# Patient Record
Sex: FEMALE | Race: BLACK OR AFRICAN AMERICAN | NOT HISPANIC OR LATINO | Employment: FULL TIME | URBAN - METROPOLITAN AREA
[De-identification: names, ages, dates, MRNs, and addresses within clinical notes are randomized per-mention and may not be internally consistent; named-entity substitution may affect disease eponyms.]

---

## 2017-01-02 ENCOUNTER — TELEPHONE (OUTPATIENT)
Dept: FAMILY MEDICINE | Facility: CLINIC | Age: 47
End: 2017-01-02

## 2017-01-02 DIAGNOSIS — E11.65 TYPE 2 DIABETES MELLITUS WITH HYPERGLYCEMIA, WITHOUT LONG-TERM CURRENT USE OF INSULIN (H): Primary | ICD-10-CM

## 2017-01-02 RX ORDER — INSULIN GLARGINE 100 [IU]/ML
55 INJECTION, SOLUTION SUBCUTANEOUS AT BEDTIME
Qty: 6 PEN | Refills: 3 | Status: SHIPPED | OUTPATIENT
Start: 2017-01-02 | End: 2017-09-13

## 2017-01-02 NOTE — TELEPHONE ENCOUNTER
Pharmacist notified.  Routing to provider please sign order for pen needles.    Catherine Nguyen RN

## 2017-01-02 NOTE — TELEPHONE ENCOUNTER
Pharmacist( Carlene) calling due to New Year Insurance change the RX for Lantus will not longer be covered , now they will cover Basaglar.  Please advise to change RX.      [facility]= [phone].    DELANEY Avila MA

## 2017-01-09 ENCOUNTER — OFFICE VISIT (OUTPATIENT)
Dept: FAMILY MEDICINE | Facility: CLINIC | Age: 47
End: 2017-01-09
Payer: COMMERCIAL

## 2017-01-09 VITALS
HEIGHT: 67 IN | TEMPERATURE: 97.1 F | HEART RATE: 90 BPM | DIASTOLIC BLOOD PRESSURE: 81 MMHG | WEIGHT: 293 LBS | SYSTOLIC BLOOD PRESSURE: 131 MMHG | BODY MASS INDEX: 45.99 KG/M2 | OXYGEN SATURATION: 100 %

## 2017-01-09 DIAGNOSIS — I10 ESSENTIAL HYPERTENSION: ICD-10-CM

## 2017-01-09 DIAGNOSIS — R82.90 UNSPECIFIED ABNORMAL FINDINGS IN URINE: ICD-10-CM

## 2017-01-09 DIAGNOSIS — E66.01 MORBID OBESITY DUE TO EXCESS CALORIES (H): ICD-10-CM

## 2017-01-09 DIAGNOSIS — R42 DIZZINESS: Primary | ICD-10-CM

## 2017-01-09 DIAGNOSIS — Z79.4 TYPE 2 DIABETES MELLITUS WITHOUT COMPLICATION, WITH LONG-TERM CURRENT USE OF INSULIN (H): ICD-10-CM

## 2017-01-09 DIAGNOSIS — E11.65 TYPE 2 DIABETES MELLITUS WITH HYPERGLYCEMIA, WITH LONG-TERM CURRENT USE OF INSULIN (H): ICD-10-CM

## 2017-01-09 DIAGNOSIS — J45.20 INTERMITTENT ASTHMA, UNCOMPLICATED: ICD-10-CM

## 2017-01-09 DIAGNOSIS — E11.9 TYPE 2 DIABETES MELLITUS WITHOUT COMPLICATION, WITH LONG-TERM CURRENT USE OF INSULIN (H): ICD-10-CM

## 2017-01-09 DIAGNOSIS — Z79.4 TYPE 2 DIABETES MELLITUS WITH HYPERGLYCEMIA, WITH LONG-TERM CURRENT USE OF INSULIN (H): ICD-10-CM

## 2017-01-09 DIAGNOSIS — I50.22 CHRONIC SYSTOLIC CONGESTIVE HEART FAILURE (H): ICD-10-CM

## 2017-01-09 LAB
ALBUMIN SERPL-MCNC: 3.6 G/DL (ref 3.4–5)
ALBUMIN UR-MCNC: NEGATIVE MG/DL
ALP SERPL-CCNC: 118 U/L (ref 40–150)
ALT SERPL W P-5'-P-CCNC: 30 U/L (ref 0–50)
ANION GAP SERPL CALCULATED.3IONS-SCNC: 8 MMOL/L (ref 3–14)
APPEARANCE UR: CLEAR
AST SERPL W P-5'-P-CCNC: 19 U/L (ref 0–45)
BACTERIA #/AREA URNS HPF: ABNORMAL /HPF
BASOPHILS # BLD AUTO: 0 10E9/L (ref 0–0.2)
BASOPHILS NFR BLD AUTO: 0.2 %
BILIRUB SERPL-MCNC: 0.4 MG/DL (ref 0.2–1.3)
BILIRUB UR QL STRIP: NEGATIVE
BUN SERPL-MCNC: 7 MG/DL (ref 7–30)
CALCIUM SERPL-MCNC: 8.8 MG/DL (ref 8.5–10.1)
CHLORIDE SERPL-SCNC: 100 MMOL/L (ref 94–109)
CO2 SERPL-SCNC: 27 MMOL/L (ref 20–32)
COLOR UR AUTO: YELLOW
CREAT SERPL-MCNC: 0.73 MG/DL (ref 0.52–1.04)
DIFFERENTIAL METHOD BLD: NORMAL
EOSINOPHIL # BLD AUTO: 0.1 10E9/L (ref 0–0.7)
EOSINOPHIL NFR BLD AUTO: 0.5 %
ERYTHROCYTE [DISTWIDTH] IN BLOOD BY AUTOMATED COUNT: 14.2 % (ref 10–15)
GFR SERPL CREATININE-BSD FRML MDRD: 85 ML/MIN/1.7M2
GLUCOSE SERPL-MCNC: 156 MG/DL (ref 70–99)
GLUCOSE UR STRIP-MCNC: NEGATIVE MG/DL
HBA1C MFR BLD: 9.8 % (ref 4.3–6)
HCT VFR BLD AUTO: 37.4 % (ref 35–47)
HGB BLD-MCNC: 11.9 G/DL (ref 11.7–15.7)
HGB UR QL STRIP: ABNORMAL
KETONES UR STRIP-MCNC: NEGATIVE MG/DL
LEUKOCYTE ESTERASE UR QL STRIP: ABNORMAL
LYMPHOCYTES # BLD AUTO: 2.9 10E9/L (ref 0.8–5.3)
LYMPHOCYTES NFR BLD AUTO: 30 %
MCH RBC QN AUTO: 26.7 PG (ref 26.5–33)
MCHC RBC AUTO-ENTMCNC: 31.8 G/DL (ref 31.5–36.5)
MCV RBC AUTO: 84 FL (ref 78–100)
MONOCYTES # BLD AUTO: 1 10E9/L (ref 0–1.3)
MONOCYTES NFR BLD AUTO: 10.4 %
NEUTROPHILS # BLD AUTO: 5.6 10E9/L (ref 1.6–8.3)
NEUTROPHILS NFR BLD AUTO: 58.9 %
NITRATE UR QL: NEGATIVE
NON-SQ EPI CELLS #/AREA URNS LPF: ABNORMAL /LPF
PH UR STRIP: 5.5 PH (ref 5–7)
PLATELET # BLD AUTO: 428 10E9/L (ref 150–450)
POTASSIUM SERPL-SCNC: 3.9 MMOL/L (ref 3.4–5.3)
PROT SERPL-MCNC: 8 G/DL (ref 6.8–8.8)
RBC # BLD AUTO: 4.45 10E12/L (ref 3.8–5.2)
RBC #/AREA URNS AUTO: ABNORMAL /HPF (ref 0–2)
SODIUM SERPL-SCNC: 135 MMOL/L (ref 133–144)
SP GR UR STRIP: 1.02 (ref 1–1.03)
URN SPEC COLLECT METH UR: ABNORMAL
UROBILINOGEN UR STRIP-ACNC: 0.2 EU/DL (ref 0.2–1)
WBC # BLD AUTO: 9.5 10E9/L (ref 4–11)
WBC #/AREA URNS AUTO: ABNORMAL /HPF (ref 0–2)

## 2017-01-09 PROCEDURE — 93000 ELECTROCARDIOGRAM COMPLETE: CPT | Performed by: FAMILY MEDICINE

## 2017-01-09 PROCEDURE — 81001 URINALYSIS AUTO W/SCOPE: CPT | Performed by: FAMILY MEDICINE

## 2017-01-09 PROCEDURE — 85025 COMPLETE CBC W/AUTO DIFF WBC: CPT | Performed by: FAMILY MEDICINE

## 2017-01-09 PROCEDURE — 80053 COMPREHEN METABOLIC PANEL: CPT | Performed by: FAMILY MEDICINE

## 2017-01-09 PROCEDURE — 83036 HEMOGLOBIN GLYCOSYLATED A1C: CPT | Performed by: FAMILY MEDICINE

## 2017-01-09 PROCEDURE — 87086 URINE CULTURE/COLONY COUNT: CPT | Performed by: FAMILY MEDICINE

## 2017-01-09 PROCEDURE — 99214 OFFICE O/P EST MOD 30 MIN: CPT | Performed by: FAMILY MEDICINE

## 2017-01-09 PROCEDURE — 36415 COLL VENOUS BLD VENIPUNCTURE: CPT | Performed by: FAMILY MEDICINE

## 2017-01-09 RX ORDER — MECLIZINE HYDROCHLORIDE 25 MG/1
25 TABLET ORAL EVERY 6 HOURS PRN
Qty: 30 TABLET | Refills: 1 | Status: SHIPPED | OUTPATIENT
Start: 2017-01-09 | End: 2017-09-13

## 2017-01-09 NOTE — MR AVS SNAPSHOT
After Visit Summary   1/9/2017    Bettina Montes    MRN: [id number]           Patient Information     Date Of Birth          1970        Visit Information        Provider Department      1/9/2017 2:00 PM Ambreen Knight MD [facility]        Today's Diagnoses     Dizziness    -  1     Chronic systolic congestive heart failure (H)         Type 2 diabetes mellitus without complication, with long-term current use of insulin (H)         Morbid obesity due to excess calories (H)         Essential hypertension         Mild persistent asthma without complication           Care Instructions    Call the imaging center at [phone] or  [phone] to schedule your echocardiogram prior to seeing the cardiologist.    [facility]    If you have any questions regarding to your visit please contact your care team:       Team Red:   Clinic Hours Telephone Number   Dr. Ambreen Pringle, NP   7am-7pm  Monday - Thursday   7am-5pm  Fridays  [phone]  (Appointment scheduling available 24/7)    Questions about your visit?   Team Line  [phone]   Urgent Care - [place] [facility] - 11am-9pm Monday-Friday Saturday-Sunday- 9am-5pm   [place] - 5pm-9pm Monday-Friday Saturday-Sunday- 9am-5pm  [phone] - [place]   [phone] - [place]       What options do I have for visits at the clinic other than the traditional office visit?  To expand how we care for you, many of our providers are utilizing electronic visits (e-visits) and telephone visits, when medically appropriate, for interactions with their patients rather than a visit in the clinic.   We also offer nurse visits for many medical concerns. Just like any other service, we will bill your insurance company for this type of visit based on time spent on the phone with your provider. Not all insurance companies cover these visits. Please check with your  medical insurance if this type of visit is covered. You will be responsible for any charges that are not paid by your insurance.      E-visits via Bauble[employer]hart:  generally incur a $35.00 fee.  Telephone visits:  Time spent on the phone: *charged based on time that is spent on the phone in increments of 10 minutes. Estimated cost:   5-10 mins $30.00   11-20 mins. $59.00   21-30 mins. $85.00     Use PacketFront (secure email communication and access to your chart) to send your primary care provider a message or make an appointment. Ask someone on your Team how to sign up for PacketFront.  For a Price Quote for your services, please call our [employer] Line at [phone].      As always, Thank you for trusting us with your health care needs!  Osmin Tylertarhoda          Follow-ups after your visit        Additional Services     CARDIOLOGY EVAL ADULT REFERRAL       Your provider [place] you to:  [facility] [place] [facility] [place] [phone]   [email or web address]    Please be aware that coverage of these services is subject to the terms and limitations of your health insurance plan.  Call member services at your health plan with any benefit or coverage questions.      Type of Referral:  New Cardiology Consult    Timeframe requested:  Within 1 month    Please bring the following to your appointment:  >>   Any x-rays, CTs or MRIs which have been performed.  Contact the facility where they were done to arrange for  prior to your scheduled appointment.    >>   List of current medications  >>   This referral request   >>   Any documents/labs given to you for this referral                  Follow-up notes from your care team     Return in about 3 months (around 4/9/2017), or if symptoms worsen or fail to improve, for diabetes (fasting labs up to one week prior).      Future tests that were ordered for you today     Open Future Orders        Priority Expected Expires  "Ordered    Echocardiogram Complete Routine  1/9/2018 1/9/2017            Who to contact     If you have questions or need follow up information about today's clinic visit or your schedule please contact [facility] directly at [phone].  Normal or non-critical lab and imaging results will be communicated to you by MyChart, letter or phone within 4 business days after the clinic has received the results. If you do not hear from us within 7 days, please contact the clinic through MyChart or phone. If you have a critical or abnormal lab result, we will notify you by phone as soon as possible.  Submit refill requests through food.[email or web address] or call your pharmacy and they will forward the refill request to us. Please allow 3 business days for your refill to be completed.          Additional Information About Your Visit        Care EveryWhere ID     This is your Care EveryWhere ID. This could be used by other organizations to access your [facility] medical records  [id number]        Your Vitals Were     Pulse Temperature Height    90 97.1  F (36.2  C) (Oral) 5' 6.81\" (1.697 m)    BMI (Body Mass Index) Pulse Oximetry Last Period    46.15 kg/m2 100% 01/09/2017 (Exact Date)    Breastfeeding?          No         Blood Pressure from Last 3 Encounters:   01/09/17 131/81   10/10/16 131/81   06/13/16 118/74    Weight from Last 3 Encounters:   01/09/17 293 lb (132.904 kg)   10/10/16 291 lb 3.2 oz (132.087 kg)   06/13/16 287 lb 6.4 oz (130.364 kg)              We Performed the Following     CARDIOLOGY EVAL ADULT REFERRAL     CBC with platelets differential     Comprehensive metabolic panel     EKG 12-lead complete w/read - Clinics     UA reflex to Microscopic and Culture          Today's Medication Changes          These changes are accurate as of: 1/9/17  2:48 PM.  If you have any questions, ask your nurse or doctor.               Start taking these medicines.        Dose/Directions    meclizine 25 MG tablet   Commonly " known as:  ANTIVERT   Used for:  Dizziness   Started by:  Ambreen Knight MD        Dose:  25 mg   Take 1 tablet (25 mg) by mouth every 6 hours as needed for dizziness or nausea   Quantity:  30 tablet   Refills:  1            Where to get your medicines      These medications were sent to [facility] [id number] [facility] [place] - [address]  [address] [place] [address]     Phone:  [phone]    - meclizine 25 MG tablet             Primary Care Provider Office Phone # Fax #    Anuja Carrollbrina Tenorio [phone]       [facility] [address]  [facility] [address]        Thank you!     Thank you for choosing [facility]  for your care. Our goal is always to provide you with excellent care. Hearing back from our patients is one way we can continue to improve our services. Please take a few minutes to complete the written survey that you may receive in the mail after your visit with us. Thank you!             Your Updated Medication List - Protect others around you: Learn how to safely use, store and throw away your medicines at www.disposemymeds.org.          This list is accurate as of: 1/9/17  2:48 PM.  Always use your most recent med list.                   Brand Name Dispense Instructions for use    furosemide 40 MG tablet    LASIX    60 tablet    TAKE 1.5 TABLETS (60 MG) BY MOUTH EVERY OTHER DAY AND 1 TABLET (40MG) BY MOUTH EVERY OTHER DAY (ALTE       hydrALAZINE 25 MG tablet    APRESOLINE    150 tablet    Take 3 tablets (75 mg) by mouth 2 times daily       * insulin glargine 100 UNIT/ML injection    LANTUS    3 Month    Take 5 units SC in the morning and 50 units SC at bedtime.       * insulin glargine 100 UNIT/ML injection     6 pen    Inject 55 Units Subcutaneous At Bedtime       insulin pen needle 31G X 5 MM    B-D U/F    100 each    Use 1 daily or as directed.       levalbuterol 1.25 MG/3ML neb solution    XOPENEX     Take 1 ampule by  nebulization every 4 hours as needed for shortness of breath / dyspnea or wheezing       losartan 50 MG tablet    COZAAR    180 tablet    Take 1 tablet (50 mg) by mouth 2 times daily       lovastatin 20 MG tablet    MEVACOR    90 tablet    Take 1 tablet (20 mg) by mouth At Bedtime       meclizine 25 MG tablet    ANTIVERT    30 tablet    Take 1 tablet (25 mg) by mouth every 6 hours as needed for dizziness or nausea       metFORMIN 500 MG tablet    GLUCOPHAGE    360 tablet    Take 2 tablets (1,000 mg) by mouth 2 times daily (with meals)       metoprolol 100 MG tablet    LOPRESSOR    180 tablet    Take 1 tablet (100 mg) by mouth 2 times daily       montelukast 10 MG tablet    SINGULAIR    90 tablet    Take 1 tablet (10 mg) by mouth At Bedtime       potassium chloride SA 20 MEQ CR tablet    potassium chloride    180 tablet    Take 1 tablet (20 mEq) by mouth 2 times daily       spironolactone 25 MG tablet    ALDACTONE    90 tablet    Take 1 tablet (25 mg) by mouth daily       TYLENOL 500 MG tablet   Generic drug:  acetaminophen      1 TABLET EVERY 4 HOURS AS NEEDED       VITAMIN D3 PO      Take 2,000 Units by mouth daily       * Notice:  This list has 2 medication(s) that are the same as other medications prescribed for you. Read the directions carefully, and ask your doctor or other care provider to review them with you.

## 2017-01-09 NOTE — Clinical Note
[facility]  [address] [place] [address]    January 11, 2017    Bettina Montes  [address] [place] [address]          Dear Bettina,  Your results are normal.  Enclosed is a copy of your results.     Results for orders placed or performed in visit on 01/09/17   CBC with platelets differential   Result Value Ref Range    WBC 9.5 4.0 - 11.0 10e9/L    RBC Count 4.45 3.8 - 5.2 10e12/L    Hemoglobin 11.9 11.7 - 15.7 g/dL    Hematocrit 37.4 35.0 - 47.0 %    MCV 84 78 - 100 fl    MCH 26.7 26.5 - 33.0 pg    MCHC 31.8 31.5 - 36.5 g/dL    RDW 14.2 10.0 - 15.0 %    Platelet Count 428 150 - 450 10e9/L    Diff Method Automated Method     % Neutrophils 58.9 %    % Lymphocytes 30.0 %    % Monocytes 10.4 %    % Eosinophils 0.5 %    % Basophils 0.2 %    Absolute Neutrophil 5.6 1.6 - 8.3 10e9/L    Absolute Lymphocytes 2.9 0.8 - 5.3 10e9/L    Absolute Monocytes 1.0 0.0 - 1.3 10e9/L    Absolute Eosinophils 0.1 0.0 - 0.7 10e9/L    Absolute Basophils 0.0 0.0 - 0.2 10e9/L   Comprehensive metabolic panel   Result Value Ref Range    Sodium 135 133 - 144 mmol/L    Potassium 3.9 3.4 - 5.3 mmol/L    Chloride 100 94 - 109 mmol/L    Carbon Dioxide 27 20 - 32 mmol/L    Anion Gap 8 3 - 14 mmol/L    Glucose 156 (H) 70 - 99 mg/dL    Urea Nitrogen 7 7 - 30 mg/dL    Creatinine 0.73 0.52 - 1.04 mg/dL    GFR Estimate 85 >60 mL/min/1.7m2    GFR Estimate If Black >90   GFR Calc   >60 mL/min/1.7m2    Calcium 8.8 8.5 - 10.1 mg/dL    Bilirubin Total 0.4 0.2 - 1.3 mg/dL    Albumin 3.6 3.4 - 5.0 g/dL    Protein Total 8.0 6.8 - 8.8 g/dL    Alkaline Phosphatase 118 40 - 150 U/L    ALT 30 0 - 50 U/L    AST 19 0 - 45 U/L   UA reflex to Microscopic and Culture   Result Value Ref Range    Color Urine Yellow     Appearance Urine Clear     Glucose Urine Negative NEG mg/dL    Bilirubin Urine Negative NEG    Ketones Urine Negative NEG mg/dL    Specific Gravity  Urine 1.025 1.003 - 1.035    Blood Urine Large (A) NEG    pH Urine 5.5 5.0 - 7.0 pH    Protein Albumin Urine Negative NEG mg/dL    Urobilinogen Urine 0.2 0.2 - 1.0 EU/dL    Nitrite Urine Negative NEG    Leukocyte Esterase Urine Small (A) NEG    Source Midstream Urine    Urine Microscopic   Result Value Ref Range    WBC Urine 2-5 (A) 0 - 2 /HPF    RBC Urine 10-25 (A) 0 - 2 /HPF    Squamous Epithelial /LPF Urine Few FEW /LPF    Bacteria Urine Few (A) NEG /HPF   Urine Culture Aerobic Bacterial   Result Value Ref Range    Specimen Description Midstream Urine     Culture Micro No growth     Micro Report Status FINAL 01/10/2017        If you have any questions or concerns, please call myself or my nurse at [phone].      Sincerely,        Ambreen Knight MD/pb

## 2017-01-09 NOTE — PATIENT INSTRUCTIONS
Call the imaging center at [phone] or  [phone] to schedule your echocardiogram prior to seeing the cardiologist.    [facility]    If you have any questions regarding to your visit please contact your care team:       Team Red:   Clinic Hours Telephone Number   Dr. Ambreen Pringle, NP   7am-7pm  Monday - Thursday   7am-5pm  Fridays  [phone]  (Appointment scheduling available 24/7)    Questions about your visit?   Team Line  [phone]   [facility] [place] [facility] 11am-9pm Monday-Friday Saturday-Sunday- 9am-5pm   [place] - 5pm-9pm Monday-Friday Saturday-Sunday- 9am-5pm  [phone] - [place]   [phone] - [place]       What options do I have for visits at the clinic other than the traditional office visit?  To expand how we care for you, many of our providers are utilizing electronic visits (e-visits) and telephone visits, when medically appropriate, for interactions with their patients rather than a visit in the clinic.   We also offer nurse visits for many medical concerns. Just like any other service, we will bill your insurance company for this type of visit based on time spent on the phone with your provider. Not all insurance companies cover these visits. Please check with your medical insurance if this type of visit is covered. You will be responsible for any charges that are not paid by your insurance.      E-visits via [employer]:  generally incur a $35.00 fee.  Telephone visits:  Time spent on the phone: *charged based on time that is spent on the phone in increments of 10 minutes. Estimated cost:   5-10 mins $30.00   11-20 mins. $59.00   21-30 mins. $85.00     Use [employer]ost (secure email communication and access to your chart) to send your primary care provider a message or make an appointment. Ask someone on your Team how to sign up for [employer] Inter[employer].  For a Price Quote for your services, please call  our Consumer Price Line at [phone].      As always, Thank you for trusting us with your health care needs!  Osmin Lyn

## 2017-01-09 NOTE — PROGRESS NOTES
SUBJECTIVE:                                                    Bettina Montes is a 46 year old morbidly obese female who presents to clinic today for the following health issues:    Dizziness      Duration: x 3 days     Description   Feeling faint:  YES  Feeling like the surroundings are moving: YES  Loss of consciousness or falls: no     Intensity:  moderate, severe    Accompanying signs and symptoms:   Nausea/vomitting: YES  Palpitations: no   Weakness in arms or legs: YES - numbness   Vision or speech changes: YES - blurred  Ringing in ears (Tinnitus): no   Hearing loss related to dizziness: no   Other (fevers/chills/sweating/dyspnea): YES - pain in the neck that goes all the way around, fatigue      History (similar episodes/head trauma/previous evaluation/recent bleeding): None    Precipitating or alleviating factors (new meds/chemicals): tylenol  Worse with activity/head movement: YES    Therapies tried and outcome: None     She denies chest pain, vomiting, shortness of breath, wheezing, recent viral upper respiratory illness, fever, cough, localized numbness, tingling, weakness, or change in bowel movements. She has some dysuria. Patient's last menstrual period was 01/09/2017 (exact date).     She is lost to follow-up with cardiology for CHF. She sees another [facility] provider for diabetes, hypertension, and hypercholesterolemia. Patient also has asthma, mild intermittent. Patient has had asthma for years. Occasional wheezing and shortness of breath are triggered by colds and relieved by albuterol.     I have reviewed the patient's medical history in detail and updated the computerized patient record.     ROS:  C: NEGATIVE for fever, chills, change in weight  I: NEGATIVE for worrisome rashes, moles or lesions  E: NEGATIVE for vision changes or irritation  ENT/MOUTH: chronic mild rhinitis   R: NEGATIVE for significant cough or SOB  CV: NEGATIVE for chest pain, palpitations or peripheral edema  GI: see HPI  "   female: menses: irregular   M: NEGATIVE for significant arthralgias or myalgia  NEURO: see HPI   ENDOCRINE: diabetes   H: NEGATIVE for bleeding problems  P: NEGATIVE for changes in mood or affect    OBJECTIVE:                                                    /81 mmHg  Pulse 90  Temp(Src) 97.1  F (36.2  C) (Oral)  Ht 5' 6.81\" (1.697 m)  Wt 293 lb (132.904 kg)  BMI 46.15 kg/m2  SpO2 100%  LMP 01/09/2017 (Exact Date)  Breastfeeding? No  Body mass index is 46.15 kg/(m^2).  GENERAL: alert, no distress and obese  EYES: Eyes grossly normal to inspection, PERRL and conjunctivae and sclerae normal  HENT: ear canals and TM's normal, nose and mouth without ulcers or lesions  NECK: no adenopathy, no asymmetry, masses, or scars and thyroid normal to palpation  RESP: lungs clear to auscultation - no rales, rhonchi or wheezes  CV: regular rate and rhythm, normal S1 S2, no S3 or S4, no murmur, click or rub, no peripheral edema and peripheral pulses strong  ABDOMEN: soft, nontender, no hepatosplenomegaly, no masses and bowel sounds normal  MS: no gross musculoskeletal defects noted, no edema  NEURO: Normal strength and tone, mentation intact and speech normal  PSYCH: mentation appears normal, affect normal/bright    Diagnostic Test Results:  Results for orders placed or performed in visit on 10/10/16   Hemoglobin A1c   Result Value Ref Range    Hemoglobin A1C 7.8 (H) 4.3 - 6.0 %   Basic metabolic panel   Result Value Ref Range    Sodium 136 133 - 144 mmol/L    Potassium 4.0 3.4 - 5.3 mmol/L    Chloride 102 94 - 109 mmol/L    Carbon Dioxide 27 20 - 32 mmol/L    Anion Gap 7 3 - 14 mmol/L    Glucose 119 (H) 70 - 99 mg/dL    Urea Nitrogen 7 7 - 30 mg/dL    Creatinine 0.58 0.52 - 1.04 mg/dL    GFR Estimate >90  Non  GFR Calc   >60 mL/min/1.7m2    GFR Estimate If Black >90   GFR Calc   >60 mL/min/1.7m2    Calcium 9.2 8.5 - 10.1 mg/dL   LDL cholesterol direct   Result Value Ref Range    " LDL Cholesterol Direct 70 <100 mg/dL        ASSESSMENT/PLAN:                                                    (R42) Dizziness  (primary encounter diagnosis)  Comment: Differential diagnoses would include: benign positional vertigo   Plan: EKG 12-lead complete w/read - Clinics,         Echocardiogram Complete, CARDIOLOGY EVAL ADULT         REFERRAL, CBC with platelets differential,         Comprehensive metabolic panel, UA reflex to         Microscopic and Culture, meclizine (ANTIVERT)         25 MG tablet        Consider physical therapy if symptoms persist; avoid aggravating activities and follow-up for worsening or new symptoms     (I50.22) Chronic systolic congestive heart failure (H)  Comment: unknown EF; euvolemic, on beta blocker and ACE/ARB   Plan: EKG 12-lead complete w/read - Clinics,         Echocardiogram Complete, CARDIOLOGY EVAL ADULT         REFERRAL, Comprehensive metabolic panel           (E11.9,  Z79.4) Type 2 diabetes mellitus without complication, with long-term current use of insulin (H)  (E66.01) Morbid obesity due to excess calories (H)  Comment: A1C      7.8   10/10/2016   Plan: Comprehensive metabolic panel        Continue insulins and metformin and follow-up with PCP as planned     (I10) Essential hypertension  Comment: Well controlled with medications without side effects.   Plan: EKG 12-lead complete w/read - Clinics,         Comprehensive metabolic panel, UA reflex to         Microscopic and Culture          (J45.2) Intermittent asthma without complication  Comment: Well controlled with medications without side effects.   Plan: ACT q 6 months      See Patient Instructions    Ambreen Knight MD  [facility]

## 2017-01-09 NOTE — NURSING NOTE
"Chief Complaint   Patient presents with     Dizziness       Initial /81 mmHg  Pulse 90  Temp(Src) 97.1  F (36.2  C) (Oral)  Ht 5' 6.81\" (1.697 m)  Wt 293 lb (132.904 kg)  BMI 46.15 kg/m2  SpO2 100%  LMP 01/09/2017 (Exact Date)  Breastfeeding? No Estimated body mass index is 46.15 kg/(m^2) as calculated from the following:    Height as of this encounter: 5' 6.81\" (1.697 m).    Weight as of this encounter: 293 lb (132.904 kg).  BP completed using cuff size: haven Lyn      "

## 2017-01-10 LAB
BACTERIA SPEC CULT: NO GROWTH
MICRO REPORT STATUS: NORMAL
SPECIMEN SOURCE: NORMAL

## 2017-01-10 ASSESSMENT — ASTHMA QUESTIONNAIRES: ACT_TOTALSCORE: 20

## 2017-01-13 NOTE — PROGRESS NOTES
Quick Note:    Ms. Montes,    Your diabetes has worsened significantly. This may account for some of your dizziness. Please take your medications as directed and cut back on sweets, regular pop, bread and pasta. Follow up in 2 months for a recheck.    Please contact the clinic if you have additional questions. Thank you.    Sincerely,    Anuja Tenorio  ______

## 2017-01-16 ENCOUNTER — RADIANT APPOINTMENT (OUTPATIENT)
Dept: CARDIOLOGY | Facility: CLINIC | Age: 47
End: 2017-01-16
Attending: FAMILY MEDICINE
Payer: COMMERCIAL

## 2017-01-16 DIAGNOSIS — R42 DIZZINESS: ICD-10-CM

## 2017-01-16 DIAGNOSIS — I50.22 CHRONIC SYSTOLIC CONGESTIVE HEART FAILURE (H): ICD-10-CM

## 2017-01-16 PROCEDURE — 93306 TTE W/DOPPLER COMPLETE: CPT | Performed by: INTERNAL MEDICINE

## 2017-01-16 RX ADMIN — Medication 3 ML: at 16:45

## 2017-01-16 NOTE — NURSING NOTE
Patient presents today for resting echo ordered by MD.     Echo Tech provided patient education about resting echo. IV started in LAC.   IV start documented in  [employer].  Echo technician completed resting echo. Patient monitored according to Optison protocol. Data transferred to [facility] for final interpretation through [employer].     Optison medication:  Amount used 3ml Optison mixed with 6ml Saline - [id number]   After completion of resting echo, IV removed.    Patient education provided about cardiology interpretation and primary provider will be notified of results.    Liberty Kang RDCS

## 2017-01-16 NOTE — Clinical Note
[facility]  [address] [facility] [facility] [place] [address]    2017    Fernando Montes  [address] [place] [address]          Dear Fernando,  Your echocardiogram is stable. Follow-up with the cardiologist as we discussed.   Enclosed is a copy of your results.     Results for orders placed or performed in visit on 17   ECHO COMPLETE WITH OPTISON    Narrative    Interpretation Summary                    [facility]  [address] [place] [address], [place]  [place] [address]        Name: FERNANDO MONTES  MRN: [id number]  : 1970  Study Date: 2017 03:52 PM  Age: 46 yrs  Gender: Female  Patient Location: [facility]  Reason For Study:     Ordering Physician: JODI MONTOYA  Referring Physician: JODI MONTOYA  Performed By: Liberty Kang RDCS     BSA: 2.4 m2  Height: 67 in  Weight: 293 lb  BP: 131/81 mmHg  ______________________________________________________________________________        Procedure  Echocardiogram with two-dimensional, color and spectral Doppler performed.  Contrast Optison. Optison (NDC #9173-6145-72) given intravenously. Patient  was given 3.0 ml mixture of 3 ml Optison and 6 ml saline. 6.0 ml wasted. IV  start location LAC .  ______________________________________________________________________________     Interpretation Summary     Right ventricular function, chamber size, wall motion, and thickness are  normal.  Mild left ventricular dilation is present. Mildly (EF 45-50%) reduced left  ventricular function is present. Traced at 45%.  ______________________________________________________________________________           Left Ventricle  Left ventricular wall thickness is normal. Mild left ventricular dilation is  present. Mildly (EF 45-50%) reduced left ventricular function is present.  Left ventricular diastolic function is indeterminate. Mild diffuse  hypokinesis  is present. No regional wall motion abnormalities are seen.     Right Ventricle  Right ventricular function, chamber size, wall motion, and thickness are  normal.  Atria  Both atria appear normal.     Mitral Valve  Trace to mild mitral insufficiency is present.     Aortic Valve  Aortic valve is normal in structure and function.     Tricuspid Valve  The tricuspid valve is normal. The peak velocity of the tricuspid regurgitant  jet is not obtainable.     Pulmonic Valve  The pulmonic valve is normal.     Vessels  The thoracic aorta is normal. The inferior vena cava cannot be assessed.  Pericardium  No pericardial effusion is present.     Compared to Previous Study  Previous study not available for comparison.     ______________________________________________________________________________  MMode/2D Measurements & Calculations  IVSd: 0.93 cm  LVIDd: 6.0 cm  LVIDs: 4.2 cm  LVPWd: 0.81 cm  FS: 29.8 %  EDV(Teich): 177.9 ml  ESV(Teich): 78.1 ml  LV mass(C)d: 205.8 grams  Ao root diam: 3.1 cm  LA dimension: 3.4 cm  asc Aorta Diam: 3.3 cm  LA/Ao: 1.1  LVOT diam: 2.2 cm  LVOT area: 3.8 cm2  LA Volume (BP): 76.0 ml     LA Volume Index (BP): 31.9 ml/m2        Doppler Measurements & Calculations  MV E max ange lluis: 47.2 cm/sec  MV A max angel luis: 60.6 cm/sec  MV E/A: 0.78  MV dec time: 0.15 sec  Lateral E/e': 6.1  Medial E/e': 7.4              ______________________________________________________________________________        Report approved by: El Barry 01/16/2017 04:57 PM          If you have any questions or concerns, please call myself or my nurse at [phone].      Sincerely,      Ambreen Knight MD /pb

## 2017-01-16 NOTE — PROGRESS NOTES
Quick Note:    Mail letter:  Your echocardiogram is stable. Follow-up with the cardiologist as we discussed.   Ambreen Knight MD    ______

## 2017-01-17 ENCOUNTER — OFFICE VISIT (OUTPATIENT)
Dept: FAMILY MEDICINE | Facility: CLINIC | Age: 47
End: 2017-01-17
Payer: COMMERCIAL

## 2017-01-17 VITALS
DIASTOLIC BLOOD PRESSURE: 78 MMHG | TEMPERATURE: 97.8 F | OXYGEN SATURATION: 99 % | HEIGHT: 67 IN | HEART RATE: 92 BPM | SYSTOLIC BLOOD PRESSURE: 124 MMHG | BODY MASS INDEX: 45.36 KG/M2 | WEIGHT: 289 LBS

## 2017-01-17 DIAGNOSIS — A59.9 TRICHOMONAS VAGINALIS INFECTION: Primary | ICD-10-CM

## 2017-01-17 DIAGNOSIS — R82.79 ABNORMAL FINDINGS ON MICROBIOLOGICAL EXAMINATION OF URINE: ICD-10-CM

## 2017-01-17 DIAGNOSIS — E11.9 TYPE 2 DIABETES MELLITUS WITHOUT COMPLICATION, WITH LONG-TERM CURRENT USE OF INSULIN (H): ICD-10-CM

## 2017-01-17 DIAGNOSIS — Z79.4 TYPE 2 DIABETES MELLITUS WITHOUT COMPLICATION, WITH LONG-TERM CURRENT USE OF INSULIN (H): ICD-10-CM

## 2017-01-17 DIAGNOSIS — N89.8 VAGINAL DISCHARGE: ICD-10-CM

## 2017-01-17 DIAGNOSIS — E66.01 MORBID OBESITY DUE TO EXCESS CALORIES (H): ICD-10-CM

## 2017-01-17 DIAGNOSIS — R31.29 MICROSCOPIC HEMATURIA: ICD-10-CM

## 2017-01-17 DIAGNOSIS — Z23 NEED FOR VACCINATION: ICD-10-CM

## 2017-01-17 DIAGNOSIS — I50.22 CHRONIC SYSTOLIC CONGESTIVE HEART FAILURE (H): ICD-10-CM

## 2017-01-17 DIAGNOSIS — Z71.89 ADVANCED DIRECTIVES, COUNSELING/DISCUSSION: ICD-10-CM

## 2017-01-17 DIAGNOSIS — Z11.3 SCREEN FOR STD (SEXUALLY TRANSMITTED DISEASE): ICD-10-CM

## 2017-01-17 PROBLEM — Z13.6 CARDIOVASCULAR SCREENING; LDL GOAL LESS THAN 100: Status: RESOLVED | Noted: 2017-01-17 | Resolved: 2017-01-17

## 2017-01-17 PROBLEM — Z13.6 CARDIOVASCULAR SCREENING; LDL GOAL LESS THAN 100: Status: ACTIVE | Noted: 2017-01-17

## 2017-01-17 LAB
ALBUMIN UR-MCNC: NEGATIVE MG/DL
APPEARANCE UR: ABNORMAL
BILIRUB UR QL STRIP: NEGATIVE
COLOR UR AUTO: YELLOW
GLUCOSE UR STRIP-MCNC: NEGATIVE MG/DL
HGB UR QL STRIP: NEGATIVE
KETONES UR STRIP-MCNC: NEGATIVE MG/DL
LEUKOCYTE ESTERASE UR QL STRIP: ABNORMAL
MICRO REPORT STATUS: ABNORMAL
NITRATE UR QL: NEGATIVE
NON-SQ EPI CELLS #/AREA URNS LPF: ABNORMAL /LPF
PH UR STRIP: 5.5 PH (ref 5–7)
RBC #/AREA URNS AUTO: ABNORMAL /HPF (ref 0–2)
SP GR UR STRIP: 1.02 (ref 1–1.03)
SPECIMEN SOURCE: ABNORMAL
URN SPEC COLLECT METH UR: ABNORMAL
UROBILINOGEN UR STRIP-ACNC: 0.2 EU/DL (ref 0.2–1)
WBC #/AREA URNS AUTO: ABNORMAL /HPF (ref 0–2)
WET PREP SPEC: ABNORMAL

## 2017-01-17 PROCEDURE — 87591 N.GONORRHOEAE DNA AMP PROB: CPT | Performed by: FAMILY MEDICINE

## 2017-01-17 PROCEDURE — 87086 URINE CULTURE/COLONY COUNT: CPT | Performed by: FAMILY MEDICINE

## 2017-01-17 PROCEDURE — 87210 SMEAR WET MOUNT SALINE/INK: CPT | Performed by: FAMILY MEDICINE

## 2017-01-17 PROCEDURE — 81001 URINALYSIS AUTO W/SCOPE: CPT | Performed by: FAMILY MEDICINE

## 2017-01-17 PROCEDURE — 87491 CHLMYD TRACH DNA AMP PROBE: CPT | Performed by: FAMILY MEDICINE

## 2017-01-17 PROCEDURE — 99214 OFFICE O/P EST MOD 30 MIN: CPT | Performed by: FAMILY MEDICINE

## 2017-01-17 RX ORDER — METRONIDAZOLE 500 MG/1
2000 TABLET ORAL ONCE
Qty: 4 TABLET | Refills: 0 | Status: SHIPPED | OUTPATIENT
Start: 2017-01-17 | End: 2017-01-17

## 2017-01-17 NOTE — MR AVS SNAPSHOT
After Visit Summary   1/17/2017    Bettina Montes    MRN: [id number]           Patient Information     Date Of Birth          1970        Visit Information        Provider Department      1/17/2017 1:20 PM Ambreen Knight MD [facility]        Today's Diagnoses     Trichomonas vaginalis infection    -  1     Vaginal discharge         Screen for STD (sexually transmitted disease)         Chronic systolic congestive heart failure (H)         Type 2 diabetes mellitus without complication, with long-term current use of insulin (H)         Morbid obesity due to excess calories (H)         Advanced directives, counseling/discussion         Need for vaccination         Abnormal findings on microbiological examination of urine           Care Instructions    [facility]    If you have any questions regarding to your visit please contact your care team:       Team Red:   Clinic Hours Telephone Number   Dr. Ambreen Pringle, NP   7am-7pm  Monday - Thursday   7am-5pm  Fridays  [phone]  (Appointment scheduling available 24/7)    Questions about your visit?   Team Line  [phone]   Urgent Care - [place] - 11am-9pm Monday-Friday Saturday-Sunday- 9am-5pm   [place] - 5pm-9pm Monday-Friday Saturday-Sunday- 9am-5pm  [phone] - [place]   [phone] - [place]       What options do I have for visits at the clinic other than the traditional office visit?  To expand how we care for you, many of our providers are utilizing electronic visits (e-visits) and telephone visits, when medically appropriate, for interactions with their patients rather than a visit in the clinic.   We also offer nurse visits for many medical concerns. Just like any other service, we will bill your insurance company for this type of visit based on time spent on the phone with your provider. Not all insurance companies  cover these visits. Please check with your medical insurance if this type of visit is covered. You will be responsible for any charges that are not paid by your insurance.      E-visits via [employer]:  generally incur a $35.00 fee.  Telephone visits:  Time spent on the phone: *charged based on time that is spent on the phone in increments of 10 minutes. Estimated cost:   5-10 mins $30.00   11-20 mins. $59.00   21-30 mins. $85.00     Use [facility] (secure email communication and access to your chart) to send your primary care provider a message or make an appointment. Ask someone on your Team how to sign up for [facility].  For a Price Quote for your services, please call our [facility] Line at [phone].      As always, Thank you for trusting us with your health care needs!          Follow-ups after your visit        Follow-up notes from your care team     Return in about 3 months (around 4/17/2017), or if symptoms worsen or fail to improve, for diabetes (fasting labs up to one week prior).      Future tests that were ordered for you today     Open Future Orders        Priority Expected Expires Ordered    HIV Antigen Antibody Combo Routine 1/18/2017 1/17/2018 1/17/2017    Hepatitis B Surface Antibody Routine 1/18/2017 1/17/2018 1/17/2017    Hepatitis B surface antigen Routine 1/18/2017 1/17/2018 1/17/2017            Who to contact     If you have questions or need follow up information about today's clinic visit or your schedule please contact [facility] directly at [phone].  Normal or non-critical lab and imaging results will be communicated to you by MyChart, letter or phone within 4 business days after the clinic has received the results. If you do not hear from us within 7 days, please contact the clinic through MyChart or phone. If you have a critical or abnormal lab result, we will notify you by phone as soon as possible.  Submit refill requests through [facility] or call your pharmacy and they  "will forward the refill request to us. Please allow 3 business days for your refill to be completed.          Additional Information About Your Visit        Care EveryWhere ID     This is your Care EveryWhere ID. This could be used by other organizations to access your [place] medical records  [id number]        Your Vitals Were     Pulse Temperature Height    92 97.8  F (36.6  C) (Oral) 5' 6.81\" (1.697 m)    BMI (Body Mass Index) Pulse Oximetry Last Period    45.52 kg/m2 99% 01/09/2017 (Exact Date)    Breastfeeding?          No         Blood Pressure from Last 3 Encounters:   01/17/17 124/78   01/09/17 131/81   10/10/16 131/81    Weight from Last 3 Encounters:   01/17/17 289 lb (131.09 kg)   01/09/17 293 lb (132.904 kg)   10/10/16 291 lb 3.2 oz (132.087 kg)              We Performed the Following     Chlamydia trachomatis PCR     Neisseria gonorrhoeae PCR     UA reflex to Microscopic and Culture     Urine Culture Aerobic Bacterial     Urine Microscopic     Wet prep          Today's Medication Changes          These changes are accurate as of: 1/17/17  2:25 PM.  If you have any questions, ask your nurse or doctor.               Start taking these medicines.        Dose/Directions    metroNIDAZOLE 500 MG tablet   Commonly known as:  FLAGYL   Used for:  Trichomonas vaginalis infection, Vaginal discharge   Started by:  Ambreen Knight MD        Dose:  2000 mg   Take 4 tablets (2,000 mg) by mouth once for 1 dose   Quantity:  4 tablet   Refills:  0         These medicines have changed or have updated prescriptions.        Dose/Directions    insulin glargine 100 UNIT/ML injection   This may have changed:  Another medication with the same name was removed. Continue taking this medication, and follow the directions you see here.   Used for:  Type 2 diabetes mellitus with hyperglycemia, without long-term current use of insulin (H)   Changed by:  Anuja Bruce MD        Dose:  55 Units   Inject 55 Units " Subcutaneous At Bedtime   Quantity:  6 pen   Refills:  3            Where to get your medicines      These medications were sent to [facility] [address] [facility] - [place] - [address]  [address] [address]     Phone:  [phone]    - metroNIDAZOLE 500 MG tablet             Primary Care Provider Office Phone # Fax #    Anuja Janay Tenorio [phone]       [facility] [address]  [facility] [address]        Thank you!     Thank you for choosing [facility]  for your care. Our goal is always to provide you with excellent care. Hearing back from our patients is one way we can continue to improve our services. Please take a few minutes to complete the written survey that you may receive in the mail after your visit with us. Thank you!             Your Updated Medication List - Protect others around you: Learn how to safely use, store and throw away your medicines at www.disposemymeds.org.          This list is accurate as of: 1/17/17  2:25 PM.  Always use your most recent med list.                   Brand Name Dispense Instructions for use    furosemide 40 MG tablet    LASIX    60 tablet    TAKE 1.5 TABLETS (60 MG) BY MOUTH EVERY OTHER DAY AND 1 TABLET (40MG) BY MOUTH EVERY OTHER DAY (ALTE       hydrALAZINE 25 MG tablet    APRESOLINE    150 tablet    Take 3 tablets (75 mg) by mouth 2 times daily       insulin glargine 100 UNIT/ML injection     6 pen    Inject 55 Units Subcutaneous At Bedtime       insulin pen needle 31G X 5 MM    B-D U/F    100 each    Use 1 daily or as directed.       levalbuterol 1.25 MG/3ML neb solution    XOPENEX     Take 1 ampule by nebulization every 4 hours as needed for shortness of breath / dyspnea or wheezing       losartan 50 MG tablet    COZAAR    180 tablet    Take 1 tablet (50 mg) by mouth 2 times daily       lovastatin 20 MG tablet    MEVACOR    90 tablet    Take 1 tablet (20 mg) by mouth At Bedtime        meclizine 25 MG tablet    ANTIVERT    30 tablet    Take 1 tablet (25 mg) by mouth every 6 hours as needed for dizziness or nausea       metFORMIN 500 MG tablet    GLUCOPHAGE    360 tablet    Take 2 tablets (1,000 mg) by mouth 2 times daily (with meals)       metoprolol 100 MG tablet    LOPRESSOR    180 tablet    Take 1 tablet (100 mg) by mouth 2 times daily       metroNIDAZOLE 500 MG tablet    FLAGYL    4 tablet    Take 4 tablets (2,000 mg) by mouth once for 1 dose       montelukast 10 MG tablet    SINGULAIR    90 tablet    Take 1 tablet (10 mg) by mouth At Bedtime       potassium chloride SA 20 MEQ CR tablet    potassium chloride    180 tablet    Take 1 tablet (20 mEq) by mouth 2 times daily       spironolactone 25 MG tablet    ALDACTONE    90 tablet    Take 1 tablet (25 mg) by mouth daily       TYLENOL 500 MG tablet   Generic drug:  acetaminophen      1 TABLET EVERY 4 HOURS AS NEEDED       VITAMIN D3 PO      Take 2,000 Units by mouth daily

## 2017-01-17 NOTE — NURSING NOTE
"Chief Complaint   Patient presents with     Vaginal Problem       Initial /78 mmHg  Pulse 92  Temp(Src) 97.8  F (36.6  C) (Oral)  Ht 5' 6.81\" (1.697 m)  Wt 289 lb (131.09 kg)  BMI 45.52 kg/m2  SpO2 99%  LMP 01/09/2017 (Exact Date) Estimated body mass index is 45.52 kg/(m^2) as calculated from the following:    Height as of this encounter: 5' 6.81\" (1.697 m).    Weight as of this encounter: 289 lb (131.09 kg).  BP completed using cuff size: large right arm    Rosario Lopez MA      "

## 2017-01-17 NOTE — PROGRESS NOTES
"  SUBJECTIVE:                                                    Bettina Montes is a 46 year old morbidly obese female who presents to clinic today for the following health issues:    Vaginal Symptoms      Duration: 3 weeks +    Description  vaginal discharge, itching, burning and odor    Intensity:  moderate    Accompanying signs and symptoms (fever/dysuria/abdominal or back pain): lower abdomen and low back pain    History  Sexually active: yes, single partner, contraception - none  Possibility of pregnancy: No  Recent antibiotic use: no     Precipitating or alleviating factors: None    Therapies tried and outcome: Monistat   Outcome: didn't help     She has history of bacterial vaginosis and yeast vaginitis. These symptoms have been recurring off and on for months. She has uncontrolled diabetes, with plan to change Lantus to Basaglar when her supply runs out. Her recent dizziness has improved. She plans to see cardiology about mild systolic CHF, likely hypertensive in etiology.     I have reviewed the patient's medical history in detail and updated the computerized patient record.     ROS:  C: NEGATIVE for fever, chills, change in weight  E/M: NEGATIVE for ear, mouth and throat problems  GI: see above    female: as above   NEURO: as above   ENDOCRINE: as above     OBJECTIVE:                                                    /78 mmHg  Pulse 92  Temp(Src) 97.8  F (36.6  C) (Oral)  Ht 5' 6.81\" (1.697 m)  Wt 289 lb (131.09 kg)  BMI 45.52 kg/m2  SpO2 99%  LMP 01/09/2017 (Exact Date)  Breastfeeding? No  Body mass index is 45.52 kg/(m^2).  GENERAL: alert, no distress and obese  MS: extremities normal- no gross deformities noted  PSYCH: mentation appears normal, affect normal/bright    Diagnostic Test Results:  Results for orders placed or performed in visit on 01/17/17   UA reflex to Microscopic and Culture   Result Value Ref Range    Color Urine Yellow     Appearance Urine Slightly Cloudy     Glucose " Urine Negative NEG mg/dL    Bilirubin Urine Negative NEG    Ketones Urine Negative NEG mg/dL    Specific Gravity Urine 1.020 1.003 - 1.035    Blood Urine Negative NEG    pH Urine 5.5 5.0 - 7.0 pH    Protein Albumin Urine Negative NEG mg/dL    Urobilinogen Urine 0.2 0.2 - 1.0 EU/dL    Nitrite Urine Negative NEG    Leukocyte Esterase Urine Small (A) NEG    Source Midstream Urine    Urine Microscopic   Result Value Ref Range    WBC Urine 2-5 (A) 0 - 2 /HPF    RBC Urine 2-5 (A) 0 - 2 /HPF    Squamous Epithelial /LPF Urine Few FEW /LPF   Wet prep   Result Value Ref Range    Specimen Description Midstream Urine     Wet Prep (A)      Trichomonas seen  No clue cells seen  No yeast seen  (Note)  SELF COLLECT.      Micro Report Status FINAL 01/17/2017         ASSESSMENT/PLAN:                                                    (A59.9) Trichomonas vaginalis infection  (primary encounter diagnosis)  Plan: UA reflex to Microscopic and Culture,         metroNIDAZOLE (FLAGYL) 500 MG tablet        Discussed risks and benefits of this medication. Prescription also called into her pharmacy for her partner. Call or return to clinic as needed if these symptoms worsen or fail to improve as anticipated.     (N89.8) Vaginal discharge  Plan: Wet prep, UA reflex to Microscopic and Culture,        Urine Microscopic, metroNIDAZOLE (FLAGYL) 500         MG tablet, Neisseria gonorrhoeae PCR, Chlamydia        trachomatis PCR          (Z11.3) Screen for STD (sexually transmitted disease)  Plan: UA reflex to Microscopic and Culture, Neisseria        gonorrhoeae PCR, Chlamydia trachomatis PCR, HIV        Antigen Antibody Combo, Hepatitis B Surface         Antibody, Hepatitis B surface antigen          (I50.22) Chronic systolic congestive heart failure (H)  Plan: UA reflex to Microscopic and Culture        See cardiology as planned     (E11.9,  Z79.4) Type 2 diabetes mellitus without complication, with long-term current use of insulin (H)  (E66.01)  Morbid obesity due to excess calories (H)  Comment: A1C      9.8   1/9/2017   Plan: UA reflex to Microscopic and Culture        Change Lantus to Basaglar as planned. Goal blood glucose <130 in AM and <180 in PM. May need to add glipizide or prandial insulin. Recommended diabetes education and follow-up labs in 3 months.      (Z71.89) Advanced directives, counseling/discussion  Plan: referral for planning       See Patient Instructions    Ambreen Knight MD  [facility]

## 2017-01-18 PROBLEM — R31.29 MICROSCOPIC HEMATURIA: Status: ACTIVE | Noted: 2017-01-18

## 2017-01-18 LAB
BACTERIA SPEC CULT: NORMAL
C TRACH DNA SPEC QL NAA+PROBE: NORMAL
MICRO REPORT STATUS: NORMAL
N GONORRHOEA DNA SPEC QL NAA+PROBE: NORMAL
SPECIMEN SOURCE: NORMAL

## 2017-01-18 NOTE — PROGRESS NOTES
Quick Note:    Please call patient:  No other cause of your symptoms was found. You have a small amount of blood in your urine. Return for lab-only recheck of your test at your convenience.     I think it would be best to further evaluate this with a CT scan and visit with our urologist. Call the imaging center at [phone] to schedule your CT. Call our appointment line at [phone] or go to [email or web address] to discuss the results with our urologist.     Ambreen Knight MD    ______

## 2017-01-23 ENCOUNTER — RADIANT APPOINTMENT (OUTPATIENT)
Dept: CT IMAGING | Facility: CLINIC | Age: 47
End: 2017-01-23
Attending: FAMILY MEDICINE
Payer: COMMERCIAL

## 2017-01-23 DIAGNOSIS — R31.29 MICROSCOPIC HEMATURIA: ICD-10-CM

## 2017-01-23 DIAGNOSIS — Q89.1 ADRENAL GLAND ANOMALY: Primary | ICD-10-CM

## 2017-01-23 LAB — RADIOLOGIST FLAGS: NORMAL

## 2017-01-23 PROCEDURE — 74178 CT ABD&PLV WO CNTR FLWD CNTR: CPT | Performed by: STUDENT IN AN ORGANIZED HEALTH CARE EDUCATION/TRAINING PROGRAM

## 2017-01-23 RX ORDER — IOPAMIDOL 755 MG/ML
134 INJECTION, SOLUTION INTRAVASCULAR ONCE
Status: COMPLETED | OUTPATIENT
Start: 2017-01-23 | End: 2017-01-23

## 2017-01-23 RX ADMIN — IOPAMIDOL 134 ML: 755 INJECTION, SOLUTION INTRAVASCULAR at 08:41

## 2017-01-24 NOTE — PROGRESS NOTES
Quick Note:    Please call patient:  No cause for blood in your urine was found. The left adrenal gland was a bit thick, and I'd like you to see the urologist to discuss these results and possible further testing of your bladder. We can safely follow-up this finding with a CT scan in 3 months. Call the imaging center at [phone] or [phone] to schedule your CT.    Ambreen Knight MD    ______

## 2017-03-01 DIAGNOSIS — I10 ESSENTIAL HYPERTENSION: ICD-10-CM

## 2017-03-01 DIAGNOSIS — I50.22 CHRONIC SYSTOLIC CONGESTIVE HEART FAILURE (H): ICD-10-CM

## 2017-03-01 NOTE — TELEPHONE ENCOUNTER
hydrALAZINE (APRESOLINE) 25 MG tablet      Last Written Prescription Date: 2/17/16  Last Fill Quantity: 150, # refills: 12    Last Office Visit with [facility], [facility] or [facility] prescribing provider:  1/17/17   Future Office Visit:        BP Readings from Last 3 Encounters:   01/17/17 124/78   01/09/17 131/81   10/10/16 131/81           Dominic Faarax  [facility]

## 2017-03-03 RX ORDER — HYDRALAZINE HYDROCHLORIDE 25 MG/1
TABLET, FILM COATED ORAL
Qty: 150 TABLET | Refills: 5 | Status: SHIPPED | OUTPATIENT
Start: 2017-03-03 | End: 2017-09-13

## 2017-03-03 NOTE — TELEPHONE ENCOUNTER
Prescription approved per [facility] Refill Protocol.  Dominique Clayton RN

## 2017-03-20 DIAGNOSIS — I10 ESSENTIAL HYPERTENSION: ICD-10-CM

## 2017-03-20 DIAGNOSIS — I50.22 CHRONIC SYSTOLIC HEART FAILURE (H): ICD-10-CM

## 2017-03-20 NOTE — TELEPHONE ENCOUNTER
furosemide (LASIX) 40 MG tablet      Last Written Prescription Date: 06/28/16  Last Fill Quantity: 60, # refills: 5  Last Office Visit with G, P or [facility] prescribing provider: 01/17/17       Potassium   Date Value Ref Range Status   01/09/2017 3.9 3.4 - 5.3 mmol/L Final     Creatinine   Date Value Ref Range Status   01/09/2017 0.73 0.52 - 1.04 mg/dL Final     BP Readings from Last 3 Encounters:   01/17/17 124/78   01/09/17 131/81   10/10/16 131/81         Renetta Edmond  [facility]

## 2017-03-22 RX ORDER — FUROSEMIDE 40 MG
TABLET ORAL
Qty: 38 TABLET | Refills: 5 | Status: SHIPPED | OUTPATIENT
Start: 2017-03-22 | End: 2017-09-13

## 2017-03-22 NOTE — TELEPHONE ENCOUNTER
Prescription approved per [facility] Refill Protocol.  Dominique Clayton RN

## 2017-04-09 DIAGNOSIS — E87.6 HYPOKALEMIA: ICD-10-CM

## 2017-04-09 NOTE — TELEPHONE ENCOUNTER
potassium chloride SA (POTASSIUM CHLORIDE) 20 MEQ tablet      Last Written Prescription Date: 10/10/16  Last Fill Quantity: 180, # refills: 1  Last Office Visit with G, P or [facility] prescribing provider: 1/17/17       Potassium   Date Value Ref Range Status   01/09/2017 3.9 3.4 - 5.3 mmol/L Final     Creatinine   Date Value Ref Range Status   01/09/2017 0.73 0.52 - 1.04 mg/dL Final     BP Readings from Last 3 Encounters:   01/17/17 124/78   01/09/17 131/81   10/10/16 131/81

## 2017-04-12 RX ORDER — POTASSIUM CHLORIDE 1500 MG/1
TABLET, EXTENDED RELEASE ORAL
Qty: 180 TABLET | Refills: 0 | Status: SHIPPED | OUTPATIENT
Start: 2017-04-12 | End: 2017-09-13

## 2017-05-09 ENCOUNTER — TELEPHONE (OUTPATIENT)
Dept: FAMILY MEDICINE | Facility: CLINIC | Age: 47
End: 2017-05-09

## 2017-05-09 NOTE — TELEPHONE ENCOUNTER
Patient states the medication she was given in January is not working. She is wanting a different medication or option for treatment. Patient declines Urgent Care. She is scheduled in the am with Dr Daniel. Informed severe or worsening symptoms to be seen tonight.    Aleena Perry RN, [facility] Triage

## 2017-05-09 NOTE — TELEPHONE ENCOUNTER
This writer attempted to contact Bettina on 05/09/17    Reason for call triage and left message to return call.    When patient calls back, please contact clinic RN team. If no one available, document that pt called and route to care team. routine priority.        Aleena Perry, RN

## 2017-05-09 NOTE — TELEPHONE ENCOUNTER
Reason for call:  Patient reporting a symptom    Symptom or request: Dizzyness, nausea, light headedness     Duration (how long have symptoms been present): 2 days    Have you been treated for this before? Yes    Additional comments: Patient states she was on pills for this before and symptoms went away and now it's back and the pill aren't helping. Please call to advise. Thanks.    Phone Number patient can be reached at:  Home number on file [phone] (home)    Best Time:  Anytime     Can we leave a detailed message on this number:  YES    Call taken on 5/9/2017 at 10:21 AM by Kamari Orourke

## 2017-05-10 ENCOUNTER — OFFICE VISIT (OUTPATIENT)
Dept: FAMILY MEDICINE | Facility: CLINIC | Age: 47
End: 2017-05-10
Payer: COMMERCIAL

## 2017-05-10 VITALS
OXYGEN SATURATION: 99 % | TEMPERATURE: 97.2 F | SYSTOLIC BLOOD PRESSURE: 134 MMHG | BODY MASS INDEX: 45.52 KG/M2 | WEIGHT: 289 LBS | HEART RATE: 87 BPM | DIASTOLIC BLOOD PRESSURE: 88 MMHG

## 2017-05-10 DIAGNOSIS — Z79.4 TYPE 2 DIABETES MELLITUS WITH HYPERGLYCEMIA, WITH LONG-TERM CURRENT USE OF INSULIN (H): ICD-10-CM

## 2017-05-10 DIAGNOSIS — I10 ESSENTIAL HYPERTENSION: ICD-10-CM

## 2017-05-10 DIAGNOSIS — R42 VERTIGO: Primary | ICD-10-CM

## 2017-05-10 DIAGNOSIS — E11.65 TYPE 2 DIABETES MELLITUS WITH HYPERGLYCEMIA, WITH LONG-TERM CURRENT USE OF INSULIN (H): ICD-10-CM

## 2017-05-10 LAB
ANION GAP SERPL CALCULATED.3IONS-SCNC: 13 MMOL/L (ref 3–14)
BUN SERPL-MCNC: 6 MG/DL (ref 7–30)
CALCIUM SERPL-MCNC: 9.2 MG/DL (ref 8.5–10.1)
CHLORIDE SERPL-SCNC: 103 MMOL/L (ref 94–109)
CO2 SERPL-SCNC: 24 MMOL/L (ref 20–32)
CREAT SERPL-MCNC: 0.7 MG/DL (ref 0.52–1.04)
ERYTHROCYTE [DISTWIDTH] IN BLOOD BY AUTOMATED COUNT: 14.4 % (ref 10–15)
GFR SERPL CREATININE-BSD FRML MDRD: 89 ML/MIN/1.7M2
GLUCOSE SERPL-MCNC: 244 MG/DL (ref 70–99)
HBA1C MFR BLD: 10.1 % (ref 4.3–6)
HCT VFR BLD AUTO: 37.1 % (ref 35–47)
HGB BLD-MCNC: 12.1 G/DL (ref 11.7–15.7)
MCH RBC QN AUTO: 27.5 PG (ref 26.5–33)
MCHC RBC AUTO-ENTMCNC: 32.6 G/DL (ref 31.5–36.5)
MCV RBC AUTO: 84 FL (ref 78–100)
PLATELET # BLD AUTO: 392 10E9/L (ref 150–450)
POTASSIUM SERPL-SCNC: 4.2 MMOL/L (ref 3.4–5.3)
RBC # BLD AUTO: 4.4 10E12/L (ref 3.8–5.2)
SODIUM SERPL-SCNC: 140 MMOL/L (ref 133–144)
TSH SERPL DL<=0.005 MIU/L-ACNC: 0.84 MU/L (ref 0.4–4)
WBC # BLD AUTO: 8.5 10E9/L (ref 4–11)

## 2017-05-10 PROCEDURE — 36415 COLL VENOUS BLD VENIPUNCTURE: CPT | Performed by: FAMILY MEDICINE

## 2017-05-10 PROCEDURE — 85027 COMPLETE CBC AUTOMATED: CPT | Performed by: FAMILY MEDICINE

## 2017-05-10 PROCEDURE — 80048 BASIC METABOLIC PNL TOTAL CA: CPT | Performed by: FAMILY MEDICINE

## 2017-05-10 PROCEDURE — 99214 OFFICE O/P EST MOD 30 MIN: CPT | Performed by: FAMILY MEDICINE

## 2017-05-10 PROCEDURE — 83036 HEMOGLOBIN GLYCOSYLATED A1C: CPT | Performed by: FAMILY MEDICINE

## 2017-05-10 PROCEDURE — 84443 ASSAY THYROID STIM HORMONE: CPT | Performed by: FAMILY MEDICINE

## 2017-05-10 RX ORDER — LEVALBUTEROL TARTRATE 45 UG/1
2 AEROSOL, METERED ORAL
COMMUNITY
Start: 2014-07-03 | End: 2018-05-02

## 2017-05-10 NOTE — PROGRESS NOTES
SUBJECTIVE:                                                    Bettina Montes is a 47 year old female who presents to clinic today for the following health issues:      Dizziness      Duration: about 3 days    Description   Feeling faint:  no   Feeling like the surroundings are moving: YES  Loss of consciousness or falls: no     Intensity:  moderate    Accompanying signs and symptoms:   Nausea/vomitting: YES  Palpitations: YES- twice--is normal  Weakness in arms or legs: no   Vision or speech changes: YES- vision  Ringing in ears (Tinnitus): no   Hearing loss related to dizziness: no   Other (fevers/chills/sweating/dyspnea): no     History (similar episodes/head trauma/previous evaluation/recent bleeding): Had this in January but it went away    Precipitating or alleviating factors (new meds/chemicals): None  Worse with activity/head movement: YES    Therapies tried and outcome: meclizine         Diabetes Follow-up      Patient is checking blood sugars: not at all    Diabetic concerns: None     Symptoms of hypoglycemia (low blood sugar): none     Paresthesias (numbness or burning in feet) or sores: No     Date of last diabetic eye exam: November of 2016     Hyperlipidemia Follow-Up      Rate your low fat/cholesterol diet?: not monitoring fat    Taking statin?  Yes, no muscle aches from statin    Other lipid medications/supplements?:  none     Hypertension Follow-up      Outpatient blood pressures are not being checked.    Low Salt Diet: not monitoring salt         Problem list and histories reviewed & adjusted, as indicated.  Additional history: as documented    Patient Active Problem List   Diagnosis     Hypertension     Type 2 diabetes mellitus (H)     CHF (congestive heart failure) (H)     Hyperlipidemia     Morbid obesity due to excess calories (H)     Intermittent asthma, uncomplicated     Microscopic hematuria     Adrenal gland anomaly     Past Surgical History:   Procedure Laterality Date     TUBAL  LIGATION         Social History   Substance Use Topics     Smoking status: Former Smoker     Packs/day: 1.00     Years: 30.00     Types: Cigarettes     Quit date: 1/9/2013     Smokeless tobacco: Not on file      Comment:       Alcohol use No     Family History   Problem Relation Age of Onset     DIABETES Mother      Hypertension Mother      Hyperlipidemia Mother      Heart Failure Mother      DIABETES Father      CANCER Paternal Grandmother      ?           Reviewed and updated as needed this visit by clinical staff  Tobacco  Allergies  Meds  Med Hx  Surg Hx  Fam Hx  Soc Hx      Reviewed and updated as needed this visit by Provider         ROS:  Constitutional, HEENT, cardiovascular, pulmonary, GI, , musculoskeletal, neuro, skin, endocrine and psych systems are negative, except as otherwise noted.    OBJECTIVE:                                                    /88  Pulse 87  Temp 97.2  F (36.2  C) (Oral)  Wt 289 lb (131.1 kg)  SpO2 99%  Breastfeeding? No  BMI 45.52 kg/m2  Body mass index is 45.52 kg/(m^2).  GENERAL: healthy, alert, no distress and obese  EYES: Eyes grossly normal to inspection and nystagmus with lateral gaze  NECK: no adenopathy, no asymmetry, masses, or scars and thyroid normal to palpation  RESP: lungs clear to auscultation - no rales, rhonchi or wheezes  CV: regular rate and rhythm, normal S1 S2, no S3 or S4, no murmur, click or rub, no peripheral edema and peripheral pulses strong  ABDOMEN: soft, nontender, no hepatosplenomegaly, no masses and bowel sounds normal  MS: no gross musculoskeletal defects noted, no edema  NEURO: Normal strength and tone, sensory exam grossly normal and mentation intact  PSYCH: mentation appears normal, affect normal/bright    Diagnostic Test Results:  none      ASSESSMENT/PLAN:                                                    1. Vertigo  Suspect BPV - check labs, continue meclizine and try exercises to move cillia.  - Hemoglobin A1c  - Basic  metabolic panel  - CBC with platelets  - TSH with free T4 reflex    2. Type 2 diabetes mellitus with hyperglycemia, with long-term current use of insulin (H)  Not previously controlled - check lab  - Hemoglobin A1c    3. Essential hypertension  Stable - check lab  - Basic metabolic panel    FUTURE APPOINTMENTS:       - Follow-up visit in 1 week if not improved or as determined by labs.    Anuja Tenorio MD  [facility]

## 2017-05-10 NOTE — NURSING NOTE
"Chief Complaint   Patient presents with     Dizziness       Initial BP (!) 156/93 (Cuff Size: Adult Large)  Pulse 87  Temp 97.2  F (36.2  C) (Oral)  Wt 289 lb (131.1 kg)  SpO2 99%  Breastfeeding? No  BMI 45.52 kg/m2 Estimated body mass index is 45.52 kg/(m^2) as calculated from the following:    Height as of 1/17/17: 5' 6.81\" (1.697 m).    Weight as of this encounter: 289 lb (131.1 kg).  Medication Reconciliation:   Liberty Max CMA  May 10, 2017 9:20 AM        "

## 2017-05-10 NOTE — PATIENT INSTRUCTIONS
Vertigo (Unknown Cause)    In addition to helping with hearing, the inner ear is part of the balance center of your body. Problems with the inner ear can a false feeling of motion. This is called vertigo. Often, it feels as if you or the room is spinning. A vertigo attack may cause sudden nausea, vomiting and heavy sweating. Severe vertigo causes a loss of balance and can cause you to fall. During vertigo, small head movements and changes in body position will often make the symptoms worse. You may also have ringing in the ears called tinnitus.  An episode of vertigo may last seconds, minutes or hours. Once you are over the first episode, it may never come back. However, symptoms may return off and on.  The cause of your vertigo is not yet known. Possible causes of vertigo include:    Inflammation of the inner ear    Disease of the nerves to the inner ear    Movement of calcium particles in the inner ear    Poor blood flow to the balance centers of the brain    Migraine headaches  Home care    If symptoms are severe, rest quietly in bed. Change positions very slowly. There is usually one position that will feel best, such as lying on one side or lying on your back with your head slightly raised on pillows.    Do not drive a car or work with dangerous machinery until symptoms have been gone for at least one week.    Take medicine as prescribed to relieve your symptoms. Unless another medicine was prescribed for symptoms of nausea, vomiting, and dizziness, you may use over-the-counter motion sickness pills. Ask your pharmacist for suggestions.  Follow-up care  Follow up with your healthcare provider or as directed. If you are referred to a specialist or for testing, make the appointment promptly.  When to seek medical advice  Call your healthcare provider if any of the following occur:    Fever of 100.4 F (38 C) or higher, or as directed by your healthcare provider    Vertigo worsens or is not controlled  by prescribed medicine     Repeated vomiting not relieved by prescribed medicine     Severe headache    Confusion    Weakness of an arm or leg or one side of the face    Difficulty with speech or vision    Loss of consciousness     Seizure    7083-6010 [employer]. [address] [place] [address], [place] [address]. All rights reserved. This information is not intended as a substitute for professional medical care. Always follow your healthcare professional's instructions.

## 2017-05-10 NOTE — MR AVS SNAPSHOT
After Visit Summary   5/10/2017    Bettina Montes    MRN: [id number]           Patient Information     Date Of Birth          1970        Visit Information        Provider Department      5/10/2017 9:00 AM Anuja Bruce MD [facility]        Today's Diagnoses     Vertigo    -  1    Type 2 diabetes mellitus with hyperglycemia, with long-term current use of insulin (H)        Essential hypertension          Care Instructions      Vertigo (Unknown Cause)    In addition to helping with hearing, the inner ear is part of the balance center of your body. Problems with the inner ear can a false feeling of motion. This is called vertigo. Often, it feels as if you or the room is spinning. A vertigo attack may cause sudden nausea, vomiting and heavy sweating. Severe vertigo causes a loss of balance and can cause you to fall. During vertigo, small head movements and changes in body position will often make the symptoms worse. You may also have ringing in the ears called tinnitus.  An episode of vertigo may last seconds, minutes or hours. Once you are over the first episode, it may never come back. However, symptoms may return off and on.  The cause of your vertigo is not yet known. Possible causes of vertigo include:    Inflammation of the inner ear    Disease of the nerves to the inner ear    Movement of calcium particles in the inner ear    Poor blood flow to the balance centers of the brain    Migraine headaches  Home care    If symptoms are severe, rest quietly in bed. Change positions very slowly. There is usually one position that will feel best, such as lying on one side or lying on your back with your head slightly raised on pillows.    Do not drive a car or work with dangerous machinery until symptoms have been gone for at least one week.    Take medicine as prescribed to relieve your symptoms. Unless another medicine was prescribed for symptoms of nausea,  vomiting, and dizziness, you may use over-the-counter motion sickness pills. Ask your pharmacist for suggestions.  Follow-up care  Follow up with your healthcare provider or as directed. If you are referred to a specialist or for testing, make the appointment promptly.  When to seek medical advice  Call your healthcare provider if any of the following occur:    Fever of 100.4 F (38 C) or higher, or as directed by your healthcare provider    Vertigo worsens or is not controlled by prescribed medicine     Repeated vomiting not relieved by prescribed medicine     Severe headache    Confusion    Weakness of an arm or leg or one side of the face    Difficulty with speech or vision    Loss of consciousness     Seizure    8672-3415 [employer]. [address] [place] [address]. All rights reserved. This information is not intended as a substitute for professional medical care. Always follow your healthcare professional's instructions.              Follow-ups after your visit        Who to contact     If you have questions or need follow up information about today's clinic visit or your schedule please contact [facility] directly at [phone].  Normal or non-critical lab and imaging results will be communicated to you by MyChart, letter or phone within 4 business days after the clinic has received the results. If you do not hear from us within 7 days, please contact the clinic through [employer] or phone. If you have a critical or abnormal lab result, we will notify you by phone as soon as possible.  Submit refill requests through [employer] or call your pharmacy and they will forward the refill request to us. Please allow 3 business days for your refill to be completed.          Additional Information About Your Visit        [employer] Information     [employer] lets you send messages to your doctor, view your test results, renew your prescriptions, schedule appointments and more. To sign  "up, go to [email or web address] . Click on \"Log in\" on the left side of the screen, which will take you to the Welcome page. Then click on \"Sign up Now\" on the right side of the page.     You will be asked to enter the access code listed below, as well as some personal information. Please follow the directions to create your username and password.     Your access code is: [email or web address]  Expires: 2017  9:40 AM     Your access code will  in 90 days. If you need help or a new code, please call your [place] clinic or [phone].        Care EveryWhere ID     This is your Care EveryWhere ID. This could be used by other organizations to access your [place] medical records  [id number]        Your Vitals Were     Pulse Temperature Pulse Oximetry Breastfeeding? BMI (Body Mass Index)       87 97.2  F (36.2  C) (Oral) 99% No 45.52 kg/m2        Blood Pressure from Last 3 Encounters:   05/10/17 134/88   17 124/78   17 131/81    Weight from Last 3 Encounters:   05/10/17 289 lb (131.1 kg)   17 289 lb (131.1 kg)   17 293 lb (132.9 kg)              We Performed the Following     Basic metabolic panel     CBC with platelets     Hemoglobin A1c     TSH with free T4 reflex        Primary Care Provider Office Phone # Fax #    Anuja Janay Tenorio [phone]       [facility] [address] [place]  [facility] [address]        Thank you!     Thank you for choosing [facility]  for your care. Our goal is always to provide you with excellent care. Hearing back from our patients is one way we can continue to improve our services. Please take a few minutes to complete the written survey that you may receive in the mail after your visit with us. Thank you!             Your Updated Medication List - Protect others around you: Learn how to safely use, store and throw away your medicines at www.disposemymeds.org.          This list is accurate as " of: 5/10/17  9:40 AM.  Always use your most recent med list.                   Brand Name Dispense Instructions for use    furosemide 40 MG tablet    LASIX    38 tablet    ALTERNATE TAKING 1.5 TABS BY MOUTH EVERY OTHER DAY AND 1 TAB EVERY OTHER DAY.       hydrALAZINE 25 MG tablet    APRESOLINE    150 tablet    TAKE 3 TABLETS BY MOUTH TWO TIMES DAILY       insulin glargine 100 UNIT/ML injection     6 pen    Inject 55 Units Subcutaneous At Bedtime       insulin pen needle 31G X 5 MM    B-D U/F    100 each    Use 1 daily or as directed.       levalbuterol 1.25 MG/3ML neb solution    XOPENEX     Take 1 ampule by nebulization every 4 hours as needed for shortness of breath / dyspnea or wheezing Reported on 5/10/2017       losartan 50 MG tablet    COZAAR    180 tablet    Take 1 tablet (50 mg) by mouth 2 times daily       lovastatin 20 MG tablet    MEVACOR    90 tablet    Take 1 tablet (20 mg) by mouth At Bedtime       meclizine 25 MG tablet    ANTIVERT    30 tablet    Take 1 tablet (25 mg) by mouth every 6 hours as needed for dizziness or nausea       metFORMIN 500 MG tablet    GLUCOPHAGE    360 tablet    Take 2 tablets (1,000 mg) by mouth 2 times daily (with meals)       metoprolol 100 MG tablet    LOPRESSOR    180 tablet    Take 1 tablet (100 mg) by mouth 2 times daily       mometasone-formoterol 200-5 MCG/ACT oral inhaler    DULERA     Inhale 2 puffs into the lungs       montelukast 10 MG tablet    SINGULAIR    90 tablet    Take 1 tablet (10 mg) by mouth At Bedtime       potassium chloride SA 20 MEQ CR tablet    K-DUR/KLOR-CON M    180 tablet    TAKE 1 TABLET BY MOUTH 2 TIMES DAILY       spironolactone 25 MG tablet    ALDACTONE    90 tablet    Take 1 tablet (25 mg) by mouth daily       TYLENOL 500 MG tablet   Generic drug:  acetaminophen      Reported on 5/10/2017       VITAMIN D3 PO      Take 2,000 Units by mouth daily       XOPENEX HFA 45 MCG/ACT Inhaler   Generic drug:  levalbuterol      Inhale 2 puffs into the  lungs

## 2017-05-10 NOTE — LETTER
[facility]   [address] [place] [address]      May 15, 2017      Bettnia Montes  [address] [place] [address]            Ms. Montes,    Your diabetes and blood sugars have worsened.  This may be the cause of your dizziness.  Make the diet changes we discussed and follow up in 2 months for a recheck.    Please contact the clinic if you have additional questions.  Thank you.    Sincerely,    Anuja Tenorio/ak        Results for orders placed or performed in visit on 05/10/17   Hemoglobin A1c   Result Value Ref Range    Hemoglobin A1C 10.1 (H) 4.3 - 6.0 %   Basic metabolic panel   Result Value Ref Range    Sodium 140 133 - 144 mmol/L    Potassium 4.2 3.4 - 5.3 mmol/L    Chloride 103 94 - 109 mmol/L    Carbon Dioxide 24 20 - 32 mmol/L    Anion Gap 13 3 - 14 mmol/L    Glucose 244 (H) 70 - 99 mg/dL    Urea Nitrogen 6 (L) 7 - 30 mg/dL    Creatinine 0.70 0.52 - 1.04 mg/dL    GFR Estimate 89 >60 mL/min/1.7m2    GFR Estimate If Black >90   GFR Calc   >60 mL/min/1.7m2    Calcium 9.2 8.5 - 10.1 mg/dL   CBC with platelets   Result Value Ref Range    WBC 8.5 4.0 - 11.0 10e9/L    RBC Count 4.40 3.8 - 5.2 10e12/L    Hemoglobin 12.1 11.7 - 15.7 g/dL    Hematocrit 37.1 35.0 - 47.0 %    MCV 84 78 - 100 fl    MCH 27.5 26.5 - 33.0 pg    MCHC 32.6 31.5 - 36.5 g/dL    RDW 14.4 10.0 - 15.0 %    Platelet Count 392 150 - 450 10e9/L   TSH with free T4 reflex   Result Value Ref Range    TSH 0.84 0.40 - 4.00 mU/L

## 2017-05-15 NOTE — PROGRESS NOTES
Ms. Montes,    Your diabetes and blood sugars have worsened.  This may be the cause of your dizziness.  Make the diet changes we discussed and follow up in 2 months for a recheck.    Please contact the clinic if you have additional questions.  Thank you.    Sincerely,    Anuja Tenorio

## 2017-06-05 ENCOUNTER — TELEPHONE (OUTPATIENT)
Dept: FAMILY MEDICINE | Facility: CLINIC | Age: 47
End: 2017-06-05

## 2017-06-05 NOTE — TELEPHONE ENCOUNTER
Called patient, unable to leave a message, will re-attempt at a later time. Poncho, Clinical RN Honey Cunningham.

## 2017-06-05 NOTE — TELEPHONE ENCOUNTER
Reason for call:  Patient reporting a symptom    Symptom or request: DIZZINESS     Duration (how long have symptoms been present):     Have you been treated for this before? Yes    Additional comments: pt seen on 5/10/2017 and still having symptoms and wants to discuss    Phone Number patient can be reached at:  Home number on file [phone] (home)    Best Time:  any    Can we leave a detailed message on this number:  YES    Call taken on 6/5/2017 at 9:16 AM by Jolanta Gonzalez

## 2017-06-06 NOTE — TELEPHONE ENCOUNTER
Patient called back. She states that the dizziness is now all day. It has lessened but is still there. States that she stumbles a lot. Review of chart noted to be seen again if not resolved. Tried to offer appointment to the patient. Patient declines, stating that she wants to know what the plan would be for an office visit. Is there something that can be done at that appointment.    Aleena Perry RN, [facility] Triage

## 2017-06-07 NOTE — TELEPHONE ENCOUNTER
Returned call to pt.  Relayed message below.  Pt will call back for appt.    Majo Pollard MA  9:59 AM 6/7/2017

## 2017-06-07 NOTE — TELEPHONE ENCOUNTER
Reason for Call:  Other     Detailed comments: plz try back sorry to miss you    Phone Number Patient can be reached at: Home number on file [phone] (home)    Best Time: any     Can we leave a detailed message on this number? YES    Call taken on 6/7/2017 at 9:56 AM by Demetra Romo

## 2017-06-07 NOTE — TELEPHONE ENCOUNTER
This writer attempted to contact Bettina on 06/07/17      Reason for call sx's and left message to return call.      When patient calls back, please contact 2nd floor [facility] Care Team (MA/TC). If no one available, document that pt called and route to care team. routine priority .          Marley Higgins MA

## 2017-07-20 DIAGNOSIS — E11.65 TYPE 2 DIABETES MELLITUS WITH HYPERGLYCEMIA, WITH LONG-TERM CURRENT USE OF INSULIN (H): ICD-10-CM

## 2017-07-20 DIAGNOSIS — I10 ESSENTIAL HYPERTENSION WITH GOAL BLOOD PRESSURE LESS THAN 140/90: ICD-10-CM

## 2017-07-20 DIAGNOSIS — E78.2 MIXED HYPERLIPIDEMIA: ICD-10-CM

## 2017-07-20 DIAGNOSIS — I50.22 CHRONIC SYSTOLIC CONGESTIVE HEART FAILURE (H): ICD-10-CM

## 2017-07-20 DIAGNOSIS — Z79.4 TYPE 2 DIABETES MELLITUS WITH HYPERGLYCEMIA, WITH LONG-TERM CURRENT USE OF INSULIN (H): ICD-10-CM

## 2017-07-21 NOTE — TELEPHONE ENCOUNTER
lovastatin (MEVACOR) 20 MG tablet     Last Written Prescription Date: 10/10/16  Last Fill Quantity: 90, # refills: 1  Last Office Visit with [facility], [facility] or [facility] prescribing provider: 05/10/17       No results found for: CHOL  No results found for: HDL  Lab Results   Component Value Date    LDL 70 10/10/2016     No results found for: TRIG  No results found for: CHOLHDLRATIO      losartan (COZAAR) 50 MG tablet      Last Written Prescription Date: 10/10/16  Last Fill Quantity: 18, # refills: 01  Last Office Visit with [facility], [facility] or [facility] prescribing provider: 05/10/17       Potassium   Date Value Ref Range Status   05/10/2017 4.2 3.4 - 5.3 mmol/L Final     Creatinine   Date Value Ref Range Status   05/10/2017 0.70 0.52 - 1.04 mg/dL Final     BP Readings from Last 3 Encounters:   05/10/17 134/88   01/17/17 124/78   01/09/17 131/81       spironolactone (ALDACTONE) 25 MG tablet      Last Written Prescription Date: 10/10/16  Last Fill Quantity: 90, # refills: 1  Last Office Visit with [facility], [facility] or [facility] prescribing provider: 05/10/17       Potassium   Date Value Ref Range Status   05/10/2017 4.2 3.4 - 5.3 mmol/L Final     Creatinine   Date Value Ref Range Status   05/10/2017 0.70 0.52 - 1.04 mg/dL Final     BP Readings from Last 3 Encounters:   05/10/17 134/88   01/17/17 124/78   01/09/17 131/81           Renetta Edmond  [facility]

## 2017-07-24 RX ORDER — SPIRONOLACTONE 25 MG/1
25 TABLET ORAL DAILY
Qty: 90 TABLET | Refills: 0 | Status: SHIPPED | OUTPATIENT
Start: 2017-07-24 | End: 2017-09-13

## 2017-07-24 RX ORDER — LOSARTAN POTASSIUM 50 MG/1
TABLET ORAL
Qty: 180 TABLET | Refills: 0 | Status: SHIPPED | OUTPATIENT
Start: 2017-07-24 | End: 2017-09-13

## 2017-07-24 RX ORDER — LOVASTATIN 20 MG
TABLET ORAL
Qty: 90 TABLET | Refills: 0 | Status: SHIPPED | OUTPATIENT
Start: 2017-07-24 | End: 2017-09-13

## 2017-07-24 NOTE — TELEPHONE ENCOUNTER
Routing refill request to provider for review/approval because:  Drug interaction warning  Labs not current:  No FLP  Catherine Albrecht RN.

## 2017-07-25 DIAGNOSIS — E11.65 TYPE 2 DIABETES MELLITUS WITH HYPERGLYCEMIA, WITH LONG-TERM CURRENT USE OF INSULIN (H): ICD-10-CM

## 2017-07-25 DIAGNOSIS — Z79.4 TYPE 2 DIABETES MELLITUS WITH HYPERGLYCEMIA, WITH LONG-TERM CURRENT USE OF INSULIN (H): ICD-10-CM

## 2017-07-26 NOTE — TELEPHONE ENCOUNTER
metFORMIN (GLUCOPHAGE) 500 MG tablet         Last Written Prescription Date: 10/10/16  Last Fill Quantity: 360, # refills: 1  Last Office Visit with [facility], [facility] or [facility] prescribing provider:  05/10/17        BP Readings from Last 3 Encounters:   05/10/17 134/88   01/17/17 124/78   01/09/17 131/81     Lab Results   Component Value Date    MICROL 21 02/17/2016     Lab Results   Component Value Date    UMALCR 12.94 02/17/2016     Creatinine   Date Value Ref Range Status   05/10/2017 0.70 0.52 - 1.04 mg/dL Final   ]  GFR Estimate   Date Value Ref Range Status   05/10/2017 89 >60 mL/min/1.7m2 Final     Comment:     Non  GFR Calc   01/09/2017 85 >60 mL/min/1.7m2 Final     Comment:     Non  GFR Calc   10/10/2016 >90  Non  GFR Calc   >60 mL/min/1.7m2 Final     GFR Estimate If Black   Date Value Ref Range Status   05/10/2017 >90   GFR Calc   >60 mL/min/1.7m2 Final   01/09/2017 >90   GFR Calc   >60 mL/min/1.7m2 Final   10/10/2016 >90   GFR Calc   >60 mL/min/1.7m2 Final     No results found for: CHOL  No results found for: HDL  Lab Results   Component Value Date    LDL 70 10/10/2016     No results found for: TRIG  No results found for: CHOLHDLRATIO  Lab Results   Component Value Date    AST 19 01/09/2017     Lab Results   Component Value Date    ALT 30 01/09/2017     Lab Results   Component Value Date    A1C 10.1 05/10/2017    A1C 9.8 01/09/2017    A1C 7.8 10/10/2016    A1C 10.7 02/17/2016     Potassium   Date Value Ref Range Status   05/10/2017 4.2 3.4 - 5.3 mmol/L Final           Renetta [facility]

## 2017-07-31 NOTE — TELEPHONE ENCOUNTER
Routing refill request to provider for review/approval because:  Labs out of range:  A1C above goal  Maria E Andrea RN

## 2017-09-05 DIAGNOSIS — Z79.4 TYPE 2 DIABETES MELLITUS WITH HYPERGLYCEMIA, WITH LONG-TERM CURRENT USE OF INSULIN (H): ICD-10-CM

## 2017-09-05 DIAGNOSIS — E11.65 TYPE 2 DIABETES MELLITUS WITH HYPERGLYCEMIA, WITH LONG-TERM CURRENT USE OF INSULIN (H): ICD-10-CM

## 2017-09-05 NOTE — TELEPHONE ENCOUNTER
Reason for Call:  Medication or medication refill:    Do you use a [facility]?  Name of the pharmacy and phone number for the current request:  [id number] [place] [facility], [place] - [address]    Name of the medication requested:  metFORMIN (GLUCOPHAGE) 500 MG tablet    Other request: Has appointment 09/13 but will run out before then. Please refill enough to tide her over until the appointment.    Can we leave a detailed message on this number? YES    Phone number patient can be reached at: Home number on file [phone] (home)    Best Time: Any    Call taken on 9/5/2017 at 5:23 PM by Willy Lezama

## 2017-09-06 NOTE — TELEPHONE ENCOUNTER
metFORMIN (GLUCOPHAGE) 500 MG tablet         Last Written Prescription Date: 07/31/17  Last Fill Quantity: 120, # refills: 0  Last Office Visit with G, P or  Health prescribing provider:  05/10/17   Next 5 appointments (look out 90 days)     Sep 13, 2017  2:20 PM CDT   PHYSICAL with Anuja Tenorio MD   [facility] ([facility]    [address] [place] [id number] [phone]                   BP Readings from Last 3 Encounters:   05/10/17 134/88   01/17/17 124/78   01/09/17 131/81     Lab Results   Component Value Date    MICROL 21 02/17/2016     Lab Results   Component Value Date    UMALCR 12.94 02/17/2016     Creatinine   Date Value Ref Range Status   05/10/2017 0.70 0.52 - 1.04 mg/dL Final   ]  GFR Estimate   Date Value Ref Range Status   05/10/2017 89 >60 mL/min/1.7m2 Final     Comment:     Non  GFR Calc   01/09/2017 85 >60 mL/min/1.7m2 Final     Comment:     Non  GFR Calc   10/10/2016 >90  Non  GFR Calc   >60 mL/min/1.7m2 Final     GFR Estimate If Black   Date Value Ref Range Status   05/10/2017 >90   GFR Calc   >60 mL/min/1.7m2 Final   01/09/2017 >90   GFR Calc   >60 mL/min/1.7m2 Final   10/10/2016 >90   GFR Calc   >60 mL/min/1.7m2 Final     No results found for: CHOL  No results found for: HDL  Lab Results   Component Value Date    LDL 70 10/10/2016     No results found for: TRIG  No results found for: CHOLHDLRATIO  Lab Results   Component Value Date    AST 19 01/09/2017     Lab Results   Component Value Date    ALT 30 01/09/2017     Lab Results   Component Value Date    A1C 10.1 05/10/2017    A1C 9.8 01/09/2017    A1C 7.8 10/10/2016    A1C 10.7 02/17/2016     Potassium   Date Value Ref Range Status   05/10/2017 4.2 3.4 - 5.3 mmol/L Final

## 2017-09-13 ENCOUNTER — OFFICE VISIT (OUTPATIENT)
Dept: FAMILY MEDICINE | Facility: CLINIC | Age: 47
End: 2017-09-13
Payer: COMMERCIAL

## 2017-09-13 VITALS
HEART RATE: 103 BPM | TEMPERATURE: 97.9 F | BODY MASS INDEX: 44.73 KG/M2 | DIASTOLIC BLOOD PRESSURE: 78 MMHG | WEIGHT: 284 LBS | OXYGEN SATURATION: 98 % | SYSTOLIC BLOOD PRESSURE: 136 MMHG

## 2017-09-13 DIAGNOSIS — I50.22 CHRONIC SYSTOLIC HEART FAILURE (H): ICD-10-CM

## 2017-09-13 DIAGNOSIS — I50.22 CHRONIC SYSTOLIC CONGESTIVE HEART FAILURE (H): ICD-10-CM

## 2017-09-13 DIAGNOSIS — I10 ESSENTIAL HYPERTENSION WITH GOAL BLOOD PRESSURE LESS THAN 140/90: ICD-10-CM

## 2017-09-13 DIAGNOSIS — E87.6 HYPOKALEMIA: ICD-10-CM

## 2017-09-13 DIAGNOSIS — Z12.39 SCREENING FOR BREAST CANCER: ICD-10-CM

## 2017-09-13 DIAGNOSIS — E78.2 MIXED HYPERLIPIDEMIA: ICD-10-CM

## 2017-09-13 DIAGNOSIS — Z00.01 ENCOUNTER FOR ROUTINE ADULT HEALTH EXAMINATION WITH ABNORMAL FINDINGS: Primary | ICD-10-CM

## 2017-09-13 DIAGNOSIS — Z23 NEED FOR PROPHYLACTIC VACCINATION AND INOCULATION AGAINST INFLUENZA: ICD-10-CM

## 2017-09-13 DIAGNOSIS — L84 CALLUS OF FOOT: ICD-10-CM

## 2017-09-13 DIAGNOSIS — E11.65 TYPE 2 DIABETES MELLITUS WITH HYPERGLYCEMIA, WITH LONG-TERM CURRENT USE OF INSULIN (H): ICD-10-CM

## 2017-09-13 DIAGNOSIS — Z79.4 TYPE 2 DIABETES MELLITUS WITH HYPERGLYCEMIA, WITH LONG-TERM CURRENT USE OF INSULIN (H): ICD-10-CM

## 2017-09-13 DIAGNOSIS — J45.30 MILD PERSISTENT ASTHMA WITHOUT COMPLICATION: ICD-10-CM

## 2017-09-13 LAB
ANION GAP SERPL CALCULATED.3IONS-SCNC: 8 MMOL/L (ref 3–14)
BUN SERPL-MCNC: 12 MG/DL (ref 7–30)
CALCIUM SERPL-MCNC: 9.5 MG/DL (ref 8.5–10.1)
CHLORIDE SERPL-SCNC: 101 MMOL/L (ref 94–109)
CHOLEST SERPL-MCNC: 146 MG/DL
CO2 SERPL-SCNC: 27 MMOL/L (ref 20–32)
CREAT SERPL-MCNC: 0.83 MG/DL (ref 0.52–1.04)
CREAT UR-MCNC: 75 MG/DL
GFR SERPL CREATININE-BSD FRML MDRD: 73 ML/MIN/1.7M2
GLUCOSE SERPL-MCNC: 158 MG/DL (ref 70–99)
HBA1C MFR BLD: 8.3 % (ref 4.3–6)
HDLC SERPL-MCNC: 54 MG/DL
LDLC SERPL CALC-MCNC: 56 MG/DL
MICROALBUMIN UR-MCNC: <5 MG/L
MICROALBUMIN/CREAT UR: NORMAL MG/G CR (ref 0–25)
NONHDLC SERPL-MCNC: 92 MG/DL
POTASSIUM SERPL-SCNC: 3.7 MMOL/L (ref 3.4–5.3)
SODIUM SERPL-SCNC: 136 MMOL/L (ref 133–144)
TRIGL SERPL-MCNC: 178 MG/DL

## 2017-09-13 PROCEDURE — 90471 IMMUNIZATION ADMIN: CPT | Performed by: FAMILY MEDICINE

## 2017-09-13 PROCEDURE — 99213 OFFICE O/P EST LOW 20 MIN: CPT | Mod: 25 | Performed by: FAMILY MEDICINE

## 2017-09-13 PROCEDURE — 11056 PARNG/CUTG B9 HYPRKR LES 2-4: CPT | Performed by: FAMILY MEDICINE

## 2017-09-13 PROCEDURE — 82043 UR ALBUMIN QUANTITATIVE: CPT | Performed by: FAMILY MEDICINE

## 2017-09-13 PROCEDURE — 83036 HEMOGLOBIN GLYCOSYLATED A1C: CPT | Performed by: FAMILY MEDICINE

## 2017-09-13 PROCEDURE — 80061 LIPID PANEL: CPT | Performed by: FAMILY MEDICINE

## 2017-09-13 PROCEDURE — 80048 BASIC METABOLIC PNL TOTAL CA: CPT | Performed by: FAMILY MEDICINE

## 2017-09-13 PROCEDURE — 99396 PREV VISIT EST AGE 40-64: CPT | Mod: 25 | Performed by: FAMILY MEDICINE

## 2017-09-13 PROCEDURE — 36415 COLL VENOUS BLD VENIPUNCTURE: CPT | Performed by: FAMILY MEDICINE

## 2017-09-13 PROCEDURE — 90686 IIV4 VACC NO PRSV 0.5 ML IM: CPT | Performed by: FAMILY MEDICINE

## 2017-09-13 RX ORDER — POTASSIUM CHLORIDE 1500 MG/1
20 TABLET, EXTENDED RELEASE ORAL 2 TIMES DAILY
Qty: 180 TABLET | Refills: 1 | Status: SHIPPED | OUTPATIENT
Start: 2017-09-13 | End: 2018-05-02

## 2017-09-13 RX ORDER — SPIRONOLACTONE 25 MG/1
25 TABLET ORAL DAILY
Qty: 90 TABLET | Refills: 1 | Status: SHIPPED | OUTPATIENT
Start: 2017-09-13 | End: 2018-05-02

## 2017-09-13 RX ORDER — LOSARTAN POTASSIUM 50 MG/1
50 TABLET ORAL DAILY
Qty: 180 TABLET | Refills: 1 | Status: SHIPPED | OUTPATIENT
Start: 2017-09-13 | End: 2018-05-02

## 2017-09-13 RX ORDER — LOVASTATIN 20 MG
20 TABLET ORAL AT BEDTIME
Qty: 90 TABLET | Refills: 3 | Status: SHIPPED | OUTPATIENT
Start: 2017-09-13 | End: 2018-05-02

## 2017-09-13 RX ORDER — INSULIN GLARGINE 100 [IU]/ML
55 INJECTION, SOLUTION SUBCUTANEOUS AT BEDTIME
Qty: 48 ML | Refills: 1 | Status: SHIPPED | OUTPATIENT
Start: 2017-09-13 | End: 2017-09-13

## 2017-09-13 RX ORDER — FUROSEMIDE 40 MG
TABLET ORAL
Qty: 120 TABLET | Refills: 1 | Status: SHIPPED | OUTPATIENT
Start: 2017-09-13 | End: 2018-02-22

## 2017-09-13 RX ORDER — MONTELUKAST SODIUM 10 MG/1
10 TABLET ORAL AT BEDTIME
Qty: 90 TABLET | Refills: 3 | Status: SHIPPED | OUTPATIENT
Start: 2017-09-13 | End: 2018-05-02

## 2017-09-13 RX ORDER — HYDRALAZINE HYDROCHLORIDE 25 MG/1
75 TABLET, FILM COATED ORAL 2 TIMES DAILY
Qty: 150 TABLET | Refills: 5 | Status: SHIPPED | OUTPATIENT
Start: 2017-09-13 | End: 2018-05-02

## 2017-09-13 RX ORDER — INSULIN GLARGINE 100 [IU]/ML
65 INJECTION, SOLUTION SUBCUTANEOUS AT BEDTIME
Qty: 48 ML | Refills: 1 | Status: SHIPPED | OUTPATIENT
Start: 2017-09-13 | End: 2018-05-02

## 2017-09-13 NOTE — NURSING NOTE
"Chief Complaint   Patient presents with     Diabetes     Follow up.     Hypertension     Follow up.     Hyperlipidemia     Follow up.       Initial /78 (BP Location: Right arm, Patient Position: Chair, Cuff Size: Adult Large)  Pulse 103  Temp 97.9  F (36.6  C) (Oral)  Wt 284 lb (128.8 kg)  SpO2 98%  Breastfeeding? No  BMI 44.73 kg/m2 Estimated body mass index is 44.73 kg/(m^2) as calculated from the following:    Height as of 1/17/17: 5' 6.81\" (1.697 m).    Weight as of this encounter: 284 lb (128.8 kg).  Medication Reconciliation: complete   An,CMA (AMAA)      "

## 2017-09-13 NOTE — PATIENT INSTRUCTIONS
Based on your medical history and these are the current health maintenance or preventive care services that you are due for (some may have been done at this visit)  Health Maintenance Due   Topic Date Due     FOOT EXAM Q1 YEAR  02/17/2017     ASTHMA ACTION PLAN Q1 YR 02/17/2017     MICROALBUMIN Q1 YEAR 02/17/2017     WELLNESS VISIT Q1 YR 02/17/2017     ASTHMA CONTROL TEST Q6 MOS  07/09/2017     A1C Q3 MO  08/10/2017     INFLUENZA VACCINE (SYSTEM ASSIGNED)  09/01/2017     LIPID MONITORING Q1 YEAR  10/10/2017         At [facility], we strive to deliver an exceptional experience to you, every time we see you.    If you receive a survey in the mail, please send us back your thoughts. We really do value your feedback.    Your care team's suggested websites for health information:  Www.[employer] : Up to date and easily searchable information on multiple topics.  Www.medlineplus.gov : medication info, interactive tutorials, watch real surgeries online  Www.familydoctor.org : good info from the Academy of Family Physicians  Www.cdc.gov : public health info, travel advisories, epidemics (H1N1)  Www.aap.org : children's health info, normal development, vaccinations  Www.health.[facility].[place].us : [place] dept of health, public health issues in [place], N1N1    How to contact your care team:   Team Zulma/Farrukh [phone]         Pharmacy [phone]    Dr. Daniel, Shazia Fuller PA-C, Dr. Sim, Darby GU CNP, Mandi Almanzar PA-C, Dr. Best, and RICCARDO Jenkins CNP    Team RNs: Aleena Muniz      Clinic hours  M-Th 7 am-7 pm   Fri 7 am-5 pm.   Urgent care M-F 11 am-9 pm,   Sat/Sun 9 am-5 pm.  Pharmacy M-Th 8 am-8 pm Fri 8 am-6 pm  Sat/Sun 9 am-5 pm.     All password changes, disabled accounts, or ID changes in [employer]/MyHealth will be done by our Access Services Department.    If you need help with your account or password, call: [phone]. Clinic staff no longer has the ability  to change passwords.     Preventive Health Recommendations  Female Ages 40 to 49    Yearly exam:     See your health care provider every year in order to  1. Review health changes.   2. Discuss preventive care.    3. Review your medicines if your doctor prescribed any.      Get a Pap test every three years (unless you have an abnormal result and your provider advises testing more often).      If you get Pap tests with HPV test, you only need to test every 5 years, unless you have an abnormal result. You do not need a Pap test if your uterus was removed (hysterectomy) and you have not had cancer.      You should be tested each year for STDs (sexually transmitted diseases), if you're at risk.       Ask your doctor if you should have a mammogram.      Have a colonoscopy (test for colon cancer) if someone in your family has had colon cancer or polyps before age 50.       Have a cholesterol test every 5 years.       Have a diabetes test (fasting glucose) after age 45. If you are at risk for diabetes, you should have this test every 3 years.    Shots: Get a flu shot each year. Get a tetanus shot every 10 years.     Nutrition:     Eat at least 5 servings of fruits and vegetables each day.    Eat whole-grain bread, whole-wheat pasta and brown rice instead of white grains and rice.    Talk to your provider about Calcium and Vitamin D.     Lifestyle    Exercise at least 150 minutes a week (an average of 30 minutes a day, 5 days a week). This will help you control your weight and prevent disease.    Limit alcohol to one drink per day.    No smoking.     Wear sunscreen to prevent skin cancer.    See your dentist every six months for an exam and cleaning.

## 2017-09-13 NOTE — PROGRESS NOTES
Injectable Influenza Immunization Documentation    1.  Are you sick today? (Fever of 100.5 or higher on the day of the clinic)   No    2.  Have you ever had Guillain-Delta Syndrome within 6 weeks of an influenza vaccionation?  No    3. Do you have a life-threatening allergy to eggs?  No    4. Do you have a life-threatening allergy to a component of the vaccine? May include antibiotics, gelatin or latex.  No     5. Have you ever had a reaction to a dose of flu vaccine that needed immediate medical attention?  No     Form completed by An,PEREZ (AMAA)       SUBJECTIVE:   CC: Bettina Montes is an 47 year old woman who presents for preventive health visit.     Healthy Habits:    Do you get at least three servings of calcium containing foods daily (dairy, green leafy vegetables, etc.)? yes    Amount of exercise or daily activities, outside of work: 0 day(s) per week    Problems taking medications regularly No    Medication side effects: No    Have you had an eye exam in the past two years? yes    Do you see a dentist twice per year? yes    Do you have sleep apnea, excessive snoring or daytime drowsiness?no      Diabetes Follow-up    Patient is checking blood sugars: once daily.  Results are as follows:       am - 98 to 135        Diabetic concerns: other - feet     Symptoms of hypoglycemia (low blood sugar): none     Paresthesias (numbness or burning in feet) or sores: Yes      Date of last diabetic eye exam: duo    Hyperlipidemia Follow-Up      Rate your low fat/cholesterol diet?: good    Taking statin?  Yes, no muscle aches from statin    Other lipid medications/supplements?:  none    Hypertension Follow-up      Outpatient blood pressures are being checked at home.  Results are pt doesn't remember the number.    Low Salt Diet: low salt        Amount of exercise or physical activity: None    Problems taking medications regularly: No    Medication side effects: none  Diet: regular (no restrictions)    Heart failure - a  little more swelling recently but doing well with additional lasix. Stopped metoprolol because she chose to.        Today's PHQ-2 Score: PHQ-2 ( 1999 Pfizer) 5/10/2017 1/17/2017   Q1: Little interest or pleasure in doing things 0 0   Q2: Feeling down, depressed or hopeless 0 0   PHQ-2 Score 0 0         Abuse: Current or Past(Physical, Sexual or Emotional)- No  Do you feel safe in your environment - Yes  Social History   Substance Use Topics     Smoking status: Former Smoker     Packs/day: 1.00     Years: 30.00     Types: Cigarettes     Quit date: 1/9/2013     Smokeless tobacco: Former User      Comment:       Alcohol use No     The patient does not drink >3 drinks per day nor >7 drinks per week.    Reviewed orders with patient.  Reviewed health maintenance and updated orders accordingly - Yes  Patient Active Problem List   Diagnosis     Hypertension     Type 2 diabetes mellitus (H)     CHF (congestive heart failure) (H)     Hyperlipidemia     Morbid obesity due to excess calories (H)     Intermittent asthma, uncomplicated     Microscopic hematuria     Adrenal gland anomaly     Past Surgical History:   Procedure Laterality Date     TUBAL LIGATION         Social History   Substance Use Topics     Smoking status: Former Smoker     Packs/day: 1.00     Years: 30.00     Types: Cigarettes     Quit date: 1/9/2013     Smokeless tobacco: Former User      Comment:       Alcohol use No     Family History   Problem Relation Age of Onset     DIABETES Mother      Hypertension Mother      Hyperlipidemia Mother      Heart Failure Mother      DIABETES Father      CANCER Paternal Grandmother      ?               Pertinent mammograms are reviewed under the imaging tab.  History of abnormal Pap smear: NO - age 30-65 PAP every 5 years with negative HPV co-testing recommended    Reviewed and updated as needed this visit by clinical staffTobacco  Allergies  Meds  Med Hx  Surg Hx  Fam Hx  Soc Hx        Reviewed and updated as needed  this visit by Provider              ROS:  C: NEGATIVE for fever, chills, change in weight  I: NEGATIVE for worrisome rashes, moles or lesions  E: NEGATIVE for vision changes or irritation  ENT: NEGATIVE for ear, mouth and throat problems  R: NEGATIVE for significant cough or SOB  B: NEGATIVE for masses, tenderness or discharge  CV: NEGATIVE for chest pain, palpitations or peripheral edema  GI: NEGATIVE for nausea, abdominal pain, heartburn, or change in bowel habits  : NEGATIVE for unusual urinary or vaginal symptoms. Periods are regular.  M: NEGATIVE for significant arthralgias or myalgia  N: NEGATIVE for weakness, dizziness or paresthesias  P: NEGATIVE for changes in mood or affect    OBJECTIVE:   /78 (BP Location: Right arm, Patient Position: Chair, Cuff Size: Adult Large)  Pulse 103  Temp 97.9  F (36.6  C) (Oral)  Wt 284 lb (128.8 kg)  SpO2 98%  Breastfeeding? No  BMI 44.73 kg/m2  EXAM:  GENERAL: healthy, alert, no distress and obese  EYES: Eyes grossly normal to inspection, PERRL and conjunctivae and sclerae normal  HENT: ear canals and TM's normal, nose and mouth without ulcers or lesions  NECK: no adenopathy, no asymmetry, masses, or scars and thyroid normal to palpation  RESP: lungs clear to auscultation - no rales, rhonchi or wheezes  BREAST: normal without masses, tenderness or nipple discharge and no palpable axillary masses or adenopathy  CV: regular rate and rhythm, normal S1 S2, no S3 or S4, no murmur, click or rub, no peripheral edema and peripheral pulses strong  ABDOMEN: soft, nontender, no hepatosplenomegaly, no masses and bowel sounds normal  MS: no gross musculoskeletal defects noted, no edema  SKIN: large callous on left plantar foot and smaller callous on right plantar foot and plantar great toe  NEURO: Normal strength and tone, mentation intact and speech normal  PSYCH: mentation appears normal, affect normal/bright    ASSESSMENT/PLAN:   1. Encounter for routine adult health  examination with abnormal findings  Routine preventive    2. Type 2 diabetes mellitus with hyperglycemia, with long-term current use of insulin (H)  Uncontrolled - check labs and increase insulin to 65 units  She will titrate to 60 for 2 weeks then increase to 65. Discussed low carb eating  - Hemoglobin A1c  - Basic metabolic panel  - Lipid panel reflex to direct LDL  - Albumin Random Urine Quantitative with Creat Ratio  - metFORMIN (GLUCOPHAGE) 500 MG tablet; Take 2 tablets (1,000 mg) by mouth 2 times daily (with meals)  Dispense: 360 tablet; Refill: 1  - losartan (COZAAR) 50 MG tablet; Take 1 tablet (50 mg) by mouth daily  Dispense: 180 tablet; Refill: 1  - insulin pen needle (ULTICARE MINI) 31G X 6 MM; Use 1 daily or as directed.  Dispense: 100 each; Refill: 3  - insulin glargine (BASAGLAR KWIKPEN) 100 UNIT/ML injection; Inject 65 Units Subcutaneous At Bedtime  Dispense: 48 mL; Refill: 1    3. Mixed hyperlipidemia  Refilled  - lovastatin (MEVACOR) 20 MG tablet; Take 1 tablet (20 mg) by mouth At Bedtime  Dispense: 90 tablet; Refill: 3    4. Essential hypertension with goal blood pressure less than 140/90  Refilled  - losartan (COZAAR) 50 MG tablet; Take 1 tablet (50 mg) by mouth daily  Dispense: 180 tablet; Refill: 1  - spironolactone (ALDACTONE) 25 MG tablet; Take 1 tablet (25 mg) by mouth daily  Dispense: 90 tablet; Refill: 1    5. Chronic systolic congestive heart failure (H)  Refilled and discussed importance of beta blocker but patient refused for now.  - losartan (COZAAR) 50 MG tablet; Take 1 tablet (50 mg) by mouth daily  Dispense: 180 tablet; Refill: 1  - spironolactone (ALDACTONE) 25 MG tablet; Take 1 tablet (25 mg) by mouth daily  Dispense: 90 tablet; Refill: 1  - hydrALAZINE (APRESOLINE) 25 MG tablet; Take 3 tablets (75 mg) by mouth 2 times daily  Dispense: 150 tablet; Refill: 5    6. Hypokalemia  Refilled  - potassium chloride SA (K-DUR/KLOR-CON M) 20 MEQ CR tablet; Take 1 tablet (20 mEq) by mouth 2  "times daily  Dispense: 180 tablet; Refill: 1    7. Chronic systolic heart failure (H)  Refilled  - furosemide (LASIX) 40 MG tablet; Take one every other day and 2 the remaining days.  Dispense: 120 tablet; Refill: 1    8. Mild persistent asthma without complication  Refilled  - montelukast (SINGULAIR) 10 MG tablet; Take 1 tablet (10 mg) by mouth At Bedtime  Dispense: 90 tablet; Refill: 3    9. Callus of foot  Verbal consent obtained.  Dermablade used to removed callous from bilateral feet and right great toe.  Patient tolerated procedure well.  - TRIM HYPERKERATOTIC SKIN LESION, ONE  - TRIM HYPERKERATOTIC SKIN LESION,2-4    10. Need for prophylactic vaccination and inoculation against influenza    - FLU VAC, SPLIT VIRUS IM > 3 YO (QUADRIVALENT) [45527]  - Vaccine Administration, Initial [01279]    11. Screening for breast cancer    - MA Screening Digital Bilateral; Future    The uses and side effects, including black box warnings as appropriate, were discussed in detail.  All patient questions were answered.  The patient was instructed to call immediately if any side effects developed.     Follow up in 2 - 3 months for recheck.    COUNSELING:   Reviewed preventive health counseling, as reflected in patient instructions       reports that she quit smoking about 4 years ago. Her smoking use included Cigarettes. She has a 30.00 pack-year smoking history. She has quit using smokeless tobacco.    Estimated body mass index is 44.73 kg/(m^2) as calculated from the following:    Height as of 1/17/17: 5' 6.81\" (1.697 m).    Weight as of this encounter: 284 lb (128.8 kg).   Weight management plan: Discussed healthy diet and exercise guidelines and patient will follow up in 3 months in clinic to re-evaluate.    Counseling Resources:  ATP IV Guidelines  Pooled Cohorts Equation Calculator  Breast Cancer Risk Calculator  FRAX Risk Assessment  ICSI Preventive Guidelines  Dietary Guidelines for Americans, 2010  USDA's MyPlate  ASA " Prophylaxis  Lung CA Screening    Anuja Tenorio MD  [facility]

## 2017-09-13 NOTE — MR AVS SNAPSHOT
After Visit Summary   9/13/2017    Bettina Montes    MRN: [id number]           Patient Information     Date Of Birth          1970        Visit Information        Provider Department      9/13/2017 2:20 PM Anuja Bruce MD [facility]        Today's Diagnoses     Encounter for routine adult health examination with abnormal findings    -  1    Type 2 diabetes mellitus with hyperglycemia, with long-term current use of insulin (H)        Mixed hyperlipidemia        Essential hypertension with goal blood pressure less than 140/90        Chronic systolic congestive heart failure (H)        Hypokalemia        Chronic systolic heart failure (H)        Mild persistent asthma without complication        Callus of foot        Need for prophylactic vaccination and inoculation against influenza        Screening for breast cancer          Care Instructions    Based on your medical history and these are the current health maintenance or preventive care services that you are due for (some may have been done at this visit)  Health Maintenance Due   Topic Date Due     FOOT EXAM Q1 YEAR  02/17/2017     ASTHMA ACTION PLAN Q1 YR  02/17/2017     MICROALBUMIN Q1 YEAR  02/17/2017     WELLNESS VISIT Q1 YR  02/17/2017     ASTHMA CONTROL TEST Q6 MOS  07/09/2017     A1C Q3 MO  08/10/2017     INFLUENZA VACCINE (SYSTEM ASSIGNED)  09/01/2017     LIPID MONITORING Q1 YEAR  10/10/2017         At [facility], we strive to deliver an exceptional experience to you, every time we see you.    If you receive a survey in the mail, please send us back your thoughts. We really do value your feedback.    Your care team's suggested websites for health information:  Www.[employer].org : Up to date and easily searchable information on multiple topics.  Www.medlineplus.gov : medication info, interactive tutorials, watch real surgeries online  Www.familydoctor.org : good info from the  Academy of Family Physicians  Www.cdc.gov : public health info, travel advisories, epidemics (H1N1)  Www.aap.org : children's health info, normal development, vaccinations  Www.health.[facility].[place].us : [place] dept of health, public health issues in [place], N1N1    How to contact your care team:   Team Zulma/Farrukh [phone]         Pharmacy [phone]    Dr. Daniel, Shazia Fuller PA-C, Dr. Sim, Darby GU CNP, Mandi Almanzar PA-C, Dr. Best, and RICCARDO Jenkins CNP    Team RNs: Aleena & Cristy      Clinic hours  M-Th 7 am-7 pm   Fri 7 am-5 pm.   Urgent care M-F 11 am-9 pm,   Sat/Sun 9 am-5 pm.  Pharmacy M-Th 8 am-8 pm Fri 8 am-6 pm  Sat/Sun 9 am-5 pm.     All password changes, disabled accounts, or ID changes in [employer]/MyHealth will be done by our Access Services Department.    If you need help with your account or password, call: [phone]. Clinic staff no longer has the ability to change passwords.             Follow-ups after your visit        Your next 10 appointments already scheduled     Sep 15, 2017  4:00 PM CDT   MA SCREENING DIGITAL BILATERAL with [id number]   [facility] ([facility]    [address] [place] [id number] [phone]           Do not use any powder, lotion or deodorant under your arms or on your breast. If you do, we will ask you to remove it before your exam.  Wear comfortable, two-piece clothing.  If you have any allergies, tell your care team.  Bring any previous mammograms from other facilities or have them mailed to the breast center.              Future tests that were ordered for you today     Open Future Orders        Priority Expected Expires Ordered    MA Screening Digital Bilateral Routine  9/13/2018 9/13/2017            Who to contact     If you have questions or need follow up information about today's clinic visit or your schedule please [facility] at  "[phone].  Normal or non-critical lab and imaging results will be communicated to you by MyChart, letter or phone within 4 business days after the clinic has received the results. If you do not hear from us within 7 days, please contact the clinic through Vgifthart or phone. If you have a critical or abnormal lab result, we will notify you by phone as soon as possible.  Submit refill requests through GetTaxi or call your pharmacy and they will forward the refill request to us. Please allow 3 business days for your refill to be completed.          Additional Information About Your Visit        VgiftharMedAware Information     GetTaxi lets you send messages to your doctor, view your test results, renew your prescriptions, schedule appointments and more. To sign up, go to [email or web address] . Click on \"Log in\" on the left side of the screen, which will take you to the Welcome page. Then click on \"Sign up Now\" on the right side of the page.     You will be asked to enter the access code listed below, as well as some personal information. Please follow the directions to create your username and password.     Your access code is: [email or web address]  Expires: 2017  3:32 PM     Your access code will  in 90 days. If you need help or a new code, please call your [place] clinic or [phone].        Care EveryWhere ID     This is your Care EveryWhere ID. This could be used by other organizations to access your [place] medical records  [id number]        Your Vitals Were     Pulse Temperature Pulse Oximetry Breastfeeding? BMI (Body Mass Index)       103 97.9  F (36.6  C) (Oral) 98% No 44.73 kg/m2        Blood Pressure from Last 3 Encounters:   17 136/78   05/10/17 134/88   17 124/78    Weight from Last 3 Encounters:   17 284 lb (128.8 kg)   05/10/17 289 lb (131.1 kg)   17 289 lb (131.1 kg)              We Performed the Following     Albumin Random Urine Quantitative with Creat Ratio     Basic " metabolic panel     FLU VAC, SPLIT VIRUS IM > 3 YO (QUADRIVALENT) [24244]     Hemoglobin A1c     Lipid panel reflex to direct LDL     TRIM HYPERKERATOTIC SKIN LESION, ONE     TRIM HYPERKERATOTIC SKIN LESION,2-4     Vaccine Administration, Initial [93355]          Today's Medication Changes          These changes are accurate as of: 9/13/17  3:32 PM.  If you have any questions, ask your nurse or doctor.               Start taking these medicines.        Dose/Directions    insulin glargine 100 UNIT/ML injection   Used for:  Type 2 diabetes mellitus with hyperglycemia, with long-term current use of insulin (H)   Started by:  Anuja Bruce MD        Dose:  65 Units   Inject 65 Units Subcutaneous At Bedtime   Quantity:  48 mL   Refills:  1       insulin pen needle 31G X 6 MM   Commonly known as:  ULTICARE MINI   Used for:  Type 2 diabetes mellitus with hyperglycemia, with long-term current use of insulin (H)   Started by:  Anuja Bruce MD        Use 1 daily or as directed.   Quantity:  100 each   Refills:  3         These medicines have changed or have updated prescriptions.        Dose/Directions    furosemide 40 MG tablet   Commonly known as:  LASIX   This may have changed:  See the new instructions.   Used for:  Chronic systolic heart failure (H)   Changed by:  Anuja Bruce MD        Take one every other day and 2 the remaining days.   Quantity:  120 tablet   Refills:  1       hydrALAZINE 25 MG tablet   Commonly known as:  APRESOLINE   This may have changed:  See the new instructions.   Used for:  Chronic systolic congestive heart failure (H)   Changed by:  Anuja Bruce MD        Dose:  75 mg   Take 3 tablets (75 mg) by mouth 2 times daily   Quantity:  150 tablet   Refills:  5       losartan 50 MG tablet   Commonly known as:  COZAAR   This may have changed:  See the new instructions.   Used for:  Essential hypertension with goal blood pressure  less than 140/90, Type 2 diabetes mellitus with hyperglycemia, with long-term current use of insulin (H), Chronic systolic congestive heart failure (H)   Changed by:  Anuja Bruce MD        Dose:  50 mg   Take 1 tablet (50 mg) by mouth daily   Quantity:  180 tablet   Refills:  1       lovastatin 20 MG tablet   Commonly known as:  MEVACOR   This may have changed:  See the new instructions.   Used for:  Mixed hyperlipidemia   Changed by:  Anuja Bruce MD        Dose:  20 mg   Take 1 tablet (20 mg) by mouth At Bedtime   Quantity:  90 tablet   Refills:  3       metFORMIN 500 MG tablet   Commonly known as:  GLUCOPHAGE   This may have changed:  additional instructions   Used for:  Type 2 diabetes mellitus with hyperglycemia, with long-term current use of insulin (H)   Changed by:  Anuja Bruce MD        Dose:  1000 mg   Take 2 tablets (1,000 mg) by mouth 2 times daily (with meals)   Quantity:  360 tablet   Refills:  1       potassium chloride SA 20 MEQ CR tablet   Commonly known as:  K-DUR/KLOR-CON M   This may have changed:  See the new instructions.   Used for:  Hypokalemia   Changed by:  Anuja Bruce MD        Dose:  20 mEq   Take 1 tablet (20 mEq) by mouth 2 times daily   Quantity:  180 tablet   Refills:  1       spironolactone 25 MG tablet   Commonly known as:  ALDACTONE   This may have changed:  additional instructions   Used for:  Essential hypertension with goal blood pressure less than 140/90, Chronic systolic congestive heart failure (H)   Changed by:  Anuja Bruce MD        Dose:  25 mg   Take 1 tablet (25 mg) by mouth daily   Quantity:  90 tablet   Refills:  1            Where to get your medicines      These medications were sent to [facility] [id number] [facility]  [address] [address] [address] [address]     Phone:  [phone]     furosemide 40 MG tablet    hydrALAZINE 25 MG tablet    insulin  glargine 100 UNIT/ML injection    insulin pen needle 31G X 6 MM    losartan 50 MG tablet    lovastatin 20 MG tablet    metFORMIN 500 MG tablet    montelukast 10 MG tablet    potassium chloride SA 20 MEQ CR tablet    spironolactone 25 MG tablet                Primary Care Provider Office Phone # Fax #    Anuja Gustafson Noah Tenorio [phone]       [address]  [facility] [address]        Equal Access to Services     [facility] : Hadii aad ku hadasho Soomaali, waaxda luqadaha, qaybta kaalmada adeegyada, waxay idiin hayaan adeeg kharash la'aan . So [facility] [phone].    ATENCIÓN: Si habla español, tiene a soliz disposición servicios gratuitos de asistencia lingüística. [facility] [phone].    We comply with applicable federal civil rights laws and [place] laws. We do not discriminate on the basis of race, color, national origin, age, disability sex, sexual orientation or gender identity.            Thank you!     Thank you for choosing [facility]  for your care. Our goal is always to provide you with excellent care. Hearing back from our patients is one way we can continue to improve our services. Please take a few minutes to complete the written survey that you may receive in the mail after your visit with us. Thank you!             Your Updated Medication List - Protect others around you: Learn how to safely use, store and throw away your medicines at www.disposemymeds.org.          This list is accurate as of: 9/13/17  3:32 PM.  Always use your most recent med list.                   Brand Name Dispense Instructions for use Diagnosis    furosemide 40 MG tablet    LASIX    120 tablet    Take one every other day and 2 the remaining days.    Chronic systolic heart failure (H)       hydrALAZINE 25 MG tablet    APRESOLINE    150 tablet    Take 3 tablets (75 mg) by mouth 2 times daily    Chronic systolic congestive heart failure (H)       insulin glargine 100  UNIT/ML injection     48 mL    Inject 65 Units Subcutaneous At Bedtime    Type 2 diabetes mellitus with hyperglycemia, with long-term current use of insulin (H)       insulin pen needle 31G X 6 MM    ULTICARE MINI    100 each    Use 1 daily or as directed.    Type 2 diabetes mellitus with hyperglycemia, with long-term current use of insulin (H)       levalbuterol 1.25 MG/3ML neb solution    XOPENEX     Take 1 ampule by nebulization every 4 hours as needed for shortness of breath / dyspnea or wheezing Reported on 5/10/2017        losartan 50 MG tablet    COZAAR    180 tablet    Take 1 tablet (50 mg) by mouth daily    Essential hypertension with goal blood pressure less than 140/90, Type 2 diabetes mellitus with hyperglycemia, with long-term current use of insulin (H), Chronic systolic congestive heart failure (H)       lovastatin 20 MG tablet    MEVACOR    90 tablet    Take 1 tablet (20 mg) by mouth At Bedtime    Mixed hyperlipidemia       metFORMIN 500 MG tablet    GLUCOPHAGE    360 tablet    Take 2 tablets (1,000 mg) by mouth 2 times daily (with meals)    Type 2 diabetes mellitus with hyperglycemia, with long-term current use of insulin (H)       mometasone-formoterol 200-5 MCG/ACT oral inhaler    DULERA     Inhale 2 puffs into the lungs        montelukast 10 MG tablet    SINGULAIR    90 tablet    Take 1 tablet (10 mg) by mouth At Bedtime    Mild persistent asthma without complication       potassium chloride SA 20 MEQ CR tablet    K-DUR/KLOR-CON M    180 tablet    Take 1 tablet (20 mEq) by mouth 2 times daily    Hypokalemia       spironolactone 25 MG tablet    ALDACTONE    90 tablet    Take 1 tablet (25 mg) by mouth daily    Essential hypertension with goal blood pressure less than 140/90, Chronic systolic congestive heart failure (H)       TYLENOL 500 MG tablet   Generic drug:  acetaminophen      Reported on 5/10/2017        VITAMIN D3 PO      Take 2,000 Units by mouth daily        XOPENEX HFA 45 MCG/ACT Inhaler    Generic drug:  levalbuterol      Inhale 2 puffs into the lungs

## 2017-09-14 ASSESSMENT — ASTHMA QUESTIONNAIRES: ACT_TOTALSCORE: 24

## 2017-09-15 ENCOUNTER — RADIANT APPOINTMENT (OUTPATIENT)
Dept: MAMMOGRAPHY | Facility: CLINIC | Age: 47
End: 2017-09-15
Payer: COMMERCIAL

## 2017-09-15 DIAGNOSIS — Z12.31 VISIT FOR SCREENING MAMMOGRAM: ICD-10-CM

## 2017-09-15 PROCEDURE — G0202 SCR MAMMO BI INCL CAD: HCPCS | Mod: TC

## 2017-09-15 NOTE — PROGRESS NOTES
Ms. Montes,    Your kidney function, cholesterol and urine tests are good for you.    Please contact the clinic if you have additional questions.  Thank you.    Sincerely,    Anuja Tenorio

## 2017-10-10 ENCOUNTER — TELEPHONE (OUTPATIENT)
Dept: FAMILY MEDICINE | Facility: CLINIC | Age: 47
End: 2017-10-10

## 2017-10-10 NOTE — LETTER
October 10, 2017      Bettina Montes  [address] [place] [address]        Dear Bettina Montes,      At [facility] we care about your health and are committed to providing quality patient care. It has come to our attention that you are due for an Asthma Control Test.     This screening tool helps us to assess how well your asthma is controlled. Good asthma control leads to less asthma symptoms and greater health. If your asthma is not in good control (score less than 20) it is recommended you be seen by your provider for medication and lifestyle adjustments    We have enclosed an  Asthma Control Test. Please complete the enclosed asthma control test even if you are feeling well. You can mail it back to our clinic or drop if off at our .     Thank you for your time and we hope this letter finds you well!      Sincerely,    Your Team at [facility]

## 2017-10-23 DIAGNOSIS — I50.22 CHRONIC SYSTOLIC CONGESTIVE HEART FAILURE (H): ICD-10-CM

## 2017-10-23 DIAGNOSIS — I10 ESSENTIAL HYPERTENSION WITH GOAL BLOOD PRESSURE LESS THAN 140/90: ICD-10-CM

## 2017-10-23 DIAGNOSIS — E11.65 TYPE 2 DIABETES MELLITUS WITH HYPERGLYCEMIA, WITH LONG-TERM CURRENT USE OF INSULIN (H): ICD-10-CM

## 2017-10-23 DIAGNOSIS — Z79.4 TYPE 2 DIABETES MELLITUS WITH HYPERGLYCEMIA, WITH LONG-TERM CURRENT USE OF INSULIN (H): ICD-10-CM

## 2017-10-26 RX ORDER — LOSARTAN POTASSIUM 50 MG/1
TABLET ORAL
Qty: 180 TABLET | Refills: 0 | OUTPATIENT
Start: 2017-10-26

## 2017-12-06 ENCOUNTER — TELEPHONE (OUTPATIENT)
Dept: FAMILY MEDICINE | Facility: CLINIC | Age: 47
End: 2017-12-06

## 2017-12-06 NOTE — TELEPHONE ENCOUNTER
Panel Management Review      Lab Results   Component Value Date    A1C 8.3 09/13/2017    A1C 10.1 05/10/2017     Last Office Visit with this department: 10/10/2017    Fail List measure: DM      Patient is due/failing the following:   A1C    Action needed:   Patient needs office visit for Diabetes check.    Type of outreach:    Phone, spoke to patient.       Questions for provider review:    None                                                                                          Shannan Franks CMA

## 2017-12-06 NOTE — LETTER
December 13, 2017          Bettina Montes  [address] [place] [address]            Dear Bettina Montes,      At [facility] we care about your health and are committed to providing quality patient care. Regular appointments are a vital part of the care and management of your health and can help prevent many of the complications that can occur.      It has come to our attention that you are due for Diabetes check.  Please call [facility] at [phone] soon to schedule your follow up appointment.    If you have transferred care to another clinic please call to inform us so that we do not continue to send you reminder letters.      Sincerely,      [facility] Care Team/tristan

## 2018-02-22 DIAGNOSIS — I50.22 CHRONIC SYSTOLIC HEART FAILURE (H): ICD-10-CM

## 2018-02-23 NOTE — TELEPHONE ENCOUNTER
"Requested Prescriptions   Pending Prescriptions Disp Refills     furosemide (LASIX) 40 MG tablet [Pharmacy Med Name: FUROSEMIDE 40 MG TABLET]  Last Written Prescription Date:  09/13/17  Last Fill Quantity: 120,  # refills: 1   Last Office Visit with [facility], P or [facility] prescribing provider:  09/13/17   Future Office Visit:    104 tablet 0     Sig: TAKE 1 TABLET BY MOUTH EVERY OTHER DAY ALTERNATING WITH 2 TABLETS    Diuretics (Including Combos) Protocol Passed    2/22/2018 10:27 PM       Passed - Blood pressure under 140/90 in past 12 months    BP Readings from Last 3 Encounters:   09/13/17 136/78   05/10/17 134/88   01/17/17 124/78                Passed - Recent or future visit with authorizing provider's specialty    Patient had office visit in the last year or has a visit in the next 30 days with authorizing provider.  See \"Patient Info\" tab in inbasket, or \"Choose Columns\" in Meds & Orders section of the refill encounter.            Passed - Patient is age 18 or older       Passed - No active pregancy on record       Passed - Normal serum creatinine on file in past 12 months    Recent Labs   Lab Test  09/13/17   1411   CR  0.83             Passed - Normal serum potassium on file in past 12 months    Recent Labs   Lab Test  09/13/17   1411   POTASSIUM  3.7                   Passed - Normal serum sodium on file in past 12 months    Recent Labs   Lab Test  09/13/17   1411   NA  136             Passed - No positive pregnancy test in past 12 months          "

## 2018-02-26 RX ORDER — FUROSEMIDE 40 MG
TABLET ORAL
Qty: 35 TABLET | Refills: 0 | Status: SHIPPED | OUTPATIENT
Start: 2018-02-26 | End: 2018-03-21

## 2018-02-26 NOTE — TELEPHONE ENCOUNTER
Routing refill request to provider for review/approval because:  RN can only give 30 day per protocol, which states by provider plan. Patient is due in March for follow up appointment. Requested amount is different that original Rx.    Aleena Perry RN, [facility]

## 2018-03-21 DIAGNOSIS — I50.22 CHRONIC SYSTOLIC HEART FAILURE (H): ICD-10-CM

## 2018-03-21 NOTE — TELEPHONE ENCOUNTER
Routing refill request to provider for review/approval because:  Zulma given x1 and patient did not follow up. No future appt scheduled at this time.  Request fails [facility] refill protocol, RN unable to fill.    Laura Medel RN  [facility]

## 2018-03-21 NOTE — TELEPHONE ENCOUNTER
"Requested Prescriptions   Pending Prescriptions Disp Refills     furosemide (LASIX) 40 MG tablet [Pharmacy Med Name: FUROSEMIDE 40 MG TABLET]    Last Written Prescription Date:  2/26/18  Last Fill Quantity: 35,  # refills: 0   Last Office Visit with G, P or [facility] prescribing provider:  9/13/17   Future Office Visit:      35 tablet 0     Sig: TAKE 1 TABLET BY MOUTH EVERY OTHER DAY. TAKE 2 TABS ON OTHER DAYS. NEEDS TO BE SEEN FOR MORE.    Diuretics (Including Combos) Protocol Passed    3/21/2018 11:40 AM       Passed - Blood pressure under 140/90 in past 12 months    BP Readings from Last 3 Encounters:   09/13/17 136/78   05/10/17 134/88   01/17/17 124/78                Passed - Recent (12 mo) or future (30 days) visit within the authorizing provider's specialty    Patient had office visit in the last 12 months or has a visit in the next 30 days with authorizing provider or within the authorizing provider's specialty.  See \"Patient Info\" tab in inbasket, or \"Choose Columns\" in Meds & Orders section of the refill encounter.           Passed - Patient is age 18 or older       Passed - No active pregancy on record       Passed - Normal serum creatinine on file in past 12 months    Recent Labs   Lab Test  09/13/17   1411   CR  0.83             Passed - Normal serum potassium on file in past 12 months    Recent Labs   Lab Test  09/13/17   1411   POTASSIUM  3.7                   Passed - Normal serum sodium on file in past 12 months    Recent Labs   Lab Test  09/13/17   1411   NA  136             Passed - No positive pregnancy test in past 12 months              Dominic Faarax  [facility]  "

## 2018-03-22 RX ORDER — FUROSEMIDE 40 MG
TABLET ORAL
Qty: 20 TABLET | Refills: 0 | Status: SHIPPED | OUTPATIENT
Start: 2018-03-22 | End: 2018-04-23

## 2018-04-12 ENCOUNTER — TELEPHONE (OUTPATIENT)
Dept: FAMILY MEDICINE | Facility: CLINIC | Age: 48
End: 2018-04-12

## 2018-04-12 NOTE — LETTER
[address] [address]  Phone: [phone]    April 12, 2018    Bettina Montes  [address] [place] [address]    Dear Bettina,    I care about your health and have reviewed your health plan. I have reviewed your medical conditions, medication list, and lab results and am making recommendations based on this review, to better manage your health.    You are in particular need of attention regarding:  -Diabetes  -High Blood Pressure    I am recommending that you:-schedule a FOLLOWUP OFFICE APPOINTMENT with me.         Here is a list of Health Maintenance topics that are due now or due soon:  Health Maintenance Due   Topic Date Due     Diabetic Foot Exam - yearly  02/17/2017     Asthma Action Plan - yearly  02/17/2017     Eye Exam - yearly  12/01/2017     A1C (Diabetes) Lab - every 3 months  12/13/2017     Liver Monitoring Lab - yearly  01/09/2018     Basic Metabolic Lab - every 6 months  03/13/2018     Asthma Control Test - every 6 months  03/13/2018     Complete Blood Count Every Year  05/10/2018       Please call us at [phone] (or use [employer]) to address the above recommendations.     Thank you for trusting [facility] and we appreciate the opportunity to serve you.  We look forward to supporting your healthcare needs in the future.    Healthy Regards,    [facility]/ Dr. Noah Tenorio.

## 2018-04-12 NOTE — TELEPHONE ENCOUNTER
Panel Management Review      Patient has the following on her problem list:   Diabetes/IVD    ASA: Failed    Last A1C  Lab Results   Component Value Date    A1C 8.3 09/13/2017    A1C 10.1 05/10/2017    A1C 9.8 01/09/2017    A1C 7.8 10/10/2016    A1C 10.7 02/17/2016     A1C tested: FAILED    Last LDL:    Lab Results   Component Value Date    CHOL 146 09/13/2017     Lab Results   Component Value Date    HDL 54 09/13/2017     Lab Results   Component Value Date    LDL 56 09/13/2017     Lab Results   Component Value Date    TRIG 178 09/13/2017     No results found for: CHOLHDLRATIO  Lab Results   Component Value Date    NHDL 92 09/13/2017       Is the patient on a Statin? YES             Is the patient on Aspirin? YES    Medications     HMG CoA Reductase Inhibitors    lovastatin (MEVACOR) 20 MG tablet        Last three blood pressure readings:  BP Readings from Last 3 Encounters:   09/13/17 136/78   05/10/17 134/88   01/17/17 124/78     Date of last diabetes office visit: 09/13/2017     Tobacco History:     History   Smoking Status     Former Smoker     Packs/day: 1.00     Years: 30.00     Types: Cigarettes     Quit date: 1/9/2013   Smokeless Tobacco     Former User     Comment:            Patient is due/failing the following:   A1C, BP CHECK and FOLLOW UP    Action needed:   Patient needs office visit for diabetes follow up.    Type of outreach:    Phone, spoke to patient.  Patient will call back to schedule     Questions for provider review:    None                                                                                                                                    Yulia Umaña, [facility]      Chart routed to Care Team.

## 2018-04-23 DIAGNOSIS — I50.22 CHRONIC SYSTOLIC HEART FAILURE (H): ICD-10-CM

## 2018-04-23 NOTE — TELEPHONE ENCOUNTER
"Requested Prescriptions   Pending Prescriptions Disp Refills     furosemide (LASIX) 40 MG tablet [Pharmacy Med Name: FUROSEMIDE 40 MG TABLET] 20 tablet 0     Sig: TAKE 1 TABLET BY MOUTH EVERY OTHER DAY. TAKE 2 TABS ON OTHER DAYS. NEEDS TO BE SEEN FOR MORE.    Diuretics (Including Combos) Protocol Passed    4/23/2018 10:33 AM       Passed - Blood pressure under 140/90 in past 12 months    BP Readings from Last 3 Encounters:   09/13/17 136/78   05/10/17 134/88   01/17/17 124/78                Passed - Recent (12 mo) or future (30 days) visit within the authorizing provider's specialty    Patient had office visit in the last 12 months or has a visit in the next 30 days with authorizing provider or within the authorizing provider's specialty.  See \"Patient Info\" tab in inbasket, or \"Choose Columns\" in Meds & Orders section of the refill encounter.           Passed - Patient is age 18 or older       Passed - No active pregancy on record       Passed - Normal serum creatinine on file in past 12 months    Recent Labs   Lab Test  09/13/17   1411   CR  0.83             Passed - Normal serum potassium on file in past 12 months    Recent Labs   Lab Test  09/13/17   1411   POTASSIUM  3.7                   Passed - Normal serum sodium on file in past 12 months    Recent Labs   Lab Test  09/13/17   1411   NA  136             Passed - No positive pregnancy test in past 12 months        Last Written Prescription Date:  4/23/18  Last Fill Quantity: 20,  # refills: 0   Last office visit: 9/13/2017 with prescribing provider:  Noah Tenorio   Future Office Visit:   Next 5 appointments (look out 90 days)     May 02, 2018  3:00 PM CDT   Office Visit with Anuja Tenorio MD   [facility] ([facility])    [address] [place] [id number] [phone]                   "

## 2018-04-25 RX ORDER — FUROSEMIDE 40 MG
TABLET ORAL
Qty: 20 TABLET | Refills: 0 | Status: SHIPPED | OUTPATIENT
Start: 2018-04-25 | End: 2018-05-02

## 2018-04-25 NOTE — TELEPHONE ENCOUNTER
Reason for Call:  Other prescription    Detailed comments: patient would like her script today. She states she's been without for 5 days    Phone Number Patient can be reached at: Home number on file [phone] (home)    Best Time: anytime     Can we leave a detailed message on this number? YES    Call taken on 4/25/2018 at 3:45 PM by Kamari Orourke

## 2018-04-26 NOTE — TELEPHONE ENCOUNTER
This writer attempted to contact patient on 04/26/18      Reason for call medication approval and office visit scheduling and left detailed message.      If patient calls back:   Schedule Office Visit appointment within 1 week with PCP, document that pt called and close encounter         Darlin Mcnulty MA

## 2018-04-30 DIAGNOSIS — I50.22 CHRONIC SYSTOLIC CONGESTIVE HEART FAILURE (H): ICD-10-CM

## 2018-04-30 NOTE — TELEPHONE ENCOUNTER
Requested Prescriptions   Pending Prescriptions Disp Refills     hydrALAZINE (APRESOLINE) 25 MG tablet [Pharmacy Med Name: HYDRALAZINE 25 MG TABLET]  Last Written Prescription Date:  09/13/17  Last Fill Quantity: 150,  # refills: 5   Last Office Visit with [facility], [facility] or [facility] prescribing provider:  09/13/17   Future Office Visit:    Next 5 appointments (look out 90 days)     May 02, 2018  3:00 PM CDT   Office Visit with Anuja Tenorio MD   [facility] ([facility])    [address] [place] [id number]   [phone]                150 tablet 5     Sig: TAKE 3 TABLETS (75 MG) BY MOUTH 2 TIMES DAILY    There is no refill protocol information for this order

## 2018-05-02 ENCOUNTER — OFFICE VISIT (OUTPATIENT)
Dept: FAMILY MEDICINE | Facility: CLINIC | Age: 48
End: 2018-05-02
Payer: COMMERCIAL

## 2018-05-02 VITALS
HEIGHT: 66 IN | SYSTOLIC BLOOD PRESSURE: 134 MMHG | RESPIRATION RATE: 16 BRPM | HEART RATE: 97 BPM | BODY MASS INDEX: 47.09 KG/M2 | TEMPERATURE: 98.1 F | DIASTOLIC BLOOD PRESSURE: 86 MMHG | WEIGHT: 293 LBS | OXYGEN SATURATION: 99 %

## 2018-05-02 DIAGNOSIS — Z79.4 TYPE 2 DIABETES MELLITUS WITH HYPERGLYCEMIA, WITH LONG-TERM CURRENT USE OF INSULIN (H): Primary | ICD-10-CM

## 2018-05-02 DIAGNOSIS — I10 ESSENTIAL HYPERTENSION WITH GOAL BLOOD PRESSURE LESS THAN 140/90: ICD-10-CM

## 2018-05-02 DIAGNOSIS — E11.65 TYPE 2 DIABETES MELLITUS WITH HYPERGLYCEMIA, WITH LONG-TERM CURRENT USE OF INSULIN (H): Primary | ICD-10-CM

## 2018-05-02 DIAGNOSIS — J45.30 MILD PERSISTENT ASTHMA WITHOUT COMPLICATION: ICD-10-CM

## 2018-05-02 DIAGNOSIS — E87.6 HYPOKALEMIA: ICD-10-CM

## 2018-05-02 DIAGNOSIS — E78.2 MIXED HYPERLIPIDEMIA: ICD-10-CM

## 2018-05-02 DIAGNOSIS — I50.22 CHRONIC SYSTOLIC CONGESTIVE HEART FAILURE (H): ICD-10-CM

## 2018-05-02 DIAGNOSIS — L84 CALLUS OF FOOT: ICD-10-CM

## 2018-05-02 LAB
ALBUMIN SERPL-MCNC: 4 G/DL (ref 3.4–5)
ALP SERPL-CCNC: 113 U/L (ref 40–150)
ALT SERPL W P-5'-P-CCNC: 34 U/L (ref 0–50)
ANION GAP SERPL CALCULATED.3IONS-SCNC: 9 MMOL/L (ref 3–14)
AST SERPL W P-5'-P-CCNC: 33 U/L (ref 0–45)
BILIRUB SERPL-MCNC: 0.6 MG/DL (ref 0.2–1.3)
BUN SERPL-MCNC: 12 MG/DL (ref 7–30)
CALCIUM SERPL-MCNC: 9.7 MG/DL (ref 8.5–10.1)
CHLORIDE SERPL-SCNC: 103 MMOL/L (ref 94–109)
CHOLEST SERPL-MCNC: 153 MG/DL
CO2 SERPL-SCNC: 28 MMOL/L (ref 20–32)
CREAT SERPL-MCNC: 0.82 MG/DL (ref 0.52–1.04)
ERYTHROCYTE [DISTWIDTH] IN BLOOD BY AUTOMATED COUNT: 14.8 % (ref 10–15)
GFR SERPL CREATININE-BSD FRML MDRD: 75 ML/MIN/1.7M2
GLUCOSE SERPL-MCNC: 73 MG/DL (ref 70–99)
HBA1C MFR BLD: 8.3 % (ref 0–5.6)
HCT VFR BLD AUTO: 36.2 % (ref 35–47)
HDLC SERPL-MCNC: 71 MG/DL
HGB BLD-MCNC: 11.8 G/DL (ref 11.7–15.7)
LDLC SERPL CALC-MCNC: 57 MG/DL
MCH RBC QN AUTO: 27.3 PG (ref 26.5–33)
MCHC RBC AUTO-ENTMCNC: 32.6 G/DL (ref 31.5–36.5)
MCV RBC AUTO: 84 FL (ref 78–100)
NONHDLC SERPL-MCNC: 82 MG/DL
PLATELET # BLD AUTO: 429 10E9/L (ref 150–450)
POTASSIUM SERPL-SCNC: 3.7 MMOL/L (ref 3.4–5.3)
PROT SERPL-MCNC: 8.2 G/DL (ref 6.8–8.8)
RBC # BLD AUTO: 4.32 10E12/L (ref 3.8–5.2)
SODIUM SERPL-SCNC: 140 MMOL/L (ref 133–144)
TRIGL SERPL-MCNC: 123 MG/DL
WBC # BLD AUTO: 11.7 10E9/L (ref 4–11)

## 2018-05-02 PROCEDURE — 80061 LIPID PANEL: CPT | Performed by: FAMILY MEDICINE

## 2018-05-02 PROCEDURE — 80053 COMPREHEN METABOLIC PANEL: CPT | Performed by: FAMILY MEDICINE

## 2018-05-02 PROCEDURE — 99214 OFFICE O/P EST MOD 30 MIN: CPT | Mod: 25 | Performed by: FAMILY MEDICINE

## 2018-05-02 PROCEDURE — 83036 HEMOGLOBIN GLYCOSYLATED A1C: CPT | Performed by: FAMILY MEDICINE

## 2018-05-02 PROCEDURE — 85027 COMPLETE CBC AUTOMATED: CPT | Performed by: FAMILY MEDICINE

## 2018-05-02 PROCEDURE — 36415 COLL VENOUS BLD VENIPUNCTURE: CPT | Performed by: FAMILY MEDICINE

## 2018-05-02 PROCEDURE — 11056 PARNG/CUTG B9 HYPRKR LES 2-4: CPT | Performed by: FAMILY MEDICINE

## 2018-05-02 RX ORDER — POTASSIUM CHLORIDE 1500 MG/1
20 TABLET, EXTENDED RELEASE ORAL 2 TIMES DAILY
Qty: 180 TABLET | Refills: 1 | Status: SHIPPED | OUTPATIENT
Start: 2018-05-02 | End: 2018-10-10

## 2018-05-02 RX ORDER — LOVASTATIN 20 MG
20 TABLET ORAL AT BEDTIME
Qty: 90 TABLET | Refills: 3 | Status: SHIPPED | OUTPATIENT
Start: 2018-05-02 | End: 2019-06-16

## 2018-05-02 RX ORDER — LEVALBUTEROL TARTRATE 45 UG/1
2 AEROSOL, METERED ORAL EVERY 4 HOURS PRN
Qty: 15 G | Refills: 3 | Status: SHIPPED | OUTPATIENT
Start: 2018-05-02

## 2018-05-02 RX ORDER — MONTELUKAST SODIUM 10 MG/1
10 TABLET ORAL AT BEDTIME
Qty: 90 TABLET | Refills: 3 | Status: SHIPPED | OUTPATIENT
Start: 2018-05-02 | End: 2019-05-16

## 2018-05-02 RX ORDER — LOSARTAN POTASSIUM 50 MG/1
50 TABLET ORAL DAILY
Qty: 90 TABLET | Refills: 1 | Status: SHIPPED | OUTPATIENT
Start: 2018-05-02 | End: 2018-10-10

## 2018-05-02 RX ORDER — HYDRALAZINE HYDROCHLORIDE 25 MG/1
75 TABLET, FILM COATED ORAL 2 TIMES DAILY
Qty: 180 TABLET | Refills: 5 | Status: SHIPPED | OUTPATIENT
Start: 2018-05-02 | End: 2018-10-10

## 2018-05-02 RX ORDER — INSULIN GLARGINE 100 [IU]/ML
65 INJECTION, SOLUTION SUBCUTANEOUS AT BEDTIME
Qty: 48 ML | Refills: 1 | Status: SHIPPED | OUTPATIENT
Start: 2018-05-02 | End: 2019-02-19

## 2018-05-02 RX ORDER — FUROSEMIDE 40 MG
TABLET ORAL
Qty: 120 TABLET | Refills: 3 | Status: SHIPPED | OUTPATIENT
Start: 2018-05-02 | End: 2018-10-10

## 2018-05-02 RX ORDER — SPIRONOLACTONE 25 MG/1
25 TABLET ORAL DAILY
Qty: 90 TABLET | Refills: 1 | Status: SHIPPED | OUTPATIENT
Start: 2018-05-02 | End: 2018-10-10

## 2018-05-02 ASSESSMENT — PAIN SCALES - GENERAL: PAINLEVEL: NO PAIN (0)

## 2018-05-02 NOTE — LETTER
My Asthma Action Plan  Name: Bettina Montes   YOB: 1970  Date: 5/2/2018   My doctor: Anuja Tenorio MD   My clinic: [facility]        My Control Medicine: Mometasone + formoterol (Dulera) -  200/5 mcg 2 puffs twice daily  My Rescue Medicine: Levalbuterol (Xopenex) inhaler 2 puffs every 4 hours as needed for cough or shortness of breath   My Asthma Severity: moderate persistent  Avoid your asthma triggers: smoke, upper respiratory infections, dust mites, pollens, mold, humidity, strong odors and fumes, exercise or sports and emotions               GREEN ZONE   Good Control    I feel good    No cough or wheeze    Can work, sleep and play without asthma symptoms       Take your asthma control medicine every day.     1. If exercise triggers your asthma, take your rescue medication    15 minutes before exercise or sports, and    During exercise if you have asthma symptoms  2. Spacer to use with inhaler: If you have a spacer, make sure to use it with your inhaler             YELLOW ZONE Getting Worse  I have ANY of these:    I do not feel good    Cough or wheeze    Chest feels tight    Wake up at night   1. Keep taking your Green Zone medications  2. Start taking your rescue medicine:    every 20 minutes for up to 1 hour. Then every 4 hours for 24-48 hours.  3. If you stay in the Yellow Zone for more than 12-24 hours, contact your doctor.  4. If you do not return to the Green Zone in 12-24 hours or you get worse, start taking your oral steroid medicine if prescribed by your provider.           RED ZONE Medical Alert - Get Help  I have ANY of these:    I feel awful    Medicine is not helping    Breathing getting harder    Trouble walking or talking    Nose opens wide to breathe       1. Take your rescue medicine NOW  2. If your provider has prescribed an oral steroid medicine, start taking it NOW  3. Call your doctor NOW  4. If you are still in the Red Zone after 20 minutes  and you have not reached your doctor:    Take your rescue medicine again and    Call 911 or go to the emergency room right away    See your regular doctor within 2 weeks of an Emergency Room or Urgent Care visit for follow-up treatment.          Annual Reminders:  Meet with Asthma Educator,  Flu Shot in the Fall, consider Pneumonia Vaccination for patients with asthma (aged 19 and older).    Pharmacy:    [id number] [place] [facility] [place], [place] - [address] [facility] [address] [place] [facility] [place], [place] [facility] [address] [facility]                      Asthma Triggers  How To Control Things That Make Your Asthma Worse    Triggers are things that make your asthma worse.  Look at the list below to help you find your triggers and what you can do about them.  You can help prevent asthma flare-ups by staying away from your triggers.      Trigger                                                          What you can do   Cigarette Smoke  Tobacco smoke can make asthma worse. Do not allow smoking in your home, car or around you.  Be sure no one smokes at a child s day care or school.  If you smoke, ask your health care provider for ways to help you quit.  Ask family members to quit too.  Ask your health care provider for a referral to Quit Plan to help you quit smoking, or call 3-751-879-PLAN.     Colds, Flu, Bronchitis  These are common triggers of asthma. Wash your hands often.  Don t touch your eyes, nose or mouth.  Get a flu shot every year.     Dust Mites  These are tiny bugs that live in cloth or carpet. They are too small to see. Wash sheets and blankets in hot water every week.   Encase pillows and mattress in dust mite proof covers.  Avoid having carpet if you can. If you have carpet, vacuum weekly.   Use a dust mask and HEPA vacuum.   Pollen and Outdoor Mold  Some people are allergic to trees, grass, or weed pollen, or molds. Try to keep your windows closed.  Limit time out doors when pollen count is high.   Ask you health care  provider about taking medicine during allergy season.     Animal Dander  Some people are allergic to skin flakes, urine or saliva from pets with fur or feathers. Keep pets with fur or feathers out of your home.    If you can t keep the pet outdoors, then keep the pet out of your bedroom.  Keep the bedroom door closed.  Keep pets off cloth furniture and away from stuffed toys.     Mice, Rats, and Cockroaches  Some people are allergic to the waste from these pests.   Cover food and garbage.  Clean up spills and food crumbs.  Store grease in the refrigerator.   Keep food out of the bedroom.   Indoor Mold  This can be a trigger if your home has high moisture. Fix leaking faucets, pipes, or other sources of water.   Clean moldy surfaces.  Dehumidify basement if it is damp and smelly.   Smoke, Strong Odors, and Sprays  These can reduce air quality. Stay away from strong odors and sprays, such as perfume, powder, hair spray, paints, smoke incense, paint, cleaning products, candles and new carpet.   Exercise or Sports  Some people with asthma have this trigger. Be active!  Ask your doctor about taking medicine before sports or exercise to prevent symptoms.    Warm up for 5-10 minutes before and after sports or exercise.     Other Triggers of Asthma  Cold air:  Cover your nose and mouth with a scarf.  Sometimes laughing or crying can be a trigger.  Some medicines and food can trigger asthma.

## 2018-05-02 NOTE — MR AVS SNAPSHOT
After Visit Summary   5/2/2018    Bettina Montes    MRN: [id number]           Patient Information     Date Of Birth          1970        Visit Information        Provider Department      5/2/2018 3:00 PM Anuja Bruce MD [facility]        Today's Diagnoses     Type 2 diabetes mellitus with hyperglycemia, with long-term current use of insulin (H)    -  1    Chronic systolic congestive heart failure (H)        Essential hypertension with goal blood pressure less than 140/90        Mixed hyperlipidemia        Mild persistent asthma without complication        Hypokalemia          Care Instructions    At [facility], we strive to deliver an exceptional experience to you, every time we see you.  If you receive a survey in the mail, please send us back your thoughts. We really do value your feedback.    Based on your medical history, these are the current health maintenance/preventive care services that you are due for (some may have been done at this visit.)  Health Maintenance Due   Topic Date Due     HIV SCREEN (SYSTEM ASSIGNED)  01/25/1987     FOOT EXAM Q1 YEAR  02/17/2017     ASTHMA ACTION PLAN Q1 YR  02/17/2017     EYE EXAM Q1 YEAR  12/01/2017     A1C Q3 MO  12/13/2017     ALT Q1 YR  01/09/2018     BMP Q6 MOS  03/13/2018     ASTHMA CONTROL TEST Q6 MOS  03/13/2018     CBC Q1 YR  05/10/2018       Suggested websites for health information:  Www.[employer].org : Up to date and easily searchable information on multiple topics.  Www.medlineplus.gov : medication info, interactive tutorials, watch real surgeries online  Www.familydoctor.org : good info from the Academy of Family Physicians  Www.cdc.gov : public health info, travel advisories, epidemics (H1N1)  Www.aap.org : children's health info, normal development, vaccinations  Www.health.state[place] : [place] dept of health, public health issues in [place], N1N1    Your care team:                          "   Family Medicine Internal Medicine   MD Fran Gabriel MD Shantel Branch-Fleming, MD Katya Georgiev PA-C Megan Hill, APRN CNP    Prabhu Robbins MD Pediatrics   ETIENNE Dewitt, MD Darby Ortiz APRN CNP   MD Latrice Piña MD Deborah Mielke, MD Kim Thein, APRGrand [facility]asc[facility]      Clinic hours: Monday - Thursday 7 am-7 pm; Fridays 7 am-5 pm.   Urgent care: Monday - Friday 11 am-9 pm; Saturday and Sunday 9 am-5 pm.  Pharmacy : Monday -Thursday 8 am-8 pm; Friday 8 am-6 pm; Saturday and Sunday 9 am-5 pm.     Clinic: [phone]   Pharmacy: [phone]             Follow-ups after your visit        Follow-up notes from your care team     Return in about 3 months (around 8/2/2018) for diabetes.      Who to contact     If you have questions or need follow up information about today's clinic visit or your schedule please contact [facility] directly at [phone].  Normal or non-critical lab and imaging results will be communicated to you by Hithruhart, letter or phone within 4 business days after the clinic has received the results. If you do not hear from us within 7 days, please contact the clinic through Hithruhart or phone. If you have a critical or abnormal lab result, we will notify you by phone as soon as possible.  Submit refill requests through Socialbomb or call your pharmacy and they will forward the refill request to us. Please allow 3 business days for your refill to be completed.          Additional Information About Your Visit        [facility] Information     Socialbomb lets you send messages to your doctor, view your test results, renew your prescriptions, schedule appointments and more. To sign up, go to [email or web address] . Click on \"Log in\" on the left side of the screen, which will take you to the Welcome page. Then click on \"Sign up Now\" on the right side of the page.     You will be asked to enter the access code " "listed below, as well as some personal information. Please follow the directions to create your username and password.     Your access code is: [email or web address]  Expires: 2018  5:05 PM     Your access code will  in 90 days. If you need help or a new code, please call your [facility] or [phone].        Care EveryWhere ID     This is your Care EveryWhere ID. This could be used by other organizations to access your [facility] medical records  [id number]        Your Vitals Were     Pulse Temperature Respirations Height Pulse Oximetry BMI (Body Mass Index)    97 98.1  F (36.7  C) (Oral) 16 5' 5.87\" (1.673 m) 99% 48 kg/m2       Blood Pressure from Last 3 Encounters:   18 159/90   17 136/78   05/10/17 134/88    Weight from Last 3 Encounters:   18 296 lb 3.2 oz (134.4 kg)   17 284 lb (128.8 kg)   05/10/17 289 lb (131.1 kg)              We Performed the Following     Asthma Action Plan (AAP)     CBC with platelets     Comprehensive metabolic panel     Hemoglobin A1c     Lipid panel reflex to direct LDL Non-fasting          Today's Medication Changes          These changes are accurate as of 18  5:05 PM.  If you have any questions, ask your nurse or doctor.               Start taking these medicines.        Dose/Directions    BETA BLOCKER NOT PRESCRIBED (INTENTIONAL)   Used for:  Mild persistent asthma without complication   Started by:  Anuja Bruce MD        Beta Blocker not prescribed intentionally due to Severe Asthma   Refills:  0         These medicines have changed or have updated prescriptions.        Dose/Directions    furosemide 40 MG tablet   Commonly known as:  LASIX   This may have changed:  See the new instructions.   Used for:  Chronic systolic congestive heart failure (H)   Changed by:  Anuja Bruce MD        TAKE 1 TABLET BY MOUTH EVERY OTHER DAY. TAKE 2 TABS ON OTHER DAYS.   Quantity:  120 tablet   Refills:  3       " levalbuterol 45 MCG/ACT Inhaler   Commonly known as:  XOPENEX HFA   This may have changed:    - when to take this  - reasons to take this   Used for:  Mild persistent asthma without complication   Changed by:  Anuja Bruce MD        Dose:  2 puff   Inhale 2 puffs into the lungs every 4 hours as needed for shortness of breath / dyspnea or wheezing   Quantity:  15 g   Refills:  3       mometasone-formoterol 200-5 MCG/ACT oral inhaler   Commonly known as:  DULERA   This may have changed:  when to take this   Used for:  Mild persistent asthma without complication   Changed by:  Anuja Bruce MD        Dose:  2 puff   Inhale 2 puffs into the lungs 2 times daily   Quantity:  39 g   Refills:  1            Where to get your medicines      These medications were sent to [facility] [address] [place] [facility] [address] [address]     Phone:  [phone]     BASAGLAR 100 UNIT/ML injection    furosemide 40 MG tablet    hydrALAZINE 25 MG tablet    levalbuterol 45 MCG/ACT Inhaler    losartan 50 MG tablet    lovastatin 20 MG tablet    metFORMIN 500 MG tablet    mometasone-formoterol 200-5 MCG/ACT oral inhaler    montelukast 10 MG tablet    potassium chloride SA 20 MEQ CR tablet    spironolactone 25 MG tablet         Some of these will need a paper prescription and others can be bought over the counter.  Ask your nurse if you have questions.     You don't need a prescription for these medications     BETA BLOCKER NOT PRESCRIBED (INTENTIONAL)                Primary Care Provider Office Phone # Fax #    Anuja Tenorio [phone]       [address]  [place] [facility] [address]        Equal Access to Services     [facility] : Hadii jeancarlos Meade, waaxda luqadaha, qaybta kaalmada toan, miriam luke. So [facility] [phone].    ATENCIÓN: Si habla español, tiene a soliz disposición servicios  jh de asistencia lingüística. Lizandro davis [phone].    We comply with applicable federal civil rights laws and [place] laws. We do not discriminate on the basis of race, color, national origin, age, disability, sex, sexual orientation, or gender identity.            Thank you!     Thank you for choosing [facility]  for your care. Our goal is always to provide you with excellent care. Hearing back from our patients is one way we can continue to improve our services. Please take a few minutes to complete the written survey that you may receive in the mail after your visit with us. Thank you!             Your Updated Medication List - Protect others around you: Learn how to safely use, store and throw away your medicines at www.disposemymeds.org.          This list is accurate as of 5/2/18  5:05 PM.  Always use your most recent med list.                   Brand Name Dispense Instructions for use Diagnosis    BASAGLAR 100 UNIT/ML injection     48 mL    Inject 65 Units Subcutaneous At Bedtime    Type 2 diabetes mellitus with hyperglycemia, with long-term current use of insulin (H)       BETA BLOCKER NOT PRESCRIBED (INTENTIONAL)      Beta Blocker not prescribed intentionally due to Severe Asthma    Mild persistent asthma without complication       furosemide 40 MG tablet    LASIX    120 tablet    TAKE 1 TABLET BY MOUTH EVERY OTHER DAY. TAKE 2 TABS ON OTHER DAYS.    Chronic systolic congestive heart failure (H)       hydrALAZINE 25 MG tablet    APRESOLINE    180 tablet    Take 3 tablets (75 mg) by mouth 2 times daily    Chronic systolic congestive heart failure (H)       insulin pen needle 31G X 6 MM    ULTICARE MINI    100 each    Use 1 daily or as directed.    Type 2 diabetes mellitus with hyperglycemia, with long-term current use of insulin (H)       levalbuterol 1.25 MG/3ML neb solution    XOPENEX     Take 1 ampule by nebulization every 4 hours as needed for shortness of breath / dyspnea  or wheezing Reported on 5/10/2017        levalbuterol 45 MCG/ACT Inhaler    XOPENEX HFA    15 g    Inhale 2 puffs into the lungs every 4 hours as needed for shortness of breath / dyspnea or wheezing    Mild persistent asthma without complication       losartan 50 MG tablet    COZAAR    90 tablet    Take 1 tablet (50 mg) by mouth daily    Essential hypertension with goal blood pressure less than 140/90, Type 2 diabetes mellitus with hyperglycemia, with long-term current use of insulin (H), Chronic systolic congestive heart failure (H)       lovastatin 20 MG tablet    MEVACOR    90 tablet    Take 1 tablet (20 mg) by mouth At Bedtime    Mixed hyperlipidemia       metFORMIN 500 MG tablet    GLUCOPHAGE    360 tablet    Take 2 tablets (1,000 mg) by mouth 2 times daily (with meals)    Type 2 diabetes mellitus with hyperglycemia, with long-term current use of insulin (H)       mometasone-formoterol 200-5 MCG/ACT oral inhaler    DULERA    39 g    Inhale 2 puffs into the lungs 2 times daily    Mild persistent asthma without complication       montelukast 10 MG tablet    SINGULAIR    90 tablet    Take 1 tablet (10 mg) by mouth At Bedtime    Mild persistent asthma without complication       potassium chloride SA 20 MEQ CR tablet    K-DUR/KLOR-CON M    180 tablet    Take 1 tablet (20 mEq) by mouth 2 times daily    Hypokalemia       spironolactone 25 MG tablet    ALDACTONE    90 tablet    Take 1 tablet (25 mg) by mouth daily    Essential hypertension with goal blood pressure less than 140/90, Chronic systolic congestive heart failure (H)       TYLENOL 500 MG tablet   Generic drug:  acetaminophen      Reported on 5/10/2017        VITAMIN D3 PO      Take 2,000 Units by mouth daily

## 2018-05-02 NOTE — PATIENT INSTRUCTIONS
At [facility], we strive to deliver an exceptional experience to you, every time we see you.  If you receive a survey in the mail, please send us back your thoughts. We really do value your feedback.    Based on your medical history, these are the current health maintenance/preventive care services that you are due for (some may have been done at this visit.)  Health Maintenance Due   Topic Date Due     HIV SCREEN (SYSTEM ASSIGNED)  01/25/1987     FOOT EXAM Q1 YEAR  02/17/2017     ASTHMA ACTION PLAN Q1 YR  02/17/2017     EYE EXAM Q1 YEAR  12/01/2017     A1C Q3 MO  12/13/2017     ALT Q1 YR  01/09/2018     BMP Q6 MOS  03/13/2018     ASTHMA CONTROL TEST Q6 MOS  03/13/2018     CBC Q1 YR  05/10/2018       Suggested websites for health information:  Www.[employer] : Up to date and easily searchable information on multiple topics.  Www.[employer].gov : medication info, interactive tutorials, watch real surgeries online  Www.familydoctor.org : good info from the Academy of Family Physicians  Www.cdc.gov : public health info, travel advisories, epidemics (H1N1)  Www.aap.org : children's health info, normal development, vaccinations  Www.health.[facility].[place].us : [place] dept of health, public health issues in [place], N1N1    Your care team:                            Family Medicine Internal Medicine   MD Fran Gabriel MD Shantel Branch-Fleming, MD Katya Georgiev PA-C Megan Hill, RICCARDO Robbins MD Pediatrics   ETIENNE Dewitt, MD Darby Ortiz APRN CNP   MD Latrice Piña MD Deborah Mielke, MD Kim Thein, APRN CNP      Clinic hours: Monday - Thursday 7 am-7 pm; Fridays 7 am-5 pm.   Urgent care: Monday - Friday 11 am-9 pm; Saturday and Sunday 9 am-5 pm.  Pharmacy : Monday -Thursday 8 am-8 pm; Friday 8 am-6 pm; Saturday and Sunday 9 am-5 pm.     Clinic: [phone]   Pharmacy: [phone]

## 2018-05-02 NOTE — PROGRESS NOTES
"  SUBJECTIVE:   Bettina Montes is a 48 year old female who presents to clinic today for the following health issues:    Diabetes Follow-up      Patient is checking blood sugars: rarely.  \"Only check when she feel funny\"    Diabetic concerns: None     Symptoms of hypoglycemia (low blood sugar): none     Paresthesias (numbness or burning in feet) or sores: No     Date of last diabetic eye exam: UTD    Eating mainly carbs and fast foods.    BP Readings from Last 2 Encounters:   05/02/18 159/90   09/13/17 136/78     Hemoglobin A1C (%)   Date Value   05/02/2018 8.3 (H)   09/13/2017 8.3 (H)     LDL Cholesterol Calculated (mg/dL)   Date Value   09/13/2017 56     LDL Cholesterol Direct (mg/dL)   Date Value   10/10/2016 70   02/17/2016 111 (H)     Hypertension Follow-up      Outpatient blood pressures are not being checked.    Low Salt Diet: not monitoring salt    Asthma Follow-Up    Was ACT completed today?    Yes    ACT Total Scores 5/2/2018   ACT TOTAL SCORE (Goal Greater than or Equal to 20) 19   In the past 12 months, how many times did you visit the emergency room for your asthma without being admitted to the hospital? 0   In the past 12 months, how many times were you hospitalized overnight because of your asthma? 0       Recent asthma triggers that patient is dealing with: pollens        Amount of exercise or physical activity: None    Problems taking medications regularly: No    Medication side effects: none    Diet: regular (no restrictions)    Heart Failure Follow-up    Symptoms:    Shortness of breath: none    Lower extremity edema: none    Chest pain: No    Using more pillows than normal: No    Cough at night: No    Weight:    Checking weight daily: No    Weight change: none    Cardiology visits, ER/UC, or hospital admissions since last visit: None    Medication side effects: none      Problem list and histories reviewed & adjusted, as indicated.  Additional history: as documented    Patient Active Problem List " "  Diagnosis     Hypertension     Type 2 diabetes mellitus (H)     CHF (congestive heart failure) (H)     Hyperlipidemia     Morbid obesity due to excess calories (H)     Intermittent asthma, uncomplicated     Microscopic hematuria     Adrenal gland anomaly     Past Surgical History:   Procedure Laterality Date     TUBAL LIGATION         Social History   Substance Use Topics     Smoking status: Former Smoker     Packs/day: 1.00     Years: 30.00     Types: Cigarettes     Quit date: 1/9/2013     Smokeless tobacco: Former User      Comment:       Alcohol use No     Family History   Problem Relation Age of Onset     DIABETES Mother      Hypertension Mother      Hyperlipidemia Mother      Heart Failure Mother      DIABETES Father      CANCER Paternal Grandmother      ?           Reviewed and updated as needed this visit by clinical staff  Tobacco  Allergies  Meds  Problems       Reviewed and updated as needed this visit by Provider  Allergies  Meds  Problems         ROS:  Constitutional, HEENT, cardiovascular, pulmonary, GI, , musculoskeletal, neuro, skin, endocrine and psych systems are negative, except as otherwise noted.    OBJECTIVE:     /86  Pulse 97  Temp 98.1  F (36.7  C) (Oral)  Resp 16  Ht 5' 5.87\" (1.673 m)  Wt 296 lb 3.2 oz (134.4 kg)  SpO2 99%  BMI 48 kg/m2  Body mass index is 48 kg/(m^2).  GENERAL: healthy, alert, no distress and obese  NECK: no adenopathy, no asymmetry, masses, or scars and thyroid normal to palpation  RESP: lungs clear to auscultation - no rales, rhonchi or wheezes  CV: regular rate and rhythm, normal S1 S2, no S3 or S4, no murmur, click or rub, no peripheral edema and peripheral pulses strong  ABDOMEN: soft, nontender, no hepatosplenomegaly, no masses and bowel sounds normal  MS: no gross musculoskeletal defects noted, no edema  SKIN: 4 callus on bilateral feet - two large on plantar aspect and smaller on bilateral great toes  PSYCH: mentation appears normal, affect " normal/bright  Diabetic foot exam: normal DP and PT pulses, no trophic changes or ulcerative lesions, normal sensory exam and normal monofilament exam    Diagnostic Test Results:  Results for orders placed or performed in visit on 05/02/18 (from the past 24 hour(s))   CBC with platelets   Result Value Ref Range    WBC 11.7 (H) 4.0 - 11.0 10e9/L    RBC Count 4.32 3.8 - 5.2 10e12/L    Hemoglobin 11.8 11.7 - 15.7 g/dL    Hematocrit 36.2 35.0 - 47.0 %    MCV 84 78 - 100 fl    MCH 27.3 26.5 - 33.0 pg    MCHC 32.6 31.5 - 36.5 g/dL    RDW 14.8 10.0 - 15.0 %    Platelet Count 429 150 - 450 10e9/L   Hemoglobin A1c   Result Value Ref Range    Hemoglobin A1C 8.3 (H) 0 - 5.6 %       ASSESSMENT/PLAN:     1. Type 2 diabetes mellitus with hyperglycemia, with long-term current use of insulin (H)  Not improved with increase lantus - discussed low carb eating.  Patient committed to 2 days per week and will try to increase to 4.  Continue current medciations.  - Comprehensive metabolic panel; Future  - CBC with platelets; Future  - Hemoglobin A1c; Future  - Lipid panel reflex to direct LDL Fasting; Future  - Comprehensive metabolic panel  - CBC with platelets  - Hemoglobin A1c  - Lipid panel reflex to direct LDL Non-fasting  - BASAGLAR 100 UNIT/ML injection; Inject 65 Units Subcutaneous At Bedtime  Dispense: 48 mL; Refill: 1  - losartan (COZAAR) 50 MG tablet; Take 1 tablet (50 mg) by mouth daily  Dispense: 90 tablet; Refill: 1  - metFORMIN (GLUCOPHAGE) 500 MG tablet; Take 2 tablets (1,000 mg) by mouth 2 times daily (with meals)  Dispense: 360 tablet; Refill: 1    2. Chronic systolic congestive heart failure (H)  Stable - continue current medications.  - furosemide (LASIX) 40 MG tablet; TAKE 1 TABLET BY MOUTH EVERY OTHER DAY. TAKE 2 TABS ON OTHER DAYS.  Dispense: 120 tablet; Refill: 3  - hydrALAZINE (APRESOLINE) 25 MG tablet; Take 3 tablets (75 mg) by mouth 2 times daily  Dispense: 180 tablet; Refill: 5  - losartan (COZAAR) 50 MG  tablet; Take 1 tablet (50 mg) by mouth daily  Dispense: 90 tablet; Refill: 1  - spironolactone (ALDACTONE) 25 MG tablet; Take 1 tablet (25 mg) by mouth daily  Dispense: 90 tablet; Refill: 1    3. Essential hypertension with goal blood pressure less than 140/90  Stable - continue current medications  - losartan (COZAAR) 50 MG tablet; Take 1 tablet (50 mg) by mouth daily  Dispense: 90 tablet; Refill: 1  - spironolactone (ALDACTONE) 25 MG tablet; Take 1 tablet (25 mg) by mouth daily  Dispense: 90 tablet; Refill: 1    4. Mixed hyperlipidemia  Refilled  - lovastatin (MEVACOR) 20 MG tablet; Take 1 tablet (20 mg) by mouth At Bedtime  Dispense: 90 tablet; Refill: 3    5. Mild persistent asthma without complication  Restart singulair and continue other medications  - BETA BLOCKER NOT PRESCRIBED, INTENTIONAL,; Beta Blocker not prescribed intentionally due to Severe Asthma; Refill: 0  - mometasone-formoterol (DULERA) 200-5 MCG/ACT oral inhaler; Inhale 2 puffs into the lungs 2 times daily  Dispense: 39 g; Refill: 1  - montelukast (SINGULAIR) 10 MG tablet; Take 1 tablet (10 mg) by mouth At Bedtime  Dispense: 90 tablet; Refill: 3  - levalbuterol (XOPENEX HFA) 45 MCG/ACT Inhaler; Inhale 2 puffs into the lungs every 4 hours as needed for shortness of breath / dyspnea or wheezing  Dispense: 15 g; Refill: 3  - Asthma Action Plan (AAP)    6. Hypokalemia  Refill  - potassium chloride SA (K-DUR/KLOR-CON M) 20 MEQ CR tablet; Take 1 tablet (20 mEq) by mouth 2 times daily  Dispense: 180 tablet; Refill: 1    7. Callus of bilateral foot  Callus on bilateral feet trimmed with dermablade.  No bleeding and patient tolerated procedure well.  - TRIM HYPERKERATOTIC SKIN LESION, ONE  - TRIM HYPERKERATOTIC SKIN LESION,2-4    The uses and side effects, including black box warnings as appropriate, were discussed in detail.  All patient questions were answered.  The patient was instructed to call immediately if any side effects developed.     FUTURE  APPOINTMENTS:       - Follow-up visit in 3 months.    Anuja Tenorio MD  [facility]

## 2018-05-03 RX ORDER — HYDRALAZINE HYDROCHLORIDE 25 MG/1
TABLET, FILM COATED ORAL
Qty: 150 TABLET | Refills: 5
Start: 2018-05-03

## 2018-05-03 ASSESSMENT — ASTHMA QUESTIONNAIRES: ACT_TOTALSCORE: 19

## 2018-05-03 NOTE — TELEPHONE ENCOUNTER
Requested Prescriptions   Pending Prescriptions Disp Refills     hydrALAZINE (APRESOLINE) 25 MG tablet [Pharmacy Med Name: HYDRALAZINE 25 MG TABLET] 150 tablet 5     Sig: TAKE 3 TABLETS (75 MG) BY MOUTH 2 TIMES DAILY    There is no refill protocol information for this order        Filled at office visit on 5/2/18.    Chelle Emanuel RN, BSN

## 2018-05-03 NOTE — PROGRESS NOTES
Ms. Montes,    Your cholesterol, liver and kidney function look great.  Your diabetes has not improved as we discussed at your visit. Try eating lower carbohydrate 2 days per week and increase to 4 as we discussed.  Follow up with me in August.  Your white blood cell count is a little high.  This may account for your achyness.  Call if you feel more ill.    Please contact the clinic if you have additional questions.  Thank you.    Sincerely,    Anuja Tenorio

## 2018-05-07 ENCOUNTER — TELEPHONE (OUTPATIENT)
Dept: FAMILY MEDICINE | Facility: CLINIC | Age: 48
End: 2018-05-07

## 2018-05-07 DIAGNOSIS — J45.40 MODERATE PERSISTENT ASTHMA WITHOUT COMPLICATION: Primary | ICD-10-CM

## 2018-05-07 NOTE — TELEPHONE ENCOUNTER
Would you like to initiate a Prior Authorization request for the following medication or change?    Medications: mometasone-formoterol (DULERA) 200-5 MCG/ACT oral inhaler  Host:leigh [phone]  Key: [id number]  From: [facility] [address] [facility] [employer] - [place], [place] - [address]  Ph.[phone]  Fx.[phone]  Rx order #: [id number]  Rx ref #: [id number]  Yulia Uamña

## 2018-05-21 DIAGNOSIS — Z79.4 TYPE 2 DIABETES MELLITUS WITH HYPERGLYCEMIA, WITH LONG-TERM CURRENT USE OF INSULIN (H): ICD-10-CM

## 2018-05-21 DIAGNOSIS — E11.65 TYPE 2 DIABETES MELLITUS WITH HYPERGLYCEMIA, WITH LONG-TERM CURRENT USE OF INSULIN (H): ICD-10-CM

## 2018-06-22 ENCOUNTER — TELEPHONE (OUTPATIENT)
Dept: FAMILY MEDICINE | Facility: CLINIC | Age: 48
End: 2018-06-22

## 2018-06-22 NOTE — LETTER
June 22, 2018    Bettina Montes  [address], [place] [place] [facility] [place] [address]    Dear Bettina Montes,      At [facility] we care about your health and are committed to providing quality patient care. It has come to our attention that you are due for an Asthma Control Test.     This screening tool helps us to assess how well your asthma is controlled. Good asthma control leads to less asthma symptoms and greater health. If your asthma is not in good control (score less than 20) it is recommended you be seen by your provider for medication and lifestyle adjustments    We have enclosed an  Asthma Control Test. Please complete the enclosed asthma control test even if you are feeling well. You can mail it back to our clinic or drop if off at our .     Thank you for your time and we hope this letter finds you well!      Sincerely,    Your Team at [facility]

## 2018-06-27 NOTE — TELEPHONE ENCOUNTER
This writer attempted to contact patient on 06/27/18    Reason for call update ACT and left message.    If patient calls back:   Patient contacted by 2nd floor [facility] Team (MA/TC). Inform patient that someone from the team will contact them, document that pt called and route to care team.       Lisbet Palmer MA

## 2018-07-10 NOTE — TELEPHONE ENCOUNTER
I called pt and she stated she doesn't have time right now to complete ACT. I asked that she call back to complete it. PEREZ Russell

## 2018-09-06 ASSESSMENT — ASTHMA QUESTIONNAIRES: ACT_TOTALSCORE: 24

## 2018-10-03 ENCOUNTER — DOCUMENTATION ONLY (OUTPATIENT)
Dept: FAMILY MEDICINE | Facility: CLINIC | Age: 48
End: 2018-10-03

## 2018-10-03 DIAGNOSIS — Z79.4 TYPE 2 DIABETES MELLITUS WITH HYPERGLYCEMIA, WITH LONG-TERM CURRENT USE OF INSULIN (H): Primary | ICD-10-CM

## 2018-10-03 DIAGNOSIS — E11.65 TYPE 2 DIABETES MELLITUS WITH HYPERGLYCEMIA, WITH LONG-TERM CURRENT USE OF INSULIN (H): Primary | ICD-10-CM

## 2018-10-10 ENCOUNTER — OFFICE VISIT (OUTPATIENT)
Dept: FAMILY MEDICINE | Facility: CLINIC | Age: 48
End: 2018-10-10
Payer: COMMERCIAL

## 2018-10-10 VITALS
TEMPERATURE: 98.2 F | SYSTOLIC BLOOD PRESSURE: 118 MMHG | BODY MASS INDEX: 45.61 KG/M2 | WEIGHT: 283.8 LBS | DIASTOLIC BLOOD PRESSURE: 82 MMHG | OXYGEN SATURATION: 97 % | HEART RATE: 102 BPM | HEIGHT: 66 IN

## 2018-10-10 DIAGNOSIS — I10 ESSENTIAL HYPERTENSION WITH GOAL BLOOD PRESSURE LESS THAN 140/90: ICD-10-CM

## 2018-10-10 DIAGNOSIS — J45.40 MODERATE PERSISTENT ASTHMA WITHOUT COMPLICATION: ICD-10-CM

## 2018-10-10 DIAGNOSIS — E11.65 TYPE 2 DIABETES MELLITUS WITH HYPERGLYCEMIA, WITH LONG-TERM CURRENT USE OF INSULIN (H): Primary | ICD-10-CM

## 2018-10-10 DIAGNOSIS — I50.22 CHRONIC SYSTOLIC CONGESTIVE HEART FAILURE (H): ICD-10-CM

## 2018-10-10 DIAGNOSIS — E87.6 HYPOKALEMIA: ICD-10-CM

## 2018-10-10 DIAGNOSIS — Z79.4 TYPE 2 DIABETES MELLITUS WITH HYPERGLYCEMIA, WITH LONG-TERM CURRENT USE OF INSULIN (H): Primary | ICD-10-CM

## 2018-10-10 DIAGNOSIS — L84 CALLUS OF FOOT: ICD-10-CM

## 2018-10-10 DIAGNOSIS — M62.838 MUSCLE SPASM: ICD-10-CM

## 2018-10-10 DIAGNOSIS — Z23 NEED FOR PROPHYLACTIC VACCINATION AND INOCULATION AGAINST INFLUENZA: ICD-10-CM

## 2018-10-10 LAB
ANION GAP SERPL CALCULATED.3IONS-SCNC: 9 MMOL/L (ref 3–14)
BUN SERPL-MCNC: 6 MG/DL (ref 7–30)
CALCIUM SERPL-MCNC: 9.2 MG/DL (ref 8.5–10.1)
CHLORIDE SERPL-SCNC: 104 MMOL/L (ref 94–109)
CO2 SERPL-SCNC: 28 MMOL/L (ref 20–32)
CREAT SERPL-MCNC: 0.79 MG/DL (ref 0.52–1.04)
CREAT UR-MCNC: 235 MG/DL
GFR SERPL CREATININE-BSD FRML MDRD: 78 ML/MIN/1.7M2
GLUCOSE SERPL-MCNC: 102 MG/DL (ref 70–99)
HBA1C MFR BLD: 8.2 % (ref 0–5.6)
MICROALBUMIN UR-MCNC: 13 MG/L
MICROALBUMIN/CREAT UR: 5.36 MG/G CR (ref 0–25)
POTASSIUM SERPL-SCNC: 3.9 MMOL/L (ref 3.4–5.3)
SODIUM SERPL-SCNC: 141 MMOL/L (ref 133–144)

## 2018-10-10 PROCEDURE — 11056 PARNG/CUTG B9 HYPRKR LES 2-4: CPT | Performed by: FAMILY MEDICINE

## 2018-10-10 PROCEDURE — 80048 BASIC METABOLIC PNL TOTAL CA: CPT | Performed by: FAMILY MEDICINE

## 2018-10-10 PROCEDURE — 82043 UR ALBUMIN QUANTITATIVE: CPT | Performed by: FAMILY MEDICINE

## 2018-10-10 PROCEDURE — 83036 HEMOGLOBIN GLYCOSYLATED A1C: CPT | Performed by: FAMILY MEDICINE

## 2018-10-10 PROCEDURE — 90471 IMMUNIZATION ADMIN: CPT | Performed by: FAMILY MEDICINE

## 2018-10-10 PROCEDURE — 99214 OFFICE O/P EST MOD 30 MIN: CPT | Mod: 25 | Performed by: FAMILY MEDICINE

## 2018-10-10 PROCEDURE — 36415 COLL VENOUS BLD VENIPUNCTURE: CPT | Performed by: FAMILY MEDICINE

## 2018-10-10 PROCEDURE — 90686 IIV4 VACC NO PRSV 0.5 ML IM: CPT | Performed by: FAMILY MEDICINE

## 2018-10-10 RX ORDER — HYDRALAZINE HYDROCHLORIDE 25 MG/1
75 TABLET, FILM COATED ORAL 2 TIMES DAILY
Qty: 180 TABLET | Refills: 5 | Status: SHIPPED | OUTPATIENT
Start: 2018-10-10 | End: 2019-02-27

## 2018-10-10 RX ORDER — SPIRONOLACTONE 25 MG/1
25 TABLET ORAL DAILY
Qty: 90 TABLET | Refills: 1 | Status: SHIPPED | OUTPATIENT
Start: 2018-10-10 | End: 2019-02-27

## 2018-10-10 RX ORDER — LOSARTAN POTASSIUM 50 MG/1
50 TABLET ORAL DAILY
Qty: 90 TABLET | Refills: 1 | Status: SHIPPED | OUTPATIENT
Start: 2018-10-10 | End: 2019-02-27

## 2018-10-10 RX ORDER — FUROSEMIDE 40 MG
TABLET ORAL
Qty: 120 TABLET | Refills: 3 | Status: SHIPPED | OUTPATIENT
Start: 2018-10-10 | End: 2019-02-27

## 2018-10-10 RX ORDER — POTASSIUM CHLORIDE 1500 MG/1
20 TABLET, EXTENDED RELEASE ORAL 2 TIMES DAILY
Qty: 180 TABLET | Refills: 1 | Status: SHIPPED | OUTPATIENT
Start: 2018-10-10 | End: 2019-02-27

## 2018-10-10 NOTE — PATIENT INSTRUCTIONS
At [facility], we strive to deliver an exceptional experience to you, every time we see you.  If you receive a survey in the mail, please send us back your thoughts. We really do value your feedback.    Your care team:                            Family Medicine Internal Medicine   MD Fran Gabriel MD Shantel Branch-Fleming, MD Katya Georgiev PA-C Megan Hill, APRN SHAZIA Robbins MD Pediatrics   Ricardo Arellano, ETIENNE Ramirez, MD Darby Ortiz APRN CNP   MD Latrice Piña MD Deborah Mielke, MD Lona Chase, APRN [facility]      Clinic hours: Monday - Thursday 7 am-7 pm; Fridays 7 am-5 pm.   Urgent care: Monday - Friday 11 am-9 pm; Saturday and Sunday 9 am-5 pm.  Pharmacy : Monday -Thursday 8 am-8 pm; Friday 8 am-6 pm; Saturday and Sunday 9 am-5 pm.     Clinic: [phone]   Pharmacy: [phone]

## 2018-10-10 NOTE — PROGRESS NOTES
SUBJECTIVE:   Bettina Montes is a 48 year old female who presents to clinic today for the following health issues:    Diabetes Follow-up      Patient is checking blood sugars: not at all    Diabetic concerns: None     Symptoms of hypoglycemia (low blood sugar): none     Paresthesias (numbness or burning in feet) or sores: No     Date of last diabetic eye exam: 2017    Stopped meds and injections for a while a few weeks ago but has restated them for now.    Diabetes Management Resources    Hyperlipidemia Follow-Up      Rate your low fat/cholesterol diet?: not monitoring fat    Taking statin?  Yes, muscle aches after starting statin    Other lipid medications/supplements?:  none    Hypertension Follow-up      Outpatient blood pressures are being checked at home.  Results are normal.    Low Salt Diet: not monitoring salt    BP Readings from Last 2 Encounters:   10/10/18 118/82   05/02/18 134/86     Hemoglobin A1C (%)   Date Value   10/10/2018 8.2 (H)   05/02/2018 8.3 (H)     LDL Cholesterol Calculated (mg/dL)   Date Value   05/02/2018 57   09/13/2017 56       Amount of exercise or physical activity: None    Problems taking medications regularly: No    Medication side effects: muscle aches    Diet: regular (no restrictions)      Muscle spasms for a couple months, pain is in thighs, feet, hands and arms.  There is also a knot on the top of feet.    Thick callus bilateral plantar feet and bilateral great toes.      Problem list and histories reviewed & adjusted, as indicated.  Additional history: as documented    Patient Active Problem List   Diagnosis     Hypertension     Type 2 diabetes mellitus (H)     CHF (congestive heart failure) (H)     Hyperlipidemia     Morbid obesity due to excess calories (H)     Intermittent asthma, uncomplicated     Microscopic hematuria     Adrenal gland anomaly     Moderate persistent asthma without complication     Callus of foot     Past Surgical History:   Procedure Laterality Date  "    TUBAL LIGATION         Social History   Substance Use Topics     Smoking status: Former Smoker     Packs/day: 1.00     Years: 30.00     Types: Cigarettes     Quit date: 1/9/2013     Smokeless tobacco: Former User      Comment:       Alcohol use No     Family History   Problem Relation Age of Onset     Diabetes Mother      Hypertension Mother      Hyperlipidemia Mother      Heart Failure Mother      Diabetes Father      Cancer Paternal Grandmother      ?           Reviewed and updated as needed this visit by clinical staff  Tobacco  Allergies  Meds  Problems  Med Hx  Surg Hx  Fam Hx  Soc Hx        Reviewed and updated as needed this visit by Provider  Allergies  Meds  Problems         ROS:  Constitutional, HEENT, cardiovascular, pulmonary, gi and gu systems are negative, except as otherwise noted.    OBJECTIVE:     /82 (BP Location: Left arm, Patient Position: Chair, Cuff Size: Adult Large)  Pulse 102  Temp 98.2  F (36.8  C) (Oral)  Ht 5' 5.87\" (1.673 m)  Wt 283 lb 12.8 oz (128.7 kg)  LMP  (LMP Unknown)  SpO2 97%  BMI 45.99 kg/m2  Body mass index is 45.99 kg/(m^2).  GENERAL: healthy, alert, no distress and obese  NECK: no adenopathy, no asymmetry, masses, or scars and thyroid normal to palpation  RESP: lungs clear to auscultation - no rales, rhonchi or wheezes  CV: regular rate and rhythm, normal S1 S2, no S3 or S4, no murmur, click or rub, no peripheral edema and peripheral pulses strong  ABDOMEN: soft, nontender, no hepatosplenomegaly, no masses and bowel sounds normal  MS: raised nodule similar to ganglion cyst on right dorsal foot  SKIN: thickened callous bilateral plantar feet and great toes.  Hyperpigmentation bilateral dorsal feet.  PSYCH: mentation appears normal, affect normal/bright    Diagnostic Test Results:  none     ASSESSMENT/PLAN:     1. Type 2 diabetes mellitus with hyperglycemia, with long-term current use of insulin (H)  Stable - continue current medication and low carb " eating.  - **A1C FUTURE anytime  - **Basic metabolic panel FUTURE anytime  - Albumin Random Urine Quantitative with Creat Ratio  - losartan (COZAAR) 50 MG tablet; Take 1 tablet (50 mg) by mouth daily  Dispense: 90 tablet; Refill: 1  - insulin pen needle (ULTICARE MINI) 31G X 6 MM; Use 1 daily or as directed.  Dispense: 100 each; Refill: 3  - metFORMIN (GLUCOPHAGE) 500 MG tablet; Take 2 tablets (1,000 mg) by mouth 2 times daily (with meals)  Dispense: 360 tablet; Refill: 1    2. Muscle spasm  Trial of magnesium and wait labs  - magnesium oxide (MAG-OX) 400 (241.3 Mg) MG tablet; Take 1 tablet (400 mg) by mouth daily  Dispense: 100 tablet; Refill: 3    3. Moderate persistent asthma without complication  Refilled  - fluticasone-salmeterol (ADVAIR) 250-50 MCG/DOSE diskus inhaler; Inhale 1 puff into the lungs 2 times daily  Dispense: 3 Inhaler; Refill: 1    4. Chronic systolic congestive heart failure (H)  Refilled  - furosemide (LASIX) 40 MG tablet; TAKE 1 TABLET BY MOUTH EVERY OTHER DAY. TAKE 2 TABS ON OTHER DAYS.  Dispense: 120 tablet; Refill: 3  - hydrALAZINE (APRESOLINE) 25 MG tablet; Take 3 tablets (75 mg) by mouth 2 times daily  Dispense: 180 tablet; Refill: 5  - losartan (COZAAR) 50 MG tablet; Take 1 tablet (50 mg) by mouth daily  Dispense: 90 tablet; Refill: 1  - spironolactone (ALDACTONE) 25 MG tablet; Take 1 tablet (25 mg) by mouth daily  Dispense: 90 tablet; Refill: 1    5. Essential hypertension with goal blood pressure less than 140/90  Controlled - refilled  - losartan (COZAAR) 50 MG tablet; Take 1 tablet (50 mg) by mouth daily  Dispense: 90 tablet; Refill: 1  - spironolactone (ALDACTONE) 25 MG tablet; Take 1 tablet (25 mg) by mouth daily  Dispense: 90 tablet; Refill: 1    6. Hypokalemia  Refilled  - potassium chloride SA (K-DUR/KLOR-CON M) 20 MEQ CR tablet; Take 1 tablet (20 mEq) by mouth 2 times daily  Dispense: 180 tablet; Refill: 1    7. Callus of foot  After verbal consent, dermablade used to debride  callous x 4.  Patient tolerated procedure well and no blood loss.  - TRIM HYPERKERATOTIC SKIN LESION, ONE  - TRIM HYPERKERATOTIC SKIN LESION,2-4    8. Need for prophylactic vaccination and inoculation against influenza    - HC FLU VAC PRESRV FREE QUAD SPLIT VIR 3+YRS IM  [43468]  -      ADMIN VACCINE, FIRST [84956]    The uses and side effects, including black box warnings as appropriate, were discussed in detail.  All patient questions were answered.  The patient was instructed to call immediately if any side effects developed.     FUTURE APPOINTMENTS:       - Follow-up visit in 3 months.    Anuja Tenorio MD  [facility]    Injectable Influenza Immunization Documentation    1.  Is the person to be vaccinated sick today?   No    2. Does the person to be vaccinated have an allergy to a component   of the vaccine?   No  Egg Allergy Algorithm Link    3. Has the person to be vaccinated ever had a serious reaction   to influenza vaccine in the past?   No    4. Has the person to be vaccinated ever had Guillain-Barré syndrome?   No    Form completed by Darlin Mcnulty MA

## 2018-10-10 NOTE — LETTER
October 12, 2018      Bettina Montes  [address] [place] [address]        Ms. Montes,     Your urine and blood kidney function is stable.   Your diabetes has improved slightly as we discussed at your visit.     Follow up with me in 3 months for a recheck.     Please contact the clinic if you have additional questions.  Thank you.         Resulted Orders   **A1C FUTURE anytime   Result Value Ref Range    Hemoglobin A1C 8.2 (H) 0 - 5.6 %      Comment:      Normal <5.7% Prediabetes 5.7-6.4%  Diabetes 6.5% or higher - adopted from ADA   consensus guidelines.     **Basic metabolic panel FUTURE anytime   Result Value Ref Range    Sodium 141 133 - 144 mmol/L    Potassium 3.9 3.4 - 5.3 mmol/L    Chloride 104 94 - 109 mmol/L    Carbon Dioxide 28 20 - 32 mmol/L    Anion Gap 9 3 - 14 mmol/L    Glucose 102 (H) 70 - 99 mg/dL      Comment:      Fasting specimen    Urea Nitrogen 6 (L) 7 - 30 mg/dL    Creatinine 0.79 0.52 - 1.04 mg/dL    GFR Estimate 78 >60 mL/min/1.7m2      Comment:      Non  GFR Calc    GFR Estimate If Black >90 >60 mL/min/1.7m2      Comment:       GFR Calc    Calcium 9.2 8.5 - 10.1 mg/dL   Albumin Random Urine Quantitative with Creat Ratio   Result Value Ref Range    Creatinine Urine 235 mg/dL    Albumin Urine mg/L 13 mg/L    Albumin Urine mg/g Cr 5.36 0 - 25 mg/g Cr       If you have any questions or concerns, please call the clinic at the number listed above.       Sincerely,        Anuja Tenorio MD/[facility]UTH[facility]

## 2018-10-10 NOTE — MR AVS SNAPSHOT
After Visit Summary   10/10/2018    Bettina Montes    MRN: [id number]           Patient Information     Date Of Birth          1970        Visit Information        Provider Department      10/10/2018 11:40 AM Anuja Bruce MD [facility]        Today's Diagnoses     Type 2 diabetes mellitus with hyperglycemia, with long-term current use of insulin (H)    -  1    Muscle spasm        Moderate persistent asthma without complication        Chronic systolic congestive heart failure (H)        Essential hypertension with goal blood pressure less than 140/90        Hypokalemia        Callus of foot        Need for prophylactic vaccination and inoculation against influenza          Care Instructions    At [facility], we strive to deliver an exceptional experience to you, every time we see you.  If you receive a survey in the mail, please send us back your thoughts. We really do value your feedback.    Your care team:                            Family Medicine Internal Medicine   MD Fran Gabriel MD Shantel Branch-Fleming, MD Katya Georgiev PA-C Megan Hill, APRN SHAZIA Robbins MD Pediatrics   Ricardo Arellano PAHUMBERTO Ramirez, MD Darby Ortiz APRN CNP   MD Latrice Piña MD Deborah Mielke, MD Kim Thein, APRN [facility]      Clinic hours: Monday - Thursday 7 am-7 pm; Fridays 7 am-5 pm.   Urgent care: Monday - Friday 11 am-9 pm; Saturday and Sunday 9 am-5 pm.  Pharmacy : Monday -Thursday 8 am-8 pm; Friday 8 am-6 pm; Saturday and Sunday 9 am-5 pm.     Clinic: [phone]   Pharmacy: [phone]                Follow-ups after your visit        Follow-up notes from your care team     Return in about 3 months (around 1/10/2019) for diabetes.      Who to contact     If you have questions or need follow up information about today's clinic visit or your schedule please contact [facility] directly at [phone].  Normal or non-critical lab and imaging results will be communicated to you by MyChart, letter or phone within 4 business days after the clinic has received the results. If you do not hear from us within 7 days, please contact the clinic through [employer] or phone. If you have a critical or abnormal lab result, we will notify you by phone as soon as possible.  Submit refill requests through [employer] or call your pharmacy and they will forward the refill request to us. Please allow 3 business days for your refill to be completed.          Additional Information About Your Visit        [employer] Information     [employer] lets you send messages to your doctor, view your test results, renew your prescriptions, schedule appointments and more. To sign up, go to [email or web address] . Click on \"Log in\" on the left side of the screen, which will take you to the Welcome page. Then click on \"Sign up Now\" on the right side of the page.     You will be asked to enter the access code listed below, as well as some personal information. Please follow the directions to create your username and password.     Your access code is: [email or web address]  Expires: 2019 12:19 PM     Your access code will  in 90 days. If you need help or a new code, please call your [facility] clinic or [phone].        Care EveryWhere ID     This is your Care EveryWhere ID. This could be used by other organizations to access your [facility] medical records  [id number]        Your Vitals Were     Pulse Temperature Height Last Period Pulse Oximetry BMI (Body Mass Index)    102 98.2  F (36.8  C) (Oral) 5' 5.87\" (1.673 m) (LMP Unknown) 97% 45.99 kg/m2       Blood Pressure from Last 3 Encounters:   10/10/18 118/82   18 134/86   17 136/78    Weight from Last 3 Encounters:   10/10/18 283 lb 12.8 oz (128.7 kg)   18 296 lb 3.2 oz (134.4 kg)   17 284 lb (128.8 kg)              We Performed the " Following          ADMIN VACCINE, FIRST [26987]     **A1C FUTURE anytime     **Basic metabolic panel FUTURE anytime     Albumin Random Urine Quantitative with Creat Ratio     HC FLU VAC PRESRV FREE QUAD SPLIT VIR 3+YRS IM  [39982]     TRIM HYPERKERATOTIC SKIN LESION, ONE     TRIM HYPERKERATOTIC SKIN LESION,2-4          Today's Medication Changes          These changes are accurate as of 10/10/18 12:19 PM.  If you have any questions, ask your nurse or doctor.               Start taking these medicines.        Dose/Directions    magnesium oxide 400 (241.3 Mg) MG tablet   Commonly known as:  MAG-OX   Used for:  Muscle spasm   Started by:  Anuja Bruce MD        Dose:  400 mg   Take 1 tablet (400 mg) by mouth daily   Quantity:  100 tablet   Refills:  3            Where to get your medicines      These medications were sent to [facility] [address] [place] [facility] [address]  [address] [address]     Phone:  [phone]     fluticasone-salmeterol 250-50 MCG/DOSE diskus inhaler    furosemide 40 MG tablet    hydrALAZINE 25 MG tablet    insulin pen needle 31G X 6 MM    losartan 50 MG tablet    magnesium oxide 400 (241.3 Mg) MG tablet    metFORMIN 500 MG tablet    potassium chloride SA 20 MEQ CR tablet    spironolactone 25 MG tablet                Primary Care Provider Office Phone # Fax #    Anuja Tenorio [phone]       [address]  [facility] [address]        Equal Access to Services     KEITH KAYE AH: Hadii aad ku hadasho Soomaali, waaxda luqadaha, qaybta kaalmada adeegyada, waxay danica haystacey burnett . So [facility] [phone].    ATENCIÓN: Si habla ashish, tiene a soliz disposición servicios gratuitos de asistencia lingüística. Lizandro al [phone].    We comply with applicable federal civil rights laws and [place] laws. We do not discriminate on the basis of race, color, national origin, age, disability, sex, sexual  orientation, or gender identity.            Thank you!     Thank you for choosing [facility]  for your care. Our goal is always to provide you with excellent care. Hearing back from our patients is one way we can continue to improve our services. Please take a few minutes to complete the written survey that you may receive in the mail after your visit with us. Thank you!             Your Updated Medication List - Protect others around you: Learn how to safely use, store and throw away your medicines at www.disposemymeds.org.          This list is accurate as of 10/10/18 12:19 PM.  Always use your most recent med list.                   Brand Name Dispense Instructions for use Diagnosis    BASAGLAR 100 UNIT/ML injection     48 mL    Inject 65 Units Subcutaneous At Bedtime    Type 2 diabetes mellitus with hyperglycemia, with long-term current use of insulin (H)       BETA BLOCKER NOT PRESCRIBED (INTENTIONAL)      Beta Blocker not prescribed intentionally due to Severe Asthma    Mild persistent asthma without complication       fluticasone-salmeterol 250-50 MCG/DOSE diskus inhaler    ADVAIR    3 Inhaler    Inhale 1 puff into the lungs 2 times daily    Moderate persistent asthma without complication       furosemide 40 MG tablet    LASIX    120 tablet    TAKE 1 TABLET BY MOUTH EVERY OTHER DAY. TAKE 2 TABS ON OTHER DAYS.    Chronic systolic congestive heart failure (H)       hydrALAZINE 25 MG tablet    APRESOLINE    180 tablet    Take 3 tablets (75 mg) by mouth 2 times daily    Chronic systolic congestive heart failure (H)       insulin pen needle 31G X 6 MM    ULTICARE MINI    100 each    Use 1 daily or as directed.    Type 2 diabetes mellitus with hyperglycemia, with long-term current use of insulin (H)       levalbuterol 1.25 MG/3ML neb solution    XOPENEX     Take 1 ampule by nebulization every 4 hours as needed for shortness of breath / dyspnea or wheezing Reported on 5/10/2017         levalbuterol 45 MCG/ACT Inhaler    XOPENEX HFA    15 g    Inhale 2 puffs into the lungs every 4 hours as needed for shortness of breath / dyspnea or wheezing    Mild persistent asthma without complication       losartan 50 MG tablet    COZAAR    90 tablet    Take 1 tablet (50 mg) by mouth daily    Essential hypertension with goal blood pressure less than 140/90, Type 2 diabetes mellitus with hyperglycemia, with long-term current use of insulin (H), Chronic systolic congestive heart failure (H)       lovastatin 20 MG tablet    MEVACOR    90 tablet    Take 1 tablet (20 mg) by mouth At Bedtime    Mixed hyperlipidemia       magnesium oxide 400 (241.3 Mg) MG tablet    MAG-OX    100 tablet    Take 1 tablet (400 mg) by mouth daily    Muscle spasm       metFORMIN 500 MG tablet    GLUCOPHAGE    360 tablet    Take 2 tablets (1,000 mg) by mouth 2 times daily (with meals)    Type 2 diabetes mellitus with hyperglycemia, with long-term current use of insulin (H)       montelukast 10 MG tablet    SINGULAIR    90 tablet    Take 1 tablet (10 mg) by mouth At Bedtime    Mild persistent asthma without complication       potassium chloride SA 20 MEQ CR tablet    K-DUR/KLOR-CON M    180 tablet    Take 1 tablet (20 mEq) by mouth 2 times daily    Hypokalemia       spironolactone 25 MG tablet    ALDACTONE    90 tablet    Take 1 tablet (25 mg) by mouth daily    Essential hypertension with goal blood pressure less than 140/90, Chronic systolic congestive heart failure (H)       TYLENOL 500 MG tablet   Generic drug:  acetaminophen      Reported on 5/10/2017        VITAMIN D3 PO      Take 2,000 Units by mouth daily

## 2018-10-12 NOTE — PROGRESS NOTES
MsMauro Montes,    Your urine and blood kidney function is stable.  Your diabetes has improved slightly as we discussed at your visit.    Follow up with me in 3 months for a recheck.    Please contact the clinic if you have additional questions.  Thank you.    Sincerely,    Anuja Tenorio

## 2018-11-20 ENCOUNTER — TRANSFERRED RECORDS (OUTPATIENT)
Dept: HEALTH INFORMATION MANAGEMENT | Facility: CLINIC | Age: 48
End: 2018-11-20

## 2018-12-03 DIAGNOSIS — E78.2 MIXED HYPERLIPIDEMIA: ICD-10-CM

## 2018-12-04 NOTE — TELEPHONE ENCOUNTER
"Requested Prescriptions   Pending Prescriptions Disp Refills     lovastatin (MEVACOR) 20 MG tablet [Pharmacy Med Name: LOVASTATIN 20 MG TABLET] 90 tablet 2    Last Written Prescription Date:  5/2/18  Last Fill Quantity: 90,  # refills: 3   Last Office Visit with [facility], [facility] or [facility] prescribing provider:  10/10/2018     Future Office Visit:      Sig: TAKE 1 TABLET BY MOUTH AT BEDTIME    Statins Protocol Passed    12/3/2018  8:32 PM       Passed - LDL on file in past 12 months    Recent Labs   Lab Test  05/02/18   1517   LDL  57            Passed - No abnormal creatine kinase in past 12 months    No lab results found.            Passed - Recent (12 mo) or future (30 days) visit within the authorizing provider's specialty    Patient had office visit in the last 12 months or has a visit in the next 30 days with authorizing provider or within the authorizing provider's specialty.  See \"Patient Info\" tab in inbasket, or \"Choose Columns\" in Meds & Orders section of the refill encounter.             Passed - Patient is age 18 or older       Passed - No active pregnancy on record       Passed - No positive pregnancy test in past 12 months          "

## 2018-12-06 RX ORDER — LOVASTATIN 20 MG
TABLET ORAL
Qty: 90 TABLET | Refills: 2
Start: 2018-12-06

## 2018-12-06 NOTE — TELEPHONE ENCOUNTER
90 day supply with 3 refills sent on 5/2/2018. Should have refills on file at pharmacy. Too soon to refill.          Chelle Emanuel RN, BSN    negative...

## 2019-01-22 ENCOUNTER — TELEPHONE (OUTPATIENT)
Dept: FAMILY MEDICINE | Facility: CLINIC | Age: 49
End: 2019-01-22

## 2019-01-22 NOTE — LETTER
[address] [address]  Dept: [phone]      January 22, 2019    Bettina Montes  [address] [facility] [place] [address]    Dear Bettina,    I care about your health and have reviewed your health plan. I have reviewed your medical conditions, medication list, and lab results and am making recommendations based on this review, to better manage your health.    You are in particular need of attention regarding:  -Diabetes    I am recommending that you:  -schedule a FOLLOWUP OFFICE APPOINTMENT with me.     Here is a list of Health Maintenance topics that are due now or due soon:  Health Maintenance Due   Topic Date Due     HIV SCREEN (SYSTEM ASSIGNED)  01/25/1987     Eye Exam - yearly  12/01/2017     Wellness Visit with your Primary Provider - yearly  09/13/2018     Asthma Control Test - every 6 months  12/22/2018     A1C (Diabetes) Lab - every 3 months  01/10/2019     Zoster (Chicken Pox) Vaccine (1 of 2) 01/25/2019     Heart Failure Action Plan Reviewed Every 3 Years  02/17/2019     Pap Smear - every 3 years  02/17/2019     Please call us at [phone] (or use [employer]) to address the above recommendations.     Thank you for trusting [facility] and we appreciate the opportunity to serve you.  We look forward to supporting your healthcare needs in the future.    Healthy Regards,    [facility]/Dr. Daniel

## 2019-01-22 NOTE — TELEPHONE ENCOUNTER
Panel Management Review      Patient has the following on her problem list:   Diabetes    ASA: Failed    Last A1C  Lab Results   Component Value Date    A1C 8.2 10/10/2018    A1C 8.3 05/02/2018    A1C 8.3 09/13/2017    A1C 10.1 05/10/2017    A1C 9.8 01/09/2017     A1C tested: FAILED    Last LDL:    Lab Results   Component Value Date    CHOL 153 05/02/2018     Lab Results   Component Value Date    HDL 71 05/02/2018     Lab Results   Component Value Date    LDL 57 05/02/2018     Lab Results   Component Value Date    TRIG 123 05/02/2018     No results found for: CHOLHDLRATIO  Lab Results   Component Value Date    NHDL 82 05/02/2018     Is the patient on a Statin? YES             Is the patient on Aspirin? NO    Medications     HMG CoA Reductase Inhibitors    lovastatin (MEVACOR) 20 MG tablet          Last three blood pressure readings:  BP Readings from Last 3 Encounters:   10/10/18 118/82   05/02/18 134/86   09/13/17 136/78     Date of last diabetes office visit: 10/10/2018     Tobacco History:     History   Smoking Status     Former Smoker     Packs/day: 1.00     Years: 30.00     Types: Cigarettes     Quit date: 1/9/2013   Smokeless Tobacco     Former User     Comment:          Patient is due/failing the following:   A1C, BP CHECK and FOLLOW UP    Action needed:   Patient needs office visit for diabetes follow up.    Type of outreach:    Phone, left message for patient to call back.  and Sent letter.    Questions for provider review:    None                                                                                                                                    Yulia Umaña, CMA

## 2019-02-15 ENCOUNTER — TELEPHONE (OUTPATIENT)
Dept: FAMILY MEDICINE | Facility: CLINIC | Age: 49
End: 2019-02-15

## 2019-02-15 ENCOUNTER — OFFICE VISIT (OUTPATIENT)
Dept: FAMILY MEDICINE | Facility: CLINIC | Age: 49
End: 2019-02-15
Payer: COMMERCIAL

## 2019-02-15 VITALS
TEMPERATURE: 98.5 F | WEIGHT: 271 LBS | HEART RATE: 97 BPM | OXYGEN SATURATION: 98 % | BODY MASS INDEX: 43.55 KG/M2 | HEIGHT: 66 IN | SYSTOLIC BLOOD PRESSURE: 144 MMHG | RESPIRATION RATE: 20 BRPM | DIASTOLIC BLOOD PRESSURE: 90 MMHG

## 2019-02-15 DIAGNOSIS — E11.65 TYPE 2 DIABETES MELLITUS WITH HYPERGLYCEMIA, WITH LONG-TERM CURRENT USE OF INSULIN (H): ICD-10-CM

## 2019-02-15 DIAGNOSIS — Z79.4 TYPE 2 DIABETES MELLITUS WITH HYPERGLYCEMIA, WITH LONG-TERM CURRENT USE OF INSULIN (H): ICD-10-CM

## 2019-02-15 DIAGNOSIS — M54.12 CERVICAL RADICULOPATHY: Primary | ICD-10-CM

## 2019-02-15 DIAGNOSIS — I10 ESSENTIAL HYPERTENSION: ICD-10-CM

## 2019-02-15 PROCEDURE — 99214 OFFICE O/P EST MOD 30 MIN: CPT | Performed by: NURSE PRACTITIONER

## 2019-02-15 RX ORDER — CYCLOBENZAPRINE HCL 5 MG
5-10 TABLET ORAL 3 TIMES DAILY PRN
Qty: 30 TABLET | Refills: 1 | Status: SHIPPED | OUTPATIENT
Start: 2019-02-15 | End: 2019-02-25

## 2019-02-15 ASSESSMENT — MIFFLIN-ST. JEOR: SCORE: 1863.94

## 2019-02-15 ASSESSMENT — PAIN SCALES - GENERAL: PAINLEVEL: EXTREME PAIN (9)

## 2019-02-15 NOTE — PROGRESS NOTES
SUBJECTIVE:   Bettina Montes is a 49 year old female who presents to clinic today for the following health issues:      Joint Pain    Onset: 4 days ago    Description:   Location: left shoulder  Character: throbbing    Intensity: severe    Progression of Symptoms: worse    Accompanying Signs & Symptoms:  Other symptoms: tingling in left fingers and swelling in left shoulder    History:   Previous similar pain: YES      Precipitating factors:   Trauma or overuse: no     Alleviating factors:  Improved by: nothing    Therapies Tried and outcome: Tylenol: no relieve      Neck pain to LUE. Feels like muscle pull. Tingling in fingers.   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  No known injury however. No chest pain.   BG running 250-280 for couple weeks after going to [employer] [place] for her birthday.  Ibuprofen 600 mg TID and tylenol 1000 mg q6h.  No weakness. No rash.    Problem list and histories reviewed & adjusted, as indicated.  Additional history: as documented    Patient Active Problem List   Diagnosis     Hypertension     Type 2 diabetes mellitus (H)     CHF (congestive heart failure) (H)     Hyperlipidemia     Morbid obesity due to excess calories (H)     Intermittent asthma, uncomplicated     Microscopic hematuria     Adrenal gland anomaly     Moderate persistent asthma without complication     Callus of foot     Past Surgical History:   Procedure Laterality Date     TUBAL LIGATION         Social History     Tobacco Use     Smoking status: Former Smoker     Packs/day: 1.00     Years: 30.00     Pack years: 30.00     Types: Cigarettes     Last attempt to quit: 2013     Years since quittin.1     Smokeless tobacco: Former User     Tobacco comment:     Substance Use Topics     Alcohol use: No     Alcohol/week: 0.0 oz     Family History   Problem Relation Age of Onset     Diabetes Mother      Hypertension Mother      Hyperlipidemia Mother      Heart Failure Mother      Diabetes  Father      Cancer Paternal Grandmother         ?         Current Outpatient Medications   Medication Sig Dispense Refill     BASAGLAR 100 UNIT/ML injection Inject 65 Units Subcutaneous At Bedtime 48 mL 1     BETA BLOCKER NOT PRESCRIBED, INTENTIONAL, Beta Blocker not prescribed intentionally due to Severe Asthma  0     Cholecalciferol (VITAMIN D3 PO) Take 2,000 Units by mouth daily       cyclobenzaprine (FLEXERIL) 5 MG tablet Take 1-2 tablets (5-10 mg) by mouth 3 times daily as needed for muscle spasms 30 tablet 1     fluticasone-salmeterol (ADVAIR) 250-50 MCG/DOSE diskus inhaler Inhale 1 puff into the lungs 2 times daily 3 Inhaler 1     furosemide (LASIX) 40 MG tablet TAKE 1 TABLET BY MOUTH EVERY OTHER DAY. TAKE 2 TABS ON OTHER DAYS. 120 tablet 3     hydrALAZINE (APRESOLINE) 25 MG tablet Take 3 tablets (75 mg) by mouth 2 times daily 180 tablet 5     insulin pen needle (ULTICARE MINI) 31G X 6 MM Use 1 daily or as directed. 100 each 3     levalbuterol (XOPENEX HFA) 45 MCG/ACT Inhaler Inhale 2 puffs into the lungs every 4 hours as needed for shortness of breath / dyspnea or wheezing 15 g 3     levalbuterol (XOPENEX) 1.25 MG/3ML nebulizer solution Take 1 ampule by nebulization every 4 hours as needed for shortness of breath / dyspnea or wheezing Reported on 5/10/2017       losartan (COZAAR) 50 MG tablet Take 1 tablet (50 mg) by mouth daily 90 tablet 1     lovastatin (MEVACOR) 20 MG tablet Take 1 tablet (20 mg) by mouth At Bedtime 90 tablet 3     magnesium oxide (MAG-OX) 400 (241.3 Mg) MG tablet Take 1 tablet (400 mg) by mouth daily 100 tablet 3     metFORMIN (GLUCOPHAGE) 500 MG tablet Take 2 tablets (1,000 mg) by mouth 2 times daily (with meals) 360 tablet 1     montelukast (SINGULAIR) 10 MG tablet Take 1 tablet (10 mg) by mouth At Bedtime 90 tablet 3     potassium chloride SA (K-DUR/KLOR-CON M) 20 MEQ CR tablet Take 1 tablet (20 mEq) by mouth 2 times daily 180 tablet 1     spironolactone (ALDACTONE) 25 MG tablet Take  "1 tablet (25 mg) by mouth daily 90 tablet 1     TYLENOL EXTRA STRENGTH 500 MG PO TABS Reported on 5/10/2017       Allergies   Allergen Reactions     Amlodipine Swelling     Edema on 5mg     Lisinopril      Swelling in throat, face     Naprosyn [Naproxen]      Swelling, diff breathing       Reviewed and updated as needed this visit by clinical staff  Tobacco  Allergies  Meds  Problems  Med Hx  Surg Hx  Fam Hx  Soc Hx        Reviewed and updated as needed this visit by Provider  Tobacco  Allergies  Meds  Problems  Med Hx  Surg Hx  Fam Hx         ROS:  Constitutional, HEENT, cardiovascular, pulmonary, GI, , musculoskeletal, neuro, skin, endocrine and psych systems are negative, except as otherwise noted.    OBJECTIVE:     /90   Pulse 97   Temp 98.5  F (36.9  C) (Oral)   Resp 20   Ht 1.673 m (5' 5.87\")   Wt 122.9 kg (271 lb)   LMP  (LMP Unknown)   SpO2 98%   Breastfeeding? No   BMI 43.91 kg/m    Body mass index is 43.91 kg/m .  GENERAL: healthy, alert and no distress  NECK: no adenopathy, no asymmetry, masses, or scars and thyroid normal to palpation  RESP: lungs clear to auscultation - no rales, rhonchi or wheezes  CV: regular rate and rhythm, normal S1 S2, no S3 or S4, no murmur, click or rub, no peripheral edema and peripheral pulses strong  MS: no gross musculoskeletal defects noted, no edema. Tenderness to left upper back. No tenderness over cervical spine  SKIN: no suspicious lesions or rashes  NEURO: Normal strength and tone, mentation intact and speech normal  PSYCH: mentation appears normal, affect normal/bright    Diagnostic Test Results:  none     ASSESSMENT/PLAN:         ICD-10-CM    1. Cervical radiculopathy M54.12 cyclobenzaprine (FLEXERIL) 5 MG tablet   2. Essential hypertension I10    3. Type 2 diabetes mellitus with hyperglycemia, with long-term current use of insulin (H) E11.65     Z79.4      Won't use steroids given type 2 DM and recent spikes in BG. Will use muscle " relaxer, NSAIDs/APAP and supportive cares.     See Patient Instructions    Ice or heat 15 minutes 4 times daily  You can take the cyclobenzaprine to help loosen up the muscles-no driving or operating machinery while taking. I also recommend ibuprofen 600 mg every 6 hours with food.   May alternate with tylenol 1000 mg every 6 hours.  If worsening or not improving in 3 days, follow up with primary care provider, sooner if needed.    Example: if you take ibuprofen at 12pm, take tylenol at 3pm, ibuprofen at 6pm, tylenol at 9pm.    The benefits, risks and potential side effects were discussed in detail. Black box warnings discussed as relevant. All patient questions were answered. The patient was instructed to follow up immediately if any adverse reactions develop.    Return precautions discussed, including when to seek urgent/emergent care.    Patient verbalizes understanding and agrees with plan of care. Patient stable for discharge.      RICCARDO Rodriguez [facility]

## 2019-02-15 NOTE — TELEPHONE ENCOUNTER
Reason for call:  Other   Patient called regarding (reason for call): call back  Additional comments: patient has questions about OTC meds and mixing them    Phone number to reach patient:  Home number on file [phone] (home)    Best Time:  any    Can we leave a detailed message on this number?  YES

## 2019-02-15 NOTE — PATIENT INSTRUCTIONS
Ice or heat 15 minutes 4 times daily  You can take the cyclobenzaprine to help loosen up the muscles-no driving or operating machinery while taking. I also recommend ibuprofen 600 mg every 6 hours with food.   May alternate with tylenol 1000 mg every 6 hours.  If worsening or not improving in 3 days, follow up with primary care provider, sooner if needed.    Example: if you take ibuprofen at 12pm, take tylenol at 3pm, ibuprofen at 6pm, tylenol at 9pm.      At [facility], we strive to deliver an exceptional experience to you, every time we see you.  If you receive a survey in the mail, please send us back your thoughts. We really do value your feedback.    Based on your medical history, these are the current health maintenance/preventive care services that you are due for (some may have been done at this visit.)  Health Maintenance Due   Topic Date Due     HIV SCREEN (SYSTEM ASSIGNED)  01/25/1987     EYE EXAM Q1 YEAR  12/01/2017     WELLNESS VISIT Q1 YR  09/13/2018     ASTHMA CONTROL TEST Q6 MOS  12/22/2018     A1C Q3 MO  01/10/2019     ZOSTER IMMUNIZATION (1 of 2) 01/25/2019     COLON CANCER SCREEN (SYSTEM ASSIGNED)  01/25/2019     HF ACTION PLAN Q3 YR  02/17/2019     PAP SCREENING Q3 YR (SYSTEM ASSIGNED)  02/17/2019         Suggested websites for health information:  Www.[employer].org : Up to date and easily searchable information on multiple topics.  Www.medlineplus.gov : medication info, interactive tutorials, watch real surgeries online  Www.familydoctor.org : good info from the Academy of Family Physicians  Www.cdc.gov : public health info, travel advisories, epidemics (H1N1)  Www.aap.org : children's health info, normal development, vaccinations  Www.health.state.[place].us : [place] dept of health, public health issues in [place], N1N1    Your care team:                            Family Medicine Internal Medicine   MD Fran Gabriel MD Shantel Branch-Fleming, MD Katya Georgiev PA-C     Prabhu Robbins MD Pediatrics   ETIENNE Dewitt, MD Latrice Siegel CNP, MD Deborah Mielke, MD Kim Thein, APRN CNP      Clinic hours: Monday - Thursday 7 am-7 pm; Fridays 7 am-5 pm.   Urgent care: Monday - Friday 11 am-9 pm; Saturday and Sunday 9 am-5 pm.  Pharmacy : Monday -Thursday 8 am-8 pm; Friday 8 am-6 pm; Saturday and Sunday 9 am-5 pm.     Clinic: [phone]   Pharmacy: [phone]    Patient Education     Pinched Nerve in the Neck  A pinched nerve in the neck (cervical radiculopathy) is caused when the nerve that goes from the spinal cord to the neck or arm is irritated or has pressure on it. This may be caused by a bulging spinal disk. A spinal disk is the cushion between each spinal bone. Or it may be caused by a narrowing of the spinal joint because of osteoarthritis and wear and tear from repeated injuries.  A pinched nerve can cause numbness, tingling, deep aching, or electrical shooting pain from the side of the neck all the way down to the fingers on one side.  A pinched nerve may start after a sudden turning or bending force (such as in a car accident) or after a simple awkward movement. In either case, muscle spasm is commonly present and adds to the pain.  Home care  Follow these guidelines when caring for yourself at home:    Rest and relax the muscles. Use a comfortable pillow that supports your head and keeps your spine in a natural (neutral) position. Your head shouldn t be tilted forward or backward. A rolled-up towel may help for a custom fit. When standing or sitting, keep your neck in line with your body. Keep your head up and shoulders down. Stay away from activities that require you to move your neck a lot.    You can use heat and massage to help ease the pain. Take a hot shower or bath, or use a heating pad. You can also use a cold pack for relief. You can make a cold pack by wrapping a plastic bag of crushed or cubed  ice in a thin towel. Try both heat and cold, and use the method that feels best. Do this for 20 minutes several times a day.    You may use acetaminophen or ibuprofen to control pain, unless another pain medicine was prescribed. If you have chronic liver or kidney disease, talk with your healthcare provider before using these medicines. Also talk with your provider if you ve had a stomach ulcer or gastrointestinal bleeding.    Reduce stress. Stress can make it longer for your pain to go away.    Do any exercises or stretches that were given to you as part of your discharge plan.    Wear a soft collar, if prescribed.    Physical therapy and massages are known to help.    You may need surgery for a more serious injury.  Follow-up care  Follow up with your healthcare provider, or as advised, if you don t start to get better after 1 week. You may need more tests. Tell your provider about any fever, chills, or weight loss.  If X-rays were taken, a radiologist may look at them. You will be told of any new findings that may affect your care.  When to seek medical advice  Call your healthcare provider right away if any of these occur:    Pain becomes worse even after taking prescribed pain medicine    Weakness in the arm or legs    Numbness in the arm gets worse    Trouble breathing or swallowing  Date Last Reviewed: 5/1/2017 2000-2018 [employer]. [address] [place] [address], [place] [address]. All rights reserved. This information is not intended as a substitute for professional medical care. Always follow your healthcare professional's instructions.

## 2019-02-15 NOTE — TELEPHONE ENCOUNTER
"  90 day supply request    Requested Prescriptions   Pending Prescriptions Disp Refills     insulin glargine (BASAGLAR KWIKPEN) 100 UNIT/ML pen  Last Written Prescription Date:  05/02/18  Last Fill Quantity: 48ml,  # refills: 1   Last Office Visit with [facility], [facility] or [facility] prescribing provider:  10/10/18-Noah Tenorio   Future Office Visit:    Next 5 appointments (look out 90 days)    Feb 27, 2019  9:40 AM CST  Office Visit with An[facility]ja [facility] MD  [facility] [address] [place] [phone]  [phone]        48 mL 1     Sig: Inject 65 Units Subcutaneous At Bedtime    Long Acting Insulin Protocol Failed - 2/15/2019 11:42 AM       Failed - HgbA1C in past 3 or 6 months    If HgbA1C is 8 or greater, it needs to be on file within the past 3 months.  If less than 8, must be on file within the past 6 months.     Recent Labs   Lab Test 10/10/18  1110   A1C 8.2*            Passed - Blood pressure less than 140/90 in past 6 months    BP Readings from Last 3 Encounters:   10/10/18 118/82   05/02/18 134/86   09/13/17 136/78                Passed - LDL on file in past 12 months    Recent Labs   Lab Test 05/02/18  1517   LDL 57            Passed - Microalbumin on file in past 12 months    Recent Labs   Lab Test 10/10/18  1115   MICROL 13   UMALCR 5.36            Passed - Serum creatinine on file in past 12 months    Recent Labs   Lab Test 10/10/18  1110   CR 0.79            Passed - Medication is active on med list       Passed - Patient is age 18 or older       Passed - Recent (6 mo) or future (30 days) visit within the authorizing provider's specialty    Patient had office visit in the last 6 months or has a visit in the next 30 days with authorizing provider or within the authorizing provider's specialty.  See \"Patient Info\" tab in inbasket, or \"Choose Columns\" in Meds & Orders section of the refill encounter.              "

## 2019-02-15 NOTE — TELEPHONE ENCOUNTER
Called patient back. She states that she pulled a muscle or pinched a nerve in her neck yesterday and she has a history of this happening in the past. Today the nerve pain is felt in her arm as well. Patient states her pain has increased since yesterday. Patient was calling to ask if it is okay to alternate taking Tylenol and Ibuprofen for symptoms.     Writer advised due to nature of symptoms, before any advisement it would be beneficial for patient to be evaluated by a provider in clinic for symptoms.     Patient denies chest pain, SOB, difficulty breathing, confusion, dizziness, lightheadedness, feeling faint, changes to vision, heart palpitations or any other emergent symptoms.     Patient advised to go to Urgent Care now for evaluation. Patient states understanding. Writer advised patient if she experiences any of the following symptoms bolded above that she should call 911 to be evaluated in the ED. Patient states understanding.     Maddie Sanchez RN

## 2019-02-19 RX ORDER — INSULIN GLARGINE 100 [IU]/ML
65 INJECTION, SOLUTION SUBCUTANEOUS AT BEDTIME
Qty: 48 ML | Refills: 0 | Status: SHIPPED | OUTPATIENT
Start: 2019-02-19 | End: 2019-02-27

## 2019-02-19 NOTE — TELEPHONE ENCOUNTER
Routing refill request to provider for review/approval because:  Labs not current:  A1C      Chelle Emanuel RN, BSN

## 2019-02-25 DIAGNOSIS — M54.12 CERVICAL RADICULOPATHY: ICD-10-CM

## 2019-02-25 RX ORDER — CYCLOBENZAPRINE HCL 5 MG
5-10 TABLET ORAL 3 TIMES DAILY PRN
Qty: 90 TABLET | Refills: 0 | Status: SHIPPED | OUTPATIENT
Start: 2019-02-25 | End: 2019-02-27 | Stop reason: SINTOL

## 2019-02-25 NOTE — TELEPHONE ENCOUNTER
Called and spoke to the patient and explained that this has been refilled and sent to her pharmacy, patient understands.  Kristyn Sheridan MA/  For Teams Mook

## 2019-02-25 NOTE — TELEPHONE ENCOUNTER
Reason for Call:   medication refill:    Do you use a [facility]?no    Name of the pharmacy and phone number for the current request:   [facility] [place]    Name of the medication requested: Flexeril - muscle relaxer    Other request: *pt was seen in uc, uc prescribed this* pt was wondering if Dr.Branch Tenorio can refill it for her even though it wasn't her who ordered it, since it states no refills on bottle.     Can we leave a detailed message on this number? YES    Phone number patient can be reached at: Cell number on file:    Telephone Information:   Mobile [phone]       Best Time: anytime    Call taken on 2/25/2019 at 9:39 AM by Lia Garcia I

## 2019-02-27 ENCOUNTER — OFFICE VISIT (OUTPATIENT)
Dept: FAMILY MEDICINE | Facility: CLINIC | Age: 49
End: 2019-02-27
Payer: COMMERCIAL

## 2019-02-27 VITALS
OXYGEN SATURATION: 100 % | HEART RATE: 114 BPM | BODY MASS INDEX: 45.32 KG/M2 | TEMPERATURE: 98.4 F | WEIGHT: 282 LBS | SYSTOLIC BLOOD PRESSURE: 137 MMHG | HEIGHT: 66 IN | DIASTOLIC BLOOD PRESSURE: 83 MMHG

## 2019-02-27 DIAGNOSIS — Z79.4 TYPE 2 DIABETES MELLITUS WITH HYPERGLYCEMIA, WITH LONG-TERM CURRENT USE OF INSULIN (H): ICD-10-CM

## 2019-02-27 DIAGNOSIS — J45.40 MODERATE PERSISTENT ASTHMA WITHOUT COMPLICATION: ICD-10-CM

## 2019-02-27 DIAGNOSIS — I50.22 CHRONIC SYSTOLIC CONGESTIVE HEART FAILURE (H): ICD-10-CM

## 2019-02-27 DIAGNOSIS — E11.65 TYPE 2 DIABETES MELLITUS WITH HYPERGLYCEMIA, WITH LONG-TERM CURRENT USE OF INSULIN (H): ICD-10-CM

## 2019-02-27 DIAGNOSIS — E87.6 HYPOKALEMIA: ICD-10-CM

## 2019-02-27 DIAGNOSIS — L84 CALLUS OF FOOT: ICD-10-CM

## 2019-02-27 DIAGNOSIS — Z23 NEED FOR SHINGLES VACCINE: ICD-10-CM

## 2019-02-27 DIAGNOSIS — Z11.4 SCREENING FOR HIV (HUMAN IMMUNODEFICIENCY VIRUS): ICD-10-CM

## 2019-02-27 DIAGNOSIS — S46.812A TRAPEZIUS STRAIN, LEFT, INITIAL ENCOUNTER: Primary | ICD-10-CM

## 2019-02-27 DIAGNOSIS — I10 ESSENTIAL HYPERTENSION WITH GOAL BLOOD PRESSURE LESS THAN 140/90: ICD-10-CM

## 2019-02-27 DIAGNOSIS — Z12.11 SCREEN FOR COLON CANCER: ICD-10-CM

## 2019-02-27 LAB — HBA1C MFR BLD: 11.7 % (ref 0–5.6)

## 2019-02-27 PROCEDURE — 99214 OFFICE O/P EST MOD 30 MIN: CPT | Mod: 25 | Performed by: FAMILY MEDICINE

## 2019-02-27 PROCEDURE — 90471 IMMUNIZATION ADMIN: CPT | Performed by: FAMILY MEDICINE

## 2019-02-27 PROCEDURE — 36415 COLL VENOUS BLD VENIPUNCTURE: CPT | Performed by: FAMILY MEDICINE

## 2019-02-27 PROCEDURE — 11056 PARNG/CUTG B9 HYPRKR LES 2-4: CPT | Performed by: FAMILY MEDICINE

## 2019-02-27 PROCEDURE — 83036 HEMOGLOBIN GLYCOSYLATED A1C: CPT | Performed by: FAMILY MEDICINE

## 2019-02-27 PROCEDURE — 90750 HZV VACC RECOMBINANT IM: CPT | Performed by: FAMILY MEDICINE

## 2019-02-27 RX ORDER — POTASSIUM CHLORIDE 1500 MG/1
20 TABLET, EXTENDED RELEASE ORAL 2 TIMES DAILY
Qty: 180 TABLET | Refills: 1 | Status: SHIPPED | OUTPATIENT
Start: 2019-02-27 | End: 2019-03-22

## 2019-02-27 RX ORDER — INSULIN GLARGINE 100 [IU]/ML
65 INJECTION, SOLUTION SUBCUTANEOUS AT BEDTIME
Qty: 60 ML | Refills: 1 | Status: SHIPPED | OUTPATIENT
Start: 2019-02-27 | End: 2019-08-12

## 2019-02-27 RX ORDER — METHOCARBAMOL 750 MG/1
750 TABLET, FILM COATED ORAL 4 TIMES DAILY PRN
Qty: 180 TABLET | Refills: 1 | Status: SHIPPED | OUTPATIENT
Start: 2019-02-27 | End: 2019-03-20

## 2019-02-27 RX ORDER — HYDRALAZINE HYDROCHLORIDE 25 MG/1
75 TABLET, FILM COATED ORAL 2 TIMES DAILY
Qty: 180 TABLET | Refills: 5 | Status: SHIPPED | OUTPATIENT
Start: 2019-02-27 | End: 2019-05-20

## 2019-02-27 RX ORDER — LOSARTAN POTASSIUM 50 MG/1
50 TABLET ORAL DAILY
Qty: 90 TABLET | Refills: 1 | Status: SHIPPED | OUTPATIENT
Start: 2019-02-27 | End: 2019-05-20

## 2019-02-27 RX ORDER — FUROSEMIDE 40 MG
TABLET ORAL
Qty: 120 TABLET | Refills: 3 | Status: SHIPPED | OUTPATIENT
Start: 2019-02-27 | End: 2019-03-20

## 2019-02-27 RX ORDER — SPIRONOLACTONE 25 MG/1
25 TABLET ORAL DAILY
Qty: 90 TABLET | Refills: 1 | Status: SHIPPED | OUTPATIENT
Start: 2019-02-27 | End: 2019-03-22

## 2019-02-27 ASSESSMENT — PAIN SCALES - GENERAL: PAINLEVEL: SEVERE PAIN (6)

## 2019-02-27 ASSESSMENT — MIFFLIN-ST. JEOR: SCORE: 1913.83

## 2019-02-27 NOTE — NURSING NOTE
Screening Questionnaire for Adult Immunization    Are you sick today?   No   Do you have allergies to medications, food, a vaccine component or latex?   No   Have you ever had a serious reaction after receiving a vaccination?   No   Do you have a long-term health problem with heart disease, lung disease, asthma, kidney disease, metabolic disease (e.g. diabetes), anemia, or other blood disorder?   No   Do you have cancer, leukemia, HIV/AIDS, or any other immune system problem?   No   In the past 3 months, have you taken medications that affect  your immune system, such as prednisone, other steroids, or anticancer drugs; drugs for the treatment of rheumatoid arthritis, Crohn s disease, or psoriasis; or have you had radiation treatments?   No   Have you had a seizure, or a brain or other nervous system problem?   No   During the past year, have you received a transfusion of blood or blood     products, or been given immune (gamma) globulin or antiviral drug?   No   For women: Are you pregnant or is there a chance you could become        pregnant during the next month?   No   Have you received any vaccinations in the past 4 weeks?   No     Immunization questionnaire answers were all negative.        Patient instructed to remain in clinic for 15 minutes afterwards, and to report any adverse reaction to me immediately.       Screening performed by Deep Palmer on 2/27/2019 at 10:48 AM.

## 2019-02-27 NOTE — PATIENT INSTRUCTIONS
At [facility], we strive to deliver an exceptional experience to you, every time we see you.  If you receive a survey in the mail, please send us back your thoughts. We really do value your feedback.    Your care team:                            Family Medicine Internal Medicine   MD Fran Gabriel MD Shantel Branch-Fleming, MD Katya Georgiev PA-C Megan Hill, APRN SHAZIA Robbins MD Pediatrics   Ricardo Arellano, ETIENNE Ramirez, MD Darby Ortiz APRN CNP   MD Latrice Piña MD Deborah Mielke, MD Lona Chase, APRN [facility]      Clinic hours: Monday - Thursday 7 am-7 pm; Fridays 7 am-5 pm.   Urgent care: Monday - Friday 11 am-9 pm; Saturday and Sunday 9 am-5 pm.  Pharmacy : Monday -Thursday 8 am-8 pm; Friday 8 am-6 pm; Saturday and Sunday 9 am-5 pm.     Clinic: [phone]   Pharmacy: [phone]

## 2019-02-27 NOTE — PROGRESS NOTES
SUBJECTIVE:   Bettina Montes is a 49 year old female who presents to clinic today for the following health issues:      Diabetes Follow-up    Patient is checking blood sugars: once daily.  Results are as follows:          am -     Diabetic concerns: blood sugar frequently over 200     Symptoms of hypoglycemia (low blood sugar): none     Paresthesias (numbness or burning in feet) or sores: Tips of fingers only     Date of last diabetic eye exam: 11/20/2018    BP Readings from Last 2 Encounters:   02/27/19 137/83   02/15/19 144/90     Hemoglobin A1C (%)   Date Value   02/27/2019 11.7 (H)   10/10/2018 8.2 (H)     LDL Cholesterol Calculated (mg/dL)   Date Value   05/02/2018 57   09/13/2017 56       Diabetes Management Resources  Hypertension/Hypokalemia Follow-up      Outpatient blood pressures are not being checked.    Low Salt Diet: not monitoring salt      Amount of exercise or physical activity: None    Problems taking medications regularly: No    Medication side effects: none    Diet: regular (no restrictions)      - Patient brought in forms for completion. Forms from the [place] Dept. Of Public Safety    CHF - stable with current medications.  No increased dyspnea noted.    Asthma - stable with current treatment.  No increase wheezing or rescue inhaler usage.    Bilateral feet callous x 4 - has reformed again.    Problem list and histories reviewed & adjusted, as indicated.  Additional history: as documented    Patient Active Problem List   Diagnosis     Hypertension     Type 2 diabetes mellitus (H)     CHF (congestive heart failure) (H)     Hyperlipidemia     Morbid obesity due to excess calories (H)     Intermittent asthma, uncomplicated     Microscopic hematuria     Adrenal gland anomaly     Moderate persistent asthma without complication     Callus of foot     Past Surgical History:   Procedure Laterality Date     TUBAL LIGATION         Social History     Tobacco Use     Smoking status: Former Smoker  "    Packs/day: 1.00     Years: 30.00     Pack years: 30.00     Types: Cigarettes     Last attempt to quit: 2013     Years since quittin.1     Smokeless tobacco: Former User     Tobacco comment:     Substance Use Topics     Alcohol use: No     Alcohol/week: 0.0 oz     Family History   Problem Relation Age of Onset     Diabetes Mother      Hypertension Mother      Hyperlipidemia Mother      Heart Failure Mother      Diabetes Father      Cancer Paternal Grandmother         ?           Reviewed and updated as needed this visit by clinical staff  Tobacco  Allergies  Meds  Problems  Med Hx  Surg Hx  Fam Hx  Soc Hx        Reviewed and updated as needed this visit by Provider  Tobacco  Allergies  Meds  Problems  Med Hx  Surg Hx  Fam Hx         ROS:  Constitutional, HEENT, cardiovascular, pulmonary, gi and gu systems are negative, except as otherwise noted.    OBJECTIVE:     /83 (BP Location: Left arm, Patient Position: Sitting, Cuff Size: Adult Large)   Pulse 114   Temp 98.4  F (36.9  C) (Tympanic)   Ht 1.673 m (5' 5.87\")   Wt 127.9 kg (282 lb)   LMP  (LMP Unknown)   SpO2 100%   Breastfeeding? No   BMI 45.70 kg/m    Body mass index is 45.7 kg/m .  GENERAL: healthy, alert, no distress and obese  NECK: no adenopathy, no asymmetry, masses, or scars and thyroid normal to palpation  RESP: lungs clear to auscultation - no rales, rhonchi or wheezes  CV: regular rate and rhythm, normal S1 S2, no S3 or S4, no murmur, click or rub, no peripheral edema and peripheral pulses strong  ABDOMEN: soft, nontender, no hepatosplenomegaly, no masses and bowel sounds normal  MS: tightness of neck component of trapezius bilaterally, and left entire trapezius with some rotator cuff tightness as well on the left  SKIN: callous on great toe and plantar foot bilaterally  PSYCH: mentation appears normal, affect normal/bright    Diagnostic Test Results:  none     ASSESSMENT/PLAN:     1. Trapezius strain, left, " initial encounter  Change flexeril to robaxin and gentle stretching recommended.  - methocarbamol (ROBAXIN) 750 MG tablet; Take 1 tablet (750 mg) by mouth 4 times daily as needed for muscle spasms  Dispense: 180 tablet; Refill: 1    2. Type 2 diabetes mellitus with hyperglycemia, with long-term current use of insulin (H)  Uncertain control - check labs, refill medication with adjustments as indicated.  - HEMOGLOBIN A1C  - insulin glargine (BASAGLAR KWIKPEN) 100 UNIT/ML pen; Inject 65 Units Subcutaneous At Bedtime  Dispense: 60 mL; Refill: 1  - losartan (COZAAR) 50 MG tablet; Take 1 tablet (50 mg) by mouth daily  Dispense: 90 tablet; Refill: 1  - metFORMIN (GLUCOPHAGE) 500 MG tablet; Take 2 tablets (1,000 mg) by mouth 2 times daily (with meals)  Dispense: 360 tablet; Refill: 1    3. Chronic systolic congestive heart failure (H)  Stable - continue current treatment  - hydrALAZINE (APRESOLINE) 25 MG tablet; Take 3 tablets (75 mg) by mouth 2 times daily  Dispense: 180 tablet; Refill: 5  - furosemide (LASIX) 40 MG tablet; TAKE 1 TABLET BY MOUTH EVERY OTHER DAY. TAKE 2 TABS ON OTHER DAYS.  Dispense: 120 tablet; Refill: 3  - losartan (COZAAR) 50 MG tablet; Take 1 tablet (50 mg) by mouth daily  Dispense: 90 tablet; Refill: 1  - spironolactone (ALDACTONE) 25 MG tablet; Take 1 tablet (25 mg) by mouth daily  Dispense: 90 tablet; Refill: 1    4. Moderate persistent asthma without complication  Stable - refilled  - fluticasone-salmeterol (ADVAIR) 250-50 MCG/DOSE inhaler; Inhale 1 puff into the lungs 2 times daily  Dispense: 3 Inhaler; Refill: 1    5. Essential hypertension with goal blood pressure less than 140/90  Controlled - refilled  - losartan (COZAAR) 50 MG tablet; Take 1 tablet (50 mg) by mouth daily  Dispense: 90 tablet; Refill: 1  - spironolactone (ALDACTONE) 25 MG tablet; Take 1 tablet (25 mg) by mouth daily  Dispense: 90 tablet; Refill: 1    6. Hypokalemia  Refilled  - potassium chloride ER (K-DUR/KLOR-CON M) 20 MEQ  CR tablet; Take 1 tablet (20 mEq) by mouth 2 times daily  Dispense: 180 tablet; Refill: 1    7. Callus of foot  Verbal informed consent.  Dermablade used to remove callous. Trace bleeding right great toe and hemostasis achieved.  - TRIM HYPERKERATOTIC SKIN LESION, ONE  - TRIM HYPERKERATOTIC SKIN LESION,2-4    8. Screening for HIV (human immunodeficiency virus)  Refused    9. Screen for colon cancer    - Fecal colorectal cancer screen FIT - Future (S+30); Future    10. Need for shingles vaccine    - ZOSTER VACCINE RECOMBINANT ADJUVANTED IM NJX  - ADMIN 1st VACCINE    The uses and side effects, including black box warnings as appropriate, were discussed in detail.  All patient questions were answered.  The patient was instructed to call immediately if any side effects developed.     FUTURE APPOINTMENTS:       - Follow-up visit in 2 - 6 months as based on labs.    Anuja Tenorio MD  [facility]

## 2019-02-27 NOTE — RESULT ENCOUNTER NOTE
Please call patient.    Her diabetes test has worsening significantly.  She need to get back to a lower carbohydrate/sugar diet and take her medications as directed.  If she feels she has been consistent in her diet, we should increase her insulin dose as her body may have become used to her current dose.    Follow up in 2 months for a recheck.    Anuja Daniel M.D.

## 2019-02-28 ASSESSMENT — ASTHMA QUESTIONNAIRES: ACT_TOTALSCORE: 20

## 2019-03-08 ENCOUNTER — TELEPHONE (OUTPATIENT)
Dept: FAMILY MEDICINE | Facility: CLINIC | Age: 49
End: 2019-03-08

## 2019-03-08 ENCOUNTER — OFFICE VISIT (OUTPATIENT)
Dept: FAMILY MEDICINE | Facility: CLINIC | Age: 49
End: 2019-03-08
Payer: COMMERCIAL

## 2019-03-08 VITALS
WEIGHT: 282 LBS | SYSTOLIC BLOOD PRESSURE: 142 MMHG | DIASTOLIC BLOOD PRESSURE: 88 MMHG | OXYGEN SATURATION: 99 % | BODY MASS INDEX: 45.7 KG/M2 | HEART RATE: 102 BPM | TEMPERATURE: 97.6 F

## 2019-03-08 DIAGNOSIS — Z79.4 TYPE 2 DIABETES MELLITUS WITH HYPERGLYCEMIA, WITH LONG-TERM CURRENT USE OF INSULIN (H): ICD-10-CM

## 2019-03-08 DIAGNOSIS — E66.01 MORBID OBESITY DUE TO EXCESS CALORIES (H): ICD-10-CM

## 2019-03-08 DIAGNOSIS — M75.02 ADHESIVE CAPSULITIS OF LEFT SHOULDER: ICD-10-CM

## 2019-03-08 DIAGNOSIS — H11.32 SUBCONJUNCTIVAL HEMORRHAGE, LEFT: Primary | ICD-10-CM

## 2019-03-08 DIAGNOSIS — E11.65 TYPE 2 DIABETES MELLITUS WITH HYPERGLYCEMIA, WITH LONG-TERM CURRENT USE OF INSULIN (H): ICD-10-CM

## 2019-03-08 DIAGNOSIS — I50.22 CHRONIC SYSTOLIC HEART FAILURE (H): ICD-10-CM

## 2019-03-08 DIAGNOSIS — S46.812D STRAIN OF LEFT TRAPEZIUS MUSCLE, SUBSEQUENT ENCOUNTER: ICD-10-CM

## 2019-03-08 PROCEDURE — 99214 OFFICE O/P EST MOD 30 MIN: CPT | Performed by: PREVENTIVE MEDICINE

## 2019-03-08 ASSESSMENT — PAIN SCALES - GENERAL: PAINLEVEL: NO PAIN (0)

## 2019-03-08 NOTE — TELEPHONE ENCOUNTER
Took patient off park.    Patient reports that she had a blood vessel that burst in her left eye. She woke up today and noticed her left eye was red.     She denies the following in left eye: pus/drainage from eye, yellow crusty stuff, loss of vision, injury to eye, unable to open eye, pain in eye.     She denies following symptoms: facial drooping, difficulty swallowing, headaches, dizziness, problems bearing weight, problems ambulating, difficulty swallowing, difficulty with moving limbs, problems breathing. She has asthma so breathing issues with this but no new breathing problems.     She was seen in clinic on 2/27/19 for Trapezius strain, left, initial encounter.    She reports that she was seen in December 2018 for an eye exam at [facility] and had a good report on her eyes.    She reports that she has been taking her blood pressure medications and does not feel like she is having symptoms related to uncontrolled blood pressures. She does not have a blood pressure cuff at home and does not check blood pressures.     She reports that she randomly checks her blood sugars and does not necessarily check blood sugars daily.This am her blood sugar was 156 and her blood sugar yesterday am was 96.      RN assisted her in scheduling an eye appointment with provider in clinic  today. Advised her that she should be seen by provider or eye doctor in next 24 hours.     Advised her to seek medical care if she has any of the above bolded symptoms in the meantime.    Routing to provider for review of plan.    Marie Mccormack RN

## 2019-03-08 NOTE — TELEPHONE ENCOUNTER
Called and left patient detailed message regarding notation below per Dr. Noah Tenorio. Appears pt has apt in clinic today with Dr. Sim at 3:20 pm.    Laura Medel RN

## 2019-03-08 NOTE — TELEPHONE ENCOUNTER
Reason for call:  Patient reporting a symptom    Symptom or request: burst left eye vessel    Duration (how long have symptoms been present): yesterday    Have you been treated for this before? No    Additional comments:     Phone Number patient can be reached at:  Home number on file [phone] (home)    Best Time:  any    Can we leave a detailed message on this number:  YES    Call taken on 3/8/2019 at 9:31 AM by Javed Woodard

## 2019-03-08 NOTE — PATIENT INSTRUCTIONS
At [facility], we strive to deliver an exceptional experience to you, every time we see you.  If you receive a survey in the mail, please send us back your thoughts. We really do value your feedback.    Your care team:                            Family Medicine Internal Medicine   MD Fran Gabriel MD Shantel Branch-Fleming, MD Katya Georgiev PA-C Megan Hill, APRN SHAZIA Robbins MD Pediatrics   Ricardo Arellano, ETIENNE Ramirez, MD Darby Ortiz APRN CNP   MD Latrice Piña MD Deborah Mielke, MD Lona Chase, APRN [facility]      Clinic hours: Monday - Thursday 7 am-7 pm; Fridays 7 am-5 pm.   Urgent care: Monday - Friday 11 am-9 pm; Saturday and Sunday 9 am-5 pm.  Pharmacy : Monday -Thursday 8 am-8 pm; Friday 8 am-6 pm; Saturday and Sunday 9 am-5 pm.     Clinic: [phone]   Pharmacy: [phone]

## 2019-03-08 NOTE — PROGRESS NOTES
SUBJECTIVE:   Bettina Montes is a 49 year old female who presents to clinic today for the following health issues:      Eye(s) Problem      Duration: x today    Description:  Location: left  Pain: no   Redness: YES  Discharge: no     Accompanying signs and symptoms: dryness    History (Trauma, foreign body exposure,): None    Precipitating or alleviating factors (contact use): None    Therapies tried and outcome: None    Patient reports that she had a blood vessel that burst in her left eye. She woke up and noticed her left eye was red.      Does not have pus/drainage from eye, yellow crusting, loss of vision, injury to eye, unable to open eye, pain in eye.      She denies following symptoms: facial drooping, difficulty swallowing, headaches, dizziness, problems bearing weight, problems ambulating, difficulty swallowing, difficulty with moving limbs, problems breathing. She has asthma so breathing issues with this but no new breathing problems.     Airplane in January, nothing recent   No hematuria  No melena or rectal bleeding   No past history   No photophobia and no foreign body sensation     Blood pressure is not checked at home  Lately, has been in a lot of pain in her left shoulder, throbbing+, pain ongoing for 1 month, has been on muscle relaxer medication   Back of the neck also hurts   About a week ago was not able to even turn her head     Diabetes Follow-up    Patient is checking blood sugars: once daily.  Results are as follows:         am - 156 mg/dl    Diabetic concerns: None     Symptoms of hypoglycemia (low blood sugar): none     Paresthesias (numbness or burning in feet) or sores: No     Date of last diabetic eye exam: 10/2018     BP Readings from Last 2 Encounters:   03/08/19 142/88   02/27/19 137/83     Hemoglobin A1C (%)   Date Value   02/27/2019 11.7 (H)   10/10/2018 8.2 (H)     LDL Cholesterol Calculated (mg/dL)   Date Value   05/02/2018 57   09/13/2017 56       Diabetes Management  Resources      Hypertension Follow-up      Outpatient blood pressures are not being checked.    Low Salt Diet: low salt      Problem list and histories reviewed & adjusted, as indicated.  Additional history: as documented    Patient Active Problem List   Diagnosis     Hypertension     Type 2 diabetes mellitus (H)     CHF (congestive heart failure) (H)     Hyperlipidemia     Morbid obesity due to excess calories (H)     Intermittent asthma, uncomplicated     Microscopic hematuria     Adrenal gland anomaly     Moderate persistent asthma without complication     Callus of foot     Past Surgical History:   Procedure Laterality Date     TUBAL LIGATION         Social History     Tobacco Use     Smoking status: Former Smoker     Packs/day: 1.00     Years: 30.00     Pack years: 30.00     Types: Cigarettes     Last attempt to quit: 2013     Years since quittin.1     Smokeless tobacco: Former User     Tobacco comment:     Substance Use Topics     Alcohol use: No     Alcohol/week: 0.0 oz     Family History   Problem Relation Age of Onset     Diabetes Mother      Hypertension Mother      Hyperlipidemia Mother      Heart Failure Mother      Diabetes Father      Cancer Paternal Grandmother         ?         Current Outpatient Medications   Medication Sig Dispense Refill     BETA BLOCKER NOT PRESCRIBED, INTENTIONAL, Beta Blocker not prescribed intentionally due to Severe Asthma  0     Cholecalciferol (VITAMIN D3 PO) Take 2,000 Units by mouth daily       fluticasone-salmeterol (ADVAIR) 250-50 MCG/DOSE inhaler Inhale 1 puff into the lungs 2 times daily 3 Inhaler 1     furosemide (LASIX) 40 MG tablet TAKE 1 TABLET BY MOUTH EVERY OTHER DAY. TAKE 2 TABS ON OTHER DAYS. 120 tablet 3     hydrALAZINE (APRESOLINE) 25 MG tablet Take 3 tablets (75 mg) by mouth 2 times daily 180 tablet 5     insulin glargine (BASAGLAR KWIKPEN) 100 UNIT/ML pen Inject 65 Units Subcutaneous At Bedtime 60 mL 1     insulin pen needle (ULTICARE MINI) 31G X 6  MM Use 1 daily or as directed. 100 each 3     levalbuterol (XOPENEX HFA) 45 MCG/ACT Inhaler Inhale 2 puffs into the lungs every 4 hours as needed for shortness of breath / dyspnea or wheezing 15 g 3     losartan (COZAAR) 50 MG tablet Take 1 tablet (50 mg) by mouth daily 90 tablet 1     lovastatin (MEVACOR) 20 MG tablet Take 1 tablet (20 mg) by mouth At Bedtime 90 tablet 3     magnesium oxide (MAG-OX) 400 (241.3 Mg) MG tablet Take 1 tablet (400 mg) by mouth daily 100 tablet 3     metFORMIN (GLUCOPHAGE) 500 MG tablet Take 2 tablets (1,000 mg) by mouth 2 times daily (with meals) 360 tablet 1     methocarbamol (ROBAXIN) 750 MG tablet Take 1 tablet (750 mg) by mouth 4 times daily as needed for muscle spasms 180 tablet 1     montelukast (SINGULAIR) 10 MG tablet Take 1 tablet (10 mg) by mouth At Bedtime 90 tablet 3     potassium chloride ER (K-DUR/KLOR-CON M) 20 MEQ CR tablet Take 1 tablet (20 mEq) by mouth 2 times daily 180 tablet 1     spironolactone (ALDACTONE) 25 MG tablet Take 1 tablet (25 mg) by mouth daily 90 tablet 1     TYLENOL EXTRA STRENGTH 500 MG PO TABS Reported on 5/10/2017       Allergies   Allergen Reactions     Amlodipine Swelling     Edema on 5mg     Lisinopril      Swelling in throat, face     Naprosyn [Naproxen]      Swelling, diff breathing     Recent Labs   Lab Test 02/27/19  1051 10/10/18  1110 05/02/18  1517 09/13/17  1411 05/10/17  0943 01/09/17  1507 10/10/16  1354 02/17/16  1435   A1C 11.7* 8.2* 8.3* 8.3* 10.1* 9.8* 7.8* 10.7*   LDL  --   --  57 56  --   --  70 111*   HDL  --   --  71 54  --   --   --   --    TRIG  --   --  123 178*  --   --   --   --    ALT  --   --  34  --   --  30  --  37   CR  --  0.79 0.82 0.83 0.70 0.73 0.58 0.71   GFRESTIMATED  --  78 75 73 89 85 >90  Non  GFR Calc   88   GFRESTBLACK  --  >90 >90 88 >90   GFR Calc   >90   GFR Calc   >90   GFR Calc   >90   GFR Calc     POTASSIUM  --  3.9 3.7 3.7  4.2 3.9 4.0 3.8   TSH  --   --   --   --  0.84  --   --  1.04      BP Readings from Last 3 Encounters:   03/08/19 142/88   02/27/19 137/83   02/15/19 144/90    Wt Readings from Last 3 Encounters:   03/08/19 127.9 kg (282 lb)   02/27/19 127.9 kg (282 lb)   02/15/19 122.9 kg (271 lb)           Labs reviewed in EPIC    Reviewed and updated as needed this visit by clinical staff  Tobacco  Allergies  Meds  Problems  Med Hx  Surg Hx  Fam Hx       Reviewed and updated as needed this visit by Provider  Tobacco  Allergies  Meds  Problems  Med Hx  Surg Hx  Fam Hx         ROS:  Constitutional, neuro, ENT, endocrine, pulmonary, cardiac, gastrointestinal, genitourinary, musculoskeletal, integument and psychiatric systems are negative, except as otherwise noted.    OBJECTIVE:                                                    /88 (BP Location: Left arm, Patient Position: Chair, Cuff Size: Adult Large)   Pulse 102   Temp 97.6  F (36.4  C) (Oral)   Wt 127.9 kg (282 lb)   LMP  (LMP Unknown)   SpO2 99%   Breastfeeding? No   BMI 45.70 kg/m    Body mass index is 45.7 kg/m .  GENERAL APPEARANCE: healthy, alert and no distress  EYES: PERRL, lids and lashes normal and small area of subconjunctival hemorrhage noted on the left side, lateral   NECK: trachea midline and normal to palpation  RESP: lungs clear to auscultation - no rales, rhonchi or wheezes  CV: regular rates and rhythm, normal S1 S2, no S3 or S4 and no murmur, click or rub  ABDOMEN: soft, non-tender  MS: extremities normal- no gross deformities noted  SKIN: no suspicious lesions or rashes  NEURO: Normal strength and tone, mentation intact and speech normal  PSYCH: mentation appears normal and affect normal/bright  Left shoulder: Marked tenderness of the left trapezius, significant reduction in range of motion of the shoulder in all fields.       VISION   Wears glasses: worn for testing  Tool used: Polanco   Right eye:        10/12.5 (20/25)  Left eye:           10/12.5 (20/25)  Visual Acuity: Pass    Diagnostic test results:  Diagnostic Test Results:  No results found for this or any previous visit (from the past 24 hour(s)).     ASSESSMENT/PLAN:                                                    1. Subconjunctival hemorrhage, left  -Reassurance  -should improve in 1-2 weeks  -Visual acuity is normal     2. Morbid obesity due to excess calories (H)  -weight stable compared to last check    3. Type 2 diabetes mellitus with hyperglycemia, with long-term current use of insulin (H)  -Uncontrolled  -last HbA1C was 11.7  -per PCP     4. Chronic systolic heart failure (H)  -Stable at this time  -no signs of fluid overload     5. Strain of left trapezius muscle, subsequent encounter  -Already on muscle relaxers   - [facility]; Future  - ORTHO  REFERRAL    6. Adhesive capsulitis of left shoulder  - [facility]FERR[facility]; Future  - ORTHO  REFERRAL      Follow up with Provider - 2 weeks with PCP  Health maintenance per PCP      Keli Sim MD MPH    [facility]

## 2019-03-09 ENCOUNTER — TELEPHONE (OUTPATIENT)
Dept: NURSING | Facility: CLINIC | Age: 49
End: 2019-03-09

## 2019-03-09 NOTE — TELEPHONE ENCOUNTER
[facility] pharmacy calling regarding RX   insulin glargine (BASAGLAR KWIKPEN) 100 UNIT/ML pen 60 mL 1 2/27/2019  No   Sig - Route: Inject 65 Units Subcutaneous At Bedtime - Subcutaneous     Verified dosing per epic.   Rebecca Wen RN [facility]

## 2019-03-12 ENCOUNTER — OFFICE VISIT (OUTPATIENT)
Dept: ORTHOPEDICS | Facility: CLINIC | Age: 49
End: 2019-03-12
Payer: COMMERCIAL

## 2019-03-12 ENCOUNTER — ANCILLARY PROCEDURE (OUTPATIENT)
Dept: GENERAL RADIOLOGY | Facility: CLINIC | Age: 49
End: 2019-03-12
Attending: FAMILY MEDICINE
Payer: OTHER MISCELLANEOUS

## 2019-03-12 ENCOUNTER — ANCILLARY PROCEDURE (OUTPATIENT)
Dept: MRI IMAGING | Facility: CLINIC | Age: 49
End: 2019-03-12
Attending: FAMILY MEDICINE
Payer: OTHER MISCELLANEOUS

## 2019-03-12 VITALS — BODY MASS INDEX: 45.7 KG/M2 | HEIGHT: 66 IN | DIASTOLIC BLOOD PRESSURE: 86 MMHG | SYSTOLIC BLOOD PRESSURE: 122 MMHG

## 2019-03-12 DIAGNOSIS — M54.12 CERVICAL RADICULOPATHY: ICD-10-CM

## 2019-03-12 DIAGNOSIS — R29.898 LEFT HAND WEAKNESS: ICD-10-CM

## 2019-03-12 DIAGNOSIS — M54.12 CERVICAL RADICULOPATHY: Primary | ICD-10-CM

## 2019-03-12 PROCEDURE — 99244 OFF/OP CNSLTJ NEW/EST MOD 40: CPT | Performed by: FAMILY MEDICINE

## 2019-03-12 PROCEDURE — 72040 X-RAY EXAM NECK SPINE 2-3 VW: CPT

## 2019-03-12 PROCEDURE — 72141 MRI NECK SPINE W/O DYE: CPT | Mod: TC

## 2019-03-12 RX ORDER — TRAMADOL HYDROCHLORIDE 50 MG/1
50 TABLET ORAL EVERY 6 HOURS PRN
Qty: 12 TABLET | Refills: 0 | Status: ON HOLD | OUTPATIENT
Start: 2019-03-12 | End: 2019-04-06

## 2019-03-12 NOTE — LETTER
[facility]  [address]  [place]  [address]  [phone]        Bettina Montes, female, 1970  [address] [facility] [address]    Employer: [employer]    Date of Treatment: 3/12/2019    Date of Accident: Approximately one month ago    History: Pt had left sided neck pain following lifting a heavy patient                        Diagnosis:   1. Cervical radiculopathy    2. Left hand weakness      Work Related:  Yes     The employee is ABLE to return to work today.    When the patient returns to work, the following restrictions apply until  :  A) Bend: Occasionally (1-3 hours)  B) Squat: Occasionally (1-3 hours)  C) Walk/Stand: Frequently (4-8 hours)  D) Reach Above Shoulders: Not at all (0 hours)  E) Lift, carry, push, and pull no more than:  0-10 lbs.    Other restrictions: None    FOLLOW-UP  Follow up with Dr. Carroll 1 week .

## 2019-03-12 NOTE — LETTER
3/12/2019         RE: Bettina Montes  [address] [place] [address]        Dear Colleague,    Thank you for referring your patient, Bettina Montes, to the [facility]. Please see a copy of my visit note below.    ASSESSMENT & PLAN  Bettina was seen today for pain.    Diagnoses and all orders for this visit:    Cervical radiculopathy  -     XR Cervical Spine 2/3 vws; Future  -     MRI Cervical spine w/o contrast; Future  -     traMADol (ULTRAM) 50 MG tablet; Take 1 tablet (50 mg) by mouth every 6 hours as needed for severe pain  -     NEUROSURGERY REFERRAL    Left hand weakness  -     XR Cervical Spine 2/3 vws; Future  -     MRI Cervical spine w/o contrast; Future  -     traMADol (ULTRAM) 50 MG tablet; Take 1 tablet (50 mg) by mouth every 6 hours as needed for severe pain  -     NEUROSURGERY REFERRAL      Pt is a 48 yo f presenting with a one mon HO progressive worsening left sided neck and shoulder pain with radicular sx to the hand and now left hand weakness.  Pt has pos Spurlings on Left, biceps weakness and decreased  strength and mild weakness in hand in m/r/u distributions.  She has fatigue with rapid finger tapping on left compared to right, 1+ reflexes bilaterally in biceps, triceps and BR and neg Rojas's bilaterally.  Concern at this point of significant disc herniation causing radicular sx and weakness.  Cervical XR neg, given this will get MRI tonight and f/u with pt with the results.  Given significant amount of pain, a one time Rx of Tramadol was written.  Due to poorly controlled diabetes, PO steroids were held for now.  Pt understands and agrees with plan.   -----    SUBJECTIVE  Bettina Montes is a/an 49 year old Right handed female who is seen in consultation at the request of  Keli Sim M.D. for evaluation of left shoulder pain. The patient is seen by themselves.    Onset: 1 month(s) ago. Reports insidious onset without acute precipitating  event. 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  Pt tried Tylenol no help, saw several providers  Location of Pain: left shoulder, radiating from neck   Rating of Pain at worst: 8/10  Rating of Pain Currently: 6/10  Worsened by: overhead motions, lifting   Better with: biofreeze   Treatments tried: Tylenol and Robaxin   Associated symptoms: numbness and tingling from elbow to fingers   Orthopedic history: YES -work injury lifting patient   Relevant surgical history: NO  Past Medical History:   Diagnosis Date     Adrenal gland anomaly      CHF (congestive heart failure) (H)      Fatty liver      Hyperlipidemia      Hypertension      Mild persistent asthma      Type 2 diabetes mellitus (H)      Social History     Socioeconomic History     Marital status: Single     Spouse name: Not on file     Number of children: 1     Years of education: Not on file     Highest education level: Not on file   Occupational History     Occupation: CNA     Employer: OTHER   Social Needs     Financial resource strain: Not on file     Food insecurity:     Worry: Not on file     Inability: Not on file     Transportation needs:     Medical: Not on file     Non-medical: Not on file   Tobacco Use     Smoking status: Former Smoker     Packs/day: 1.00     Years: 30.00     Pack years: 30.00     Types: Cigarettes     Last attempt to quit: 2013     Years since quittin.1     Smokeless tobacco: Former User     Tobacco comment:     Substance and Sexual Activity     Alcohol use: No     Alcohol/week: 0.0 oz     Drug use: No     Sexual activity: Yes     Partners: Male     Birth control/protection: Surgical   Lifestyle     Physical activity:     Days per week: Not on file     Minutes per session: Not on file     Stress: Not on file   Relationships     Social connections:     Talks on phone: Not on file     Gets together: Not on file     Attends Orthodoxy service: Not on file      "Active member of club or organization: Not on file     Attends meetings of clubs or organizations: Not on file     Relationship status: Not on file     Intimate partner violence:     Fear of current or ex partner: Not on file     Emotionally abused: Not on file     Physically abused: Not on file     Forced sexual activity: Not on file   Other Topics Concern     Parent/sibling w/ CABG, MI or angioplasty before 65F 55M? Not Asked   Social History Narrative     Not on file         Patient's past medical, surgical, social, and family histories were reviewed today and no changes are noted.    REVIEW OF SYSTEMS:  10 point ROS is negative other than symptoms noted above in HPI, Past Medical History or as stated below  Constitutional: NEGATIVE for fever, chills, change in weight  Skin: NEGATIVE for worrisome rashes, moles or lesions  GI/: NEGATIVE for bowel or bladder changes  Neuro: NEGATIVE for weakness, dizziness or paresthesias    OBJECTIVE:  /86   Ht 1.673 m (5' 5.87\")   LMP  (LMP Unknown)   BMI 45.70 kg/m      General: healthy, alert and in no distress  HEENT: no scleral icterus or conjunctival erythema  Skin: no suspicious lesions or rash. No jaundice.  CV: regular rhythm by palpation  Resp: normal respiratory effort without conversational dyspnea   Psych: normal mood and affect  Gait: normal steady gait with appropriate coordination and balance  Neuro: Decreased sensation diffusely in m/r/u distributions on left compared to right, mild dec in strength 5-/5 in median and radial distributions, 5/5 in ulnar bilaterally  BR, biceps and triceps reflexes 1+ bilaterally  Fatigue noted with rapid thumb/index finger tapping on left none on right      MSK:  LEFT SHOULDER  Inspection:    Palpation:    Tender about the . Remainder of bony and tendinous landmarks are nontender.  Active Range of Motion: Limited abduction and forward flexion 2/2 pain    Abduction 1350, FF 1350, ER significantly limited 2/2 pain0, IR L4. "        Strength:    Scapular plane abduction 4+/5, painful,  ER 5-/5, painful, IR 5-/5, painful, biceps 4+/5, weakness, triceps 5/5  Special Tests:    Positive: Gunderson and empty can testing with pain radiating to neck    Negative: Phalen/Cici'ls (cubital tunnel and wrist bilaterally)    CERVICAL SPINE  Inspection:    normal cervical lordosis present, rounded shoulders, forward head posture  Palpation:    Tender about the paracervical musculature (left). Otherwise remainder of the landmarks and nontender.  Range of Motion:     Flexion limited slightly by pain    Extension limited substantially by pain    Right side bend limited slightly by pain    Left side bend limited substantially by pain    Right rotation limited slightly by pain    Left rotation limited substantially by pain  Strength:    Full strength throughout all neck muscles  Special Tests:    Positive: Spurling's (bilateral)    Negative: Rojas's (bilateral)    Independent visualization of the below image:  Recent Results (from the past 24 hour(s))   XR Cervical Spine 2/3 vws    Narrative    CERVICAL SPINE TWO - THREE VIEWS 3/12/2019 4:03 PM     HISTORY: Left-sided radiculopathy. Cervical radiculopathy.      Impression    IMPRESSION: Below C5, the cervical spine is not well-seen on the  lateral view. Anterior alignment is grossly normal. Above C5,  vertebral body heights appear within normal limits. There may be mild  degenerative loss of disc height at C4-5. No apparent prevertebral  soft tissue swelling.    JOCELYNE TONEY MD   MRI Cervical spine w/o contrast    Narrative    MR CERVICAL SPINE WITHOUT CONTRAST March 12, 2019 8:37 PM     HISTORY: Radiculopathy, left-sided radiculopathy and weakness.  Cervical radiculopathy. Left hand weakness.    TECHNIQUE: Multiplanar multisequence images were obtained through the  cervical spine without contrast.    FINDINGS: Sagittal images demonstrate mild cervical kyphosis centered  on the C4-C5 level, otherwise  normal posterior alignment. There is no  evidence for craniovertebral or cervical medullary junction  abnormality. The cervical cord is indented anteriorly at C4-C5 and  C5-C6, otherwise is normal in morphology and signal characteristics.  Disc space narrowing is present C4-C5 and C5-C6. Bone marrow signal  intensity is normal.    C1-C2: Normal.    C2-C3: Normal.    C3-C4: Minimal disc bulging. No stenosis.    C4-C5: There is a moderate size central right paramedian disc  protrusion indenting and deforming the cervical cord causing moderate  central canal stenosis, moderate right-sided foraminal stenosis and  mild left-sided foraminal stenosis along with moderate cord deformity.    C5-C6: There is a moderate size left paramedian disc protrusion  indenting and deforming the cervical cord causing moderate central  canal stenosis, moderate left-sided foraminal stenosis and  mild-to-moderate right-sided foraminal stenosis. There is also  moderate cord deformity.    C6-C7: Normal.    C7-T1: Normal.    Paraspinal soft tissues: Unremarkable as visualized.      Impression    IMPRESSION:  1. Moderate size central to right paramedian C4-C5 disc protrusion  with secondary moderate central canal stenosis, moderate right-sided  foraminal stenosis, mild left-sided foraminal stenosis, and moderate  cord deformity.  2. Moderate-sized left paramedian C5-C6 disc protrusion with secondary  moderate central canal stenosis, moderate left-sided foraminal  stenosis, mild to moderate right-sided foraminal stenosis and moderate  cord deformity.    SOURAV RIOS MD         Patient's conditions were thoroughly discussed during today's visit with greater than 50% of the visit spent counseling the patient with total time spent face-to-face with the patient being 30 minutes.    Troy Carroll MD [facility]      Again, thank you for allowing me to participate in the care of your patient.         Sincerely,        Troy Carroll MD

## 2019-03-12 NOTE — PATIENT INSTRUCTIONS
Patient Education     Pinched Nerve in the Neck  A pinched nerve in the neck (cervical radiculopathy) is caused when the nerve that goes from the spinal cord to the neck or arm is irritated or has pressure on it. This may be caused by a bulging spinal disk. A spinal disk is the cushion between each spinal bone. Or it may be caused by a narrowing of the spinal joint because of osteoarthritis and wear and tear from repeated injuries.  A pinched nerve can cause numbness, tingling, deep aching, or electrical shooting pain from the side of the neck all the way down to the fingers on one side.  A pinched nerve may start after a sudden turning or bending force (such as in a car accident) or after a simple awkward movement. In either case, muscle spasm is commonly present and adds to the pain.  Home care  Follow these guidelines when caring for yourself at home:    Rest and relax the muscles. Use a comfortable pillow that supports your head and keeps your spine in a natural (neutral) position. Your head shouldn t be tilted forward or backward. A rolled-up towel may help for a custom fit. When standing or sitting, keep your neck in line with your body. Keep your head up and shoulders down. Stay away from activities that require you to move your neck a lot.    You can use heat and massage to help ease the pain. Take a hot shower or bath, or use a heating pad. You can also use a cold pack for relief. You can make a cold pack by wrapping a plastic bag of crushed or cubed ice in a thin towel. Try both heat and cold, and use the method that feels best. Do this for 20 minutes several times a day.    You may use acetaminophen or ibuprofen to control pain, unless another pain medicine was prescribed. If you have chronic liver or kidney disease, talk with your healthcare provider before using these medicines. Also talk with your provider if you ve had a stomach ulcer or gastrointestinal bleeding.    Reduce stress. Stress can make it  longer for your pain to go away.    Do any exercises or stretches that were given to you as part of your discharge plan.    Wear a soft collar, if prescribed.    Physical therapy and massages are known to help.    You may need surgery for a more serious injury.  Follow-up care  Follow up with your healthcare provider, or as advised, if you don t start to get better after 1 week. You may need more tests. Tell your provider about any fever, chills, or weight loss.  If X-rays were taken, a radiologist may look at them. You will be told of any new findings that may affect your care.  When to seek medical advice  Call your healthcare provider right away if any of these occur:    Pain becomes worse even after taking prescribed pain medicine    Weakness in the arm or legs    Numbness in the arm gets worse    Trouble breathing or swallowing  Date Last Reviewed: 5/1/2017 2000-2018 [employer]. [address] [place] [address], [place] [address]. All rights reserved. This information is not intended as a substitute for professional medical care. Always follow your healthcare professional's instructions.

## 2019-03-12 NOTE — PROGRESS NOTES
ASSESSMENT & PLAN  Bettina was seen today for pain.    Diagnoses and all orders for this visit:    Cervical radiculopathy  -     XR Cervical Spine 2/3 vws; Future      ***  -----    SUBJECTIVE  Bettina Montes is a/an 49 year old Right handed female who is seen in consultation at the request of  Keli Sim M.D. for evaluation of left shoulder pain. The patient is seen by themselves.    Onset: 1 month(s) ago. Reports insidious onset without acute precipitating event. 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  Pt tried Tylenol no help, saw several providers  Location of Pain: left shoulder, radiating from neck   Rating of Pain at worst: 8/10  Rating of Pain Currently: 6/10  Worsened by: overhead motions, lifting   Better with: biofreeze   Treatments tried: Tylenol and Robaxin   Associated symptoms: numbness and tingling from elbow to fingers   Orthopedic history: YES -work injury lifting patient   Relevant surgical history: NO  Past Medical History:   Diagnosis Date     Adrenal gland anomaly      CHF (congestive heart failure) (H)      Fatty liver      Hyperlipidemia      Hypertension      Mild persistent asthma      Type 2 diabetes mellitus (H)      Social History     Socioeconomic History     Marital status: Single     Spouse name: Not on file     Number of children: 1     Years of education: Not on file     Highest education level: Not on file   Occupational History     Occupation: CNA     Employer: OTHER   Social Needs     Financial resource strain: Not on file     Food insecurity:     Worry: Not on file     Inability: Not on file     Transportation needs:     Medical: Not on file     Non-medical: Not on file   Tobacco Use     Smoking status: Former Smoker     Packs/day: 1.00     Years: 30.00     Pack years: 30.00     Types: Cigarettes     Last attempt to quit: 2013     Years since quittin.1     Smokeless tobacco: Former User     Tobacco  "comment:     Substance and Sexual Activity     Alcohol use: No     Alcohol/week: 0.0 oz     Drug use: No     Sexual activity: Yes     Partners: Male     Birth control/protection: Surgical   Lifestyle     Physical activity:     Days per week: Not on file     Minutes per session: Not on file     Stress: Not on file   Relationships     Social connections:     Talks on phone: Not on file     Gets together: Not on file     Attends Christian service: Not on file     Active member of club or organization: Not on file     Attends meetings of clubs or organizations: Not on file     Relationship status: Not on file     Intimate partner violence:     Fear of current or ex partner: Not on file     Emotionally abused: Not on file     Physically abused: Not on file     Forced sexual activity: Not on file   Other Topics Concern     Parent/sibling w/ CABG, MI or angioplasty before 65F 55M? Not Asked   Social History Narrative     Not on file         Patient's past medical, surgical, social, and family histories were reviewed today and no changes are noted.    REVIEW OF SYSTEMS:  10 point ROS is negative other than symptoms noted above in HPI, Past Medical History or as stated below  Constitutional: NEGATIVE for fever, chills, change in weight  Skin: NEGATIVE for worrisome rashes, moles or lesions  GI/: NEGATIVE for bowel or bladder changes  Neuro: NEGATIVE for weakness, dizziness or paresthesias    OBJECTIVE:  /86   Ht 1.673 m (5' 5.87\")   LMP  (LMP Unknown)   BMI 45.70 kg/m     General: healthy, alert and in no distress  HEENT: no scleral icterus or conjunctival erythema  Skin: no suspicious lesions or rash. No jaundice.  CV: regular rhythm by palpation  Resp: normal respiratory effort without conversational dyspnea   Psych: normal mood and affect  Gait: normal steady gait with appropriate coordination and balance  Neuro: normal light touch sensory exam of the bilateral upper extremities.    MSK:  LEFT " "SHOULDER  Inspection:  Bruising/ecchymosis     Palpation:    Tender about the {FSOC SHOULDER TENDERNESS2:253935}. Remainder of bony and tendinous landmarks are nontender.  Active Range of Motion:     Abduction {FSOC ROM SHOULDER:159504::\"180\"}0, FF {FSOC ROM SHOULDER:399763::\"180\"}0, ER {FSOC ROM SHOULDER:451366::\"180\"}0, IR {FSOC ROM EVAL:599911}.      Scapular dyskinesis ***  Strength:    Scapular plane abduction {FSOC STRENGTH RANGE:215275},  ER {FSOC STRENGTH RANGE:863956}, IR {FSOC STRENGTH RANGE:311730}, biceps {FSOC STRENGTH RANGE:327466}, triceps {FSOC STRENGTH RANGE:753630}  Special Tests:    Positive: {FSOC SHOULDER TESTS REV:957086}    Negative: {FSOC SHOULDER TESTS REV:051388}    CERVICAL SPINE  Inspection:    normal cervical lordosis present, rounded shoulders, forward head posture  Palpation:    Tender about the {FSOC CSPINE PALPATION REV:937681:a}. Otherwise remainder of the landmarks and nontender.  Range of Motion:     Flexion {Pain limited:019177}    Extension {Pain limited:282890}    Right side bend {Pain limited:941105}    Left side bend {Pain limited:821224}    Right rotation {Pain limited:376314}    Left rotation {Pain limited:791478}  Strength:    {FSOC CSPINE STRENGTH REV:488584::\"Full strength throughout all neck muscles\"}  Special Tests:    Positive: {FSOC CSPINE SPECIAL TESTS:098356}    Negative: {FSOC CSPINE SPECIAL TESTS:015518}    Independent visualization of the below image:  No results found for this or any previous visit (from the past 24 hour(s)).      Patient's conditions were thoroughly discussed during today's visit with greater than 50% of the visit spent counseling the patient with total time spent face-to-face with the patient being *** minutes.    Troy Carroll MD [facility]    "

## 2019-03-13 ENCOUNTER — HOSPITAL ENCOUNTER (EMERGENCY)
Facility: CLINIC | Age: 49
Discharge: HOME | End: 2019-03-13
Attending: EMERGENCY MEDICINE | Admitting: EMERGENCY MEDICINE
Payer: OTHER MISCELLANEOUS

## 2019-03-13 ENCOUNTER — TELEPHONE (OUTPATIENT)
Dept: NEUROSURGERY | Facility: CLINIC | Age: 49
End: 2019-03-13

## 2019-03-13 ENCOUNTER — TELEPHONE (OUTPATIENT)
Dept: FAMILY MEDICINE | Facility: CLINIC | Age: 49
End: 2019-03-13

## 2019-03-13 VITALS
SYSTOLIC BLOOD PRESSURE: 143 MMHG | HEART RATE: 93 BPM | DIASTOLIC BLOOD PRESSURE: 78 MMHG | WEIGHT: 282 LBS | TEMPERATURE: 98.3 F | RESPIRATION RATE: 18 BRPM | BODY MASS INDEX: 45.32 KG/M2 | OXYGEN SATURATION: 100 % | HEIGHT: 66 IN

## 2019-03-13 DIAGNOSIS — E11.65 TYPE 2 DIABETES MELLITUS WITH HYPERGLYCEMIA, WITH LONG-TERM CURRENT USE OF INSULIN (H): Primary | ICD-10-CM

## 2019-03-13 DIAGNOSIS — Z79.4 TYPE 2 DIABETES MELLITUS WITH HYPERGLYCEMIA, WITH LONG-TERM CURRENT USE OF INSULIN (H): Primary | ICD-10-CM

## 2019-03-13 DIAGNOSIS — M54.12 CERVICAL RADICULOPATHY: ICD-10-CM

## 2019-03-13 PROCEDURE — 99282 EMERGENCY DEPT VISIT SF MDM: CPT

## 2019-03-13 RX ORDER — DIAZEPAM 5 MG
5-10 TABLET ORAL EVERY 6 HOURS PRN
Qty: 20 TABLET | Refills: 0 | Status: ON HOLD | OUTPATIENT
Start: 2019-03-13 | End: 2019-04-06

## 2019-03-13 RX ORDER — METHYLPREDNISOLONE 4 MG
TABLET, DOSE PACK ORAL
Qty: 21 TABLET | Refills: 0 | Status: SHIPPED | OUTPATIENT
Start: 2019-03-13 | End: 2019-03-22

## 2019-03-13 ASSESSMENT — ENCOUNTER SYMPTOMS
FEVER: 0
ABDOMINAL PAIN: 0
SHORTNESS OF BREATH: 0
MYALGIAS: 1
CHILLS: 0

## 2019-03-13 ASSESSMENT — MIFFLIN-ST. JEOR: SCORE: 1915.89

## 2019-03-13 NOTE — PROGRESS NOTES
ASSESSMENT & PLAN  Bettina was seen today for pain.    Diagnoses and all orders for this visit:    Cervical radiculopathy  -     XR Cervical Spine 2/3 vws; Future  -     MRI Cervical spine w/o contrast; Future  -     traMADol (ULTRAM) 50 MG tablet; Take 1 tablet (50 mg) by mouth every 6 hours as needed for severe pain  -     NEUROSURGERY REFERRAL    Left hand weakness  -     XR Cervical Spine 2/3 vws; Future  -     MRI Cervical spine w/o contrast; Future  -     traMADol (ULTRAM) 50 MG tablet; Take 1 tablet (50 mg) by mouth every 6 hours as needed for severe pain  -     NEUROSURGERY REFERRAL      Pt is a 48 yo f presenting with a one mon HO progressive worsening left sided neck and shoulder pain with radicular sx to the hand and now left hand weakness.  Pt has pos Spurlings on Left, biceps weakness and decreased  strength and mild weakness in hand in m/r/u distributions.  She has fatigue with rapid finger tapping on left compared to right, 1+ reflexes bilaterally in biceps, triceps and BR and neg Rojas's bilaterally.  Concern at this point of significant disc herniation causing radicular sx and weakness.  Cervical XR neg, given this will get MRI tonight and f/u with pt with the results.  Given significant amount of pain, a one time Rx of Tramadol was written.  Due to poorly controlled diabetes, PO steroids were held for now.  Pt understands and agrees with plan.   -----    SUBJECTIVE  Bettina Montes is a/an 49 year old Right handed female who is seen in consultation at the request of  Keli Sim M.D. for evaluation of left shoulder pain. The patient is seen by themselves.    Onset: 1 month(s) ago. Reports insidious onset without acute precipitating event. 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  Pt tried Tylenol no help, saw several providers  Location of Pain: left shoulder, radiating from neck   Rating of Pain at worst: 8/10  Rating of  Pain Currently: 6/10  Worsened by: overhead motions, lifting   Better with: biofreeze   Treatments tried: Tylenol and Robaxin   Associated symptoms: numbness and tingling from elbow to fingers   Orthopedic history: YES -work injury lifting patient   Relevant surgical history: NO  Past Medical History:   Diagnosis Date     Adrenal gland anomaly      CHF (congestive heart failure) (H)      Fatty liver      Hyperlipidemia      Hypertension      Mild persistent asthma      Type 2 diabetes mellitus (H)      Social History     Socioeconomic History     Marital status: Single     Spouse name: Not on file     Number of children: 1     Years of education: Not on file     Highest education level: Not on file   Occupational History     Occupation: CNA     Employer: OTHER   Social Needs     Financial resource strain: Not on file     Food insecurity:     Worry: Not on file     Inability: Not on file     Transportation needs:     Medical: Not on file     Non-medical: Not on file   Tobacco Use     Smoking status: Former Smoker     Packs/day: 1.00     Years: 30.00     Pack years: 30.00     Types: Cigarettes     Last attempt to quit: 2013     Years since quittin.1     Smokeless tobacco: Former User     Tobacco comment:     Substance and Sexual Activity     Alcohol use: No     Alcohol/week: 0.0 oz     Drug use: No     Sexual activity: Yes     Partners: Male     Birth control/protection: Surgical   Lifestyle     Physical activity:     Days per week: Not on file     Minutes per session: Not on file     Stress: Not on file   Relationships     Social connections:     Talks on phone: Not on file     Gets together: Not on file     Attends Episcopal service: Not on file     Active member of club or organization: Not on file     Attends meetings of clubs or organizations: Not on file     Relationship status: Not on file     Intimate partner violence:     Fear of current or ex partner: Not on file     Emotionally abused: Not on file  "    Physically abused: Not on file     Forced sexual activity: Not on file   Other Topics Concern     Parent/sibling w/ CABG, MI or angioplasty before 65F 55M? Not Asked   Social History Narrative     Not on file         Patient's past medical, surgical, social, and family histories were reviewed today and no changes are noted.    REVIEW OF SYSTEMS:  10 point ROS is negative other than symptoms noted above in HPI, Past Medical History or as stated below  Constitutional: NEGATIVE for fever, chills, change in weight  Skin: NEGATIVE for worrisome rashes, moles or lesions  GI/: NEGATIVE for bowel or bladder changes  Neuro: NEGATIVE for weakness, dizziness or paresthesias    OBJECTIVE:  /86   Ht 1.673 m (5' 5.87\")   LMP  (LMP Unknown)   BMI 45.70 kg/m     General: healthy, alert and in no distress  HEENT: no scleral icterus or conjunctival erythema  Skin: no suspicious lesions or rash. No jaundice.  CV: regular rhythm by palpation  Resp: normal respiratory effort without conversational dyspnea   Psych: normal mood and affect  Gait: normal steady gait with appropriate coordination and balance  Neuro: Decreased sensation diffusely in m/r/u distributions on left compared to right, mild dec in strength 5-/5 in median and radial distributions, 5/5 in ulnar bilaterally  BR, biceps and triceps reflexes 1+ bilaterally  Fatigue noted with rapid thumb/index finger tapping on left none on right      MSK:  LEFT SHOULDER  Inspection:    Palpation:    Tender about the . Remainder of bony and tendinous landmarks are nontender.  Active Range of Motion: Limited abduction and forward flexion 2/2 pain    Abduction 1350, FF 1350, ER significantly limited 2/2 pain0, IR L4.        Strength:    Scapular plane abduction 4+/5, painful,  ER 5-/5, painful, IR 5-/5, painful, biceps 4+/5, weakness, triceps 5/5  Special Tests:    Positive: Gunderson and empty can testing with pain radiating to neck    Negative: Phalen/Cici'ls (cubital tunnel " and wrist bilaterally)    CERVICAL SPINE  Inspection:    normal cervical lordosis present, rounded shoulders, forward head posture  Palpation:    Tender about the paracervical musculature (left). Otherwise remainder of the landmarks and nontender.  Range of Motion:     Flexion limited slightly by pain    Extension limited substantially by pain    Right side bend limited slightly by pain    Left side bend limited substantially by pain    Right rotation limited slightly by pain    Left rotation limited substantially by pain  Strength:    Full strength throughout all neck muscles  Special Tests:    Positive: Spurling's (bilateral)    Negative: Rojas's (bilateral)    Independent visualization of the below image:  Recent Results (from the past 24 hour(s))   XR Cervical Spine 2/3 vws    Narrative    CERVICAL SPINE TWO - THREE VIEWS 3/12/2019 4:03 PM     HISTORY: Left-sided radiculopathy. Cervical radiculopathy.      Impression    IMPRESSION: Below C5, the cervical spine is not well-seen on the  lateral view. Anterior alignment is grossly normal. Above C5,  vertebral body heights appear within normal limits. There may be mild  degenerative loss of disc height at C4-5. No apparent prevertebral  soft tissue swelling.    JOCELYNE TONEY MD   MRI Cervical spine w/o contrast    Narrative    MR CERVICAL SPINE WITHOUT CONTRAST March 12, 2019 8:37 PM     HISTORY: Radiculopathy, left-sided radiculopathy and weakness.  Cervical radiculopathy. Left hand weakness.    TECHNIQUE: Multiplanar multisequence images were obtained through the  cervical spine without contrast.    FINDINGS: Sagittal images demonstrate mild cervical kyphosis centered  on the C4-C5 level, otherwise normal posterior alignment. There is no  evidence for craniovertebral or cervical medullary junction  abnormality. The cervical cord is indented anteriorly at C4-C5 and  C5-C6, otherwise is normal in morphology and signal characteristics.  Disc space narrowing is  present C4-C5 and C5-C6. Bone marrow signal  intensity is normal.    C1-C2: Normal.    C2-C3: Normal.    C3-C4: Minimal disc bulging. No stenosis.    C4-C5: There is a moderate size central right paramedian disc  protrusion indenting and deforming the cervical cord causing moderate  central canal stenosis, moderate right-sided foraminal stenosis and  mild left-sided foraminal stenosis along with moderate cord deformity.    C5-C6: There is a moderate size left paramedian disc protrusion  indenting and deforming the cervical cord causing moderate central  canal stenosis, moderate left-sided foraminal stenosis and  mild-to-moderate right-sided foraminal stenosis. There is also  moderate cord deformity.    C6-C7: Normal.    C7-T1: Normal.    Paraspinal soft tissues: Unremarkable as visualized.      Impression    IMPRESSION:  1. Moderate size central to right paramedian C4-C5 disc protrusion  with secondary moderate central canal stenosis, moderate right-sided  foraminal stenosis, mild left-sided foraminal stenosis, and moderate  cord deformity.  2. Moderate-sized left paramedian C5-C6 disc protrusion with secondary  moderate central canal stenosis, moderate left-sided foraminal  stenosis, mild to moderate right-sided foraminal stenosis and moderate  cord deformity.    SOURAV RIOS MD         Patient's conditions were thoroughly discussed during today's visit with greater than 50% of the visit spent counseling the patient with total time spent face-to-face with the patient being 30 minutes.    Tory Carroll MD [facility]

## 2019-03-13 NOTE — DISCHARGE INSTRUCTIONS
1. -Take acetaminophen 500 to 1000 mg by mouth every 4 to 6 hours as needed for pain or fever.  Do not take more than 4000 mg in 24 hours.  Do not take within 6 hours of another acetaminophen containing medication such as norco (vicodin) or percocet.  - Take ibuprofen 600 to 800 mg by mouth every 6 to 8 hours as needed for pain or fever  2.  Take Valium as prescribed as needed for muscle spasms or aching.  Do not take within 6 hours of methocarbamol.  Please use caution when taking this medication when also taking tramadol.  Both medications can make you sleepy.  3. Follow-up in spine and brain clinic as soon as possible.  We will call you to make an appointment.  4. You may return to the ED as needed for new or worsening symptoms such as reinjury, severe and uncontrollable pain, worsening focal weakness, severe numbness and tingling, any other concerning symptoms.

## 2019-03-13 NOTE — TELEPHONE ENCOUNTER
Requested Prescriptions   Signed Prescriptions Disp Refills     insulin pen needle (32G X 4 MM) 32G X 4 MM miscellaneous 100 each 3     Sig: Use 1 pen needles daily or as directed.    There is no refill protocol information for this order        Prescription approved per [facility] Refill Protocol.      Chelle Emanuel RN, BSN

## 2019-03-13 NOTE — ED AVS SNAPSHOT
Emergency Department  [address] [place] [id number]  Phone:  [phone]  Fax:  [phone]                                    Bettina Montes   MRN: [id number]    Department:   Emergency Department   Date of Visit:  3/13/2019           After Visit Summary Signature Page    I have received my discharge instructions, and my questions have been answered. I have discussed any challenges I see with this plan with the nurse or doctor.    ..........................................................................................................................................  Patient/Patient Representative Signature      ..........................................................................................................................................  Patient Representative Print Name and Relationship to Patient    ..................................................               ................................................  Date                                   Time    ..........................................................................................................................................  Reviewed by Signature/Title    ...................................................              ..............................................  Date                                               Time          22EPIC Rev 08/18

## 2019-03-13 NOTE — ED PROVIDER NOTES
History     Chief Complaint:  Shoulder Pain     HPI   Bettina Montes is a 49 year old female with a history of DM2, HLD, HTN, CHF who presents to the emergency department for evaluation of shoulder pain. The patient reports she was seen yesterday for around 1 month of left shoulder pain, radiating to her left-sided neck and down her left arm and hand, also accompanied by arm and hand weakness, in [place] by orthopedics, with MRI of her C Spine performed last night, discharged with Tramadol. She was contacted by the orthopedist today with MRI results and was instructed to present to the ED. The patient denies any chest pain, shortness of breath, abdominal pain, fevers, chills, or recent injury, fall, or trauma. She also denies any prior episodes of pain similar to this.     MR CERVICAL SPINE WITHOUT CONTRAST March 12, 2019 8:37 PM                                                                  IMPRESSION:  1. Moderate size central to right paramedian C4-C5 disc protrusion  with secondary moderate central canal stenosis, moderate right-sided  foraminal stenosis, mild left-sided foraminal stenosis, and moderate  cord deformity.  2. Moderate-sized left paramedian C5-C6 disc protrusion with secondary  moderate central canal stenosis, moderate left-sided foraminal  stenosis, mild to moderate right-sided foraminal stenosis and moderate  cord deformity.     SOURAV RIOS MD    Allergies:  Amlodipine  Lisinopril  Naprosyn [Naproxen]     Medications:    Beta blocker  Advair  Lasix  Apresoline  Insulin  Xopenex  Cozaar  Mevacor  Mag ox  Metformin  Robaxin  Singulair  Aldactone  Ultram     Past Medical History:    Adrenal gland anomaly  CHF  Fatty liver  HLD  HTN  Asthma  DM2    Past Surgical History:    Tubal ligation    Family History:    Diabetes   HTN  HLD   Heart failure    Social History:  Presents alone.  Former smoker, 30 pack years.  Positive for alcohol use, 4-5 drinks weekly.  Marital Status:  Single [1]     Review  "of Systems   Constitutional: Negative for chills and fever.   Respiratory: Negative for shortness of breath.    Cardiovascular: Negative for chest pain.   Gastrointestinal: Negative for abdominal pain.   Musculoskeletal: Positive for myalgias.   All other systems reviewed and are negative.      Physical Exam     Patient Vitals for the past 24 hrs:   BP Temp Pulse Resp SpO2 Height Weight   19 1223 143/78 98.3  F (36.8  C) 99 18 97 % 1.676 m (5' 6\") 127.9 kg (282 lb)     Physical Exam  Constitutional: Well developed, nontox appearance, mild forlorn appearance  Head: Atraumatic.   Mouth/Throat: Oropharynx is clear and moist.   Neck:  no stridor, tenderness to L lateral posterior neck and into lateral L trapezius  Eyes: no scleral icterus  Cardiovascular: RRR, 2+ bilat radial pulses  Pulmonary/Chest: nml resp effort, Clear BS bilat  Ext: Warm, well perfused, no edema  Neurological: A&O,  No hyperreflexia, 4+/5 L  strength, L triceps/biceps strength testing limited by pain (at least 4/5), otherwise 5/5 strength throughout upper and lower ext, symmetric; sensation grossly intact  Skin: Skin is warm and dry.   Psychiatric: Behavior is normal. Thought content normal.   Nursing note and vitals reviewed.        Emergency Department Course     Emergency Department Course:  Nursing notes and vitals reviewed. 1500 I performed an exam of the patient as documented above.     1500 I spoke with Aníbal neurosurgery PA-C, regarding the patient.    1520 I spoke with Aníbal neurosurgery PA-C, regarding the patient.    1539 I rechecked the patient and discussed the results of her workup thus far.     Findings and plan explained to the Patient. Patient discharged home with instructions regarding supportive care, medications, and reasons to return. The importance of close follow-up was reviewed. The patient was prescribed blood glucose, Valium, Medrol Dosepak.    Impression & Plan      Medical Decision Makin y.o. F " presenting w/ neck pain, L arm pain    Patient's presentation is consistent with cervical radiculopathy.  She had 4++/5 left  strength reports subjective weakness.  It is difficult to discern whether her weakness is true versus secondary to her pain.  There is no hyperreflexia on exam.  Upon further discussion and review of her chart, it seems that the patient was unaware that she may be staying in the hospital and reports preferring to discharge and follow-up as an outpatient.  I discussed this with on-call neurosurgery who recommended it would be reasonable to admit the patient with spine consult in the morning versus versus discharge with expedited follow-up.  After further discussion with the patient, she elected to return home and follow-up in spine clinic this coming week.  No evidence of acute spinal cord compromise, no myelopathic symptoms or signs noted.  Duration of symptoms has been one month as well so I think expedited f/u would be reasonable.  Medications were given for further outpatient management and she was counseled on taking those medications in context of other medications she has been prescribed.  At this time I feel the patient is safe for discharge.  Recommendations given regarding follow up with neurosurgery spine clinic and return to the emergency department as needed for new or worsening symptoms.  Counseled on all results, diagnosis and disposition.  Patient understanding and agreeable to plan. Patient discharged in stable condition.            Diagnosis:    ICD-10-CM   1. Cervical radiculopathy M54.12       Disposition:  discharged to home    Discharge Medications:     Medication List      Started    blood glucose test strip  Commonly known as:  NO BRAND SPECIFIED  Use to test blood sugar 3-4 times daily or as directed.     diazepam 5 MG tablet  Commonly known as:  VALIUM  5-10 mg, Oral, EVERY 6 HOURS PRN     methylPREDNISolone 4 MG tablet therapy pack  Commonly known as:  MEDROL  DOSEPAK  Follow Package Directions          I, Fer eMnchaca, am serving as a scribe on 3/13/2019 at 2:45 PM to personally document services performed by Geremias Mistry MD based on my observations and the provider's statements to me.     Fer Menchaca  3/13/2019    EMERGENCY DEPARTMENT       Geremias Mistry MD  03/14/19 1011

## 2019-03-13 NOTE — TELEPHONE ENCOUNTER
Faxed message from [facility] pharmacy:    Patient would like the BD Renee pen needles because they are smaller.  Please send new Rx

## 2019-03-13 NOTE — LETTER
March 13, 2019      To Whom It May Concern:      Bettina Montes was seen in our Emergency Department today, 03/13/19.  I expect her condition to improve over the next 6 days.  She may return to work when improved.    Sincerely,        Geremias Mistry MD

## 2019-03-13 NOTE — PROGRESS NOTES
Page from [facility] ED, Dr. Mistry.  Pt presented to ED after being contacted today with results of MRI and to discuss symptoms.  Per Dr Mistry patients exam somewhat limited by pain, most notably with left shoulder abduction, otherwise, left  4+/5 and no other significant deficits. No hyper reflexia, gait steady. Patient thought she was coming to be evaluated in ED and then could follow up outpatient to discuss options.  Patient not interested in staying.  Discussed with Dr. Ferguson and if patient does not want to be admitted we will reach out to her to schedule an appointment.  Dr. Mistry will discuss with pt; if stays admit through hospitalist for pain control and full consult to follow.

## 2019-03-13 NOTE — TELEPHONE ENCOUNTER
Contacted by Dr. Enriquez.  Pt was seen in his practice yesterday. Pt presented with significant arm pain and some weakness.  Her MRI shows:      IMPRESSION:  1. Moderate size central to right paramedian C4-C5 disc protrusion  with secondary moderate central canal stenosis, moderate right-sided  foraminal stenosis, mild left-sided foraminal stenosis, and moderate  cord deformity.  2. Moderate-sized left paramedian C5-C6 disc protrusion with secondary  moderate central canal stenosis, moderate left-sided foraminal  stenosis, mild to moderate right-sided foraminal stenosis and moderate  cord deformity.    LM for her to call me back to discuss symptoms. If true weakness and severe pain she should go to [facility] ER for pain control and we can see her as an inpatient.

## 2019-03-19 ENCOUNTER — OFFICE VISIT (OUTPATIENT)
Dept: NEUROSURGERY | Facility: CLINIC | Age: 49
End: 2019-03-19
Payer: OTHER MISCELLANEOUS

## 2019-03-19 VITALS
WEIGHT: 281.4 LBS | HEIGHT: 66 IN | TEMPERATURE: 97.3 F | BODY MASS INDEX: 45.22 KG/M2 | RESPIRATION RATE: 18 BRPM | OXYGEN SATURATION: 98 % | DIASTOLIC BLOOD PRESSURE: 102 MMHG | SYSTOLIC BLOOD PRESSURE: 151 MMHG | HEART RATE: 110 BPM

## 2019-03-19 DIAGNOSIS — M50.30 DDD (DEGENERATIVE DISC DISEASE), CERVICAL: ICD-10-CM

## 2019-03-19 DIAGNOSIS — M54.12 CERVICAL RADICULOPATHY: Primary | ICD-10-CM

## 2019-03-19 PROCEDURE — 99203 OFFICE O/P NEW LOW 30 MIN: CPT | Performed by: NURSE PRACTITIONER

## 2019-03-19 ASSESSMENT — MIFFLIN-ST. JEOR: SCORE: 1913.17

## 2019-03-19 ASSESSMENT — PAIN SCALES - GENERAL: PAINLEVEL: MODERATE PAIN (4)

## 2019-03-19 NOTE — NURSING NOTE
Pre-operative Education    Education included but not limited to:  - Pre-operative physical with primary care physician within 30 days of surgical date.   - Pre-operative clearance (cardiology, hematology, etc).   - Discontinue Aspirin, NSAIDs, Diclofenac x 7 days prior to surgical date.   -May try tylenol for pain 1000 mg three times per day for pain  -Do not begin taking Non-Steroidal Anti-Inflammatory Drugs or NSAIDs (Advil,Motrin, Ibuprofen,Nuprin, Diclofenac,Meloxicam, Aleve, Celebrex, Aspirin, etc.) until 12 weeks after surgery if you had a fusion. May cause bleeding and interfere with bone healing.  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  -Surgical risks: blood clots in the leg or lung, problems urinating, nerve damage, drainage from the incision, infection,stiffness  -Preparation timeline  - When to start NPO           -Special bathing procedure.Patient received Hibiclens and showering instruction sheet.   Hospital stay:  Checking in  The surgery itself   Recovery room  - Transition to the hospital room where you will begin your recovery  - Managing pain   - 1-2 night hospitalization  - Post operative incisional pain x 1-2 weeks which will require pain medications and muscle relaxants.   -Do NOT drive or drink alcohol while taking narcotic pain medication.  -Post operative incision care-Keep your incision clean and dry at all times. OK to remove dressing on postop day 2. OK to shower on postop day 3 and allow water to run over incision, pat dry after shower. No bathing, swimming or submerging in water. Call immediately or come to ED if any drainage occurs, or you develop new pain. Watch for signs of infection: redness, swelling, warmth, drainage, and fever of 101 degrees or higher. Notify clinic.   - Post operative activity limitations: 6-8 weeks, no lifting > 10 pounds, no bending, twisting, or overhead reaching.  -Ok to  walk as tolerated, avoid bed rest and prolonged sitting.  -No contact sports until after follow up visit  -No high impact activities such as; running/jogging, snowmobile or 4 chandler riding or any other recreational vehicles.  -Orthotics will fit you for a brace in the hospital. Cervical fusion: wear soft collar for up to 2 weeks as needed for comfort.  - Post op follow up appointments 6 weeks and 3 months with Physician Assistant or Nurse Practitionerwith X-ray prior. Please call to schedule follow up appointment at [phone].   - Spine Education book was also given to the patient for further review.      Patient verbalized understanding of above instructions. All questions were answered to the best of my ability and the patient's satisfaction. Patient advised to call with any additional questions or concerns.  Shantal Aguilar RN

## 2019-03-19 NOTE — PROGRESS NOTES
"Dr. Hollis Hurtado  [facility]  Neurosurgery Clinic Visit      CC: Left-sided neck and arm pain    Primary care Provider: Anuja Bruce      Reason For Visit:   I was asked by Dr. Carroll to consult on the patient for cervical radiculopathy.      HPI: Bettina Montes is a 49 year old female with left-sided neck and arm pain that began on 2/12/19.  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  She describes the pain as a throbbing pain along the left lateral neck that radiates into left arm and forearm.  She has N/T in the fingers of the left hand.  She notes weakness to the LUE when compared to the RUE.  She has had some difficulty on the left with fine motor skills such as buttons and zippers the past couple of weeks.  The pain increases when she turns her neck and with lifting.  She has found little to relieve the pain other than medication and massage to the area, \"Sometimes it feels like there is a bra strap on my upper arm but there's not.\"  She rubs the area repeatedly to find comfort.  She denies changes to bowel or bladder. She denies difficulty with gait.    Pain right now:  4    Past Medical History:   Diagnosis Date     Adrenal gland anomaly      CHF (congestive heart failure) (H)      Fatty liver      Hyperlipidemia      Hypertension      Mild persistent asthma      Type 2 diabetes mellitus (H)        Past Medical History reviewed with patient during visit.    Past Surgical History:   Procedure Laterality Date     TUBAL LIGATION       Past Surgical History reviewed with patient during visit.    Current Outpatient Medications   Medication     BETA BLOCKER NOT PRESCRIBED, INTENTIONAL,     blood glucose (NO BRAND SPECIFIED) test strip     Cholecalciferol (VITAMIN D3 PO)     diazepam (VALIUM) 5 MG tablet     fluticasone-salmeterol " (ADVAIR) 250-50 MCG/DOSE inhaler     furosemide (LASIX) 40 MG tablet     hydrALAZINE (APRESOLINE) 25 MG tablet     insulin glargine (BASAGLAR KWIKPEN) 100 UNIT/ML pen     insulin pen needle (32G X 4 MM) 32G X 4 MM miscellaneous     levalbuterol (XOPENEX HFA) 45 MCG/ACT Inhaler     losartan (COZAAR) 50 MG tablet     lovastatin (MEVACOR) 20 MG tablet     magnesium oxide (MAG-OX) 400 (241.3 Mg) MG tablet     metFORMIN (GLUCOPHAGE) 500 MG tablet     methocarbamol (ROBAXIN) 750 MG tablet     montelukast (SINGULAIR) 10 MG tablet     potassium chloride ER (K-DUR/KLOR-CON M) 20 MEQ CR tablet     spironolactone (ALDACTONE) 25 MG tablet     TYLENOL EXTRA STRENGTH 500 MG PO TABS     methylPREDNISolone (MEDROL DOSEPAK) 4 MG tablet therapy pack     traMADol (ULTRAM) 50 MG tablet     No current facility-administered medications for this visit.        Allergies   Allergen Reactions     Amlodipine Swelling     Edema on 5mg     Lisinopril      Swelling in throat, face     Naprosyn [Naproxen]      Swelling, diff breathing       Social History     Socioeconomic History     Marital status: Single     Spouse name: None     Number of children: 1     Years of education: None     Highest education level: None   Occupational History     Occupation: CNA     Employer: OTHER   Social Needs     Financial resource strain: None     Food insecurity:     Worry: None     Inability: None     Transportation needs:     Medical: None     Non-medical: None   Tobacco Use     Smoking status: Former Smoker     Packs/day: 1.00     Years: 30.00     Pack years: 30.00     Types: Cigarettes     Last attempt to quit: 2013     Years since quittin.1     Smokeless tobacco: Former User     Tobacco comment:     Substance and Sexual Activity     Alcohol use: No     Alcohol/week: 0.0 oz     Drug use: No     Sexual activity: Yes     Partners: Male     Birth control/protection: Surgical   Lifestyle     Physical activity:     Days per week: None     Minutes per  "session: None     Stress: None   Relationships     Social connections:     Talks on phone: None     Gets together: None     Attends Catholic service: None     Active member of club or organization: None     Attends meetings of clubs or organizations: None     Relationship status: None     Intimate partner violence:     Fear of current or ex partner: None     Emotionally abused: None     Physically abused: None     Forced sexual activity: None   Other Topics Concern     Parent/sibling w/ CABG, MI or angioplasty before 65F 55M? Not Asked   Social History Narrative     None       Family History   Problem Relation Age of Onset     Diabetes Mother      Hypertension Mother      Hyperlipidemia Mother      Heart Failure Mother      Diabetes Father      Cancer Paternal Grandmother         ?         ROS: 10 point ROS neg other than the symptoms noted above in the HPI.    Vital Signs: BP (!) 151/102   Pulse 110   Temp 97.3  F (36.3  C) (Oral)   Resp 18   Ht 5' 6\" (1.676 m)   Wt 281 lb 6.4 oz (127.6 kg)   LMP  (LMP Unknown)   SpO2 98%   BMI 45.42 kg/m        Examination:  Constitutional:  Alert, well nourished, NAD.  HEENT: Normocephalic, atraumatic.   Pulm:  Without shortness of breath   CV:  No pitting edema of BLE.    Neurological:  Awake  Alert  Oriented x 3  Speech clear  Cranial nerves II - XII intact    Motor exam   Shoulder Abduction:  Right:  5/5   Left:  5/5  Biceps:                      Right:  5/5   Left:  4/5  Triceps:                     Right:  5/5   Left:  5/5  Wrist Extensors:       Right:  5/5   Left:  5/5  Wrist Flexors:           Right:  5/5   Left:  5/5  Intrinsics:                   Right:  5/5   Left:  5/5  Hip Flexor:                Right: 5/5  Left:  5/5  Hip Adductor:             Right:  5/5  Left:  5/5  Hip Abductor:             Right:  5/5  Left:  5/5  Gastroc Soleus:        Right:  5/5  Left:  5/5  Tib/Ant:                      Right:  5/5  Left:  5/5  EHL:                          Right:  " "5/5  Left:  5/5   Sensation normal to bilateral upper and lower extremities  Clonus negative  DTRs, no hyper-relexia   Gait: Able to stand from a seated position. Normal non-antalgic, non-myelopathic gait.  Able to heel/toe walk without loss of balance  Cervical examination reveals limited range of motion due to pain.  Tenderness to palpation of the cervical spine and left paraspinous muscles.  Tenderness to left upper scapula.    Lumbar examination reveals no tenderness of the spine or paraspinous muscles.  Hip height is symmetrical. Negative SI joint, sciatic notch or greater trochanteric tenderness to palpation bilaterally.  Straight leg raise is negative bilaterally.      Imaging: Cervical MRI 3/12/19:  IMPRESSION:  1. Moderate size central to right paramedian C4-C5 disc protrusion  with secondary moderate central canal stenosis, moderate right-sided foraminal stenosis, mild left-sided foraminal stenosis, and moderate cord deformity.  2. Moderate-sized left paramedian C5-C6 disc protrusion with secondary moderate central canal stenosis, moderate left-sided foraminal stenosis, mild to moderate right-sided foraminal stenosis and moderate cord deformity.      Assessment/Plan:   Cervical DDD  Cervical Radiculopathy    Bettina Montes is a 49 year old female with left-sided neck and arm pain that began on 2/12/19.  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  She describes the pain as a throbbing pain along the left lateral neck that radiates into left arm and forearm.  She has N/T in the fingers of the left hand.  She notes weakness to the LUE when compared to the RUE.  She has had some difficulty on the left with fine motor skills such as buttons and zippers the past couple of weeks.  The pain increases when she turns her neck and with lifting.  She has found little to relieve " "the pain other than medication and massage to the area, \"Sometimes it feels like there is a bra strap on my upper arm but there's not.\"  She rubs the area repeatedly to find comfort.  She denies changes to bowel or bladder. She denies difficulty with gait.  We reviewed MRI results.  The patient had been seen at [facility] ED on 3/13/19 for evaluation and discussion regarding being admitted for cervical fusion surgery, however, the patient opted to f/u at clinic, \"I didn't want to stay and have surgery.\"  Dr. Hurtado had reviewed MRI and considering severe canal stenosis with LUE weakness we are recommending  C4-5, C5-6 ACDF this week. The patient would like to start the authorization process for surgery and Dr. Hurtado will contact her to discuss in detail.  The patient was educated on the surgery including risks and benefits associated.  She will contact us if any further questions and we will start the PA process to schedule.       Patient Instructions   Patient Instructions  Pre-Operative:  -Surgery scheduled at [facility] for possible C4-6 ACDF (anterior cervical discectomy and fusion)  -Surgical risks: blood clots in the leg or lung, problems urinating, nerve damage, drainage from the incision, infection,stiffness  - Pre-operative physical with primary care physician within 30 days of surgical date.   -1 night hospitalization.    -Shower procedure: please shower with antimicrobial soap the night before surgery and morning of surgery. Please refer to showering instruction sheet in folder.  -Stop all solid foods 8 hours before surgery.  -Keep drinking clear liquids until 4 hours before surgery. Clear liquids include water, clear juice, black coffee, or clear tea without milk, Gatorade, clear soda.   - Discontinue Aspirin, NSAIDs (Advil/Ibuprofen, Naproxen,Nuprin,Relafen/Nabumetone, Diclofenac,Meloxicam, Aleve, Celebrex) x 7 days prior to surgical date.  - May try tylenol for pain 1000 mg three times per day " for pain        Post-Operative:  -Do not begin taking Non-Steroidal Anti-Inflammatory Drugs or NSAIDs (Advil/ibuprofen, Naproxen,Nuprin, Relafen/Nabumetone, Diclofenac,Meloxicam, Aleve, Celebrex, Aspirin, etc.) until 12 weeks after surgery. May cause bleeding and interfere with bone healing.   - Post operative incisional pain x 1-2 weeks which will require pain medications and muscle relaxants. You will receive medication upon discharge.  -Do NOT drive while taking narcotic pain medication.  -Do not drink alcohol while using any pain medication   -Post operative incision care- Watch for signs of infection: redness, swelling, warmth, drainage, and fever of 101 degrees or higher. Notify clinic [phone].  -No submerging incision in water such as pools, hot tubs,baths for at least 8 weeks or until incision is healed. Showers are fine.   - Post operative activity limitations: 6-8 weeks, no lifting > 10 pounds, no bending, twisting, or overhead reaching.  -Ok to walk as tolerated, avoid bed rest and prolonged sitting.  -No contact sports until after follow up visit  -No high impact activities such as; running/jogging, snowmobile or 4 chandler riding or any other recreational vehicles.  -Orthotics will fit you for a brace in the hospital.Cervical fusion: wear soft collar for up to 2 weeks as needed for comfort.  - Post op follow up appointments: 6 week post op follow up visit with Nurse practitioner and x-ray prior to appointment.Please call to schedule follow up appointment at [phone].  -If you are currently employed, you will need to be off work for 4-6 weeks for post op recovery and healing.     Bettina to follow up with Primary Care provider regarding elevated blood pressure.      Carmen Spangler [facility]  [facility]  [address]  [address] [place] [id number]    Tel [phone]  Pager [phone]

## 2019-03-19 NOTE — NURSING NOTE
"Bettina Montes is a 49 year old female who presents for:  Chief Complaint   Patient presents with     Neurologic Problem     W/C. Neck and left shoulder pain. Onset: 2/12/19. Patient states she helped someone up that had fallen while at work. MRI 3/12/19. Xray 3/12/19. ED f/u. Initially patient thought it was a pulled muscle. The next morning she was unable to turn her head to the left. Pain starts in the posterior shoulder and will travel up the back of the neck and down the arm past the elbow. She has numbness in the fingers. She has taken muscle relaxers.         Vitals:    Vitals:    03/19/19 1005   BP: (!) 151/102   Pulse: 110   Resp: 18   Temp: 97.3  F (36.3  C)   TempSrc: Oral   SpO2: 98%   Weight: 281 lb 6.4 oz (127.6 kg)   Height: 5' 6\" (1.676 m)       BMI:  Estimated body mass index is 45.42 kg/m  as calculated from the following:    Height as of this encounter: 5' 6\" (1.676 m).    Weight as of this encounter: 281 lb 6.4 oz (127.6 kg).    Pain Score:  Moderate Pain (4)        Cammy Silverman      "

## 2019-03-19 NOTE — LETTER
"    3/19/2019         RE: Bettina Montes  [address]  [place] [address]        Dear Colleague,    Thank you for referring your patient, Bettina Montes, to the [facility]. Please see a copy of my visit note below.    Dr. Hollis Hurtado  [facility]  Neurosurgery Clinic Visit      CC: Left-sided neck and arm pain    Primary care Provider: Anuja Bruce      Reason For Visit:   I was asked by Dr. Carroll to consult on the patient for cervical radiculopathy.      HPI: Bettina Montes is a 49 year old female with left-sided neck and arm pain that began on 2/12/19.  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  She describes the pain as a throbbing pain along the left lateral neck that radiates into left arm and forearm.  She has N/T in the fingers of the left hand.  She notes weakness to the LUE when compared to the RUE.  She has had some difficulty on the left with fine motor skills such as buttons and zippers the past couple of weeks.  The pain increases when she turns her neck and with lifting.  She has found little to relieve the pain other than medication and massage to the area, \"Sometimes it feels like there is a bra strap on my upper arm but there's not.\"  She rubs the area repeatedly to find comfort.  She denies changes to bowel or bladder. She denies difficulty with gait.    Pain right now:  4    Past Medical History:   Diagnosis Date     Adrenal gland anomaly      CHF (congestive heart failure) (H)      Fatty liver      Hyperlipidemia      Hypertension      Mild persistent asthma      Type 2 diabetes mellitus (H)        Past Medical History reviewed with patient during visit.    Past Surgical History:   Procedure Laterality Date     TUBAL LIGATION       Past Surgical History reviewed with patient during " visit.    Current Outpatient Medications   Medication     BETA BLOCKER NOT PRESCRIBED, INTENTIONAL,     blood glucose (NO BRAND SPECIFIED) test strip     Cholecalciferol (VITAMIN D3 PO)     diazepam (VALIUM) 5 MG tablet     fluticasone-salmeterol (ADVAIR) 250-50 MCG/DOSE inhaler     furosemide (LASIX) 40 MG tablet     hydrALAZINE (APRESOLINE) 25 MG tablet     insulin glargine (BASAGLAR KWIKPEN) 100 UNIT/ML pen     insulin pen needle (32G X 4 MM) 32G X 4 MM miscellaneous     levalbuterol (XOPENEX HFA) 45 MCG/ACT Inhaler     losartan (COZAAR) 50 MG tablet     lovastatin (MEVACOR) 20 MG tablet     magnesium oxide (MAG-OX) 400 (241.3 Mg) MG tablet     metFORMIN (GLUCOPHAGE) 500 MG tablet     methocarbamol (ROBAXIN) 750 MG tablet     montelukast (SINGULAIR) 10 MG tablet     potassium chloride ER (K-DUR/KLOR-CON M) 20 MEQ CR tablet     spironolactone (ALDACTONE) 25 MG tablet     TYLENOL EXTRA STRENGTH 500 MG PO TABS     methylPREDNISolone (MEDROL DOSEPAK) 4 MG tablet therapy pack     traMADol (ULTRAM) 50 MG tablet     No current facility-administered medications for this visit.        Allergies   Allergen Reactions     Amlodipine Swelling     Edema on 5mg     Lisinopril      Swelling in throat, face     Naprosyn [Naproxen]      Swelling, diff breathing       Social History     Socioeconomic History     Marital status: Single     Spouse name: None     Number of children: 1     Years of education: None     Highest education level: None   Occupational History     Occupation: CNA     Employer: OTHER   Social Needs     Financial resource strain: None     Food insecurity:     Worry: None     Inability: None     Transportation needs:     Medical: None     Non-medical: None   Tobacco Use     Smoking status: Former Smoker     Packs/day: 1.00     Years: 30.00     Pack years: 30.00     Types: Cigarettes     Last attempt to quit: 2013     Years since quittin.1     Smokeless tobacco: Former User     Tobacco comment:    "  Substance and Sexual Activity     Alcohol use: No     Alcohol/week: 0.0 oz     Drug use: No     Sexual activity: Yes     Partners: Male     Birth control/protection: Surgical   Lifestyle     Physical activity:     Days per week: None     Minutes per session: None     Stress: None   Relationships     Social connections:     Talks on phone: None     Gets together: None     Attends Anabaptist service: None     Active member of club or organization: None     Attends meetings of clubs or organizations: None     Relationship status: None     Intimate partner violence:     Fear of current or ex partner: None     Emotionally abused: None     Physically abused: None     Forced sexual activity: None   Other Topics Concern     Parent/sibling w/ CABG, MI or angioplasty before 65F 55M? Not Asked   Social History Narrative     None       Family History   Problem Relation Age of Onset     Diabetes Mother      Hypertension Mother      Hyperlipidemia Mother      Heart Failure Mother      Diabetes Father      Cancer Paternal Grandmother         ?         ROS: 10 point ROS neg other than the symptoms noted above in the HPI.    Vital Signs: BP (!) 151/102   Pulse 110   Temp 97.3  F (36.3  C) (Oral)   Resp 18   Ht 5' 6\" (1.676 m)   Wt 281 lb 6.4 oz (127.6 kg)   LMP  (LMP Unknown)   SpO2 98%   BMI 45.42 kg/m         Examination:  Constitutional:  Alert, well nourished, NAD.  HEENT: Normocephalic, atraumatic.   Pulm:  Without shortness of breath   CV:  No pitting edema of BLE.    Neurological:  Awake  Alert  Oriented x 3  Speech clear  Cranial nerves II - XII intact    Motor exam   Shoulder Abduction:  Right:  5/5   Left:  5/5  Biceps:                      Right:  5/5   Left:  4/5  Triceps:                     Right:  5/5   Left:  5/5  Wrist Extensors:       Right:  5/5   Left:  5/5  Wrist Flexors:           Right:  5/5   Left:  5/5  Intrinsics:                   Right:  5/5   Left:  5/5  Hip Flexor:                Right: 5/5  " "Left:  5/5  Hip Adductor:             Right:  5/5  Left:  5/5  Hip Abductor:             Right:  5/5  Left:  5/5  Gastroc Soleus:        Right:  5/5  Left:  5/5  Tib/Ant:                      Right:  5/5  Left:  5/5  EHL:                          Right:  5/5  Left:  5/5   Sensation normal to bilateral upper and lower extremities  Clonus negative  DTRs, no hyper-relexia   Gait: Able to stand from a seated position. Normal non-antalgic, non-myelopathic gait.  Able to heel/toe walk without loss of balance  Cervical examination reveals limited range of motion due to pain.  Tenderness to palpation of the cervical spine and left paraspinous muscles.  Tenderness to left upper scapula.    Lumbar examination reveals no tenderness of the spine or paraspinous muscles.  Hip height is symmetrical. Negative SI joint, sciatic notch or greater trochanteric tenderness to palpation bilaterally.  Straight leg raise is negative bilaterally.      Imaging: Cervical MRI 3/12/19:  IMPRESSION:  1. Moderate size central to right paramedian C4-C5 disc protrusion  with secondary moderate central canal stenosis, moderate right-sided foraminal stenosis, mild left-sided foraminal stenosis, and moderate cord deformity.  2. Moderate-sized left paramedian C5-C6 disc protrusion with secondary moderate central canal stenosis, moderate left-sided foraminal stenosis, mild to moderate right-sided foraminal stenosis and moderate cord deformity.      Assessment/Plan:   Cervical DDD  Cervical Radiculopathy    Bettina Montes is a 49 year old female with left-sided neck and arm pain that began on 2/12/19.  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  She describes the pain as a throbbing pain along the left lateral neck that radiates into left arm and forearm.  She has N/T in the fingers of the left hand.  She notes " "weakness to the LUE when compared to the RUE.  She has had some difficulty on the left with fine motor skills such as buttons and zippers the past couple of weeks.  The pain increases when she turns her neck and with lifting.  She has found little to relieve the pain other than medication and massage to the area, \"Sometimes it feels like there is a bra strap on my upper arm but there's not.\"  She rubs the area repeatedly to find comfort.  She denies changes to bowel or bladder. She denies difficulty with gait.  We reviewed MRI results.  The patient had been seen at [facility] ED on 3/13/19 for evaluation and discussion regarding being admitted for cervical fusion surgery, however, the patient opted to f/u at clinic, \"I didn't want to stay and have surgery.\"  Dr. Hurtado had reviewed MRI and considering severe canal stenosis with LUE weakness we are recommending  C4-5, C5-6 ACDF this week. The patient would like to start the authorization process for surgery and Dr. Hurtado will contact her to discuss in detail.  The patient was educated on the surgery including risks and benefits associated.  She will contact us if any further questions and we will start the PA process to schedule.       Patient Instructions   Patient Instructions  Pre-Operative:  -Surgery scheduled at [facility] for possible C4-6 ACDF (anterior cervical discectomy and fusion)  -Surgical risks: blood clots in the leg or lung, problems urinating, nerve damage, drainage from the incision, infection,stiffness  - Pre-operative physical with primary care physician within 30 days of surgical date.   -1 night hospitalization.    -Shower procedure: please shower with antimicrobial soap the night before surgery and morning of surgery. Please refer to showering instruction sheet in folder.  -Stop all solid foods 8 hours before surgery.  -Keep drinking clear liquids until 4 hours before surgery. Clear liquids include water, clear juice, black coffee, or " clear tea without milk, Gatorade, clear soda.   - Discontinue Aspirin, NSAIDs (Advil/Ibuprofen, Naproxen,Nuprin,Relafen/Nabumetone, Diclofenac,Meloxicam, Aleve, Celebrex) x 7 days prior to surgical date.  - May try tylenol for pain 1000 mg three times per day for pain        Post-Operative:  -Do not begin taking Non-Steroidal Anti-Inflammatory Drugs or NSAIDs (Advil/ibuprofen, Naproxen,Nuprin, Relafen/Nabumetone, Diclofenac,Meloxicam, Aleve, Celebrex, Aspirin, etc.) until 12 weeks after surgery. May cause bleeding and interfere with bone healing.   - Post operative incisional pain x 1-2 weeks which will require pain medications and muscle relaxants. You will receive medication upon discharge.  -Do NOT drive while taking narcotic pain medication.  -Do not drink alcohol while using any pain medication   -Post operative incision care- Watch for signs of infection: redness, swelling, warmth, drainage, and fever of 101 degrees or higher. Notify clinic [phone].  -No submerging incision in water such as pools, hot tubs,baths for at least 8 weeks or until incision is healed. Showers are fine.   - Post operative activity limitations: 6-8 weeks, no lifting > 10 pounds, no bending, twisting, or overhead reaching.  -Ok to walk as tolerated, avoid bed rest and prolonged sitting.  -No contact sports until after follow up visit  -No high impact activities such as; running/jogging, snowmobile or 4 chandler riding or any other recreational vehicles.  -Orthotics will fit you for a brace in the hospital.Cervical fusion: wear soft collar for up to 2 weeks as needed for comfort.  - Post op follow up appointments: 6 week post op follow up visit with Nurse practitioner and x-ray prior to appointment.Please call to schedule follow up appointment at [phone].  -If you are currently employed, you will need to be off work for 4-6 weeks for post op recovery and healing.     Bettina to follow up with Primary Care provider regarding  elevated blood pressure.      Carmen Spangler CNP  [facility]  [address] [address]    Tel [phone]  Pager [phone]    Again, thank you for allowing me to participate in the care of your patient.        Sincerely,        Carmen Spangler, NP

## 2019-03-19 NOTE — PATIENT INSTRUCTIONS
Patient Instructions  Pre-Operative:  -Surgery scheduled at [facility] for possible C4-6 ACDF (anterior cervical discectomy and fusion)  -Surgical risks: blood clots in the leg or lung, problems urinating, nerve damage, drainage from the incision, infection,stiffness  - Pre-operative physical with primary care physician within 30 days of surgical date.   -1 night hospitalization.    -Shower procedure: please shower with antimicrobial soap the night before surgery and morning of surgery. Please refer to showering instruction sheet in folder.  -Stop all solid foods 8 hours before surgery.  -Keep drinking clear liquids until 4 hours before surgery. Clear liquids include water, clear juice, black coffee, or clear tea without milk, Gatorade, clear soda.   - Discontinue Aspirin, NSAIDs (Advil/Ibuprofen, Naproxen,Nuprin,Relafen/Nabumetone, Diclofenac,Meloxicam, Aleve, Celebrex) x 7 days prior to surgical date.  - May try tylenol for pain 1000 mg three times per day for pain        Post-Operative:  -Do not begin taking Non-Steroidal Anti-Inflammatory Drugs or NSAIDs (Advil/ibuprofen, Naproxen,Nuprin, Relafen/Nabumetone, Diclofenac,Meloxicam, Aleve, Celebrex, Aspirin, etc.) until 12 weeks after surgery. May cause bleeding and interfere with bone healing.   - Post operative incisional pain x 1-2 weeks which will require pain medications and muscle relaxants. You will receive medication upon discharge.  -Do NOT drive while taking narcotic pain medication.  -Do not drink alcohol while using any pain medication   -Post operative incision care- Watch for signs of infection: redness, swelling, warmth, drainage, and fever of 101 degrees or higher. Notify clinic [phone].  -No submerging incision in water such as pools, hot tubs,baths for at least 8 weeks or until incision is healed. Showers are fine.   - Post operative activity limitations: 6-8 weeks, no lifting > 10 pounds, no bending, twisting, or overhead  reaching.  -Ok to walk as tolerated, avoid bed rest and prolonged sitting.  -No contact sports until after follow up visit  -No high impact activities such as; running/jogging, snowmobile or 4 chandler riding or any other recreational vehicles.  -Orthotics will fit you for a brace in the hospital.Cervical fusion: wear soft collar for up to 2 weeks as needed for comfort.  - Post op follow up appointments: 6 week post op follow up visit with Nurse practitioner and x-ray prior to appointment.Please call to schedule follow up appointment at [phone].  -If you are currently employed, you will need to be off work for 4-6 weeks for post op recovery and healing.     Bettina to follow up with Primary Care provider regarding elevated blood pressure.

## 2019-03-19 NOTE — LETTER
[facility]   [facility]  [address]  [address]      March 19, 2019  Re: Bettina Montes      To Whom it May Concern,      Bettina was seen in clinic today and is scheduled for surgery on 3/22/19. Due to necessary post-operative healing and recovery, please excuse her from work for approximately 6 weeks.     Return to work date and restrictions will be re-evaluated at her 6 week post op visit. Please feel free to contact our clinic with any questions or concerns.       Sincerely,            Carmen Spangler, CNP  [phone]

## 2019-03-20 ENCOUNTER — HOSPITAL ENCOUNTER (INPATIENT)
Facility: CLINIC | Age: 49
Setting detail: SURGERY ADMIT
End: 2019-03-20
Attending: NEUROLOGICAL SURGERY | Admitting: NEUROLOGICAL SURGERY
Payer: OTHER MISCELLANEOUS

## 2019-03-20 ENCOUNTER — OFFICE VISIT (OUTPATIENT)
Dept: FAMILY MEDICINE | Facility: CLINIC | Age: 49
End: 2019-03-20
Payer: OTHER MISCELLANEOUS

## 2019-03-20 VITALS
SYSTOLIC BLOOD PRESSURE: 135 MMHG | HEART RATE: 111 BPM | BODY MASS INDEX: 45.26 KG/M2 | TEMPERATURE: 98.2 F | HEIGHT: 66 IN | OXYGEN SATURATION: 98 % | DIASTOLIC BLOOD PRESSURE: 81 MMHG | WEIGHT: 281.6 LBS

## 2019-03-20 DIAGNOSIS — J45.40 MODERATE PERSISTENT ASTHMA WITHOUT COMPLICATION: ICD-10-CM

## 2019-03-20 DIAGNOSIS — I42.8 NONISCHEMIC CARDIOMYOPATHY (H): ICD-10-CM

## 2019-03-20 DIAGNOSIS — Z01.818 PREOP GENERAL PHYSICAL EXAM: Primary | ICD-10-CM

## 2019-03-20 DIAGNOSIS — E78.2 MIXED HYPERLIPIDEMIA: ICD-10-CM

## 2019-03-20 DIAGNOSIS — I50.9 CHRONIC CONGESTIVE HEART FAILURE, UNSPECIFIED HEART FAILURE TYPE (H): ICD-10-CM

## 2019-03-20 DIAGNOSIS — M54.12 CERVICAL RADICULOPATHY: ICD-10-CM

## 2019-03-20 DIAGNOSIS — Z79.4 TYPE 2 DIABETES MELLITUS WITH HYPERGLYCEMIA, WITH LONG-TERM CURRENT USE OF INSULIN (H): ICD-10-CM

## 2019-03-20 DIAGNOSIS — E11.65 TYPE 2 DIABETES MELLITUS WITH HYPERGLYCEMIA, WITH LONG-TERM CURRENT USE OF INSULIN (H): ICD-10-CM

## 2019-03-20 DIAGNOSIS — I10 ESSENTIAL HYPERTENSION: ICD-10-CM

## 2019-03-20 LAB
ANION GAP SERPL CALCULATED.3IONS-SCNC: 6 MMOL/L (ref 3–14)
BUN SERPL-MCNC: 17 MG/DL (ref 7–30)
CALCIUM SERPL-MCNC: 9.7 MG/DL (ref 8.5–10.1)
CHLORIDE SERPL-SCNC: 102 MMOL/L (ref 94–109)
CO2 SERPL-SCNC: 29 MMOL/L (ref 20–32)
CREAT SERPL-MCNC: 0.86 MG/DL (ref 0.52–1.04)
GFR SERPL CREATININE-BSD FRML MDRD: 78 ML/MIN/{1.73_M2}
GLUCOSE SERPL-MCNC: 160 MG/DL (ref 70–99)
POTASSIUM SERPL-SCNC: 4.2 MMOL/L (ref 3.4–5.3)
SODIUM SERPL-SCNC: 137 MMOL/L (ref 133–144)

## 2019-03-20 PROCEDURE — 36415 COLL VENOUS BLD VENIPUNCTURE: CPT | Performed by: FAMILY MEDICINE

## 2019-03-20 PROCEDURE — 80048 BASIC METABOLIC PNL TOTAL CA: CPT | Performed by: FAMILY MEDICINE

## 2019-03-20 PROCEDURE — 99215 OFFICE O/P EST HI 40 MIN: CPT | Performed by: FAMILY MEDICINE

## 2019-03-20 PROCEDURE — 93000 ELECTROCARDIOGRAM COMPLETE: CPT | Performed by: FAMILY MEDICINE

## 2019-03-20 RX ORDER — FUROSEMIDE 40 MG
40 TABLET ORAL EVERY OTHER DAY
COMMUNITY
End: 2019-03-21 | Stop reason: HOSPADM

## 2019-03-20 RX ORDER — ACETAMINOPHEN 500 MG
1000 TABLET ORAL EVERY 8 HOURS PRN
COMMUNITY
End: 2019-03-21 | Stop reason: HOSPADM

## 2019-03-20 RX ORDER — FUROSEMIDE 40 MG
80 TABLET ORAL EVERY OTHER DAY
COMMUNITY
End: 2019-03-21 | Stop reason: HOSPADM

## 2019-03-20 ASSESSMENT — PAIN SCALES - GENERAL: PAINLEVEL: EXTREME PAIN (8)

## 2019-03-20 ASSESSMENT — MIFFLIN-ST. JEOR: SCORE: 1914.08

## 2019-03-20 NOTE — LETTER
[address]  [address]  [phone]    March 21, 2019      Bettina Montes  [address] [place] [address]              Ms. Montes,     Your kidney function was normal.     Please contact the clinic if you have additional questions.  Thank you.       Sincerely,     Anuja Tenorio

## 2019-03-20 NOTE — PHARMACY-ADMISSION MEDICATION HISTORY
Admission medication history interview status for this patient is complete. See [facility] admission navigator for allergy information, prior to admission medications and immunization status.     PTA med list completed by pre-admitting nurse,   Nurse Supriya Boogie RN Wed Mar 20, 2019  1:50 PM     Eloisa Hollins, PharmD called and verified Strengths of Advair, insulin glargine (65 units at bedtime), hydralazine, losartan, lovastatin, magnesium, metformin, potassium chloride, spironolactone, and tylenol.     Prior to Admission medications    Medication Sig Last Dose Taking? Auth Provider   acetaminophen (TYLENOL) 500 MG tablet Take 1,000 mg by mouth every 8 hours as needed for mild pain  Yes Unknown, Entered By History   Cholecalciferol (VITAMIN D3 PO) Take 2,000 Units by mouth daily  Yes Reported, Patient   diazepam (VALIUM) 5 MG tablet Take 1-2 tablets (5-10 mg) by mouth every 6 hours as needed for muscle spasms  Yes Geremias Mistry MD   fluticasone-salmeterol (ADVAIR) 250-50 MCG/DOSE inhaler Inhale 1 puff into the lungs 2 times daily  Yes Anuja Bruce MD   furosemide (LASIX) 40 MG tablet Take 40 mg by mouth every other day Alternate with 80 mg every other day  Yes Unknown, Entered By History   furosemide (LASIX) 40 MG tablet Take 80 mg by mouth every other day Alternate with 40 mg every other day  Yes Unknown, Entered By History   hydrALAZINE (APRESOLINE) 25 MG tablet Take 3 tablets (75 mg) by mouth 2 times daily  Yes Anuja Bruce MD   insulin glargine (BASAGLAR KWIKPEN) 100 UNIT/ML pen Inject 65 Units Subcutaneous At Bedtime  Yes Anuja Bruce MD   levalbuterol (XOPENEX HFA) 45 MCG/ACT Inhaler Inhale 2 puffs into the lungs every 4 hours as needed for shortness of breath / dyspnea or wheezing  Yes Anuja Bruce MD   losartan (COZAAR) 50 MG tablet Take 1 tablet (50 mg) by mouth daily  Yes Anuja Bruce MD    lovastatin (MEVACOR) 20 MG tablet Take 1 tablet (20 mg) by mouth At Bedtime  Yes Anuja Bruce MD   magnesium oxide (MAG-OX) 400 (241.3 Mg) MG tablet Take 1 tablet (400 mg) by mouth daily  Yes Anuja Bruce MD   metFORMIN (GLUCOPHAGE) 500 MG tablet Take 2 tablets (1,000 mg) by mouth 2 times daily (with meals)  Yes Anuja Bruce MD   methylPREDNISolone (MEDROL DOSEPAK) 4 MG tablet therapy pack Follow Package Directions 3/19/2019 at Finished on 3/19/2019 Yes Geremias Mistry MD   montelukast (SINGULAIR) 10 MG tablet Take 1 tablet (10 mg) by mouth At Bedtime  Yes Anuja Bruce MD   potassium chloride ER (K-DUR/KLOR-CON M) 20 MEQ CR tablet Take 1 tablet (20 mEq) by mouth 2 times daily  Yes Anuja Bruce MD   spironolactone (ALDACTONE) 25 MG tablet Take 1 tablet (25 mg) by mouth daily  Yes Anuja Bruce MD   traMADol (ULTRAM) 50 MG tablet Take 1 tablet (50 mg) by mouth every 6 hours as needed for severe pain  Yes Troy Carroll MD   BETA BLOCKER NOT PRESCRIBED, INTENTIONAL, Beta Blocker not prescribed intentionally due to Severe Asthma   Anuja Bruce MD   blood glucose (NO BRAND SPECIFIED) test strip Use to test blood sugar 3-4 times daily or as directed.   Geremias Mistry MD   insulin pen needle (32G X 4 MM) 32G X 4 MM miscellaneous Use 1 pen needles daily or as directed.   Anuja Bruce MD

## 2019-03-20 NOTE — PATIENT INSTRUCTIONS
Before Your Surgery      Call your surgeon if there is any change in your health. This includes signs of a cold or flu (such as a sore throat, runny nose, cough, rash or fever).    Do not smoke, drink alcohol or take over the counter medicine (unless your surgeon or primary care doctor tells you to) for the 24 hours before and after surgery.    If you take prescribed drugs: Follow your doctor s orders about which medicines to take and which to stop until after surgery.  --Patient is to take all scheduled medications on the day of surgery EXCEPT for modifications listed below.  Diabetes Medication Use  -----Hold usual oral and non-insulin diabetic meds (e.g. Metformin, Actos, Glipizide) while NPO.   -----Take 80% of long acting insulin (e.g. Lantus, NPH) while NPO (fasting)  Anticoagulant or Antiplatelet Medication Use  ASPIRIN: Discontinue ASA 7-10 days prior to procedure to reduce bleeding risk.  It should be resumed post-operatively.  ACE Inhibitor or Angiotensin Receptor Blocker (ARB) Use  Losartan and should HOLD this medication for the 24 hours prior to surgery.    Eating and drinking prior to surgery: follow the instructions from your surgeon    Take a shower or bath the night before surgery. Use the soap your surgeon gave you to gently clean your skin. If you do not have soap from your surgeon, use your regular soap. Do not shave or scrub the surgery site.  Wear clean pajamas and have clean sheets on your bed.

## 2019-03-20 NOTE — PROGRESS NOTES
[address] [address]  [phone]  Dept: [phone]    PRE-OP EVALUATION:  Today's date: 3/20/2019    Bettina Montes (: 1970) presents for pre-operative evaluation assessment as requested by Dr. Hurtado.  She requires evaluation and anesthesia risk assessment prior to undergoing surgery/procedure for treatment of Cervical Radiculopathy.    Proposed Surgery/ Procedure: C4-6 Anterior Cervical Discectomy and Fusion  Date of Surgery/ Procedure: 3/23/2019  Time of Surgery/ Procedure: 8am  Hospital/Surgical Facility: [facility]  OR  Fax number for surgical facility:   Primary Physician: Anuja Bruce  Type of Anesthesia Anticipated: General    Patient has a Health Care Directive or Living Will:  NO    1. NO - Do you have a history of heart attack, stroke, stent, bypass or surgery on an artery in the head, neck, heart or legs?  2. NO - Do you ever have any pain or discomfort in your chest?  3. YES - DO YOU HAVE A HISTORY OF HEART FAILURE CHF and stable with medications.  4. YES - ARE YOUR TROUBLED BY SHORTNESS OF BREATH WHEN WALKING ON THE LEVEL, UP A SLIGHT HILL OR AT NIGHT? Yes  5. NO - Do you currently have a cold, bronchitis or other respiratory infection?  6. NO - Do you have a cough, shortness of breath or wheezing?  7. YES - DO YOU SOMETIMES GET PAINS IN THE CALVES OF YOUR LEGS WHEN YOU WALK? Described as occasional cramping but more common at night.  8. NO - Do you or anyone in your family have previous history of blood clots?  9. NO - Do you or does anyone in your family have a serious bleeding problem such as prolonged bleeding following surgeries or cuts?  10. NO - Have you ever had problems with anemia or been told to take iron pills?  11. NO - Have you had any abnormal blood loss such as black, tarry or bloody stools, or abnormal vaginal bleeding?  12. NO - Have you ever had a blood transfusion?  13. NO - Have you or any  of your relatives ever had problems with anesthesia?  14. NO - Do you have sleep apnea, excessive snoring or daytime drowsiness?  15. NO - Do you have any prosthetic heart valves?  16. NO - Do you have prosthetic joints?  17. NO - Is there any chance that you may be pregnant?      HPI:     HPI related to upcoming procedure: radicular neck pain for weeks that has been worsening. Seen by neurosurgery and fusion recommended.      ASTHMA - Patient has a longstanding history of moderate-severe Asthma . Patient has been doing well overall noting SOB and continues on medication regimen consisting of adavair without adverse reactions or side effects.                                                                                                                                               .  CHF - Patient has a longstanding history of moderate-severe CHF per patient.  Care Everywhere shows nonischemic cardiomyopathy with suggested sleep apnea etiology.  Patient stopped seeing them in 2015 though. Currently the patient's condition is same. Current treatment regimen includes Angiotensin 2 receptor blocker, spirolactone and hydralazine. The patient denies chest pain, edema, orthopnea, SOB or recent weight gain. Last Echocardiogram 2017, EKG today.                                                                                                                                                                               .  DIABETES - Patient has a longstanding history of DiabetesType Type II . Patient is being treated with oral agents and insulin injections and denies significant side effects. Control has been good. Complicating factors include but are not limited to: hypertension, hyperlipidemia, chronic kidney disease and morbid obesity .                                                                                                                            .  HYPERLIPIDEMIA - Patient has a long history of significant  Hyperlipidemia requiring medication for treatment with recent good control. Patient reports no problems or side effects with the medication.                                                                                                                                                       .  HYPERTENSION - Patient has longstanding history of HTN , currently denies any symptoms referable to elevated blood pressure. Specifically denies chest pain, palpitations, dyspnea, orthopnea, PND or peripheral edema. Blood pressure readings have been in normal range. Current medication regimen is as listed below. Patient denies any side effects of medication.                                                                                                                                                                                          .    MEDICAL HISTORY:     Patient Active Problem List    Diagnosis Date Noted     Callus of foot 10/10/2018     Priority: Medium     Moderate persistent asthma without complication 05/07/2018     Priority: Medium     Adrenal gland anomaly      Priority: Medium     Microscopic hematuria 01/18/2017     Priority: Medium     Intermittent asthma, uncomplicated 01/09/2017     Priority: Medium     Morbid obesity due to excess calories (H) 03/21/2016     Priority: Medium     Hypertension      Priority: Medium     Type 2 diabetes mellitus (H)      Priority: Medium     CHF (congestive heart failure) (H)      Priority: Medium     EF 45%       Hyperlipidemia      Priority: Medium      Past Medical History:   Diagnosis Date     Adrenal gland anomaly      CHF (congestive heart failure) (H)      Fatty liver      Hyperlipidemia      Hypertension      Mild persistent asthma      Type 2 diabetes mellitus (H)      Past Surgical History:   Procedure Laterality Date     TUBAL LIGATION       Current Outpatient Medications   Medication Sig Dispense Refill     BETA BLOCKER NOT PRESCRIBED, INTENTIONAL, Beta Blocker  not prescribed intentionally due to Severe Asthma  0     blood glucose (NO BRAND SPECIFIED) test strip Use to test blood sugar 3-4 times daily or as directed. 200 strip 1     Cholecalciferol (VITAMIN D3 PO) Take 2,000 Units by mouth daily       diazepam (VALIUM) 5 MG tablet Take 1-2 tablets (5-10 mg) by mouth every 6 hours as needed for muscle spasms 20 tablet 0     fluticasone-salmeterol (ADVAIR) 250-50 MCG/DOSE inhaler Inhale 1 puff into the lungs 2 times daily 3 Inhaler 1     furosemide (LASIX) 40 MG tablet TAKE 1 TABLET BY MOUTH EVERY OTHER DAY. TAKE 2 TABS ON OTHER DAYS. 120 tablet 3     hydrALAZINE (APRESOLINE) 25 MG tablet Take 3 tablets (75 mg) by mouth 2 times daily 180 tablet 5     insulin glargine (BASAGLAR KWIKPEN) 100 UNIT/ML pen Inject 65 Units Subcutaneous At Bedtime 60 mL 1     insulin pen needle (32G X 4 MM) 32G X 4 MM miscellaneous Use 1 pen needles daily or as directed. 100 each 3     levalbuterol (XOPENEX HFA) 45 MCG/ACT Inhaler Inhale 2 puffs into the lungs every 4 hours as needed for shortness of breath / dyspnea or wheezing 15 g 3     losartan (COZAAR) 50 MG tablet Take 1 tablet (50 mg) by mouth daily 90 tablet 1     lovastatin (MEVACOR) 20 MG tablet Take 1 tablet (20 mg) by mouth At Bedtime 90 tablet 3     magnesium oxide (MAG-OX) 400 (241.3 Mg) MG tablet Take 1 tablet (400 mg) by mouth daily 100 tablet 3     metFORMIN (GLUCOPHAGE) 500 MG tablet Take 2 tablets (1,000 mg) by mouth 2 times daily (with meals) 360 tablet 1     methocarbamol (ROBAXIN) 750 MG tablet Take 1 tablet (750 mg) by mouth 4 times daily as needed for muscle spasms 180 tablet 1     methylPREDNISolone (MEDROL DOSEPAK) 4 MG tablet therapy pack Follow Package Directions 21 tablet 0     montelukast (SINGULAIR) 10 MG tablet Take 1 tablet (10 mg) by mouth At Bedtime 90 tablet 3     potassium chloride ER (K-DUR/KLOR-CON M) 20 MEQ CR tablet Take 1 tablet (20 mEq) by mouth 2 times daily 180 tablet 1     spironolactone (ALDACTONE) 25  "MG tablet Take 1 tablet (25 mg) by mouth daily 90 tablet 1     traMADol (ULTRAM) 50 MG tablet Take 1 tablet (50 mg) by mouth every 6 hours as needed for severe pain 12 tablet 0     TYLENOL EXTRA STRENGTH 500 MG PO TABS Reported on 5/10/2017       OTC products: None, except as noted above    Allergies   Allergen Reactions     Amlodipine Swelling     Edema on 5mg     Lisinopril      Swelling in throat, face     Naprosyn [Naproxen]      Swelling, diff breathing      Latex Allergy: NO    Social History     Tobacco Use     Smoking status: Former Smoker     Packs/day: 1.00     Years: 30.00     Pack years: 30.00     Types: Cigarettes     Last attempt to quit: 2013     Years since quittin.1     Smokeless tobacco: Former User     Tobacco comment:     Substance Use Topics     Alcohol use: No     Alcohol/week: 0.0 oz     History   Drug Use No       REVIEW OF SYSTEMS:   Constitutional, neuro, ENT, endocrine, pulmonary, cardiac, gastrointestinal, genitourinary, musculoskeletal, integument and psychiatric systems are negative, except as otherwise noted.    EXAM:   /81 (BP Location: Left arm, Patient Position: Chair, Cuff Size: Adult Large)   Pulse 111   Temp 98.2  F (36.8  C) (Oral)   Ht 1.676 m (5' 6\")   Wt 127.7 kg (281 lb 9.6 oz)   LMP  (LMP Unknown)   SpO2 98%   Breastfeeding? No   BMI 45.45 kg/m      GENERAL APPEARANCE: healthy, alert and no distress, morbidly obese     EYES: EOMI, PERRL     HENT: ear canals and TM's normal and nose and mouth without ulcers or lesions     NECK: no adenopathy, no asymmetry, masses, or scars and thyroid normal to palpation     RESP: lungs clear to auscultation - no rales, rhonchi or wheezes     CV: regular rates and rhythm, normal S1 S2, no S3 or S4 and no murmur, click or rub     ABDOMEN:  soft, nontender, no HSM or masses and bowel sounds normal     MS: extremities normal- no gross deformities noted, no evidence of inflammation in joints, FROM in all extremities.     " SKIN: no suspicious lesions or rashes     NEURO: Normal strength and tone, sensory exam grossly normal, mentation intact and speech normal     PSYCH: mentation appears normal. and affect normal/bright     LYMPHATICS: No cervical adenopathy    DIAGNOSTICS:     Labs Resulted Today:   Results for orders placed or performed in visit on 03/20/19   EKG 12-lead complete w/read - Clinics    Narrative    Abnormal NSR, no acute ST change. Peaking of T wave in septal leads when compared to previous.    Anuja Daniel M.D.     Impression    Abnormal NSR, no acute ST change. Peaking of T wave in septal leads when compared to previous.    Anuja Daniel M.D.        Recent Labs   Lab Test 02/27/19  1051 10/10/18  1110 05/02/18  1517  05/10/17  0943   HGB  --   --  11.8  --  12.1   PLT  --   --  429  --  392   NA  --  141 140   < > 140   POTASSIUM  --  3.9 3.7   < > 4.2   CR  --  0.79 0.82   < > 0.70   A1C 11.7* 8.2* 8.3*   < > 10.1*    < > = values in this interval not displayed.        IMPRESSION:   Reason for surgery/procedure: cervical radiculopathy  Diagnosis/reason for consult: The primary encounter diagnosis was Preop general physical exam. Diagnoses of Cervical radiculopathy, Type 2 diabetes mellitus with hyperglycemia, with long-term current use of insulin (H), Chronic congestive heart failure, unspecified heart failure type (H), Nonischemic cardiomyopathy (H), Moderate persistent asthma without complication, Mixed hyperlipidemia, and Essential hypertension were also pertinent to this visit.     The proposed surgical procedure is considered INTERMEDIATE risk.    REVISED CARDIAC RISK INDEX  The patient has the following serious cardiovascular risks for perioperative complications such as (MI, PE, VFib and 3  AV Block):  Congestive Heart Failure (pulmonary edema, PND, s3 sara, CXR with pulmonary congestion, basilar rales)  Diabetes Mellitus (on Insulin)  INTERPRETATION: 2 risks: Class III (moderate risk -  6.6% complication rate)    The patient has the following additional risks for perioperative complications:  Morbid obesity      ICD-10-CM    1. Preop general physical exam Z01.818 BASIC METABOLIC PANEL     Echo Stress Test with Definity     EKG 12-lead complete w/read - Clinics   2. Cervical radiculopathy M54.12    3. Type 2 diabetes mellitus with hyperglycemia, with long-term current use of insulin (H) E11.65     Z79.4    4. Mixed hyperlipidemia E78.2    5. Moderate persistent asthma without complication J45.40    6. Chronic congestive heart failure, unspecified heart failure type (H) I50.9 Echo Stress Test with Definity     EKG 12-lead complete w/read - Clinics   7. Essential hypertension I10 BASIC METABOLIC PANEL       RECOMMENDATIONS:     --Consult hospital rounder / IM to assist post-op medical management    Cardiovascular Risk  Beta blockers contraindicated due to asthma  **Preop stress imaging recommended due to current serious symptoms/signs that would warrant stress testing regardless of preoperative status** Scheduled 3/21/19.      Pulmonary Risk  Incentive spirometry post op  Respiratory Therapy (Respiratory Care IP Consult)  post op  NG tube decompression if abdominal distension or significant vomiting       --Patient is to take all scheduled medications on the day of surgery EXCEPT for modifications listed below.    Diabetes Medication Use  -----Hold usual oral and non-insulin diabetic meds (e.g. Metformin, Actos, Glipizide) while NPO.   -----Take 80% of long acting insulin (e.g. Lantus, NPH) while NPO (fasting)      Anticoagulant or Antiplatelet Medication Use  ASPIRIN: Discontinue ASA 7-10 days prior to procedure to reduce bleeding risk.  It should be resumed post-operatively.        ACE Inhibitor or Angiotensin Receptor Blocker (ARB) Use  Ace inhibitor or Angiotensin Receptor Blocker (ARB) and should HOLD this medication for the 24 hours prior to surgery.      Surgery is not recommended until stress  echocardiogram is complete.       Signed Electronically by: Anuja Tenorio MD    Addendum 4/4/19 at 2:45 pm: patient has seen and been evaluated by cardiology and was cleared for surgery by them.  Okay to proceed.    Anuja Daniel M.D.     Copy of this evaluation report is provided to requesting physician.    [facility] Preop Guidelines    Revised Cardiac Risk Index

## 2019-03-21 ENCOUNTER — HOSPITAL ENCOUNTER (OUTPATIENT)
Dept: CARDIOLOGY | Facility: CLINIC | Age: 49
Discharge: HOME | End: 2019-03-21
Attending: FAMILY MEDICINE | Admitting: FAMILY MEDICINE
Payer: OTHER MISCELLANEOUS

## 2019-03-21 ENCOUNTER — TELEPHONE (OUTPATIENT)
Dept: FAMILY MEDICINE | Facility: CLINIC | Age: 49
End: 2019-03-21

## 2019-03-21 DIAGNOSIS — M54.12 CERVICAL RADICULOPATHY: ICD-10-CM

## 2019-03-21 DIAGNOSIS — M54.2 CERVICALGIA: Primary | ICD-10-CM

## 2019-03-21 DIAGNOSIS — Z01.818 PREOP GENERAL PHYSICAL EXAM: ICD-10-CM

## 2019-03-21 DIAGNOSIS — I50.9 CHRONIC CONGESTIVE HEART FAILURE, UNSPECIFIED HEART FAILURE TYPE (H): ICD-10-CM

## 2019-03-21 DIAGNOSIS — I50.9 CHRONIC CONGESTIVE HEART FAILURE, UNSPECIFIED HEART FAILURE TYPE (H): Primary | ICD-10-CM

## 2019-03-21 PROCEDURE — 93321 DOPPLER ECHO F-UP/LMTD STD: CPT | Mod: 26 | Performed by: INTERNAL MEDICINE

## 2019-03-21 PROCEDURE — 93325 DOPPLER ECHO COLOR FLOW MAPG: CPT | Mod: 26 | Performed by: INTERNAL MEDICINE

## 2019-03-21 PROCEDURE — 93308 TTE F-UP OR LMTD: CPT | Mod: 26 | Performed by: INTERNAL MEDICINE

## 2019-03-21 PROCEDURE — 25500064 ZZH RX 255 OP 636: Performed by: INTERNAL MEDICINE

## 2019-03-21 PROCEDURE — 40000264 ECHOCARDIOGRAM LIMITED

## 2019-03-21 RX ORDER — DOBUTAMINE HYDROCHLORIDE 200 MG/100ML
10-50 INJECTION INTRAVENOUS CONTINUOUS
Status: ACTIVE | OUTPATIENT
Start: 2019-03-21 | End: 2019-03-21

## 2019-03-21 RX ORDER — ATROPINE SULFATE 0.4 MG/ML
.2-2 AMPUL (ML) INJECTION
Status: DISCONTINUED | OUTPATIENT
Start: 2019-03-21 | End: 2019-03-22 | Stop reason: HOSPADM

## 2019-03-21 RX ORDER — TIZANIDINE 2 MG/1
2-4 TABLET ORAL 3 TIMES DAILY PRN
Qty: 60 TABLET | Refills: 1 | Status: ON HOLD | OUTPATIENT
Start: 2019-03-21 | End: 2019-04-06

## 2019-03-21 RX ORDER — METOPROLOL TARTRATE 1 MG/ML
1-20 INJECTION, SOLUTION INTRAVENOUS
Status: ACTIVE | OUTPATIENT
Start: 2019-03-21 | End: 2019-03-21

## 2019-03-21 RX ADMIN — PERFLUTREN 6 ML: 6.52 INJECTION, SUSPENSION INTRAVENOUS at 11:49

## 2019-03-21 NOTE — TELEPHONE ENCOUNTER
Chart review shows surgery has been canceled and appears neurosurgery team received message from earlier. They have already outreached to patient today.  Closing encounter, nothing further needed at this time.    Laura Medel RN

## 2019-03-21 NOTE — PROGRESS NOTES
Pt here for dobutamine stress echo prior to surgery. Resting echo pictures completed and test cancelled d/t abnormal baseline echo. Ordering MD notified by attending Cardiologist. Pt discharged to [facility] waiting room accompanied by staff.

## 2019-03-21 NOTE — TELEPHONE ENCOUNTER
Received a call from cardiology that patient's heart ejection fraction has dropped to 30%.  Patient is not cleared for surgery until she has further cardiac evaluation.    Please contact the patient to see if she has any question. Cardiology referral placed for [facility] in case they didn't get the patient scheduled. Can also be seen at [facility] if wanted. She should continue her regular medications for now.    Please call Dr Hurtado' office to notify them as well that surgery needs to be reschedule.    Anuja Daniel M.D.

## 2019-03-21 NOTE — TELEPHONE ENCOUNTER
Late entry, called patient at 1:26 pm.    Writer called and spoke with patient. Relayed provider message below, per Dr. Noah Tenorio. Patient verbalized understanding that surgery scheduled for tomorrow will need to be canceled and possibly rescheduled for later time, after further cardiac evaluation. Discussed cardiology referral and to contact [facility] at [phone], can also contact [place] location and see if can be seen there, if desired. [phone]. Patient agreed with plan, no questions at this time.    Call placed to neurosurgery dept/scheduling at [phone], based off of referral in chart. No answer at this line so left a detailed VM message for staff with Dr. Hurtado (provider doing patient's surgery) and informed of echocardiogram result and that patient is currently not cleared for surgery, per PCP. Noted in message can refer to results and this message in patient Epic chart. Advised for staff to call our office back if message was left in error at wrong number for this provider and his team. Unsure how else to locate this provider and team right now. Hopeful that message can be relayed to surgeon's team or will be contacted to call another number. Will monitor message for any call backs and will check chart to see if surgery is canceled.    Laura Medel RN

## 2019-03-21 NOTE — RESULT ENCOUNTER NOTE
MsMauro Montes,    Your kidney function was normal.    Please contact the clinic if you have additional questions.  Thank you.      Sincerely,    Anuja Tenorio

## 2019-03-21 NOTE — PROGRESS NOTES
Patient surgery canceled due to needing cardiac clearance, patient requesting something for her neck and shoulder pain. Spoke with SHAZIA Morales okay for Tizanidine. Patient was prescribed valium through the ED informed her to discontinue and try Tizanidine, also explained that may cause drowsiness. Patient verbalized understanding and will keep us updated.

## 2019-03-22 ENCOUNTER — OFFICE VISIT (OUTPATIENT)
Dept: CARDIOLOGY | Facility: CLINIC | Age: 49
End: 2019-03-22
Attending: FAMILY MEDICINE
Payer: COMMERCIAL

## 2019-03-22 VITALS
HEART RATE: 103 BPM | BODY MASS INDEX: 45.21 KG/M2 | DIASTOLIC BLOOD PRESSURE: 101 MMHG | OXYGEN SATURATION: 99 % | WEIGHT: 280.1 LBS | SYSTOLIC BLOOD PRESSURE: 153 MMHG

## 2019-03-22 DIAGNOSIS — I50.22 CHRONIC SYSTOLIC CONGESTIVE HEART FAILURE (H): Primary | ICD-10-CM

## 2019-03-22 DIAGNOSIS — I10 ESSENTIAL HYPERTENSION WITH GOAL BLOOD PRESSURE LESS THAN 140/90: ICD-10-CM

## 2019-03-22 DIAGNOSIS — R00.0 TACHYCARDIA: ICD-10-CM

## 2019-03-22 PROCEDURE — 99204 OFFICE O/P NEW MOD 45 MIN: CPT | Performed by: INTERNAL MEDICINE

## 2019-03-22 RX ORDER — SPIRONOLACTONE 50 MG/1
50 TABLET, FILM COATED ORAL DAILY
Qty: 90 TABLET | Refills: 3 | Status: SHIPPED | OUTPATIENT
Start: 2019-03-22 | End: 2020-06-05

## 2019-03-22 RX ORDER — CARVEDILOL 3.12 MG/1
3.12 TABLET ORAL 2 TIMES DAILY WITH MEALS
Qty: 60 TABLET | Refills: 3 | Status: SHIPPED | OUTPATIENT
Start: 2019-03-22 | End: 2019-03-29

## 2019-03-22 ASSESSMENT — PAIN SCALES - GENERAL: PAINLEVEL: SEVERE PAIN (6)

## 2019-03-22 NOTE — PROGRESS NOTES
[facility] Cardiology Consultation:    Assessment and Plan:     1.  Chronic systolic heart failure with recent worsening of LV function, now with LVEF of 30%, presumed nonischemic cardiomyopathy: Will evaluate further with a stress MRI, this is scheduled for next week.  Differential is broad at this point.  Given her resting sinus tachycardia, I think it is crucial that we lower her heart rate.  I think it safe to reattempt a beta-blocker.  We will start Coreg at very low dose, and if tolerated titrate up slowly.  I will also increase her Aldactone to 50 mg daily.  I will stop her potassium supplements.  Over time, we will plan to titrate up Coreg and losartan, and may be titrate down hydralazine.  We will also set her up with our CORE clinic.  We will also do a 7-day ziopatch to evaluate for any underlying arrhythmias.  I will see her back next week after a stress MRI.  Hopefully then, we can clear her for her upcoming disc surgery.   2. Hypertension  3. Obesity  4. Diabetes  5. Asthma       El Chinchilla MD    Cardiac Imaging and Prevention  [facility]  [email or web address] Pager: [phone]    HPI: Seen as urgent add-on due to an abnormal echocardiogram.  She has a long-standing history of systolic heart failure.  She previously followed at [facility] with Dr. Luis.  History dates back to 2002 or so, when she was found to have a severe cardiomyopathy with LVEF in the 20s.  Due to mediastinal adenopathy, there was suspicion for sarcoidosis.  Per notes she underwent a myocardial biopsy that was negative for sarcoid or any other myocarditis.  She also had a stress test around that time that did not show any ischemia.  She was presumed to have a nonischemic cardiomyopathy, that per notes was attributed to hypertension and sleep apnea.  Due to insurance issues, she tells me that she has not followed with a cardiologist for the past few years.  She reports moderate  compliance with her medications.    Stress test was ordered as preop evaluation for upcoming cervical disc surgery.  The stress test was canceled due to her cardiomyopathy.  I reviewed her echocardiogram.  It shows severe cardiomyopathy with LV function around 30% with diffuse hypokinesis, with normal RA pressure.  This is worse compared to an echocardiogram from 2017 when her LVEF was 45-50%.  Review of her blood pressures shows inadequate control, with systolic blood pressure consistently in the 140s-150s.  She reports similar numbers when she checks it at work.  She takes Lasix daily, alternating between 40 and 80 mg, and this seems to work well for her.  She also reports history of asthma for which she takes inhalers.  Apparently metoprolol was stopped many years ago due to worsening bronchospasm.  She is functional class II-III, though she tells me that her dyspnea is stable.  She also reports of her recent dry cough.    She tells me that she is always tachycardic.  Prior notes also reported a history of SVT.  Denies any chest pain or pressure, orthopnea, pnd, syncope/presyncope or edema.    EXAM:  BP (!) 153/101 (BP Location: Right arm, Patient Position: Chair, Cuff Size: Adult Large)   Pulse 103   Wt 127.1 kg (280 lb 1.6 oz)   LMP  (LMP Unknown)   SpO2 99%   BMI 45.21 kg/m    GEN/CONSTITUIONAL: Appears comfortable, in no apparent distress   EYES: No icterus  ENT/MOUTH: Normal  JVP:  Not visible  RESPIRATORY: Clear to auscultation bilaterally   CARDIOVASCULAR: Regular S1 and S2, no murmurs, rubs, or gallops.   ABDOMEN: Soft, non-tender, positive bowel sounds   NEUROLOGIC: Grossly non-focal   PSYCHIATRIC: Normal affect  EXT: No cyanosis, clubbing, edema. Normal pedal pulses.  Skin: No petechiae, purpura or rash    PAST MEDICAL HISTORY:  Past Medical History:   Diagnosis Date     Adrenal gland anomaly      CHF (congestive heart failure) (H)      Fatty liver      Gastroesophageal reflux disease       Hyperlipidemia      Hypertension      Mild persistent asthma      Type 2 diabetes mellitus (H)        CURRENT MEDICATIONS:  Current Outpatient Medications   Medication     Cholecalciferol (VITAMIN D3 PO)     fluticasone-salmeterol (ADVAIR) 250-50 MCG/DOSE inhaler     hydrALAZINE (APRESOLINE) 25 MG tablet     insulin glargine (BASAGLAR KWIKPEN) 100 UNIT/ML pen     insulin pen needle (32G X 4 MM) 32G X 4 MM miscellaneous     levalbuterol (XOPENEX HFA) 45 MCG/ACT Inhaler     losartan (COZAAR) 50 MG tablet     lovastatin (MEVACOR) 20 MG tablet     magnesium oxide (MAG-OX) 400 (241.3 Mg) MG tablet     metFORMIN (GLUCOPHAGE) 500 MG tablet     montelukast (SINGULAIR) 10 MG tablet     potassium chloride ER (K-DUR/KLOR-CON M) 20 MEQ CR tablet     spironolactone (ALDACTONE) 25 MG tablet     tiZANidine (ZANAFLEX) 2 MG tablet     BETA BLOCKER NOT PRESCRIBED, INTENTIONAL,     blood glucose (NO BRAND SPECIFIED) test strip     diazepam (VALIUM) 5 MG tablet     traMADol (ULTRAM) 50 MG tablet     No current facility-administered medications for this visit.        PAST SURGICAL HISTORY:  Past Surgical History:   Procedure Laterality Date     COLONOSCOPY       TUBAL LIGATION         ALLERGIES     Allergies   Allergen Reactions     Amlodipine Swelling     Edema on 5mg throat     Lisinopril      Swelling in throat, face     Naprosyn [Naproxen]      Swelling, diff breathing       FAMILY HISTORY:  Family History   Problem Relation Age of Onset     Diabetes Mother      Hypertension Mother      Hyperlipidemia Mother      Heart Failure Mother      Diabetes Father      Cancer Paternal Grandmother         ?       SOCIAL HISTORY:  Social History     Socioeconomic History     Marital status: Single     Spouse name: Not on file     Number of children: 1     Years of education: Not on file     Highest education level: Not on file   Occupational History     Occupation: CNA     Employer: OTHER   Social Needs     Financial resource strain: Not on  file     Food insecurity:     Worry: Not on file     Inability: Not on file     Transportation needs:     Medical: Not on file     Non-medical: Not on file   Tobacco Use     Smoking status: Former Smoker     Packs/day: 1.00     Years: 30.00     Pack years: 30.00     Types: Cigarettes     Last attempt to quit: 2013     Years since quittin.2     Smokeless tobacco: Former User     Tobacco comment:     Substance and Sexual Activity     Alcohol use: Yes     Alcohol/week: 0.0 oz     Comment: 4-5 drinks per week     Drug use: No     Sexual activity: Yes     Partners: Male     Birth control/protection: Surgical   Lifestyle     Physical activity:     Days per week: Not on file     Minutes per session: Not on file     Stress: Not on file   Relationships     Social connections:     Talks on phone: Not on file     Gets together: Not on file     Attends Druze service: Not on file     Active member of club or organization: Not on file     Attends meetings of clubs or organizations: Not on file     Relationship status: Not on file     Intimate partner violence:     Fear of current or ex partner: Not on file     Emotionally abused: Not on file     Physically abused: Not on file     Forced sexual activity: Not on file   Other Topics Concern     Parent/sibling w/ CABG, MI or angioplasty before 65F 55M? Not Asked   Social History Narrative     Not on file       ROS:   Constitutional: No fever, chills, or sweats. No weight gain/loss   ENT: No visual disturbance, ear ache, epistaxis, sore throat  Allergies/Immunologic: Negative.   Respiratory: No cough, hemoptysia  Cardiovascular: As per HPI  GI: No nausea, vomiting, hematemesis, melena, or hematochezia  : No urinary frequency, dysuria, or hematuria  Integument: Negative  Psychiatric: Negative  Neuro: Negative  Endocrinology: Negative   Musculoskeletal: Negative    ADDITIONAL COMMENTS:     I reviewed the patient's medications:     I reviewed the patient's pertinent  clinical laboratory studies:     I reviewed the patient's pertinent imaging studies:   I reviewed the patient's ECG:

## 2019-03-22 NOTE — PATIENT INSTRUCTIONS
The following is a summary of your office visit:    Medications started today: carvedilol (COREG) 3.125 MG tablet    Medications stopped today: potassium chloride ER (K-DUR/KLOR-CON M) 20 MEQ CR tablet     Medication dose change: spironolactone (ALDACTONE) 25 increased to 50 MG table once a day    Nurse contact information: Katie Sinclair RN  Cardiology Care Coordinator  [phone] Phone  [phone] Fax    Appointments made today: Establish in CORE clinic. Complete a Stress MRI within the next week. Lab next Friday 3/30    Patient instructions:    If you have had any blood work, imaging or other testing completed we will be in touch within 1-2 weeks regarding the results. If you have any questions, concerns or need to schedule a follow up, please contact us at [phone]. If you are needing refills please contact your pharmacy. For urgent after hour care please call the [place] [facility] at [phone] or the [facility] at [phone] and ask to speak to the cardiologist on call.    It was a pleasure meeting with you today. Please let us know if there is anything else we can do for you so that we can be sure you are leaving completely satisfied with your care experience.     Your Cardiology Team at [facility]  RN Care Coordinator: Katie TODD: Zari              A pharmacological Cardiac MRI stress test is a diagnostic test. It is used to check the blood flow to the heart. An exercise stress test is another way to check the blood flow, but if you cannot exercise or if your heart rate does not go up enough with exercise, this test may be done instead. The test can help determine if your heart is getting enough blood while you are active compared to when you are resting.  During the test, you will receive a small amount of medication (adenosine, dipyridamole or regadenoson). This medication makes the coronary arteries open (dilate) much like they do when you  exercise. This causes more blood to flow and simulates the effect of exercise for patients who cannot exercise on a treadmill. The medication causes only a slight increase in your heart rate.  A small amount of MRI dye (gadolinium) is injected into a vein while you are resting and again after you receive the medication. An MRI scanner takes pictures of the gadolinium dye as it passes through your heart muscle. This creates computer images of your heart for your doctor to review.    Prep for cardiac MRI:  Report to [facility] waiting room.  No caffeine for 12 hrs prior to test.   Tell nurse if you take theophylline or persantine.  No food, drink, or smoking 3 hours prior to test.

## 2019-03-22 NOTE — NURSING NOTE
Bettina Montes's goals for this visit include:   Chief Complaint   Patient presents with     Consult     CHF       She requests these members of her care team be copied on today's visit information: PCP    PCP: Anuja Bruce    Referring Provider:  Anuja Tenorio MD  [id number] [address]    BP (!) 153/101 (BP Location: Right arm, Patient Position: Chair, Cuff Size: Adult Large)   Pulse 103   Wt 127.1 kg (280 lb 1.6 oz)   LMP  (LMP Unknown)   SpO2 99%   BMI 45.21 kg/m      Do you need any medication refills at today's visit? None      Ana Moreau CMA

## 2019-03-26 ENCOUNTER — DOCUMENTATION ONLY (OUTPATIENT)
Dept: CARE COORDINATION | Facility: CLINIC | Age: 49
End: 2019-03-26

## 2019-03-27 ENCOUNTER — HOSPITAL ENCOUNTER (OUTPATIENT)
Dept: CARDIOLOGY | Facility: CLINIC | Age: 49
End: 2019-03-27
Attending: INTERNAL MEDICINE
Payer: COMMERCIAL

## 2019-03-27 ENCOUNTER — HOSPITAL ENCOUNTER (OUTPATIENT)
Dept: MRI IMAGING | Facility: CLINIC | Age: 49
Discharge: HOME | End: 2019-03-27
Attending: INTERNAL MEDICINE | Admitting: INTERNAL MEDICINE
Payer: COMMERCIAL

## 2019-03-27 VITALS
RESPIRATION RATE: 18 BRPM | OXYGEN SATURATION: 96 % | DIASTOLIC BLOOD PRESSURE: 66 MMHG | SYSTOLIC BLOOD PRESSURE: 136 MMHG

## 2019-03-27 DIAGNOSIS — I50.22 CHRONIC SYSTOLIC CONGESTIVE HEART FAILURE (H): ICD-10-CM

## 2019-03-27 DIAGNOSIS — R00.0 TACHYCARDIA: ICD-10-CM

## 2019-03-27 PROCEDURE — A9585 GADOBUTROL INJECTION: HCPCS | Performed by: INTERNAL MEDICINE

## 2019-03-27 PROCEDURE — 93005 ELECTROCARDIOGRAM TRACING: CPT

## 2019-03-27 PROCEDURE — 93010 ELECTROCARDIOGRAM REPORT: CPT | Performed by: INTERNAL MEDICINE

## 2019-03-27 PROCEDURE — 93016 CV STRESS TEST SUPVJ ONLY: CPT | Performed by: INTERNAL MEDICINE

## 2019-03-27 PROCEDURE — 93018 CV STRESS TEST I&R ONLY: CPT | Performed by: INTERNAL MEDICINE

## 2019-03-27 PROCEDURE — 75563 CARD MRI W/STRESS IMG & DYE: CPT | Mod: 26 | Performed by: INTERNAL MEDICINE

## 2019-03-27 PROCEDURE — 93017 CV STRESS TEST TRACING ONLY: CPT

## 2019-03-27 PROCEDURE — 75563 CARD MRI W/STRESS IMG & DYE: CPT

## 2019-03-27 PROCEDURE — 25500064 ZZH RX 255 OP 636: Performed by: INTERNAL MEDICINE

## 2019-03-27 PROCEDURE — 25000128 H RX IP 250 OP 636: Performed by: INTERNAL MEDICINE

## 2019-03-27 PROCEDURE — 0296T ZIO PATCH HOLTER ADULT PEDIATRIC GREATER THAN 48 HRS: CPT

## 2019-03-27 PROCEDURE — 0298T ZIO PATCH HOLTER ADULT PEDIATRIC GREATER THAN 48 HRS: CPT | Performed by: INTERNAL MEDICINE

## 2019-03-27 RX ORDER — DIAZEPAM 5 MG
5 TABLET ORAL EVERY 30 MIN PRN
Status: DISCONTINUED | OUTPATIENT
Start: 2019-03-27 | End: 2019-03-28 | Stop reason: HOSPADM

## 2019-03-27 RX ORDER — GADOBUTROL 604.72 MG/ML
10 INJECTION INTRAVENOUS ONCE
Status: COMPLETED | OUTPATIENT
Start: 2019-03-27 | End: 2019-03-27

## 2019-03-27 RX ORDER — AMINOPHYLLINE 25 MG/ML
100 INJECTION, SOLUTION INTRAVENOUS ONCE
Status: DISCONTINUED | OUTPATIENT
Start: 2019-03-27 | End: 2019-03-28 | Stop reason: HOSPADM

## 2019-03-27 RX ORDER — REGADENOSON 0.08 MG/ML
0.4 INJECTION, SOLUTION INTRAVENOUS ONCE
Status: COMPLETED | OUTPATIENT
Start: 2019-03-27 | End: 2019-03-27

## 2019-03-27 RX ORDER — ALBUTEROL SULFATE 90 UG/1
2 AEROSOL, METERED RESPIRATORY (INHALATION) EVERY 5 MIN PRN
Status: DISCONTINUED | OUTPATIENT
Start: 2019-03-27 | End: 2019-03-28 | Stop reason: HOSPADM

## 2019-03-27 RX ORDER — ACYCLOVIR 200 MG/1
0-1 CAPSULE ORAL
Status: DISCONTINUED | OUTPATIENT
Start: 2019-03-27 | End: 2019-03-28 | Stop reason: HOSPADM

## 2019-03-27 RX ADMIN — REGADENOSON 0.4 MG: 0.08 INJECTION, SOLUTION INTRAVENOUS at 12:03

## 2019-03-27 RX ADMIN — GADOBUTROL 10 ML: 604.72 INJECTION INTRAVENOUS at 13:18

## 2019-03-27 RX ADMIN — GADOBUTROL 10 ML: 604.72 INJECTION INTRAVENOUS at 13:17

## 2019-03-27 NOTE — PROGRESS NOTES
Pt here for cardiac MRI with stress.  Pre EKG done.  Lung sounds clear.  No caffeine in last 12 hours.  Lexiscan given over 15 seconds followed by a 5 ml saline flush.  Post EKG done when MRI completed.  Pt discharged to [facility] waiting room when test completed.

## 2019-03-28 LAB
INTERPRETATION ECG - MUSE: NORMAL
INTERPRETATION ECG - MUSE: NORMAL

## 2019-03-29 ENCOUNTER — OFFICE VISIT (OUTPATIENT)
Dept: CARDIOLOGY | Facility: CLINIC | Age: 49
End: 2019-03-29
Payer: COMMERCIAL

## 2019-03-29 VITALS
OXYGEN SATURATION: 98 % | WEIGHT: 286.2 LBS | BODY MASS INDEX: 46 KG/M2 | SYSTOLIC BLOOD PRESSURE: 140 MMHG | RESPIRATION RATE: 20 BRPM | HEIGHT: 66 IN | DIASTOLIC BLOOD PRESSURE: 85 MMHG | HEART RATE: 106 BPM

## 2019-03-29 DIAGNOSIS — I10 ESSENTIAL HYPERTENSION WITH GOAL BLOOD PRESSURE LESS THAN 140/90: ICD-10-CM

## 2019-03-29 DIAGNOSIS — I50.22 CHRONIC SYSTOLIC CONGESTIVE HEART FAILURE (H): ICD-10-CM

## 2019-03-29 DIAGNOSIS — I42.8 NONISCHEMIC CARDIOMYOPATHY (H): ICD-10-CM

## 2019-03-29 DIAGNOSIS — I42.8 NONISCHEMIC CARDIOMYOPATHY (H): Primary | ICD-10-CM

## 2019-03-29 LAB
ANION GAP SERPL CALCULATED.3IONS-SCNC: 8 MMOL/L (ref 3–14)
BUN SERPL-MCNC: 10 MG/DL (ref 7–30)
CALCIUM SERPL-MCNC: 9.1 MG/DL (ref 8.5–10.1)
CHLORIDE SERPL-SCNC: 97 MMOL/L (ref 94–109)
CO2 SERPL-SCNC: 30 MMOL/L (ref 20–32)
CREAT SERPL-MCNC: 0.84 MG/DL (ref 0.52–1.04)
GFR SERPL CREATININE-BSD FRML MDRD: 81 ML/MIN/{1.73_M2}
GLUCOSE SERPL-MCNC: 135 MG/DL (ref 70–99)
NT-PROBNP SERPL-MCNC: 292 PG/ML (ref 0–125)
POTASSIUM SERPL-SCNC: 3.8 MMOL/L (ref 3.4–5.3)
SODIUM SERPL-SCNC: 135 MMOL/L (ref 133–144)

## 2019-03-29 PROCEDURE — 99214 OFFICE O/P EST MOD 30 MIN: CPT | Performed by: INTERNAL MEDICINE

## 2019-03-29 PROCEDURE — 83880 ASSAY OF NATRIURETIC PEPTIDE: CPT | Performed by: NURSE PRACTITIONER

## 2019-03-29 PROCEDURE — 36415 COLL VENOUS BLD VENIPUNCTURE: CPT | Performed by: NURSE PRACTITIONER

## 2019-03-29 PROCEDURE — 80048 BASIC METABOLIC PNL TOTAL CA: CPT | Performed by: NURSE PRACTITIONER

## 2019-03-29 RX ORDER — CARVEDILOL 6.25 MG/1
6.25 TABLET ORAL 2 TIMES DAILY WITH MEALS
Qty: 60 TABLET | Refills: 3 | Status: SHIPPED | OUTPATIENT
Start: 2019-03-29 | End: 2019-07-01

## 2019-03-29 ASSESSMENT — MIFFLIN-ST. JEOR: SCORE: 1934.94

## 2019-03-29 ASSESSMENT — PAIN SCALES - GENERAL: PAINLEVEL: SEVERE PAIN (6)

## 2019-03-29 NOTE — PATIENT INSTRUCTIONS
The following is a summary of your office visit:    Medications started today:None    Medications stopped today:None    Medication dose change: Coreg- take 6.25 mg twice a day     Nurse contact information: Katie Sinclair RN  Cardiology Care Coordinator  [phone] Phone  [phone] Fax    Appointments made today: Follow up with Dr. Chinchilla in 6 months, and we will need to move your [facility] appointment to May, as you need to have your neck surgery completed first.     Patient instructions: None    If you have had any blood work, imaging or other testing completed we will be in touch within 1-2 weeks regarding the results. If you have any questions, concerns or need to schedule a follow up, please contact us at [phone]. If you are needing refills please contact your pharmacy. For urgent after hour care please call the [place] [facility] at [phone] or the [facility] at [phone] and ask to speak to the cardiologist on call.    It was a pleasure meeting with you today. Please let us know if there is anything else we can do for you so that we can be sure you are leaving completely satisfied with your care experience.     Your Cardiology Team at [facility]  RN Care Coordinator: Katie  CMA: Zari          \

## 2019-03-29 NOTE — PROGRESS NOTES
"[facility] Cardiology Consultation:    Assessment and Plan:     1.  Chronic systolic heart failure, LVEF of 30-35%, idiopathic nonischemic cardiomyopathy (stress MRI - no ischemia, mild septal fibrosis): Euvolemic.   Tolerating Coreg, will titrate up to 6.25 mg bid. Continue Aldactone 50 mg daily and losartan 50.   Over time, plan to titrate these up, and titrate down hydralazine if needed.    Continue current lasix regimen.   Will push out CORE clinic appt for after her spine surgery.    Await 7-day ziopatch to evaluate for any underlying arrhythmias.    2. Hypertension  3. Obesity  4. Diabetes  5. Asthma   6. Pre-op evaluation for cervical spine surgery: She is well compensated for her CHF. She can now proceed to have her spine surgery. This should be done in a hospital setting, with cardiology consultation availability. All her cardiac medications should be continued in the ava-op period.     RTC in 6 months.     El Chinchilla MD    Cardiac Imaging and Prevention  [facility]  [email or web address] Pager: [phone]    HPI: CHF return, pre-op for cervical spine surgery.   I reviewed her stress CMR, report below. There was no ischemia or cardiac sarcoid, and septal scar pattern suggested an idiopathic dilated CM. She has been tolerating the coreg with no issues. BP is slightly improved. Lab was normal. No other change in clinical condition.   Denies any chest pain or pressure, shortness of breath/dyspnea, orthopnea, pnd, palpitations, syncope/presyncope or edema. She is eager to have her cervical surgery.       EXAM:  /85 (BP Location: Left arm, Patient Position: Sitting, Cuff Size: Adult Large)   Pulse 106   Resp 20   Ht 1.676 m (5' 6\")   Wt 129.8 kg (286 lb 3.2 oz)   LMP  (LMP Unknown)   SpO2 98%   BMI 46.19 kg/m    GEN/CONSTITUIONAL: Appears comfortable, in no apparent distress   EYES: No icterus  ENT/MOUTH: Normal  JVP:  Not visible  RESPIRATORY: " Clear to auscultation bilaterally   CARDIOVASCULAR: Regular S1 and S2, no murmurs, rubs, or gallops.   ABDOMEN: Soft, non-tender, positive bowel sounds   NEUROLOGIC: Grossly non-focal   PSYCHIATRIC: Normal affect  EXT: No cyanosis, clubbing, edema. Normal pedal pulses.  Skin: No petechiae, purpura or rash    PAST MEDICAL HISTORY:  Past Medical History:   Diagnosis Date     Adrenal gland anomaly      CHF (congestive heart failure) (H)      Fatty liver      Gastroesophageal reflux disease      Hyperlipidemia      Hypertension      Mild persistent asthma      Type 2 diabetes mellitus (H)        CURRENT MEDICATIONS:  Current Outpatient Medications   Medication     blood glucose (NO BRAND SPECIFIED) test strip     carvedilol (COREG) 3.125 MG tablet     Cholecalciferol (VITAMIN D3 PO)     fluticasone-salmeterol (ADVAIR) 250-50 MCG/DOSE inhaler     hydrALAZINE (APRESOLINE) 25 MG tablet     insulin glargine (BASAGLAR KWIKPEN) 100 UNIT/ML pen     insulin pen needle (32G X 4 MM) 32G X 4 MM miscellaneous     levalbuterol (XOPENEX HFA) 45 MCG/ACT Inhaler     losartan (COZAAR) 50 MG tablet     lovastatin (MEVACOR) 20 MG tablet     magnesium oxide (MAG-OX) 400 (241.3 Mg) MG tablet     metFORMIN (GLUCOPHAGE) 500 MG tablet     montelukast (SINGULAIR) 10 MG tablet     spironolactone (ALDACTONE) 50 MG tablet     tiZANidine (ZANAFLEX) 2 MG tablet     BETA BLOCKER NOT PRESCRIBED, INTENTIONAL,     diazepam (VALIUM) 5 MG tablet     traMADol (ULTRAM) 50 MG tablet     No current facility-administered medications for this visit.        PAST SURGICAL HISTORY:  Past Surgical History:   Procedure Laterality Date     COLONOSCOPY       TUBAL LIGATION         ALLERGIES     Allergies   Allergen Reactions     Amlodipine Swelling     Edema on 5mg throat     Lisinopril      Swelling in throat, face     Naprosyn [Naproxen]      Swelling, diff breathing       FAMILY HISTORY:  Family History   Problem Relation Age of Onset     Diabetes Mother       Hypertension Mother      Hyperlipidemia Mother      Heart Failure Mother      Diabetes Father      Cancer Paternal Grandmother         ?       SOCIAL HISTORY:  Social History     Socioeconomic History     Marital status: Single     Spouse name: Not on file     Number of children: 1     Years of education: Not on file     Highest education level: Not on file   Occupational History     Occupation: CNA     Employer: OTHER   Social Needs     Financial resource strain: Not on file     Food insecurity:     Worry: Not on file     Inability: Not on file     Transportation needs:     Medical: Not on file     Non-medical: Not on file   Tobacco Use     Smoking status: Former Smoker     Packs/day: 1.00     Years: 30.00     Pack years: 30.00     Types: Cigarettes     Last attempt to quit: 2013     Years since quittin.2     Smokeless tobacco: Former User     Tobacco comment:     Substance and Sexual Activity     Alcohol use: Yes     Alcohol/week: 0.0 oz     Comment: 4-5 drinks per week     Drug use: No     Sexual activity: Yes     Partners: Male     Birth control/protection: Surgical   Lifestyle     Physical activity:     Days per week: Not on file     Minutes per session: Not on file     Stress: Not on file   Relationships     Social connections:     Talks on phone: Not on file     Gets together: Not on file     Attends Congregation service: Not on file     Active member of club or organization: Not on file     Attends meetings of clubs or organizations: Not on file     Relationship status: Not on file     Intimate partner violence:     Fear of current or ex partner: Not on file     Emotionally abused: Not on file     Physically abused: Not on file     Forced sexual activity: Not on file   Other Topics Concern     Parent/sibling w/ CABG, MI or angioplasty before 65F 55M? Not Asked   Social History Narrative     Not on file       ROS:   Constitutional: No fever, chills, or sweats. No weight gain/loss   ENT: No visual  disturbance, ear ache, epistaxis, sore throat  Allergies/Immunologic: Negative.   Respiratory: No cough, hemoptysia  Cardiovascular: As per HPI  GI: No nausea, vomiting, hematemesis, melena, or hematochezia  : No urinary frequency, dysuria, or hematuria  Integument: Negative  Psychiatric: Negative  Neuro: Negative  Endocrinology: Negative   Musculoskeletal: Negative    ADDITIONAL COMMENTS:     I reviewed the patient's medications:     I reviewed the patient's pertinent clinical laboratory studies:     I reviewed the patient's pertinent imaging studies:   I reviewed the patient's ECG:       CMR 03/27/19 SUMMARY   ==========================================================================================================     Clinical history: 49-year old female with DM, HTN, WILLARD, HFrEF with recent worsening of LV function now with  LVEF of 30%. Stress CMR for further evaluation.  Comparison CMR: None.      1. The LV is mildly dilated in cavity size, with mild concentric LVH. The global systolic function is  moderately reduced. The LVEF is 32%. There is global hypokinesis.     2. The RV is normal in cavity size. The global systolic function is normal. The RVEF is 57%.      3. Both atria are normal in size.     4. There is no significant valvular disease.      5. Late gadolinium enhancement imaging shows no MI, or infiltrative disease. There is mid wall enhancement  in the basal to mid septum, in a pattern that can be seen in idiopathic dilated CM.      6. Regadenoson stress perfusion imaging shows no ischemia.     7. There is no pericardial effusion or thickening.     8. There is no intracardiac thrombus.     CONCLUSIONS: No myocardial ischemia. Non-ischemic cardiomyopathy; LVEF 32%, RVEF 57%. Mid wall enhancement  in the basal to mid septum, in a pattern that can be seen in idiopathic dilated CM. No evidence of cardiac  sarcoid.

## 2019-03-29 NOTE — NURSING NOTE
"Bettina Montes's goals for this visit include:   Chief Complaint   Patient presents with     Follow Up     per Dr. Chinchilla, for MRI stress test      She requests these members of her care team be copied on today's visit information:     PCP: Anuja Bruce    Referring Provider:  No referring provider defined for this encounter.    /85 (BP Location: Left arm, Patient Position: Sitting, Cuff Size: Adult Large)   Pulse 106   Resp 20   Ht 1.676 m (5' 6\")   Wt 129.8 kg (286 lb 3.2 oz)   LMP  (LMP Unknown)   SpO2 98%   BMI 46.19 kg/m      Do you need any medication refills at today's visit? No    Chelsea Dominguez LPN      "

## 2019-04-02 ENCOUNTER — TELEPHONE (OUTPATIENT)
Dept: NEUROSURGERY | Facility: CLINIC | Age: 49
End: 2019-04-02

## 2019-04-02 NOTE — PHARMACY-ADMISSION MEDICATION HISTORY
Pharmacy reviewed prior to admission med list from pre-admitting rn, JOSE Mcginnis.      Prior to Admission medications    Medication Sig Last Dose Taking? Auth Provider   carvedilol (COREG) 6.25 MG tablet Take 1 tablet (6.25 mg) by mouth 2 times daily (with meals)  Yes El Chinchilla MD   Cholecalciferol (VITAMIN D3 PO) Take 2,000 Units by mouth daily  Yes Reported, Patient   diazepam (VALIUM) 5 MG tablet Take 1-2 tablets (5-10 mg) by mouth every 6 hours as needed for muscle spasms  Yes Geremias Mistry MD   fluticasone-salmeterol (ADVAIR) 250-50 MCG/DOSE inhaler Inhale 1 puff into the lungs 2 times daily  Yes Anuja Bruce MD   hydrALAZINE (APRESOLINE) 25 MG tablet Take 3 tablets (75 mg) by mouth 2 times daily  Yes Anuja Bruce MD   insulin glargine (BASAGLAR KWIKPEN) 100 UNIT/ML pen Inject 65 Units Subcutaneous At Bedtime  Yes Anuja Bruce MD   levalbuterol (XOPENEX HFA) 45 MCG/ACT Inhaler Inhale 2 puffs into the lungs every 4 hours as needed for shortness of breath / dyspnea or wheezing  Yes Anuja Bruce MD   losartan (COZAAR) 50 MG tablet Take 1 tablet (50 mg) by mouth daily  Yes Anuja Bruce MD   lovastatin (MEVACOR) 20 MG tablet Take 1 tablet (20 mg) by mouth At Bedtime  Yes Anuja Bruce MD   magnesium oxide (MAG-OX) 400 (241.3 Mg) MG tablet Take 1 tablet (400 mg) by mouth daily  Yes Anuja Bruce MD   metFORMIN (GLUCOPHAGE) 500 MG tablet Take 2 tablets (1,000 mg) by mouth 2 times daily (with meals)  Yes Anuja Bruce MD   montelukast (SINGULAIR) 10 MG tablet Take 1 tablet (10 mg) by mouth At Bedtime  Yes Anuja Bruce MD   spironolactone (ALDACTONE) 50 MG tablet Take 1 tablet (50 mg) by mouth daily  Yes El Chinchilla MD   tiZANidine (ZANAFLEX) 2 MG tablet Take 1-2 tablets (2-4 mg) by mouth 3 times daily as needed for muscle  spasms  Yes Cramen Spangler, NP   traMADol (ULTRAM) 50 MG tablet Take 1 tablet (50 mg) by mouth every 6 hours as needed for severe pain  Yes Troy Carroll, MD

## 2019-04-02 NOTE — TELEPHONE ENCOUNTER
Type of surgery: C4-6 ACDF  Location of surgery: [facility]  Date and time of surgery: 04/05/2019 at 7:30am  Surgeon: MARIEL Hurtado  Pre-Op Appt Date: [facility]20/2019 [facility])  Post-Op Appt Date: 05/02/2019  Packet sent out: Yes  Pre-cert/Authorization completed:  Yes  Date: 04/02/2019 PORTER

## 2019-04-03 ENCOUNTER — ANESTHESIA EVENT (OUTPATIENT)
Dept: SURGERY | Facility: CLINIC | Age: 49
DRG: 472 | End: 2019-04-03
Payer: OTHER MISCELLANEOUS

## 2019-04-04 ENCOUNTER — TELEPHONE (OUTPATIENT)
Dept: FAMILY MEDICINE | Facility: CLINIC | Age: 49
End: 2019-04-04

## 2019-04-04 NOTE — TELEPHONE ENCOUNTER
Called and spoke to Hali and explained that this has been addended. Hali said they can see this in Epic, no need to fax.  Kristyn Sheridan MA/  For Teams Mook

## 2019-04-04 NOTE — TELEPHONE ENCOUNTER
Reason for Call:  Other call back    Detailed comments: Hali from [facility] calling in regards to Bettina H and P. Bettina had the stress and ECHO done and asking if you want to addend the H and P to go ahead with the surgery. Please call to discuss    Phone Number Patient can be reached at: [phone]    Best Time: Any    Can we leave a detailed message on this number? YES    Call taken on 4/4/2019 at 10:14 AM by Willy Lezama

## 2019-04-05 ENCOUNTER — ANESTHESIA (OUTPATIENT)
Dept: SURGERY | Facility: CLINIC | Age: 49
DRG: 472 | End: 2019-04-05
Payer: OTHER MISCELLANEOUS

## 2019-04-05 ENCOUNTER — APPOINTMENT (OUTPATIENT)
Dept: GENERAL RADIOLOGY | Facility: CLINIC | Age: 49
DRG: 472 | End: 2019-04-05
Attending: NEUROLOGICAL SURGERY
Payer: OTHER MISCELLANEOUS

## 2019-04-05 ENCOUNTER — HOSPITAL ENCOUNTER (INPATIENT)
Facility: CLINIC | Age: 49
LOS: 2 days | Discharge: HOME | DRG: 472 | End: 2019-04-07
Attending: NEUROLOGICAL SURGERY | Admitting: NEUROLOGICAL SURGERY
Payer: OTHER MISCELLANEOUS

## 2019-04-05 DIAGNOSIS — M54.12 CERVICAL RADICULOPATHY: ICD-10-CM

## 2019-04-05 DIAGNOSIS — Z98.1 S/P CERVICAL SPINAL FUSION: Primary | ICD-10-CM

## 2019-04-05 LAB
ABO + RH BLD: NORMAL
ABO + RH BLD: NORMAL
BLD GP AB SCN SERPL QL: NORMAL
BLOOD BANK CMNT PATIENT-IMP: NORMAL
GLUCOSE BLDC GLUCOMTR-MCNC: 110 MG/DL (ref 70–99)
GLUCOSE BLDC GLUCOMTR-MCNC: 134 MG/DL (ref 70–99)
GLUCOSE BLDC GLUCOMTR-MCNC: 137 MG/DL (ref 70–99)
GLUCOSE BLDC GLUCOMTR-MCNC: 91 MG/DL (ref 70–99)
HGB BLD-MCNC: 11.8 G/DL (ref 11.7–15.7)
SPECIMEN EXP DATE BLD: NORMAL

## 2019-04-05 PROCEDURE — 22853 INSJ BIOMECHANICAL DEVICE: CPT | Performed by: NEUROLOGICAL SURGERY

## 2019-04-05 PROCEDURE — 25000125 ZZHC RX 250: Performed by: NEUROLOGICAL SURGERY

## 2019-04-05 PROCEDURE — 22552 ARTHRD ANT NTRBD CERVICAL EA: CPT | Performed by: NEUROLOGICAL SURGERY

## 2019-04-05 PROCEDURE — 36000069 ZZH SURGERY LEVEL 5 EA 15 ADDTL MIN: Performed by: NEUROLOGICAL SURGERY

## 2019-04-05 PROCEDURE — 40000277 XR SURGERY CARM FLUORO LESS THAN 5 MIN W STILLS: Mod: TC

## 2019-04-05 PROCEDURE — 36415 COLL VENOUS BLD VENIPUNCTURE: CPT | Performed by: ANESTHESIOLOGY

## 2019-04-05 PROCEDURE — 86901 BLOOD TYPING SEROLOGIC RH(D): CPT | Performed by: ANESTHESIOLOGY

## 2019-04-05 PROCEDURE — 40000306 ZZH STATISTIC PRE PROC ASSESS II: Performed by: NEUROLOGICAL SURGERY

## 2019-04-05 PROCEDURE — 86850 RBC ANTIBODY SCREEN: CPT | Performed by: ANESTHESIOLOGY

## 2019-04-05 PROCEDURE — 25000128 H RX IP 250 OP 636: Performed by: NURSE ANESTHETIST, CERTIFIED REGISTERED

## 2019-04-05 PROCEDURE — 99222 1ST HOSP IP/OBS MODERATE 55: CPT | Performed by: INTERNAL MEDICINE

## 2019-04-05 PROCEDURE — 36000071 ZZH SURGERY LEVEL 5 W FLUORO 1ST 30 MIN: Performed by: NEUROLOGICAL SURGERY

## 2019-04-05 PROCEDURE — 37000008 ZZH ANESTHESIA TECHNICAL FEE, 1ST 30 MIN: Performed by: NEUROLOGICAL SURGERY

## 2019-04-05 PROCEDURE — 86900 BLOOD TYPING SEROLOGIC ABO: CPT | Performed by: ANESTHESIOLOGY

## 2019-04-05 PROCEDURE — 95940 IONM IN OPERATNG ROOM 15 MIN: CPT | Performed by: NEUROLOGICAL SURGERY

## 2019-04-05 PROCEDURE — 27210794 ZZH OR GENERAL SUPPLY STERILE: Performed by: NEUROLOGICAL SURGERY

## 2019-04-05 PROCEDURE — 25000128 H RX IP 250 OP 636: Performed by: NEUROLOGICAL SURGERY

## 2019-04-05 PROCEDURE — 27210995 ZZH RX 272: Performed by: NEUROLOGICAL SURGERY

## 2019-04-05 PROCEDURE — 27211022 ZZHC OR IOM SUPPLIES OPNP: Performed by: NEUROLOGICAL SURGERY

## 2019-04-05 PROCEDURE — 0RG20A0 FUSION OF 2 OR MORE CERVICAL VERTEBRAL JOINTS WITH INTERBODY FUSION DEVICE, ANTERIOR APPROACH, ANTERIOR COLUMN, OPEN APPROACH: ICD-10-PCS | Performed by: NEUROLOGICAL SURGERY

## 2019-04-05 PROCEDURE — 25000128 H RX IP 250 OP 636: Performed by: ANESTHESIOLOGY

## 2019-04-05 PROCEDURE — 00000146 ZZHCL STATISTIC GLUCOSE BY METER IP

## 2019-04-05 PROCEDURE — 4A11X4G MONITORING OF PERIPHERAL NERVOUS ELECTRICAL ACTIVITY, INTRAOPERATIVE, EXTERNAL APPROACH: ICD-10-PCS | Performed by: NEUROLOGICAL SURGERY

## 2019-04-05 PROCEDURE — 22551 ARTHRD ANT NTRBDY CERVICAL: CPT | Performed by: NEUROLOGICAL SURGERY

## 2019-04-05 PROCEDURE — 12000000 ZZH R&B MED SURG/OB

## 2019-04-05 PROCEDURE — 22845 INSERT SPINE FIXATION DEVICE: CPT | Mod: 59 | Performed by: NEUROLOGICAL SURGERY

## 2019-04-05 PROCEDURE — 25000125 ZZHC RX 250: Performed by: NURSE ANESTHETIST, CERTIFIED REGISTERED

## 2019-04-05 PROCEDURE — 25000132 ZZH RX MED GY IP 250 OP 250 PS 637: Performed by: PHYSICIAN ASSISTANT

## 2019-04-05 PROCEDURE — 85018 HEMOGLOBIN: CPT | Performed by: ANESTHESIOLOGY

## 2019-04-05 PROCEDURE — C1713 ANCHOR/SCREW BN/BN,TIS/BN: HCPCS | Performed by: NEUROLOGICAL SURGERY

## 2019-04-05 PROCEDURE — 25800025 ZZH RX 258: Performed by: NEUROLOGICAL SURGERY

## 2019-04-05 PROCEDURE — 25000131 ZZH RX MED GY IP 250 OP 636 PS 637: Performed by: PHYSICIAN ASSISTANT

## 2019-04-05 PROCEDURE — 01N10ZZ RELEASE CERVICAL NERVE, OPEN APPROACH: ICD-10-PCS | Performed by: NEUROLOGICAL SURGERY

## 2019-04-05 PROCEDURE — 25800030 ZZH RX IP 258 OP 636: Performed by: ANESTHESIOLOGY

## 2019-04-05 PROCEDURE — 99207 ZZC CONSULT E&M CHANGED TO INITIAL LEVEL: CPT | Performed by: INTERNAL MEDICINE

## 2019-04-05 PROCEDURE — 25000132 ZZH RX MED GY IP 250 OP 250 PS 637: Performed by: NEUROLOGICAL SURGERY

## 2019-04-05 PROCEDURE — C1762 CONN TISS, HUMAN(INC FASCIA): HCPCS | Performed by: NEUROLOGICAL SURGERY

## 2019-04-05 PROCEDURE — 37000009 ZZH ANESTHESIA TECHNICAL FEE, EACH ADDTL 15 MIN: Performed by: NEUROLOGICAL SURGERY

## 2019-04-05 PROCEDURE — 25800030 ZZH RX IP 258 OP 636: Performed by: NURSE ANESTHETIST, CERTIFIED REGISTERED

## 2019-04-05 PROCEDURE — 25000300 ZZH OR RX SURGIFLO HEMOSTATIC MATRIX 10ML 199102S OPNP: Performed by: NEUROLOGICAL SURGERY

## 2019-04-05 PROCEDURE — 0RB30ZZ EXCISION OF CERVICAL VERTEBRAL DISC, OPEN APPROACH: ICD-10-PCS | Performed by: NEUROLOGICAL SURGERY

## 2019-04-05 PROCEDURE — 71000013 ZZH RECOVERY PHASE 1 LEVEL 1 EA ADDTL HR: Performed by: NEUROLOGICAL SURGERY

## 2019-04-05 PROCEDURE — 71000012 ZZH RECOVERY PHASE 1 LEVEL 1 FIRST HR: Performed by: NEUROLOGICAL SURGERY

## 2019-04-05 PROCEDURE — G0378 HOSPITAL OBSERVATION PER HR: HCPCS

## 2019-04-05 DEVICE — IMPLANTABLE DEVICE: Type: IMPLANTABLE DEVICE | Site: SPINE CERVICAL | Status: FUNCTIONAL

## 2019-04-05 DEVICE — GRAFT BONE PUTTY DBX 01ML 038010: Type: IMPLANTABLE DEVICE | Site: SPINE CERVICAL | Status: FUNCTIONAL

## 2019-04-05 RX ORDER — FENTANYL CITRATE 50 UG/ML
25-50 INJECTION, SOLUTION INTRAMUSCULAR; INTRAVENOUS
Status: DISCONTINUED | OUTPATIENT
Start: 2019-04-05 | End: 2019-04-05 | Stop reason: HOSPADM

## 2019-04-05 RX ORDER — ONDANSETRON 4 MG/1
4 TABLET, ORALLY DISINTEGRATING ORAL EVERY 30 MIN PRN
Status: DISCONTINUED | OUTPATIENT
Start: 2019-04-05 | End: 2019-04-05 | Stop reason: HOSPADM

## 2019-04-05 RX ORDER — MONTELUKAST SODIUM 10 MG/1
10 TABLET ORAL AT BEDTIME
Status: DISCONTINUED | OUTPATIENT
Start: 2019-04-05 | End: 2019-04-07 | Stop reason: HOSPADM

## 2019-04-05 RX ORDER — LOVASTATIN 20 MG
20 TABLET ORAL AT BEDTIME
Status: DISCONTINUED | OUTPATIENT
Start: 2019-04-05 | End: 2019-04-07 | Stop reason: HOSPADM

## 2019-04-05 RX ORDER — EPHEDRINE SULFATE 50 MG/ML
INJECTION, SOLUTION INTRAMUSCULAR; INTRAVENOUS; SUBCUTANEOUS PRN
Status: DISCONTINUED | OUTPATIENT
Start: 2019-04-05 | End: 2019-04-05

## 2019-04-05 RX ORDER — ACETAMINOPHEN 325 MG/1
975 TABLET ORAL EVERY 8 HOURS
Status: DISCONTINUED | OUTPATIENT
Start: 2019-04-05 | End: 2019-04-07 | Stop reason: HOSPADM

## 2019-04-05 RX ORDER — BUPIVACAINE HYDROCHLORIDE AND EPINEPHRINE 5; 5 MG/ML; UG/ML
INJECTION, SOLUTION PERINEURAL PRN
Status: DISCONTINUED | OUTPATIENT
Start: 2019-04-05 | End: 2019-04-05 | Stop reason: HOSPADM

## 2019-04-05 RX ORDER — LIDOCAINE HYDROCHLORIDE 10 MG/ML
INJECTION, SOLUTION INFILTRATION; PERINEURAL PRN
Status: DISCONTINUED | OUTPATIENT
Start: 2019-04-05 | End: 2019-04-05

## 2019-04-05 RX ORDER — SODIUM CHLORIDE, SODIUM LACTATE, POTASSIUM CHLORIDE, CALCIUM CHLORIDE 600; 310; 30; 20 MG/100ML; MG/100ML; MG/100ML; MG/100ML
INJECTION, SOLUTION INTRAVENOUS CONTINUOUS
Status: DISCONTINUED | OUTPATIENT
Start: 2019-04-05 | End: 2019-04-05 | Stop reason: HOSPADM

## 2019-04-05 RX ORDER — PROPOFOL 10 MG/ML
INJECTION, EMULSION INTRAVENOUS CONTINUOUS PRN
Status: DISCONTINUED | OUTPATIENT
Start: 2019-04-05 | End: 2019-04-05

## 2019-04-05 RX ORDER — DIPHENHYDRAMINE HCL 25 MG
25 CAPSULE ORAL EVERY 6 HOURS PRN
Status: DISCONTINUED | OUTPATIENT
Start: 2019-04-05 | End: 2019-04-07 | Stop reason: HOSPADM

## 2019-04-05 RX ORDER — METOCLOPRAMIDE 10 MG/1
10 TABLET ORAL EVERY 6 HOURS PRN
Status: DISCONTINUED | OUTPATIENT
Start: 2019-04-05 | End: 2019-04-07 | Stop reason: HOSPADM

## 2019-04-05 RX ORDER — SPIRONOLACTONE 25 MG/1
50 TABLET ORAL DAILY
Status: DISCONTINUED | OUTPATIENT
Start: 2019-04-05 | End: 2019-04-05

## 2019-04-05 RX ORDER — ACETAMINOPHEN 325 MG/1
650 TABLET ORAL EVERY 4 HOURS PRN
Status: DISCONTINUED | OUTPATIENT
Start: 2019-04-08 | End: 2019-04-05

## 2019-04-05 RX ORDER — HYDRALAZINE HYDROCHLORIDE 20 MG/ML
2.5-5 INJECTION INTRAMUSCULAR; INTRAVENOUS EVERY 10 MIN PRN
Status: DISCONTINUED | OUTPATIENT
Start: 2019-04-05 | End: 2019-04-05 | Stop reason: HOSPADM

## 2019-04-05 RX ORDER — ACETAMINOPHEN 325 MG/1
975 TABLET ORAL EVERY 8 HOURS
Status: DISCONTINUED | OUTPATIENT
Start: 2019-04-05 | End: 2019-04-05

## 2019-04-05 RX ORDER — LIDOCAINE 40 MG/G
CREAM TOPICAL
Status: DISCONTINUED | OUTPATIENT
Start: 2019-04-05 | End: 2019-04-05 | Stop reason: HOSPADM

## 2019-04-05 RX ORDER — LOSARTAN POTASSIUM 50 MG/1
50 TABLET ORAL DAILY
Status: DISCONTINUED | OUTPATIENT
Start: 2019-04-06 | End: 2019-04-07 | Stop reason: HOSPADM

## 2019-04-05 RX ORDER — DEXTROSE MONOHYDRATE 25 G/50ML
25-50 INJECTION, SOLUTION INTRAVENOUS
Status: DISCONTINUED | OUTPATIENT
Start: 2019-04-05 | End: 2019-04-07 | Stop reason: HOSPADM

## 2019-04-05 RX ORDER — ONDANSETRON 2 MG/ML
4 INJECTION INTRAMUSCULAR; INTRAVENOUS EVERY 6 HOURS PRN
Status: DISCONTINUED | OUTPATIENT
Start: 2019-04-05 | End: 2019-04-07 | Stop reason: HOSPADM

## 2019-04-05 RX ORDER — DOCUSATE SODIUM 100 MG/1
100 CAPSULE, LIQUID FILLED ORAL 2 TIMES DAILY
Status: DISCONTINUED | OUTPATIENT
Start: 2019-04-05 | End: 2019-04-07 | Stop reason: HOSPADM

## 2019-04-05 RX ORDER — ONDANSETRON 2 MG/ML
4 INJECTION INTRAMUSCULAR; INTRAVENOUS EVERY 30 MIN PRN
Status: DISCONTINUED | OUTPATIENT
Start: 2019-04-05 | End: 2019-04-05 | Stop reason: HOSPADM

## 2019-04-05 RX ORDER — CARVEDILOL 6.25 MG/1
6.25 TABLET ORAL 2 TIMES DAILY WITH MEALS
Status: DISCONTINUED | OUTPATIENT
Start: 2019-04-05 | End: 2019-04-05

## 2019-04-05 RX ORDER — SPIRONOLACTONE 25 MG/1
50 TABLET ORAL DAILY
Status: DISCONTINUED | OUTPATIENT
Start: 2019-04-06 | End: 2019-04-07 | Stop reason: HOSPADM

## 2019-04-05 RX ORDER — CEFAZOLIN SODIUM IN 0.9 % NACL 3 G/100 ML
3 INTRAVENOUS SOLUTION, PIGGYBACK (ML) INTRAVENOUS
Status: COMPLETED | OUTPATIENT
Start: 2019-04-05 | End: 2019-04-05

## 2019-04-05 RX ORDER — DIPHENHYDRAMINE HYDROCHLORIDE 50 MG/ML
25 INJECTION INTRAMUSCULAR; INTRAVENOUS EVERY 6 HOURS PRN
Status: DISCONTINUED | OUTPATIENT
Start: 2019-04-05 | End: 2019-04-07 | Stop reason: HOSPADM

## 2019-04-05 RX ORDER — PROCHLORPERAZINE MALEATE 10 MG
10 TABLET ORAL EVERY 6 HOURS PRN
Status: DISCONTINUED | OUTPATIENT
Start: 2019-04-05 | End: 2019-04-07 | Stop reason: HOSPADM

## 2019-04-05 RX ORDER — LOSARTAN POTASSIUM 50 MG/1
50 TABLET ORAL DAILY
Status: DISCONTINUED | OUTPATIENT
Start: 2019-04-05 | End: 2019-04-05

## 2019-04-05 RX ORDER — ACETAMINOPHEN 325 MG/1
650 TABLET ORAL EVERY 4 HOURS PRN
Status: DISCONTINUED | OUTPATIENT
Start: 2019-04-08 | End: 2019-04-07 | Stop reason: HOSPADM

## 2019-04-05 RX ORDER — TRAMADOL HYDROCHLORIDE 50 MG/1
50 TABLET ORAL EVERY 6 HOURS PRN
Status: DISCONTINUED | OUTPATIENT
Start: 2019-04-05 | End: 2019-04-07 | Stop reason: HOSPADM

## 2019-04-05 RX ORDER — MAGNESIUM OXIDE 400 MG/1
400 TABLET ORAL DAILY
Status: DISCONTINUED | OUTPATIENT
Start: 2019-04-05 | End: 2019-04-07 | Stop reason: HOSPADM

## 2019-04-05 RX ORDER — NALOXONE HYDROCHLORIDE 0.4 MG/ML
.1-.4 INJECTION, SOLUTION INTRAMUSCULAR; INTRAVENOUS; SUBCUTANEOUS
Status: ACTIVE | OUTPATIENT
Start: 2019-04-05 | End: 2019-04-06

## 2019-04-05 RX ORDER — HYDROMORPHONE HYDROCHLORIDE 2 MG/1
2-4 TABLET ORAL
Status: DISCONTINUED | OUTPATIENT
Start: 2019-04-05 | End: 2019-04-07 | Stop reason: HOSPADM

## 2019-04-05 RX ORDER — NALOXONE HYDROCHLORIDE 0.4 MG/ML
.1-.4 INJECTION, SOLUTION INTRAMUSCULAR; INTRAVENOUS; SUBCUTANEOUS
Status: DISCONTINUED | OUTPATIENT
Start: 2019-04-05 | End: 2019-04-07 | Stop reason: HOSPADM

## 2019-04-05 RX ORDER — DEXAMETHASONE SODIUM PHOSPHATE 4 MG/ML
INJECTION, SOLUTION INTRA-ARTICULAR; INTRALESIONAL; INTRAMUSCULAR; INTRAVENOUS; SOFT TISSUE PRN
Status: DISCONTINUED | OUTPATIENT
Start: 2019-04-05 | End: 2019-04-05

## 2019-04-05 RX ORDER — CEFAZOLIN SODIUM 1 G/3ML
1 INJECTION, POWDER, FOR SOLUTION INTRAMUSCULAR; INTRAVENOUS SEE ADMIN INSTRUCTIONS
Status: DISCONTINUED | OUTPATIENT
Start: 2019-04-05 | End: 2019-04-05 | Stop reason: HOSPADM

## 2019-04-05 RX ORDER — NICOTINE POLACRILEX 4 MG
15-30 LOZENGE BUCCAL
Status: DISCONTINUED | OUTPATIENT
Start: 2019-04-05 | End: 2019-04-07 | Stop reason: HOSPADM

## 2019-04-05 RX ORDER — OXYCODONE HYDROCHLORIDE 5 MG/1
5-10 TABLET ORAL
Status: DISCONTINUED | OUTPATIENT
Start: 2019-04-05 | End: 2019-04-07 | Stop reason: HOSPADM

## 2019-04-05 RX ORDER — DIAZEPAM 5 MG
5 TABLET ORAL EVERY 6 HOURS PRN
Status: DISCONTINUED | OUTPATIENT
Start: 2019-04-05 | End: 2019-04-07 | Stop reason: HOSPADM

## 2019-04-05 RX ORDER — CARVEDILOL 6.25 MG/1
6.25 TABLET ORAL 2 TIMES DAILY WITH MEALS
Status: DISCONTINUED | OUTPATIENT
Start: 2019-04-05 | End: 2019-04-07 | Stop reason: HOSPADM

## 2019-04-05 RX ORDER — ONDANSETRON 4 MG/1
4 TABLET, ORALLY DISINTEGRATING ORAL EVERY 6 HOURS PRN
Status: DISCONTINUED | OUTPATIENT
Start: 2019-04-05 | End: 2019-04-07 | Stop reason: HOSPADM

## 2019-04-05 RX ORDER — PROPOFOL 10 MG/ML
INJECTION, EMULSION INTRAVENOUS PRN
Status: DISCONTINUED | OUTPATIENT
Start: 2019-04-05 | End: 2019-04-05

## 2019-04-05 RX ORDER — LIDOCAINE 40 MG/G
CREAM TOPICAL
Status: DISCONTINUED | OUTPATIENT
Start: 2019-04-05 | End: 2019-04-07 | Stop reason: HOSPADM

## 2019-04-05 RX ORDER — CEFAZOLIN SODIUM 2 G/100ML
2 INJECTION, SOLUTION INTRAVENOUS EVERY 8 HOURS
Status: DISCONTINUED | OUTPATIENT
Start: 2019-04-05 | End: 2019-04-06

## 2019-04-05 RX ORDER — CEFAZOLIN SODIUM 1 G/3ML
INJECTION, POWDER, FOR SOLUTION INTRAMUSCULAR; INTRAVENOUS PRN
Status: DISCONTINUED | OUTPATIENT
Start: 2019-04-05 | End: 2019-04-05

## 2019-04-05 RX ORDER — METOCLOPRAMIDE HYDROCHLORIDE 5 MG/ML
10 INJECTION INTRAMUSCULAR; INTRAVENOUS EVERY 6 HOURS PRN
Status: DISCONTINUED | OUTPATIENT
Start: 2019-04-05 | End: 2019-04-07 | Stop reason: HOSPADM

## 2019-04-05 RX ORDER — ONDANSETRON 2 MG/ML
INJECTION INTRAMUSCULAR; INTRAVENOUS PRN
Status: DISCONTINUED | OUTPATIENT
Start: 2019-04-05 | End: 2019-04-05

## 2019-04-05 RX ORDER — ALBUTEROL SULFATE 90 UG/1
2 AEROSOL, METERED RESPIRATORY (INHALATION) EVERY 4 HOURS PRN
Status: DISCONTINUED | OUTPATIENT
Start: 2019-04-05 | End: 2019-04-07 | Stop reason: HOSPADM

## 2019-04-05 RX ORDER — LABETALOL 20 MG/4 ML (5 MG/ML) INTRAVENOUS SYRINGE
10
Status: DISCONTINUED | OUTPATIENT
Start: 2019-04-05 | End: 2019-04-05 | Stop reason: HOSPADM

## 2019-04-05 RX ORDER — HYDROMORPHONE HYDROCHLORIDE 1 MG/ML
.3-.5 INJECTION, SOLUTION INTRAMUSCULAR; INTRAVENOUS; SUBCUTANEOUS
Status: DISCONTINUED | OUTPATIENT
Start: 2019-04-05 | End: 2019-04-07 | Stop reason: HOSPADM

## 2019-04-05 RX ORDER — FENTANYL CITRATE 50 UG/ML
INJECTION, SOLUTION INTRAMUSCULAR; INTRAVENOUS PRN
Status: DISCONTINUED | OUTPATIENT
Start: 2019-04-05 | End: 2019-04-05

## 2019-04-05 RX ORDER — KETAMINE HYDROCHLORIDE 10 MG/ML
INJECTION INTRAMUSCULAR; INTRAVENOUS PRN
Status: DISCONTINUED | OUTPATIENT
Start: 2019-04-05 | End: 2019-04-05

## 2019-04-05 RX ADMIN — RANITIDINE 150 MG: 150 TABLET ORAL at 20:42

## 2019-04-05 RX ADMIN — Medication 5 MG: at 08:51

## 2019-04-05 RX ADMIN — Medication 5 MG: at 07:43

## 2019-04-05 RX ADMIN — PHENYLEPHRINE HYDROCHLORIDE 50 MCG: 10 INJECTION, SOLUTION INTRAMUSCULAR; INTRAVENOUS; SUBCUTANEOUS at 09:00

## 2019-04-05 RX ADMIN — HYDROMORPHONE HYDROCHLORIDE 1 MG: 1 INJECTION, SOLUTION INTRAMUSCULAR; INTRAVENOUS; SUBCUTANEOUS at 07:55

## 2019-04-05 RX ADMIN — METFORMIN HYDROCHLORIDE 1000 MG: 500 TABLET ORAL at 17:22

## 2019-04-05 RX ADMIN — Medication 5 MG: at 09:00

## 2019-04-05 RX ADMIN — Medication 0.5 MG: at 11:16

## 2019-04-05 RX ADMIN — Medication 0.5 MG: at 13:18

## 2019-04-05 RX ADMIN — Medication 0.5 MG: at 14:20

## 2019-04-05 RX ADMIN — Medication 3 G: at 07:49

## 2019-04-05 RX ADMIN — PHENYLEPHRINE HYDROCHLORIDE 50 MCG: 10 INJECTION, SOLUTION INTRAMUSCULAR; INTRAVENOUS; SUBCUTANEOUS at 09:25

## 2019-04-05 RX ADMIN — PHENYLEPHRINE HYDROCHLORIDE 50 MCG: 10 INJECTION, SOLUTION INTRAMUSCULAR; INTRAVENOUS; SUBCUTANEOUS at 09:16

## 2019-04-05 RX ADMIN — CEFAZOLIN SODIUM 2 G: 2 INJECTION, SOLUTION INTRAVENOUS at 15:56

## 2019-04-05 RX ADMIN — PROPOFOL 80 MG: 10 INJECTION, EMULSION INTRAVENOUS at 07:43

## 2019-04-05 RX ADMIN — Medication 5 MG: at 08:14

## 2019-04-05 RX ADMIN — Medication 5 MG: at 09:30

## 2019-04-05 RX ADMIN — Medication 50 MG: at 07:43

## 2019-04-05 RX ADMIN — FENTANYL CITRATE 50 MCG: 50 INJECTION, SOLUTION INTRAMUSCULAR; INTRAVENOUS at 10:32

## 2019-04-05 RX ADMIN — LOVASTATIN 20 MG: 20 TABLET ORAL at 22:01

## 2019-04-05 RX ADMIN — CARVEDILOL 6.25 MG: 6.25 TABLET, FILM COATED ORAL at 17:22

## 2019-04-05 RX ADMIN — Medication 5 MG: at 08:05

## 2019-04-05 RX ADMIN — SODIUM CHLORIDE, POTASSIUM CHLORIDE, SODIUM LACTATE AND CALCIUM CHLORIDE: 600; 310; 30; 20 INJECTION, SOLUTION INTRAVENOUS at 06:30

## 2019-04-05 RX ADMIN — MONTELUKAST SODIUM 10 MG: 10 TABLET, FILM COATED ORAL at 22:01

## 2019-04-05 RX ADMIN — Medication 10 MG: at 08:23

## 2019-04-05 RX ADMIN — FENTANYL CITRATE 50 MCG: 50 INJECTION, SOLUTION INTRAMUSCULAR; INTRAVENOUS at 08:36

## 2019-04-05 RX ADMIN — ACETAMINOPHEN 975 MG: 325 TABLET, FILM COATED ORAL at 16:09

## 2019-04-05 RX ADMIN — INSULIN GLARGINE 40 UNITS: 100 INJECTION, SOLUTION SUBCUTANEOUS at 22:09

## 2019-04-05 RX ADMIN — PHENYLEPHRINE HYDROCHLORIDE 50 MCG: 10 INJECTION, SOLUTION INTRAMUSCULAR; INTRAVENOUS; SUBCUTANEOUS at 08:29

## 2019-04-05 RX ADMIN — DOCUSATE SODIUM 100 MG: 100 CAPSULE, LIQUID FILLED ORAL at 20:42

## 2019-04-05 RX ADMIN — DIAZEPAM 5 MG: 5 TABLET ORAL at 17:22

## 2019-04-05 RX ADMIN — HYDROMORPHONE HYDROCHLORIDE 2 MG: 2 TABLET ORAL at 20:49

## 2019-04-05 RX ADMIN — LIDOCAINE HYDROCHLORIDE 50 MG: 10 INJECTION, SOLUTION INFILTRATION; PERINEURAL at 07:43

## 2019-04-05 RX ADMIN — FENTANYL CITRATE 50 MCG: 50 INJECTION, SOLUTION INTRAMUSCULAR; INTRAVENOUS at 10:47

## 2019-04-05 RX ADMIN — Medication 100 MG: at 07:43

## 2019-04-05 RX ADMIN — Medication 0.5 MG: at 15:56

## 2019-04-05 RX ADMIN — PHENYLEPHRINE HYDROCHLORIDE 50 MCG: 10 INJECTION, SOLUTION INTRAMUSCULAR; INTRAVENOUS; SUBCUTANEOUS at 08:44

## 2019-04-05 RX ADMIN — MIDAZOLAM 2 MG: 1 INJECTION INTRAMUSCULAR; INTRAVENOUS at 07:35

## 2019-04-05 RX ADMIN — ONDANSETRON 4 MG: 2 INJECTION INTRAMUSCULAR; INTRAVENOUS at 09:37

## 2019-04-05 RX ADMIN — FENTANYL CITRATE 100 MCG: 50 INJECTION, SOLUTION INTRAMUSCULAR; INTRAVENOUS at 07:43

## 2019-04-05 RX ADMIN — PROPOFOL 100 MCG/KG/MIN: 10 INJECTION, EMULSION INTRAVENOUS at 07:51

## 2019-04-05 RX ADMIN — CEFAZOLIN 1 G: 1 INJECTION, POWDER, FOR SOLUTION INTRAMUSCULAR; INTRAVENOUS at 09:49

## 2019-04-05 RX ADMIN — DEXAMETHASONE SODIUM PHOSPHATE 8 MG: 4 INJECTION, SOLUTION INTRA-ARTICULAR; INTRALESIONAL; INTRAMUSCULAR; INTRAVENOUS; SOFT TISSUE at 08:47

## 2019-04-05 RX ADMIN — HYDROMORPHONE HYDROCHLORIDE 2 MG: 2 TABLET ORAL at 22:00

## 2019-04-05 ASSESSMENT — MIFFLIN-ST. JEOR: SCORE: 1938.57

## 2019-04-05 NOTE — LETTER
Transition Communication Hand-off for Care Transitions to Next Level of Care Provider    Name: Bettina Montes  : 1970  MRN #: [id number]  Primary Care Provider: Anuja Tenorio  Primary Care MD Name: Dr. Noah Tenorio  Primary Clinic: [address]  [facility] [address]  Primary Care Clinic [facility]: [facility]   Reason for Hospitalization:  C4-6 Severe stenosis with myelomalacia  S/P cervical spinal fusion  S/P cervical spinal fusion  Admit Date/Time: 2019  5:40 AM  Discharge Date: 2019   Payor Source: Payor: WORK [employer] / Plan: WC [employer] / Product Type: Indemnity /     Readmission Assessment Measure (DOMO) Risk Score/category: LOW          Reason for Communication Hand-off Referral: Other Cervical Fusion, DM with A1C 11.7.     Discharge Plan: Discharge to home   Discharge Needs Assessment:  Needs      Most Recent Value   # of Referrals Placed by CTS  Communication hand-offs to next level of Care Providers        Follow-up plan:    Future Appointments   Date Time Provider Department Center   2019  3:00 PM Светлана Chase, APRN CNP [facility]   2019  1:00 PM Supriya Case NP [facility]   2019  9:00 AM [facility]   2019 10:00 AM Angle Reaves NP [facility]   2019  4:00 PM El Chinchilla MD [facility] [place]           Key Recommendations:  Met w/ pt at bedside to discuss her DM management and A1c of 11.7. Pt reports that after her last A1c check she found that she was administering her insulin incorrectly. Since learning this and adjusting the way she take her insulin, pt reports better BG values. Pt is supposed to check her BG 3 times a day, she reports that with insurance coverage her strips still cost $75 for 100 strips so she only checks her BG once daily in the AM. She reports an average AM BG of . She also has gone from eating out frequently to  preparing more meals at home. She has plans to visit with a Dietician which is available through her employer. Discussed monitoring her diet and checking BG three times daily, especially while recovering from her spinal fusion. Discussed goal of A1c under 8 and notifying provider if having BG >200. Also discussed the importance of follow-up w/ surgeon and PCP on discharge. Pt is receptive and understanding of this information.     Pt would benefit from ongoing clinic CC support due to uncontrolled DM2.     Maria C Hernandez    AVS/Discharge Summary is the source of truth; this is a helpful guide for improved communication of patient story

## 2019-04-05 NOTE — ANESTHESIA PREPROCEDURE EVALUATION
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 Pt has had prior anesthetic.     No history of anesthetic complications          ROS/MED HX    ENT/Pulmonary:     (+)asthma , . .    Neurologic:     (+)other neuro cervical neck pain    Cardiovascular:     (+) hypertension----. : . CHF Last EF: 30-35 . . :. .       METS/Exercise Tolerance:     Hematologic:  - neg hematologic  ROS       Musculoskeletal:  - neg musculoskeletal ROS       GI/Hepatic:     (+) GERD Asymptomatic on medication,       Renal/Genitourinary:  - ROS Renal section negative       Endo:     (+) type II DM Obesity, .      Psychiatric:  - neg psychiatric ROS       Infectious Disease:  - neg infectious disease ROS       Malignancy:         Other:                          Physical Exam  Normal systems: cardiovascular and pulmonary    Airway   Mallampati: II  TM distance: >3 FB  Neck ROM: full    Dental     Cardiovascular       Pulmonary             Lab Results   Component Value Date    WBC 11.7 (H) 2018    HGB 11.8 2019    HCT 36.2 2018     2018     2019    POTASSIUM 3.8 2019    CHLORIDE 97 2019    CO2 30 2019    BUN 10 2019    CR 0.84 2019     (H) 2019    TIM 9.1 2019    ALBUMIN 4.0 2018    PROTTOTAL 8.2 2018    ALT 34 2018    AST 33 2018    ALKPHOS 113 2018    BILITOTAL  "0.6 05/02/2018    TSH 0.84 05/10/2017       Preop Vitals  BP Readings from Last 3 Encounters:   04/05/19 (!) 142/92   03/29/19 140/85   03/27/19 136/66    Pulse Readings from Last 3 Encounters:   03/29/19 106   03/22/19 103   03/20/19 111      Resp Readings from Last 3 Encounters:   03/29/19 20   03/27/19 18   03/19/19 18    SpO2 Readings from Last 3 Encounters:   04/05/19 99%   03/29/19 98%   03/27/19 96%      Temp Readings from Last 1 Encounters:   04/05/19 97  F (36.1  C) (Temporal)    Ht Readings from Last 1 Encounters:   04/05/19 1.676 m (5' 6\")      Wt Readings from Last 1 Encounters:   04/05/19 130.2 kg (287 lb)    Estimated body mass index is 46.32 kg/m  as calculated from the following:    Height as of this encounter: 1.676 m (5' 6\").    Weight as of this encounter: 130.2 kg (287 lb).       Anesthesia Plan      History & Physical Review  History and physical reviewed and following examination; no interval change.    ASA Status:  4 .    NPO Status:  > 8 hours    Plan for General and ETT with Intravenous and Propofol induction. Maintenance will be Balanced.    PONV prophylaxis:  Ondansetron (or other 5HT-3) and Dexamethasone or Solumedrol  Additional equipment: Videolaryngoscope      Postoperative Care  Postoperative pain management:  IV analgesics.      Consents  Anesthetic plan, risks, benefits and alternatives discussed with:  Patient.  Use of blood products discussed: Yes.   Use of blood products discussed with Patient.  Consented to blood products.  .                 Cesar Nava MD                    .  "

## 2019-04-05 NOTE — PLAN OF CARE
Pt vss, on 3L NC, Lungs are clear, pt has been encouraged to use IS.  BS hypoactive, eating and drinking fulls well.  CMS- light numbness and tingling in the left fingers but improving from prior per pt statement, dressing CDI.   Pain- using valium, dilaudid and Tylenol staring to come down to manageable level.   Up with Ax2 and neck brace on.  Drain limited output  Blood sugars stable no coverage needed.  Saline locked.   Voided 50cc at 1730- encouraged fluids

## 2019-04-05 NOTE — LETTER
Key Recommendations:  Met w/ pt at bedside to discuss her DM management and A1c of 11.7. Pt reports that after her last A1c check she found that she was administering her insulin incorrectly. Since learning this and adjusting the way she take her insulin, pt reports better BG values. Pt is supposed to check her BG 3 times a day, she reports that with insurance coverage her strips still cost $75 for 100 strips so she only checks her BG once daily in the AM. She reports an average AM BG of . She also has gone from eating out frequently to preparing more meals at home. She has plans to visit with a Dietician which is available through her employer. Discussed monitoring her diet and checking BG three times daily, especially while recovering from her spinal fusion. Discussed goal of A1c under 8 and notifying provider if having BG >200. Also discussed the importance of follow-up w/ surgeon and PCP on discharge. Pt is receptive and understanding of this information.     Pt would benefit from ongoing clinic CC support due to uncontrolled DM2.     Maria C Hernandez    AVS/Discharge Summary is the source of truth; this is a helpful guide for improved communication of patient story

## 2019-04-05 NOTE — OP NOTE
OPERATIVE NOTE        Pre op Diagnosis: C4-5 and C5-6 severe stenosis with cervical radiculopathy and weakness in BUE left > right      Post op Diagnosis: Same      Procedure:   1. C4-5 anterior cervical discectomy with arthrodesis and placement of a 7 mm Medtronic Capstone PEEK interbody graft with DBM Putty placed centrally  2. C5-6 anterior cervical discectomy with arthrodesis and placement of a 7 mm Medtronic Capstone PEEK interbody graft withDBM Putty placed centrally  3. Placement of a 39 mm Medtronic Zevo anterior cervical plate with six 17 mm screws  4. Use of intraoperative spinal cord monitoring  5. Use of C-arm and Microscope      Surgeon: Hollis Hurtado MD      Assistant: Edy Mathews      Indication for procedure: The patient is a 49 year old obese female that presented with the above clinical and radiographic findings. After reviewing the patient's imaging studies and examination, the decision was made to proceed with the above procedure. The patient understood the risks and benefits of surgery and wanted to proceed.      Description of Procedure: The patient was seen in the pre op area and the procedure was discussed with the patient and family once again and all questions were answered. The consent was then signed and the patient's neck was marked with a marker. The patient was then transferred to the operating suite on a stretcher and received general endotracheal anesthesia. She was then placed on the operating table in the supine position with all pressure points padded. A small roll was placed under her shoulders for slight extension and spinal cord monitoring leads were placed and baseline sensory and motor values were obtained. Next, the C-arm fluoroscopy was brought in the operating room for localization of the C4 to C6 disk spaces. The patient's neck was then prepped and draped in a normal sterile fashion and a transverse incision was marked along the C4-6 interspaces. Localocal  anesthesia was placed along the planned incision and a 10 blade scalpel was used to make the incision. The platysma muscle was then identified, undermined and incised with the Metzenbaum scissors. The Weitlaner retractors were used to retract the platysma muscle and the skin edges. A dissection plane was then made with both sharp and blunt dissection medical to the sternocliedomastoid muscle and the carotid artery down to the prevertebral fascia. The esophagus and trachea were then retracted laterally with the Cloward retractor and the prevertebral fascia was clean with Kitner's. A spinal need was placed in the C5-6 interspace and verified with C-arm fluoroscopy. The longus coli muscles were then elevated with the bovie cautery and the  retractor was placed under the muscle. The C4-5 and C5-6 interspaces were incised with the bovie cautery and the disk were then removed with the Midas Marek drill down to the posterior longitudinal ligament at each level. The ligament was then penetrated with a microball hook and the Kerrison rongeur was used to remove the posterior longitudinal ligament. All foramen were decompressed and the exiting nerve roots were decompressed and verified by with the microball hook from C4-6 bilaterally. Size 7 mm interbody trials were placed in the C4-5 and C5-6 interspaces and were noted to be the appropriate size, therefore, one 7 mm PEEK interbody graft was placed in the C4-5 interspace and one 7 mm PEEK interbody graft was placed in C5-6 interspace all with DBX putty placed centrally under fluoroscopic guidance and noted to have an appropriate fit. Next, the size 39 mm anterior cervical plate was secured from C4 to C6 with six 17 mm screws which were locked in placed with the hand held locking bit. The wound was then copiously irrigated and hemostasis was obtained with bipolar cautery, gelfoam and Surgiflo. A SARITA drain was placed in the operative bed and the retractors were removed  and there was no bleeding or injury to the carotid artery. The wound was irrigated once again and the platysma muscle was closed with 2-0 vicryl and the skin edges were closed with 3-0 vicryl and Deremabond. The wound was dressed appropriately and the patient was then extubated and transferred to recovery.      At the end of the case all counts were correct    Spinal cord monitoring remained stable throughout the case      Complications: none      Estimated blood loss: 10 ml      IV Fluid: See Anesthesia      The patient received Ancef preoperatively        Hollis Hurtado MD, MS, FAANS

## 2019-04-05 NOTE — ANESTHESIA CARE TRANSFER NOTE
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    Anesthesia Type:   General, ETT     Note:  Airway :Oral Airway and Face Mask  Patient transferred to:PACU  Comments: Patient oral suctioned. Patient with spontaneous respirations and adequate tidal volumes. Patient awake and responsive. Extubated in OR with oral airway in place to 8 L face tent. To PACU ventilating well. VSS. Report given.Handoff Report: Identifed the Patient, Identified the Reponsible Provider, Reviewed the pertinent medical history, Discussed the surgical course, Reviewed Intra-OP anesthesia mangement and issues during anesthesia, Set expectations for post-procedure period and Allowed opportunity for questions and acknowledgement of understanding      Vitals: (Last set prior to Anesthesia Care Transfer)    CRNA VITALS  4/5/2019 0935 - 4/5/2019 1015      4/5/2019             NIBP:  120/74    Pulse:  91    NIBP Mean:  87    SpO2:  100 %    Resp Rate (observed):  3  (Abnormal)                 Electronically Signed By: RICCARDO Shannon CRNA  April 5, 2019  10:15 AM

## 2019-04-05 NOTE — PLAN OF CARE
DAy RN  Patient arrived from pacu around 1250 a and O, orientated to room, pain meds call light and plan of care.  Vss 02 stable on 3L, capno stable.  CMS+ moves upper and lower ext well. Stated baseline weakness in L hand and pain down the arm  Pain in neck 4-8 today controlled with iv dilaudid and ice.   DTV  Tolerated a popsicle and water no coughing. throat sore with swallowing.  Slept between cares.

## 2019-04-05 NOTE — CONSULTS
Hospitalist Consultation      Bettina Montes MRN# [id number]   YOB: 1970 Age: 49 year old   Date of Admission: 4/5/2019     Requesting Physician:  Dr. Hurtado  Reason for consult: Medical management           Assessment and Plan:   This patient is a 49 year old female with a PMH significant for DM II, nonischemic cardiomyopathy, HTN, asthma, HLP and GERD who is POD 1 s/p C4-5 and C5-6 anterior cervical discectomy due severe stenosis with cervical radiculopathy and weakness in BUE.    1. S/p C4-5 and C5-6 anterior cervical discectomy: doing well, cont PT/OT and pain control as per primary. Due to severe stenosis patient has cervical radiculopathy affecting both arms left > right.    2. Nonischemic cardiomyopathy- Followed by Cardiology. Cardiac MRI showed no ischemia, but EF of 30%  -Continue PTA Carvedilol, Hydralazine and Spironolactone on parameters    3. DM II  Poorly controlled. Most recent A1c was 11.7 and sugar this AM was 91 after taking 52 units last night.  -Reorder Glargine 40 units tonight (normally takes 62 units)  -Continue PTA metformin  -Start medium resistance sliding scale    4. HTN  -Resume PTA Losartan on parameters    5. HLP  -Continue PTA Lovastatin    6. Asthma  No respiratory complaints. Not hypoxic and clear lungs  -Continue PTA Breo Ellipta    DVT Prophylaxis: on PCDs as per primary  D/C planning: To home likely once cleared by surgery             History of Present Illness:   This patient is a 49 year old female who is POD 1 s/p C4-5 and C5-6 anterior cervical discectomy.  Intra-op report reviewed and showed no intra-op complications.   I/o's reviewed, Currently net positive with good UOP since OR.   No complaints, VSS.   Currently, today grupo liquid, pain mostly controlled, no CP, SOB, no n/v.   O/w other medical problems have been stable, with no recent c/o illness.               Past Medical History:     Past Medical History:   Diagnosis Date     Adrenal gland anomaly       CHF (congestive heart failure) (H)      Fatty liver      Gastroesophageal reflux disease      Hyperlipidemia      Hypertension      Mild persistent asthma      Type 2 diabetes mellitus (H)                Past Surgical History:     Past Surgical History:   Procedure Laterality Date     COLONOSCOPY       TUBAL LIGATION                   Social History:     Social History     Tobacco Use     Smoking status: Former Smoker     Packs/day: 1.00     Years: 30.00     Pack years: 30.00     Types: Cigarettes     Last attempt to quit: 2013     Years since quittin.2     Smokeless tobacco: Former User     Tobacco comment:     Substance Use Topics     Alcohol use: Yes     Alcohol/week: 0.0 oz     Comment: 4-5 drinks per week     Drug use: No                 Family History:     family history includes Cancer in her paternal grandmother; Diabetes in her father and mother; Heart Failure in her mother; Hyperlipidemia in her mother; Hypertension in her mother.              Allergies:     Allergies   Allergen Reactions     Amlodipine Swelling     Edema on 5mg throat     Lisinopril      Swelling in throat, face     Naprosyn [Naproxen]      Swelling, diff breathing             Medications:     Prior to Admission medications    Medication Sig Last Dose Taking? Auth Provider   carvedilol (COREG) 6.25 MG tablet Take 1 tablet (6.25 mg) by mouth 2 times daily (with meals) 2019 at Unknown time Yes El Chinchilla MD   Cholecalciferol (VITAMIN D3 PO) Take 2,000 Units by mouth daily  Yes Reported, Patient   diazepam (VALIUM) 5 MG tablet Take 1-2 tablets (5-10 mg) by mouth every 6 hours as needed for muscle spasms  Yes Geremias Mistry MD   fluticasone-salmeterol (ADVAIR) 250-50 MCG/DOSE inhaler Inhale 1 puff into the lungs 2 times daily 2019 at Unknown time Yes Anuja Bruce MD   hydrALAZINE (APRESOLINE) 25 MG tablet Take 3 tablets (75 mg) by mouth 2 times daily 2019 at Unknown time Yes Noah  Anuja Tenorio MD   insulin glargine (BASAGLAR KWIKPEN) 100 UNIT/ML pen Inject 65 Units Subcutaneous At Bedtime  Yes Anuja Bruce MD   insulin pen needle (32G X 4 MM) 32G X 4 MM miscellaneous Use 1 pen needles daily or as directed. 4/4/2019 at Unknown time Yes Anuja Bruce MD   levalbuterol (XOPENEX HFA) 45 MCG/ACT Inhaler Inhale 2 puffs into the lungs every 4 hours as needed for shortness of breath / dyspnea or wheezing 4/4/2019 at Unknown time Yes Anuja Bruce MD   losartan (COZAAR) 50 MG tablet Take 1 tablet (50 mg) by mouth daily  Yes Anuja Bruce MD   lovastatin (MEVACOR) 20 MG tablet Take 1 tablet (20 mg) by mouth At Bedtime  Yes Anuja Bruce MD   magnesium oxide (MAG-OX) 400 (241.3 Mg) MG tablet Take 1 tablet (400 mg) by mouth daily  Yes Anuja Bruce MD   metFORMIN (GLUCOPHAGE) 500 MG tablet Take 2 tablets (1,000 mg) by mouth 2 times daily (with meals) 4/4/2019 at Unknown time Yes Anuja Bruce MD   montelukast (SINGULAIR) 10 MG tablet Take 1 tablet (10 mg) by mouth At Bedtime 4/4/2019 at Unknown time Yes Anuja Bruce MD   spironolactone (ALDACTONE) 50 MG tablet Take 1 tablet (50 mg) by mouth daily 4/4/2019 at Unknown time Yes El Chinchilla MD   tiZANidine (ZANAFLEX) 2 MG tablet Take 1-2 tablets (2-4 mg) by mouth 3 times daily as needed for muscle spasms 4/4/2019 at Unknown time Yes Carmen Spangler NP   traMADol (ULTRAM) 50 MG tablet Take 1 tablet (50 mg) by mouth every 6 hours as needed for severe pain 4/4/2019 at Unknown time Yes Troy Carroll MD   BETA BLOCKER NOT PRESCRIBED, INTENTIONAL, Beta Blocker not prescribed intentionally due to Severe Asthma   Branch Anuja Tenorio MD   blood glucose (NO BRAND SPECIFIED) test strip Use to test blood sugar 3-4 times daily or as directed.   Geremias Mistry MD             "   Review of Systems:   A comprehensive greater than 10 system review of systems was carried out.  Pertinent positives and negatives are noted above.  Otherwise negative for contributory info.            Physical Exam:   Vitals were reviewed  Blood pressure 121/66, pulse 92, temperature 97.3  F (36.3  C), temperature source Oral, resp. rate 18, height 1.676 m (5' 6\"), weight 130.2 kg (287 lb), SpO2 94 %, not currently breastfeeding.  Exam:    GENERAL:  Comfortable.  PSYCH: pleasant, oriented, No acute distress.  HEENT:  PERRLA. Normal conjunctiva, normal hearing, nasal mucosa and Oropharynx are normal. Neck brace in place.  NECK:  Supple, no neck vein distention, adenopathy or bruits, normal thyroid.  HEART:  Normal S1, S2 with no murmur, no pericardial rub, S3 or S4.  LUNGS:  Clear to auscultation, normal Respiratory effort.  ABDOMEN:  Soft, no hepatosplenomegaly, normal bowel sounds.  EXTREMITIES:  No pedal edema, +2 pulses bilateral and equal.  SKIN:  Dry to touch, No rash, wound or ulcerations.  NEUROLOGIC:  Nonfocal with normal cranial nerve and motor power and sensation.            Data:   Past 24 hours labs, studies, and imaging were reviewed.  Recent Labs   Lab 04/05/19  0618   HGB 11.8     Recent Labs   Lab 03/29/19  1605      POTASSIUM 3.8   CHLORIDE 97   CO2 30   ANIONGAP 8   *   BUN 10   CR 0.84   GFRESTIMATED 81   GFRESTBLACK >90   TIM 9.1       Chelsea Mcnulty PA-C    Pt discussed with Dr. Castano who agrees with the care as discussed above.     "

## 2019-04-05 NOTE — ANESTHESIA POSTPROCEDURE EVALUATION
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 C4-5 anterior cervical discectomy with arthrodesis and placemen, t of a 7 mm Medtronic Capstone PEEK interbody graft with DBM Putty placed centrally  2. C5-6 anterior cervical discectomy with arthrodesis and placement of a 7 mm Medtronic Capstone PEEK interbody graft withDBM Putty placed centrally  3. Placement of,  a 39 mm Medtronic Zevo anterior cervical plate with six 17 mm screws  4. Use of intraoperative spinal cord monitoring  5. Use of C-arm and Microscope        Surgeon: Hollis Hurtado MD        Assistant: Edy Mathews       Anesthesia Type:  General, ETT    Note:  Anesthesia Post Evaluation    Patient location during evaluation: PACU  Patient participation: Able to fully participate in evaluation  Level of consciousness: awake  Pain management: adequate  Airway patency: patent  Cardiovascular status: acceptable  Respiratory status: acceptable  Hydration status: acceptable  PONV: none     Anesthetic complications: None          Last vitals:  Vitals:    04/05/19 1045 04/05/19 1100 04/05/19 1115   BP: 131/76 130/82    Pulse:      Resp: 15 14 14   Temp:  98.2  F (36.8  C)    SpO2: 100% 100% 100%         Electronically Signed By: Cesar Nava MD  April 5, 2019  11:49 AM

## 2019-04-06 ENCOUNTER — APPOINTMENT (OUTPATIENT)
Dept: PHYSICAL THERAPY | Facility: CLINIC | Age: 49
DRG: 472 | End: 2019-04-06
Attending: NEUROLOGICAL SURGERY
Payer: OTHER MISCELLANEOUS

## 2019-04-06 LAB
GLUCOSE BLDC GLUCOMTR-MCNC: 113 MG/DL (ref 70–99)
GLUCOSE BLDC GLUCOMTR-MCNC: 115 MG/DL (ref 70–99)
GLUCOSE BLDC GLUCOMTR-MCNC: 151 MG/DL (ref 70–99)
GLUCOSE BLDC GLUCOMTR-MCNC: 180 MG/DL (ref 70–99)
GLUCOSE BLDC GLUCOMTR-MCNC: 87 MG/DL (ref 70–99)

## 2019-04-06 PROCEDURE — 97530 THERAPEUTIC ACTIVITIES: CPT | Mod: GP | Performed by: PHYSICAL THERAPIST

## 2019-04-06 PROCEDURE — 25000132 ZZH RX MED GY IP 250 OP 250 PS 637: Performed by: PHYSICIAN ASSISTANT

## 2019-04-06 PROCEDURE — 25000132 ZZH RX MED GY IP 250 OP 250 PS 637: Performed by: NEUROLOGICAL SURGERY

## 2019-04-06 PROCEDURE — 25000131 ZZH RX MED GY IP 250 OP 636 PS 637: Performed by: PHYSICIAN ASSISTANT

## 2019-04-06 PROCEDURE — 00000146 ZZHCL STATISTIC GLUCOSE BY METER IP

## 2019-04-06 PROCEDURE — 97161 PT EVAL LOW COMPLEX 20 MIN: CPT | Mod: GP | Performed by: PHYSICAL THERAPIST

## 2019-04-06 PROCEDURE — 97116 GAIT TRAINING THERAPY: CPT | Mod: GP | Performed by: PHYSICAL THERAPIST

## 2019-04-06 PROCEDURE — 99232 SBSQ HOSP IP/OBS MODERATE 35: CPT | Performed by: INTERNAL MEDICINE

## 2019-04-06 PROCEDURE — 25000132 ZZH RX MED GY IP 250 OP 250 PS 637: Performed by: INTERNAL MEDICINE

## 2019-04-06 PROCEDURE — 12000000 ZZH R&B MED SURG/OB

## 2019-04-06 PROCEDURE — 25000128 H RX IP 250 OP 636: Performed by: NEUROLOGICAL SURGERY

## 2019-04-06 RX ORDER — OXYCODONE HYDROCHLORIDE 5 MG/1
5-10 TABLET ORAL
Qty: 60 TABLET | Refills: 0 | Status: SHIPPED | OUTPATIENT
Start: 2019-04-06 | End: 2019-06-25

## 2019-04-06 RX ORDER — DIAZEPAM 5 MG
5-10 TABLET ORAL EVERY 6 HOURS PRN
Qty: 40 TABLET | Refills: 1 | Status: SHIPPED | OUTPATIENT
Start: 2019-04-06 | End: 2019-05-22

## 2019-04-06 RX ORDER — TIZANIDINE 2 MG/1
2-4 TABLET ORAL 3 TIMES DAILY PRN
Qty: 60 TABLET | Refills: 1 | Status: SHIPPED | OUTPATIENT
Start: 2019-04-06 | End: 2019-05-22

## 2019-04-06 RX ORDER — FUROSEMIDE 40 MG
40 TABLET ORAL DAILY
Status: DISCONTINUED | OUTPATIENT
Start: 2019-04-06 | End: 2019-04-07 | Stop reason: HOSPADM

## 2019-04-06 RX ORDER — CALCIUM CARBONATE 500 MG/1
1000 TABLET, CHEWABLE ORAL EVERY 4 HOURS PRN
Status: DISCONTINUED | OUTPATIENT
Start: 2019-04-06 | End: 2019-04-07 | Stop reason: HOSPADM

## 2019-04-06 RX ADMIN — RANITIDINE 150 MG: 150 TABLET ORAL at 19:07

## 2019-04-06 RX ADMIN — DOCUSATE SODIUM 100 MG: 100 CAPSULE, LIQUID FILLED ORAL at 19:07

## 2019-04-06 RX ADMIN — METFORMIN HYDROCHLORIDE 1000 MG: 500 TABLET ORAL at 08:09

## 2019-04-06 RX ADMIN — ACETAMINOPHEN 975 MG: 325 TABLET, FILM COATED ORAL at 00:38

## 2019-04-06 RX ADMIN — OXYCODONE HYDROCHLORIDE 10 MG: 5 TABLET ORAL at 19:06

## 2019-04-06 RX ADMIN — OXYCODONE HYDROCHLORIDE 5 MG: 5 TABLET ORAL at 08:08

## 2019-04-06 RX ADMIN — CALCIUM CARBONATE (ANTACID) CHEW TAB 500 MG 1000 MG: 500 CHEW TAB at 23:42

## 2019-04-06 RX ADMIN — DIAZEPAM 5 MG: 5 TABLET ORAL at 08:56

## 2019-04-06 RX ADMIN — LOSARTAN POTASSIUM 50 MG: 50 TABLET, FILM COATED ORAL at 08:09

## 2019-04-06 RX ADMIN — DIAZEPAM 5 MG: 5 TABLET ORAL at 14:26

## 2019-04-06 RX ADMIN — MONTELUKAST SODIUM 10 MG: 10 TABLET, FILM COATED ORAL at 21:56

## 2019-04-06 RX ADMIN — DOCUSATE SODIUM 100 MG: 100 CAPSULE, LIQUID FILLED ORAL at 08:07

## 2019-04-06 RX ADMIN — METFORMIN HYDROCHLORIDE 1000 MG: 500 TABLET ORAL at 19:07

## 2019-04-06 RX ADMIN — FUROSEMIDE 40 MG: 40 TABLET ORAL at 14:26

## 2019-04-06 RX ADMIN — RANITIDINE 150 MG: 150 TABLET ORAL at 08:08

## 2019-04-06 RX ADMIN — CARVEDILOL 6.25 MG: 6.25 TABLET, FILM COATED ORAL at 19:07

## 2019-04-06 RX ADMIN — MAGNESIUM OXIDE TAB 400 MG (241.3 MG ELEMENTAL MG) 400 MG: 400 (241.3 MG) TAB at 08:09

## 2019-04-06 RX ADMIN — SPIRONOLACTONE 50 MG: 25 TABLET ORAL at 08:09

## 2019-04-06 RX ADMIN — ACETAMINOPHEN 975 MG: 325 TABLET, FILM COATED ORAL at 23:39

## 2019-04-06 RX ADMIN — ACETAMINOPHEN 975 MG: 325 TABLET, FILM COATED ORAL at 16:15

## 2019-04-06 RX ADMIN — LOVASTATIN 20 MG: 20 TABLET ORAL at 21:56

## 2019-04-06 RX ADMIN — OXYCODONE HYDROCHLORIDE 10 MG: 5 TABLET ORAL at 10:55

## 2019-04-06 RX ADMIN — CEFAZOLIN SODIUM 2 G: 2 INJECTION, SOLUTION INTRAVENOUS at 00:45

## 2019-04-06 RX ADMIN — CEFAZOLIN SODIUM 2 G: 2 INJECTION, SOLUTION INTRAVENOUS at 08:11

## 2019-04-06 RX ADMIN — DIAZEPAM 5 MG: 5 TABLET ORAL at 23:39

## 2019-04-06 RX ADMIN — HYDROMORPHONE HYDROCHLORIDE 4 MG: 2 TABLET ORAL at 00:39

## 2019-04-06 RX ADMIN — INSULIN GLARGINE 40 UNITS: 100 INJECTION, SOLUTION SUBCUTANEOUS at 21:58

## 2019-04-06 RX ADMIN — OXYCODONE HYDROCHLORIDE 10 MG: 5 TABLET ORAL at 15:05

## 2019-04-06 RX ADMIN — CALCIUM CARBONATE (ANTACID) CHEW TAB 500 MG 1000 MG: 500 CHEW TAB at 19:21

## 2019-04-06 RX ADMIN — CARVEDILOL 6.25 MG: 6.25 TABLET, FILM COATED ORAL at 08:09

## 2019-04-06 RX ADMIN — HYDROMORPHONE HYDROCHLORIDE 4 MG: 2 TABLET ORAL at 03:49

## 2019-04-06 RX ADMIN — ACETAMINOPHEN 975 MG: 325 TABLET, FILM COATED ORAL at 08:08

## 2019-04-06 ASSESSMENT — MIFFLIN-ST. JEOR: SCORE: 1929.05

## 2019-04-06 ASSESSMENT — ACTIVITIES OF DAILY LIVING (ADL): ADLS_ACUITY_SCORE: 16

## 2019-04-06 NOTE — PLAN OF CARE
PT : Evaluation completed, treatment initiated. Pt is 48 yo female POD 1 s/p C4-5 and C5-6 anterior cervical discectomy. Pt lives in multi-level [facility] with mother, bedroom and bath on 2nd floor, 3 stairs to enter (no rail), and 8 stairs within home (1 rail). Bathroom set-up includes walk-in shower with grab bars and shower chair, raised toilet seat. Pt was indep with ADLs and functional mobility, no device. Works as CNA.    Discharge Planner PT   Patient plan for discharge: home with support from mother and daughter as needed  Current status: Ed on spinal precautions and soft collar when OOB, instructed in proper fit. Soft collar donned/doffed with assist x 1 for fastening. Supine <> sit with logroll and supervision. Sit <> stand with supervision, amb 200' with supervision and no device, no evidence of imbalance. Negotiated 4 steps x 2 (1 rail, handheld assist) with step-to pattern, good tolerance with no imbalance. Instructed in upper and lower body dressing, able to maintain precautions without cues. Pt reporting 8/10 neck pain with activity. No OT needs at this time as screened by PT , all pt questions answered. Vitals stable. Up to chair end of session, RN updated.  Barriers to return to prior living situation: none  Recommendations for discharge: home  Rationale for recommendations: Pt progressing well, demonstrating independence with all functional mobility and ADLs with good maintenance of spinal precautions. No further PT/OT needs, safe to discharge home.        Entered by: Alivia Vasques 04/06/2019 9:32 AM    Physical Therapy Discharge Summary    Reason for therapy discharge:    All goals and outcomes met, no further needs identified.    Progress towards therapy goal(s). See goals on Care Plan in [facility] electronic health record for goal details.  Goals met    Therapy recommendation(s):    Continue home exercise program.

## 2019-04-06 NOTE — PROGRESS NOTES
[facility]    Neurosurgery  Daily Post-Op Note    Assessment & Plan   Procedure(s):  C4-6 anterior cervical discectomy and fusion   -1 Day Post-Op  POD 1 C4-6 ACDF with Dr. Hurtado. Worked with PT this am, pretty sore at present. Slight dysphagia, not unexpected.   Trace numbness in fingertips.   -no arm pain.     Plan:  -Advance activity as tolerated  -Continue supportive and symptomatic treatment  -Advance diet as tolerated  -Home later today if doing ok with PT.       Hair Mcknightnn    Interval History   Stable.  Doing well.  Improving slowly.  Pain is reasonably controlled.  No fevers.     Physical Exam   Temp: 96.6  F (35.9  C) Temp src: Oral BP: 152/76 Pulse: 90 Heart Rate: 76 Resp: 13 SpO2: 97 % O2 Device: Nasal cannula Oxygen Delivery: 3 LPM  Vitals:    04/05/19 0613 04/06/19 0640   Weight: 130.2 kg (287 lb) 129.2 kg (284 lb 14.4 oz)     Vital Signs with Ranges  Temp:  [96.6  F (35.9  C)-98.2  F (36.8  C)] 96.6  F (35.9  C)  Pulse:  [80-92] 90  Heart Rate:  [] 76  Resp:  [10-18] 13  BP: (118-157)/(62-97) 152/76  SpO2:  [94 %-100 %] 97 %  I/O last 3 completed shifts:  In: 920 [P.O.:120; I.V.:800]  Out: 455 [Urine:450; Drains:5]    Alert and oriented.  Moves all extremities equally.      Incision: CDI  SARITA with 0 output. Will discontinue.       Medications        acetaminophen  975 mg Oral Q8H     carvedilol  6.25 mg Oral BID w/meals     ceFAZolin  2 g Intravenous Q8H     docusate sodium  100 mg Oral BID     ranitidine  150 mg Oral Q12H KRYS    Or     famotidine  20 mg Intravenous Q12H KRYS     fluticasone-salmeterol  1 puff Inhalation Q12H     hydrALAZINE  75 mg Oral BID     insulin aspart  1-7 Units Subcutaneous TID AC     insulin aspart  1-5 Units Subcutaneous At Bedtime     insulin glargine  40 Units Subcutaneous At Bedtime     losartan  50 mg Oral Daily     lovastatin  20 mg Oral At Bedtime     magnesium oxide  400 mg Oral Daily     metFORMIN  1,000 mg Oral BID w/meals     montelukast  10  mg Oral At Bedtime     sodium chloride (PF)  3 mL Intracatheter Q8H     spironolactone  50 mg Oral Daily             Hair Rock PA-C  [facility]

## 2019-04-06 NOTE — PLAN OF CARE
Pt vss, lungs are clear, pt has been encouraged to use IS hourly.   BS+, eating and drinking well.   CMS-slight numb/tingling on left hand, dressing CDI, drain removed today.   Pain controlled with oral pain meds. Changed to Oral Oxy 10 mg and doing much better.  Doing well in pt, transfers with SBA, brace on when out of bed.  Following care plan and pt was educated on care plan expectations  Plans to d/c to tomorrow morning to home  Blood sugars stable today, no coverage needed.   Will cont to monitor. No other issues at this time.

## 2019-04-06 NOTE — PLAN OF CARE
OT: Order received, chart reviewed, pt currently POD#1 s/p C4-5 and C5-6 anterior cervical discectomy.     Discharge Planner OT   Patient plan for discharge: home  Current status: pt able to complete transfers/mobility with SBA/supervision. Pt has well set-up home environment including RTS and walk in shower with shower chair, grab bars  Barriers to return to prior living situation: none anticipated  Recommendations for discharge: in agreement with plan for home per chart review  Rationale for recommendations: no OT evaluation/treatment provided as no skilled needs present at this time       Entered by: Maty Davila 04/06/2019 2:46 PM

## 2019-04-06 NOTE — PLAN OF CARE
Pt up with 1 assist, walker, soft brace, and gait belt. LS clear - CADB exercises, BS active, passing flatus. Drsg CDI. JPx1 - 0cc from evening and night shifts. CMS intact except for numbness in L fingers. Voiding adequately. Pain partially controlled with PO Dilaudid however pt has been experiencing moderate headaches with this medication. Oxycodone is available and has scheduled tylenol. AM nurse aware and will discuss plan of care.

## 2019-04-06 NOTE — PROGRESS NOTES
"[facility]  Hospitalist Progress Note  Adrian Marti MD  04/06/2019    Assessment & Plan   This patient is a 49 year old female with a PMH significant for DM II, nonischemic cardiomyopathy, HTN, asthma, HLP and GERD who is POD 1 s/p C4-5 and C5-6 anterior cervical discectomy due severe stenosis with cervical radiculopathy and weakness in BUE.     1. S/p C4-5 and C5-6 anterior cervical discectomy for cervical radiculopathy of both arms: doing well,  -- cont PT/OT    -- slept poorly and attempting oxy for pain control  -- not much flatus  -- would await flatus, bowel function prior to discharge     2. Nonischemic cardiomyopathy  -- Followed by Cardiology. Cardiac MRI showed no ischemia, but EF of 30%  -- Continue PTA Carvedilol, Hydralazine and Spironolactone at discharge.     3. DM II  Poorly controlled. Most recent A1c was 11.7   -Reduced Glargine 40 units last night (normally takes 62 units)  -resume 62 units at bedtime at discharge.  -Continue PTA metformin  - medium resistance sliding scale while inpatient.     4. HTN  -continue Losartan on parameters     5. HLP  -Continue PTA Lovastatin     6. Asthma  No respiratory complaints. Not hypoxic and clear lungs  -Continue PTA Breo Ellipta     DVT Prophylaxis: on PCDs as per primary  D/C planning: To home likely once cleared by surgery    Interval History   -- chart reviewed  -- vitals and labs reviewed  -- neurosurg notes reviewed  -- poor sleep and dilaudid gave her headaches, trying oxy  -- she is ambulating not much gas    -Data reviewed today: I reviewed all new labs and imaging over the last 24 hours. I personally reviewed no images or EKG's today.    Physical Exam   Heart Rate: 76, Blood pressure 152/76, pulse 90, temperature 96.6  F (35.9  C), resp. rate 13, height 1.676 m (5' 6\"), weight 129.2 kg (284 lb 14.4 oz), SpO2 97 %, not currently breastfeeding.  Vitals:    04/05/19 0613 04/06/19 0640   Weight: 130.2 kg (287 lb) 129.2 kg (284 lb 14.4 " oz)     Vital Signs with Ranges  Temp:  [96.6  F (35.9  C)-98.2  F (36.8  C)] 96.6  F (35.9  C)  Pulse:  [80-92] 90  Heart Rate:  [] 76  Resp:  [10-18] 13  BP: (118-157)/(62-97) 152/76  SpO2:  [94 %-100 %] 97 %  I/O's Last 24 hours  I/O last 3 completed shifts:  In: 920 [P.O.:120; I.V.:800]  Out: 455 [Urine:450; Drains:5]    Constitutional: Awake, alert, cooperative, no apparent distress, drain noted  Respiratory: Clear to auscultation bilaterally, no crackles or wheezing  Cardiovascular: Regular rate and rhythm, normal S1 and S2, and no murmur noted  GI: Normal bowel sounds, soft, non-distended, non-tender  Skin/Integumen: No rashes, no cyanosis, no edema  Other:      Medications   All medications were reviewed.      acetaminophen  975 mg Oral Q8H     carvedilol  6.25 mg Oral BID w/meals     ceFAZolin  2 g Intravenous Q8H     docusate sodium  100 mg Oral BID     ranitidine  150 mg Oral Q12H KRYS    Or     famotidine  20 mg Intravenous Q12H KRYS     fluticasone-salmeterol  1 puff Inhalation Q12H     hydrALAZINE  75 mg Oral BID     insulin aspart  1-7 Units Subcutaneous TID AC     insulin aspart  1-5 Units Subcutaneous At Bedtime     insulin glargine  40 Units Subcutaneous At Bedtime     losartan  50 mg Oral Daily     lovastatin  20 mg Oral At Bedtime     magnesium oxide  400 mg Oral Daily     metFORMIN  1,000 mg Oral BID w/meals     montelukast  10 mg Oral At Bedtime     sodium chloride (PF)  3 mL Intracatheter Q8H     spironolactone  50 mg Oral Daily        Data   Recent Labs   Lab 04/05/19  0618   HGB 11.8       No results found for this or any previous visit (from the past 24 hour(s)).    Adrian Marti MD  Text Page  (7am to 6pm)

## 2019-04-06 NOTE — PROGRESS NOTES
04/06/19 0848   Quick Adds   Type of Visit Initial PT Evaluation   Living Environment   Lives With parent(s)   Living Arrangements house   Home Accessibility stairs to enter home;stairs within home   Number of Stairs, Main Entrance 3   Stair Railings, Main Entrance none   Number of Stairs, Within Home, Primary 8   Stair Railings, Within Home, Primary railing on right side (ascending)   Transportation Anticipated family or friend will provide   [facility] Environment [facility]. Pt's bed/bath are on 2nd floor, bathroom set-up includes walk-in shower with grab bars, has shower chair available, high toilet seat,    Self-Care   Usual Activity Tolerance good   Current Activity Tolerance moderate   Activity/Exercise/Self-Care Comment Pt was indep with all ADLs. No AD.   Functional Level Prior   Ambulation 0-->independent   Transferring 0-->independent   Toileting 0-->independent   Bathing 0-->independent   Fall history within last six months no   Prior Functional Level Comment Pt works as CNA, plans to go back once heals.   General Information   Onset of Illness/Injury or Date of Surgery - Date 04/05/19   Referring Physician Hollis Hurtado MD   Patient/Family Goals Statement return home   Pertinent History of Current Problem (include personal factors and/or comorbidities that impact the POC) Pt is 50 yo female POD 1 s/p C4-5 and C5-6 anterior cervical discectomy.   Precautions/Limitations spinal precautions  (soft collar prn)   Cognitive Status Examination   Orientation orientation to person, place and time   Pain Assessment   Patient Currently in Pain Yes, see Vital Sign flowsheet  (8/10 ant/post neck pain, radiating into B clavicular region)   Integumentary/Edema   Integumentary/Edema Comments surgical dressing dry and intact, SARITA drain   Posture    Posture Forward head position;Protracted shoulders   Range of Motion (ROM)   ROM Comment R UE and B LEs WFLs, L UE  "elevation to 90 deg   Strength   Strength Comments R UE and B UEs WFLs, L UE 3-/5, reduced  strength - baseline   Bed Mobility   Bed Mobility Comments supine <> sit with logroll indep   Transfer Skills   Transfer Comments sit <> stand with supervision   Gait   Gait Comments Amb 200' with supervision and no AD, no evidience of imbalance, typical gait pattern   Balance   Balance Comments no evidence of imbalance, impaired postural reactions secondary to cervical fusion   Sensory Examination   Sensory Perception Comments pt reports baseline tingling into L UE   Coordination   Coordination no deficits were identified   Modality Interventions   Planned Modality Interventions Comments as indicated per therapist discretion   General Therapy Interventions   Planned Therapy Interventions balance training;bed mobility training;gait training;neuromuscular re-education;ROM;strengthening;stretching;transfer training;risk factor education;home program guidelines;progressive activity/exercise   Clinical Impression   Criteria for Skilled Therapeutic Intervention yes, treatment indicated   PT Diagnosis impaired functional mobility and ADLs secondary to cervical fusion   Influenced by the following impairments 8/10 neck pain, spinal precautions, L UE weakness   Functional limitations due to impairments bed mobility, transfers, ambulation, stairs, ADLs,   Clinical Presentation Stable/Uncomplicated   Clinical Presentation Rationale medically stable, no surgical complications, PLOF and PMH   Clinical Decision Making (Complexity) Low complexity   Therapy Frequency` daily   Predicted Duration of Therapy Intervention (days/wks) 1 day   Anticipated Discharge Disposition Home   Risk & Benefits of therapy have been explained Yes   Patient, Family & other staff in agreement with plan of care Yes   [facility] AM-PAC TM \"6 Clicks\"   2016, Trustees of [facility], under license to [employer].  All rights reserved.   6 Clicks " "Short Forms Basic Mobility Inpatient Short Form   [facility] AM-PAC  \"6 Clicks\" V.2 Basic Mobility Inpatient Short Form   1. Turning from your back to your side while in a flat bed without using bedrails? 4 - None   2. Moving from lying on your back to sitting on the side of a flat bed without using bedrails? 4 - None   3. Moving to and from a bed to a chair (including a wheelchair)? 4 - None   4. Standing up from a chair using your arms (e.g., wheelchair, or bedside chair)? 4 - None   5. To walk in hospital room? 4 - None   6. Climbing 3-5 steps with a railing? 3 - A Little   Basic Mobility Raw Score (Score out of 24.Lower scores equate to lower levels of function) 23   Total Evaluation Time   Total Evaluation Time (Minutes) 15     "

## 2019-04-07 VITALS
DIASTOLIC BLOOD PRESSURE: 71 MMHG | WEIGHT: 284.8 LBS | OXYGEN SATURATION: 98 % | HEIGHT: 66 IN | SYSTOLIC BLOOD PRESSURE: 124 MMHG | HEART RATE: 90 BPM | TEMPERATURE: 98.6 F | BODY MASS INDEX: 45.77 KG/M2 | RESPIRATION RATE: 16 BRPM

## 2019-04-07 LAB
ANION GAP SERPL CALCULATED.3IONS-SCNC: 3 MMOL/L (ref 3–14)
BUN SERPL-MCNC: 8 MG/DL (ref 7–30)
CALCIUM SERPL-MCNC: 9.2 MG/DL (ref 8.5–10.1)
CHLORIDE SERPL-SCNC: 103 MMOL/L (ref 94–109)
CO2 SERPL-SCNC: 31 MMOL/L (ref 20–32)
CREAT SERPL-MCNC: 0.7 MG/DL (ref 0.52–1.04)
GFR SERPL CREATININE-BSD FRML MDRD: >90 ML/MIN/{1.73_M2}
GLUCOSE BLDC GLUCOMTR-MCNC: 104 MG/DL (ref 70–99)
GLUCOSE BLDC GLUCOMTR-MCNC: 130 MG/DL (ref 70–99)
GLUCOSE BLDC GLUCOMTR-MCNC: 141 MG/DL (ref 70–99)
GLUCOSE SERPL-MCNC: 97 MG/DL (ref 70–99)
HGB BLD-MCNC: 11.4 G/DL (ref 11.7–15.7)
POTASSIUM SERPL-SCNC: 4 MMOL/L (ref 3.4–5.3)
SODIUM SERPL-SCNC: 137 MMOL/L (ref 133–144)

## 2019-04-07 PROCEDURE — 25000132 ZZH RX MED GY IP 250 OP 250 PS 637: Performed by: NEUROLOGICAL SURGERY

## 2019-04-07 PROCEDURE — 00000146 ZZHCL STATISTIC GLUCOSE BY METER IP

## 2019-04-07 PROCEDURE — 80048 BASIC METABOLIC PNL TOTAL CA: CPT | Performed by: INTERNAL MEDICINE

## 2019-04-07 PROCEDURE — 25000132 ZZH RX MED GY IP 250 OP 250 PS 637: Performed by: INTERNAL MEDICINE

## 2019-04-07 PROCEDURE — 36415 COLL VENOUS BLD VENIPUNCTURE: CPT | Performed by: INTERNAL MEDICINE

## 2019-04-07 PROCEDURE — 25000132 ZZH RX MED GY IP 250 OP 250 PS 637: Performed by: PHYSICIAN ASSISTANT

## 2019-04-07 PROCEDURE — 85018 HEMOGLOBIN: CPT | Performed by: INTERNAL MEDICINE

## 2019-04-07 RX ADMIN — LOSARTAN POTASSIUM 50 MG: 50 TABLET, FILM COATED ORAL at 08:19

## 2019-04-07 RX ADMIN — DOCUSATE SODIUM 100 MG: 100 CAPSULE, LIQUID FILLED ORAL at 08:19

## 2019-04-07 RX ADMIN — RANITIDINE 150 MG: 150 TABLET ORAL at 06:23

## 2019-04-07 RX ADMIN — OXYCODONE HYDROCHLORIDE 10 MG: 5 TABLET ORAL at 08:19

## 2019-04-07 RX ADMIN — CALCIUM CARBONATE (ANTACID) CHEW TAB 500 MG 1000 MG: 500 CHEW TAB at 06:23

## 2019-04-07 RX ADMIN — METFORMIN HYDROCHLORIDE 1000 MG: 500 TABLET ORAL at 08:20

## 2019-04-07 RX ADMIN — CARVEDILOL 6.25 MG: 6.25 TABLET, FILM COATED ORAL at 08:20

## 2019-04-07 RX ADMIN — MAGNESIUM OXIDE TAB 400 MG (241.3 MG ELEMENTAL MG) 400 MG: 400 (241.3 MG) TAB at 08:19

## 2019-04-07 RX ADMIN — ACETAMINOPHEN 975 MG: 325 TABLET, FILM COATED ORAL at 08:21

## 2019-04-07 RX ADMIN — DIAZEPAM 5 MG: 5 TABLET ORAL at 06:23

## 2019-04-07 RX ADMIN — SPIRONOLACTONE 50 MG: 25 TABLET ORAL at 08:20

## 2019-04-07 RX ADMIN — OXYCODONE HYDROCHLORIDE 10 MG: 5 TABLET ORAL at 03:40

## 2019-04-07 RX ADMIN — FUROSEMIDE 40 MG: 40 TABLET ORAL at 08:20

## 2019-04-07 RX ADMIN — OXYCODONE HYDROCHLORIDE 10 MG: 5 TABLET ORAL at 13:14

## 2019-04-07 ASSESSMENT — MIFFLIN-ST. JEOR: SCORE: 1928.59

## 2019-04-07 ASSESSMENT — ACTIVITIES OF DAILY LIVING (ADL)
ADLS_ACUITY_SCORE: 16

## 2019-04-07 NOTE — CONSULTS
Care Transition Initial Assessment - RN        Met with: Patient.  DATA   Active Problems:    S/P cervical spinal fusion       Cognitive Status: awake, alert and oriented.  [facility]   Primary Care MD Name: Dr. Noah Tenorio  Contact information and PCP information verified: Yes  Lives With: parent(s)   Living Arrangements: house         Who is your support system?: Parent(s)       Insurance concerns: No Insurance issues identified  ASSESSMENT  Patient currently receives the following services:  None. Pt lives independently at baseline.         Identified issues/concerns regarding health management: Met w/ pt at bedside to discuss her DM management and A1c of 11.7. Pt reports that after her last A1c check she found that she was administering her insulin incorrectly. Since learning this and adjusting the way she take her insulin, pt reports better BG values. Pt is supposed to check her BG 3 times a day, she reports that with insurance coverage her strips still cost $75 for 100 strips so she only checks her BG once daily in the AM. She reports an average AM BG of . She also has gone from eating out frequently to preparing more meals at home. She has plans to visit with a Dietician which is available through her employer. Discussed monitoring her diet and checking BG three times daily, especially while recovering from her spinal fusion. Discussed goal of A1c under 8 and notifying provider if having BG >200. Also discussed the importance of follow-up w/ surgeon and PCP on discharge. Pt is receptive and understanding of this information. Will send hand off to PCP's CC on discharge.      PLAN  Patient anticipates discharging to home.     Patient anticipates needs for home equipment: No  Plan/Disposition: Home   Appointments: see above.       Care  (CTS) will continue to follow as needed.    Maria C Hernandez RN BSN CTS   [phone]  Care Transitions  Team  [facility]

## 2019-04-07 NOTE — PLAN OF CARE
Pt up independently however supervised during this shift. Up with soft collar brace. Pain partially tolerated with oxycodone, tylenol, and valium. LS clear - encouraged CADB exercises and IS use. BS active, passing flatus. CMS - improving - L shoulder tenderness and L fingers are more tingling than numb. Ice applied to anterior neck. Voiding adequately. Plan is to discharge to home today.

## 2019-04-07 NOTE — PLAN OF CARE
Patient's discharge instructions were reviewed with patient .   Patient verbalized understanding of discharge instructions, recommended follow up and was given an opportunity to ask questions.   Discharge medications sent home with patient/family: YES   Discharged with mother

## 2019-04-07 NOTE — PLAN OF CARE
Patient alert and oriented. Independent in room.   Tolerating liquids. Soft foods more difficult. Popsicles.   Ice on neck, and oxycodone for pain.   Numbness and tingling on left fingers. Other CMS intact.   Dressing CDI  Discharging home today.

## 2019-04-07 NOTE — PLAN OF CARE
Pt A&O, VSS on RA.  Up independent in room,   Ax1 when walking in the hallways.  Soft cervical collar on.  Pain managed with oxycodone, valium and scheduled tylenol.  IV saline locked.  Voiding adequate amounts.  Diabetic,  and 180.  Pt likely to discharge home Sunday.  Will continue to monitor.

## 2019-04-07 NOTE — PROGRESS NOTES
[facility]    Neurosurgery  Daily Post-Op Note    Assessment & Plan   Procedure(s):  C4-6 anterior cervical discectomy and fusion   -2 Days Post-Op  Doing well.  No immediate surgical complications identified.  Pain well-controlled.  Tolerating physical therapy and rehabilitation well.  Home today  -f/u with PCP regarding BP meds.   -6 week f/u with Neurosurgery      Hair Rock    Interval History   Stable.  Doing well.  Improving slowly.  Pain is reasonably controlled.  No fevers.     Physical Exam   Temp: 98.6  F (37  C) Temp src: Oral BP: 124/71   Heart Rate: 97 Resp: 16 SpO2: 98 % O2 Device: None (Room air)    Vitals:    04/05/19 0613 04/06/19 0640 04/07/19 0436   Weight: 130.2 kg (287 lb) 129.2 kg (284 lb 14.4 oz) 129.2 kg (284 lb 12.8 oz)     Vital Signs with Ranges  Temp:  [97.2  F (36.2  C)-98.6  F (37  C)] 98.6  F (37  C)  Heart Rate:  [82-97] 97  Resp:  [16] 16  BP: (119-130)/(55-74) 124/71  SpO2:  [97 %-100 %] 98 %  I/O last 3 completed shifts:  In: 2640 [P.O.:2640]  Out: 0     Alert and oriented.  Moves all extremities equally.      Incision: CDI      Medications        acetaminophen  975 mg Oral Q8H     carvedilol  6.25 mg Oral BID w/meals     docusate sodium  100 mg Oral BID     ranitidine  150 mg Oral Q12H KRYS    Or     famotidine  20 mg Intravenous Q12H KRYS     fluticasone-salmeterol  1 puff Inhalation Q12H     furosemide  40 mg Oral Daily     hydrALAZINE  75 mg Oral BID     insulin aspart  1-7 Units Subcutaneous TID AC     insulin aspart  1-5 Units Subcutaneous At Bedtime     insulin glargine  40 Units Subcutaneous At Bedtime     losartan  50 mg Oral Daily     lovastatin  20 mg Oral At Bedtime     magnesium oxide  400 mg Oral Daily     metFORMIN  1,000 mg Oral BID w/meals     montelukast  10 mg Oral At Bedtime     sodium chloride (PF)  3 mL Intracatheter Q8H     spironolactone  50 mg Oral Daily           Hair Rock PA-C  [facility]

## 2019-04-08 ENCOUNTER — OFFICE VISIT (OUTPATIENT)
Dept: FAMILY MEDICINE | Facility: CLINIC | Age: 49
End: 2019-04-08
Payer: OTHER MISCELLANEOUS

## 2019-04-08 ENCOUNTER — TELEPHONE (OUTPATIENT)
Dept: FAMILY MEDICINE | Facility: CLINIC | Age: 49
End: 2019-04-08

## 2019-04-08 VITALS
SYSTOLIC BLOOD PRESSURE: 136 MMHG | BODY MASS INDEX: 45.87 KG/M2 | OXYGEN SATURATION: 99 % | HEART RATE: 97 BPM | DIASTOLIC BLOOD PRESSURE: 89 MMHG | WEIGHT: 285.4 LBS | TEMPERATURE: 97.8 F | HEIGHT: 66 IN

## 2019-04-08 DIAGNOSIS — R10.13 DYSPEPSIA: ICD-10-CM

## 2019-04-08 DIAGNOSIS — Z12.11 SCREEN FOR COLON CANCER: ICD-10-CM

## 2019-04-08 DIAGNOSIS — I10 ESSENTIAL HYPERTENSION: ICD-10-CM

## 2019-04-08 DIAGNOSIS — Z79.4 TYPE 2 DIABETES MELLITUS WITH HYPERGLYCEMIA, WITH LONG-TERM CURRENT USE OF INSULIN (H): ICD-10-CM

## 2019-04-08 DIAGNOSIS — E78.2 MIXED HYPERLIPIDEMIA: ICD-10-CM

## 2019-04-08 DIAGNOSIS — Z98.1 S/P CERVICAL SPINAL FUSION: Primary | ICD-10-CM

## 2019-04-08 DIAGNOSIS — E11.65 TYPE 2 DIABETES MELLITUS WITH HYPERGLYCEMIA, WITH LONG-TERM CURRENT USE OF INSULIN (H): ICD-10-CM

## 2019-04-08 DIAGNOSIS — R19.5 POSITIVE FIT (FECAL IMMUNOCHEMICAL TEST): ICD-10-CM

## 2019-04-08 PROCEDURE — 99214 OFFICE O/P EST MOD 30 MIN: CPT | Performed by: NURSE PRACTITIONER

## 2019-04-08 ASSESSMENT — MIFFLIN-ST. JEOR: SCORE: 1931.32

## 2019-04-08 NOTE — PROGRESS NOTES
"  SUBJECTIVE:                                                    Bettina Montes is a 49 year old female who presents to clinic today for the following health issues:        ED/UC Followup:    Facility:  [facility]  Date of visit: 4/5/19  Reason for visit: spinal fusion  Current Status: feeling not the best, 5/10 on pain scale     Patient was discharged from the hospital yesterday and is very unhappy with her discharge planning.  She states staff never checked her dressing, told her how to change her dressing. She is not seen until the end of May by her Neurosurgeon.  She complains of a sore throat from the ET tube in surgery and has not been able to swallow easily, has been spitting her saliva out due to painful swallowing and has not been eating.  She complains of heartburn, requests medication for this- does not want to take Tums on a regular bsis. She took only 40 U of the Basaglar today, usual dose was 65 U, due to not eating and is concerned for hypoglycemia.  She felt as if she was \"pushed out\" by the hospital.       Diabetes Follow-up    Patient is checking blood sugars: three times daily.   Blood sugar testing frequency justification: Risk of hypoglycemia with medication(s)  Results are as follows:         0300 today glu 70, 0730 glu 49, 1230 today Glu 117    Diabetic concerns: other - low sugars due to not eating, difficulty swallowing     Symptoms of hypoglycemia (low blood sugar): shaky, dizzy, weak, lethargy, blurred vision     Paresthesias (numbness or burning in feet) or sores: No     Date of last diabetic eye exam:     BP Readings from Last 2 Encounters:   04/08/19 136/89   04/07/19 124/71     Hemoglobin A1C (%)   Date Value   02/27/2019 11.7 (H)   10/10/2018 8.2 (H)     LDL Cholesterol Calculated (mg/dL)   Date Value   05/02/2018 57   09/13/2017 56       Diabetes Management Resources  Hyperlipidemia Follow-Up      Rate your low fat/cholesterol diet?: good    Taking statin?  Yes, no " muscle aches from statin    Other lipid medications/supplements?:  none    Hypertension Follow-up      Outpatient blood pressures are not being checked.    Low Salt Diet: low salt      Problem list and histories reviewed & adjusted, as indicated.  Additional history: as documented    Patient Active Problem List   Diagnosis     Hypertension     Type 2 diabetes mellitus (H)     CHF (congestive heart failure) (H)     Hyperlipidemia     Morbid obesity due to excess calories (H)     Intermittent asthma, uncomplicated     Microscopic hematuria     Adrenal gland anomaly     Moderate persistent asthma without complication     Callus of foot     Nonischemic cardiomyopathy (H)     S/P cervical spinal fusion     Past Surgical History:   Procedure Laterality Date     COLONOSCOPY       DISCECTOMY, FUSION CERVICAL ANTERIOR TWO LEVELS, COMBINED N/A 2019    Procedure: C4-6 anterior cervical discectomy and fusion;  Surgeon: Hollis Hurtado MD;  Location: [facility] OR     TUBAL LIGATION         Social History     Tobacco Use     Smoking status: Former Smoker     Packs/day: 1.00     Years: 30.00     Pack years: 30.00     Types: Cigarettes     Last attempt to quit: 2013     Years since quittin.2     Smokeless tobacco: Former User     Tobacco comment:     Substance Use Topics     Alcohol use: Yes     Alcohol/week: 0.0 oz     Comment: 4-5 drinks per week     Family History   Problem Relation Age of Onset     Diabetes Mother      Hypertension Mother      Hyperlipidemia Mother      Heart Failure Mother      Diabetes Father      Cancer Paternal Grandmother         ?         Current Outpatient Medications   Medication Sig Dispense Refill     BETA BLOCKER NOT PRESCRIBED, INTENTIONAL, Beta Blocker not prescribed intentionally due to Severe Asthma  0     blood glucose (NO BRAND SPECIFIED) test strip Use to test blood sugar 3-4 times daily or as directed. 200 strip 1     carvedilol (COREG) 6.25 MG tablet Take 1 tablet (6.25 mg)  by mouth 2 times daily (with meals) 60 tablet 3     Cholecalciferol (VITAMIN D3 PO) Take 2,000 Units by mouth daily       diazepam (VALIUM) 5 MG tablet Take 1-2 tablets (5-10 mg) by mouth every 6 hours as needed for muscle spasms 40 tablet 1     hydrALAZINE (APRESOLINE) 25 MG tablet Take 3 tablets (75 mg) by mouth 2 times daily 180 tablet 5     insulin glargine (BASAGLAR KWIKPEN) 100 UNIT/ML pen Inject 65 Units Subcutaneous At Bedtime 60 mL 1     insulin pen needle (32G X 4 MM) 32G X 4 MM miscellaneous Use 1 pen needles daily or as directed. 100 each 3     levalbuterol (XOPENEX HFA) 45 MCG/ACT Inhaler Inhale 2 puffs into the lungs every 4 hours as needed for shortness of breath / dyspnea or wheezing 15 g 3     losartan (COZAAR) 50 MG tablet Take 1 tablet (50 mg) by mouth daily 90 tablet 1     lovastatin (MEVACOR) 20 MG tablet Take 1 tablet (20 mg) by mouth At Bedtime 90 tablet 3     magnesium oxide (MAG-OX) 400 (241.3 Mg) MG tablet Take 1 tablet (400 mg) by mouth daily 100 tablet 3     metFORMIN (GLUCOPHAGE) 500 MG tablet Take 2 tablets (1,000 mg) by mouth 2 times daily (with meals) 360 tablet 1     montelukast (SINGULAIR) 10 MG tablet Take 1 tablet (10 mg) by mouth At Bedtime 90 tablet 3     oxyCODONE (ROXICODONE) 5 MG tablet Take 1-2 tablets (5-10 mg) by mouth every 3 hours as needed 60 tablet 0     ranitidine (ZANTAC) 150 MG/10ML syrup Take 10 mLs (150 mg) by mouth 2 times daily 240 mL 1     spironolactone (ALDACTONE) 50 MG tablet Take 1 tablet (50 mg) by mouth daily 90 tablet 3     tiZANidine (ZANAFLEX) 2 MG tablet Take 1-2 tablets (2-4 mg) by mouth 3 times daily as needed for muscle spasms 60 tablet 1     WIXELA INHUB 250-50 MCG/DOSE inhaler Inhale 1 puff into the lungs every 12 hours       BP Readings from Last 3 Encounters:   04/08/19 136/89   04/07/19 124/71   03/29/19 140/85    Wt Readings from Last 3 Encounters:   04/08/19 129.5 kg (285 lb 6.4 oz)   04/07/19 129.2 kg (284 lb 12.8 oz)   03/29/19 129.8 kg  "(286 lb 3.2 oz)                    ROS:  Constitutional, HEENT, cardiovascular, pulmonary, gi and gu systems are negative, except as otherwise noted.    OBJECTIVE:     /89   Pulse 97   Temp 97.8  F (36.6  C) (Oral)   Ht 1.676 m (5' 6\")   Wt 129.5 kg (285 lb 6.4 oz)   LMP  (LMP Unknown)   SpO2 99%   BMI 46.06 kg/m    Body mass index is 46.06 kg/m .  GENERAL: healthy, alert and no distress  EYES: Eyes grossly normal to inspection, PERRL and conjunctivae and sclerae normal  HENT: ear canals and TM's normal, nose and mouth without ulcers or lesions  NECK: no adenopathy, no asymmetry, masses, or scars and thyroid normal to palpation  RESP: lungs clear to auscultation - no rales, rhonchi or wheezes  CV: regular rate and rhythm, normal S1 S2, no S3 or S4, no murmur, click or rub, no peripheral edema and peripheral pulses strong  ABDOMEN: soft, nontender, no hepatosplenomegaly, no masses and bowel sounds normal  MS: no gross musculoskeletal defects noted, no edema  SKIN: no suspicious lesions or rashes  NEURO: Normal strength and tone, mentation intact and speech normal  PSYCH: mentation appears normal, affect normal/bright  LYMPH: normal ant/post cervical, supraclavicular nodes    Diagnostic Test Results:  none     ASSESSMENT/PLAN:         BMI:   Estimated body mass index is 46.06 kg/m  as calculated from the following:    Height as of this encounter: 1.676 m (5' 6\").    Weight as of this encounter: 129.5 kg (285 lb 6.4 oz).   Weight management plan: Discussed healthy diet and exercise guidelines      1. S/P cervical spinal fusion  Explained that sore throat is not uncommon after surgery but that she needs to be swallowing (ice chips, jello, lukewarm broth) to help resolve the sore throat.  She denies choking.  We changed her dressing  Which had minimal serous discharge, incision is clean, dry, well approximated without surrounding erythema. Recommended she change her dressing daily until it is healed. She " states she is comfortable with doing this.    2. Type 2 diabetes mellitus with hyperglycemia, with long-term current use of insulin (H)  Be checking sugars mor often while you are recovering and not eating as usual.  Continue on 40 U Basaglar for now, we'll follow back toward the end of the week, review sugars (phone visit OK), and determine future insulin requirements.   - **A1C FUTURE anytime; Future    3.  Mixed hyperlipidemia  Due for labs.  - ALT; Future  - Lipid panel reflex to direct LDL Fasting; Future    4. Essential hypertension  BP at upper limit of acceptable but she is anxious today.  Recheck BP in 2 weeks.    5. Dyspepsia  Giving liquid Zantac for dyspepsia given swallowing difficulty, change to tabs after 2 weeks if still needed.  - ranitidine (ZANTAC) 150 MG/10ML syrup; Take 10 mLs (150 mg) by mouth 2 times daily  Dispense: 240 mL; Refill: 1    6. Screen for colon cancer    - Fecal colorectal cancer screen (FIT); Future    See Patient Instructions    RICCARDO Yates [facility]

## 2019-04-08 NOTE — TELEPHONE ENCOUNTER
Patient discharged from [facility] IP  ( Inpatient or ER).    Discharge location: [facility]  Discharge date: 4/7/19  Diagnosis: Cervical Radiculopathy, S/P Cervical Spinal Fusion  Patient has been in the ER/IP 1/1 times.  Care Coord:  NA  Please follow up as appropriate. If no follow up required, please close encounter.

## 2019-04-08 NOTE — PROGRESS NOTES
Transition Communication Hand-off for Care Transitions to Next Level of Care Provider    Name: Bettina Montes  : 1970  MRN #: [id number]  Primary Care Provider: Anuja Tenorio  Primary Care MD Name: Dr. Noah Tenorio  Primary Clinic: [address]  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     Discharge Plan: Discharge to home   Discharge Needs Assessment:  Needs      Most Recent Value   # of Referrals Placed by CTS  Communication hand-offs to next level of Care Providers        Follow-up plan:    Future Appointments   Date Time Provider Department Center   2019  3:00 PM Светлана Chase, APRN CNP [facility]   2019  1:00 PM Supriya Case NP [facility]   2019  9:00 AM [facility]   2019 10:00 AM Angle Reaves NP [facility]   2019  4:00 PM El Chinchilla MD [facility] [place]           Key Recommendations:  Met w/ pt at bedside to discuss her DM management and A1c of 11.7. Pt reports that after her last A1c check she found that she was administering her insulin incorrectly. Since learning this and adjusting the way she take her insulin, pt reports better BG values. Pt is supposed to check her BG 3 times a day, she reports that with insurance coverage her strips still cost $75 for 100 strips so she only checks her BG once daily in the AM. She reports an average AM BG of . She also has gone from eating out frequently to preparing  more meals at home. She has plans to visit with a Dietician which is available through her employer. Discussed monitoring her diet and checking BG three times daily, especially while recovering from her spinal fusion. Discussed goal of A1c under 8 and notifying provider if having BG >200. Also discussed the importance of follow-up w/ surgeon and PCP on discharge. Pt is receptive and understanding of this information.     Pt would benefit from ongoing clinic CC support due to uncontrolled DM2.     Maria C Hernandez    AVS/Discharge Summary is the source of truth; this is a helpful guide for improved communication of patient story

## 2019-04-08 NOTE — TELEPHONE ENCOUNTER
Patient was hospitalized for surgery of her cervical area. Patient will be contacted by surgeon's office for post-op call. Closing this encounter.        Chelle Emanuel RN, BSN

## 2019-04-08 NOTE — PATIENT INSTRUCTIONS
.brady    At [facility], we strive to deliver an exceptional experience to you, every time we see you.  If you receive a survey in the mail, please send us back your thoughts. We really do value your feedback.    Your care team:                            Family Medicine Internal Medicine   MD Fran Gabriel MD Shantel Branch-Fleming, MD Katya Georgiev PA-C Megan Hill, APRN SHAZIA Robbins, MD Pediatrics   ETIENNE Dewitt, MD Darby Ortiz APRN CNP   MD Latrice Piña MD Deborah Mielke, MD Lona Chase, APRN [facility]      Clinic hours: Monday - Thursday 7 am-7 pm; Fridays 7 am-5 pm.   Urgent care: Monday - Friday 11 am-9 pm; Saturday and Sunday 9 am-5 pm.  Pharmacy : Monday -Thursday 8 am-8 pm; Friday 8 am-6 pm; Saturday and Sunday 9 am-5 pm.     Clinic: [phone]   Pharmacy: [phone]        Patient Education     Discharge Instructions for Cervical Fusion  You had a cervical fusion. During this procedure, your healthcare provider locked together (fused) some of the bones in the curve of your neck. This limits the movement of these bones to help relieve your pain. Here s what you need to know about home care following a cervical fusion.  Activity  Do's and don'ts include:     Arrange your household to keep the items you need within reach.    Remove electrical cords, throw rugs, and anything else that may cause you to fall.    Follow your healthcare provider s instructions for wearing a cervical collar or brace. The neck collar or brace is important because it supports and correctly positions your neck after surgery. Be sure to follow instructions for its care, use, and the length of time you must wear it.    Don t bend or twist at the waist, or raise your hands over your head for 2 week(s) after your surgery.    Don t drive until your healthcare provider says it s OK. This will most likely be when you can  move your neck from side to side freely and without pain. Never drive while you are taking opioid pain medicine.    Walk as much as possible. You may also go up and down stairs as much as you can tolerate. Walking outside or walking on a treadmill at a slow speed with no incline is OK.    Don t lift anything heavier than 5 pounds.    Ask your healthcare provider when you can return to work.  Other home care  Additional tips include:     Take your medicine exactly as directed. Talk to your healthcare provider about pain medicine.    Don t take nonsteroidal anti-inflammatory medicines (NSAIDs), such as aspirin and ibuprofen unless your healthcare provider approves. They may delay or prevent proper fusion of bone.    Wait 5 to 7 days after your surgery to start showering. Then shower as needed. You may be instructed to use a neck collar while you shower. If so, carefully remove it when you finish showering. Then keep your neck correctly positioned as you gently pat dry your skin, the incision, and the neck collar. Then put the neck collar back on. Don t rub the incision, or apply creams or lotions on it.    Don t soak in bathtubs, hot tubs, or swimming pools until instructed by your healthcare provider.    Your incision may have been closed using sutures, staples, or strips of tape. If you have sutures or staples they may need to be removed 2 to 3 weeks after surgery. You can allow strips of tape to fall off on their own.    If you smoke, quit. Nicotine from any source (cigarettes, patches, chewing tobacco) slows healing of bone and you may need more surgery. Enroll in a stop-smoking program to improve your chances of success.  Follow-up    Make a follow-up appointment.    Keep appointments for X-rays. They will be taken often to check the status of the cervical fusion.  Call 911  Call 911 right away if you have any of the following:    Chest pain    Shortness of breath    Trouble controlling your bowels or  bladder    Painful calf that is warm to the touch and tender with pressure  When to call your healthcare provider  Call your healthcare provider right away if you have any of the following:    Drainage, redness, or warmth at the incision    Fever of 100.4 F (38 C) or higher, or as directed by your healthcare provider    Shaking chills    Weakness, tingling, or any new numbness in your arms or legs    Increased pain    Trouble swallowing   Date Last Reviewed: 4/1/2018 2000-2018 [employer]. [address] [place] [address]. All rights reserved. This information is not intended as a substitute for professional medical care. Always follow your healthcare professional's instructions.           Patient Education     Spinal Fusion: Recovery     Keep all your follow-up appointments after your surgery.     If you ve had neck surgery, recovery takes about 3 months. For lower back surgery, recovery takes about 6 months to a year. To help protect your healing spine during this time, follow the guidelines below and any other directions you ve been given.  Recovering in the hospital  After the surgery, you ll go to the PACU (postanesthesia care unit). After you are fully awake and stable, you ll go to your room:    When you first wake up from surgery, you may feel groggy, thirsty, or cold.    You may have tubes in your body to drain fluid from your incision. You may also have a tube called a catheter to drain your bladder. These tubes are usually removed before you leave the hospital.    You ll be encouraged to get up and walk.    Your intravenous (IV) gives you fluids and nutrition until you can eat on your own, usually within a 1 to 2 days.    You may wear special stockings or boots to prevent blood clots in your legs.    You may be given a neck collar or back brace.    You will be given medicines to control the pain.  Recovering at home  Once you return home, here is what you can do:    Visits after  surgery let your surgeon keep track of your healing. Be sure to keep your follow-up appointments.    Take a few short walks each day. Increase your walking time as you heal, as directed.     If you feel more pain than usual after an activity, you may have overdone it. Take it a little easier for a few hours.    Ask your surgeon what activities to avoid. Also ask when you can return to work, driving, and sex.    You may see a physical therapist who will teach you how to move after surgery.  Managing your pain  In the hospital, your nurse may give you your pain medicine. Or you may have a PCA (patient-controlled analgesia) pump. This allows you to control your own pain medicine. If your pain makes you very uncomfortable, tell your nurse.  At home, take your prescribed pain medicine as directed and on time. Don t wait for the pain to get bad before you take your pain medicine. You may need this medicine for 1 to 3 weeks or longer. Ask your doctor about the use of non-steroidal anti-inflammatory medicines (NSAIDs) for pain control. There is some controversy as to whether they slow the healing of spinal fusion. If you are diabetic, you will be encouraged to control your blood sugar. Nicotine blocks new bone formation so your doctor will ask you not to smoke or use any nicotine products for 6 to 12 months.   Date Last Reviewed: 5/1/2018 2000-2018 [employer]. [address] [place] [address], [place] [address]. All rights reserved. This information is not intended as a substitute for professional medical care. Always follow your healthcare professional's instructions.

## 2019-04-11 ENCOUNTER — TELEPHONE (OUTPATIENT)
Dept: NEUROSURGERY | Facility: CLINIC | Age: 49
End: 2019-04-11

## 2019-04-11 NOTE — DISCHARGE SUMMARY
[facility]    Discharge Summary  Neurosurgery    Date of Admission:  4/5/2019  Date of Discharge:  4/7/2019  2:01 PM  Discharging Provider: Hair Rock  Date of Service (when I saw the patient): 4-7-19    Discharge Diagnoses   Active Problems:    S/P cervical spinal fusion      History of Present Illness   Bettina Montes is an 49 year old female who presented for elective spine surgery.  She underwent a C4-6 ACDF with Dr. Hurtado, after failing conservative treatment.    Hospital Course   Bettina Montes was admitted on 4/5/2019.  The following problems were addressed during her hospitalization:    Active Problems:    S/P cervical spinal fusion    Assessment: She did well, with good pain control.  She was tolerating her diet and ambulating independently.    Plan: She was discharged home in good condition.        I have discussed the following assessment and plan with Dr. Hurtado, who is in agreement with initial plan and will follow up with further consultation recommendations.    [facility]  [facility]  [place] [address]    Tel [phone]  Pager [phone]        Significant Results and Procedures       Pending Results   These results will be followed up by   Unresulted Labs Ordered in the Past 30 Days of this Admission     No orders found from 2/4/2019 to 4/6/2019.          Code Status   Full Code    Primary Care Physician   Anuja Tenorio    Physical Exam                      Vitals:    04/05/19 0613 04/06/19 0640 04/07/19 0436   Weight: 130.2 kg (287 lb) 129.2 kg (284 lb 14.4 oz) 129.2 kg (284 lb 12.8 oz)     Vital Signs with Ranges     No intake/output data recorded.    Constitutional: Awake, alert, cooperative, no apparent distress, and appears stated age.  Eyes: Lids and lashes normal, pupils equal, round and reactive to light, extra ocular muscles intact  ENT: Normocephalic, without obvious  abnormality, atraumatic  Respiratory: No increased work of breathing  Skin: No bruising or bleeding, normal skin color, texture, turgor, no redness, warmth, or swelling.  Incision with staples intact, minimal drainage, open to air.  Musculoskeletal: There is no redness, warmth, or swelling of the joints.  Full range of motion noted.  Motor strength is 5 out of 5 all extremities bilaterally.  Tone is normal.  Neurologic: Awake, alert, oriented to name, place and time.  Cranial nerves II-XII are grossly intact.  Motor is 5 out of 5 bilaterally.     Neuropsychiatric: Calm, normal eye contact, alert, normal affect, oriented to self, place, time and situation, memory for past and recent events intact and thought process normal.       Time Spent on this Encounter   I, aHir Rock, personally saw the patient today and spent less than or equal to 30 minutes discharging this patient.    Discharge Disposition   Discharged to home  Condition at discharge: Good    Consultations This Hospital Stay   OCCUPATIONAL THERAPY ADULT IP CONSULT  PHYSICAL THERAPY ADULT IP CONSULT  HOSPITALIST IP CONSULT  CARE COORDINATOR IP CONSULT    Discharge Orders      Reason for your hospital stay    Neck surgery     Follow-up and recommended labs and tests     [facility] 6 weeks with new xrays prior.     Activity    Your activity upon discharge: activity as tolerated, no lifting more than 10 lbs.     Wound care and dressings    Instructions to care for your wound at home: keep wound clean and dry and may get incision wet in shower but do not soak or scrub.     Discharge Instructions    Discharge instructions:  No lifting of more than 10 pounds until follow up visit.  Ok to shampoo hair, shower or bathe, but, do not scrub or submerge incision under water until evaluated post operatively in clinic.    Ok to walk as tolerated, avoid bedrest.    No contact sports until after follow up visit  No high impact activities such as;  running/jogging, snowmobile or 4 chandler riding or any other recreational vehicles    Call my office at [phone] for increasing redness, swelling or pus draining from the incision, increased pain or any other questions and concerns.    Go to ER with any seizure activity, mental status change (increasing confusion), difficulty with speech or increasing or acute weakness     Full Code     Diet    Follow this diet upon discharge: Advance to a regular diet as tolerated     Discharge Medications   Discharge Medication List as of 4/7/2019  1:22 PM      START taking these medications    Details   oxyCODONE (ROXICODONE) 5 MG tablet Take 1-2 tablets (5-10 mg) by mouth every 3 hours as needed, Disp-60 tablet, R-0, Local Print         CONTINUE these medications which have CHANGED    Details   diazepam (VALIUM) 5 MG tablet Take 1-2 tablets (5-10 mg) by mouth every 6 hours as needed for muscle spasms, Disp-40 tablet, R-1, Local Print      tiZANidine (ZANAFLEX) 2 MG tablet Take 1-2 tablets (2-4 mg) by mouth 3 times daily as needed for muscle spasms, Disp-60 tablet, R-1, E-Prescribe         CONTINUE these medications which have NOT CHANGED    Details   BETA BLOCKER NOT PRESCRIBED, INTENTIONAL, Beta Blocker not prescribed intentionally due to Severe Asthma, R-0, No Print Out      blood glucose (NO BRAND SPECIFIED) test strip Use to test blood sugar 3-4 times daily or as directed., Disp-200 strip, R-1, Local Print      carvedilol (COREG) 6.25 MG tablet Take 1 tablet (6.25 mg) by mouth 2 times daily (with meals), Disp-60 tablet, R-3, E-Prescribe      Cholecalciferol (VITAMIN D3 PO) Take 2,000 Units by mouth daily, Historical      hydrALAZINE (APRESOLINE) 25 MG tablet Take 3 tablets (75 mg) by mouth 2 times daily, Disp-180 tablet, R-5, E-PrescribeProfile Rx: patient will contact pharmacy when needed      insulin glargine (BASAGLAR KWIKPEN) 100 UNIT/ML pen Inject 65 Units Subcutaneous At Bedtime, Disp-60 mL, R-1, E-PrescribeProfile  Rx: patient will contact pharmacy when needed      insulin pen needle (32G X 4 MM) 32G X 4 MM miscellaneous Use 1 pen needles daily or as directed.Disp-100 each, [id number]      levalbuterol (XOPENEX HFA) 45 MCG/ACT Inhaler Inhale 2 puffs into the lungs every 4 hours as needed for shortness of breath / dyspnea or wheezing, Disp-15 g, R-3, E-Prescribe      losartan (COZAAR) 50 MG tablet Take 1 tablet (50 mg) by mouth daily, Disp-90 tablet, R-1, E-PrescribeProfile Rx: patient will contact pharmacy when needed      lovastatin (MEVACOR) 20 MG tablet Take 1 tablet (20 mg) by mouth At Bedtime, Disp-90 tablet, R-3, E-Prescribe      magnesium oxide (MAG-OX) 400 (241.3 Mg) MG tablet Take 1 tablet (400 mg) by mouth daily, Disp-100 tablet, R-3, E-Prescribe      metFORMIN (GLUCOPHAGE) 500 MG tablet Take 2 tablets (1,000 mg) by mouth 2 times daily (with meals), Disp-360 tablet, R-1, E-PrescribeProfile Rx: patient will contact pharmacy when needed      montelukast (SINGULAIR) 10 MG tablet Take 1 tablet (10 mg) by mouth At Bedtime, Disp-90 tablet, R-3, E-Prescribe      spironolactone (ALDACTONE) 50 MG tablet Take 1 tablet (50 mg) by mouth daily, Disp-90 tablet, R-3, E-Prescribe      WIXELA INHUB 250-50 MCG/DOSE inhaler Inhale 1 puff into the lungs every 12 hours, CINDY, Historical         STOP taking these medications       traMADol (ULTRAM) 50 MG tablet Comments:   Reason for Stopping:             Allergies   Allergies   Allergen Reactions     Amlodipine Swelling     Edema on 5mg throat     Lisinopril      Swelling in throat, face  angioedema     Naprosyn [Naproxen]      Swelling, diff breathing

## 2019-04-12 DIAGNOSIS — Z12.11 SCREEN FOR COLON CANCER: ICD-10-CM

## 2019-04-12 LAB — HEMOCCULT STL QL IA: POSITIVE

## 2019-04-12 PROCEDURE — 82274 ASSAY TEST FOR BLOOD FECAL: CPT | Performed by: NURSE PRACTITIONER

## 2019-05-02 ENCOUNTER — TELEPHONE (OUTPATIENT)
Dept: NEUROSURGERY | Facility: CLINIC | Age: 49
End: 2019-05-02

## 2019-05-02 DIAGNOSIS — Z98.1 S/P CERVICAL SPINAL FUSION: Primary | ICD-10-CM

## 2019-05-02 NOTE — TELEPHONE ENCOUNTER
Patient is s/p C4-6 ACDF by Dr Hurtado 4/5/19. Patient reports continued LUE weakness. Stated its not getting worse but not any better. Discussed with patient that we can order PT with [facility] to work on strengthening exercises for her left arm. Pain is under control.Patient has stopped taking her oxycodone and valium. She is only taking tylenol and tizanidine at HS. Her 6 week follow-up appt is scheduled with Supriya Case CNP on 5/16/19 at  location. Patient wishes to continue care here at [facility]. She said with her case being Work Comp it would be too difficult to transfer over to [facility] to continue care with Dr Hurtado. Patient is in agreement with starting PT. She will contact clinic if any further concerns in the interim. Order placed in [email or web address]. [facility] will call her to schedule.

## 2019-05-03 ENCOUNTER — HEALTH MAINTENANCE LETTER (OUTPATIENT)
Age: 49
End: 2019-05-03

## 2019-05-06 ENCOUNTER — THERAPY VISIT (OUTPATIENT)
Dept: PHYSICAL THERAPY | Facility: CLINIC | Age: 49
End: 2019-05-06
Payer: OTHER MISCELLANEOUS

## 2019-05-06 DIAGNOSIS — M54.2 NECK PAIN: ICD-10-CM

## 2019-05-06 DIAGNOSIS — Z98.1 S/P SPINAL FUSION: ICD-10-CM

## 2019-05-06 DIAGNOSIS — G89.29 CHRONIC LEFT SHOULDER PAIN: ICD-10-CM

## 2019-05-06 DIAGNOSIS — Z98.1 S/P CERVICAL SPINAL FUSION: ICD-10-CM

## 2019-05-06 DIAGNOSIS — M25.512 CHRONIC LEFT SHOULDER PAIN: ICD-10-CM

## 2019-05-06 PROCEDURE — 97110 THERAPEUTIC EXERCISES: CPT | Mod: GP | Performed by: PHYSICAL THERAPIST

## 2019-05-06 PROCEDURE — 97161 PT EVAL LOW COMPLEX 20 MIN: CPT | Mod: GP | Performed by: PHYSICAL THERAPIST

## 2019-05-06 NOTE — PROGRESS NOTES
[facility] Initial Evaluation  Subjective:  The history is provided by the patient.   Bettina Montes is a 49 year old female with a cervical spine condition.  Condition occurred with:  Lifting.  Condition occurred: at work.  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 She subsequently had a C4 through C6 fusion on 4/5/19, anterior approach. She continues to have weakness in her L arm and she also still has L arm pain. She is not scheduled until 5/16/19 for her 6 week post-op recheck with the neurosurgeon..    Patient reports pain:  Cervical left side, lower cervical spine, mid cervical spine and upper cervical spine.  Radiates to:  Shoulder left, upper arm left, lower arm left and hand left.  Pain is described as aching and is intermittent and reported as 6/10 (9/10 at worst).  Associated symptoms:  Headache, loss of motion/stiffness, numbness, tingling and loss of strength. Pain is the same all the time.  Symptoms are exacerbated by change of position, sitting, lying down, lifting, certain positions, looking up or down and rotating head and relieved by bracing/immobilizing, muscle relaxants and rest.  Since onset symptoms are unchanged.  Special tests:  X-ray.      General health as reported by patient is fair.  Pertinent medical history includes:  Asthma, diabetes, heart problems, high blood pressure, numbness/tingling and overweight (vertigo).  Medical allergies: yes (see in Epic).  Other surgeries include:  Other (tubal ligation).  Current medications:  Muscle relaxants, high blood pressure medication, cardiac medication and pain medication.  Current occupation is CNA.  Patient is currently not working due to present treatment problem.  Primary job tasks include:  Prolonged standing and lifting (pushing/pulling).    Barriers include:  None as reported by the patient.    Red flags:  None as reported by the  patient.                        Objective:  Standing Alignment:    Cervical/thoracic deviations alignment: wearing a soft cervical collar.  Shoulder/UE:  Rounded shoulders (moderate)                                  Cervical/Thoracic Evaluation    AROM:  AROM Cervical:    Flexion:            90% of NL  Extension:       15% w/pain+  Rotation:         Left: 50% w/pain     Right: 50% w/pain  Side Bend:      Left: 40% w/pain     Right:  40% w/pain    Strength: L RC weakness: Gr 3/5 for flex, 2+/5 for abd, 3-/5 for ER, 3/5 for IR, biceps had 4-/5    Cervical Myotomes:        C4 (shrug):  Left: 5-      C5 (Deltoid):  Left: 4+      C6 (Biceps):  Left: 4+      C7 (Triceps):  Left: 5-          DTR's:  not assessed          Cervical Dermatomes:  not assessed                    Cervical Palpation:  : most tender on L supraspinatus and biceps tendon, mildly tender on L infraspinatus.  Tenderness present at Left:    Scalenes; Upper Trap; Levator; Erector Spinae and Suboccipitals  Tenderness present at Right:    Upper Trap; Levator; Erector Spinae and Suboccipitals  Tenderness not present at Right:      Scalenes                                                  General     ROS    Assessment/Plan:    Patient is a 49 year old female with cervical and left side shoulder complaints.    Patient has the following significant findings with corresponding treatment plan.                Diagnosis 1:  S/p cervical fusion of C4-C6  Pain -  hot/cold therapy, self management, education and home program  Decreased ROM/flexibility - manual therapy and therapeutic exercise  Decreased strength - therapeutic exercise and therapeutic activities  Impaired muscle performance - neuro re-education  Decreased function - therapeutic activities  Impaired posture - neuro re-education and therapeutic activities    Therapy Evaluation Codes:   1) History comprised of:   Personal factors that impact the plan of care:      Past/current experiences and Time since  onset of symptoms.    Comorbidity factors that impact the plan of care are:      Asthma, Diabetes, Dizziness, Heart problems, High blood pressure, Numbness/tingling, Overweight and Weakness.     Medications impacting care: Cardiac, High blood pressure, Muscle relaxant and Pain.  2) Examination of Body Systems comprised of:   Body structures and functions that impact the plan of care:      Cervical spine and Shoulder.   Activity limitations that impact the plan of care are:      Bathing, Driving, Dressing, Lifting, Sitting, Working, Sleeping and Laying down.  3) Clinical presentation characteristics are:   Evolving/Changing.  4) Decision-Making    Moderate complexity using standardized patient assessment instrument and/or measureable assessment of functional outcome.  Cumulative Therapy Evaluation is: Moderate complexity.    Previous and current functional limitations:  (See Goal Flow Sheet for this information)    Short term and Long term goals: (See Goal Flow Sheet for this information)     Communication ability:  Patient appears to be able to clearly communicate and understand verbal and written communication and follow directions correctly.  Treatment Explanation - The following has been discussed with the patient:   RX ordered/plan of care  Anticipated outcomes  Possible risks and side effects  This patient would benefit from PT intervention to resume normal activities.   Rehab potential is good.    Frequency:  2 X week, once daily  Duration:  for 3 weeks tapering to 1 X a week over 6 weeks  Discharge Plan:  Achieve all LTG.  Independent in home treatment program.  Reach maximal therapeutic benefit.    Please refer to the daily flowsheet for treatment today, total treatment time and time spent performing 1:1 timed codes.

## 2019-05-13 ENCOUNTER — TELEPHONE (OUTPATIENT)
Dept: CARDIOLOGY | Facility: CLINIC | Age: 49
End: 2019-05-13

## 2019-05-13 ENCOUNTER — OFFICE VISIT (OUTPATIENT)
Dept: FAMILY MEDICINE | Facility: CLINIC | Age: 49
End: 2019-05-13
Payer: COMMERCIAL

## 2019-05-13 VITALS
HEART RATE: 94 BPM | HEIGHT: 66 IN | TEMPERATURE: 97.8 F | OXYGEN SATURATION: 97 % | SYSTOLIC BLOOD PRESSURE: 122 MMHG | WEIGHT: 284.8 LBS | DIASTOLIC BLOOD PRESSURE: 78 MMHG | BODY MASS INDEX: 45.77 KG/M2

## 2019-05-13 DIAGNOSIS — Z01.818 PREOP GENERAL PHYSICAL EXAM: ICD-10-CM

## 2019-05-13 DIAGNOSIS — J45.40 MODERATE PERSISTENT ASTHMA WITHOUT COMPLICATION: ICD-10-CM

## 2019-05-13 DIAGNOSIS — I42.8 NONISCHEMIC CARDIOMYOPATHY (H): ICD-10-CM

## 2019-05-13 DIAGNOSIS — E11.65 TYPE 2 DIABETES MELLITUS WITH HYPERGLYCEMIA, WITH LONG-TERM CURRENT USE OF INSULIN (H): ICD-10-CM

## 2019-05-13 DIAGNOSIS — K92.1 BLOOD IN STOOL: Primary | ICD-10-CM

## 2019-05-13 DIAGNOSIS — E66.01 MORBID OBESITY DUE TO EXCESS CALORIES (H): ICD-10-CM

## 2019-05-13 DIAGNOSIS — I42.8 NONISCHEMIC CARDIOMYOPATHY (H): Primary | ICD-10-CM

## 2019-05-13 DIAGNOSIS — Z98.1 S/P CERVICAL SPINAL FUSION: Primary | ICD-10-CM

## 2019-05-13 DIAGNOSIS — Z12.11 SCREEN FOR COLON CANCER: ICD-10-CM

## 2019-05-13 DIAGNOSIS — Z79.4 TYPE 2 DIABETES MELLITUS WITH HYPERGLYCEMIA, WITH LONG-TERM CURRENT USE OF INSULIN (H): ICD-10-CM

## 2019-05-13 LAB
ALBUMIN SERPL-MCNC: 3.8 G/DL (ref 3.4–5)
ALP SERPL-CCNC: 104 U/L (ref 40–150)
ALT SERPL W P-5'-P-CCNC: 20 U/L (ref 0–50)
ANION GAP SERPL CALCULATED.3IONS-SCNC: 6 MMOL/L (ref 3–14)
AST SERPL W P-5'-P-CCNC: 13 U/L (ref 0–45)
BILIRUB SERPL-MCNC: 0.6 MG/DL (ref 0.2–1.3)
BUN SERPL-MCNC: 11 MG/DL (ref 7–30)
CALCIUM SERPL-MCNC: 9.2 MG/DL (ref 8.5–10.1)
CHLORIDE SERPL-SCNC: 103 MMOL/L (ref 94–109)
CHOLEST SERPL-MCNC: 139 MG/DL
CO2 SERPL-SCNC: 29 MMOL/L (ref 20–32)
CREAT SERPL-MCNC: 0.84 MG/DL (ref 0.52–1.04)
ERYTHROCYTE [DISTWIDTH] IN BLOOD BY AUTOMATED COUNT: 14.4 % (ref 10–15)
GFR SERPL CREATININE-BSD FRML MDRD: 81 ML/MIN/{1.73_M2}
GLUCOSE SERPL-MCNC: 81 MG/DL (ref 70–99)
HBA1C MFR BLD: 7.7 % (ref 0–5.6)
HCT VFR BLD AUTO: 35.4 % (ref 35–47)
HDLC SERPL-MCNC: 55 MG/DL
HGB BLD-MCNC: 11.6 G/DL (ref 11.7–15.7)
LDLC SERPL CALC-MCNC: 63 MG/DL
MCH RBC QN AUTO: 27.4 PG (ref 26.5–33)
MCHC RBC AUTO-ENTMCNC: 32.8 G/DL (ref 31.5–36.5)
MCV RBC AUTO: 84 FL (ref 78–100)
NONHDLC SERPL-MCNC: 84 MG/DL
PLATELET # BLD AUTO: 384 10E9/L (ref 150–450)
POTASSIUM SERPL-SCNC: 3.9 MMOL/L (ref 3.4–5.3)
PROT SERPL-MCNC: 8 G/DL (ref 6.8–8.8)
RBC # BLD AUTO: 4.24 10E12/L (ref 3.8–5.2)
SODIUM SERPL-SCNC: 138 MMOL/L (ref 133–144)
TRIGL SERPL-MCNC: 104 MG/DL
TSH SERPL DL<=0.005 MIU/L-ACNC: 0.71 MU/L (ref 0.4–4)
WBC # BLD AUTO: 9.5 10E9/L (ref 4–11)

## 2019-05-13 PROCEDURE — 85027 COMPLETE CBC AUTOMATED: CPT | Performed by: FAMILY MEDICINE

## 2019-05-13 PROCEDURE — 83036 HEMOGLOBIN GLYCOSYLATED A1C: CPT | Performed by: FAMILY MEDICINE

## 2019-05-13 PROCEDURE — 84443 ASSAY THYROID STIM HORMONE: CPT | Performed by: FAMILY MEDICINE

## 2019-05-13 PROCEDURE — 36415 COLL VENOUS BLD VENIPUNCTURE: CPT | Performed by: FAMILY MEDICINE

## 2019-05-13 PROCEDURE — 80053 COMPREHEN METABOLIC PANEL: CPT | Performed by: FAMILY MEDICINE

## 2019-05-13 PROCEDURE — 99215 OFFICE O/P EST HI 40 MIN: CPT | Performed by: FAMILY MEDICINE

## 2019-05-13 PROCEDURE — 80061 LIPID PANEL: CPT | Performed by: FAMILY MEDICINE

## 2019-05-13 ASSESSMENT — ANXIETY QUESTIONNAIRES
2. NOT BEING ABLE TO STOP OR CONTROL WORRYING: NOT AT ALL
IF YOU CHECKED OFF ANY PROBLEMS ON THIS QUESTIONNAIRE, HOW DIFFICULT HAVE THESE PROBLEMS MADE IT FOR YOU TO DO YOUR WORK, TAKE CARE OF THINGS AT HOME, OR GET ALONG WITH OTHER PEOPLE: NOT DIFFICULT AT ALL
5. BEING SO RESTLESS THAT IT IS HARD TO SIT STILL: NOT AT ALL
3. WORRYING TOO MUCH ABOUT DIFFERENT THINGS: NOT AT ALL
GAD7 TOTAL SCORE: 0
1. FEELING NERVOUS, ANXIOUS, OR ON EDGE: NOT AT ALL
6. BECOMING EASILY ANNOYED OR IRRITABLE: NOT AT ALL
7. FEELING AFRAID AS IF SOMETHING AWFUL MIGHT HAPPEN: NOT AT ALL

## 2019-05-13 ASSESSMENT — PATIENT HEALTH QUESTIONNAIRE - PHQ9
5. POOR APPETITE OR OVEREATING: NOT AT ALL
SUM OF ALL RESPONSES TO PHQ QUESTIONS 1-9: 3

## 2019-05-13 ASSESSMENT — MIFFLIN-ST. JEOR: SCORE: 1928.59

## 2019-05-13 ASSESSMENT — PAIN SCALES - GENERAL: PAINLEVEL: NO PAIN (0)

## 2019-05-13 NOTE — PROGRESS NOTES
[address] [address]  [phone]  Dept: [phone]    PRE-OP EVALUATION:  Today's date: 2019    Bettina Montes (: 1970) presents for pre-operative evaluation assessment as requested by .  She requires evaluation and anesthesia risk assessment prior to undergoing surgery/procedure for treatment of blood in stool.    Proposed Surgery/ Procedure: Hospital Colonoscopy  Date of Surgery/ Procedure: 19  Time of Surgery/ Procedure: 830am  Hospital/Surgical Facility: [facility]  Fax number for surgical facility: onfile  Primary Physician: Anuja Bruce  Type of Anesthesia Anticipated: Local    Patient has a Health Care Directive or Living Will:  NO    1. NO - Do you have a history of heart attack, stroke, stent, bypass or surgery on an artery in the head, neck, heart or legs?  2. NO - Do you ever have any pain or discomfort in your chest?  3. NO - Do you have a history of  Heart Failure?  4. YES - ARE YOUR TROUBLED BY SHORTNESS OF BREATH WHEN WALKING ON THE LEVEL, UP A SLIGHT HILL OR AT NIGHT? Stable  5. YES - DO YOU CURRENTLY HAVE A COLD, BRONCHITIS OR OTHER RESPIRATORY INFECTION? Cold  6. YES - DO YOU HAVE A COUGH, SHORTNESS OF BREATH OR WHEEZING? Due to allergy/asthma - stable  7. NO - Do you sometimes get pains in the calves of your legs when you walk?  8. NO - Do you or anyone in your family have previous history of blood clots?  9. NO - Do you or does anyone in your family have a serious bleeding problem such as prolonged bleeding following surgeries or cuts?  10. NO - Have you ever had problems with anemia or been told to take iron pills?  11. NO - Have you had any abnormal blood loss such as black, tarry or bloody stools, or abnormal vaginal bleeding?  12. NO - Have you ever had a blood transfusion?  13. NO - Have you or any of your relatives ever had problems with anesthesia?  14. NO - Do you have  sleep apnea, excessive snoring or daytime drowsiness?  15. NO - Do you have any prosthetic heart valves?  16. NO - Do you have prosthetic joints?  17. NO - Is there any chance that you may be pregnant?      HPI:     HPI related to upcoming procedure: positive FIT test with colonoscopy recommended.  Unable to have in ACS due to BMI so schedule at [facility] and preop required.      See problem list for active medical problems.  Problems all longstanding and stable, except as noted/documented.  See ROS for pertinent symptoms related to these conditions.                                                                                                                                                          .    MEDICAL HISTORY:     Patient Active Problem List    Diagnosis Date Noted     Neck pain 05/06/2019     Priority: Medium     S/P spinal fusion 05/06/2019     Priority: Medium     Chronic left shoulder pain 05/06/2019     Priority: Medium     S/P cervical spinal fusion 04/05/2019     Priority: Medium     Nonischemic cardiomyopathy (H) 03/20/2019     Priority: Medium     Callus of foot 10/10/2018     Priority: Medium     Moderate persistent asthma without complication 05/07/2018     Priority: Medium     Adrenal gland anomaly      Priority: Medium     Microscopic hematuria 01/18/2017     Priority: Medium     Intermittent asthma, uncomplicated 01/09/2017     Priority: Medium     Morbid obesity due to excess calories (H) 03/21/2016     Priority: Medium     Hypertension      Priority: Medium     Type 2 diabetes mellitus (H)      Priority: Medium     CHF (congestive heart failure) (H)      Priority: Medium     EF 45%       Hyperlipidemia      Priority: Medium      Past Medical History:   Diagnosis Date     Adrenal gland anomaly      CHF (congestive heart failure) (H)      Fatty liver      Gastroesophageal reflux disease      Hyperlipidemia      Hypertension      Mild persistent asthma      Type 2 diabetes mellitus (H)       Past Surgical History:   Procedure Laterality Date     COLONOSCOPY       DISCECTOMY, FUSION CERVICAL ANTERIOR TWO LEVELS, COMBINED N/A 4/5/2019    Procedure: C4-6 anterior cervical discectomy and fusion;  Surgeon: Hollis Hurtado MD;  Location: [facility] OR     TUBAL LIGATION       Current Outpatient Medications   Medication Sig Dispense Refill     BETA BLOCKER NOT PRESCRIBED, INTENTIONAL, Beta Blocker not prescribed intentionally due to Severe Asthma  0     blood glucose (NO BRAND SPECIFIED) test strip Use to test blood sugar 3-4 times daily or as directed. 200 strip 1     carvedilol (COREG) 6.25 MG tablet Take 1 tablet (6.25 mg) by mouth 2 times daily (with meals) 60 tablet 3     Cholecalciferol (VITAMIN D3 PO) Take 2,000 Units by mouth daily       diazepam (VALIUM) 5 MG tablet Take 1-2 tablets (5-10 mg) by mouth every 6 hours as needed for muscle spasms 40 tablet 1     hydrALAZINE (APRESOLINE) 25 MG tablet Take 3 tablets (75 mg) by mouth 2 times daily 180 tablet 5     insulin glargine (BASAGLAR KWIKPEN) 100 UNIT/ML pen Inject 65 Units Subcutaneous At Bedtime 60 mL 1     insulin pen needle (32G X 4 MM) 32G X 4 MM miscellaneous Use 1 pen needles daily or as directed. 100 each 3     levalbuterol (XOPENEX HFA) 45 MCG/ACT Inhaler Inhale 2 puffs into the lungs every 4 hours as needed for shortness of breath / dyspnea or wheezing 15 g 3     losartan (COZAAR) 50 MG tablet Take 1 tablet (50 mg) by mouth daily 90 tablet 1     lovastatin (MEVACOR) 20 MG tablet Take 1 tablet (20 mg) by mouth At Bedtime 90 tablet 3     magnesium oxide (MAG-OX) 400 (241.3 Mg) MG tablet Take 1 tablet (400 mg) by mouth daily 100 tablet 3     metFORMIN (GLUCOPHAGE) 500 MG tablet Take 2 tablets (1,000 mg) by mouth 2 times daily (with meals) 360 tablet 1     montelukast (SINGULAIR) 10 MG tablet Take 1 tablet (10 mg) by mouth At Bedtime 90 tablet 3     oxyCODONE (ROXICODONE) 5 MG tablet Take 1-2 tablets (5-10 mg) by mouth every 3 hours as  "needed 60 tablet 0     ranitidine (ZANTAC) 150 MG/10ML syrup Take 10 mLs (150 mg) by mouth 2 times daily 240 mL 1     spironolactone (ALDACTONE) 50 MG tablet Take 1 tablet (50 mg) by mouth daily 90 tablet 3     tiZANidine (ZANAFLEX) 2 MG tablet Take 1-2 tablets (2-4 mg) by mouth 3 times daily as needed for muscle spasms 60 tablet 1     WIXELA INHUB 250-50 MCG/DOSE inhaler Inhale 1 puff into the lungs every 12 hours       OTC products: None, except as noted above    Allergies   Allergen Reactions     Amlodipine Swelling     Edema on 5mg throat     Lisinopril      Swelling in throat, face  angioedema     Naprosyn [Naproxen]      Swelling, diff breathing      Latex Allergy: NO    Social History     Tobacco Use     Smoking status: Former Smoker     Packs/day: 1.00     Years: 30.00     Pack years: 30.00     Types: Cigarettes     Last attempt to quit: 2013     Years since quittin.3     Smokeless tobacco: Former User     Tobacco comment:     Substance Use Topics     Alcohol use: Yes     Alcohol/week: 0.0 oz     Comment: 4-5 drinks per week     History   Drug Use No       REVIEW OF SYSTEMS:   Constitutional, neuro, ENT, endocrine, pulmonary, cardiac, gastrointestinal, genitourinary, musculoskeletal, integument and psychiatric systems are negative, except as otherwise noted.    EXAM:   /78 (BP Location: Left arm, Patient Position: Chair, Cuff Size: Adult Large)   Pulse 94   Temp 97.8  F (36.6  C) (Oral)   Ht 1.676 m (5' 6\")   Wt 129.2 kg (284 lb 12.8 oz)   LMP  (LMP Unknown)   SpO2 97%   Breastfeeding? No   BMI 45.97 kg/m      GENERAL APPEARANCE: healthy, active, no distress and morbidly obese     EYES: EOMI, PERRL     HENT: ear canals and TM's normal and nose and mouth without ulcers or lesions     NECK: no adenopathy, no asymmetry, masses, or scars and thyroid normal to palpation     RESP: lungs clear to auscultation - no rales, rhonchi or wheezes     CV: regular rates and rhythm, normal S1 S2, no S3 " or S4 and no murmur, click or rub     ABDOMEN:  soft, nontender, no HSM or masses and bowel sounds normal     MS: extremities normal- no gross deformities noted, no evidence of inflammation in joints, FROM in all extremities.     SKIN: no suspicious lesions or rashes     NEURO: Normal strength and tone, sensory exam grossly normal, mentation intact and speech normal     PSYCH: mentation appears normal. and affect normal/bright     LYMPHATICS: No cervical adenopathy    DIAGNOSTICS:     Labs Resulted Today:   Results for orders placed or performed in visit on 04/12/19   Fecal colorectal cancer screen (FIT)   Result Value Ref Range    Occult Blood Scn FIT Positive (A) NEG^Negative     Labs Drawn and in Process:   Unresulted Labs Ordered in the Past 30 Days of this Admission     Date and Time Order Name Status Description    5/13/2019 1406 COMPREHENSIVE METABOLIC PANEL In process     5/13/2019 1406 CBC WITH PLATELETS In process     5/13/2019 1406 TSH WITH FREE T4 REFLEX In process     5/13/2019 1406 LIPID REFLEX TO DIRECT LDL PANEL In process     5/13/2019 1406 HEMOGLOBIN A1C In process           Recent Labs   Lab Test 04/07/19  0710 04/05/19  0618 03/29/19  1605  02/27/19  1051 10/10/18  1110 05/02/18  1517  05/10/17  0943   HGB 11.4* 11.8  --   --   --   --  11.8  --  12.1   PLT  --   --   --   --   --   --  429  --  392     --  135   < >  --  141 140   < > 140   POTASSIUM 4.0  --  3.8   < >  --  3.9 3.7   < > 4.2   CR 0.70  --  0.84   < >  --  0.79 0.82   < > 0.70   A1C  --   --   --   --  11.7* 8.2* 8.3*   < > 10.1*    < > = values in this interval not displayed.        IMPRESSION:   Reason for surgery/procedure: positive FIT  Diagnosis/reason for consult: The primary encounter diagnosis was Blood in stool. Diagnoses of Screen for colon cancer, Type 2 diabetes mellitus with hyperglycemia, with long-term current use of insulin (H), Nonischemic cardiomyopathy (H), Morbid obesity due to excess calories (H),  Moderate persistent asthma without complication, and Preop general physical exam were also pertinent to this visit.     The proposed surgical procedure is considered LOW risk.    REVISED CARDIAC RISK INDEX  The patient has the following serious cardiovascular risks for perioperative complications such as (MI, PE, VFib and 3  AV Block):  Congestive Heart Failure (pulmonary edema, PND, s3 sara, CXR with pulmonary congestion, basilar rales)  Diabetes Mellitus (on Insulin)  INTERPRETATION: 2 risks: Class III (moderate risk - 6.6% complication rate)    The patient has the following additional risks for perioperative complications:  No identified additional risks  Morbid obesity      ICD-10-CM    1. Blood in stool K92.1    2. Screen for colon cancer Z12.11    3. Type 2 diabetes mellitus with hyperglycemia, with long-term current use of insulin (H) E11.65 HEMOGLOBIN A1C    Z79.4 Lipid panel reflex to direct LDL Non-fasting     TSH WITH FREE T4 REFLEX     CBC with platelets     Comprehensive metabolic panel   4. Nonischemic cardiomyopathy (H) I42.8 Lipid panel reflex to direct LDL Non-fasting     Comprehensive metabolic panel   5. Morbid obesity due to excess calories (H) E66.01    6. Moderate persistent asthma without complication J45.40    7. Preop general physical exam Z01.818        RECOMMENDATIONS:       Pulmonary Risk  Incentive spirometry post op  Respiratory Therapy (Respiratory Care IP Consult)  post op  NG tube decompression if abdominal distension or significant vomiting       --Patient is to take all scheduled medications on the day of surgery EXCEPT for modifications listed below.    Diabetes Medication Use  -----Hold usual oral and non-insulin diabetic meds (e.g. Metformin, Actos, Glipizide) while NPO.   -----Take 80% of long acting insulin (e.g. Lantus, NPH) while NPO (fasting)      ACE Inhibitor or Angiotensin Receptor Blocker (ARB) Use  Ace inhibitor or Angiotensin Receptor Blocker (ARB) and should HOLD  this medication for the 24 hours prior to surgery.      APPROVAL GIVEN to proceed with proposed procedure, without further diagnostic evaluation       Signed Electronically by: Anuja Tenorio MD    Copy of this evaluation report is provided to requesting physician.    [facility] Preop Guidelines    Revised Cardiac Risk Index

## 2019-05-13 NOTE — TELEPHONE ENCOUNTER
----- Message from Yasemin Blount RN sent at 5/13/2019  1:53 PM CDT -----  Regarding: lab appt  Hi -   She is seeing Angle Monday 5/20 for a new CORE appt - can you call and see if she will do her labs on Thursday or Friday before? Looks like she has a few appointments we could maybe coordinate with.     Thanks!  Cayla

## 2019-05-13 NOTE — PATIENT INSTRUCTIONS
Before Your Surgery      Call your surgeon if there is any change in your health. This includes signs of a cold or flu (such as a sore throat, runny nose, cough, rash or fever).    Do not smoke, drink alcohol or take over the counter medicine (unless your surgeon or primary care doctor tells you to) for the 24 hours before and after surgery. Take 52 units of Basaglar the night before surgery then return to regular dosing. Hold your metfomin and losartan the day of surgery.    If you take prescribed drugs: Follow your doctor s orders about which medicines to take and which to stop until after surgery.    Eating and drinking prior to surgery: follow the instructions from your surgeon    Take a shower or bath the night before surgery. Use the soap your surgeon gave you to gently clean your skin. If you do not have soap from your surgeon, use your regular soap. Do not shave or scrub the surgery site.  Wear clean pajamas and have clean sheets on your bed.

## 2019-05-14 ASSESSMENT — ANXIETY QUESTIONNAIRES: GAD7 TOTAL SCORE: 0

## 2019-05-15 NOTE — PROGRESS NOTES
Faxed Pre-op notes and labs to [facility], [phone], right fax confirmed at 10:12 am today, 5/15/19.  Kristyn Sheridan MA/  For Teams Mook

## 2019-05-15 NOTE — RESULT ENCOUNTER NOTE
Ms. Montes,    All of your labs were normal for you.  Your diabetes numbers are great!    Follow up in 6 months for a recheck.    Please contact the clinic if you have additional questions.  Thank you.    Sincerely,    Anuja Tenorio

## 2019-05-16 ENCOUNTER — ANCILLARY PROCEDURE (OUTPATIENT)
Dept: GENERAL RADIOLOGY | Facility: CLINIC | Age: 49
End: 2019-05-16
Attending: NURSE PRACTITIONER
Payer: COMMERCIAL

## 2019-05-16 ENCOUNTER — OFFICE VISIT (OUTPATIENT)
Dept: NEUROSURGERY | Facility: CLINIC | Age: 49
End: 2019-05-16
Payer: OTHER MISCELLANEOUS

## 2019-05-16 VITALS
WEIGHT: 285 LBS | DIASTOLIC BLOOD PRESSURE: 94 MMHG | SYSTOLIC BLOOD PRESSURE: 139 MMHG | BODY MASS INDEX: 45.8 KG/M2 | HEART RATE: 105 BPM | HEIGHT: 66 IN | OXYGEN SATURATION: 97 % | TEMPERATURE: 98.5 F

## 2019-05-16 DIAGNOSIS — Z98.1 S/P CERVICAL SPINAL FUSION: Primary | ICD-10-CM

## 2019-05-16 DIAGNOSIS — J45.30 MILD PERSISTENT ASTHMA WITHOUT COMPLICATION: ICD-10-CM

## 2019-05-16 DIAGNOSIS — Z98.1 S/P CERVICAL SPINAL FUSION: ICD-10-CM

## 2019-05-16 PROCEDURE — 99024 POSTOP FOLLOW-UP VISIT: CPT | Performed by: NURSE PRACTITIONER

## 2019-05-16 PROCEDURE — 72040 X-RAY EXAM NECK SPINE 2-3 VW: CPT

## 2019-05-16 RX ORDER — MONTELUKAST SODIUM 10 MG/1
10 TABLET ORAL AT BEDTIME
Qty: 90 TABLET | Refills: 0 | Status: SHIPPED | OUTPATIENT
Start: 2019-05-16 | End: 2019-08-19

## 2019-05-16 ASSESSMENT — PAIN SCALES - GENERAL: PAINLEVEL: SEVERE PAIN (6)

## 2019-05-16 ASSESSMENT — MIFFLIN-ST. JEOR: SCORE: 1929.5

## 2019-05-16 NOTE — NURSING NOTE
"Bettina Montes is a 49 year old female who presents for:  Chief Complaint   Patient presents with     Surgical Followup     6 week f/u s/p 4/5/19 C4-6 ACDF.         Initial Vitals:  BP (!) 139/94 (BP Location: Left arm, Patient Position: Sitting, Cuff Size: Adult Large)   Pulse 105   Temp 98.5  F (36.9  C) (Oral)   Ht 5' 6\" (1.676 m)   Wt 285 lb (129.3 kg)   LMP  (LMP Unknown)   SpO2 97%   BMI 46.00 kg/m   Estimated body mass index is 46 kg/m  as calculated from the following:    Height as of this encounter: 5' 6\" (1.676 m).    Weight as of this encounter: 285 lb (129.3 kg).. Body surface area is 2.45 meters squared. BP completed using cuff size: large  Severe Pain (6)        Nursing Comments: Pain level of a 6 today.         Katerin Babcock    "

## 2019-05-16 NOTE — TELEPHONE ENCOUNTER
"Requested Prescriptions   Pending Prescriptions Disp Refills     montelukast (SINGULAIR) 10 MG tablet [Pharmacy Med Name: MONTELUKAST SOD 10 MG TABLET]  Last Written Prescription Date:  5/2/18  Last Fill Quantity: 90,  # refills: 3   Last office visit: 5/13/2019 with prescribing provider:  Noah Tenorio   Future Office Visit:   Next 5 appointments (look out 90 days)    May 16, 2019  1:00 PM CDT  Return Visit with Supriya Case NP  [facility] [address] [place] [id number]  [phone]          90 tablet 1     Sig: TAKE 1 TABLET (10 MG) BY MOUTH AT BEDTIME       Leukotriene Inhibitors Protocol Passed - 5/16/2019  1:04 AM        Passed - Patient is age 12 or older     If patient is under 16, ok to refill using age based dosing.           Passed - Asthma control assessment score within normal limits in last 6 months     Please review ACT score.           Passed - Medication is active on med list        Passed - Recent (6 mo) or future (30 days) visit within the authorizing provider's specialty     Patient had office visit in the last 6 months or has a visit in the next 30 days with authorizing provider or within the authorizing provider's specialty.  See \"Patient Info\" tab in inbasket, or \"Choose Columns\" in Meds & Orders section of the refill encounter.              "

## 2019-05-16 NOTE — PROGRESS NOTES
[facility]  Neurosurgery followup:    HPI: 6 weeks s/p C4-6 ACDF with Dr. Hurtado. She state she is overall doing well. She continues to have left upper extremity paresthesias and muscle spasms. She states these symptoms have gotten better since surgery. She reports mild neck pain. She denies weakness.     Exam:  Constitutional:  Alert, well nourished, NAD.  HEENT: Normocephalic, atraumatic.   Pulm:  Without shortness of breath   CV:  No pitting edema of BLE.     Neurological:  Awake  Alert  Oriented x 3  Motor exam:     Shoulder Abduction:  Right:  5/5    Left:  5/5  Biceps:                      Right:  5/5    Left:  5/5  Triceps:                     Right:  5/5    Left:  5/5  Wrist Extensors:       Right:  5/5    Left:  5/5  Wrist Flexors:           Right:  5/5    Left:  5/5  Intrinsics:                  Right:  5/5    Left:  5/5     Able to spontaneously move U/E bilaterally  Sensation intact throughout all U/E dermatomes  Incisions:  Healing nicely  Imaging:  AP and lateral films reveal intact hardware.  A/P:  6 weeks s/p C4-6 ACDF with Dr. Hurtado. She state she is overall doing well. She continues to have left upper extremity paresthesias and muscle spasms. She states these symptoms have gotten better since surgery. She reports mild neck pain. She denies weakness. Reviewed cervical XR results. Discussed increasing weight restriction to 20 pounds. She inquires about returning to work. Instructed that she is ok to return to work with continued restrictions. Work letter written. Recommended her to follow up in 6 weeks with XR prior. She verbalized understanding and agreement.     Patient Instructions   - May increase lifting restriction to 20  pounds    - Follow up in 6 weeks  with xray prior     - Call the clinic at [phone] for increased pain or any other questions and concerns.      Supriya Case [facility]  [facility]  [address]  [place] [address]    Tel [phone]  Pager [phone]

## 2019-05-16 NOTE — TELEPHONE ENCOUNTER
Prescription approved per [facility].  Beverley Brennan RN

## 2019-05-16 NOTE — PATIENT INSTRUCTIONS
- May increase lifting restriction to 20  pounds    - Follow up in 6 weeks  with xray prior     - Call the clinic at [phone] for increased pain or any other questions and concerns.

## 2019-05-16 NOTE — LETTER
5/16/2019         RE: Bettina Montes  [address] [facility] [place] [address]        Dear Colleague,    Thank you for referring your patient, Bettina Montes, to the [facility]. Please see a copy of my visit note below.    [facility]  Neurosurgery followup:    HPI: 6 weeks s/p C4-6 ACDF with Dr. Hurtado. She state she is overall doing well. She continues to have left upper extremity paresthesias and muscle spasms. She states these symptoms have gotten better since surgery. She reports mild neck pain. She denies weakness.     Exam:  Constitutional:  Alert, well nourished, NAD.  HEENT: Normocephalic, atraumatic.   Pulm:  Without shortness of breath   CV:  No pitting edema of BLE.     Neurological:  Awake  Alert  Oriented x 3  Motor exam:     Shoulder Abduction:  Right:  5/5    Left:  5/5  Biceps:                      Right:  5/5    Left:  5/5  Triceps:                     Right:  5/5    Left:  5/5  Wrist Extensors:       Right:  5/5    Left:  5/5  Wrist Flexors:           Right:  5/5    Left:  5/5  Intrinsics:                  Right:  5/5    Left:  5/5     Able to spontaneously move U/E bilaterally  Sensation intact throughout all U/E dermatomes  Incisions:  Healing nicely  Imaging:  AP and lateral films reveal intact hardware.  A/P:  6 weeks s/p C4-6 ACDF with Dr. Hurtado. She state she is overall doing well. She continues to have left upper extremity paresthesias and muscle spasms. She states these symptoms have gotten better since surgery. She reports mild neck pain. She denies weakness. Reviewed cervical XR results. Discussed increasing weight restriction to 20 pounds. She inquires about returning to work. Instructed that she is ok to return to work with continued restrictions. Work letter written. Recommended her to follow up in 6 weeks with XR prior. She verbalized understanding and agreement.     Patient Instructions   - May increase lifting restriction to 20  pounds    - Follow  up in 6 weeks  with xray prior     - Call the clinic at [phone] for increased pain or any other questions and concerns.      Supriya Case CNP  [facility]  [address] [address]    Tel [phone]  Pager [phone]      Again, thank you for allowing me to participate in the care of your patient.        Sincerely,        Supriya Case, NP

## 2019-05-16 NOTE — LETTER
[address] [address]  Phone: [phone]  Fax: [phone]    05/16/19    Bettina Montes  [address] [facility] [facility] [facility], [place] [address]      To whom it may concern:     Bettina Montes was seen in clinic today. She may resume work with the following work restrictions:    -No bending, twisting, or lifting greater than 20 pounds  -No overhead reaching    She will be re-evaluated at her next clinic appointment in approximately 6 weeks.      Sincerely,      Supriya Case, CNP

## 2019-05-17 ENCOUNTER — THERAPY VISIT (OUTPATIENT)
Dept: PHYSICAL THERAPY | Facility: CLINIC | Age: 49
End: 2019-05-17
Payer: OTHER MISCELLANEOUS

## 2019-05-17 DIAGNOSIS — I42.8 NONISCHEMIC CARDIOMYOPATHY (H): ICD-10-CM

## 2019-05-17 DIAGNOSIS — Z98.1 S/P SPINAL FUSION: ICD-10-CM

## 2019-05-17 DIAGNOSIS — G89.29 CHRONIC LEFT SHOULDER PAIN: ICD-10-CM

## 2019-05-17 DIAGNOSIS — E78.2 MIXED HYPERLIPIDEMIA: ICD-10-CM

## 2019-05-17 DIAGNOSIS — M25.512 CHRONIC LEFT SHOULDER PAIN: ICD-10-CM

## 2019-05-17 DIAGNOSIS — E11.65 TYPE 2 DIABETES MELLITUS WITH HYPERGLYCEMIA, WITH LONG-TERM CURRENT USE OF INSULIN (H): ICD-10-CM

## 2019-05-17 DIAGNOSIS — Z79.4 TYPE 2 DIABETES MELLITUS WITH HYPERGLYCEMIA, WITH LONG-TERM CURRENT USE OF INSULIN (H): ICD-10-CM

## 2019-05-17 DIAGNOSIS — M54.2 NECK PAIN: ICD-10-CM

## 2019-05-17 LAB
ALT SERPL W P-5'-P-CCNC: 22 U/L (ref 0–50)
ANION GAP SERPL CALCULATED.3IONS-SCNC: 11 MMOL/L (ref 3–14)
BUN SERPL-MCNC: 11 MG/DL (ref 7–30)
CALCIUM SERPL-MCNC: 9.8 MG/DL (ref 8.5–10.1)
CHLORIDE SERPL-SCNC: 104 MMOL/L (ref 94–109)
CHOLEST SERPL-MCNC: 147 MG/DL
CO2 SERPL-SCNC: 24 MMOL/L (ref 20–32)
CREAT SERPL-MCNC: 0.8 MG/DL (ref 0.52–1.04)
GFR SERPL CREATININE-BSD FRML MDRD: 86 ML/MIN/{1.73_M2}
GLUCOSE SERPL-MCNC: 75 MG/DL (ref 70–99)
HBA1C MFR BLD: 7.7 % (ref 0–5.6)
HDLC SERPL-MCNC: 66 MG/DL
LDLC SERPL CALC-MCNC: 64 MG/DL
NONHDLC SERPL-MCNC: 81 MG/DL
NT-PROBNP SERPL-MCNC: 318 PG/ML (ref 0–125)
POTASSIUM SERPL-SCNC: 3.8 MMOL/L (ref 3.4–5.3)
SODIUM SERPL-SCNC: 139 MMOL/L (ref 133–144)
TRIGL SERPL-MCNC: 83 MG/DL

## 2019-05-17 PROCEDURE — 83036 HEMOGLOBIN GLYCOSYLATED A1C: CPT | Performed by: NURSE PRACTITIONER

## 2019-05-17 PROCEDURE — 80061 LIPID PANEL: CPT | Performed by: NURSE PRACTITIONER

## 2019-05-17 PROCEDURE — 97110 THERAPEUTIC EXERCISES: CPT | Mod: GP

## 2019-05-17 PROCEDURE — 83880 ASSAY OF NATRIURETIC PEPTIDE: CPT | Performed by: NURSE PRACTITIONER

## 2019-05-17 PROCEDURE — 84460 ALANINE AMINO (ALT) (SGPT): CPT | Performed by: NURSE PRACTITIONER

## 2019-05-17 PROCEDURE — 97112 NEUROMUSCULAR REEDUCATION: CPT | Mod: GP

## 2019-05-17 PROCEDURE — 36415 COLL VENOUS BLD VENIPUNCTURE: CPT | Performed by: NURSE PRACTITIONER

## 2019-05-17 PROCEDURE — 80048 BASIC METABOLIC PNL TOTAL CA: CPT | Performed by: NURSE PRACTITIONER

## 2019-05-19 DIAGNOSIS — J45.30 MILD PERSISTENT ASTHMA WITHOUT COMPLICATION: ICD-10-CM

## 2019-05-20 ENCOUNTER — MEDICAL CORRESPONDENCE (OUTPATIENT)
Dept: HEALTH INFORMATION MANAGEMENT | Facility: CLINIC | Age: 49
End: 2019-05-20

## 2019-05-20 ENCOUNTER — OFFICE VISIT (OUTPATIENT)
Dept: CARDIOLOGY | Facility: CLINIC | Age: 49
End: 2019-05-20
Payer: COMMERCIAL

## 2019-05-20 ENCOUNTER — THERAPY VISIT (OUTPATIENT)
Dept: PHYSICAL THERAPY | Facility: CLINIC | Age: 49
End: 2019-05-20
Payer: OTHER MISCELLANEOUS

## 2019-05-20 VITALS
BODY MASS INDEX: 46.81 KG/M2 | OXYGEN SATURATION: 98 % | HEART RATE: 89 BPM | DIASTOLIC BLOOD PRESSURE: 88 MMHG | WEIGHT: 290 LBS | SYSTOLIC BLOOD PRESSURE: 143 MMHG

## 2019-05-20 DIAGNOSIS — Z98.1 S/P SPINAL FUSION: ICD-10-CM

## 2019-05-20 DIAGNOSIS — G89.29 CHRONIC LEFT SHOULDER PAIN: ICD-10-CM

## 2019-05-20 DIAGNOSIS — I10 ESSENTIAL HYPERTENSION WITH GOAL BLOOD PRESSURE LESS THAN 140/90: ICD-10-CM

## 2019-05-20 DIAGNOSIS — M25.512 CHRONIC LEFT SHOULDER PAIN: ICD-10-CM

## 2019-05-20 DIAGNOSIS — I42.8 NONISCHEMIC CARDIOMYOPATHY (H): ICD-10-CM

## 2019-05-20 DIAGNOSIS — I47.29 NSVT (NONSUSTAINED VENTRICULAR TACHYCARDIA) (H): ICD-10-CM

## 2019-05-20 DIAGNOSIS — I50.23 ACUTE ON CHRONIC SYSTOLIC HEART FAILURE (H): Primary | ICD-10-CM

## 2019-05-20 DIAGNOSIS — E78.5 HYPERLIPIDEMIA LDL GOAL <130: ICD-10-CM

## 2019-05-20 DIAGNOSIS — M54.2 NECK PAIN: ICD-10-CM

## 2019-05-20 DIAGNOSIS — E66.01 OBESITY, CLASS III, BMI 40-49.9 (MORBID OBESITY) (H): ICD-10-CM

## 2019-05-20 PROCEDURE — 99215 OFFICE O/P EST HI 40 MIN: CPT | Performed by: NURSE PRACTITIONER

## 2019-05-20 PROCEDURE — 97110 THERAPEUTIC EXERCISES: CPT | Mod: GP

## 2019-05-20 RX ORDER — HYDRALAZINE HYDROCHLORIDE 100 MG/1
100 TABLET, FILM COATED ORAL 2 TIMES DAILY
Qty: 180 TABLET | Refills: 3 | Status: SHIPPED | OUTPATIENT
Start: 2019-05-20 | End: 2019-11-21

## 2019-05-20 RX ORDER — ACETAMINOPHEN 500 MG
500-1000 TABLET ORAL EVERY 6 HOURS PRN
COMMUNITY

## 2019-05-20 NOTE — NURSING NOTE
"Chief Complaint   Patient presents with     Heart Failure     NEW: 49 year old female presents with systolic HF, EF 30% to establish in CORE. Pt reports some heart racing and SOB.       Initial /88 (BP Location: Right arm, Patient Position: Chair, Cuff Size: Adult Large)   Pulse 89   Wt 131.5 kg (290 lb)   LMP  (LMP Unknown)   SpO2 98%   BMI 46.81 kg/m   Estimated body mass index is 46.81 kg/m  as calculated from the following:    Height as of 5/16/19: 1.676 m (5' 6\").    Weight as of this encounter: 131.5 kg (290 lb)..  BP completed using cuff size: deisi Dietz MA    "

## 2019-05-20 NOTE — PATIENT INSTRUCTIONS
Take your medicines every day, as directed    Changes made today:  o Stop Losartan  o Start Entresto 49/51 mg twice daily  o Increase Hydralazine to 100 mg twice daily  o    Monitor Your Weight and Symptoms    Contact us if you:      Gain 2 pounds in one day or 5 pounds in one week    Feel more short of breath    Notice more leg swelling    Feel lightheadeded   Change your lifestyle    Limit Salt or Sodium:    2000 mg  Limit Fluids:    2000 mL or approximately 64 ounces  Eat a Heart Healthy Diet    Low in saturated fats  Stay Active:    Aim to move at least 150 minutes every  week         To Contact us    During Business Hours:  [phone], option # 1 (University)  Then option # 4 (medical questions)     After hours, weekends or holidays:   [phone], Option #4  Ask to speak to the On-Call Cardiologist. Inform them you are a CORE/heart failure patient at the [facility].     Use [employer] allows you to communicate directly with your heart team through secure messaging.    [employer] can be accessed any time on your phone, computer, or tablet.    If you need assistance, we'd be happy to help!         Keep your Heart Appointments:    1.  We placed a nutrition referral.  2.  We placed a cardiac rehab referral.  3.  We placed a referral for a zoll life vest  4.  Repeat blood work one week after starting Entresto  5.  Follow up in CORE in one month.

## 2019-05-20 NOTE — LETTER
5/20/2019      RE: Bettina Montes  [address] [facility] [place] [address]       Dear Colleague,    Thank you for the opportunity to participate in the care of your patient, Bettina Montes, at the [facility] at [facility]. Please see a copy of my visit note below.      HPI: Bettina is a 49 year old female with a past medical history of HFrEF (30-35%) secondary to NICM (felt to be idiopathic), HTN, obesity, DM II, recent cervical spine fusion, and hyperlipidemia who presents to [facility] clinic for HF optimization and titration.    Bettina just underwent cervical fusion a few weeks ago.  She is slowly returning back to her activities of daily living and just went back to light duty at work.  She weighs herself once a week.  Her weight has been ranging between 280-285 lbs. Denies SOB, CASILLAS, PND, orthopnea, edema, or chest pain.  She eats sodium rich foods and drinks four 16 ounce bottles of water in addition to other beverages dialy.  She feels she drinks over 2 liters daily.      She still has periodic episodes of palpitations that are irregular and fast and last seconds.  The frequency and duration is unchanged. She denies lightheadedness, dizziness, or near syncopal/syncopal episodes.  Her home BP readings are ranging 120-135/70-80.  HR  bpm.      Her diabetes is under better control now that she is on her insulin shots.  She has been discontinuing her insulin shots for 2 weeks to two months due to injection site soreness in her abdomen.      PAST MEDICAL HISTORY:  Past Medical History:   Diagnosis Date     Adrenal gland anomaly      CHF (congestive heart failure) (H)      Fatty liver      Gastroesophageal reflux disease      Hyperlipidemia      Hypertension      Mild persistent asthma      Type 2 diabetes mellitus (H)        FAMILY HISTORY:  Family History   Problem Relation Age of Onset     Diabetes Mother      Hypertension  Mother      Hyperlipidemia Mother      Heart Failure Mother      Diabetes Father      Cancer Paternal Grandmother         ?       SOCIAL HISTORY:  Social History     Socioeconomic History     Marital status: Single     Spouse name: Not on file     Number of children: 1     Years of education: Not on file     Highest education level: Not on file   Occupational History     Occupation: CNA     Employer: OTHER   Social Needs     Financial resource strain: Not on file     Food insecurity:     Worry: Not on file     Inability: Not on file     Transportation needs:     Medical: Not on file     Non-medical: Not on file   Tobacco Use     Smoking status: Former Smoker     Packs/day: 1.00     Years: 30.00     Pack years: 30.00     Types: Cigarettes     Last attempt to quit: 2013     Years since quittin.3     Smokeless tobacco: Former User     Tobacco comment:     Substance and Sexual Activity     Alcohol use: Yes     Alcohol/week: 0.0 oz     Comment: 4-5 drinks per week     Drug use: No     Sexual activity: Yes     Partners: Male     Birth control/protection: Surgical   Lifestyle     Physical activity:     Days per week: Not on file     Minutes per session: Not on file     Stress: Not on file   Relationships     Social connections:     Talks on phone: Not on file     Gets together: Not on file     Attends Zoroastrianism service: Not on file     Active member of club or organization: Not on file     Attends meetings of clubs or organizations: Not on file     Relationship status: Not on file     Intimate partner violence:     Fear of current or ex partner: Not on file     Emotionally abused: Not on file     Physically abused: Not on file     Forced sexual activity: Not on file   Other Topics Concern     Parent/sibling w/ CABG, MI or angioplasty before 65F 55M? Not Asked   Social History Narrative     Not on file       CURRENT MEDICATIONS:  Current Outpatient Medications   Medication Sig Dispense Refill     BETA BLOCKER NOT  PRESCRIBED, INTENTIONAL, Beta Blocker not prescribed intentionally due to Severe Asthma (Patient not taking: Reported on 5/16/2019)  0     blood glucose (NO BRAND SPECIFIED) test strip Use to test blood sugar 3-4 times daily or as directed. 200 strip 1     carvedilol (COREG) 6.25 MG tablet Take 1 tablet (6.25 mg) by mouth 2 times daily (with meals) 60 tablet 3     Cholecalciferol (VITAMIN D3 PO) Take 2,000 Units by mouth daily       diazepam (VALIUM) 5 MG tablet Take 1-2 tablets (5-10 mg) by mouth every 6 hours as needed for muscle spasms (Patient not taking: Reported on 5/16/2019) 40 tablet 1     hydrALAZINE (APRESOLINE) 25 MG tablet Take 3 tablets (75 mg) by mouth 2 times daily 180 tablet 5     insulin glargine (BASAGLAR KWIKPEN) 100 UNIT/ML pen Inject 65 Units Subcutaneous At Bedtime 60 mL 1     insulin pen needle (32G X 4 MM) 32G X 4 MM miscellaneous Use 1 pen needles daily or as directed. 100 each 3     levalbuterol (XOPENEX HFA) 45 MCG/ACT Inhaler Inhale 2 puffs into the lungs every 4 hours as needed for shortness of breath / dyspnea or wheezing 15 g 3     losartan (COZAAR) 50 MG tablet Take 1 tablet (50 mg) by mouth daily 90 tablet 1     lovastatin (MEVACOR) 20 MG tablet Take 1 tablet (20 mg) by mouth At Bedtime 90 tablet 3     magnesium oxide (MAG-OX) 400 (241.3 Mg) MG tablet Take 1 tablet (400 mg) by mouth daily 100 tablet 3     metFORMIN (GLUCOPHAGE) 500 MG tablet Take 2 tablets (1,000 mg) by mouth 2 times daily (with meals) 360 tablet 1     montelukast (SINGULAIR) 10 MG tablet TAKE 1 TABLET (10 MG) BY MOUTH AT BEDTIME 90 tablet 0     oxyCODONE (ROXICODONE) 5 MG tablet Take 1-2 tablets (5-10 mg) by mouth every 3 hours as needed 60 tablet 0     ranitidine (ZANTAC) 150 MG/10ML syrup Take 10 mLs (150 mg) by mouth 2 times daily 240 mL 1     spironolactone (ALDACTONE) 50 MG tablet Take 1 tablet (50 mg) by mouth daily 90 tablet 3     tiZANidine (ZANAFLEX) 2 MG tablet Take 1-2 tablets (2-4 mg) by mouth 3 times  daily as needed for muscle spasms 60 tablet 1     WIXELA INHUB 250-50 MCG/DOSE inhaler Inhale 1 puff into the lungs every 12 hours         ROS:  Constitutional: Denies fever, chills, or diaphoresis.  +weight gain.  ENT: Denies changes in vision or hearing.  No epistaxis or vertigo.   Respiratory:  See HPI.     Cardiovascular: As per HPI.   GI: Denies nausea, vomiting, diarrhea, hematemesis, melena, or hematochezia.   : Denies urinary frequency, dysuria, or hematuria.   Integument: Negative for bruising, rash, or pruritis.  Psychiatric: Negative for anxiety, depression, sleep disturbance, or mood changes.   Neuro: Negative for headaches, syncope, numbness or tingling.   Endocrinology: +DM  Musculoskeletal: +neck pain.      EXAM:  /88 (BP Location: Right arm, Patient Position: Chair, Cuff Size: Adult Large)   Pulse 89   Wt 131.5 kg (290 lb)   LMP  (LMP Unknown)   SpO2 98%   BMI 46.81 kg/m     General: alert, articulate, and in no acute distress.  HEENT: normocephalic, atraumatic, anicteric sclera, EOMI, mucosa moist, no cyanosis.   Neck: neck supple.  No adenopathy or carotid bruits.  JVP 10-11 cm.  Heart: regular rhythm, normal S1/S2, no murmur, gallop, rub.     Lungs: Clear, no rales, ronchi, or wheezing.  No accessory muscle use, respirations unlabored.   Abdomen: soft, non-tender, bowel sounds present, no hepatomegaly  Extremities: Trace pitting edema.   No cyanosis.  Extremities warm.  2+ pedal pulses.   Neurological: Alert and oriented x 3.  Normal speech and affect, no gross motor deficits  Skin:  No ecchymoses, rashes, or clubbing.    Labs:  CBC RESULTS:  Lab Results   Component Value Date    WBC 9.5 05/13/2019    RBC 4.24 05/13/2019    HGB 11.6 (L) 05/13/2019    HCT 35.4 05/13/2019    MCV 84 05/13/2019    MCH 27.4 05/13/2019    MCHC 32.8 05/13/2019    RDW 14.4 05/13/2019     05/13/2019       CMP RESULTS:  Lab Results   Component Value Date     05/17/2019    POTASSIUM 3.8 05/17/2019     CHLORIDE 104 05/17/2019    CO2 24 05/17/2019    ANIONGAP 11 05/17/2019    GLC 75 05/17/2019    BUN 11 05/17/2019    CR 0.80 05/17/2019    GFRESTIMATED 86 05/17/2019    GFRESTBLACK >90 05/17/2019    TIM 9.8 05/17/2019    BILITOTAL 0.6 05/13/2019    ALBUMIN 3.8 05/13/2019    ALKPHOS 104 05/13/2019    ALT 22 05/17/2019    AST 13 05/13/2019        INR RESULTS:  No results found for: INR    No components found for: CK  No results found for: MAG  Lab Results   Component Value Date    NTBNP 318 (H) 05/17/2019     Most recent echocardiogram 3/19:  Interpretation Summary  Patient was scheduled for stress testing that was converted to TTE alone due to decrease in LVEF.  Moderate to severe left ventricular systolic dysfunction.  LVEF 30% based on biplane 2D tracing. Global hypokinesis.  Right ventricular size and systolic function appears normal.  No valve dysfunction.  Unable to assess PA pressure but RA pressure is normal.  This study was compared with the study from 1/16/17 and LVEF is decreased.    Assessment and Plan:    In summary, Bettina is a 49 year old female with NICM who appears hypervolemic today secondary to holding her diuretic. She plans on taking her diuretic when she gets home.  Today, I changed her hydralazine to 100 mg twice daily for better BP control and discontinued Losartan and started Entresto 49/51 mg twice daily.    I placed several referrals for her today including a nutrition referral, cardiac rehab, and also for a [employer] life vest for SCD prevention.  We spent a fair amount of time reviewing the purpose of her medications and the importance of modifying her sodium to two grams daily and her fluid intake to 2L daily. She will repeat a BMP in one week after starting Entresto, and follow up with [facility] clinic in one month.    1.  Acute on chronic systolic heart failure secondary to NICM.  Stage C  NYHA Class IIIA  ACEi/ARB: Hydralazine 75 mg twice daily. Losartan 50 mg daily.   BB: yes, Carvedilol 6.25  mg twice daily.   Aldosterone antagonist: yes, Spironolactone 50 mg daily.   SCD prophylaxis: Life-Vest  Fluid status: hypervolemic  Anticoagulation: None  Antiplatelet:  ASA dose   Sleep apnea: Previous sleep studies.  CPAP not indicated per her report.   NSAID use:  Contraindicated.  Avoid use.  Remote Monitoring: None.    2.  HTN:  sub optimal control at 143/88.  Start Entresto and increase Hydralazine as outlined above.     3.  Hyperlipidemia:  Last lipids 5/17/19:  , Tri 83, HDL 66, LDL 64.     4.  Morbid Obesity:  BMI: 46.81.  Nutrition referral for weight loss placed today. Cardiac rehab referral placed as well to help guide exercise regimen.     5.  Diabetes:  Reinforced the importance of rotating sites to avoid gaps in therapy. Managed by primary MD.     6.  Follow-up:  [facility] in one month.  Dr. Chinchilla in September.     >40 minutes spent face to face with patient with >50% in counseling, education, and coordination of care        SHAZIA Flowers [facility].

## 2019-05-20 NOTE — PROGRESS NOTES
HPI: Bettina is a 49 year old female with a past medical history of HFrEF (30-35%) secondary to NICM (felt to be idiopathic), HTN, obesity, DM II, recent cervical spine fusion, and hyperlipidemia who presents to [facility] clinic for HF optimization and titration.    Bettina just underwent cervical fusion a few weeks ago.  She is slowly returning back to her activities of daily living and just went back to light duty at work.  She weighs herself once a week.  Her weight has been ranging between 280-285 lbs. Denies SOB, CASILLAS, PND, orthopnea, edema, or chest pain.  She eats sodium rich foods and drinks four 16 ounce bottles of water in addition to other beverages dialy.  She feels she drinks over 2 liters daily.      She still has periodic episodes of palpitations that are irregular and fast and last seconds.  The frequency and duration is unchanged. She denies lightheadedness, dizziness, or near syncopal/syncopal episodes.  Her home BP readings are ranging 120-135/70-80.  HR  bpm.      Her diabetes is under better control now that she is on her insulin shots.  She has been discontinuing her insulin shots for 2 weeks to two months due to injection site soreness in her abdomen.      PAST MEDICAL HISTORY:  Past Medical History:   Diagnosis Date     Adrenal gland anomaly      CHF (congestive heart failure) (H)      Fatty liver      Gastroesophageal reflux disease      Hyperlipidemia      Hypertension      Mild persistent asthma      Type 2 diabetes mellitus (H)        FAMILY HISTORY:  Family History   Problem Relation Age of Onset     Diabetes Mother      Hypertension Mother      Hyperlipidemia Mother      Heart Failure Mother      Diabetes Father      Cancer Paternal Grandmother         ?       SOCIAL HISTORY:  Social History     Socioeconomic History     Marital status: Single     Spouse name: Not on file     Number of children: 1     Years of education: Not on file     Highest education level: Not on file    Occupational History     Occupation: CNA     Employer: OTHER   Social Needs     Financial resource strain: Not on file     Food insecurity:     Worry: Not on file     Inability: Not on file     Transportation needs:     Medical: Not on file     Non-medical: Not on file   Tobacco Use     Smoking status: Former Smoker     Packs/day: 1.00     Years: 30.00     Pack years: 30.00     Types: Cigarettes     Last attempt to quit: 2013     Years since quittin.3     Smokeless tobacco: Former User     Tobacco comment:     Substance and Sexual Activity     Alcohol use: Yes     Alcohol/week: 0.0 oz     Comment: 4-5 drinks per week     Drug use: No     Sexual activity: Yes     Partners: Male     Birth control/protection: Surgical   Lifestyle     Physical activity:     Days per week: Not on file     Minutes per session: Not on file     Stress: Not on file   Relationships     Social connections:     Talks on phone: Not on file     Gets together: Not on file     Attends Muslim service: Not on file     Active member of club or organization: Not on file     Attends meetings of clubs or organizations: Not on file     Relationship status: Not on file     Intimate partner violence:     Fear of current or ex partner: Not on file     Emotionally abused: Not on file     Physically abused: Not on file     Forced sexual activity: Not on file   Other Topics Concern     Parent/sibling w/ CABG, MI or angioplasty before 65F 55M? Not Asked   Social History Narrative     Not on file       CURRENT MEDICATIONS:  Current Outpatient Medications   Medication Sig Dispense Refill     BETA BLOCKER NOT PRESCRIBED, INTENTIONAL, Beta Blocker not prescribed intentionally due to Severe Asthma (Patient not taking: Reported on 2019)  0     blood glucose (NO BRAND SPECIFIED) test strip Use to test blood sugar 3-4 times daily or as directed. 200 strip 1     carvedilol (COREG) 6.25 MG tablet Take 1 tablet (6.25 mg) by mouth 2 times daily (with  meals) 60 tablet 3     Cholecalciferol (VITAMIN D3 PO) Take 2,000 Units by mouth daily       diazepam (VALIUM) 5 MG tablet Take 1-2 tablets (5-10 mg) by mouth every 6 hours as needed for muscle spasms (Patient not taking: Reported on 5/16/2019) 40 tablet 1     hydrALAZINE (APRESOLINE) 25 MG tablet Take 3 tablets (75 mg) by mouth 2 times daily 180 tablet 5     insulin glargine (BASAGLAR KWIKPEN) 100 UNIT/ML pen Inject 65 Units Subcutaneous At Bedtime 60 mL 1     insulin pen needle (32G X 4 MM) 32G X 4 MM miscellaneous Use 1 pen needles daily or as directed. 100 each 3     levalbuterol (XOPENEX HFA) 45 MCG/ACT Inhaler Inhale 2 puffs into the lungs every 4 hours as needed for shortness of breath / dyspnea or wheezing 15 g 3     losartan (COZAAR) 50 MG tablet Take 1 tablet (50 mg) by mouth daily 90 tablet 1     lovastatin (MEVACOR) 20 MG tablet Take 1 tablet (20 mg) by mouth At Bedtime 90 tablet 3     magnesium oxide (MAG-OX) 400 (241.3 Mg) MG tablet Take 1 tablet (400 mg) by mouth daily 100 tablet 3     metFORMIN (GLUCOPHAGE) 500 MG tablet Take 2 tablets (1,000 mg) by mouth 2 times daily (with meals) 360 tablet 1     montelukast (SINGULAIR) 10 MG tablet TAKE 1 TABLET (10 MG) BY MOUTH AT BEDTIME 90 tablet 0     oxyCODONE (ROXICODONE) 5 MG tablet Take 1-2 tablets (5-10 mg) by mouth every 3 hours as needed 60 tablet 0     ranitidine (ZANTAC) 150 MG/10ML syrup Take 10 mLs (150 mg) by mouth 2 times daily 240 mL 1     spironolactone (ALDACTONE) 50 MG tablet Take 1 tablet (50 mg) by mouth daily 90 tablet 3     tiZANidine (ZANAFLEX) 2 MG tablet Take 1-2 tablets (2-4 mg) by mouth 3 times daily as needed for muscle spasms 60 tablet 1     WIXELA INHUB 250-50 MCG/DOSE inhaler Inhale 1 puff into the lungs every 12 hours         ROS:  Constitutional: Denies fever, chills, or diaphoresis.  +weight gain.  ENT: Denies changes in vision or hearing.  No epistaxis or vertigo.   Respiratory:  See HPI.     Cardiovascular: As per HPI.   GI:  Denies nausea, vomiting, diarrhea, hematemesis, melena, or hematochezia.   : Denies urinary frequency, dysuria, or hematuria.   Integument: Negative for bruising, rash, or pruritis.  Psychiatric: Negative for anxiety, depression, sleep disturbance, or mood changes.   Neuro: Negative for headaches, syncope, numbness or tingling.   Endocrinology: +DM  Musculoskeletal: +neck pain.      EXAM:  /88 (BP Location: Right arm, Patient Position: Chair, Cuff Size: Adult Large)   Pulse 89   Wt 131.5 kg (290 lb)   LMP  (LMP Unknown)   SpO2 98%   BMI 46.81 kg/m    General: alert, articulate, and in no acute distress.  HEENT: normocephalic, atraumatic, anicteric sclera, EOMI, mucosa moist, no cyanosis.   Neck: neck supple.  No adenopathy or carotid bruits.  JVP 10-11 cm.  Heart: regular rhythm, normal S1/S2, no murmur, gallop, rub.     Lungs: Clear, no rales, ronchi, or wheezing.  No accessory muscle use, respirations unlabored.   Abdomen: soft, non-tender, bowel sounds present, no hepatomegaly  Extremities: Trace pitting edema.   No cyanosis.  Extremities warm.  2+ pedal pulses.   Neurological: Alert and oriented x 3.  Normal speech and affect, no gross motor deficits  Skin:  No ecchymoses, rashes, or clubbing.    Labs:  CBC RESULTS:  Lab Results   Component Value Date    WBC 9.5 05/13/2019    RBC 4.24 05/13/2019    HGB 11.6 (L) 05/13/2019    HCT 35.4 05/13/2019    MCV 84 05/13/2019    MCH 27.4 05/13/2019    MCHC 32.8 05/13/2019    RDW 14.4 05/13/2019     05/13/2019       CMP RESULTS:  Lab Results   Component Value Date     05/17/2019    POTASSIUM 3.8 05/17/2019    CHLORIDE 104 05/17/2019    CO2 24 05/17/2019    ANIONGAP 11 05/17/2019    GLC 75 05/17/2019    BUN 11 05/17/2019    CR 0.80 05/17/2019    GFRESTIMATED 86 05/17/2019    GFRESTBLACK >90 05/17/2019    TIM 9.8 05/17/2019    BILITOTAL 0.6 05/13/2019    ALBUMIN 3.8 05/13/2019    ALKPHOS 104 05/13/2019    ALT 22 05/17/2019    AST 13 05/13/2019         INR RESULTS:  No results found for: INR    No components found for: CK  No results found for: MAG  Lab Results   Component Value Date    NTBNP 318 (H) 05/17/2019     Most recent echocardiogram 3/19:  Interpretation Summary  Patient was scheduled for stress testing that was converted to TTE alone due to decrease in LVEF.  Moderate to severe left ventricular systolic dysfunction.  LVEF 30% based on biplane 2D tracing. Global hypokinesis.  Right ventricular size and systolic function appears normal.  No valve dysfunction.  Unable to assess PA pressure but RA pressure is normal.  This study was compared with the study from 1/16/17 and LVEF is decreased.    Assessment and Plan:    In summary, Bettina is a 49 year old female with NICM who appears hypervolemic today secondary to holding her diuretic. She plans on taking her diuretic when she gets home.  Today, I changed her hydralazine to 100 mg twice daily for better BP control and discontinued Losartan and started Entresto 49/51 mg twice daily.    I placed several referrals for her today including a nutrition referral and cardiac rehab.  We spent a fair amount of time reviewing the purpose of her medications and the importance of modifying her sodium to two grams daily and her fluid intake to 2L daily. She will repeat a BMP in one week after starting Entresto, and follow up with [facility] clinic in one month.    1.  Acute on chronic systolic heart failure secondary to NICM.  Stage C  NYHA Class IIIA  ACEi/ARB: Hydralazine 75 mg twice daily. Losartan 50 mg daily.   BB: yes, Carvedilol 6.25 mg twice daily.   Aldosterone antagonist: yes, Spironolactone 50 mg daily.   SCD prophylaxis: Consider Life-Vest.   Fluid status: hypervolemic  Anticoagulation: None  Antiplatelet:  ASA dose   Sleep apnea: Previous sleep studies.  CPAP not indicated per her report.   NSAID use:  Contraindicated.  Avoid use.  Remote Monitoring: None.    2.  HTN: Sub optimal control at 143/88.  Start  Entresto and increase Hydralazine as outlined above.     3.  Hyperlipidemia:  Last lipids 5/17/19:  , Tri 83, HDL 66, LDL 64.     4.  Morbid Obesity:  BMI: 46.81.  Nutrition referral for weight loss placed today. Cardiac rehab referral placed as well to help guide exercise regimen.     5.  Diabetes:  Reinforced the importance of rotating sites to avoid gaps in therapy. Managed by primary MD.     6.  Follow-up:  [facility] in one month.  Dr. Chinchilla in September.     >40 minutes spent face to face with patient with >50% in counseling, education, and coordination of care        Angle GU, SHAZIA Schneidery [facility].     Addendum:  Reviewed lifevest with Dr. Chinchilla, her primary cardiologist.  She is low risk for SCD based on her NICM.  Lifevest not needed.  Patient updated.

## 2019-05-20 NOTE — NURSING NOTE
Diet: Patient instructed regarding a heart healthy diet, including discussion of reduced fat and sodium intake. Patient demonstrated understanding of this information and agreed to call with further questions or concerns.  Labs: Patient was given results of the laboratory testing obtained today. Patient was instructed to return for the next laboratory testing in 1 week after starting Entresto. Patient demonstrated understanding of this information and agreed to call with further questions or concerns.   New Medication: Patient was educated regarding newly prescribed medication, including discussion of  the indication, administration, side effects, and when to report to MD or RN. Patient demonstrated understanding of this information and agreed to call with further questions or concerns.  Return Appointment: Patient given instructions regarding scheduling next clinic visit. Patient demonstrated understanding of this information and agreed to call with further questions or concerns.  Medication Change: Patient was educated regarding prescribed medication change, including discussion of the indication, administration, side effects, and when to report to MD or RN. Patient demonstrated understanding of this information and agreed to call with further questions or concerns.  Patient stated she understood all health information given and agreed to call with further questions or concerns.   We reviewed instructions to stop Losartan today and start Entresto tomorrow. Per pharmacy no PA required and co-pay is $27 a month which patient states she can afford.     Yasemin Blount RN

## 2019-05-21 ENCOUNTER — PATIENT OUTREACH (OUTPATIENT)
Dept: CARDIOLOGY | Facility: CLINIC | Age: 49
End: 2019-05-21

## 2019-05-21 RX ORDER — MONTELUKAST SODIUM 10 MG/1
10 TABLET ORAL AT BEDTIME
Qty: 90 TABLET | Refills: 1 | OUTPATIENT
Start: 2019-05-21

## 2019-05-21 NOTE — PROGRESS NOTES
Received call from rep Susie at [facility]. Bettina's insurance is [employer] out of [place] and it requires [employer] to submit all the paperwork before it is approved. She will call Bettina with this information and keep us updated.     Yasemin Blount RN

## 2019-05-22 ENCOUNTER — TELEPHONE (OUTPATIENT)
Dept: NEUROSURGERY | Facility: CLINIC | Age: 49
End: 2019-05-22

## 2019-05-22 DIAGNOSIS — M54.12 CERVICAL RADICULOPATHY: ICD-10-CM

## 2019-05-22 DIAGNOSIS — Z98.1 S/P CERVICAL SPINAL FUSION: Primary | ICD-10-CM

## 2019-05-22 RX ORDER — TIZANIDINE 2 MG/1
2-4 TABLET ORAL 3 TIMES DAILY PRN
Qty: 60 TABLET | Refills: 0 | Status: SHIPPED | OUTPATIENT
Start: 2019-05-22 | End: 2019-05-28 | Stop reason: DRUGHIGH

## 2019-05-22 NOTE — TELEPHONE ENCOUNTER
Prescription Refill Request    Medication: Tizanidine 2 mg     Surgical procedure/date: s/p C4-6 ACDF 4/5/19 Dr Hurtado    Current regimen/number of tabs per day: takes PRN.     Last refill:  4/6/19    Pain: muscle spasms in and pain in left shoulder and arm. Patient reports she is back to work and noticed increased pain after returning. Advised to alternate ic/heat, Tylenol and may wear brace for acute flare up.     Ok per Supriya Case CNP to refill.      location: e-prescribed to [facility] [place] ,[place]     Any patient questions or concerns: none.

## 2019-05-23 ENCOUNTER — THERAPY VISIT (OUTPATIENT)
Dept: PHYSICAL THERAPY | Facility: CLINIC | Age: 49
End: 2019-05-23
Payer: OTHER MISCELLANEOUS

## 2019-05-23 DIAGNOSIS — Z98.1 S/P SPINAL FUSION: ICD-10-CM

## 2019-05-23 DIAGNOSIS — M54.2 NECK PAIN: ICD-10-CM

## 2019-05-23 DIAGNOSIS — M25.512 CHRONIC LEFT SHOULDER PAIN: ICD-10-CM

## 2019-05-23 DIAGNOSIS — G89.29 CHRONIC LEFT SHOULDER PAIN: ICD-10-CM

## 2019-05-23 PROCEDURE — 97110 THERAPEUTIC EXERCISES: CPT | Mod: GP | Performed by: PHYSICAL THERAPIST

## 2019-05-23 PROCEDURE — 97112 NEUROMUSCULAR REEDUCATION: CPT | Mod: GP | Performed by: PHYSICAL THERAPIST

## 2019-05-23 PROCEDURE — 97140 MANUAL THERAPY 1/> REGIONS: CPT | Mod: GP | Performed by: PHYSICAL THERAPIST

## 2019-05-24 NOTE — PROGRESS NOTES
Per Angle Reaves, Lifevest not needed and patient low risk. Called [employer] rep to cancel order. Patient is aware.   Yasemin Blount RN

## 2019-05-28 ENCOUNTER — THERAPY VISIT (OUTPATIENT)
Dept: PHYSICAL THERAPY | Facility: CLINIC | Age: 49
End: 2019-05-28
Payer: OTHER MISCELLANEOUS

## 2019-05-28 ENCOUNTER — PATIENT OUTREACH (OUTPATIENT)
Dept: CARDIOLOGY | Facility: CLINIC | Age: 49
End: 2019-05-28

## 2019-05-28 DIAGNOSIS — M54.2 NECK PAIN: ICD-10-CM

## 2019-05-28 DIAGNOSIS — G89.29 CHRONIC LEFT SHOULDER PAIN: ICD-10-CM

## 2019-05-28 DIAGNOSIS — Z98.1 S/P SPINAL FUSION: ICD-10-CM

## 2019-05-28 DIAGNOSIS — I42.8 NONISCHEMIC CARDIOMYOPATHY (H): ICD-10-CM

## 2019-05-28 DIAGNOSIS — I42.8 NONISCHEMIC CARDIOMYOPATHY (H): Primary | ICD-10-CM

## 2019-05-28 DIAGNOSIS — M25.512 CHRONIC LEFT SHOULDER PAIN: ICD-10-CM

## 2019-05-28 LAB
ANION GAP SERPL CALCULATED.3IONS-SCNC: 6 MMOL/L (ref 3–14)
BUN SERPL-MCNC: 8 MG/DL (ref 7–30)
CALCIUM SERPL-MCNC: 9.7 MG/DL (ref 8.5–10.1)
CHLORIDE SERPL-SCNC: 100 MMOL/L (ref 94–109)
CO2 SERPL-SCNC: 27 MMOL/L (ref 20–32)
CREAT SERPL-MCNC: 0.75 MG/DL (ref 0.52–1.04)
GFR SERPL CREATININE-BSD FRML MDRD: >90 ML/MIN/{1.73_M2}
GLUCOSE SERPL-MCNC: 153 MG/DL (ref 70–99)
POTASSIUM SERPL-SCNC: 3.8 MMOL/L (ref 3.4–5.3)
SODIUM SERPL-SCNC: 133 MMOL/L (ref 133–144)

## 2019-05-28 PROCEDURE — 97110 THERAPEUTIC EXERCISES: CPT | Mod: GP | Performed by: PHYSICAL THERAPIST

## 2019-05-28 PROCEDURE — 80048 BASIC METABOLIC PNL TOTAL CA: CPT | Performed by: NURSE PRACTITIONER

## 2019-05-28 PROCEDURE — 97140 MANUAL THERAPY 1/> REGIONS: CPT | Mod: GP | Performed by: PHYSICAL THERAPIST

## 2019-05-28 PROCEDURE — 36415 COLL VENOUS BLD VENIPUNCTURE: CPT | Performed by: NURSE PRACTITIONER

## 2019-05-28 RX ORDER — HYDRALAZINE HYDROCHLORIDE 50 MG/1
50 TABLET, FILM COATED ORAL
COMMUNITY
Start: 2015-05-07 | End: 2019-05-28 | Stop reason: DRUGHIGH

## 2019-05-28 RX ORDER — MONTELUKAST SODIUM 10 MG/1
10 TABLET ORAL
COMMUNITY
End: 2020-08-05

## 2019-05-28 RX ORDER — TIZANIDINE HYDROCHLORIDE 2 MG/1
2 CAPSULE, GELATIN COATED ORAL
COMMUNITY
End: 2019-07-15

## 2019-05-28 RX ORDER — FUROSEMIDE 40 MG
40 TABLET ORAL 2 TIMES DAILY
Qty: 60 TABLET | Refills: 3 | Status: SHIPPED | OUTPATIENT
Start: 2019-05-28 | End: 2019-11-07

## 2019-05-28 RX ORDER — CARVEDILOL 6.25 MG/1
6.25 TABLET ORAL
COMMUNITY
End: 2019-05-28

## 2019-05-28 RX ORDER — POTASSIUM CHLORIDE 750 MG/1
20 TABLET, EXTENDED RELEASE ORAL 2 TIMES DAILY
Qty: 120 TABLET | Refills: 1 | Status: SHIPPED | OUTPATIENT
Start: 2019-05-28 | End: 2019-11-07

## 2019-05-28 RX ORDER — OXYCODONE HYDROCHLORIDE 5 MG/1
5 TABLET ORAL
COMMUNITY
End: 2019-06-25

## 2019-05-28 RX ORDER — SPIRONOLACTONE 50 MG/1
50 TABLET, FILM COATED ORAL
COMMUNITY
End: 2019-05-28

## 2019-05-28 NOTE — PROGRESS NOTES
Reviewed with Angle Reaves NP and called back with following recommendations:    Date: 5/28/2019    Time of Call: 3:57 PM     Diagnosis:  HFrEF     [ VORB ] Ordering provider: Angle Reaves NP  Order: Increase Lasix to 40 mg BID. Start Potassium 20 mEq BID. BMP in 1-2 weeks.      Order received by: Yasemin Blount RN      Follow-up/additional notes: will schedule labs for 6/6. Will check in on blood pressure/HR log at that time.

## 2019-05-28 NOTE — PROGRESS NOTES
Called Bettina and reviewed labs. She reports she is up about 5 lbs since she saw us last week in clinic. Notes increased swelling in her legs and has more labored breathing.   She reports she has been taking Lasix 80 mg one day, 40 mg the next day.   Asked her to keep a blood pressure/HR log over the next week and I would call her to check in and will possibly increase Entresto at that time. She asks about titrating Coreg at that time as she said this was discussed in clinic as well.     Will discuss above with Angle Reaves NP and call Bettina back.     Yasemin Blount RN

## 2019-05-29 ENCOUNTER — TRANSFERRED RECORDS (OUTPATIENT)
Dept: MULTI SPECIALTY CLINIC | Facility: CLINIC | Age: 49
End: 2019-05-29

## 2019-05-29 ENCOUNTER — TELEPHONE (OUTPATIENT)
Dept: FAMILY MEDICINE | Facility: CLINIC | Age: 49
End: 2019-05-29

## 2019-05-29 NOTE — TELEPHONE ENCOUNTER
Faxed last tracing we have from 3/28/19 to [facility] at [place], [phone], right fax confirmed at 9:37 am today. Called and spoke to [facility] and explained that this has been faxed. She understands.  Kristyn Sheridan MA/  For Teams Mook

## 2019-05-29 NOTE — TELEPHONE ENCOUNTER
Reason for Call:  Other     Detailed comments: [facility] needs EKG tracing ASAP.     Phone Number can be reached at: [facility] Voice [phone] Fax [phone]     Best Time: any    Can we leave a detailed message on this number? YES    Call taken on 5/29/2019 at 9:22 AM by Demetra Romo

## 2019-05-31 ENCOUNTER — THERAPY VISIT (OUTPATIENT)
Dept: PHYSICAL THERAPY | Facility: CLINIC | Age: 49
End: 2019-05-31
Payer: OTHER MISCELLANEOUS

## 2019-05-31 DIAGNOSIS — G89.29 CHRONIC LEFT SHOULDER PAIN: ICD-10-CM

## 2019-05-31 DIAGNOSIS — M25.512 CHRONIC LEFT SHOULDER PAIN: ICD-10-CM

## 2019-05-31 DIAGNOSIS — Z98.1 S/P SPINAL FUSION: ICD-10-CM

## 2019-05-31 DIAGNOSIS — M54.2 NECK PAIN: ICD-10-CM

## 2019-05-31 PROCEDURE — 97112 NEUROMUSCULAR REEDUCATION: CPT | Mod: GP | Performed by: PHYSICAL THERAPIST

## 2019-05-31 PROCEDURE — 97140 MANUAL THERAPY 1/> REGIONS: CPT | Mod: GP | Performed by: PHYSICAL THERAPIST

## 2019-05-31 PROCEDURE — 97110 THERAPEUTIC EXERCISES: CPT | Mod: GP | Performed by: PHYSICAL THERAPIST

## 2019-06-05 ENCOUNTER — HOSPITAL ENCOUNTER (OUTPATIENT)
Dept: CARDIAC REHAB | Facility: CLINIC | Age: 49
End: 2019-06-05
Attending: NURSE PRACTITIONER
Payer: COMMERCIAL

## 2019-06-05 DIAGNOSIS — I42.8 NONISCHEMIC CARDIOMYOPATHY (H): ICD-10-CM

## 2019-06-05 PROCEDURE — 93797 PHYS/QHP OP CAR RHAB WO ECG: CPT

## 2019-06-05 PROCEDURE — 40000575 ZZH STATISTIC OP CARDIAC VISIT #2

## 2019-06-05 PROCEDURE — 93798 PHYS/QHP OP CAR RHAB W/ECG: CPT

## 2019-06-05 PROCEDURE — 40000116 ZZH STATISTIC OP CR VISIT

## 2019-06-06 ENCOUNTER — THERAPY VISIT (OUTPATIENT)
Dept: PHYSICAL THERAPY | Facility: CLINIC | Age: 49
End: 2019-06-06
Payer: OTHER MISCELLANEOUS

## 2019-06-06 ENCOUNTER — HOSPITAL ENCOUNTER (OUTPATIENT)
Dept: CARDIAC REHAB | Facility: CLINIC | Age: 49
End: 2019-06-06
Attending: NURSE PRACTITIONER
Payer: COMMERCIAL

## 2019-06-06 ENCOUNTER — PATIENT OUTREACH (OUTPATIENT)
Dept: CARDIOLOGY | Facility: CLINIC | Age: 49
End: 2019-06-06

## 2019-06-06 DIAGNOSIS — M54.2 NECK PAIN: ICD-10-CM

## 2019-06-06 DIAGNOSIS — Z98.1 S/P SPINAL FUSION: ICD-10-CM

## 2019-06-06 DIAGNOSIS — M25.512 CHRONIC LEFT SHOULDER PAIN: ICD-10-CM

## 2019-06-06 DIAGNOSIS — I42.8 NONISCHEMIC CARDIOMYOPATHY (H): ICD-10-CM

## 2019-06-06 DIAGNOSIS — G89.29 CHRONIC LEFT SHOULDER PAIN: ICD-10-CM

## 2019-06-06 LAB
ANION GAP SERPL CALCULATED.3IONS-SCNC: 6 MMOL/L (ref 3–14)
BUN SERPL-MCNC: 12 MG/DL (ref 7–30)
CALCIUM SERPL-MCNC: 9.5 MG/DL (ref 8.5–10.1)
CHLORIDE SERPL-SCNC: 102 MMOL/L (ref 94–109)
CO2 SERPL-SCNC: 30 MMOL/L (ref 20–32)
CREAT SERPL-MCNC: 0.85 MG/DL (ref 0.52–1.04)
GFR SERPL CREATININE-BSD FRML MDRD: 80 ML/MIN/{1.73_M2}
GLUCOSE SERPL-MCNC: 65 MG/DL (ref 70–99)
POTASSIUM SERPL-SCNC: 3.7 MMOL/L (ref 3.4–5.3)
SODIUM SERPL-SCNC: 138 MMOL/L (ref 133–144)

## 2019-06-06 PROCEDURE — 97530 THERAPEUTIC ACTIVITIES: CPT | Mod: GP | Performed by: PHYSICAL THERAPIST

## 2019-06-06 PROCEDURE — 40000116 ZZH STATISTIC OP CR VISIT

## 2019-06-06 PROCEDURE — 97140 MANUAL THERAPY 1/> REGIONS: CPT | Mod: GP | Performed by: PHYSICAL THERAPIST

## 2019-06-06 PROCEDURE — 80048 BASIC METABOLIC PNL TOTAL CA: CPT | Performed by: NURSE PRACTITIONER

## 2019-06-06 PROCEDURE — 36415 COLL VENOUS BLD VENIPUNCTURE: CPT | Performed by: NURSE PRACTITIONER

## 2019-06-06 PROCEDURE — 93798 PHYS/QHP OP CAR RHAB W/ECG: CPT

## 2019-06-06 PROCEDURE — 97110 THERAPEUTIC EXERCISES: CPT | Mod: GP | Performed by: PHYSICAL THERAPIST

## 2019-06-06 NOTE — PROGRESS NOTES
Called Bettina to follow up on blood pressures after starting Entresto. She reports her numbers are between 120-126/70-75. Denies any dizziness or lightheadedness. Weight has been stable around 184-185. Labs drawn today and in process, told her we would follow up once we see those with recommendations.     Yasemin Blount RN

## 2019-06-07 DIAGNOSIS — I50.22 CHRONIC SYSTOLIC HEART FAILURE (H): Primary | ICD-10-CM

## 2019-06-07 NOTE — PROGRESS NOTES
Date: 6/7/2019    Time of Call: 2:40 PM     Diagnosis:  HFrEF     [ VORB ] Ordering provider: Angle Reaves NP  Order: Increase Entresto to 97/103 mg BID. Repeat labs in two weeks.      Order received by: Yasemin Blount RN      Follow-up/additional notes: sent [place] message with labs with further instructions. Increase starting next Friday to correlate with lab appt 2 weeks later.

## 2019-06-10 ENCOUNTER — HOSPITAL ENCOUNTER (OUTPATIENT)
Dept: CARDIAC REHAB | Facility: CLINIC | Age: 49
End: 2019-06-10
Attending: NURSE PRACTITIONER
Payer: COMMERCIAL

## 2019-06-10 PROCEDURE — 93798 PHYS/QHP OP CAR RHAB W/ECG: CPT

## 2019-06-10 PROCEDURE — 40000116 ZZH STATISTIC OP CR VISIT

## 2019-06-11 ENCOUNTER — HOSPITAL ENCOUNTER (OUTPATIENT)
Dept: CARDIAC REHAB | Facility: CLINIC | Age: 49
End: 2019-06-11
Attending: NURSE PRACTITIONER
Payer: COMMERCIAL

## 2019-06-11 ENCOUNTER — THERAPY VISIT (OUTPATIENT)
Dept: PHYSICAL THERAPY | Facility: CLINIC | Age: 49
End: 2019-06-11
Payer: OTHER MISCELLANEOUS

## 2019-06-11 DIAGNOSIS — M54.2 NECK PAIN: ICD-10-CM

## 2019-06-11 DIAGNOSIS — G89.29 CHRONIC LEFT SHOULDER PAIN: ICD-10-CM

## 2019-06-11 DIAGNOSIS — M25.512 CHRONIC LEFT SHOULDER PAIN: ICD-10-CM

## 2019-06-11 DIAGNOSIS — Z98.1 S/P SPINAL FUSION: ICD-10-CM

## 2019-06-11 PROCEDURE — 97112 NEUROMUSCULAR REEDUCATION: CPT | Mod: GP | Performed by: PHYSICAL THERAPIST

## 2019-06-11 PROCEDURE — 40000116 ZZH STATISTIC OP CR VISIT

## 2019-06-11 PROCEDURE — 97110 THERAPEUTIC EXERCISES: CPT | Mod: GP | Performed by: PHYSICAL THERAPIST

## 2019-06-11 PROCEDURE — 97140 MANUAL THERAPY 1/> REGIONS: CPT | Mod: GP | Performed by: PHYSICAL THERAPIST

## 2019-06-11 PROCEDURE — 93798 PHYS/QHP OP CAR RHAB W/ECG: CPT

## 2019-06-13 ENCOUNTER — THERAPY VISIT (OUTPATIENT)
Dept: PHYSICAL THERAPY | Facility: CLINIC | Age: 49
End: 2019-06-13
Payer: OTHER MISCELLANEOUS

## 2019-06-13 ENCOUNTER — HOSPITAL ENCOUNTER (OUTPATIENT)
Dept: CARDIAC REHAB | Facility: CLINIC | Age: 49
End: 2019-06-13
Attending: NURSE PRACTITIONER
Payer: COMMERCIAL

## 2019-06-13 DIAGNOSIS — M25.512 CHRONIC LEFT SHOULDER PAIN: ICD-10-CM

## 2019-06-13 DIAGNOSIS — M54.2 NECK PAIN: ICD-10-CM

## 2019-06-13 DIAGNOSIS — G89.29 CHRONIC LEFT SHOULDER PAIN: ICD-10-CM

## 2019-06-13 DIAGNOSIS — Z98.1 S/P SPINAL FUSION: ICD-10-CM

## 2019-06-13 PROCEDURE — 40000116 ZZH STATISTIC OP CR VISIT

## 2019-06-13 PROCEDURE — 93798 PHYS/QHP OP CAR RHAB W/ECG: CPT

## 2019-06-13 PROCEDURE — 97110 THERAPEUTIC EXERCISES: CPT | Mod: GP | Performed by: PHYSICAL THERAPIST

## 2019-06-13 PROCEDURE — 97140 MANUAL THERAPY 1/> REGIONS: CPT | Mod: GP | Performed by: PHYSICAL THERAPIST

## 2019-06-13 PROCEDURE — 97112 NEUROMUSCULAR REEDUCATION: CPT | Mod: GP | Performed by: PHYSICAL THERAPIST

## 2019-06-16 DIAGNOSIS — E11.65 TYPE 2 DIABETES MELLITUS WITH HYPERGLYCEMIA, WITH LONG-TERM CURRENT USE OF INSULIN (H): Primary | ICD-10-CM

## 2019-06-16 DIAGNOSIS — E78.2 MIXED HYPERLIPIDEMIA: ICD-10-CM

## 2019-06-16 DIAGNOSIS — Z79.4 TYPE 2 DIABETES MELLITUS WITH HYPERGLYCEMIA, WITH LONG-TERM CURRENT USE OF INSULIN (H): Primary | ICD-10-CM

## 2019-06-16 NOTE — TELEPHONE ENCOUNTER
"Requested Prescriptions   Pending Prescriptions Disp Refills     lovastatin (MEVACOR) 20 MG tablet [Pharmacy Med Name: LOVASTATIN 20 MG TABLET]  Last Written Prescription Date:  5/2/18  Last Fill Quantity: 90,  # refills: 3   Last Office Visit with [facility], P or [facility] prescribing provider:  5/13/19   Future Office Visit:    Next 5 appointments (look out 90 days)    Jun 25, 2019  1:30 PM CDT  Return Visit with Supriya Case NP  [facility] ([address] [address]  [phone]          90 tablet 1     Sig: TAKE 1 TABLET BY MOUTH EVERY DAY AT BEDTIME       Statins Protocol Passed - 6/16/2019  8:29 AM        Passed - LDL on file in past 12 months     Recent Labs   Lab Test 05/17/19  1306   LDL 64             Passed - No abnormal creatine kinase in past 12 months     No lab results found.             Passed - Recent (12 mo) or future (30 days) visit within the authorizing provider's specialty     Patient had office visit in the last 12 months or has a visit in the next 30 days with authorizing provider or within the authorizing provider's specialty.  See \"Patient Info\" tab in inbasket, or \"Choose Columns\" in Meds & Orders section of the refill encounter.              Passed - Medication is active on med list        Passed - Patient is age 18 or older        Passed - No active pregnancy on record        Passed - No positive pregnancy test in past 12 months          "

## 2019-06-17 ENCOUNTER — THERAPY VISIT (OUTPATIENT)
Dept: PHYSICAL THERAPY | Facility: CLINIC | Age: 49
End: 2019-06-17
Payer: OTHER MISCELLANEOUS

## 2019-06-17 DIAGNOSIS — M25.512 CHRONIC LEFT SHOULDER PAIN: ICD-10-CM

## 2019-06-17 DIAGNOSIS — Z98.1 S/P SPINAL FUSION: ICD-10-CM

## 2019-06-17 DIAGNOSIS — G89.29 CHRONIC LEFT SHOULDER PAIN: ICD-10-CM

## 2019-06-17 DIAGNOSIS — M54.2 NECK PAIN: ICD-10-CM

## 2019-06-17 PROCEDURE — 97140 MANUAL THERAPY 1/> REGIONS: CPT | Mod: GP | Performed by: PHYSICAL THERAPIST

## 2019-06-17 PROCEDURE — 97110 THERAPEUTIC EXERCISES: CPT | Mod: GP | Performed by: PHYSICAL THERAPIST

## 2019-06-18 ENCOUNTER — HOSPITAL ENCOUNTER (OUTPATIENT)
Dept: CARDIAC REHAB | Facility: CLINIC | Age: 49
End: 2019-06-18
Attending: NURSE PRACTITIONER
Payer: COMMERCIAL

## 2019-06-18 PROCEDURE — 93798 PHYS/QHP OP CAR RHAB W/ECG: CPT

## 2019-06-18 PROCEDURE — 40000116 ZZH STATISTIC OP CR VISIT

## 2019-06-18 RX ORDER — LOVASTATIN 20 MG
TABLET ORAL
Qty: 90 TABLET | Refills: 1 | Status: SHIPPED | OUTPATIENT
Start: 2019-06-18 | End: 2020-01-15

## 2019-06-18 NOTE — TELEPHONE ENCOUNTER
Routing refill request to provider for review/approval because:  A break in medication  Marie Mccormack RN

## 2019-06-19 ENCOUNTER — THERAPY VISIT (OUTPATIENT)
Dept: PHYSICAL THERAPY | Facility: CLINIC | Age: 49
End: 2019-06-19
Payer: OTHER MISCELLANEOUS

## 2019-06-19 ENCOUNTER — HOSPITAL ENCOUNTER (OUTPATIENT)
Dept: CARDIAC REHAB | Facility: CLINIC | Age: 49
End: 2019-06-19
Attending: NURSE PRACTITIONER
Payer: COMMERCIAL

## 2019-06-19 ENCOUNTER — TELEPHONE (OUTPATIENT)
Dept: PHYSICAL THERAPY | Facility: CLINIC | Age: 49
End: 2019-06-19

## 2019-06-19 DIAGNOSIS — M54.2 NECK PAIN: ICD-10-CM

## 2019-06-19 DIAGNOSIS — G89.29 CHRONIC LEFT SHOULDER PAIN: ICD-10-CM

## 2019-06-19 DIAGNOSIS — M25.512 CHRONIC LEFT SHOULDER PAIN: ICD-10-CM

## 2019-06-19 DIAGNOSIS — Z98.1 S/P SPINAL FUSION: ICD-10-CM

## 2019-06-19 DIAGNOSIS — I42.8 NONISCHEMIC CARDIOMYOPATHY (H): Primary | ICD-10-CM

## 2019-06-19 PROCEDURE — 97110 THERAPEUTIC EXERCISES: CPT | Mod: GP | Performed by: PHYSICAL THERAPIST

## 2019-06-19 PROCEDURE — 40000116 ZZH STATISTIC OP CR VISIT

## 2019-06-19 PROCEDURE — 97140 MANUAL THERAPY 1/> REGIONS: CPT | Mod: GP | Performed by: PHYSICAL THERAPIST

## 2019-06-19 PROCEDURE — 93798 PHYS/QHP OP CAR RHAB W/ECG: CPT

## 2019-06-19 NOTE — TELEPHONE ENCOUNTER
I left a message with Dorothy JEFE requesting 6 more visits of PT for Bettina given that she still has so much pain and weakness in her L arm/shoulder.

## 2019-06-19 NOTE — PROGRESS NOTES
Subjective:  HPI                    Objective:  System    Physical Exam    General     ROS    Assessment/Plan:    PROGRESS  REPORT    Progress reporting period is from 5/6/19 to 6/19/19.       SUBJECTIVE  Subjective changes noted by patient:  Pt notes that her L arm/shoulder is feeling really good today, but has a day off from work and hasn't done anything yet today. She does still get some tingling/numbness in her fingertips, but feels that that is easing up. Her shoulder pain is not getting better, though.    Current pain level is  1/10 (increased to 5/10 by end of session).     Previous pain level was  4/10 Initial Pain level: 9/10(at worst).   Changes in function:  Yes (See Goal flowsheet attached for changes in current functional level)  Adverse reaction to treatment or activity: None    OBJECTIVE  Changes noted in objective findings:    L shoulder AROM: flex 78 deg, abd 57 deg, ER 70 deg, IR/ext thumb to L buttock laterally w/pain++; L shoulder PROM: abd 90 deg (pain++ in ant/lat L shoulder, no pain in neck), flex 120 w/pain++, ER 70 deg w/pain++. CROM: flex full, ext 60%, SB L 50%, SB R 75%, rot L 50%, rot R 75%, more pain w/L SB and L rot.  strength L: avg of 26# psi (R has an avg of 52# psi).      ASSESSMENT/PLAN  Updated problem list and treatment plan: Diagnosis 1:  S/p cervical fusion w/possible underlying RC injury  Pain -  hot/cold therapy, manual therapy, self management and home program  Decreased ROM/flexibility - manual therapy and therapeutic exercise  Decreased strength - therapeutic exercise and therapeutic activities  Impaired muscle performance - neuro re-education  Decreased function - therapeutic activities  STG/LTGs have been met or progress has been made towards goals:  Yes (See Goal flow sheet completed today.)  Assessment of Progress: The patient's condition is improving.  The patient's condition has potential to improve.  The patient's progress has slowed.  The patient has had set  backs in their progress.  Patient is meeting short term goals and is progressing towards long term goals.  Self Management Plans:  Patient has been instructed in a home treatment program.  Patient is independent in a home treatment program.  Patient  has been instructed in self management of symptoms.  Patient is independent in self management of symptoms.  I have re-evaluated this patient and find that the nature, scope, duration and intensity of the therapy is appropriate for the medical condition of the patient.  Bettina continues to require the following intervention to meet STG and LTG's:  PT    Recommendations:  This patient would benefit from continued therapy.     Frequency:  1 X week, once daily  Duration:  for 6 weeks        Please refer to the daily flowsheet for treatment today, total treatment time and time spent performing 1:1 timed codes.

## 2019-06-19 NOTE — Clinical Note
Please see the progress report completed in PT today.  Thank you for referring Bettina to us!Cordially, LEONILA Weiner, MPT

## 2019-06-20 ENCOUNTER — HOSPITAL ENCOUNTER (OUTPATIENT)
Dept: CARDIAC REHAB | Facility: CLINIC | Age: 49
End: 2019-06-20
Attending: NURSE PRACTITIONER
Payer: COMMERCIAL

## 2019-06-20 PROCEDURE — 40000116 ZZH STATISTIC OP CR VISIT

## 2019-06-20 PROCEDURE — 93798 PHYS/QHP OP CAR RHAB W/ECG: CPT

## 2019-06-24 ENCOUNTER — HOSPITAL ENCOUNTER (OUTPATIENT)
Dept: CARDIAC REHAB | Facility: CLINIC | Age: 49
End: 2019-06-24
Attending: NURSE PRACTITIONER
Payer: COMMERCIAL

## 2019-06-24 PROCEDURE — 93798 PHYS/QHP OP CAR RHAB W/ECG: CPT | Performed by: OCCUPATIONAL THERAPIST

## 2019-06-24 PROCEDURE — 40000116 ZZH STATISTIC OP CR VISIT: Performed by: OCCUPATIONAL THERAPIST

## 2019-06-25 ENCOUNTER — HOSPITAL ENCOUNTER (OUTPATIENT)
Dept: CARDIAC REHAB | Facility: CLINIC | Age: 49
End: 2019-06-25
Attending: NURSE PRACTITIONER
Payer: COMMERCIAL

## 2019-06-25 ENCOUNTER — ANCILLARY PROCEDURE (OUTPATIENT)
Dept: GENERAL RADIOLOGY | Facility: CLINIC | Age: 49
End: 2019-06-25
Attending: NURSE PRACTITIONER
Payer: COMMERCIAL

## 2019-06-25 ENCOUNTER — OFFICE VISIT (OUTPATIENT)
Dept: NEUROSURGERY | Facility: CLINIC | Age: 49
End: 2019-06-25
Payer: COMMERCIAL

## 2019-06-25 VITALS
DIASTOLIC BLOOD PRESSURE: 82 MMHG | WEIGHT: 284.4 LBS | SYSTOLIC BLOOD PRESSURE: 130 MMHG | TEMPERATURE: 97.5 F | RESPIRATION RATE: 16 BRPM | HEART RATE: 92 BPM | BODY MASS INDEX: 45.71 KG/M2 | HEIGHT: 66 IN | OXYGEN SATURATION: 97 %

## 2019-06-25 DIAGNOSIS — Z98.1 S/P CERVICAL SPINAL FUSION: Primary | ICD-10-CM

## 2019-06-25 DIAGNOSIS — Z98.1 S/P CERVICAL SPINAL FUSION: ICD-10-CM

## 2019-06-25 PROCEDURE — 40000116 ZZH STATISTIC OP CR VISIT

## 2019-06-25 PROCEDURE — 93798 PHYS/QHP OP CAR RHAB W/ECG: CPT

## 2019-06-25 PROCEDURE — 72040 X-RAY EXAM NECK SPINE 2-3 VW: CPT

## 2019-06-25 PROCEDURE — 99024 POSTOP FOLLOW-UP VISIT: CPT | Performed by: NURSE PRACTITIONER

## 2019-06-25 RX ORDER — METHYLPREDNISOLONE 4 MG
TABLET, DOSE PACK ORAL
Qty: 21 TABLET | Refills: 0 | Status: SHIPPED | OUTPATIENT
Start: 2019-06-25 | End: 2019-11-21

## 2019-06-25 ASSESSMENT — PAIN SCALES - GENERAL: PAINLEVEL: SEVERE PAIN (6)

## 2019-06-25 ASSESSMENT — MIFFLIN-ST. JEOR: SCORE: 1926.78

## 2019-06-25 NOTE — NURSING NOTE
"Bettina Montes is a 49 year old female who presents for:  Chief Complaint   Patient presents with     Neurologic Problem     S/p C4-6 ACDF. DOS 4/5/19. Xray today. Patient notes she is still having left sided neck pain and continues to get headaches on the left side. The pain is moderate at this time. She continues to get numbness in the fingers of the left hand. She is still unable to lift her arm up.         Vitals:    Vitals:    06/25/19 1318   BP: 130/82   Pulse: 92   Resp: 16   Temp: 97.5  F (36.4  C)   TempSrc: Oral   SpO2: 97%   Weight: 284 lb 6.4 oz (129 kg)   Height: 5' 6\" (1.676 m)       BMI:  Estimated body mass index is 45.9 kg/m  as calculated from the following:    Height as of this encounter: 5' 6\" (1.676 m).    Weight as of this encounter: 284 lb 6.4 oz (129 kg).    Pain Score:  Severe Pain (6)        Cammy Silverman      "

## 2019-06-25 NOTE — LETTER
6/25/2019         RE: Bettina Montes  [address] [place] [address]        Dear Colleague,    Thank you for referring your patient, Bettina Montes, to the [facility]. Please see a copy of my visit note below.    [facility]  Neurosurgery followup:    HPI: 3 months s/p C4-6 ACDF with Dr. Hurtado. She states her neck pain is improved since surgery. She continues to have left upper extremity muscle spasms and weakness.  She states this has unchanged since surgery. She has been working with therapies. She denies new weakness.     Exam:  Constitutional:  Alert, well nourished, NAD.  HEENT: Normocephalic, atraumatic.   Pulm:  Without shortness of breath   CV:  No pitting edema of BLE.     Neurological:  Awake  Alert  Oriented x 3  Motor exam:     Shoulder Abduction:  Right:  5/5    Left:  5/5  Biceps:                      Right:  5/5   Left:  4+/5  Triceps:                     Right:  5/5   Left:  4+/5  Wrist Extensors:       Right:  5/5    Left:  5/5  Wrist Flexors:           Right:  5/5    Left:  5/5  Intrinsics:                  Right:  5/5    Left:  5/5     Able to spontaneously move U/E bilaterally  Sensation intact throughout all U/E dermatomes     Incisions:  Healed nicely    Imaging:  AP and lateral films reveal stable hardware.    A/P: 3 months s/p C4-6 ACDF with Dr. Hurtado. She states her neck pain is improved since surgery. She continues to have left upper extremity muscle spasms and weakness.  She states this has unchanged since surgery. She has been working with therapies. She denies new weakness. Discussed increasing weight restriction and activity as tolerated. She expresses concerns regarding returning to work with no restrictions. Instructed that from a surgical standpoint we do not have any restrictions for her going forward. She states she is unable to return to work without restrictions. Recommended her to have a functional capacity exam. Referral placed and  faxed. Follow up in 3 months with XR prior. She verbalized understanding and agreement.    Patient Instructions   - May increase lifting restriction and activity as tolerated.    - Continue physical therapy as previously ordered.    - Take medrol dose pack as prescribed.     - Follow up in 3 months with xray prior.     - Call the clinic at [phone] for increased pain or any other questions and concerns.    - [facility] [place] [phone]      Supriya Case CNP  [facility]  [address] [address]    Tel [phone]  Pager [phone]      Again, thank you for allowing me to participate in the care of your patient.        Sincerely,        Supriya Case, NP

## 2019-06-25 NOTE — PROGRESS NOTES
[facility]  Neurosurgery followup:    HPI: 3 months s/p C4-6 ACDF with Dr. Hurtado. She states her neck pain is improved since surgery. She continues to have left upper extremity muscle spasms and weakness.  She states this has unchanged since surgery. She has been working with therapies. She denies new weakness.     Exam:  Constitutional:  Alert, well nourished, NAD.  HEENT: Normocephalic, atraumatic.   Pulm:  Without shortness of breath   CV:  No pitting edema of BLE.     Neurological:  Awake  Alert  Oriented x 3  Motor exam:     Shoulder Abduction:  Right:  5/5    Left:  5/5  Biceps:                      Right:  5/5   Left:  4+/5  Triceps:                     Right:  5/5   Left:  4+/5  Wrist Extensors:       Right:  5/5    Left:  5/5  Wrist Flexors:           Right:  5/5    Left:  5/5  Intrinsics:                  Right:  5/5    Left:  5/5     Able to spontaneously move U/E bilaterally  Sensation intact throughout all U/E dermatomes     Incisions:  Healed nicely    Imaging:  AP and lateral films reveal stable hardware.    A/P: 3 months s/p C4-6 ACDF with Dr. Hurtado. She states her neck pain is improved since surgery. She continues to have left upper extremity muscle spasms and weakness.  She states this has unchanged since surgery. She has been working with therapies. She denies new weakness. Discussed increasing weight restriction and activity as tolerated. She expresses concerns regarding returning to work with no restrictions. Instructed that from a surgical standpoint we do not have any restrictions for her going forward. She states she is unable to return to work without restrictions. Recommended her to have a functional capacity exam. Referral placed and faxed. Follow up in 3 months with XR prior. She verbalized understanding and agreement.    Patient Instructions   - May increase lifting restriction and activity as tolerated.    - Continue physical therapy as previously ordered.    - Take medrol  dose pack as prescribed.     - Follow up in 3 months with xray prior.     - Call the clinic at [phone] for increased pain or any other questions and concerns.    - Functional Capacity Exam [facility] [phone]      Supriya Case [facility]  [facility]  [address] [address]    Tel [phone]  Pager [phone]

## 2019-06-25 NOTE — PATIENT INSTRUCTIONS
- May increase lifting restriction and activity as tolerated.    - Continue physical therapy as previously ordered.    - Take medrol dose pack as prescribed.     - Follow up in 3 months with xray prior.     - Call the clinic at [phone] for increased pain or any other questions and concerns.    - Functional Capacity Exam [facility] [phone]

## 2019-06-27 ENCOUNTER — HOSPITAL ENCOUNTER (OUTPATIENT)
Dept: CARDIAC REHAB | Facility: CLINIC | Age: 49
End: 2019-06-27
Attending: NURSE PRACTITIONER
Payer: COMMERCIAL

## 2019-06-27 PROCEDURE — 93798 PHYS/QHP OP CAR RHAB W/ECG: CPT

## 2019-06-27 PROCEDURE — 40000116 ZZH STATISTIC OP CR VISIT

## 2019-06-30 DIAGNOSIS — I42.8 NONISCHEMIC CARDIOMYOPATHY (H): ICD-10-CM

## 2019-06-30 PROCEDURE — 80048 BASIC METABOLIC PNL TOTAL CA: CPT | Performed by: NURSE PRACTITIONER

## 2019-06-30 PROCEDURE — 36415 COLL VENOUS BLD VENIPUNCTURE: CPT | Performed by: NURSE PRACTITIONER

## 2019-07-01 ENCOUNTER — DOCUMENTATION ONLY (OUTPATIENT)
Dept: NEUROSURGERY | Facility: CLINIC | Age: 49
End: 2019-07-01

## 2019-07-01 ENCOUNTER — OFFICE VISIT (OUTPATIENT)
Dept: CARDIOLOGY | Facility: CLINIC | Age: 49
End: 2019-07-01
Payer: COMMERCIAL

## 2019-07-01 VITALS
DIASTOLIC BLOOD PRESSURE: 84 MMHG | WEIGHT: 283 LBS | SYSTOLIC BLOOD PRESSURE: 132 MMHG | BODY MASS INDEX: 45.68 KG/M2 | HEART RATE: 90 BPM | OXYGEN SATURATION: 98 %

## 2019-07-01 DIAGNOSIS — I10 ESSENTIAL HYPERTENSION WITH GOAL BLOOD PRESSURE LESS THAN 140/90: ICD-10-CM

## 2019-07-01 DIAGNOSIS — E66.01 OBESITY, CLASS III, BMI 40-49.9 (MORBID OBESITY) (H): ICD-10-CM

## 2019-07-01 DIAGNOSIS — I42.8 NONISCHEMIC CARDIOMYOPATHY (H): Primary | ICD-10-CM

## 2019-07-01 DIAGNOSIS — I50.22 CHRONIC SYSTOLIC CONGESTIVE HEART FAILURE (H): ICD-10-CM

## 2019-07-01 DIAGNOSIS — E78.5 HYPERLIPIDEMIA LDL GOAL <130: ICD-10-CM

## 2019-07-01 LAB
ANION GAP SERPL CALCULATED.3IONS-SCNC: 7 MMOL/L (ref 3–14)
BUN SERPL-MCNC: 10 MG/DL (ref 7–30)
CALCIUM SERPL-MCNC: 9.5 MG/DL (ref 8.5–10.1)
CHLORIDE SERPL-SCNC: 100 MMOL/L (ref 94–109)
CO2 SERPL-SCNC: 28 MMOL/L (ref 20–32)
CREAT SERPL-MCNC: 0.83 MG/DL (ref 0.52–1.04)
GFR SERPL CREATININE-BSD FRML MDRD: 82 ML/MIN/{1.73_M2}
GLUCOSE SERPL-MCNC: 82 MG/DL (ref 70–99)
POTASSIUM SERPL-SCNC: 3.9 MMOL/L (ref 3.4–5.3)
SODIUM SERPL-SCNC: 135 MMOL/L (ref 133–144)

## 2019-07-01 PROCEDURE — 99214 OFFICE O/P EST MOD 30 MIN: CPT | Performed by: NURSE PRACTITIONER

## 2019-07-01 RX ORDER — CARVEDILOL 12.5 MG/1
12.5 TABLET ORAL 2 TIMES DAILY WITH MEALS
Qty: 60 TABLET | Refills: 0 | Status: SHIPPED | OUTPATIENT
Start: 2019-07-01 | End: 2019-08-12

## 2019-07-01 NOTE — PATIENT INSTRUCTIONS
Take your medicines every day, as directed    Changes made today:  o Increase Carvedilol to 12.5 mg twice daily.   o    Monitor Your Weight and Symptoms    Contact us if you:      Gain 2 pounds in one day or 5 pounds in one week    Feel more short of breath    Notice more leg swelling    Feel lightheadeded   Change your lifestyle    Limit Salt or Sodium:    2000 mg  Limit Fluids:    2000 mL or approximately 64 ounces  Eat a Heart Healthy Diet    Low in saturated fats  Stay Active:    Aim to move at least 150 minutes every  week         To Contact us    During Business Hours:  [phone], option # 1 (University)  Then option # 4 (medical questions)     After hours, weekends or holidays:   [phone], Option #4  Ask to speak to the On-Call Cardiologist. Inform them you are a CORE/heart failure patient at the [facility].     Use [employer] allows you to communicate directly with your heart team through secure messaging.    [employer] can be accessed any time on your phone, computer, or tablet.    If you need assistance, we'd be happy to help!         Keep your Heart Appointments:    1.  Dr. Edwards in September.  2. Schedule follow up with new PCP - Gabriela Vu MD or Deanna Winkler NP at [place] would be one recommendation

## 2019-07-01 NOTE — LETTER
7/1/2019      RE: Bettina Montes  [address] [facility] [place] [address]       Dear Colleague,    Thank you for the opportunity to participate in the care of your patient, Bettina Montes, at the [facility] HEART AT [facility]. Please see a copy of my visit note below.      HPI: Bettina is a 49 year old female with a past medical history of HFrEF (30-35%) secondary to NICM (felt to be idiopathic), HTN, obesity, DM II, cervical spine fusion, and hyperlipidemia who presents for routine follow-up.      Since her last visit, Bettina has been feeling short of breath with exertion.  She has been more active and is going to cardiac rehab 3 times a week.  Her weight has been stable.  She has decreased her Lasix to 40 mg daily.  If she notices lower extremity edema, she takes an additional 40 mg in the afternoon, which she recently hasn't had to do. She has been tolerating her increased doses of Hydralazine and the switch over to Entresto.  She denies any lower extremity edema, shortness of breath at rest, PND, or orthopnea.  Her appetite comes and goes.  Her blood sugars after cardiac rehab have been running lower.  In the mornings her blood sugar has been in the 80s to 120's range.     PAST MEDICAL HISTORY:  Past Medical History:   Diagnosis Date     Adrenal gland anomaly      CHF (congestive heart failure) (H)      Fatty liver      Gastroesophageal reflux disease      Hyperlipidemia      Hypertension      Mild persistent asthma      NICM (nonischemic cardiomyopathy) (H)      Obesity      WILLARD (obstructive sleep apnea) 1/9/2015     Type 2 diabetes mellitus (H)        FAMILY HISTORY:  Family History   Problem Relation Age of Onset     Diabetes Mother      Hypertension Mother      Hyperlipidemia Mother      Heart Failure Mother      Diabetes Father      Cancer Paternal Grandmother         ?       SOCIAL HISTORY:  Social History     Socioeconomic  History     Marital status: Single     Spouse name: None     Number of children: 1     Years of education: None     Highest education level: None   Occupational History     Occupation: CNA     Employer: OTHER   Social Needs     Financial resource strain: None     Food insecurity:     Worry: None     Inability: None     Transportation needs:     Medical: None     Non-medical: None   Tobacco Use     Smoking status: Former Smoker     Packs/day: 1.00     Years: 30.00     Pack years: 30.00     Types: Cigarettes     Last attempt to quit: 2013     Years since quittin.4     Smokeless tobacco: Former User     Tobacco comment:     Substance and Sexual Activity     Alcohol use: Yes     Alcohol/week: 0.0 oz     Comment: 4-5 drinks per week     Drug use: No     Sexual activity: Yes     Partners: Male     Birth control/protection: Surgical   Lifestyle     Physical activity:     Days per week: None     Minutes per session: None     Stress: None   Relationships     Social connections:     Talks on phone: None     Gets together: None     Attends Yazdanism service: None     Active member of club or organization: None     Attends meetings of clubs or organizations: None     Relationship status: None     Intimate partner violence:     Fear of current or ex partner: None     Emotionally abused: None     Physically abused: None     Forced sexual activity: None   Other Topics Concern     Parent/sibling w/ CABG, MI or angioplasty before 65F 55M? No   Social History Narrative     None       CURRENT MEDICATIONS:  Current Outpatient Medications   Medication Sig Dispense Refill     acetaminophen (TYLENOL) 500 MG tablet Take 500-1,000 mg by mouth every 6 hours as needed for mild pain       blood glucose (NO BRAND SPECIFIED) test strip Use to test blood sugar 3-4 times daily or as directed. 200 strip 1     carvedilol (COREG) 6.25 MG tablet Take 1 tablet (6.25 mg) by mouth 2 times daily (with meals) 60 tablet 3     cholecalciferol (  ULTRA STRENGTH) 2000 units CAPS Take 2,000 Units by mouth       Cholecalciferol (VITAMIN D3 PO) Take 2,000 Units by mouth daily       fluticasone-salmeterol (WIXELA INHUB) 250-50 MCG/DOSE inhaler Inhale 1 puff into the lungs       furosemide (LASIX) 40 MG tablet Take 1 tablet (40 mg) by mouth 2 times daily 60 tablet 3     hydrALAZINE (APRESOLINE) 100 MG tablet Take 1 tablet (100 mg) by mouth 2 times daily 180 tablet 3     insulin glargine (BASAGLAR KWIKPEN) 100 UNIT/ML pen Inject 65 Units Subcutaneous At Bedtime 60 mL 1     insulin pen needle (32G X 4 MM) 32G X 4 MM miscellaneous Use 1 pen needles daily or as directed. 100 each 3     levalbuterol (XOPENEX HFA) 45 MCG/ACT Inhaler Inhale 2 puffs into the lungs every 4 hours as needed for shortness of breath / dyspnea or wheezing 15 g 3     lovastatin (MEVACOR) 20 MG tablet TAKE 1 TABLET BY MOUTH EVERY DAY AT BEDTIME 90 tablet 1     magnesium oxide (MAG-OX) 400 (241.3 Mg) MG tablet Take 1 tablet (400 mg) by mouth daily 100 tablet 3     metFORMIN (GLUCOPHAGE) 500 MG tablet Take 2 tablets (1,000 mg) by mouth 2 times daily (with meals) 360 tablet 1     methylPREDNISolone (MEDROL DOSEPAK) 4 MG tablet therapy pack Follow Package Directions 21 tablet 0     montelukast (SINGULAIR) 10 MG tablet Take 10 mg by mouth       montelukast (SINGULAIR) 10 MG tablet TAKE 1 TABLET (10 MG) BY MOUTH AT BEDTIME 90 tablet 0     potassium chloride ER (K-DUR/KLOR-CON M) 10 MEQ CR tablet Take 2 tablets (20 mEq) by mouth 2 times daily 120 tablet 1     ranitidine (ZANTAC) 150 MG tablet Take 150 mg by mouth       ranitidine (ZANTAC) 150 MG/10ML syrup Take 10 mLs (150 mg) by mouth 2 times daily 240 mL 1     sacubitril-valsartan (ENTRESTO)  MG per tablet Take 1 tablet by mouth 2 times daily 60 tablet 3     spironolactone (ALDACTONE) 50 MG tablet Take 1 tablet (50 mg) by mouth daily 90 tablet 3     tiZANidine (ZANAFLEX) 2 MG capsule Take 2 mg by mouth       WIXELA INHUB 250-50 MCG/DOSE inhaler  Inhale 1 puff into the lungs every 12 hours       BETA BLOCKER NOT PRESCRIBED, INTENTIONAL, Beta Blocker not prescribed intentionally due to Severe Asthma  0       ROS:  Constitutional: Denies fever, chills, or diaphoresis. No significant weight fluctuations  ENT: Denies visual disturbance or hearing loss   Respiratory:  See HPI.     Cardiovascular: As per HPI.   GI: Denies nausea, vomiting, diarrhea, hematemesis, melena, or hematochezia.   : Denies dysuria or hematuria  Integument: Negative for bruising, rash, or pruritis.  Psychiatric: Negative for anxiety, depression, sleep disturbance, or mood changes.   Neuro: Negative for headaches, syncope, numbness or tingling.   Endocrinology: See HPI  Musculoskeletal: + Neck pain      EXAM:  /84 (BP Location: Right arm, Patient Position: Sitting, Cuff Size: Adult Large)   Pulse 90   Wt 128.4 kg (283 lb)   LMP  (LMP Unknown)   SpO2 98%   BMI 45.68 kg/m     General: alert, articulate, and in no acute distress.  HEENT: normocephalic, atraumatic, anicteric sclera, EOMI, mucosa moist, no cyanosis.   Neck: neck supple.  JVP not appreciated.  Heart: regular rhythm, normal S1/S2, no murmur, gallop, rub.    Lungs: clear, no rales, ronchi, or wheezing.  No accessory muscle use, respirations unlabored.   Abdomen: soft, obese, non-tender, bowel sounds present, no hepatomegaly  Extremities: No pitting edema.   No cyanosis.  Extremities warm.  2+ pedal pulses.   Neurological: alert and oriented x 3.  Normal speech and affect, no gross motor deficits  Skin:  No ecchymoses, rashes, or clubbing.    Labs:  CBC RESULTS:  Lab Results   Component Value Date    WBC 9.5 05/13/2019    RBC 4.24 05/13/2019    HGB 11.6 (L) 05/13/2019    HCT 35.4 05/13/2019    MCV 84 05/13/2019    MCH 27.4 05/13/2019    MCHC 32.8 05/13/2019    RDW 14.4 05/13/2019     05/13/2019       CMP RESULTS:  Lab Results   Component Value Date     06/30/2019    POTASSIUM 3.9 06/30/2019    CHLORIDE 100  06/30/2019    CO2 28 06/30/2019    ANIONGAP 7 06/30/2019    GLC 82 06/30/2019    BUN 10 06/30/2019    CR 0.83 06/30/2019    GFRESTIMATED 82 06/30/2019    GFRESTBLACK >90 06/30/2019    TIM 9.5 06/30/2019    BILITOTAL 0.6 05/13/2019    ALBUMIN 3.8 05/13/2019    ALKPHOS 104 05/13/2019    ALT 22 05/17/2019    AST 13 05/13/2019        INR RESULTS:  No results found for: INR    No components found for: CK  No results found for: MAG  Lab Results   Component Value Date    NTBNP 318 (H) 05/17/2019       Most recent echocardiogram 3/20/19:  Interpretation Summary  Patient was scheduled for stress testing that was converted to TTE alone due  to decrease in LVEF.  Moderate to severe left ventricular systolic dysfunction.  LVEF 30% based on biplane 2D tracing. Global hypokinesis.  Right ventricular size and systolic function appears normal.  No valve dysfunction.  Unable to assess PA pressure but RA pressure is normal.  This study was compared with the study from 1/16/17 and LVEF is decreased.    Assessment and Plan:    In summary, Bettina is a 49-year-old female with nonischemic cardiomyopathy who appears euvolemic today.  I have increased her carvedilol to 12.5 mg twice daily.  If she is tolerating this in 2 weeks, I will titrate her Coreg further with a goal of 50 mg twice daily.  I will continue to titrate her HF regimen over the phone as she is overwhelmed by the number of appointments and would like to spread out her CORE appointments.  She will follow up with Dr. Chinchilla in September and CORE in December.    1.   Chronic systolic heart failure secondary to NICM.  Stage C  NYHA Class IIIA  ACEi/ARB: Hydralazine  100 mg twice daily.  Entresto 97/103 mg twice daily  BB: yes,  titration ongoing.  Carvedilol 12.5 mg mg twice daily.   Aldosterone antagonist: yes, Spironolactone 50 mg daily.   SCD prophylaxis:   Deferred while optimizing medications.     Fluid status:   Euvolemic   Anticoagulation: None  Antiplatelet:  ASA dose  none  Sleep apnea: Previous sleep studies.  CPAP not indicated per her report.   NSAID use:  Contraindicated.  Avoid use.  Remote Monitoring: None.     2.  HTN:   Suboptimal control though improved.  Blood pressure today 132/84.  Increase carvedilol to 12.5 mg twice daily as outlined above.    3.  Hyperlipidemia:  Last lipids 5/17/19:  , Tri 83, HDL 66, LDL 64. Continue Lovastatin.     4.  Morbid Obesity:  BMI:  45.68.     Discussed weight loss for overall health.  Encouraged her to increase her activity and continue modifying her diet and portion size as she has been doing.     5. Follow-up:  Dr. Chinchilla in September.  Core in 3 months       Angle GU, [facility].

## 2019-07-01 NOTE — PROGRESS NOTES
HPI: Bettina is a 49 year old female with a past medical history of HFrEF (30-35%) secondary to NICM (felt to be idiopathic), HTN, obesity, DM II, cervical spine fusion, and hyperlipidemia who presents for routine follow-up.      Since her last visit, Bettina has been feeling short of breath with exertion.  She has been more active and is going to cardiac rehab 3 times a week.  Her weight has been stable.  She has decreased her Lasix to 40 mg daily.  If she notices lower extremity edema, she takes an additional 40 mg in the afternoon, which she recently hasn't had to do. She has been tolerating her increased doses of Hydralazine and the switch over to Entresto.  She denies any lower extremity edema, shortness of breath at rest, PND, or orthopnea.  Her appetite comes and goes.  Her blood sugars after cardiac rehab have been running lower.  In the mornings her blood sugar has been in the 80s to 120's range.     PAST MEDICAL HISTORY:  Past Medical History:   Diagnosis Date     Adrenal gland anomaly      CHF (congestive heart failure) (H)      Fatty liver      Gastroesophageal reflux disease      Hyperlipidemia      Hypertension      Mild persistent asthma      NICM (nonischemic cardiomyopathy) (H)      Obesity      WILLARD (obstructive sleep apnea) 1/9/2015     Type 2 diabetes mellitus (H)        FAMILY HISTORY:  Family History   Problem Relation Age of Onset     Diabetes Mother      Hypertension Mother      Hyperlipidemia Mother      Heart Failure Mother      Diabetes Father      Cancer Paternal Grandmother         ?       SOCIAL HISTORY:  Social History     Socioeconomic History     Marital status: Single     Spouse name: None     Number of children: 1     Years of education: None     Highest education level: None   Occupational History     Occupation: CNA     Employer: OTHER   Social Needs     Financial resource strain: None     Food insecurity:     Worry: None     Inability: None     Transportation needs:     Medical:  None     Non-medical: None   Tobacco Use     Smoking status: Former Smoker     Packs/day: 1.00     Years: 30.00     Pack years: 30.00     Types: Cigarettes     Last attempt to quit: 2013     Years since quittin.4     Smokeless tobacco: Former User     Tobacco comment:     Substance and Sexual Activity     Alcohol use: Yes     Alcohol/week: 0.0 oz     Comment: 4-5 drinks per week     Drug use: No     Sexual activity: Yes     Partners: Male     Birth control/protection: Surgical   Lifestyle     Physical activity:     Days per week: None     Minutes per session: None     Stress: None   Relationships     Social connections:     Talks on phone: None     Gets together: None     Attends Yazidi service: None     Active member of club or organization: None     Attends meetings of clubs or organizations: None     Relationship status: None     Intimate partner violence:     Fear of current or ex partner: None     Emotionally abused: None     Physically abused: None     Forced sexual activity: None   Other Topics Concern     Parent/sibling w/ CABG, MI or angioplasty before 65F 55M? No   Social History Narrative     None       CURRENT MEDICATIONS:  Current Outpatient Medications   Medication Sig Dispense Refill     acetaminophen (TYLENOL) 500 MG tablet Take 500-1,000 mg by mouth every 6 hours as needed for mild pain       blood glucose (NO BRAND SPECIFIED) test strip Use to test blood sugar 3-4 times daily or as directed. 200 strip 1     carvedilol (COREG) 6.25 MG tablet Take 1 tablet (6.25 mg) by mouth 2 times daily (with meals) 60 tablet 3     cholecalciferol ( ULTRA STRENGTH) 2000 units CAPS Take 2,000 Units by mouth       Cholecalciferol (VITAMIN D3 PO) Take 2,000 Units by mouth daily       fluticasone-salmeterol (WIXELA INHUB) 250-50 MCG/DOSE inhaler Inhale 1 puff into the lungs       furosemide (LASIX) 40 MG tablet Take 1 tablet (40 mg) by mouth 2 times daily 60 tablet 3     hydrALAZINE (APRESOLINE) 100 MG  tablet Take 1 tablet (100 mg) by mouth 2 times daily 180 tablet 3     insulin glargine (BASAGLAR KWIKPEN) 100 UNIT/ML pen Inject 65 Units Subcutaneous At Bedtime 60 mL 1     insulin pen needle (32G X 4 MM) 32G X 4 MM miscellaneous Use 1 pen needles daily or as directed. 100 each 3     levalbuterol (XOPENEX HFA) 45 MCG/ACT Inhaler Inhale 2 puffs into the lungs every 4 hours as needed for shortness of breath / dyspnea or wheezing 15 g 3     lovastatin (MEVACOR) 20 MG tablet TAKE 1 TABLET BY MOUTH EVERY DAY AT BEDTIME 90 tablet 1     magnesium oxide (MAG-OX) 400 (241.3 Mg) MG tablet Take 1 tablet (400 mg) by mouth daily 100 tablet 3     metFORMIN (GLUCOPHAGE) 500 MG tablet Take 2 tablets (1,000 mg) by mouth 2 times daily (with meals) 360 tablet 1     methylPREDNISolone (MEDROL DOSEPAK) 4 MG tablet therapy pack Follow Package Directions 21 tablet 0     montelukast (SINGULAIR) 10 MG tablet Take 10 mg by mouth       montelukast (SINGULAIR) 10 MG tablet TAKE 1 TABLET (10 MG) BY MOUTH AT BEDTIME 90 tablet 0     potassium chloride ER (K-DUR/KLOR-CON M) 10 MEQ CR tablet Take 2 tablets (20 mEq) by mouth 2 times daily 120 tablet 1     ranitidine (ZANTAC) 150 MG tablet Take 150 mg by mouth       ranitidine (ZANTAC) 150 MG/10ML syrup Take 10 mLs (150 mg) by mouth 2 times daily 240 mL 1     sacubitril-valsartan (ENTRESTO)  MG per tablet Take 1 tablet by mouth 2 times daily 60 tablet 3     spironolactone (ALDACTONE) 50 MG tablet Take 1 tablet (50 mg) by mouth daily 90 tablet 3     tiZANidine (ZANAFLEX) 2 MG capsule Take 2 mg by mouth       WIXELA INHUB 250-50 MCG/DOSE inhaler Inhale 1 puff into the lungs every 12 hours       BETA BLOCKER NOT PRESCRIBED, INTENTIONAL, Beta Blocker not prescribed intentionally due to Severe Asthma  0       ROS:  Constitutional: Denies fever, chills, or diaphoresis. No significant weight fluctuations  ENT: Denies visual disturbance or hearing loss   Respiratory:  See HPI.     Cardiovascular: As  per HPI.   GI: Denies nausea, vomiting, diarrhea, hematemesis, melena, or hematochezia.   : Denies dysuria or hematuria  Integument: Negative for bruising, rash, or pruritis.  Psychiatric: Negative for anxiety, depression, sleep disturbance, or mood changes.   Neuro: Negative for headaches, syncope, numbness or tingling.   Endocrinology: See HPI  Musculoskeletal: + Neck pain      EXAM:  /84 (BP Location: Right arm, Patient Position: Sitting, Cuff Size: Adult Large)   Pulse 90   Wt 128.4 kg (283 lb)   LMP  (LMP Unknown)   SpO2 98%   BMI 45.68 kg/m    General: alert, articulate, and in no acute distress.  HEENT: normocephalic, atraumatic, anicteric sclera, EOMI, mucosa moist, no cyanosis.   Neck: neck supple.  JVP not appreciated.  Heart: regular rhythm, normal S1/S2, no murmur, gallop, rub.    Lungs: clear, no rales, ronchi, or wheezing.  No accessory muscle use, respirations unlabored.   Abdomen: soft, obese, non-tender, bowel sounds present, no hepatomegaly  Extremities: No pitting edema.   No cyanosis.  Extremities warm.  2+ pedal pulses.   Neurological: alert and oriented x 3.  Normal speech and affect, no gross motor deficits  Skin:  No ecchymoses, rashes, or clubbing.    Labs:  CBC RESULTS:  Lab Results   Component Value Date    WBC 9.5 05/13/2019    RBC 4.24 05/13/2019    HGB 11.6 (L) 05/13/2019    HCT 35.4 05/13/2019    MCV 84 05/13/2019    MCH 27.4 05/13/2019    MCHC 32.8 05/13/2019    RDW 14.4 05/13/2019     05/13/2019       CMP RESULTS:  Lab Results   Component Value Date     06/30/2019    POTASSIUM 3.9 06/30/2019    CHLORIDE 100 06/30/2019    CO2 28 06/30/2019    ANIONGAP 7 06/30/2019    GLC 82 06/30/2019    BUN 10 06/30/2019    CR 0.83 06/30/2019    GFRESTIMATED 82 06/30/2019    GFRESTBLACK >90 06/30/2019    TIM 9.5 06/30/2019    BILITOTAL 0.6 05/13/2019    ALBUMIN 3.8 05/13/2019    ALKPHOS 104 05/13/2019    ALT 22 05/17/2019    AST 13 05/13/2019        INR RESULTS:  No results  found for: INR    No components found for: CK  No results found for: MAG  Lab Results   Component Value Date    NTBNP 318 (H) 05/17/2019       Most recent echocardiogram 3/20/19:  Interpretation Summary  Patient was scheduled for stress testing that was converted to TTE alone due  to decrease in LVEF.  Moderate to severe left ventricular systolic dysfunction.  LVEF 30% based on biplane 2D tracing. Global hypokinesis.  Right ventricular size and systolic function appears normal.  No valve dysfunction.  Unable to assess PA pressure but RA pressure is normal.  This study was compared with the study from 1/16/17 and LVEF is decreased.    Assessment and Plan:    In summary, Bettina is a 49-year-old female with nonischemic cardiomyopathy who appears euvolemic today.  I have increased her carvedilol to 12.5 mg twice daily.  If she is tolerating this in 2 weeks, I will titrate her Coreg further with a goal of 50 mg twice daily.  I will continue to titrate her HF regimen over the phone as she is overwhelmed by the number of appointments and would like to spread out her CORE appointments.  She will follow up with Dr. Chinchilla in September and CORE in December.    1.  Chronic systolic heart failure secondary to NICM.  Stage C  NYHA Class IIIA  ACEi/ARB: Hydralazine 100 mg twice daily.  Entresto 97/103 mg twice daily  BB: yes, titration ongoing.  Carvedilol 12.5 mg mg twice daily.   Aldosterone antagonist: yes, Spironolactone 50 mg daily.   SCD prophylaxis:  Deferred while optimizing medications.     Fluid status:  Euvolemic   Anticoagulation: None  Antiplatelet:  ASA dose none  Sleep apnea: Previous sleep studies.  CPAP not indicated per her report.   NSAID use:  Contraindicated.  Avoid use.  Remote Monitoring: None.     2.  HTN:  Suboptimal control though improved.  Blood pressure today 132/84.  Increase carvedilol to 12.5 mg twice daily as outlined above.    3.  Hyperlipidemia:  Last lipids 5/17/19:  , Tri 83, HDL 66, LDL  64. Continue Lovastatin.     4.  Morbid Obesity:  BMI: 45.68.    Discussed weight loss for overall health.  Encouraged her to increase her activity and continue modifying her diet and portion size as she has been doing.     5. Follow-up:  Dr. Chinchilla in September.  Core in 3 months       Angle GU CNP  [place] [facility].

## 2019-07-01 NOTE — NURSING NOTE
Diet: Patient instructed regarding a heart healthy diet, including discussion of reduced fat and sodium intake. Patient demonstrated understanding of this information and agreed to call with further questions or concerns.  Labs: Patient was given results of the laboratory testing obtained today.. Patient demonstrated understanding of this information and agreed to call with further questions or concerns.   Return Appointment: Patient given instructions regarding scheduling next clinic visit. Patient demonstrated understanding of this information and agreed to call with further questions or concerns.  Medication Change: Patient was educated regarding prescribed medication change, including discussion of the indication, administration, side effects, and when to report to MD or RN. Patient demonstrated understanding of this information and agreed to call with further questions or concerns.  Patient stated she understood all health information given and agreed to call with further questions or concerns.   Yasemin Blount RN

## 2019-07-01 NOTE — NURSING NOTE
"Chief Complaint   Patient presents with     Heart Failure     chronic systolic heart failure presents for follow up with labs prior. Per patient sob., and edema at times       Initial /84 (BP Location: Right arm, Patient Position: Sitting, Cuff Size: Adult Large)   Pulse 90   Wt 128.4 kg (283 lb)   LMP  (LMP Unknown)   SpO2 98%   BMI 45.68 kg/m   Estimated body mass index is 45.68 kg/m  as calculated from the following:    Height as of 6/25/19: 1.676 m (5' 6\").    Weight as of this encounter: 128.4 kg (283 lb)..  BP completed using cuff size: large    Jerome Rhoades L.P.N.    "

## 2019-07-02 ENCOUNTER — HOSPITAL ENCOUNTER (OUTPATIENT)
Dept: CARDIAC REHAB | Facility: CLINIC | Age: 49
End: 2019-07-02
Attending: NURSE PRACTITIONER
Payer: COMMERCIAL

## 2019-07-02 ENCOUNTER — THERAPY VISIT (OUTPATIENT)
Dept: PHYSICAL THERAPY | Facility: CLINIC | Age: 49
End: 2019-07-02
Payer: OTHER MISCELLANEOUS

## 2019-07-02 DIAGNOSIS — Z98.1 S/P SPINAL FUSION: ICD-10-CM

## 2019-07-02 DIAGNOSIS — G89.29 CHRONIC LEFT SHOULDER PAIN: ICD-10-CM

## 2019-07-02 DIAGNOSIS — M25.512 CHRONIC LEFT SHOULDER PAIN: ICD-10-CM

## 2019-07-02 DIAGNOSIS — M54.2 NECK PAIN: ICD-10-CM

## 2019-07-02 PROCEDURE — 97110 THERAPEUTIC EXERCISES: CPT | Mod: GP | Performed by: PHYSICAL THERAPIST

## 2019-07-02 PROCEDURE — 93798 PHYS/QHP OP CAR RHAB W/ECG: CPT

## 2019-07-02 PROCEDURE — 40000116 ZZH STATISTIC OP CR VISIT

## 2019-07-02 PROCEDURE — 93797 PHYS/QHP OP CAR RHAB WO ECG: CPT

## 2019-07-02 PROCEDURE — 97140 MANUAL THERAPY 1/> REGIONS: CPT | Mod: GP | Performed by: PHYSICAL THERAPIST

## 2019-07-02 PROCEDURE — 40000575 ZZH STATISTIC OP CARDIAC VISIT #2

## 2019-07-03 ENCOUNTER — HOSPITAL ENCOUNTER (OUTPATIENT)
Dept: CARDIAC REHAB | Facility: CLINIC | Age: 49
End: 2019-07-03
Attending: NURSE PRACTITIONER
Payer: COMMERCIAL

## 2019-07-03 PROCEDURE — 40000116 ZZH STATISTIC OP CR VISIT

## 2019-07-03 PROCEDURE — 93798 PHYS/QHP OP CAR RHAB W/ECG: CPT

## 2019-07-03 PROCEDURE — 40000575 ZZH STATISTIC OP CARDIAC VISIT #2

## 2019-07-03 PROCEDURE — 93797 PHYS/QHP OP CAR RHAB WO ECG: CPT

## 2019-07-08 ENCOUNTER — HOSPITAL ENCOUNTER (OUTPATIENT)
Dept: CARDIAC REHAB | Facility: CLINIC | Age: 49
End: 2019-07-08
Attending: NURSE PRACTITIONER
Payer: COMMERCIAL

## 2019-07-08 PROCEDURE — 93798 PHYS/QHP OP CAR RHAB W/ECG: CPT

## 2019-07-08 PROCEDURE — 40000116 ZZH STATISTIC OP CR VISIT

## 2019-07-09 NOTE — PROGRESS NOTES
"NUTRITION ASSESSMENT AND EDUCATION    Reason For Assessment  Bettina Montes is a 49 year old female seen by Registered Dietitian for 1:1 Cardiac Rehab consult    Pt accompanied by self. Information obtained from pt.       Nutrition History  1) What percentage of the meals that you are currently eating are heart healthy? 20%  2) On a scale of 1-10, how confident are you that you can prepare a heart healthy meal? 10, but barrier is not r/t to ability  3) What diet have you been instructed to follow? DM diet, Low sodium and fluid restrction  4) Do you have any nutrition related goals? Lower sodium, weight management   5) Are you able to grocery shop and prepare your own meals? Yes, cooks dinner for self    Diet Recall  Breakfast Sweet roll (Honey bun, doughnut), sweet tea (16 oz) [place]   Lunch [place],    Dinner Home cooked meal    Snacks Cake, doughnuts, muffins, candy (turtles), low sodium crackers    Beverages Water, sweet tea,    Dining out [employer] kids meal (Meal)       Barriers to dietary changes: Likes salty chips, sweets (bread, cake, candy). Doesn't like vegetables (unless it's raw broccoli, kale).     Additional Info: Pt with h/o DMT2, CHF, HTN. HgbA1c 7.7 on 5/17/19. Waking up in the middle night to eat candy, sweets. Does not like alternative sweetners (reports that it makes her gassy). Needs caffeine in the morning, but does not drink coffee or unsweetened tea.     ANTHROPOMETRICS  Estimated body mass index is 45.68 kg/m  as calculated from the following:    Height as of 6/25/19: 1.676 m (5' 6\").    Weight as of 7/1/19: 128.4 kg (283 lb).  Weight Status:  Obesity Grade III BMI >40  IBW: 59.1 kg  % IBW: 217%   Weight History:   Wt Readings from Last 10 Encounters:   07/01/19 128.4 kg (283 lb)   06/25/19 129 kg (284 lb 6.4 oz)   05/20/19 131.5 kg (290 lb)   05/16/19 129.3 kg (285 lb)   05/13/19 129.2 kg (284 lb 12.8 oz)   04/08/19 129.5 kg (285 lb 6.4 oz)   04/07/19 129.2 kg (284 lb 12.8 " oz)   03/29/19 129.8 kg (286 lb 3.2 oz)   03/22/19 127.1 kg (280 lb 1.6 oz)   03/20/19 127.7 kg (281 lb 9.6 oz)       Dosing weight: 76 kg (adjusted for obesity)    ASSESSED NUTRITION NEEDS   Estimated Energy Needs: 2701-8318 kcals (25-30 Kcal/Kg - 500 kcal/day)  Justification: Weight loss  Estimated Protein Needs: 61-76 grams protein (0.8-1 g pro/Kg)  Justification: maintenance  Estimated Fluid Needs:  (1 mL/Kcal)  Justification: fluid restriction    NUTRITION DIAGNOSIS  Food- and nutrition- related knowledge deficit related to diabetes and heart failure nutrition therapy as evidenced by pt report of no formal nutrition education.    INTERVENTIONS  Nutrition Prescription:  Na <2400 mg, Fluid restriction per MD  The Plate Method of structuring a balanced meal  Moderated, consistent carb diet    Implementation:  1. Assessed learning needs and learning preference.  2. Nutrition Education (Content):  a) Provided handouts: Low sodium Nutrition Therapy,  Portion control/Choose My Plate, Carb Counting   b) Discussed heart healthy diet recommendations, including label reading, minimizing added salts/saturated fats/sugars, balanced meals TID, heart healthy substitutes, eliminating all trans fat, sugar free beverages    Nutrition Education (Application):  a) Discussed eating habits and recommended alternative food choices:  a. Emphasized fruits, vegetables, low sodium nuts/heart healthy fats, fish, low fat dairy  b. Reviewed recipes and new food ideas and ways to adapt favorite/most commonly used recipes using healthier ingredients.   c. Encouraged water and non sugar sweetened beverages   d. Discussed cooking more from scratch to monitor meal ingredients, portions, menu choices   e. Encouraged pt to plan out meals ahead of time. Prepare lunch meals to take to work, instead of eating out.   f. Encouraged pt to portion out snacks, and eat at table free from distractions.      Goals  1. Adherence to nutrition related  recommendations, follow with low sodium, carb restricted diet   2. Decreased consumption of calorie containing beverages  3. Reduce snacking between meals   4. Increase fruit and vegetable consumption    Follow up/Monitoring For Cardiac Rehab Staff  Additional questions/concerns related to heart healthy guidelines.     Anne Madrigal RD, LD

## 2019-07-10 ENCOUNTER — HOSPITAL ENCOUNTER (OUTPATIENT)
Dept: CARDIAC REHAB | Facility: CLINIC | Age: 49
End: 2019-07-10
Attending: NURSE PRACTITIONER
Payer: COMMERCIAL

## 2019-07-11 ENCOUNTER — HOSPITAL ENCOUNTER (OUTPATIENT)
Dept: CARDIAC REHAB | Facility: CLINIC | Age: 49
End: 2019-07-11
Attending: NURSE PRACTITIONER
Payer: COMMERCIAL

## 2019-07-11 PROCEDURE — 93798 PHYS/QHP OP CAR RHAB W/ECG: CPT

## 2019-07-11 PROCEDURE — 40000116 ZZH STATISTIC OP CR VISIT

## 2019-07-15 DIAGNOSIS — Z98.1 S/P CERVICAL SPINAL FUSION: Primary | ICD-10-CM

## 2019-07-15 RX ORDER — TIZANIDINE HYDROCHLORIDE 2 MG/1
2-4 CAPSULE, GELATIN COATED ORAL 3 TIMES DAILY PRN
Qty: 90 CAPSULE | Refills: 1 | Status: SHIPPED | OUTPATIENT
Start: 2019-07-15 | End: 2019-10-01

## 2019-07-15 NOTE — TELEPHONE ENCOUNTER
Requested Prescriptions   Pending Prescriptions Disp Refills     tiZANidine (ZANAFLEX) 2 MG capsule          Last Written Prescription Date:  05/22/19  Last Fill Quantity: 60,   # refills: 0  Last Office Visit: 05/13/19-[facility] Office visit:    Next 5 appointments (look out 90 days)    Sep 27, 2019  4:00 PM CDT  Return Visit with El Chinchilla MD  [facility] [address] [place] [address] [phone]           Routing refill request to provider for review/approval because:  Drug not on the G, P or [facility] refill protocol or controlled substance       Sig: Take 1 capsule (2 mg) by mouth       There is no refill protocol information for this order

## 2019-07-15 NOTE — TELEPHONE ENCOUNTER
Routing refill request to provider for review/approval because:  Drug not on the [facility] refill protocol   Medication is reported/historical      Chelle Emanuel RN, BSN, PHN

## 2019-07-16 ENCOUNTER — HOSPITAL ENCOUNTER (OUTPATIENT)
Dept: CARDIAC REHAB | Facility: CLINIC | Age: 49
End: 2019-07-16
Attending: NURSE PRACTITIONER
Payer: COMMERCIAL

## 2019-07-16 PROCEDURE — 93798 PHYS/QHP OP CAR RHAB W/ECG: CPT

## 2019-07-16 PROCEDURE — 40000116 ZZH STATISTIC OP CR VISIT

## 2019-07-18 ENCOUNTER — HOSPITAL ENCOUNTER (OUTPATIENT)
Dept: CARDIAC REHAB | Facility: CLINIC | Age: 49
End: 2019-07-18
Attending: NURSE PRACTITIONER
Payer: COMMERCIAL

## 2019-07-18 PROCEDURE — 40000116 ZZH STATISTIC OP CR VISIT: Performed by: REHABILITATION PRACTITIONER

## 2019-07-18 PROCEDURE — 93798 PHYS/QHP OP CAR RHAB W/ECG: CPT | Performed by: REHABILITATION PRACTITIONER

## 2019-07-22 ENCOUNTER — HOSPITAL ENCOUNTER (OUTPATIENT)
Dept: CARDIAC REHAB | Facility: CLINIC | Age: 49
End: 2019-07-22
Attending: NURSE PRACTITIONER
Payer: COMMERCIAL

## 2019-07-22 PROCEDURE — 93798 PHYS/QHP OP CAR RHAB W/ECG: CPT | Performed by: OCCUPATIONAL THERAPIST

## 2019-07-22 PROCEDURE — 40000116 ZZH STATISTIC OP CR VISIT: Performed by: OCCUPATIONAL THERAPIST

## 2019-07-23 ENCOUNTER — TELEPHONE (OUTPATIENT)
Dept: FAMILY MEDICINE | Facility: CLINIC | Age: 49
End: 2019-07-23

## 2019-07-23 ENCOUNTER — HOSPITAL ENCOUNTER (OUTPATIENT)
Dept: CARDIAC REHAB | Facility: CLINIC | Age: 49
End: 2019-07-23
Attending: NURSE PRACTITIONER
Payer: COMMERCIAL

## 2019-07-23 PROCEDURE — 40000116 ZZH STATISTIC OP CR VISIT

## 2019-07-23 PROCEDURE — 93798 PHYS/QHP OP CAR RHAB W/ECG: CPT

## 2019-07-23 NOTE — TELEPHONE ENCOUNTER
Diabetes Education Scheduling Outreach #1:    Call to patient to schedule. Left message with phone number to call to schedule.    Plan for 2nd outreach attempt within 1 week.    [facility] On[facility]  Diabetes and Nutrition Scheduling

## 2019-07-24 ENCOUNTER — THERAPY VISIT (OUTPATIENT)
Dept: PHYSICAL THERAPY | Facility: CLINIC | Age: 49
End: 2019-07-24
Payer: OTHER MISCELLANEOUS

## 2019-07-24 DIAGNOSIS — M54.2 NECK PAIN: ICD-10-CM

## 2019-07-24 DIAGNOSIS — M25.512 CHRONIC LEFT SHOULDER PAIN: ICD-10-CM

## 2019-07-24 DIAGNOSIS — Z98.1 S/P SPINAL FUSION: ICD-10-CM

## 2019-07-24 DIAGNOSIS — G89.29 CHRONIC LEFT SHOULDER PAIN: ICD-10-CM

## 2019-07-24 PROCEDURE — 97110 THERAPEUTIC EXERCISES: CPT | Mod: GP | Performed by: PHYSICAL THERAPIST

## 2019-07-24 PROCEDURE — 97112 NEUROMUSCULAR REEDUCATION: CPT | Mod: GP | Performed by: PHYSICAL THERAPIST

## 2019-07-25 ENCOUNTER — HOSPITAL ENCOUNTER (OUTPATIENT)
Dept: CARDIAC REHAB | Facility: CLINIC | Age: 49
End: 2019-07-25
Attending: NURSE PRACTITIONER
Payer: COMMERCIAL

## 2019-07-25 PROCEDURE — 40000116 ZZH STATISTIC OP CR VISIT

## 2019-07-25 PROCEDURE — 93798 PHYS/QHP OP CAR RHAB W/ECG: CPT

## 2019-07-29 ENCOUNTER — TELEPHONE (OUTPATIENT)
Dept: NEUROSURGERY | Facility: CLINIC | Age: 49
End: 2019-07-29

## 2019-07-29 NOTE — TELEPHONE ENCOUNTER
REASON FOR CALL: Geovanna pt's occupational therapist called. She stated that she wanted to talk w/Supriya and her team in regards to concerns she has for the pt. She would like to talk w/someone before the end of the day, in order to proceed with testing/treatment tomorrow. She is unavailable from 1-3 PM, would like a call back after 3 @ [phone].

## 2019-07-29 NOTE — TELEPHONE ENCOUNTER
Called and spoke with Geovanna (OT) with [facility] who is completing patients FCE. She is concerned that patient is demonstrating left shoulder weakness and decreased ROM that wasn't reported at LOV with SHAZIA Baum. She is concerned that she isn't getting accurate results and wants to know if she should continue with the completion of the 3 day exam. I told her that patient should complete the exam that SHAZIA Epps recommended and have her follow up in clinic to discuss after completion. Therapist will call back with any further questions or concerns.

## 2019-07-30 ENCOUNTER — TRANSFERRED RECORDS (OUTPATIENT)
Dept: HEALTH INFORMATION MANAGEMENT | Facility: CLINIC | Age: 49
End: 2019-07-30

## 2019-07-31 NOTE — TELEPHONE ENCOUNTER
Diabetes Education Scheduling Outreach #2:    Call to patient to schedule. Patient declined to schedule.      Jalil Mcclelland  [facility]  Diabetes and Nutrition Scheduling

## 2019-08-01 ENCOUNTER — THERAPY VISIT (OUTPATIENT)
Dept: PHYSICAL THERAPY | Facility: CLINIC | Age: 49
End: 2019-08-01
Payer: OTHER MISCELLANEOUS

## 2019-08-01 DIAGNOSIS — M25.512 CHRONIC LEFT SHOULDER PAIN: ICD-10-CM

## 2019-08-01 DIAGNOSIS — G89.29 CHRONIC LEFT SHOULDER PAIN: ICD-10-CM

## 2019-08-01 DIAGNOSIS — M54.2 NECK PAIN: ICD-10-CM

## 2019-08-01 DIAGNOSIS — Z98.1 S/P SPINAL FUSION: ICD-10-CM

## 2019-08-01 PROCEDURE — 97035 APP MDLTY 1+ULTRASOUND EA 15: CPT | Mod: GP | Performed by: PHYSICAL THERAPIST

## 2019-08-01 PROCEDURE — 97110 THERAPEUTIC EXERCISES: CPT | Mod: GP | Performed by: PHYSICAL THERAPIST

## 2019-08-05 ENCOUNTER — TELEPHONE (OUTPATIENT)
Dept: NEUROSURGERY | Facility: CLINIC | Age: 49
End: 2019-08-05

## 2019-08-05 NOTE — TELEPHONE ENCOUNTER
Ok per Supriya to update letter. Patient notified. She would like it emailed to her email on file. Email sent.

## 2019-08-05 NOTE — LETTER
[facility]   [facility]  [address]  [address]                 July 1, 2019     Re: Bettina Montes was seen in clinic on 6/25/19. She may resume work with the following work restrictions:     -No lifting greater than 20 pounds  -Limited bending, twisting, and overhead reaching     Patient has been given a referral for a functional capacity exam, which was completed on 07/31/2019. Please continue with restrictions as listed through 8/14/19. Further restrictions will be determined after this date. Please feel free to contact our office with any questions or concerns.        Sincerely,           Supriya Case, CNP  [phone]

## 2019-08-06 ENCOUNTER — HOSPITAL ENCOUNTER (OUTPATIENT)
Dept: CARDIAC REHAB | Facility: CLINIC | Age: 49
End: 2019-08-06
Attending: NURSE PRACTITIONER
Payer: COMMERCIAL

## 2019-08-06 PROCEDURE — 93797 PHYS/QHP OP CAR RHAB WO ECG: CPT | Performed by: REHABILITATION PRACTITIONER

## 2019-08-06 PROCEDURE — 93798 PHYS/QHP OP CAR RHAB W/ECG: CPT | Performed by: REHABILITATION PRACTITIONER

## 2019-08-06 PROCEDURE — 40000575 ZZH STATISTIC OP CARDIAC VISIT #2: Performed by: REHABILITATION PRACTITIONER

## 2019-08-06 PROCEDURE — 40000116 ZZH STATISTIC OP CR VISIT: Performed by: REHABILITATION PRACTITIONER

## 2019-08-12 DIAGNOSIS — I50.22 CHRONIC SYSTOLIC CONGESTIVE HEART FAILURE (H): ICD-10-CM

## 2019-08-12 DIAGNOSIS — Z79.4 TYPE 2 DIABETES MELLITUS WITH HYPERGLYCEMIA, WITH LONG-TERM CURRENT USE OF INSULIN (H): ICD-10-CM

## 2019-08-12 DIAGNOSIS — E11.65 TYPE 2 DIABETES MELLITUS WITH HYPERGLYCEMIA, WITH LONG-TERM CURRENT USE OF INSULIN (H): ICD-10-CM

## 2019-08-12 RX ORDER — CARVEDILOL 12.5 MG/1
TABLET ORAL
Qty: 180 TABLET | Refills: 1 | Status: SHIPPED | OUTPATIENT
Start: 2019-08-12 | End: 2019-09-20

## 2019-08-12 NOTE — TELEPHONE ENCOUNTER
Signed Prescriptions:                        Disp   Refills    carvedilol (COREG) 12.5 MG tablet          180 ta*1        Sig: TAKE 1 TABLET BY MOUTH TWICE A DAY WITH MEALS  Authorizing Provider: YAA BURNS  Ordering User: MARY PEACOCK    Rx filled per refill protocol.  Mary Peacock RN

## 2019-08-13 NOTE — TELEPHONE ENCOUNTER
"Requested Prescriptions   Pending Prescriptions Disp Refills     insulin glargine (BASAGLAR KWIKPEN) 100 UNIT/ML pen [Pharmacy Med Name: BASAGLAR 100 UNIT/ML KWIKPEN]  Last Written Prescription Date:  2/27/19  Last Fill Quantity: 60,  # refills: 1   Last Office Visit with FMRIGO, GERTRUED or [facility] prescribing provider:  5/13/19   Future Office Visit:    Next 5 appointments (look out 90 days)    Sep 27, 2019  4:00 PM CDT  Return Visit with El [facility]inchilla MD  [facility] [id number] [place] [address] [phone]           1     Sig: INJECT 65 UNITS SUBCUTANEOUS AT BEDTIME       Long Acting Insulin Protocol Passed - 8/12/2019  5:51 PM        Passed - Blood pressure less than 140/90 in past 6 months     BP Readings from Last 3 Encounters:   07/01/19 132/84   06/25/19 130/82   05/20/19 143/88                 Passed - LDL on file in past 12 months     Recent Labs   Lab Test 05/17/19  1306   LDL 64             Passed - Microalbumin on file in past 12 months     Recent Labs   Lab Test 10/10/18  1115   MICROL 13   UMALCR 5.36             Passed - Serum creatinine on file in past 12 months     Recent Labs   Lab Test 06/30/19  1523   CR 0.83             Passed - HgbA1C in past 3 or 6 months     If HgbA1C is 8 or greater, it needs to be on file within the past 3 months.  If less than 8, must be on file within the past 6 months.     Recent Labs   Lab Test 05/17/19  1306   A1C 7.7*             Passed - Medication is active on med list        Passed - Patient is age 18 or older        Passed - Recent (6 mo) or future (30 days) visit within the authorizing provider's specialty     Patient had office visit in the last 6 months or has a visit in the next 30 days with authorizing provider or within the authorizing provider's specialty.  See \"Patient Info\" tab in inbasket, or \"Choose Columns\" in Meds & Orders section of the refill encounter.              "

## 2019-08-14 ENCOUNTER — TELEPHONE (OUTPATIENT)
Dept: NEUROSURGERY | Facility: CLINIC | Age: 49
End: 2019-08-14

## 2019-08-14 RX ORDER — INSULIN GLARGINE 100 [IU]/ML
65 INJECTION, SOLUTION SUBCUTANEOUS AT BEDTIME
Qty: 60 ML | Refills: 0 | Status: SHIPPED | OUTPATIENT
Start: 2019-08-14 | End: 2019-09-10

## 2019-08-14 NOTE — TELEPHONE ENCOUNTER
Received call from patient who underwent FCE exam with Miguel. Report received and scanned into patient's chart. Per Patient, Supriya Case CNP needs to review and write letter to patient's employer based on result findings of exam if she can go back to her job or not. Message sent to provider to review and advise.

## 2019-08-14 NOTE — LETTER
[facility]   [facility]  [address] [place] [address], [place] [address]        August 15, 2019        To Whom it May Concern,    RE: BettinaAnna Montes was seen in clinic on 6/25/19. She may resume work with the following work restrictions:     -No lifting greater than 20 pounds  -Limited bending, twisting, and overhead reaching     Please continue with restrictions as listed above through 8/20/19. She is scheduled to see Orthopedics for further evaluation. Please feel free to contact our office with any questions or concerns.       Sincerely,  Supriya Case, CNP     [facility]  [address] [place] [address]    Tel [phone]

## 2019-08-14 NOTE — TELEPHONE ENCOUNTER
Prescription approved per [facility] Refill Protocol.      Chelle Emanuel RN, BSN, PHN

## 2019-08-15 NOTE — TELEPHONE ENCOUNTER
Spoke to patient. Discussed info from previous note. New letter written and emailed to patient. She is scheduled to see Ortho 8/20/19 and patient stated she is able to work with light duty until then. Patient had no further questions or concerns at this time.

## 2019-08-15 NOTE — TELEPHONE ENCOUNTER
Supriya Case CNp reviewed results of FCE exam done by Miguel. Per Huma, It sounds like she should be off work until evaluated by ortho.     Called and LM . Awaiting callback to review above info.

## 2019-08-16 ENCOUNTER — THERAPY VISIT (OUTPATIENT)
Dept: PHYSICAL THERAPY | Facility: CLINIC | Age: 49
End: 2019-08-16
Payer: OTHER MISCELLANEOUS

## 2019-08-16 DIAGNOSIS — G89.29 CHRONIC LEFT SHOULDER PAIN: ICD-10-CM

## 2019-08-16 DIAGNOSIS — M54.2 NECK PAIN: ICD-10-CM

## 2019-08-16 DIAGNOSIS — M25.512 CHRONIC LEFT SHOULDER PAIN: ICD-10-CM

## 2019-08-16 DIAGNOSIS — Z98.1 S/P SPINAL FUSION: ICD-10-CM

## 2019-08-16 PROCEDURE — 97110 THERAPEUTIC EXERCISES: CPT | Mod: GP | Performed by: PHYSICAL THERAPIST

## 2019-08-16 PROCEDURE — 97140 MANUAL THERAPY 1/> REGIONS: CPT | Mod: GP | Performed by: PHYSICAL THERAPIST

## 2019-08-19 DIAGNOSIS — J45.30 MILD PERSISTENT ASTHMA WITHOUT COMPLICATION: ICD-10-CM

## 2019-08-19 NOTE — TELEPHONE ENCOUNTER
"Requested Prescriptions   Pending Prescriptions Disp Refills     montelukast (SINGULAIR) 10 MG tablet [Pharmacy Med Name: MONTELUKAST SOD 10 MG TABLET]  Last Written Prescription Date:  05/16/19  Last Fill Quantity: 90,  # refills: 0   Last Office Visit with FMRIGO, JEROME or [facility] prescribing provider:  05/13/19-[facility]   Future Office Visit:    Next 5 appointments (look out 90 days)    Sep 27, 2019  4:00 PM CDT  Return Visit with El Chinchilla MD  [facility]) [address] [place] [phone]  [phone]        90 tablet 0     Sig: TAKE 1 TABLET BY MOUTH EVERYDAY AT BEDTIME       Leukotriene Inhibitors Protocol Passed - 8/19/2019 10:28 AM        Passed - Patient is age 12 or older     If patient is under 16, ok to refill using age based dosing.           Passed - Asthma control assessment score within normal limits in last 6 months     Please review ACT score.           Passed - Medication is active on med list        Passed - Recent (6 mo) or future (30 days) visit within the authorizing provider's specialty     Patient had office visit in the last 6 months or has a visit in the next 30 days with authorizing provider or within the authorizing provider's specialty.  See \"Patient Info\" tab in inbasket, or \"Choose Columns\" in Meds & Orders section of the refill encounter.              "

## 2019-08-21 RX ORDER — MONTELUKAST SODIUM 10 MG/1
TABLET ORAL
Qty: 90 TABLET | Refills: 0 | Status: SHIPPED | OUTPATIENT
Start: 2019-08-21 | End: 2019-09-10

## 2019-08-21 NOTE — TELEPHONE ENCOUNTER
Prescription approved per [facility] Refill Protocol.    Aleena Perry RN, [facility]

## 2019-08-23 ENCOUNTER — THERAPY VISIT (OUTPATIENT)
Dept: PHYSICAL THERAPY | Facility: CLINIC | Age: 49
End: 2019-08-23
Payer: OTHER MISCELLANEOUS

## 2019-08-23 DIAGNOSIS — Z98.1 S/P SPINAL FUSION: ICD-10-CM

## 2019-08-23 DIAGNOSIS — M54.2 NECK PAIN: ICD-10-CM

## 2019-08-23 DIAGNOSIS — M25.512 CHRONIC LEFT SHOULDER PAIN: ICD-10-CM

## 2019-08-23 DIAGNOSIS — G89.29 CHRONIC LEFT SHOULDER PAIN: ICD-10-CM

## 2019-08-23 PROCEDURE — 97112 NEUROMUSCULAR REEDUCATION: CPT | Mod: GP | Performed by: PHYSICAL THERAPIST

## 2019-08-23 PROCEDURE — 97110 THERAPEUTIC EXERCISES: CPT | Mod: GP | Performed by: PHYSICAL THERAPIST

## 2019-08-23 PROCEDURE — 97035 APP MDLTY 1+ULTRASOUND EA 15: CPT | Mod: GP | Performed by: PHYSICAL THERAPIST

## 2019-08-30 ENCOUNTER — THERAPY VISIT (OUTPATIENT)
Dept: PHYSICAL THERAPY | Facility: CLINIC | Age: 49
End: 2019-08-30
Payer: OTHER MISCELLANEOUS

## 2019-08-30 DIAGNOSIS — Z98.1 S/P SPINAL FUSION: ICD-10-CM

## 2019-08-30 DIAGNOSIS — M54.2 NECK PAIN: ICD-10-CM

## 2019-08-30 DIAGNOSIS — G89.29 CHRONIC LEFT SHOULDER PAIN: ICD-10-CM

## 2019-08-30 DIAGNOSIS — M25.512 CHRONIC LEFT SHOULDER PAIN: ICD-10-CM

## 2019-08-30 PROCEDURE — 97140 MANUAL THERAPY 1/> REGIONS: CPT | Mod: GP | Performed by: PHYSICAL THERAPIST

## 2019-08-30 PROCEDURE — 97110 THERAPEUTIC EXERCISES: CPT | Mod: GP | Performed by: PHYSICAL THERAPIST

## 2019-08-30 PROCEDURE — 97035 APP MDLTY 1+ULTRASOUND EA 15: CPT | Mod: GP | Performed by: PHYSICAL THERAPIST

## 2019-08-30 NOTE — PROGRESS NOTES
Subjective:  HPI                    Objective:  System    Physical Exam    General     ROS    Assessment/Plan:    DISCHARGE REPORT for Cervical condition; PROGRESS REPORT for L shoulder condition    Progress reporting period is from 6/19/19 to 8/30/19.       SUBJECTIVE  Subjective changes noted by patient:   Pt states that her L arm/shoulder were more painful when she got up today; at work she feels she may have done more than she should have, but work was not asking her to go beyond her restrictions. Her neck is feeling fine, though.    Current pain level is  7/10.     Previous pain level was  4/10 Initial Pain level: 9/10(at worst).   Changes in function:  None and patient has regressed, continues to worsen  Adverse reaction to treatment or activity: None    OBJECTIVE  Changes noted in objective findings:    AROM L shoulder: flex <90 deg, abd about 45 deg both w/pain++.  Tight and very tender on L biceps and triceps, decreased muscle mass over L infraspinatus and very tender. Pt continues to hike her shoulder w/abd and flex of her L arm. Pt cannot reach behind her back at all today, at best gets hand to lateral hip.     ASSESSMENT/PLAN  Updated problem list and treatment plan: Diagnosis 1:  S/p cervical fusion  Decreased ROM/flexibility - home program  Diagnosis 2:  L shoulder injury   Pain -  hot/cold therapy, US, self management, education and home program  Decreased ROM/flexibility - manual therapy and therapeutic exercise  Decreased strength - therapeutic exercise and therapeutic activities  Edema - self management/home program  Impaired muscle performance - neuro re-education  Decreased function - therapeutic activities  STG/LTGs have been met or progress has been made towards goals:  Yes, initially, but she has been worsening in the past 3 wks. and goals are regressed.  Assessment of Progress: The patient is no longer making progress in all 3 of the following areas: subjectively, objectively and  functionally.  The patient's condition has exacerbated.  Self Management Plans:  Patient has been instructed in a home treatment program.  Patient  has been instructed in self management of symptoms.  I have re-evaluated this patient and find that the nature, scope, duration and intensity of the therapy is appropriate for the medical condition of the patient.  Bettina continues to require the following intervention to meet STG and LTG's:  For the cervical spine condition, patient no longer requires PT services and will continue with her HEP independently.  For the L shoulder condition, patient will hold on further PT until after she has an MRI and has consulted with the orthopedist again to discuss options for treatment.    Recommendations:  This patient would benefit from further evaluation of her L shoulder.  We will discharge her from PT for the cervical condition.    Please refer to the daily flowsheet for treatment today, total treatment time and time spent performing 1:1 timed codes.

## 2019-09-10 ENCOUNTER — OFFICE VISIT (OUTPATIENT)
Dept: FAMILY MEDICINE | Facility: CLINIC | Age: 49
End: 2019-09-10
Payer: COMMERCIAL

## 2019-09-10 VITALS
HEIGHT: 66 IN | WEIGHT: 290.2 LBS | TEMPERATURE: 98.4 F | HEART RATE: 95 BPM | SYSTOLIC BLOOD PRESSURE: 110 MMHG | BODY MASS INDEX: 46.64 KG/M2 | OXYGEN SATURATION: 100 % | DIASTOLIC BLOOD PRESSURE: 70 MMHG

## 2019-09-10 DIAGNOSIS — J45.30 MILD PERSISTENT ASTHMA WITHOUT COMPLICATION: ICD-10-CM

## 2019-09-10 DIAGNOSIS — E11.65 TYPE 2 DIABETES MELLITUS WITH HYPERGLYCEMIA, WITH LONG-TERM CURRENT USE OF INSULIN (H): ICD-10-CM

## 2019-09-10 DIAGNOSIS — I42.8 NONISCHEMIC CARDIOMYOPATHY (H): ICD-10-CM

## 2019-09-10 DIAGNOSIS — Z79.4 TYPE 2 DIABETES MELLITUS WITH HYPERGLYCEMIA, WITH LONG-TERM CURRENT USE OF INSULIN (H): ICD-10-CM

## 2019-09-10 DIAGNOSIS — Z12.31 ENCOUNTER FOR SCREENING MAMMOGRAM FOR BREAST CANCER: ICD-10-CM

## 2019-09-10 DIAGNOSIS — E66.01 MORBID OBESITY WITH BMI OF 45.0-49.9, ADULT (H): ICD-10-CM

## 2019-09-10 DIAGNOSIS — Z12.4 SCREENING FOR MALIGNANT NEOPLASM OF CERVIX: ICD-10-CM

## 2019-09-10 DIAGNOSIS — Z00.00 ROUTINE GENERAL MEDICAL EXAMINATION AT A HEALTH CARE FACILITY: Primary | ICD-10-CM

## 2019-09-10 LAB — HBA1C MFR BLD: 6.5 % (ref 0–5.6)

## 2019-09-10 PROCEDURE — 82043 UR ALBUMIN QUANTITATIVE: CPT | Performed by: PREVENTIVE MEDICINE

## 2019-09-10 PROCEDURE — 83036 HEMOGLOBIN GLYCOSYLATED A1C: CPT | Performed by: PREVENTIVE MEDICINE

## 2019-09-10 PROCEDURE — 36415 COLL VENOUS BLD VENIPUNCTURE: CPT | Performed by: PREVENTIVE MEDICINE

## 2019-09-10 PROCEDURE — 99396 PREV VISIT EST AGE 40-64: CPT | Performed by: PREVENTIVE MEDICINE

## 2019-09-10 PROCEDURE — G0124 SCREEN C/V THIN LAYER BY MD: HCPCS | Performed by: PREVENTIVE MEDICINE

## 2019-09-10 PROCEDURE — G0145 SCR C/V CYTO,THINLAYER,RESCR: HCPCS | Performed by: PREVENTIVE MEDICINE

## 2019-09-10 PROCEDURE — 87624 HPV HI-RISK TYP POOLED RSLT: CPT | Performed by: PREVENTIVE MEDICINE

## 2019-09-10 RX ORDER — MONTELUKAST SODIUM 10 MG/1
TABLET ORAL
Qty: 90 TABLET | Refills: 0 | Status: SHIPPED | OUTPATIENT
Start: 2019-09-10 | End: 2020-03-10

## 2019-09-10 RX ORDER — INSULIN GLARGINE 100 [IU]/ML
65 INJECTION, SOLUTION SUBCUTANEOUS AT BEDTIME
Qty: 60 ML | Refills: 0 | Status: SHIPPED | OUTPATIENT
Start: 2019-09-10 | End: 2020-08-05

## 2019-09-10 ASSESSMENT — MIFFLIN-ST. JEOR: SCORE: 1953.09

## 2019-09-10 ASSESSMENT — PAIN SCALES - GENERAL: PAINLEVEL: NO PAIN (0)

## 2019-09-10 NOTE — RESULT ENCOUNTER NOTE
Bettina,     Three month glucose value looks good at 6.5.     Please do not hesitate to call us at [phone] if you have any questions or concerns.    Thank you,    Keli Sim MD MPH

## 2019-09-10 NOTE — PROGRESS NOTES
SUBJECTIVE:   CC: Bettina Montes is an 49 year old woman who presents for preventive health visit.     Healthy Habits:    Do you get at least three servings of calcium containing foods daily (dairy, green leafy vegetables, etc.)? no, taking calcium and/or vitamin D supplement: yes     Amount of exercise or daily activities, outside of work: none    Problems taking medications regularly No    Medication side effects: No    Have you had an eye exam in the past two years? no    Do you see a dentist twice per year? yes    Do you have sleep apnea, excessive snoring or daytime drowsiness?no      Diabetes Follow-up      How often are you checking your blood sugar? Two times daily    What time of day are you checking your blood sugars (select all that apply)?  Before and after meals    Have you had any blood sugars above 200?  No    Have you had any blood sugars below 70?  No    What symptoms do you notice when your blood sugar is low?  Weak    What concerns do you have today about your diabetes? None     Do you have any of these symptoms? (Select all that apply)  Weight gain     Have you had a diabetic eye exam in the last 12 months? Yes- Date of last eye exam: 11/2018    Diabetes Management Resources    Hyperlipidemia Follow-Up      Are you having any of the following symptoms? (Select all that apply)  Shortness of breath, Left-sided neck or arm pain and Pain in calves when walking 1-2 blocks    Are you regularly taking any medication or supplement to lower your cholesterol?   No    Are you having muscle aches or other side effects that you think could be caused by your cholesterol lowering medication?  No    Hypertension Follow-up      Do you check your blood pressure regularly outside of the clinic? Yes     Are you following a low salt diet? No    Are your blood pressures ever more than 140 on the top number (systolic) OR more   than 90 on the bottom number (diastolic), for example 140/90? No    BP Readings from  Last 2 Encounters:   09/10/19 110/70   19 132/84     Hemoglobin A1C (%)   Date Value   09/10/2019 6.5 (H)   2019 7.7 (H)     LDL Cholesterol Calculated (mg/dL)   Date Value   2019 64   2019 63     Asthma Follow-Up    Was ACT completed today?    Yes    ACT Total Scores 2019   ACT TOTAL SCORE (Goal Greater than or Equal to 20) 20   In the past 12 months, how many times did you visit the emergency room for your asthma without being admitted to the hospital? 0   In the past 12 months, how many times were you hospitalized overnight because of your asthma? 0     Today's PHQ-2 Score:   PHQ-2 (  [employer]) 9/10/2019 3/29/2019   Q1: Little interest or pleasure in doing things 0 1   Q2: Feeling down, depressed or hopeless 0 0   PHQ-2 Score 0 1       Abuse: Current or Past(Physical, Sexual or Emotional)- No  Do you feel safe in your environment? Yes    Social History     Tobacco Use     Smoking status: Former Smoker     Packs/day: 1.00     Years: 30.00     Pack years: 30.00     Types: Cigarettes     Last attempt to quit: 2013     Years since quittin.6     Smokeless tobacco: Former User     Tobacco comment:     Substance Use Topics     Alcohol use: Yes     Alcohol/week: 0.0 oz     Comment: 1     If you drink alcohol do you typically have >3 drinks per day or >7 drinks per week? No                     Reviewed orders with patient.  Reviewed health maintenance and updated orders accordingly - Yes  Lab work is in process  Labs reviewed in EPIC  BP Readings from Last 3 Encounters:   09/10/19 110/70   19 132/84   19 130/82    Wt Readings from Last 3 Encounters:   09/10/19 131.6 kg (290 lb 3.2 oz)   19 128.4 kg (283 lb)   19 129 kg (284 lb 6.4 oz)                  Patient Active Problem List   Diagnosis     Hypertension     Type 2 diabetes mellitus (H)     CHF (congestive heart failure) (H)     Hyperlipidemia     Morbid obesity due to excess calories (H)     Intermittent  asthma, uncomplicated     Microscopic hematuria     Adrenal gland anomaly     Moderate persistent asthma without complication     Callus of foot     Nonischemic cardiomyopathy (H)     S/P cervical spinal fusion     WILLARD (obstructive sleep apnea)     Past Surgical History:   Procedure Laterality Date     COLONOSCOPY       DISCECTOMY, FUSION CERVICAL ANTERIOR TWO LEVELS, COMBINED N/A 2019    Procedure: C4-6 anterior cervical discectomy and fusion;  Surgeon: Hollis Hurtado MD;  Location: [facility] OR     TUBAL LIGATION         Social History     Tobacco Use     Smoking status: Former Smoker     Packs/day: 1.00     Years: 30.00     Pack years: 30.00     Types: Cigarettes     Last attempt to quit: 2013     Years since quittin.6     Smokeless tobacco: Former User     Tobacco comment:     Substance Use Topics     Alcohol use: Yes     Alcohol/week: 0.0 oz     Comment: 1     Family History   Problem Relation Age of Onset     Diabetes Mother      Hypertension Mother      Hyperlipidemia Mother      Heart Failure Mother      Diabetes Father      Cancer Paternal Grandmother         ?         Current Outpatient Medications   Medication Sig Dispense Refill     acetaminophen (TYLENOL) 500 MG tablet Take 500-1,000 mg by mouth every 6 hours as needed for mild pain       BETA BLOCKER NOT PRESCRIBED, INTENTIONAL, Beta Blocker not prescribed intentionally due to Severe Asthma  0     blood glucose (NO BRAND SPECIFIED) test strip Use to test blood sugar 3-4 times daily or as directed. 200 strip 1     carvedilol (COREG) 12.5 MG tablet TAKE 1 TABLET BY MOUTH TWICE A DAY WITH MEALS 180 tablet 1     cholecalciferol ( ULTRA STRENGTH) 2000 units CAPS Take 2,000 Units by mouth       Cholecalciferol (VITAMIN D3 PO) Take 2,000 Units by mouth daily       furosemide (LASIX) 40 MG tablet Take 1 tablet (40 mg) by mouth 2 times daily 60 tablet 3     hydrALAZINE (APRESOLINE) 100 MG tablet Take 1 tablet (100 mg) by mouth 2 times  daily 180 tablet 3     insulin glargine (BASAGLAR KWIKPEN) 100 UNIT/ML pen Inject 65 Units Subcutaneous At Bedtime 60 mL 0     insulin pen needle (32G X 4 MM) 32G X 4 MM miscellaneous Use 1 pen needles daily or as directed. 100 each 3     levalbuterol (XOPENEX HFA) 45 MCG/ACT Inhaler Inhale 2 puffs into the lungs every 4 hours as needed for shortness of breath / dyspnea or wheezing 15 g 3     lovastatin (MEVACOR) 20 MG tablet TAKE 1 TABLET BY MOUTH EVERY DAY AT BEDTIME 90 tablet 1     magnesium oxide (MAG-OX) 400 (241.3 Mg) MG tablet Take 1 tablet (400 mg) by mouth daily 100 tablet 3     metFORMIN (GLUCOPHAGE) 500 MG tablet Take 2 tablets (1,000 mg) by mouth 2 times daily (with meals) 360 tablet 1     montelukast (SINGULAIR) 10 MG tablet TAKE 1 TABLET BY MOUTH EVERYDAY AT BEDTIME 90 tablet 0     montelukast (SINGULAIR) 10 MG tablet Take 10 mg by mouth       potassium chloride ER (K-DUR/KLOR-CON M) 10 MEQ CR tablet Take 2 tablets (20 mEq) by mouth 2 times daily 120 tablet 1     sacubitril-valsartan (ENTRESTO)  MG per tablet Take 1 tablet by mouth 2 times daily 60 tablet 3     spironolactone (ALDACTONE) 50 MG tablet Take 1 tablet (50 mg) by mouth daily 90 tablet 3     tiZANidine (ZANAFLEX) 2 MG capsule Take 1-2 capsules (2-4 mg) by mouth 3 times daily as needed (for muscle spasms.) 90 capsule 1     fluticasone-salmeterol (WIXELA INHUB) 250-50 MCG/DOSE inhaler Inhale 1 puff into the lungs       methylPREDNISolone (MEDROL DOSEPAK) 4 MG tablet therapy pack Follow Package Directions (Patient not taking: Reported on 9/10/2019) 21 tablet 0     ranitidine (ZANTAC) 150 MG tablet Take 150 mg by mouth       ranitidine (ZANTAC) 150 MG/10ML syrup Take 10 mLs (150 mg) by mouth 2 times daily (Patient not taking: Reported on 9/10/2019) 240 mL 1     WIXELA INHUB 250-50 MCG/DOSE inhaler Inhale 1 puff into the lungs every 12 hours       Allergies   Allergen Reactions     Amlodipine Swelling     Edema on 5mg throat     Lisinopril       Swelling in throat, face  angioedema     Naprosyn [Naproxen]      Swelling, diff breathing     Recent Labs   Lab Test 09/10/19  1624 06/30/19  1523 06/06/19  1316  05/17/19  1306 05/13/19  1421  05/02/18  1517  05/10/17  0943   A1C 6.5*  --   --   --  7.7* 7.7*   < > 8.3*   < > 10.1*   LDL  --   --   --   --  64 63  --  57   < >  --    HDL  --   --   --   --  66 55  --  71   < >  --    TRIG  --   --   --   --  83 104  --  123   < >  --    ALT  --   --   --   --  22 20  --  34  --   --    CR  --  0.83 0.85   < > 0.80 0.84   < > 0.82   < > 0.70   GFRESTIMATED  --  82 80   < > 86 81   < > 75   < > 89   GFRESTBLACK  --  >90 >90   < > >90 >90   < > >90   < > >90  African American GFR Calc     POTASSIUM  --  3.9 3.7   < > 3.8 3.9   < > 3.7   < > 4.2   TSH  --   --   --   --   --  0.71  --   --   --  0.84    < > = values in this interval not displayed.        Mammogram Screening: Patient over age 50, mutual decision to screen reflected in health maintenance.    Pertinent mammograms are reviewed under the imaging tab.  History of abnormal Pap smear: NO - age 30-65 PAP every 5 years with negative HPV co-testing recommended  PAP / HPV 2/17/2016   PAP NIL     Reviewed and updated as needed this visit by clinical staff  Tobacco  Allergies  Meds  Problems  Med Hx  Surg Hx  Fam Hx  Soc Hx          Reviewed and updated as needed this visit by Provider  Tobacco  Allergies  Meds  Problems  Med Hx  Surg Hx  Fam Hx        Past Medical History:   Diagnosis Date     Adrenal gland anomaly      CHF (congestive heart failure) (H)      Fatty liver      Gastroesophageal reflux disease      Hyperlipidemia      Hypertension      Mild persistent asthma      NICM (nonischemic cardiomyopathy) (H)      Obesity      WILLARD (obstructive sleep apnea) 1/9/2015     Type 2 diabetes mellitus (H)       Past Surgical History:   Procedure Laterality Date     COLONOSCOPY       DISCECTOMY, FUSION CERVICAL ANTERIOR TWO LEVELS, COMBINED N/A  "4/5/2019    Procedure: C4-6 anterior cervical discectomy and fusion;  Surgeon: Hollis Hurtado MD;  Location: [facility] OR     TUBAL LIGATION         ROS:  CONSTITUTIONAL: NEGATIVE for fever, chills, change in weight  INTEGUMENTARY/SKIN: NEGATIVE for worrisome rashes, moles or lesions  EYES: NEGATIVE for vision changes or irritation  ENT: NEGATIVE for ear, mouth and throat problems  RESP: NEGATIVE for significant cough or SOB  BREAST: NEGATIVE for masses, tenderness or discharge  CV: NEGATIVE for chest pain, palpitations or peripheral edema  GI: NEGATIVE for nausea, abdominal pain, heartburn, or change in bowel habits  : NEGATIVE for unusual urinary or vaginal symptoms. No vaginal bleeding.  MUSCULOSKELETAL: NEGATIVE for significant arthralgias or myalgia  NEURO: NEGATIVE for weakness, dizziness or paresthesias  ENDOCRINE: NEGATIVE for temperature intolerance, skin/hair changes  HEME/ALLERGY/IMMUNE: NEGATIVE for bleeding problems  PSYCHIATRIC: NEGATIVE for changes in mood or affect     OBJECTIVE:   /70 (BP Location: Left arm, Patient Position: Chair, Cuff Size: Adult Large)   Pulse 95   Temp 98.4  F (36.9  C) (Oral)   Ht 1.676 m (5' 6\")   Wt 131.6 kg (290 lb 3.2 oz)   LMP  (LMP Unknown)   SpO2 100%   BMI 46.84 kg/m    EXAM:  GENERAL: healthy, alert and no distress  EYES: Eyes grossly normal to inspection, PERRL and conjunctivae and sclerae normal  HENT: ear canals and TM's normal, nose and mouth without ulcers or lesions  NECK: no adenopathy  RESP: lungs clear to auscultation - no rales, rhonchi or wheezes  BREAST: normal without masses, tenderness or nipple discharge and no palpable axillary masses or adenopathy  CV: regular rate and rhythm, normal S1 S2, no S3 or S4, no murmur, click or rub, no peripheral edema and peripheral pulses strong  ABDOMEN: soft, nontender, no hepatosplenomegaly, no masses and bowel sounds normal   (female): normal female external genitalia, normal urethral meatus, " vaginal mucosa pink, moist, well rugated, and normal cervix/adnexa/uterus without masses or discharge  MS: no gross musculoskeletal defects noted, no edema  SKIN: no suspicious lesions or rashes, keloids+   NEURO: Normal strength and tone, mentation intact and speech normal  PSYCH: mentation appears normal, affect normal/bright  LYMPH: no cervical, supraclavicular, axillary adenopathy    Diagnostic Test Results:  Labs reviewed in Epic  Results for orders placed or performed in visit on 09/10/19 (from the past 24 hour(s))   HEMOGLOBIN A1C   Result Value Ref Range    Hemoglobin A1C 6.5 (H) 0 - 5.6 %       ASSESSMENT/PLAN:   Bettina was seen today for physical.    Diagnoses and all orders for this visit:    Routine general medical examination at a health care facility    Type 2 diabetes mellitus with hyperglycemia, with long-term current use of insulin (H)  -     HEMOGLOBIN A1C  -     Albumin Random Urine Quantitative with Creat Ratio  -     metFORMIN (GLUCOPHAGE) 500 MG tablet; Take 2 tablets (1,000 mg) by mouth 2 times daily (with meals)  -     insulin glargine (BASAGLAR KWIKPEN) 100 UNIT/ML pen; Inject 65 Units Subcutaneous At Bedtime  -HbA1C is at goal  -continue to follow up with PCP as scheduled     Morbid obesity with BMI of 45.0-49.9, adult (H)  -has gained weight since last check     Nonischemic cardiomyopathy (H)  -followed by Cardiology, appointment on 9/20/19    Screening for malignant neoplasm of cervix  -     Pap imaged thin layer screen with HPV - recommended age 30 - 65 years (select HPV order below)  -     HPV High Risk Types DNA Cervical  -screening guidelines reviewed     Encounter for screening mammogram for breast cancer  -     MA SCREENING DIGITAL BILAT - Future  (s+30); Future    Mild persistent asthma without complication  -     montelukast (SINGULAIR) 10 MG tablet; TAKE 1 TABLET BY MOUTH EVERYDAY AT BEDTIME        COUNSELING:   Reviewed preventive health counseling, as reflected in patient  "instructions       Regular exercise       Healthy diet/nutrition       Colon cancer screening    Estimated body mass index is 46.84 kg/m  as calculated from the following:    Height as of this encounter: 1.676 m (5' 6\").    Weight as of this encounter: 131.6 kg (290 lb 3.2 oz).    Weight management plan: Discussed healthy diet and exercise guidelines     reports that she quit smoking about 6 years ago. Her smoking use included cigarettes. She has a 30.00 pack-year smoking history. She has quit using smokeless tobacco.      Counseling Resources:  ATP IV Guidelines  Pooled Cohorts Equation Calculator  Breast Cancer Risk Calculator  FRAX Risk Assessment  ICSI Preventive Guidelines  Dietary Guidelines for Americans, 2010  USDA's MyPlate  ASA Prophylaxis  Lung CA Screening    Keli Sim MD MPH    [facility]  "

## 2019-09-10 NOTE — PATIENT INSTRUCTIONS
Preventive Health Recommendations  Female Ages 50 - 64    Yearly exam: See your health care provider every year in order to  o Review health changes.   o Discuss preventive care.    o Review your medicines if your doctor has prescribed any.      Get a Pap test every three years (unless you have an abnormal result and your provider advises testing more often).    If you get Pap tests with HPV test, you only need to test every 5 years, unless you have an abnormal result.     You do not need a Pap test if your uterus was removed (hysterectomy) and you have not had cancer.    You should be tested each year for STDs (sexually transmitted diseases) if you're at risk.     Have a mammogram every 1 to 2 years.    Have a colonoscopy at age 50, or have a yearly FIT test (stool test). These exams screen for colon cancer.      Have a cholesterol test every 5 years, or more often if advised.    Have a diabetes test (fasting glucose) every three years. If you are at risk for diabetes, you should have this test more often.     If you are at risk for osteoporosis (brittle bone disease), think about having a bone density scan (DEXA).    Shots: Get a flu shot each year. Get a tetanus shot every 10 years.    Nutrition:     Eat at least 5 servings of fruits and vegetables each day.    Eat whole-grain bread, whole-wheat pasta and brown rice instead of white grains and rice.    Get adequate Calcium and Vitamin D.     Lifestyle    Exercise at least 150 minutes a week (30 minutes a day, 5 days a week). This will help you control your weight and prevent disease.    Limit alcohol to one drink per day.    No smoking.     Wear sunscreen to prevent skin cancer.     See your dentist every six months for an exam and cleaning.    See your eye doctor every 1 to 2 years.      At [facility], we strive to deliver an exceptional experience to you, every time we see you.  If you receive a survey in the mail, please send us back  your thoughts. We really do value your feedback.    Based on your medical history, these are the current health maintenance/preventive care services that you are due for (some may have been done at this visit.)  Health Maintenance Due   Topic Date Due     URINE DRUG SCREEN  1970     COLONOSCOPY  01/25/1979     PREVENTIVE CARE VISIT  09/13/2018     HF ACTION PLAN  02/17/2019     PAP  02/17/2019     DIABETIC FOOT EXAM  05/02/2019     ASTHMA ACTION PLAN  05/02/2019     A1C  08/17/2019     ASTHMA CONTROL TEST  08/27/2019     MAMMO SCREENING  09/15/2019     MICROALBUMIN  10/10/2019         Suggested websites for health information:  Www.[facility].org : Up to date and easily searchable information on multiple topics.  Www.medlineplus.gov : medication info, interactive tutorials, watch real surgeries online  Www.familydoctor.org : good info from the Academy of Family Physicians  Www.cdc.gov : public health info, travel advisories, epidemics (H1N1)  Www.aap.org : children's health info, normal development, vaccinations  Www.health.Counts include 234 beds at the [facility].[place] : [place] dept of health, public health issues in [place], N1N1    Your care team:                            Family Medicine Internal Medicine   MD Fran Gabriel MD Shantel Branch-Fleming, MD Katya Georgiev PA-C Nam Ho, MD Pediatrics   ETIENNE Dewitt, MD Latrice Siegel CNP, MD Deborah Mielke, MD Kim Thein, APRN [facility]      Clinic hours: Monday - Thursday 7 am-7 pm; Fridays 7 am-5 pm.   Urgent care: Monday - Friday 11 am-9 pm; Saturday and Sunday 9 am-5 pm.  Pharmacy : Monday -Thursday 8 am-8 pm; Friday 8 am-6 pm; Saturday and Sunday 9 am-5 pm.     Clinic: [phone]   Pharmacy: [phone]

## 2019-09-11 LAB
CREAT UR-MCNC: 12 MG/DL
MICROALBUMIN UR-MCNC: <5 MG/L
MICROALBUMIN/CREAT UR: NORMAL MG/G CR (ref 0–25)

## 2019-09-11 ASSESSMENT — ASTHMA QUESTIONNAIRES: ACT_TOTALSCORE: 16

## 2019-09-11 NOTE — RESULT ENCOUNTER NOTE
Bettina,     Urine sample does not show any abnormal protein.     Please do not hesitate to call us at [phone] if you have any questions or concerns.    Thank you,    Keli Sim MD MPH

## 2019-09-13 LAB
COPATH REPORT: ABNORMAL
PAP: ABNORMAL

## 2019-09-16 LAB
FINAL DIAGNOSIS: NORMAL
HPV HR 12 DNA CVX QL NAA+PROBE: NEGATIVE
HPV16 DNA SPEC QL NAA+PROBE: NEGATIVE
HPV18 DNA SPEC QL NAA+PROBE: NEGATIVE
SPECIMEN DESCRIPTION: NORMAL
SPECIMEN SOURCE CVX/VAG CYTO: NORMAL

## 2019-09-17 ENCOUNTER — ANCILLARY PROCEDURE (OUTPATIENT)
Dept: MAMMOGRAPHY | Facility: CLINIC | Age: 49
End: 2019-09-17
Payer: COMMERCIAL

## 2019-09-17 DIAGNOSIS — Z12.31 ENCOUNTER FOR SCREENING MAMMOGRAM FOR BREAST CANCER: ICD-10-CM

## 2019-09-17 PROCEDURE — 77067 SCR MAMMO BI INCL CAD: CPT | Mod: TC

## 2019-09-20 ENCOUNTER — OFFICE VISIT (OUTPATIENT)
Dept: CARDIOLOGY | Facility: CLINIC | Age: 49
End: 2019-09-20
Payer: COMMERCIAL

## 2019-09-20 VITALS
HEART RATE: 84 BPM | BODY MASS INDEX: 46.76 KG/M2 | OXYGEN SATURATION: 99 % | WEIGHT: 289.7 LBS | DIASTOLIC BLOOD PRESSURE: 82 MMHG | SYSTOLIC BLOOD PRESSURE: 127 MMHG

## 2019-09-20 DIAGNOSIS — I50.22 CHRONIC SYSTOLIC CONGESTIVE HEART FAILURE (H): ICD-10-CM

## 2019-09-20 DIAGNOSIS — I50.22 CHRONIC SYSTOLIC HEART FAILURE (H): ICD-10-CM

## 2019-09-20 LAB
ANION GAP SERPL CALCULATED.3IONS-SCNC: 6 MMOL/L (ref 3–14)
BUN SERPL-MCNC: 12 MG/DL (ref 7–30)
CALCIUM SERPL-MCNC: 9.5 MG/DL (ref 8.5–10.1)
CHLORIDE SERPL-SCNC: 104 MMOL/L (ref 94–109)
CO2 SERPL-SCNC: 30 MMOL/L (ref 20–32)
CREAT SERPL-MCNC: 0.9 MG/DL (ref 0.52–1.04)
GFR SERPL CREATININE-BSD FRML MDRD: 74 ML/MIN/{1.73_M2}
GLUCOSE SERPL-MCNC: 92 MG/DL (ref 70–99)
POTASSIUM SERPL-SCNC: 3.8 MMOL/L (ref 3.4–5.3)
SODIUM SERPL-SCNC: 140 MMOL/L (ref 133–144)

## 2019-09-20 PROCEDURE — 99214 OFFICE O/P EST MOD 30 MIN: CPT | Mod: GC | Performed by: INTERNAL MEDICINE

## 2019-09-20 PROCEDURE — 80048 BASIC METABOLIC PNL TOTAL CA: CPT | Performed by: INTERNAL MEDICINE

## 2019-09-20 PROCEDURE — 36415 COLL VENOUS BLD VENIPUNCTURE: CPT | Performed by: INTERNAL MEDICINE

## 2019-09-20 RX ORDER — CARVEDILOL 25 MG/1
25 TABLET ORAL 2 TIMES DAILY WITH MEALS
Qty: 180 TABLET | Refills: 3 | Status: SHIPPED | OUTPATIENT
Start: 2019-09-20 | End: 2020-06-05

## 2019-09-20 NOTE — NURSING NOTE
Bettina Montes's: CHF  She requests these members of her care team be copied on today's visit information: YES    PCP: Anuja Bruce      /82   Pulse 84   Wt 131.4 kg (289 lb 11.2 oz)   LMP  (LMP Unknown)   SpO2 99%   BMI 46.76 kg/m      PEREZ Russell

## 2019-09-20 NOTE — PROGRESS NOTES
[facility] Cardiology Consultation:    Assessment and Plan:   Ms. Bettina Montes is a 49 year-old female with idiopathic cardiomyopathy who presents for follow-up. She tolerated summertime uptitration of her heart failure medications and appears nearly euvolemic on exam today on a reduced dose of Furosemide. We will continue this uptitration now.    1.  Chronic systolic heart failure, LVEF of 30-35%, idiopathic nonischemic cardiomyopathy (stress MRI - no ischemia, mild septal fibrosis): Euvolemic.   Increase Carvedilol to 25 mg BID.   -Continue Entresto  mg BID, Hydralazine 100 mg BID, and Spironolactone 50 mg daily  -Continue Furosemide 40 mg AM and 60 mg PM; Low threshold to uptitrate back to 40 mg BID if she gains 3 lbs in 1 day or 5 lbs in 1 week  -Potassium level today. Consider increasing aldactone to 100 mg to replace K supplement.   -TTE prior to next [facility] appointment. I am hopeful that her LVEF will recover to a level that we dont have to consider primary prevention ICD.   2. Hypertension  3. Obesity  4. Diabetes  5. Asthma     -Follow-up with Dr. Chinchilla June 2020    David Esposito MD  Cardiology Fellow, PGY-6  Pager #[phone]    [facility] Cardiovascular Division    I have seen and examined the patient, reviewed labs and tests. I have discussed my findings and treatment recommendations with the house staff and/or Cardiology fellow and agree with their assessment and plan as outlined in the note.    El Chinchilla MD    Cardiac Imaging and Prevention  [facility]  Pager: [phone]    HPI:   Ms. Bettina Montes is a 49 year-old female with idiopathic non-ischemic cardiomyopathy with LVEF 30-35%, hypertension and DM2 who presents for follow-up. She has been seen in [facility] clinic over the summer and her medications were titrated with Hydralazine uptitrated to 100 mg BID, Carvedilol to 12.5 mg BID, and Furosemide reduced to 40 mg and 60 mg on  alternating days due to cramping. She has tolerated these changes well. She does report a 6-lb weight gain over the past 4 months, but denies bloating, orthopnea, lower extremity edema, or progressive dyspnea. She has woken up with a dry cough occasionally.  Ziopatch showed only short runs, with no sustained arrhythmia.     EXAM:  /82   Pulse 84   Wt 131.4 kg (289 lb 11.2 oz)   LMP  (LMP Unknown)   SpO2 99%   BMI 46.76 kg/m    GEN/CONSTITUIONAL: Appears comfortable, in no apparent distress   EYES: No icterus  ENT/MOUTH: Normal  JVP:  Not visible  RESPIRATORY: Clear to auscultation bilaterally   CARDIOVASCULAR: Regular S1 and S2, no murmurs, rubs, or gallops.   ABDOMEN: Soft, non-tender, positive bowel sounds   NEUROLOGIC: Grossly non-focal   PSYCHIATRIC: Normal affect  EXT: Tr LE edema. Normal pedal pulses.  Skin: No petechiae, purpura or rash    PAST MEDICAL HISTORY:  Past Medical History:   Diagnosis Date     Abnormal Pap smear of cervix 09/10/2019    See problem list     Adrenal gland anomaly      CHF (congestive heart failure) (H)      Fatty liver      Gastroesophageal reflux disease      Hyperlipidemia      Hypertension      Mild persistent asthma      NICM (nonischemic cardiomyopathy) (H)      Obesity      WILLARD (obstructive sleep apnea) 1/9/2015     Type 2 diabetes mellitus (H)        CURRENT MEDICATIONS:  Current Outpatient Medications   Medication     acetaminophen (TYLENOL) 500 MG tablet     BETA BLOCKER NOT PRESCRIBED, INTENTIONAL,     blood glucose (NO BRAND SPECIFIED) test strip     carvedilol (COREG) 25 MG tablet     cholecalciferol ( ULTRA STRENGTH) 2000 units CAPS     Cholecalciferol (VITAMIN D3 PO)     fluticasone-salmeterol (WIXELA INHUB) 250-50 MCG/DOSE inhaler     furosemide (LASIX) 40 MG tablet     hydrALAZINE (APRESOLINE) 100 MG tablet     insulin glargine (BASAGLAR KWIKPEN) 100 UNIT/ML pen     insulin pen needle (32G X 4 MM) 32G X 4 MM miscellaneous     levalbuterol (XOPENEX HFA)  45 MCG/ACT Inhaler     lovastatin (MEVACOR) 20 MG tablet     magnesium oxide (MAG-OX) 400 (241.3 Mg) MG tablet     metFORMIN (GLUCOPHAGE) 500 MG tablet     montelukast (SINGULAIR) 10 MG tablet     montelukast (SINGULAIR) 10 MG tablet     potassium chloride ER (K-DUR/KLOR-CON M) 10 MEQ CR tablet     sacubitril-valsartan (ENTRESTO)  MG per tablet     spironolactone (ALDACTONE) 50 MG tablet     tiZANidine (ZANAFLEX) 2 MG capsule     WIXELA INHUB 250-50 MCG/DOSE inhaler     methylPREDNISolone (MEDROL DOSEPAK) 4 MG tablet therapy pack     ranitidine (ZANTAC) 150 MG tablet     ranitidine (ZANTAC) 150 MG/10ML syrup     No current facility-administered medications for this visit.        PAST SURGICAL HISTORY:  Past Surgical History:   Procedure Laterality Date     COLONOSCOPY       DISCECTOMY, FUSION CERVICAL ANTERIOR TWO LEVELS, COMBINED N/A 4/5/2019    Procedure: C4-6 anterior cervical discectomy and fusion;  Surgeon: Hollis Hurtado MD;  Location: [facility] OR     TUBAL LIGATION         ALLERGIES     Allergies   Allergen Reactions     Amlodipine Swelling     Edema on 5mg throat     Lisinopril      Swelling in throat, face  angioedema     Naprosyn [Naproxen]      Swelling, diff breathing       FAMILY HISTORY:  Family History   Problem Relation Age of Onset     Diabetes Mother      Hypertension Mother      Hyperlipidemia Mother      Heart Failure Mother      Diabetes Father      Cancer Paternal Grandmother         ?       SOCIAL HISTORY:  Social History     Socioeconomic History     Marital status: Single     Spouse name: Not on file     Number of children: 1     Years of education: Not on file     Highest education level: Not on file   Occupational History     Occupation: CNA     Employer: OTHER   Social Needs     Financial resource strain: Not on file     Food insecurity:     Worry: Not on file     Inability: Not on file     Transportation needs:     Medical: Not on file     Non-medical: Not on file   Tobacco  Use     Smoking status: Former Smoker     Packs/day: 1.00     Years: 30.00     Pack years: 30.00     Types: Cigarettes     Last attempt to quit: 2013     Years since quittin.6     Smokeless tobacco: Former User     Tobacco comment:     Substance and Sexual Activity     Alcohol use: Yes     Alcohol/week: 0.0 oz     Comment: 1     Drug use: No     Sexual activity: Yes     Partners: Male     Birth control/protection: Surgical   Lifestyle     Physical activity:     Days per week: Not on file     Minutes per session: Not on file     Stress: Not on file   Relationships     Social connections:     Talks on phone: Not on file     Gets together: Not on file     Attends [employer] service: Not on file     Active member of club or organization: Not on file     Attends meetings of clubs or organizations: Not on file     Relationship status: Not on file     Intimate partner violence:     Fear of current or ex partner: Not on file     Emotionally abused: Not on file     Physically abused: Not on file     Forced sexual activity: Not on file   Other Topics Concern     Parent/sibling w/ CABG, MI or angioplasty before 65F 55M? No   Social History Narrative     Not on file       ROS:   Constitutional: No fever, chills, or sweats. No weight gain/loss   ENT: No visual disturbance, ear ache, epistaxis, sore throat  Allergies/Immunologic: Negative.   Respiratory: No cough, hemoptysia  Cardiovascular: As per HPI  GI: No nausea, vomiting, hematemesis, melena, or hematochezia  : No urinary frequency, dysuria, or hematuria  Integument: Negative  Psychiatric: Negative  Neuro: Negative  Endocrinology: Negative   Musculoskeletal: Negative    Cardiac MRI, 3/27/2019:  Clinical history: 49-year old female with DM, HTN, WILLARD, HFrEF with recent worsening of LV function now with  LVEF of 30%. Stress CMR for further evaluation.  Comparison CMR: None.      1. The LV is mildly dilated in cavity size, with mild concentric LVH. The global systolic  function is  moderately reduced. The LVEF is 32%. There is global hypokinesis.     2. The RV is normal in cavity size. The global systolic function is normal. The RVEF is 57%.      3. Both atria are normal in size.     4. There is no significant valvular disease.      5. Late gadolinium enhancement imaging shows no MI, or infiltrative disease. There is mid wall enhancement  in the basal to mid septum, in a pattern that can be seen in idiopathic dilated CM.      6. Regadenoson stress perfusion imaging shows no ischemia.     7. There is no pericardial effusion or thickening.     8. There is no intracardiac thrombus.     CONCLUSIONS: No myocardial ischemia. Non-ischemic cardiomyopathy; LVEF 32%, RVEF 57%. Mid wall enhancement  in the basal to mid septum, in a pattern that can be seen in idiopathic dilated CM. No evidence of cardiac  sarcoid.          ADDITIONAL COMMENTS:     I reviewed the patient's medications:     I reviewed the patient's pertinent clinical laboratory studies:     I reviewed the patient's pertinent imaging studies:   I reviewed the patient's ECG:

## 2019-09-20 NOTE — PATIENT INSTRUCTIONS
The following is a summary of your office visit:    Start taking carvedilol (COREG) 25 MG tablet, Take 1 tablet (25 mg) by mouth 2 times daily (with meals).     Nurse contact information: Katie Sinclair RN  Cardiology Care Coordinator  [phone] Phone  [phone] Fax    Appointments made today: Echocardiogram, June follow up with Dr. Chinchilla    Patient instructions:Please go to the lab today.       If you have had any blood work, imaging or other testing completed we will be in touch within 1-2 weeks regarding the results. If you have any questions, concerns or need to schedule a follow up, please contact us at [phone]. If you are needing refills please contact your pharmacy. For urgent after hour care please call the [facility] at [phone] or the [facility] at [phone] and ask to speak to the cardiologist on call.    It was a pleasure meeting with you today. Please let us know if there is anything else we can do for you so that we can be sure you are leaving completely satisfied with your care experience.     Your Cardiology Team at [facility]  RN Care Coordinator: Katie TODD: [place]

## 2019-10-01 DIAGNOSIS — Z98.1 S/P CERVICAL SPINAL FUSION: ICD-10-CM

## 2019-10-02 ENCOUNTER — TELEPHONE (OUTPATIENT)
Dept: FAMILY MEDICINE | Facility: CLINIC | Age: 49
End: 2019-10-02

## 2019-10-02 NOTE — TELEPHONE ENCOUNTER
This writer attempted to contact Patient on 10/02/19      Reason for call patient needs an office visit to address and left message.      If patient calls back:   Schedule Office Visit appointment within 2 days with primary care, postponing message.      Kristyn Sheridan MA

## 2019-10-02 NOTE — TELEPHONE ENCOUNTER
Routing refill request to provider for review/approval because:  Drug not on the [facility] refill protocol     Chelle Emanuel RN, BSN, PHN

## 2019-10-02 NOTE — TELEPHONE ENCOUNTER
"Took call from call center.    Patient had a cortisone injection in her shoulder yesterday, is diabetic and now blood sugars are running in the 400's.  Injection given with Ortho with Sofía-see record in Chart Review.  At bedtime last night, blood sugar was 434. Patient had given herself her usual dose of 65 U of Basaglar before bed.  This morning, blood sugar was 433.  \"I feel normal\". Denies any symptoms of hyperglycemia at this time.  Takes 65 U of Basaglar at bedtime and takes Metformin 500 mg, 2 tablets twice daily.  Patient asking if her medications need to temporarily be adjusted to reflect cortisone injection and time this may affect blood sugar.  Patient does note she does not want to have to increase her Metformin as she is already experiencing diarrhea from this and fears an increase in dose will make this worse.  Patient checking blood sugars TID before meals, also asking if needs to check more frequently for now.    Routing to provider to review and advise.    Laura Medel RN        "

## 2019-10-02 NOTE — TELEPHONE ENCOUNTER
Requested Prescriptions   Pending Prescriptions Disp Refills     tiZANidine (ZANAFLEX) 2 MG tablet [Pharmacy Med Name: TIZANIDINE HCL 2 MG TABLET] 90 tablet 1     Sig: TAKE 1-2 CAPSULES (2-4 MG) BY MOUTH 3 TIMES DAILY AS NEEDED FOR MUSCLE SPASMS       There is no refill protocol information for this order        Last Written Prescription Date:  7/15/19  Last Fill Quantity: 90,  # refills: 1   Last Office Visit with [facility], [facility] or [facility] prescribing provider:  9/10/19   Future Office Visit:           Dominic Galindo  [facility]

## 2019-10-03 ENCOUNTER — HEALTH MAINTENANCE LETTER (OUTPATIENT)
Age: 49
End: 2019-10-03

## 2019-10-03 ENCOUNTER — OFFICE VISIT (OUTPATIENT)
Dept: FAMILY MEDICINE | Facility: CLINIC | Age: 49
End: 2019-10-03
Payer: COMMERCIAL

## 2019-10-03 VITALS
BODY MASS INDEX: 46.45 KG/M2 | HEIGHT: 66 IN | RESPIRATION RATE: 24 BRPM | TEMPERATURE: 98 F | OXYGEN SATURATION: 97 % | HEART RATE: 75 BPM | DIASTOLIC BLOOD PRESSURE: 85 MMHG | WEIGHT: 289 LBS | SYSTOLIC BLOOD PRESSURE: 138 MMHG

## 2019-10-03 DIAGNOSIS — Z79.4 TYPE 2 DIABETES MELLITUS WITH HYPERGLYCEMIA, WITH LONG-TERM CURRENT USE OF INSULIN (H): Primary | ICD-10-CM

## 2019-10-03 DIAGNOSIS — E11.65 TYPE 2 DIABETES MELLITUS WITH HYPERGLYCEMIA, WITH LONG-TERM CURRENT USE OF INSULIN (H): Primary | ICD-10-CM

## 2019-10-03 PROCEDURE — 99214 OFFICE O/P EST MOD 30 MIN: CPT | Performed by: FAMILY MEDICINE

## 2019-10-03 RX ORDER — TIZANIDINE 2 MG/1
TABLET ORAL
Qty: 90 TABLET | Refills: 1 | Status: SHIPPED | OUTPATIENT
Start: 2019-10-03 | End: 2019-11-10

## 2019-10-03 ASSESSMENT — MIFFLIN-ST. JEOR: SCORE: 1947.65

## 2019-10-03 ASSESSMENT — PAIN SCALES - GENERAL: PAINLEVEL: NO PAIN (0)

## 2019-10-03 NOTE — TELEPHONE ENCOUNTER
Patient seeing Dr Robbins today, 10/3/19.  Kristyn Sheridan MA   For [place]  2nd Floor Primary Care

## 2019-10-03 NOTE — PROGRESS NOTES
Subjective     Bettina Montes is a 49 year old female who presents to clinic today for the following health issues:    HPI   Diabetes Follow-up      How often are you checking your blood sugar? Three times daily    What time of day are you checking your blood sugars (select all that apply)?  Before and after meals    Have you had any blood sugars above 200?  Yes    Have you had any blood sugars below 70?  No    What symptoms do you notice when your blood sugar is low?  Weak    What concerns do you have today about your diabetes? Blood sugar is often over 200     Do you have any of these symptoms? (Select all that apply)  Weight gain     Have you had a diabetic eye exam in the last 12 months? Yes- Date of last eye exam: 11/2018    BP Readings from Last 2 Encounters:   10/03/19 138/85   09/20/19 127/82     Hemoglobin A1C (%)   Date Value   09/10/2019 6.5 (H)   05/17/2019 7.7 (H)     LDL Cholesterol Calculated (mg/dL)   Date Value   05/17/2019 64   05/13/2019 63       Diabetes Management Resources      How many servings of fruits and vegetables do you eat daily? 1    On average, how many sweetened beverages do you drink each day (soda, juice, sweet tea, etc)?   0    How many days per week do you miss taking your medication? 0        Patient Active Problem List   Diagnosis     Hypertension     Type 2 diabetes mellitus (H)     CHF (congestive heart failure) (H)     Hyperlipidemia     Morbid obesity due to excess calories (H)     Intermittent asthma, uncomplicated     Microscopic hematuria     Adrenal gland anomaly     Moderate persistent asthma without complication     Callus of foot     Nonischemic cardiomyopathy (H)     S/P cervical spinal fusion     WILLARD (obstructive sleep apnea)     ASCUS of cervix with negative high risk HPV     Past Surgical History:   Procedure Laterality Date     COLONOSCOPY       DISCECTOMY, FUSION CERVICAL ANTERIOR TWO LEVELS, COMBINED N/A 4/5/2019    Procedure: C4-6 anterior cervical  "discectomy and fusion;  Surgeon: Hollis Hurtado MD;  Location: [facility] OR     TUBAL LIGATION         Social History     Tobacco Use     Smoking status: Former Smoker     Packs/day: 1.00     Years: 30.00     Pack years: 30.00     Types: Cigarettes     Last attempt to quit: 2013     Years since quittin.7     Smokeless tobacco: Former User     Tobacco comment:     Substance Use Topics     Alcohol use: Yes     Alcohol/week: 0.0 standard drinks     Comment: 1     Family History   Problem Relation Age of Onset     Diabetes Mother      Hypertension Mother      Hyperlipidemia Mother      Heart Failure Mother      Diabetes Father      Cancer Paternal Grandmother         ?           Reviewed and updated as needed this visit by Provider         Review of Systems   ROS COMP: Constitutional, HEENT, cardiovascular, pulmonary, GI, , musculoskeletal, neuro, skin, endocrine and psych systems are negative, except as otherwise noted.      Objective    /85   Pulse 75   Temp 98  F (36.7  C) (Oral)   Resp 24   Ht 1.676 m (5' 6\")   Wt 131.1 kg (289 lb)   LMP  (LMP Unknown)   SpO2 97%   BMI 46.65 kg/m    Body mass index is 46.65 kg/m .  Physical Exam   GENERAL: healthy, alert and no distress  NECK: no adenopathy, no asymmetry, masses, or scars and thyroid normal to palpation  RESP: lungs clear to auscultation - no rales, rhonchi or wheezes  CV: regular rate and rhythm, normal S1 S2, no S3 or S4, no murmur, click or rub, no peripheral edema and peripheral pulses strong  ABDOMEN: soft, nontender, no hepatosplenomegaly, no masses and bowel sounds normal  MS: no gross musculoskeletal defects noted, no edema    Diagnostic Test Results:  Labs reviewed in Epic        Assessment & Plan     1. Type 2 diabetes mellitus with hyperglycemia, with long-term current use of insulin (H)  Transient hyperglycemia due to recent left shoulder steroidal injection. May increase insuline glargine to 70U. Sliding scale insulin added " "for short term until sugar readings are back to baseline. Patient will let us know if sugars continue to be elevated and may need adjustments on the sliding scale insulin.  - insulin aspart (NOVOLOG FLEXPEN) 100 UNIT/ML pen; Per sliding scale before meals and at bedtime. 100-150 1U, 151-200 2U, 201-250 4U, 251-300 6U, 301-350, 8U, 351-400 10U, >400 12U  Dispense: 3 mL; Refill: 3  - insulin pen needle (ULTICARE MICRO) 32G X 4 MM miscellaneous; Use 4 pen needles daily or as directed.  Dispense: 100 each; Refill: 3     BMI:   Estimated body mass index is 46.65 kg/m  as calculated from the following:    Height as of this encounter: 1.676 m (5' 6\").    Weight as of this encounter: 131.1 kg (289 lb).           See Patient Instructions    Return in about 3 months (around 1/3/2020).    Prabhu Robbins MD, MD  [facility]      "

## 2019-10-03 NOTE — PATIENT INSTRUCTIONS
At [facility], we strive to deliver an exceptional experience to you, every time we see you.  If you receive a survey in the mail, please send us back your thoughts. We really do value your feedback.    Based on your medical history, these are the current health maintenance/preventive care services that you are due for (some may have been done at this visit.)  Health Maintenance Due   Topic Date Due     URINE DRUG SCREEN  1970     HF ACTION PLAN  02/17/2019     DIABETIC FOOT EXAM  05/02/2019     ASTHMA ACTION PLAN  05/02/2019         Suggested websites for health information:  Www.[email or web address].[email or web address] : Up to date and easily searchable information on multiple topics.  Www.[employer] [facility] [employer].gov : medication info, interactive tutorials, watch real surgeries online  Www.familydoctor.org : good info from the Academy of Family Physicians  Www.cdc.gov : public health info, travel advisories, epidemics (H1N1)  Www.aap.org : children's health info, normal development, vaccinations  Www.health.[facility].[place] : [place] dept of health, public health issues in [place], N1N1    Your care team:                            Family Medicine Internal Medicine   MD Fran Gabriel MD Shantel Branch-Fleming, MD Katya Georgiev PA-C Nam Ho, MD Pediatrics   ETIENNE Dewitt, MD Latrice Siegel CNP, MD Deborah Mielke, MD Kim Thein, APRN CNP      Clinic hours: Monday - Thursday 7 am-7 pm; Fridays 7 am-5 pm.   Urgent care: Monday - Friday 11 am-9 pm; Saturday and Sunday 9 am-5 pm.  Pharmacy : Monday -Thursday 8 am-8 pm; Friday 8 am-6 pm; Saturday and Sunday 9 am-5 pm.     Clinic: [phone]   Pharmacy: [phone]

## 2019-10-17 ENCOUNTER — THERAPY VISIT (OUTPATIENT)
Dept: PHYSICAL THERAPY | Facility: CLINIC | Age: 49
End: 2019-10-17
Payer: OTHER MISCELLANEOUS

## 2019-10-17 DIAGNOSIS — M75.02 ADHESIVE CAPSULITIS OF LEFT SHOULDER: ICD-10-CM

## 2019-10-17 DIAGNOSIS — S46.219A BICEPS TENDON TEAR: ICD-10-CM

## 2019-10-17 PROCEDURE — 97161 PT EVAL LOW COMPLEX 20 MIN: CPT | Mod: GP | Performed by: PHYSICAL THERAPIST

## 2019-10-17 PROCEDURE — 97110 THERAPEUTIC EXERCISES: CPT | Mod: GP | Performed by: PHYSICAL THERAPIST

## 2019-10-17 NOTE — PROGRESS NOTES
[facility] Initial Evaluation  Subjective:  The history is provided by the patient.   Bettina Montes being seen for L shoulder.   Problem began 2/12/2019 (initial injury). Where condition occurred: at work.Problem occurred: lifting at work  and reported as 4/10 on pain scale. General health as reported by patient is fair. Pertinent medical history includes:  Asthma, diabetes, heart problems, high blood pressure, menopausal, numbness/tingling and overweight.   Other medical allergies details: none.  Surgeries include:  Orthopedic surgery. Other surgery history details: cervical fusion.  Current medications:  High blood pressure medication, cardiac medication, muscle relaxants and pain medication.   Primary job tasks include:  Lifting/carrying, repetitive tasks, pushing/pulling and prolonged standing.  Pain is described as aching (spasms) and is constant. Pain is worse in the P.M.. Since onset symptoms are gradually improving. Special tests:  MRI. Previous treatment includes physical therapy. There was mild (for the shoulder piece) improvement following previous treatment.   Patient is HHA. Restrictions include:  Working in normal job with restrictions.    Barriers include:  None as reported by patient.  Red flags:  None as reported by patient.  Type of problem:  Left shoulder   Condition occurred with:  Lifting. This is a chronic condition   Problem details: Pt notes that she originally injured her L shoulder at work with lifting a client/patient. She had also injured her neck and did require a cervical fusion. The neck is fine now except for stiffness. She consulted with the orthopedist for the shoulder on 9/19/19 and had a cortisone injection on 10/1/19. The MRI that was done did show a biceps tear and small RC tears..   Patient reports pain:  Anterior and upper arm. Radiates to: upper trap. Associated symptoms:  Tingling, loss of motion/stiffness, loss of strength, painful arc, numbness and  edema (tingling and numbness still present in thumb, index and middle fingertips.). Symptoms are exacerbated by carrying, lifting, certain positions, lying on extremity, using arm at shoulder level, using arm behind back, using arm overhead and other (pushing and pulling, using steering wheel) and relieved by rest, ice, muscle relaxants and analgesics.                      Objective:  Standing Alignment:    Cervical/Thoracic:  Forward head  Shoulder/UE:  Rounded shoulders (keeps L arm held tightly to her body which causes some hiking at shoulder)                                       Shoulder Evaluation:  ROM:  AROM:    Flexion:  Left:  96 deg      Extension: Left: 25 deg  Abduction:  Left: 77 deg         External Rotation:  Left:  54 deg                Extension/Internal Rotation:  Left:  Unable        PROM:    Flexion:  Left:  130 deg          Abduction:  Left:  90 deg w/pain++        Internal Rotation:  Left:  62 deg      External Rotation:  Left:  50 deg w/arm  abd'd 30 deg                        Strength:  not assessed (deferred secondary to being in so much pain just from ROM testing)                        Special Tests:  not assessed      Palpation:    Left shoulder tenderness present at:  Biceps; Supraspinatus; Infraspinatus; Levator; Upper Trap and Bicipital Groove  Left shoulder tenderness not present at: Acrimioclavicular or Deltoid    Mobility Tests:  not assessed                                                 General     ROS    Assessment/Plan:    Patient is a 49 year old female with left side shoulder complaints.    Patient has the following significant findings with corresponding treatment plan.                Diagnosis 1:  L biceps tear w/adhesive capsulitis  Pain -  hot/cold therapy, self management, education and home program  Decreased ROM/flexibility - manual therapy and therapeutic exercise  Decreased strength - therapeutic exercise and therapeutic activities  Edema - self management/home  program  Impaired muscle performance - neuro re-education  Decreased function - therapeutic activities  Impaired posture - neuro re-education and therapeutic activities    Therapy Evaluation Codes:   1) History comprised of:   Personal factors that impact the plan of care:      Past/current experiences and Time since onset of symptoms.    Comorbidity factors that impact the plan of care are:      Asthma, Diabetes, Heart problems, High blood pressure, Menopausal, Numbness/tingling, Overweight and Weakness.     Medications impacting care: Cardiac, High blood pressure, Muscle relaxant and Pain.  2) Examination of Body Systems comprised of:   Body structures and functions that impact the plan of care:      Shoulder.   Activity limitations that impact the plan of care are:      Bathing, Driving, Dressing, Lifting, Laying down and reaching.  3) Clinical presentation characteristics are:   Stable/Uncomplicated.  4) Decision-Making    Low complexity using standardized patient assessment instrument and/or measureable assessment of functional outcome.  Cumulative Therapy Evaluation is: Low complexity.    Previous and current functional limitations:  (See Goal Flow Sheet for this information)    Short term and Long term goals: (See Goal Flow Sheet for this information)     Communication ability:  Patient appears to be able to clearly communicate and understand verbal and written communication and follow directions correctly.  Treatment Explanation - The following has been discussed with the patient:   RX ordered/plan of care  Anticipated outcomes  Possible risks and side effects  This patient would benefit from PT intervention to resume normal activities.   Rehab potential is good.    Frequency:  1-2 X week, once daily  Duration:  for 8 visits  Discharge Plan:  Achieve all LTG.  Independent in home treatment program.  Reach maximal therapeutic benefit.    Please refer to the daily flowsheet for treatment today, total treatment  time and time spent performing 1:1 timed codes.

## 2019-10-25 ENCOUNTER — ANCILLARY PROCEDURE (OUTPATIENT)
Dept: CARDIOLOGY | Facility: CLINIC | Age: 49
End: 2019-10-25
Attending: INTERNAL MEDICINE
Payer: COMMERCIAL

## 2019-10-25 DIAGNOSIS — I50.22 CHRONIC SYSTOLIC CONGESTIVE HEART FAILURE (H): ICD-10-CM

## 2019-10-25 PROCEDURE — 93306 TTE W/DOPPLER COMPLETE: CPT | Performed by: INTERNAL MEDICINE

## 2019-10-25 RX ADMIN — Medication 4 ML: at 16:45

## 2019-10-30 ENCOUNTER — THERAPY VISIT (OUTPATIENT)
Dept: PHYSICAL THERAPY | Facility: CLINIC | Age: 49
End: 2019-10-30
Payer: OTHER MISCELLANEOUS

## 2019-10-30 DIAGNOSIS — M75.02 ADHESIVE CAPSULITIS OF LEFT SHOULDER: ICD-10-CM

## 2019-10-30 DIAGNOSIS — S46.219A BICEPS TENDON TEAR: ICD-10-CM

## 2019-10-30 PROCEDURE — 97110 THERAPEUTIC EXERCISES: CPT | Mod: GP | Performed by: PHYSICAL THERAPIST

## 2019-10-30 PROCEDURE — 97140 MANUAL THERAPY 1/> REGIONS: CPT | Mod: GP | Performed by: PHYSICAL THERAPIST

## 2019-10-31 DIAGNOSIS — I50.22 CHRONIC SYSTOLIC HEART FAILURE (H): Primary | ICD-10-CM

## 2019-11-04 ENCOUNTER — PATIENT OUTREACH (OUTPATIENT)
Dept: CARDIOLOGY | Facility: CLINIC | Age: 49
End: 2019-11-04

## 2019-11-04 NOTE — PROGRESS NOTES
Called Bettina to go get labs prior to [facility] appt on Thursday. She is agreeable and will get labs Wednesday 11/6 at [facility]. Appointment made for her.   Yasemin Blount RN

## 2019-11-06 DIAGNOSIS — I50.22 CHRONIC SYSTOLIC HEART FAILURE (H): ICD-10-CM

## 2019-11-06 LAB
ANION GAP SERPL CALCULATED.3IONS-SCNC: 7 MMOL/L (ref 3–14)
BUN SERPL-MCNC: 9 MG/DL (ref 7–30)
CALCIUM SERPL-MCNC: 9.3 MG/DL (ref 8.5–10.1)
CHLORIDE SERPL-SCNC: 103 MMOL/L (ref 94–109)
CO2 SERPL-SCNC: 28 MMOL/L (ref 20–32)
CREAT SERPL-MCNC: 0.92 MG/DL (ref 0.52–1.04)
GFR SERPL CREATININE-BSD FRML MDRD: 72 ML/MIN/{1.73_M2}
GLUCOSE SERPL-MCNC: 133 MG/DL (ref 70–99)
POTASSIUM SERPL-SCNC: 4 MMOL/L (ref 3.4–5.3)
SODIUM SERPL-SCNC: 138 MMOL/L (ref 133–144)

## 2019-11-06 PROCEDURE — 80048 BASIC METABOLIC PNL TOTAL CA: CPT | Performed by: NURSE PRACTITIONER

## 2019-11-06 PROCEDURE — 36415 COLL VENOUS BLD VENIPUNCTURE: CPT | Performed by: NURSE PRACTITIONER

## 2019-11-07 ENCOUNTER — OFFICE VISIT (OUTPATIENT)
Dept: CARDIOLOGY | Facility: CLINIC | Age: 49
End: 2019-11-07
Payer: COMMERCIAL

## 2019-11-07 VITALS
BODY MASS INDEX: 47.29 KG/M2 | WEIGHT: 293 LBS | HEART RATE: 75 BPM | DIASTOLIC BLOOD PRESSURE: 89 MMHG | SYSTOLIC BLOOD PRESSURE: 162 MMHG | OXYGEN SATURATION: 98 %

## 2019-11-07 DIAGNOSIS — E66.01 OBESITY, CLASS III, BMI 40-49.9 (MORBID OBESITY) (H): ICD-10-CM

## 2019-11-07 DIAGNOSIS — G47.33 OSA (OBSTRUCTIVE SLEEP APNEA): ICD-10-CM

## 2019-11-07 DIAGNOSIS — I50.23 ACUTE ON CHRONIC SYSTOLIC HEART FAILURE (H): Primary | ICD-10-CM

## 2019-11-07 DIAGNOSIS — I10 ESSENTIAL HYPERTENSION WITH GOAL BLOOD PRESSURE LESS THAN 140/90: ICD-10-CM

## 2019-11-07 PROCEDURE — 99214 OFFICE O/P EST MOD 30 MIN: CPT | Performed by: NURSE PRACTITIONER

## 2019-11-07 RX ORDER — METOLAZONE 2.5 MG/1
2.5 TABLET ORAL ONCE
Qty: 1 TABLET | Refills: 0 | Status: SHIPPED | OUTPATIENT
Start: 2019-11-07 | End: 2019-11-21

## 2019-11-07 RX ORDER — POTASSIUM CHLORIDE 750 MG/1
TABLET, EXTENDED RELEASE ORAL
Qty: 240 TABLET | Refills: 11 | Status: SHIPPED | OUTPATIENT
Start: 2019-11-07 | End: 2019-11-21

## 2019-11-07 RX ORDER — POTASSIUM CHLORIDE 750 MG/1
40 TABLET, EXTENDED RELEASE ORAL 2 TIMES DAILY
Qty: 240 TABLET | Refills: 11 | Status: SHIPPED | OUTPATIENT
Start: 2019-11-07 | End: 2019-11-07

## 2019-11-07 RX ORDER — TORSEMIDE 20 MG/1
40 TABLET ORAL 2 TIMES DAILY
Qty: 120 TABLET | Refills: 11 | Status: SHIPPED | OUTPATIENT
Start: 2019-11-07 | End: 2019-11-21

## 2019-11-07 NOTE — PROGRESS NOTES
HPI  Bettina Mayers is a 49 year old female with a past medical history of HFrEF (30-35%) secondary to NICM (felt to be idiopathic), HTN, obesity, DM II, cervical spine fusion, and hyperlipidemia who presents for routine follow-up. She was last seen by Dr. Chinchilla in September.  At that visit, her carvedilol was increased to 25 mg twice daily.    Bettina Montes is not feeling well.  Over the past 5 days she has been more short of breath and fatigue she ran out of her Entresto approximately 5 days ago.  She is up about 10 pounds and weighs 293 pounds currently.  She endorses nightly PND and orthopnea.  She denies any lower extremity edema.  Her appetite is been poor and she complains of early satiety.  She had increased her Lasix to 40 mg twice daily, but did not feel that made any difference to her heart failure symptoms.  She denies any chest pain, palpitations, lightheadedness, dizziness, or near syncopal episodes.  Her blood pressure is high today.  At home, her blood pressure has been controlled per her report.  She does not feel that her home Lasix is working as well as it had previously.    PMH  Past Medical History:   Diagnosis Date     Abnormal Pap smear of cervix 09/10/2019    See problem list     Adrenal gland anomaly      CHF (congestive heart failure) (H)      Fatty liver      Gastroesophageal reflux disease      Hyperlipidemia      Hypertension      Mild persistent asthma      NICM (nonischemic cardiomyopathy) (H)      Obesity      WILLARD (obstructive sleep apnea) 1/9/2015     Type 2 diabetes mellitus (H)        Past Surgical History:   Procedure Laterality Date     COLONOSCOPY       DISCECTOMY, FUSION CERVICAL ANTERIOR TWO LEVELS, COMBINED N/A 4/5/2019    Procedure: C4-6 anterior cervical discectomy and fusion;  Surgeon: Hollis Hurtado MD;  Location: [facility] OR     TUBAL LIGATION         Family History   Problem Relation Age of Onset     Diabetes Mother      Hypertension Mother       Hyperlipidemia Mother      Heart Failure Mother      Diabetes Father      Cancer Paternal Grandmother         ?       Social History     Socioeconomic History     Marital status: Single     Spouse name: None     Number of children: 1     Years of education: None     Highest education level: None   Occupational History     Occupation: CNA     Employer: OTHER   Social Needs     Financial resource strain: None     Food insecurity:     Worry: None     Inability: None     Transportation needs:     Medical: None     Non-medical: None   Tobacco Use     Smoking status: Former Smoker     Packs/day: 1.00     Years: 30.00     Pack years: 30.00     Types: Cigarettes     Last attempt to quit: 2013     Years since quittin.8     Smokeless tobacco: Former User     Tobacco comment:     Substance and Sexual Activity     Alcohol use: Yes     Alcohol/week: 0.0 standard drinks     Comment: 1     Drug use: No     Sexual activity: Yes     Partners: Male     Birth control/protection: Surgical   Lifestyle     Physical activity:     Days per week: None     Minutes per session: None     Stress: None   Relationships     Social connections:     Talks on phone: None     Gets together: None     Attends [employer] service: None     Active member of club or organization: None     Attends meetings of clubs or organizations: None     Relationship status: None     Intimate partner violence:     Fear of current or ex partner: None     Emotionally abused: None     Physically abused: None     Forced sexual activity: None   Other Topics Concern     Parent/sibling w/ CABG, MI or angioplasty before 65F 55M? No   Social History Narrative     None       ALLERGIES  Allergies   Allergen Reactions     Amlodipine Swelling     Edema on 5mg throat     Lisinopril      Swelling in throat, face  angioedema     Naprosyn [Naproxen]      Swelling, diff breathing       MEDICATIONS acetaminophen (TYLENOL) 500 MG tablet, Take 500-1,000 mg by mouth every 6 hours as  needed for mild pain  carvedilol (COREG) 25 MG tablet, Take 1 tablet (25 mg) by mouth 2 times daily (with meals)  cholecalciferol ( ULTRA STRENGTH) 2000 units CAPS, Take 2,000 Units by mouth  Cholecalciferol (VITAMIN D3 PO), Take 2,000 Units by mouth daily  fluticasone-salmeterol (WIXELA INHUB) 250-50 MCG/DOSE inhaler, Inhale 1 puff into the lungs  furosemide (LASIX) 40 MG tablet, Take 1 tablet (40 mg) by mouth 2 times daily  hydrALAZINE (APRESOLINE) 100 MG tablet, Take 1 tablet (100 mg) by mouth 2 times daily  insulin aspart (NOVOLOG FLEXPEN) 100 UNIT/ML pen, Per sliding scale before meals and at bedtime. 100-150 1U, 151-200 2U, 201-250 4U, 251-300 6U, 301-350, 8U, 351-400 10U, >400 12U  levalbuterol (XOPENEX HFA) 45 MCG/ACT Inhaler, Inhale 2 puffs into the lungs every 4 hours as needed for shortness of breath / dyspnea or wheezing  lovastatin (MEVACOR) 20 MG tablet, TAKE 1 TABLET BY MOUTH EVERY DAY AT BEDTIME  magnesium oxide (MAG-OX) 400 (241.3 Mg) MG tablet, Take 1 tablet (400 mg) by mouth daily  metFORMIN (GLUCOPHAGE) 500 MG tablet, Take 2 tablets (1,000 mg) by mouth 2 times daily (with meals)  montelukast (SINGULAIR) 10 MG tablet, Take 10 mg by mouth  potassium chloride ER (K-DUR/KLOR-CON M) 10 MEQ CR tablet, Take 2 tablets (20 mEq) by mouth 2 times daily  ranitidine (ZANTAC) 150 MG tablet, Take 150 mg by mouth  ranitidine (ZANTAC) 150 MG/10ML syrup, Take 10 mLs (150 mg) by mouth 2 times daily  sacubitril-valsartan (ENTRESTO)  MG per tablet, Take 1 tablet by mouth 2 times daily  spironolactone (ALDACTONE) 50 MG tablet, Take 1 tablet (50 mg) by mouth daily  tiZANidine (ZANAFLEX) 2 MG tablet, TAKE 1-2 CAPSULES (2-4 MG) BY MOUTH 3 TIMES DAILY AS NEEDED FOR MUSCLE SPASMS  WIXELA INHUB 250-50 MCG/DOSE inhaler, Inhale 1 puff into the lungs every 12 hours  BETA BLOCKER NOT PRESCRIBED, INTENTIONAL,, Beta Blocker not prescribed intentionally due to Severe Asthma  blood glucose (NO BRAND SPECIFIED) test  strip, Use to test blood sugar 3-4 times daily or as directed.  insulin glargine (BASAGLAR KWIKPEN) 100 UNIT/ML pen, Inject 65 Units Subcutaneous At Bedtime  insulin pen needle (32G X 4 MM) 32G X 4 MM miscellaneous, Use 1 pen needles daily or as directed.  insulin pen needle (ULTICARE MICRO) 32G X 4 MM miscellaneous, Use 4 pen needles daily or as directed.  methylPREDNISolone (MEDROL DOSEPAK) 4 MG tablet therapy pack, Follow Package Directions (Patient not taking: Reported on 11/7/2019)  montelukast (SINGULAIR) 10 MG tablet, TAKE 1 TABLET BY MOUTH EVERYDAY AT BEDTIME    sodium chloride (PF) 0.9% PF flush 10 mL      ROS:  Constitutional: No fever, chills, or sweats.  + Weight gain and fatigue.  ENT: No visual disturbance, ear ache, epistaxis, sore throat.    Respiratory: No cough, hemoptysis.   Cardiovascular: As per HPI.   GI: No nausea, vomiting, hematemesis, melena, or hematochezia.   : No urinary frequency, dysuria, or hematuria.   Integument: Negative for rashes or ecchymosis.   Psychiatric: Negative for mood changes, anxiety, or depression.   Neuro: Negative for numbness or tingling.   Endocrinology: Blood sugar controlled.   Musculoskeletal:  +gait instability. No gout or muscle weakness.     EXAM  BP (!) 175/99 (BP Location: Left arm, Patient Position: Sitting, Cuff Size: Adult Regular)   Pulse 75   Wt 132.9 kg (293 lb)   LMP  (LMP Unknown)   SpO2 98%   BMI 47.29 kg/m    Home weight typically is 284 lbs.    Vitals:    11/07/19 1605   Weight: 132.9 kg (293 lb)     General: appears comfortable, alert and articulate  Head: normocephalic, atraumatic  Eyes: anicteric sclera, EOMI  Neck: Supple, no adenopathy  Orophyarynx: moist mucosa, no cyanosis  Heart: regular, S1/S2, no murmur, gallop, rub, estimated JVP level of the mandible at 45 degree angle  Lungs: Respirations even and unlabored, lungs clear, no rales or wheezing.  Lungs sounds decreased in the bases.  Abdomen:  distended and firmer, non-tender,  bowel sounds present, no hepatomegaly  Extremities: no clubbing, cyanosis or edema  Neurological: normal speech and affect, no gross motor deficits  Skin:  Warm and dry.      LABS  Last Comprehensive Metabolic Panel:  Sodium   Date Value Ref Range Status   11/06/2019 138 133 - 144 mmol/L Final     Potassium   Date Value Ref Range Status   11/06/2019 4.0 3.4 - 5.3 mmol/L Final     Chloride   Date Value Ref Range Status   11/06/2019 103 94 - 109 mmol/L Final     Carbon Dioxide   Date Value Ref Range Status   11/06/2019 28 20 - 32 mmol/L Final     Anion Gap   Date Value Ref Range Status   11/06/2019 7 3 - 14 mmol/L Final     Glucose   Date Value Ref Range Status   11/06/2019 133 (H) 70 - 99 mg/dL Final     Comment:     Non Fasting     Urea Nitrogen   Date Value Ref Range Status   11/06/2019 9 7 - 30 mg/dL Final     Creatinine   Date Value Ref Range Status   11/06/2019 0.92 0.52 - 1.04 mg/dL Final     GFR Estimate   Date Value Ref Range Status   11/06/2019 72 >60 mL/min/[1.73_m2] Final     Comment:     Non  GFR Calc  Starting 12/18/2018, serum creatinine based estimated GFR (eGFR) will be   calculated using the Chronic Kidney Disease Epidemiology Collaboration   (CKD-EPI) equation.       Calcium   Date Value Ref Range Status   11/06/2019 9.3 8.5 - 10.1 mg/dL Final       MOST RECENT ECHOCARDIOGRAM 10/25/19:  Interpretation Summary  Technically difficult study.  Left ventricular size is normal. Mildly (EF 40-45%, traced 45%) reduced left ventricular function is present.  Right ventricular function, chamber size, wall motion, and thickness are normal. This study was compared with the study from 3/21/18: The left ventricular function has improved    ASSESSMENT AND PLAN  Bettina Montes is a 49 year old female with *** who appears hypervolemic today.  I discontinued her Lasix and started torsemide 40 mg twice daily.  I have also given her 2.5 mg of metolazone to take tonight with an extra 40 mEq of potassium.   I increased her potassium to 40 mEq in the morning 20 mEq in the afternoon.  We will work on getting her Entresto at a lower co-pay and give her a coupon for that.  I instructed her to contact her office if she runs out of her medications or has any problems as I feel that some of her issues today are due to the gap in her Entresto therapy.    She will return for labs in 1 week to reassess her potassium and kidney function.  She will return to the [facility] in 2 weeks time.  She was instructed to contact me in the interim if her symptoms worsen or do not improve.    1. Chronic systolic heart failure/HFrEF (EF ***) secondary to {UMARDETIOLOGY:216744192} cardiomyopathy  NYHA Symptom Class IIIB  Stage C  Primary Cardiologist: ***; Last seen ***  ACE-I/ARB/ARNi: Restart Entresto  BB yes, Carvedilol 25 mg twice daily  Aldosterone antagonist yes, Spironolactone 50 mg daily.   SCD prophylaxis {SCD:289998}  %BiV pacing: *** or N/A  Fluid status Hypervolemic  Cardiac Rehab: {Cardiac Rehab Referral:473421}  Sleep Apnea Evaluation: {Apnea Referral:892087}  Remote PA Pressure Monitoring (CardioMems) {Remote PA Pressure Monitoring (CardioMems):013880}  Remote monitoring: {Cardiac Remote Monitorin}  Antiplatelet: none.   Anticoagulation: None.    2.  Obesity: BMI ***.      3.  HTN:  Uncontrolled at 162/89.  Restart Entresto as outlined above.  Continue to monitor BP readings at home.  Re-evaluate in one week after aggressive diuresis. If BP is still uncontrolled and volume status has improved, would increase Coreg to 50 mg twice daily.    4.  Follow-up:  BMP in one week.  [facility] in two weeks.       Angle GU, CNP  [facility]

## 2019-11-07 NOTE — PATIENT INSTRUCTIONS
Take your medicines every day, as directed    Changes made today:  o STOP Lasix  o Start Torsemide 40 mg twice daily.   o Take 2.5 mg Metolazone with extra 40 mEq Potassium tonight.   o Increase Potassium at home to 40 mEq in the morning, 20 mEq in the evening.   o We will contact pharmacy to work on restarting Entresto   Monitor Your Weight and Symptoms    Contact us if you:      Gain 2 pounds in one day or 5 pounds in one week    Feel more short of breath    Notice more leg swelling    Feel lightheadeded   Change your lifestyle    Limit Salt or Sodium:    2000 mg  Limit Fluids:    2000 mL or approximately 64 ounces  Eat a Heart Healthy Diet    Low in saturated fats  Stay Active:    Aim to move at least 150 minutes every  week         To Contact us    During Business Hours:  [phone], option # 1 (University)  Then option # 4 (medical questions)     After hours, weekends or holidays:   [phone], Option #4  Ask to speak to the On-Call Cardiologist. Inform them you are a CORE/heart failure patient at the [facility].     Use [employer] allows you to communicate directly with your heart team through secure messaging.    Xiaoi Robert can be accessed any time on your phone, computer, or tablet.    If you need assistance, we'd be happy to help!         Keep your Heart Appointments:    Labs in one week to check kidney function and potassium   Follow up in [facility] in 2 weeks.

## 2019-11-07 NOTE — NURSING NOTE
"Chief Complaint   Patient presents with     Heart Failure      chronic systolic heart failure presents for follow up with labs prior. Per patient sob. ,and  wt. increase concern.       Initial BP (!) 175/99 (BP Location: Left arm, Patient Position: Sitting, Cuff Size: Adult Regular)   Pulse 75   Wt 132.9 kg (293 lb)   LMP  (LMP Unknown)   SpO2 98%   BMI 47.29 kg/m   Estimated body mass index is 47.29 kg/m  as calculated from the following:    Height as of 10/3/19: 1.676 m (5' 6\").    Weight as of this encounter: 132.9 kg (293 lb)..  BP completed using cuff size: small cuff low arm    Jerome Rhoades L.P.N.    "

## 2019-11-07 NOTE — LETTER
11/7/2019      RE: Bettina Montes  [address] [facility] [place] [address]       Dear Colleague,    Thank you for the opportunity to participate in the care of your patient, Bettina Montes, at the [facility] HEART AT [facility]. Please see a copy of my visit note below.      HPI  Bettina Montes is a 49 year old female with a past medical history of HFrEF (30-35%) secondary to NICM (felt to be idiopathic), HTN, obesity, DM II, cervical spine fusion, and hyperlipidemia who presents for routine follow-up. She was last seen by Dr. Chinchilla in September.  At that visit, her Carvedilol was increased to 25 mg twice daily.    Bettina Montes has been feeling poorly.  Over the past 5 days she has been feeling more shortness of breath and fatigue.  She ran out of her Entresto approximately 5 days ago.  She is up about 10 pounds to 293 pounds.  She endorses nightly PND, cough, and orthopnea.  She denies any lower extremity edema.  Her appetite has been poor and she complains of early satiety.      She had increased her Lasix to 40 mg twice daily, but did not feel that made any difference to her heart failure symptoms.  She does not feel that her home Lasix is working as well as it had previously and that she is urinating less.  She denies any chest pain, palpitations, lightheadedness, dizziness, or near syncopal episodes.     PMH  Past Medical History:   Diagnosis Date     Abnormal Pap smear of cervix 09/10/2019    See problem list     Adrenal gland anomaly      CHF (congestive heart failure) (H)      Fatty liver      Gastroesophageal reflux disease      Hyperlipidemia      Hypertension      Mild persistent asthma      NICM (nonischemic cardiomyopathy) (H)      Obesity      WILLARD (obstructive sleep apnea) 1/9/2015     Type 2 diabetes mellitus (H)        Past Surgical History:   Procedure Laterality Date     COLONOSCOPY       DISCECTOMY, FUSION  CERVICAL ANTERIOR TWO LEVELS, COMBINED N/A 2019    Procedure: C4-6 anterior cervical discectomy and fusion;  Surgeon: Hollis Hurtado MD;  Location: [facility] OR     TUBAL LIGATION         Family History   Problem Relation Age of Onset     Diabetes Mother      Hypertension Mother      Hyperlipidemia Mother      Heart Failure Mother      Diabetes Father      Cancer Paternal Grandmother         ?       Social History     Socioeconomic History     Marital status: Single     Spouse name: None     Number of children: 1     Years of education: None     Highest education level: None   Occupational History     Occupation: CNA     Employer: OTHER   Social Needs     Financial resource strain: None     Food insecurity:     Worry: None     Inability: None     Transportation needs:     Medical: None     Non-medical: None   Tobacco Use     Smoking status: Former Smoker     Packs/day: 1.00     Years: 30.00     Pack years: 30.00     Types: Cigarettes     Last attempt to quit: 2013     Years since quittin.8     Smokeless tobacco: Former User     Tobacco comment:     Substance and Sexual Activity     Alcohol use: Yes     Alcohol/week: 0.0 standard drinks     Comment: 1     Drug use: No     Sexual activity: Yes     Partners: Male     Birth control/protection: Surgical   Lifestyle     Physical activity:     Days per week: None     Minutes per session: None     Stress: None   Relationships     Social connections:     Talks on phone: None     Gets together: None     Attends [employer] service: None     Active member of club or organization: None     Attends meetings of clubs or organizations: None     Relationship status: None     Intimate partner violence:     Fear of current or ex partner: None     Emotionally abused: None     Physically abused: None     Forced sexual activity: None   Other Topics Concern     Parent/sibling w/ CABG, MI or angioplasty before 65F 55M? No   Social History Narrative     None        ALLERGIES  Allergies   Allergen Reactions     Amlodipine Swelling     Edema on 5mg throat     Lisinopril      Swelling in throat, face  angioedema     Naprosyn [Naproxen]      Swelling, diff breathing       MEDICATIONS acetaminophen (TYLENOL) 500 MG tablet, Take 500-1,000 mg by mouth every 6 hours as needed for mild pain  carvedilol (COREG) 25 MG tablet, Take 1 tablet (25 mg) by mouth 2 times daily (with meals)  cholecalciferol ( ULTRA STRENGTH) 2000 units CAPS, Take 2,000 Units by mouth  Cholecalciferol (VITAMIN D3 PO), Take 2,000 Units by mouth daily  fluticasone-salmeterol (WIXELA INHUB) 250-50 MCG/DOSE inhaler, Inhale 1 puff into the lungs  furosemide (LASIX) 40 MG tablet, Take 1 tablet (40 mg) by mouth 2 times daily  hydrALAZINE (APRESOLINE) 100 MG tablet, Take 1 tablet (100 mg) by mouth 2 times daily  insulin aspart (NOVOLOG FLEXPEN) 100 UNIT/ML pen, Per sliding scale before meals and at bedtime. 100-150 1U, 151-200 2U, 201-250 4U, 251-300 6U, 301-350, 8U, 351-400 10U, >400 12U  levalbuterol (XOPENEX HFA) 45 MCG/ACT Inhaler, Inhale 2 puffs into the lungs every 4 hours as needed for shortness of breath / dyspnea or wheezing  lovastatin (MEVACOR) 20 MG tablet, TAKE 1 TABLET BY MOUTH EVERY DAY AT BEDTIME  magnesium oxide (MAG-OX) 400 (241.3 Mg) MG tablet, Take 1 tablet (400 mg) by mouth daily  metFORMIN (GLUCOPHAGE) 500 MG tablet, Take 2 tablets (1,000 mg) by mouth 2 times daily (with meals)  montelukast (SINGULAIR) 10 MG tablet, Take 10 mg by mouth  potassium chloride ER (K-DUR/KLOR-CON M) 10 MEQ CR tablet, Take 2 tablets (20 mEq) by mouth 2 times daily  ranitidine (ZANTAC) 150 MG tablet, Take 150 mg by mouth  ranitidine (ZANTAC) 150 MG/10ML syrup, Take 10 mLs (150 mg) by mouth 2 times daily  sacubitril-valsartan (ENTRESTO)  MG per tablet, Take 1 tablet by mouth 2 times daily  spironolactone (ALDACTONE) 50 MG tablet, Take 1 tablet (50 mg) by mouth daily  tiZANidine (ZANAFLEX) 2 MG tablet, TAKE  1-2 CAPSULES (2-4 MG) BY MOUTH 3 TIMES DAILY AS NEEDED FOR MUSCLE SPASMS  WIXELA INHUB 250-50 MCG/DOSE inhaler, Inhale 1 puff into the lungs every 12 hours  BETA BLOCKER NOT PRESCRIBED, INTENTIONAL,, Beta Blocker not prescribed intentionally due to Severe Asthma  blood glucose (NO BRAND SPECIFIED) test strip, Use to test blood sugar 3-4 times daily or as directed.  insulin glargine (BASAGLAR KWIKPEN) 100 UNIT/ML pen, Inject 65 Units Subcutaneous At Bedtime  insulin pen needle (32G X 4 MM) 32G X 4 MM miscellaneous, Use 1 pen needles daily or as directed.  insulin pen needle (ULTICARE MICRO) 32G X 4 MM miscellaneous, Use 4 pen needles daily or as directed.  methylPREDNISolone (MEDROL DOSEPAK) 4 MG tablet therapy pack, Follow Package Directions (Patient not taking: Reported on 11/7/2019)  montelukast (SINGULAIR) 10 MG tablet, TAKE 1 TABLET BY MOUTH EVERYDAY AT BEDTIME    sodium chloride (PF) 0.9% PF flush 10 mL      ROS:  Constitutional: No fever, chills, or sweats.  + Weight gain and fatigue.  ENT: No visual disturbance, ear ache, epistaxis, sore throat.    Respiratory: +cough, no hemoptysis.   Cardiovascular: As per HPI.   GI: No nausea, vomiting, hematemesis, melena, or hematochezia.   : See HPI  Integument: Negative for rashes or ecchymosis.   Psychiatric: Negative for mood changes, anxiety, or depression.   Neuro: Negative for numbness or tingling.   Endocrinology: Blood sugar controlled.   Musculoskeletal:  +gait instability. No gout or muscle weakness.     EXAM  BP (!) 175/99 (BP Location: Left arm, Patient Position: Sitting, Cuff Size: Adult Regular)   Pulse 75   Wt 132.9 kg (293 lb)   LMP  (LMP Unknown)   SpO2 98%   BMI 47.29 kg/m     Home weight typically is 284 lbs.    Vitals:    11/07/19 1605   Weight: 132.9 kg (293 lb)     General: appears comfortable, alert and articulate  Head: normocephalic, atraumatic  Eyes: anicteric sclera, EOMI  Neck: Supple, no adenopathy  Orophyarynx: moist mucosa, no  cyanosis  Heart: regular, S1/S2, no murmur, gallop, rub, estimated JVP level of the mandible at a 45 degree angle  Lungs: Respirations even and unlabored, lungs clear, no rales or wheezing.  Lungs sounds decreased bilaterally in the bases.  Abdomen:  distended and firmer, non-tender, bowel sounds present, no hepatomegaly  Extremities: no clubbing, cyanosis, or edema  Neurological: normal speech and affect, no gross motor deficits  Skin:  Warm and dry.      LABS  Last Comprehensive Metabolic Panel:  Sodium   Date Value Ref Range Status   11/06/2019 138 133 - 144 mmol/L Final     Potassium   Date Value Ref Range Status   11/06/2019 4.0 3.4 - 5.3 mmol/L Final     Chloride   Date Value Ref Range Status   11/06/2019 103 94 - 109 mmol/L Final     Carbon Dioxide   Date Value Ref Range Status   11/06/2019 28 20 - 32 mmol/L Final     Anion Gap   Date Value Ref Range Status   11/06/2019 7 3 - 14 mmol/L Final     Glucose   Date Value Ref Range Status   11/06/2019 133 (H) 70 - 99 mg/dL Final     Comment:     Non Fasting     Urea Nitrogen   Date Value Ref Range Status   11/06/2019 9 7 - 30 mg/dL Final     Creatinine   Date Value Ref Range Status   11/06/2019 0.92 0.52 - 1.04 mg/dL Final     GFR Estimate   Date Value Ref Range Status   11/06/2019 72 >60 mL/min/[1.73_m2] Final     Comment:     Non  GFR Calc  Starting 12/18/2018, serum creatinine based estimated GFR (eGFR) will be   calculated using the Chronic Kidney Disease Epidemiology Collaboration   (CKD-EPI) equation.       Calcium   Date Value Ref Range Status   11/06/2019 9.3 8.5 - 10.1 mg/dL Final       MOST RECENT ECHOCARDIOGRAM 10/25/19:  Interpretation Summary  Technically difficult study.  Left ventricular size is normal. Mildly (EF 40-45%, traced 45%) reduced left ventricular function is present.  Right ventricular function, chamber size, wall motion, and thickness are normal. This study was compared with the study from 3/21/18: The left ventricular  function has improved    ASSESSMENT AND PLAN  Bettina Montes is a 49 year old female with NICM who appears hypervolemic today.  I discontinued her Lasix and started Torsemide 40 mg twice daily.  I have also given her 2.5 mg of metolazone to take tonight with an extra 40 mEq of potassium.  I increased her potassium to 40 mEq in the morning 20 mEq in the afternoon for her baseline dose.      We will work on getting her Entresto at a lower co-pay and give her a coupon for that.  I instructed her to contact our office if she runs out of her medications or has any problems as I feel that some of her issues today are due to the gap in her Entresto therapy.    She will return for labs in 1 week to reassess her potassium and kidney function.  She will return to the [facility] in 2 weeks time.  She was instructed to contact me in the interim if her symptoms worsen or do not improve.    1. Chronic systolic heart failure/HFrEF (EF 40-45%) secondary to nonischemic cardiomyopathy  NYHA Symptom Class IIIB  Stage C  Primary Cardiologist: Dr Chinchilla; Last seen 9/20/19  ACE-I/ARB/ARNi: Restart Entresto.  BB yes, Carvedilol 25 mg twice daily  Aldosterone antagonist yes, Spironolactone 50 mg daily.   SCD prophylaxis Decision deferred during medication uptitration  Fluid status Hypervolemic  Cardiac Rehab: Completed  Sleep Apnea Evaluation: Documented sleep apnea.    Remote PA Pressure Monitoring (CardioMems) Deferred while medical therapy is optimized  Remote monitoring: Mychart active  Antiplatelet: none.   Anticoagulation: None.    2.  Obesity: BMI 47.29.  She is working on weight loss.  Feel part of this is fluid retention.  Continue to reassess at subsequent visits.     3.  HTN:  Uncontrolled at 162/89.  Restart Entresto as outlined above.  Continue to monitor BP readings at home.  Re-evaluate in one week after aggressive diuresis. If BP is still uncontrolled and volume status has improved, would increase Coreg to 50 mg twice  daily.    4.  Follow-up:  BMP in one week.  [facility] in two weeks.       Angle GU, SHAZIA  [facility]

## 2019-11-07 NOTE — NURSING NOTE
Diet: Patient instructed regarding a heart healthy diet, including discussion of reduced fat and sodium intake. Patient demonstrated understanding of this information and agreed to call with further questions or concerns.  Labs: Patient was given results of the laboratory testing obtained today. Patient was instructed to return for the next laboratory testing in 1 week . Patient demonstrated understanding of this information and agreed to call with further questions or concerns.   New Medication: Patient was educated regarding newly prescribed medication, including discussion of  the indication, administration, side effects, and when to report to MD or RN. Patient demonstrated understanding of this information and agreed to call with further questions or concerns.  Return Appointment: Patient given instructions regarding scheduling next clinic visit. Patient demonstrated understanding of this information and agreed to call with further questions or concerns.  Medication Change: Patient was educated regarding prescribed medication change, including discussion of the indication, administration, side effects, and when to report to MD or RN. Patient demonstrated understanding of this information and agreed to call with further questions or concerns.    I called pharmacy to clarify Entresto co-pay card as patient has run out of Entresto and having issues with refills. They state the previous co-pay card was a one time only card and that she needs a new card going forward. I gave her a $10 co-pay card and instructed her to activate it before going to pharmacy. Will call her tomorrow to make sure she was able to pick it up.       Patient stated she understood all health information given and agreed to call with further questions or concerns.     Yasemin Blount RN

## 2019-11-08 ENCOUNTER — THERAPY VISIT (OUTPATIENT)
Dept: PHYSICAL THERAPY | Facility: CLINIC | Age: 49
End: 2019-11-08
Payer: OTHER MISCELLANEOUS

## 2019-11-08 ENCOUNTER — PATIENT OUTREACH (OUTPATIENT)
Dept: CARDIOLOGY | Facility: CLINIC | Age: 49
End: 2019-11-08

## 2019-11-08 DIAGNOSIS — S46.219A BICEPS TENDON TEAR: ICD-10-CM

## 2019-11-08 DIAGNOSIS — M75.02 ADHESIVE CAPSULITIS OF LEFT SHOULDER: ICD-10-CM

## 2019-11-08 PROCEDURE — 97112 NEUROMUSCULAR REEDUCATION: CPT | Mod: GP | Performed by: PHYSICAL THERAPIST

## 2019-11-08 PROCEDURE — 97110 THERAPEUTIC EXERCISES: CPT | Mod: GP | Performed by: PHYSICAL THERAPIST

## 2019-11-08 PROCEDURE — 97035 APP MDLTY 1+ULTRASOUND EA 15: CPT | Mod: GP | Performed by: PHYSICAL THERAPIST

## 2019-11-08 PROCEDURE — 97140 MANUAL THERAPY 1/> REGIONS: CPT | Mod: GP | Performed by: PHYSICAL THERAPIST

## 2019-11-08 NOTE — PROGRESS NOTES
HPI  Bettina Montes is a 49 year old female with a past medical history of HFrEF (30-35%) secondary to NICM (felt to be idiopathic), HTN, obesity, DM II, cervical spine fusion, and hyperlipidemia who presents for routine follow-up. She was last seen by Dr. Chinchilla in September.  At that visit, her Carvedilol was increased to 25 mg twice daily.    Bettina Montes has been feeling poorly.  Over the past 5 days she has been feeling more shortness of breath and fatigue.  She ran out of her Entresto approximately 5 days ago.  She is up about 10 pounds to 293 pounds.  She endorses nightly PND, cough, and orthopnea.  She denies any lower extremity edema.  Her appetite has been poor and she complains of early satiety.      She had increased her Lasix to 40 mg twice daily, but did not feel that made any difference to her heart failure symptoms.  She does not feel that her home Lasix is working as well as it had previously and that she is urinating less.  She denies any chest pain, palpitations, lightheadedness, dizziness, or near syncopal episodes.     PMH  Past Medical History:   Diagnosis Date     Abnormal Pap smear of cervix 09/10/2019    See problem list     Adrenal gland anomaly      CHF (congestive heart failure) (H)      Fatty liver      Gastroesophageal reflux disease      Hyperlipidemia      Hypertension      Mild persistent asthma      NICM (nonischemic cardiomyopathy) (H)      Obesity      WILLARD (obstructive sleep apnea) 1/9/2015     Type 2 diabetes mellitus (H)        Past Surgical History:   Procedure Laterality Date     COLONOSCOPY       DISCECTOMY, FUSION CERVICAL ANTERIOR TWO LEVELS, COMBINED N/A 4/5/2019    Procedure: C4-6 anterior cervical discectomy and fusion;  Surgeon: Hollis Hurtado MD;  Location: [facility]     TUBAL LIGATION         Family History   Problem Relation Age of Onset     Diabetes Mother      Hypertension Mother      Hyperlipidemia Mother      Heart Failure Mother      Diabetes  Father      Cancer Paternal Grandmother         ?       Social History     Socioeconomic History     Marital status: Single     Spouse name: None     Number of children: 1     Years of education: None     Highest education level: None   Occupational History     Occupation: CNA     Employer: OTHER   Social Needs     Financial resource strain: None     Food insecurity:     Worry: None     Inability: None     Transportation needs:     Medical: None     Non-medical: None   Tobacco Use     Smoking status: Former Smoker     Packs/day: 1.00     Years: 30.00     Pack years: 30.00     Types: Cigarettes     Last attempt to quit: 2013     Years since quittin.8     Smokeless tobacco: Former User     Tobacco comment:     Substance and Sexual Activity     Alcohol use: Yes     Alcohol/week: 0.0 standard drinks     Comment: 1     Drug use: No     Sexual activity: Yes     Partners: Male     Birth control/protection: Surgical   Lifestyle     Physical activity:     Days per week: None     Minutes per session: None     Stress: None   Relationships     Social connections:     Talks on phone: None     Gets together: None     Attends Gnosticist service: None     Active member of club or organization: None     Attends meetings of clubs or organizations: None     Relationship status: None     Intimate partner violence:     Fear of current or ex partner: None     Emotionally abused: None     Physically abused: None     Forced sexual activity: None   Other Topics Concern     Parent/sibling w/ CABG, MI or angioplasty before 65F 55M? No   Social History Narrative     None       ALLERGIES  Allergies   Allergen Reactions     Amlodipine Swelling     Edema on 5mg throat     Lisinopril      Swelling in throat, face  angioedema     Naprosyn [Naproxen]      Swelling, diff breathing       MEDICATIONS acetaminophen (TYLENOL) 500 MG tablet, Take 500-1,000 mg by mouth every 6 hours as needed for mild pain  carvedilol (COREG) 25 MG tablet, Take 1  tablet (25 mg) by mouth 2 times daily (with meals)  cholecalciferol ( ULTRA STRENGTH) 2000 units CAPS, Take 2,000 Units by mouth  Cholecalciferol (VITAMIN D3 PO), Take 2,000 Units by mouth daily  fluticasone-salmeterol (WIXELA INHUB) 250-50 MCG/DOSE inhaler, Inhale 1 puff into the lungs  furosemide (LASIX) 40 MG tablet, Take 1 tablet (40 mg) by mouth 2 times daily  hydrALAZINE (APRESOLINE) 100 MG tablet, Take 1 tablet (100 mg) by mouth 2 times daily  insulin aspart (NOVOLOG FLEXPEN) 100 UNIT/ML pen, Per sliding scale before meals and at bedtime. 100-150 1U, 151-200 2U, 201-250 4U, 251-300 6U, 301-350, 8U, 351-400 10U, >400 12U  levalbuterol (XOPENEX HFA) 45 MCG/ACT Inhaler, Inhale 2 puffs into the lungs every 4 hours as needed for shortness of breath / dyspnea or wheezing  lovastatin (MEVACOR) 20 MG tablet, TAKE 1 TABLET BY MOUTH EVERY DAY AT BEDTIME  magnesium oxide (MAG-OX) 400 (241.3 Mg) MG tablet, Take 1 tablet (400 mg) by mouth daily  metFORMIN (GLUCOPHAGE) 500 MG tablet, Take 2 tablets (1,000 mg) by mouth 2 times daily (with meals)  montelukast (SINGULAIR) 10 MG tablet, Take 10 mg by mouth  potassium chloride ER (K-DUR/KLOR-CON M) 10 MEQ CR tablet, Take 2 tablets (20 mEq) by mouth 2 times daily  ranitidine (ZANTAC) 150 MG tablet, Take 150 mg by mouth  ranitidine (ZANTAC) 150 MG/10ML syrup, Take 10 mLs (150 mg) by mouth 2 times daily  sacubitril-valsartan (ENTRESTO)  MG per tablet, Take 1 tablet by mouth 2 times daily  spironolactone (ALDACTONE) 50 MG tablet, Take 1 tablet (50 mg) by mouth daily  tiZANidine (ZANAFLEX) 2 MG tablet, TAKE 1-2 CAPSULES (2-4 MG) BY MOUTH 3 TIMES DAILY AS NEEDED FOR MUSCLE SPASMS  WIXELA INHUB 250-50 MCG/DOSE inhaler, Inhale 1 puff into the lungs every 12 hours  BETA BLOCKER NOT PRESCRIBED, INTENTIONAL,, Beta Blocker not prescribed intentionally due to Severe Asthma  blood glucose (NO BRAND SPECIFIED) test strip, Use to test blood sugar 3-4 times daily or as  directed.  insulin glargine (BASAGLAR KWIKPEN) 100 UNIT/ML pen, Inject 65 Units Subcutaneous At Bedtime  insulin pen needle (32G X 4 MM) 32G X 4 MM miscellaneous, Use 1 pen needles daily or as directed.  insulin pen needle (ULTICARE MICRO) 32G X 4 MM miscellaneous, Use 4 pen needles daily or as directed.  methylPREDNISolone (MEDROL DOSEPAK) 4 MG tablet therapy pack, Follow Package Directions (Patient not taking: Reported on 11/7/2019)  montelukast (SINGULAIR) 10 MG tablet, TAKE 1 TABLET BY MOUTH EVERYDAY AT BEDTIME    sodium chloride (PF) 0.9% PF flush 10 mL      ROS:  Constitutional: No fever, chills, or sweats.  + Weight gain and fatigue.  ENT: No visual disturbance, ear ache, epistaxis, sore throat.    Respiratory: +cough, no hemoptysis.   Cardiovascular: As per HPI.   GI: No nausea, vomiting, hematemesis, melena, or hematochezia.   : See HPI  Integument: Negative for rashes or ecchymosis.   Psychiatric: Negative for mood changes, anxiety, or depression.   Neuro: Negative for numbness or tingling.   Endocrinology: Blood sugar controlled.   Musculoskeletal:  +gait instability. No gout or muscle weakness.     EXAM  BP (!) 175/99 (BP Location: Left arm, Patient Position: Sitting, Cuff Size: Adult Regular)   Pulse 75   Wt 132.9 kg (293 lb)   LMP  (LMP Unknown)   SpO2 98%   BMI 47.29 kg/m    Home weight typically is 284 lbs.    Vitals:    11/07/19 1605   Weight: 132.9 kg (293 lb)     General: appears comfortable, alert and articulate  Head: normocephalic, atraumatic  Eyes: anicteric sclera, EOMI  Neck: Supple, no adenopathy  Orophyarynx: moist mucosa, no cyanosis  Heart: regular, S1/S2, no murmur, gallop, rub, estimated JVP level of the mandible at a 45 degree angle  Lungs: Respirations even and unlabored, lungs clear, no rales or wheezing.  Lungs sounds decreased bilaterally in the bases.  Abdomen:  distended and firmer, non-tender, bowel sounds present, no hepatomegaly  Extremities: no clubbing, cyanosis, or  edema  Neurological: normal speech and affect, no gross motor deficits  Skin:  Warm and dry.      LABS  Last Comprehensive Metabolic Panel:  Sodium   Date Value Ref Range Status   11/06/2019 138 133 - 144 mmol/L Final     Potassium   Date Value Ref Range Status   11/06/2019 4.0 3.4 - 5.3 mmol/L Final     Chloride   Date Value Ref Range Status   11/06/2019 103 94 - 109 mmol/L Final     Carbon Dioxide   Date Value Ref Range Status   11/06/2019 28 20 - 32 mmol/L Final     Anion Gap   Date Value Ref Range Status   11/06/2019 7 3 - 14 mmol/L Final     Glucose   Date Value Ref Range Status   11/06/2019 133 (H) 70 - 99 mg/dL Final     Comment:     Non Fasting     Urea Nitrogen   Date Value Ref Range Status   11/06/2019 9 7 - 30 mg/dL Final     Creatinine   Date Value Ref Range Status   11/06/2019 0.92 0.52 - 1.04 mg/dL Final     GFR Estimate   Date Value Ref Range Status   11/06/2019 72 >60 mL/min/[1.73_m2] Final     Comment:     Non  GFR Calc  Starting 12/18/2018, serum creatinine based estimated GFR (eGFR) will be   calculated using the Chronic Kidney Disease Epidemiology Collaboration   (CKD-EPI) equation.       Calcium   Date Value Ref Range Status   11/06/2019 9.3 8.5 - 10.1 mg/dL Final       MOST RECENT ECHOCARDIOGRAM 10/25/19:  Interpretation Summary  Technically difficult study.  Left ventricular size is normal. Mildly (EF 40-45%, traced 45%) reduced left ventricular function is present.  Right ventricular function, chamber size, wall motion, and thickness are normal. This study was compared with the study from 3/21/18: The left ventricular function has improved    ASSESSMENT AND PLAN  Bettina Montes is a 49 year old female with NICM who appears hypervolemic today.  I discontinued her Lasix and started Torsemide 40 mg twice daily.  I have also given her 2.5 mg of metolazone to take tonight with an extra 40 mEq of potassium.  I increased her potassium to 40 mEq in the morning 20 mEq in the afternoon  for her baseline dose.      We will work on getting her Entresto at a lower co-pay and give her a coupon for that.  I instructed her to contact our office if she runs out of her medications or has any problems as I feel that some of her issues today are due to the gap in her Entresto therapy.    She will return for labs in 1 week to reassess her potassium and kidney function.  She will return to the [facility] in 2 weeks time.  She was instructed to contact me in the interim if her symptoms worsen or do not improve.    1. Chronic systolic heart failure/HFrEF (EF 40-45%) secondary to nonischemic cardiomyopathy  NYHA Symptom Class IIIB  Stage C  Primary Cardiologist: Dr Chinchilla; Last seen 9/20/19  ACE-I/ARB/ARNi: Restart Entresto.  BB yes, Carvedilol 25 mg twice daily  Aldosterone antagonist yes, Spironolactone 50 mg daily.   SCD prophylaxis Decision deferred during medication uptitration  Fluid status Hypervolemic  Cardiac Rehab: Completed  Sleep Apnea Evaluation: Documented sleep apnea.    Remote PA Pressure Monitoring (CardioMems) Deferred while medical therapy is optimized  Remote monitoring: Mychart active  Antiplatelet: none.   Anticoagulation: None.    2.  Obesity: BMI 47.29.  She is working on weight loss.  Feel part of this is fluid retention.  Continue to reassess at subsequent visits.     3.  HTN:  Uncontrolled at 162/89.  Restart Entresto as outlined above.  Continue to monitor BP readings at home.  Re-evaluate in one week after aggressive diuresis. If BP is still uncontrolled and volume status has improved, would increase Coreg to 50 mg twice daily.    4.  Follow-up:  BMP in one week.  [facility] in two weeks.       Angle GU, SHAZIA  [facility]

## 2019-11-08 NOTE — PROGRESS NOTES
I called pharmacy to find out if Bettina was able to use co-pay card last evening to  her Entresto. Per pharmacy they were able to use the co-pay card but didn't have the Entresto in stock. It is on order and they are hopeful will get it today. I called Bettina and left her this message. Left her my direct dial line to call me back if there are any further issues, otherwise should be able to pick it up today (or tomorrow if not in stock).     Yasemin Blount RN

## 2019-11-10 DIAGNOSIS — Z98.1 S/P CERVICAL SPINAL FUSION: ICD-10-CM

## 2019-11-10 NOTE — TELEPHONE ENCOUNTER
Requested Prescriptions   Pending Prescriptions Disp Refills     tiZANidine (ZANAFLEX) 2 MG tablet [Pharmacy Med Name: TIZANIDINE HCL 2 MG TABLET] 90 tablet 1     Sig: TAKE 1-2 TABLETS BY MOUTH 3 TIMES DAILY AS NEEDED FOR MUSCLE SPASMS       There is no refill protocol information for this order        Last Written Prescription Date:  10/3/19  Last Fill Quantity: 90,  # refills: 1   Last Office Visit with G, P or [facility] prescribing provider:  10/3/19   Future Office Visit:           Dominic Galindo  [facility]

## 2019-11-12 RX ORDER — TIZANIDINE 2 MG/1
TABLET ORAL
Qty: 90 TABLET | Refills: 1 | Status: SHIPPED | OUTPATIENT
Start: 2019-11-12 | End: 2022-03-01

## 2019-11-14 DIAGNOSIS — I50.23 ACUTE ON CHRONIC SYSTOLIC HEART FAILURE (H): ICD-10-CM

## 2019-11-14 LAB
ANION GAP SERPL CALCULATED.3IONS-SCNC: 5 MMOL/L (ref 3–14)
BUN SERPL-MCNC: 16 MG/DL (ref 7–30)
CALCIUM SERPL-MCNC: 9.8 MG/DL (ref 8.5–10.1)
CHLORIDE SERPL-SCNC: 104 MMOL/L (ref 94–109)
CO2 SERPL-SCNC: 31 MMOL/L (ref 20–32)
CREAT SERPL-MCNC: 0.96 MG/DL (ref 0.52–1.04)
GFR SERPL CREATININE-BSD FRML MDRD: 69 ML/MIN/{1.73_M2}
GLUCOSE SERPL-MCNC: 65 MG/DL (ref 70–99)
POTASSIUM SERPL-SCNC: 4.4 MMOL/L (ref 3.4–5.3)
SODIUM SERPL-SCNC: 140 MMOL/L (ref 133–144)

## 2019-11-14 PROCEDURE — 80048 BASIC METABOLIC PNL TOTAL CA: CPT | Performed by: NURSE PRACTITIONER

## 2019-11-14 PROCEDURE — 36415 COLL VENOUS BLD VENIPUNCTURE: CPT | Performed by: NURSE PRACTITIONER

## 2019-11-16 DIAGNOSIS — M62.838 MUSCLE SPASM: Primary | ICD-10-CM

## 2019-11-18 RX ORDER — MAGNESIUM OXIDE 400 MG/1
TABLET ORAL
Qty: 30 TABLET | Refills: 5 | Status: SHIPPED | OUTPATIENT
Start: 2019-11-18 | End: 2021-01-14

## 2019-11-21 ENCOUNTER — THERAPY VISIT (OUTPATIENT)
Dept: PHYSICAL THERAPY | Facility: CLINIC | Age: 49
End: 2019-11-21
Payer: OTHER MISCELLANEOUS

## 2019-11-21 ENCOUNTER — OFFICE VISIT (OUTPATIENT)
Dept: CARDIOLOGY | Facility: CLINIC | Age: 49
End: 2019-11-21
Payer: COMMERCIAL

## 2019-11-21 VITALS
SYSTOLIC BLOOD PRESSURE: 107 MMHG | OXYGEN SATURATION: 99 % | HEART RATE: 95 BPM | BODY MASS INDEX: 46.81 KG/M2 | DIASTOLIC BLOOD PRESSURE: 74 MMHG | WEIGHT: 290 LBS

## 2019-11-21 DIAGNOSIS — S46.219A BICEPS TENDON TEAR: ICD-10-CM

## 2019-11-21 DIAGNOSIS — E66.01 OBESITY, CLASS III, BMI 40-49.9 (MORBID OBESITY) (H): ICD-10-CM

## 2019-11-21 DIAGNOSIS — I10 ESSENTIAL HYPERTENSION WITH GOAL BLOOD PRESSURE LESS THAN 140/90: ICD-10-CM

## 2019-11-21 DIAGNOSIS — I50.23 ACUTE ON CHRONIC SYSTOLIC HEART FAILURE (H): Primary | ICD-10-CM

## 2019-11-21 DIAGNOSIS — M75.02 ADHESIVE CAPSULITIS OF LEFT SHOULDER: ICD-10-CM

## 2019-11-21 PROCEDURE — 97140 MANUAL THERAPY 1/> REGIONS: CPT | Mod: GP | Performed by: PHYSICAL THERAPIST

## 2019-11-21 PROCEDURE — 99214 OFFICE O/P EST MOD 30 MIN: CPT | Performed by: NURSE PRACTITIONER

## 2019-11-21 PROCEDURE — 97035 APP MDLTY 1+ULTRASOUND EA 15: CPT | Mod: GP | Performed by: PHYSICAL THERAPIST

## 2019-11-21 RX ORDER — POTASSIUM CHLORIDE 750 MG/1
20 TABLET, EXTENDED RELEASE ORAL DAILY
Qty: 60 TABLET | Refills: 11 | Status: SHIPPED | OUTPATIENT
Start: 2019-11-21 | End: 2019-11-27

## 2019-11-21 RX ORDER — HYDRALAZINE HYDROCHLORIDE 100 MG/1
50 TABLET, FILM COATED ORAL 2 TIMES DAILY
Qty: 30 TABLET | Refills: 11 | Status: SHIPPED | OUTPATIENT
Start: 2019-11-21 | End: 2020-06-05

## 2019-11-21 RX ORDER — TORSEMIDE 20 MG/1
TABLET ORAL
Qty: 90 TABLET | Refills: 11 | Status: SHIPPED | OUTPATIENT
Start: 2019-11-21 | End: 2020-09-24

## 2019-11-21 RX ORDER — METOLAZONE 2.5 MG/1
TABLET ORAL
Qty: 5 TABLET | Refills: 0 | Status: SHIPPED | OUTPATIENT
Start: 2019-11-21 | End: 2020-09-22

## 2019-11-21 NOTE — NURSING NOTE
"Chief Complaint   Patient presents with     Heart Failure     chronic systolic heart failure; HFrEF EF 40-45% (ECHO 10/25/19) presents for follow up with labs prior. Per patient sob.,lightheaded occationally       Initial /74 (BP Location: Right arm, Patient Position: Sitting, Cuff Size: Adult Large)   Pulse 95   Wt 131.5 kg (290 lb)   LMP  (LMP Unknown)   SpO2 99%   BMI 46.81 kg/m   Estimated body mass index is 46.81 kg/m  as calculated from the following:    Height as of 10/3/19: 1.676 m (5' 6\").    Weight as of this encounter: 131.5 kg (290 lb)..  BP completed using cuff size: large    Jerome Rhoades L.P.N.    "

## 2019-11-21 NOTE — NURSING NOTE
Diet: Patient instructed regarding a heart healthy diet, including discussion of reduced fat and sodium intake. Patient demonstrated understanding of this information and agreed to call with further questions or concerns.  Return Appointment: Patient given instructions regarding scheduling next clinic visit in one month with labs. Patient demonstrated understanding of this information and agreed to call with further questions or concerns.  Medication Change: Patient was educated regarding prescribed medication change, including discussion of the indication, administration, side effects, and when to report to MD or RN. Patient demonstrated understanding of this information and agreed to call with further questions or concerns.  Bettina is to establish with a PCP. Name of a new provider given to her by Angle PAEZ NP.  Patient stated she understood all health information given and agreed to call with further questions or concerns.  Heaven Puentes RN Care Coordinator  Heart Failure [facility]

## 2019-11-21 NOTE — PROGRESS NOTES
HPI  Bettina Montes is a 49 year old female with a past medical history of HFrEF (30-35%) secondary to NICM (felt to be idiopathic), HTN, obesity, DM II, cervical spine fusion, and hyperlipidemia who presents for routine follow-up. She was last seen in [facility] [place]7.  At that visit, she appeared hypervolemic and I discontinued Lasix and started Torsemide 40 mg twice daily.  I also had her take 2.5 mg of Metolazone, increased her potassium and got her Entresto reapproved as she had been off it due to insurance issues.    Since implementing the medication changes, Bettina feels better.  Her stomach is less distended and her shortness of breath has improved.  She self decreased her torsemide to 40 mg in the morning and 20 mg in the afternoon due to frequent urination and nocturia.  She admits that she has been more thirsty and has been drinking more.  She is drinking at least three 16 ounce bottles of fluid daily with 2 flavored valdes that were approximately 20 ounces.  She has been working on her sodium restrictions and has been consuming less sodium.  She gave up her vinegar and salt chips that she loves.      She denies any shortness of breath at rest.  She has an occasional cough and coughing spells while sitting or at night.  This has improved with the increase in diuretics.  She denies any chest pain, palpitations, or lower extremity edema.  Her weight at home is 289 pounds up from her baseline weight of 284 lbs.    She has noticed lower blood pressure readings since restarting the Entresto.  Her blood pressure is in the high 90 systolic range.  She has been developing more lightheadedness that occurs twice a week.  She needs to stop and rest for a half hour before her symptoms dissipate.      PMH  Past Medical History:   Diagnosis Date     Abnormal Pap smear of cervix 09/10/2019    See problem list     Adrenal gland anomaly      CHF (congestive heart failure) (H)      Fatty liver      Gastroesophageal reflux  disease      Hyperlipidemia      Hypertension      Mild persistent asthma      NICM (nonischemic cardiomyopathy) (H)      Obesity      WILLARD (obstructive sleep apnea) 2015     Type 2 diabetes mellitus (H)        Past Surgical History:   Procedure Laterality Date     COLONOSCOPY       DISCECTOMY, FUSION CERVICAL ANTERIOR TWO LEVELS, COMBINED N/A 2019    Procedure: C4-6 anterior cervical discectomy and fusion;  Surgeon: Hollis Hurtado MD;  Location: [facility] OR     TUBAL LIGATION         Family History   Problem Relation Age of Onset     Diabetes Mother      Hypertension Mother      Hyperlipidemia Mother      Heart Failure Mother      Diabetes Father      Cancer Paternal Grandmother         ?       Social History     Socioeconomic History     Marital status: Single     Spouse name: None     Number of children: 1     Years of education: None     Highest education level: None   Occupational History     Occupation: CNA     Employer: OTHER   Social Needs     Financial resource strain: None     Food insecurity:     Worry: None     Inability: None     Transportation needs:     Medical: None     Non-medical: None   Tobacco Use     Smoking status: Former Smoker     Packs/day: 1.00     Years: 30.00     Pack years: 30.00     Types: Cigarettes     Last attempt to quit: 2013     Years since quittin.8     Smokeless tobacco: Former User     Tobacco comment:     Substance and Sexual Activity     Alcohol use: Yes     Alcohol/week: 0.0 standard drinks     Comment: 1     Drug use: No     Sexual activity: Yes     Partners: Male     Birth control/protection: Surgical   Lifestyle     Physical activity:     Days per week: None     Minutes per session: None     Stress: None   Relationships     Social connections:     Talks on phone: None     Gets together: None     Attends Buddhism service: None     Active member of club or organization: None     Attends meetings of clubs or organizations: None     Relationship status:  None     Intimate partner violence:     Fear of current or ex partner: None     Emotionally abused: None     Physically abused: None     Forced sexual activity: None   Other Topics Concern     Parent/sibling w/ CABG, MI or angioplasty before 65F 55M? No   Social History Narrative     None       ALLERGIES  Allergies   Allergen Reactions     Amlodipine Swelling     Edema on 5mg throat     Lisinopril      Swelling in throat, face  angioedema     Naprosyn [Naproxen]      Swelling, diff breathing       MEDICATIONS acetaminophen (TYLENOL) 500 MG tablet, Take 500-1,000 mg by mouth every 6 hours as needed for mild pain  carvedilol (COREG) 25 MG tablet, Take 1 tablet (25 mg) by mouth 2 times daily (with meals)  cholecalciferol ( ULTRA STRENGTH) 2000 units CAPS, Take 2,000 Units by mouth  Cholecalciferol (VITAMIN D3 PO), Take 2,000 Units by mouth daily  fluticasone-salmeterol (WIXELA INHUB) 250-50 MCG/DOSE inhaler, Inhale 1 puff into the lungs  hydrALAZINE (APRESOLINE) 100 MG tablet, Take 1 tablet (100 mg) by mouth 2 times daily  insulin aspart (NOVOLOG FLEXPEN) 100 UNIT/ML pen, Per sliding scale before meals and at bedtime. 100-150 1U, 151-200 2U, 201-250 4U, 251-300 6U, 301-350, 8U, 351-400 10U, >400 12U  insulin glargine (BASAGLAR KWIKPEN) 100 UNIT/ML pen, Inject 65 Units Subcutaneous At Bedtime  insulin pen needle (32G X 4 MM) 32G X 4 MM miscellaneous, Use 1 pen needles daily or as directed.  insulin pen needle (ULTICARE MICRO) 32G X 4 MM miscellaneous, Use 4 pen needles daily or as directed.  levalbuterol (XOPENEX HFA) 45 MCG/ACT Inhaler, Inhale 2 puffs into the lungs every 4 hours as needed for shortness of breath / dyspnea or wheezing  lovastatin (MEVACOR) 20 MG tablet, TAKE 1 TABLET BY MOUTH EVERY DAY AT BEDTIME  magnesium oxide (MAG-OX) 400 (241.3 Mg) MG tablet, Take 1 tablet (400 mg) by mouth daily  magnesium oxide (MAG-OX) 400 MG tablet, TAKE 1 TABLET BY MOUTH EVERY DAY  metFORMIN (GLUCOPHAGE) 500 MG tablet,  Take 2 tablets (1,000 mg) by mouth 2 times daily (with meals)  montelukast (SINGULAIR) 10 MG tablet, TAKE 1 TABLET BY MOUTH EVERYDAY AT BEDTIME  montelukast (SINGULAIR) 10 MG tablet, Take 10 mg by mouth  potassium chloride ER (K-DUR/KLOR-CON M) 10 MEQ CR tablet, Take 40 meq in the am and 20 meq in the evening.  ranitidine (ZANTAC) 150 MG tablet, Take 150 mg by mouth  ranitidine (ZANTAC) 150 MG/10ML syrup, Take 10 mLs (150 mg) by mouth 2 times daily  sacubitril-valsartan (ENTRESTO)  MG per tablet, Take 1 tablet by mouth 2 times daily  spironolactone (ALDACTONE) 50 MG tablet, Take 1 tablet (50 mg) by mouth daily  tiZANidine (ZANAFLEX) 2 MG tablet, TAKE 1-2 TABLETS BY MOUTH 3 TIMES DAILY AS NEEDED FOR MUSCLE SPASMS  torsemide (DEMADEX) 20 MG tablet, Take 2 tablets (40 mg) by mouth 2 times daily  WIXELA INHUB 250-50 MCG/DOSE inhaler, Inhale 1 puff into the lungs every 12 hours  BETA BLOCKER NOT PRESCRIBED, INTENTIONAL,, Beta Blocker not prescribed intentionally due to Severe Asthma  blood glucose (NO BRAND SPECIFIED) test strip, Use to test blood sugar 3-4 times daily or as directed.  methylPREDNISolone (MEDROL DOSEPAK) 4 MG tablet therapy pack, Follow Package Directions (Patient not taking: Reported on 11/7/2019)  metolazone (ZAROXOLYN) 2.5 MG tablet, Take 1 tablet (2.5 mg) by mouth once for 1 dose    sodium chloride (PF) 0.9% PF flush 10 mL      ROS:  Constitutional: No fever, chills, or sweats.  + Weight gain  ENT: No visual disturbance, ear ache, epistaxis, sore throat.    Respiratory: +cough, no hemoptysis.   Cardiovascular: As per HPI.   GI: No nausea, vomiting, hematemesis, melena, or hematochezia.   : See HPI  Integument: Negative for rashes or ecchymosis.   Psychiatric: Negative for mood changes, anxiety, or depression.   Neuro: Negative for numbness or tingling.   Endocrinology: Blood sugar controlled.   Musculoskeletal:  +gait instability. No gout or muscle weakness.     EXAM  /74 (BP Location:  Right arm, Patient Position: Sitting, Cuff Size: Adult Large)   Pulse 95   Wt 131.5 kg (290 lb)   LMP  (LMP Unknown)   SpO2 99%   BMI 46.81 kg/m    Home weight typically is 284 lbs.    Vitals:    11/21/19 1613   Weight: 131.5 kg (290 lb)     General: appears comfortable, alert and articulate  Head: normocephalic, atraumatic  Eyes: anicteric sclera, EOMI  Neck: Supple, no masses.  Orophyarynx: moist mucosa, no cyanosis  Heart: regular, S1/S2, no murmur, gallop, rub, estimated JVP 11 cm  Lungs: Respirations even and unlabored, lungs clear, no rales or wheezing.    Abdomen: Soft, non-tender, bowel sounds present, no hepatomegaly  Extremities: no clubbing, cyanosis, or edema  Neurological: normal speech and affect, no gross motor deficits  Skin:  Warm and dry.      LABS  Last Comprehensive Metabolic Panel:  Sodium   Date Value Ref Range Status   11/14/2019 140 133 - 144 mmol/L Final     Potassium   Date Value Ref Range Status   11/14/2019 4.4 3.4 - 5.3 mmol/L Final     Chloride   Date Value Ref Range Status   11/14/2019 104 94 - 109 mmol/L Final     Carbon Dioxide   Date Value Ref Range Status   11/14/2019 31 20 - 32 mmol/L Final     Anion Gap   Date Value Ref Range Status   11/14/2019 5 3 - 14 mmol/L Final     Glucose   Date Value Ref Range Status   11/14/2019 65 (L) 70 - 99 mg/dL Final     Urea Nitrogen   Date Value Ref Range Status   11/14/2019 16 7 - 30 mg/dL Final     Creatinine   Date Value Ref Range Status   11/14/2019 0.96 0.52 - 1.04 mg/dL Final     GFR Estimate   Date Value Ref Range Status   11/14/2019 69 >60 mL/min/[1.73_m2] Final     Comment:     Non  GFR Calc  Starting 12/18/2018, serum creatinine based estimated GFR (eGFR) will be   calculated using the Chronic Kidney Disease Epidemiology Collaboration   (CKD-EPI) equation.       Calcium   Date Value Ref Range Status   11/14/2019 9.8 8.5 - 10.1 mg/dL Final       MOST RECENT ECHOCARDIOGRAM 10/25/19:  Interpretation Summary  Technically  difficult study.  Left ventricular size is normal. Mildly (EF 40-45%, traced 45%) reduced left ventricular function is present.  Right ventricular function, chamber size, wall motion, and thickness are normal. This study was compared with the study from 3/21/18: The left ventricular function has improved    ASSESSMENT AND PLAN  Bettina Montes is a 49 year old female with NICM who appears hypervolemic today.  We discussed increasing her diuretics, but she declined as she experiences more polyuria and nocturia on the increased dose.  Since she is feeling lightheaded and her blood pressure is lower, I have decreased her hydralazine to 50 mg twice daily.  I also decreased her potassium to 20 mEq daily.    She is planning on eating a higher sodium rich meal on Thanksgiving.  I instructed her to take 2.5 mg of metolazone the day after Thanksgiving with an extra 60 mEq of potassium.  She will then resume her normal diuretic dose.  She will return to the [facility] clinic in 1 month with a BMP prior.  She was instructed to call me in the interim if her symptoms worsen or do not improve.    1. Chronic systolic heart failure/HFrEF (EF 40-45%) secondary to nonischemic cardiomyopathy  NYHA Symptom Class IIIA  Stage C  Primary Cardiologist: Dr Chinchilla; Last seen 9/20/19  ACE-I/ARB/ARNi: Entresto 97/103 mg twice daily. Hydralazine 50 mg twice daily  BB yes, Carvedilol 25 mg twice daily  Aldosterone antagonist yes, Spironolactone 50 mg daily.   SCD prophylaxis does not meet criteria for implant EF 40-45%  Fluid status Hypervolemic  Cardiac Rehab: Completed  Sleep Apnea Evaluation: Documented sleep apnea.    Remote PA Pressure Monitoring (CardioMems) Deferred while medical therapy is optimized  Remote monitoring: Mychart active  Antiplatelet: none.   Anticoagulation: None.    2.  Obesity: BMI 46.80  She is working on weight loss and is modifying her diet. Continue to reassess at subsequent visits.     3. Hypertension:  /74 today.   Decrease Hydralazine to 50 mg twice daily due to lightheadedness.  Continue Entresto 97/103 mg twice daily and Carvedilol 25 mg twice daily.    4.  Follow-up:   [facility] in one month with JANN GU, SH[facility]  [facility]

## 2019-11-21 NOTE — PATIENT INSTRUCTIONS
Take your medicines every day, as directed    Changes made today:  o Continue Torsemide 40 mg in the am and 20 mg in the after.    o Decrease potassium to 20 meq daily.   o On 11/29, take 1 tab of metolazone with 60 meq of potassium in addition to your 20 meq daily.    o Decrease Hydralazine to 50 mg twice daily.    Monitor Your Weight and Symptoms    Contact us if you:      Gain 2 pounds in one day or 5 pounds in one week    Feel more short of breath    Notice more leg swelling    Feel lightheadeded   Change your lifestyle    Limit Salt or Sodium:    2000 mg  Limit Fluids:    2000 mL or approximately 64 ounces  Eat a Heart Healthy Diet    Low in saturated fats  Stay Active:    Aim to move at least 150 minutes every  week         To Contact us    During Business Hours:  [phone], option # 1 (University)  Then option # 4 (medical questions)     After hours, weekends or holidays:   [phone], Option #4  Ask to speak to the On-Call Cardiologist. Inform them you are a CORE/heart failure patient at the [place].     Use [employer] allows you to communicate directly with your heart team through secure messaging.    [employer] can be accessed any time on your phone, computer, or tablet.    If you need assistance, we'd be happy to help!         Keep your Heart Appointments:    1.  [facility] in one month with BMP prior.  2.  Establish care with a primary MD.  Deanna Ng, MS, [facility]

## 2019-11-21 NOTE — LETTER
11/21/2019      RE: Bettina Montes  [address] [facility] [place] [address]       Dear Colleague,    Thank you for the opportunity to participate in the care of your patient, Bettina Montes, at the [facility] HEART AT [facility]. Please see a copy of my visit note below.      HPI  Bettina Montes is a 49 year old female with a past medical history of HFrEF (30-35%) secondary to NICM (felt to be idiopathic), HTN, obesity, DM II, cervical spine fusion, and hyperlipidemia who presents for routine follow-up. She was last seen in [facility] on 11/7.  At that visit, she appeared hypervolemic and I discontinued Lasix and started Torsemide 40 mg twice daily.  I also had her take 2.5 mg of Metolazone, increased her potassium and got her Entresto reapproved as she had been off it due to insurance issues.    Since implementing the medication changes, Bettina feels better.  Her stomach is less distended and her shortness of breath has improved.  She self decreased her torsemide to 40 mg in the morning and 20 mg in the afternoon due to frequent urination and nocturia.  She admits that she has been more thirsty and has been drinking more.  She is drinking at least three 16 ounce bottles of fluid daily with 2 flavored valdes that were approximately 20 ounces.  She has been working on her sodium restrictions and has been consuming less sodium.  She gave up her vinegar and salt chips that she loves.      She denies any shortness of breath at rest.  She has an occasional cough and coughing spells while sitting or at night.  This has improved with the increase in diuretics.  She denies any chest pain, palpitations, or lower extremity edema.  Her weight at home is 289 pounds up from her baseline weight of 284 lbs.    She has noticed lower blood pressure readings since restarting the Entresto.  Her blood pressure is in the high 90 systolic range.  She has been  developing more lightheadedness that occurs twice a week.  She needs to stop and rest for a half hour before her symptoms dissipate.      PMH  Past Medical History:   Diagnosis Date     Abnormal Pap smear of cervix 09/10/2019    See problem list     Adrenal gland anomaly      CHF (congestive heart failure) (H)      Fatty liver      Gastroesophageal reflux disease      Hyperlipidemia      Hypertension      Mild persistent asthma      NICM (nonischemic cardiomyopathy) (H)      Obesity      WILLARD (obstructive sleep apnea) 2015     Type 2 diabetes mellitus (H)        Past Surgical History:   Procedure Laterality Date     COLONOSCOPY       DISCECTOMY, FUSION CERVICAL ANTERIOR TWO LEVELS, COMBINED N/A 2019    Procedure: C4-6 anterior cervical discectomy and fusion;  Surgeon: Hollis Hurtado MD;  Location: [facility] OR     TUBAL LIGATION         Family History   Problem Relation Age of Onset     Diabetes Mother      Hypertension Mother      Hyperlipidemia Mother      Heart Failure Mother      Diabetes Father      Cancer Paternal Grandmother         ?       Social History     Socioeconomic History     Marital status: Single     Spouse name: None     Number of children: 1     Years of education: None     Highest education level: None   Occupational History     Occupation: CNA     Employer: OTHER   Social Needs     Financial resource strain: None     Food insecurity:     Worry: None     Inability: None     Transportation needs:     Medical: None     Non-medical: None   Tobacco Use     Smoking status: Former Smoker     Packs/day: 1.00     Years: 30.00     Pack years: 30.00     Types: Cigarettes     Last attempt to quit: 2013     Years since quittin.8     Smokeless tobacco: Former User     Tobacco comment:     Substance and Sexual Activity     Alcohol use: Yes     Alcohol/week: 0.0 standard drinks     Comment: 1     Drug use: No     Sexual activity: Yes     Partners: Male     Birth control/protection: Surgical    Lifestyle     Physical activity:     Days per week: None     Minutes per session: None     Stress: None   Relationships     Social connections:     Talks on phone: None     Gets together: None     Attends Yarsani service: None     Active member of club or organization: None     Attends meetings of clubs or organizations: None     Relationship status: None     Intimate partner violence:     Fear of current or ex partner: None     Emotionally abused: None     Physically abused: None     Forced sexual activity: None   Other Topics Concern     Parent/sibling w/ CABG, MI or angioplasty before 65F 55M? No   Social History Narrative     None       ALLERGIES  Allergies   Allergen Reactions     Amlodipine Swelling     Edema on 5mg throat     Lisinopril      Swelling in throat, face  angioedema     Naprosyn [Naproxen]      Swelling, diff breathing       MEDICATIONS acetaminophen (TYLENOL) 500 MG tablet, Take 500-1,000 mg by mouth every 6 hours as needed for mild pain  carvedilol (COREG) 25 MG tablet, Take 1 tablet (25 mg) by mouth 2 times daily (with meals)  cholecalciferol ( ULTRA STRENGTH) 2000 units CAPS, Take 2,000 Units by mouth  Cholecalciferol (VITAMIN D3 PO), Take 2,000 Units by mouth daily  fluticasone-salmeterol (WIXELA INHUB) 250-50 MCG/DOSE inhaler, Inhale 1 puff into the lungs  hydrALAZINE (APRESOLINE) 100 MG tablet, Take 1 tablet (100 mg) by mouth 2 times daily  insulin aspart (NOVOLOG FLEXPEN) 100 UNIT/ML pen, Per sliding scale before meals and at bedtime. 100-150 1U, 151-200 2U, 201-250 4U, 251-300 6U, 301-350, 8U, 351-400 10U, >400 12U  insulin glargine (BASAGLAR KWIKPEN) 100 UNIT/ML pen, Inject 65 Units Subcutaneous At Bedtime  insulin pen needle (32G X 4 MM) 32G X 4 MM miscellaneous, Use 1 pen needles daily or as directed.  insulin pen needle (ULTICARE MICRO) 32G X 4 MM miscellaneous, Use 4 pen needles daily or as directed.  levalbuterol (XOPENEX HFA) 45 MCG/ACT Inhaler, Inhale 2 puffs into the  lungs every 4 hours as needed for shortness of breath / dyspnea or wheezing  lovastatin (MEVACOR) 20 MG tablet, TAKE 1 TABLET BY MOUTH EVERY DAY AT BEDTIME  magnesium oxide (MAG-OX) 400 (241.3 Mg) MG tablet, Take 1 tablet (400 mg) by mouth daily  magnesium oxide (MAG-OX) 400 MG tablet, TAKE 1 TABLET BY MOUTH EVERY DAY  metFORMIN (GLUCOPHAGE) 500 MG tablet, Take 2 tablets (1,000 mg) by mouth 2 times daily (with meals)  montelukast (SINGULAIR) 10 MG tablet, TAKE 1 TABLET BY MOUTH EVERYDAY AT BEDTIME  montelukast (SINGULAIR) 10 MG tablet, Take 10 mg by mouth  potassium chloride ER (K-DUR/KLOR-CON M) 10 MEQ CR tablet, Take 40 meq in the am and 20 meq in the evening.  ranitidine (ZANTAC) 150 MG tablet, Take 150 mg by mouth  ranitidine (ZANTAC) 150 MG/10ML syrup, Take 10 mLs (150 mg) by mouth 2 times daily  sacubitril-valsartan (ENTRESTO)  MG per tablet, Take 1 tablet by mouth 2 times daily  spironolactone (ALDACTONE) 50 MG tablet, Take 1 tablet (50 mg) by mouth daily  tiZANidine (ZANAFLEX) 2 MG tablet, TAKE 1-2 TABLETS BY MOUTH 3 TIMES DAILY AS NEEDED FOR MUSCLE SPASMS  torsemide (DEMADEX) 20 MG tablet, Take 2 tablets (40 mg) by mouth 2 times daily  WIXELA INHUB 250-50 MCG/DOSE inhaler, Inhale 1 puff into the lungs every 12 hours  BETA BLOCKER NOT PRESCRIBED, INTENTIONAL,, Beta Blocker not prescribed intentionally due to Severe Asthma  blood glucose (NO BRAND SPECIFIED) test strip, Use to test blood sugar 3-4 times daily or as directed.  methylPREDNISolone (MEDROL DOSEPAK) 4 MG tablet therapy pack, Follow Package Directions (Patient not taking: Reported on 11/7/2019)  metolazone (ZAROXOLYN) 2.5 MG tablet, Take 1 tablet (2.5 mg) by mouth once for 1 dose    sodium chloride (PF) 0.9% PF flush 10 mL      ROS:  Constitutional: No fever, chills, or sweats.  + Weight gain  ENT: No visual disturbance, ear ache, epistaxis, sore throat.    Respiratory: +cough, no hemoptysis.   Cardiovascular: As per HPI.   GI: No nausea,  vomiting, hematemesis, melena, or hematochezia.   : See HPI  Integument: Negative for rashes or ecchymosis.   Psychiatric: Negative for mood changes, anxiety, or depression.   Neuro: Negative for numbness or tingling.   Endocrinology: Blood sugar controlled.   Musculoskeletal:  +gait instability. No gout or muscle weakness.     EXAM  /74 (BP Location: Right arm, Patient Position: Sitting, Cuff Size: Adult Large)   Pulse 95   Wt 131.5 kg (290 lb)   LMP  (LMP Unknown)   SpO2 99%   BMI 46.81 kg/m     Home weight typically is 284 lbs.    Vitals:    11/21/19 1613   Weight: 131.5 kg (290 lb)     General: appears comfortable, alert and articulate  Head: normocephalic, atraumatic  Eyes: anicteric sclera, EOMI  Neck: Supple, no masses.  Orophyarynx: moist mucosa, no cyanosis  Heart: regular, S1/S2, no murmur, gallop, rub, estimated JVP 11 cm  Lungs: Respirations even and unlabored, lungs clear, no rales or wheezing.    Abdomen: Soft, non-tender, bowel sounds present, no hepatomegaly  Extremities: no clubbing, cyanosis, or edema  Neurological: normal speech and affect, no gross motor deficits  Skin:  Warm and dry.      LABS  Last Comprehensive Metabolic Panel:  Sodium   Date Value Ref Range Status   11/14/2019 140 133 - 144 mmol/L Final     Potassium   Date Value Ref Range Status   11/14/2019 4.4 3.4 - 5.3 mmol/L Final     Chloride   Date Value Ref Range Status   11/14/2019 104 94 - 109 mmol/L Final     Carbon Dioxide   Date Value Ref Range Status   11/14/2019 31 20 - 32 mmol/L Final     Anion Gap   Date Value Ref Range Status   11/14/2019 5 3 - 14 mmol/L Final     Glucose   Date Value Ref Range Status   11/14/2019 65 (L) 70 - 99 mg/dL Final     Urea Nitrogen   Date Value Ref Range Status   11/14/2019 16 7 - 30 mg/dL Final     Creatinine   Date Value Ref Range Status   11/14/2019 0.96 0.52 - 1.04 mg/dL Final     GFR Estimate   Date Value Ref Range Status   11/14/2019 69 >60 mL/min/[1.73_m2] Final     Comment:      Non  GFR Calc  Starting 12/18/2018, serum creatinine based estimated GFR (eGFR) will be   calculated using the Chronic Kidney Disease Epidemiology Collaboration   (CKD-EPI) equation.       Calcium   Date Value Ref Range Status   11/14/2019 9.8 8.5 - 10.1 mg/dL Final       MOST RECENT ECHOCARDIOGRAM 10/25/19:  Interpretation Summary  Technically difficult study.  Left ventricular size is normal. Mildly (EF 40-45%, traced 45%) reduced left ventricular function is present.  Right ventricular function, chamber size, wall motion, and thickness are normal. This study was compared with the study from 3/21/18: The left ventricular function has improved    ASSESSMENT AND PLAN  Bettina Montes is a 49 year old female with NICM who appears hypervolemic today.  We discussed increasing her diuretics, but she declined as she experiences more polyuria and nocturia on the increased dose.  Since she is feeling lightheaded and her blood pressure is lower, I have decreased her hydralazine to 50 mg twice daily.  I also decreased her potassium to 20 mEq daily.    She is planning on eating a higher sodium rich meal on Thanksgiving.  I instructed her to take 2.5 mg of metolazone the day after Thanksgiving with an extra 60 mEq of potassium.  She will then resume her normal diuretic dose.  [facility] will [facility] in 1 month with a BMP prior.  She was instructed to call me in the interim if her symptoms worsen or do not improve.    1. Chronic systolic heart failure/HFrEF (EF 40-45%) secondary to nonischemic cardiomyopathy  NYHA Symptom Class IIIA  Stage C  Primary Cardiologist: Dr Chinchilla; Last seen 9/20/19  ACE-I/ARB/ARNi: Entresto 97/103 mg twice daily. Hydralazine 50 mg twice daily  BB yes, Carvedilol 25 mg twice daily  Aldosterone antagonist yes, Spironolactone 50 mg daily.   SCD prophylaxis Decision deferred during medication uptitration  Fluid status Hypervolemic  Cardiac Rehab: Completed  Sleep Apnea  Evaluation: Documented sleep apnea.    Remote PA Pressure Monitoring (CardioMems) Deferred while medical therapy is optimized  Remote monitoring: Mychart active  Antiplatelet: none.   Anticoagulation: None.    2.  Obesity: BMI 46.80  She is working on weight loss and is modifying her diet. Continue to reassess at subsequent visits.     3. Hypertension:  /74 today.  Decrease Hydralazine to 50 mg twice daily due to lightheadedness.  Continue Entresto 97/103 mg twice daily and Carvedilol 25 mg twice daily.    4.  Follow-up:   [facility] in one month with JANN GU CNP  [facility]

## 2019-11-27 ENCOUNTER — THERAPY VISIT (OUTPATIENT)
Dept: PHYSICAL THERAPY | Facility: CLINIC | Age: 49
End: 2019-11-27
Payer: OTHER MISCELLANEOUS

## 2019-11-27 DIAGNOSIS — M75.02 ADHESIVE CAPSULITIS OF LEFT SHOULDER: ICD-10-CM

## 2019-11-27 DIAGNOSIS — S46.219A BICEPS TENDON TEAR: ICD-10-CM

## 2019-11-27 DIAGNOSIS — I50.23 ACUTE ON CHRONIC SYSTOLIC HEART FAILURE (H): ICD-10-CM

## 2019-11-27 PROCEDURE — 97110 THERAPEUTIC EXERCISES: CPT | Mod: GP | Performed by: PHYSICAL THERAPIST

## 2019-11-27 PROCEDURE — 97035 APP MDLTY 1+ULTRASOUND EA 15: CPT | Mod: GP | Performed by: PHYSICAL THERAPIST

## 2019-11-27 RX ORDER — POTASSIUM CHLORIDE 750 MG/1
20 TABLET, EXTENDED RELEASE ORAL DAILY
Qty: 60 TABLET | Refills: 11 | Status: SHIPPED | OUTPATIENT
Start: 2019-11-27 | End: 2020-09-24

## 2019-11-29 ENCOUNTER — THERAPY VISIT (OUTPATIENT)
Dept: PHYSICAL THERAPY | Facility: CLINIC | Age: 49
End: 2019-11-29
Payer: OTHER MISCELLANEOUS

## 2019-11-29 DIAGNOSIS — M75.02 ADHESIVE CAPSULITIS OF LEFT SHOULDER: ICD-10-CM

## 2019-11-29 DIAGNOSIS — S46.219A BICEPS TENDON TEAR: ICD-10-CM

## 2019-11-29 PROCEDURE — 97035 APP MDLTY 1+ULTRASOUND EA 15: CPT | Mod: GP | Performed by: PHYSICAL THERAPIST

## 2019-11-29 PROCEDURE — 97110 THERAPEUTIC EXERCISES: CPT | Mod: GP | Performed by: PHYSICAL THERAPIST

## 2019-11-29 PROCEDURE — 97112 NEUROMUSCULAR REEDUCATION: CPT | Mod: GP | Performed by: PHYSICAL THERAPIST

## 2019-11-29 PROCEDURE — 97140 MANUAL THERAPY 1/> REGIONS: CPT | Mod: GP | Performed by: PHYSICAL THERAPIST

## 2019-12-04 ENCOUNTER — THERAPY VISIT (OUTPATIENT)
Dept: PHYSICAL THERAPY | Facility: CLINIC | Age: 49
End: 2019-12-04
Payer: OTHER MISCELLANEOUS

## 2019-12-04 DIAGNOSIS — M75.02 ADHESIVE CAPSULITIS OF LEFT SHOULDER: ICD-10-CM

## 2019-12-04 DIAGNOSIS — S46.219A BICEPS TENDON TEAR: ICD-10-CM

## 2019-12-04 PROCEDURE — 97035 APP MDLTY 1+ULTRASOUND EA 15: CPT | Mod: GP | Performed by: PHYSICAL THERAPIST

## 2019-12-04 PROCEDURE — 97110 THERAPEUTIC EXERCISES: CPT | Mod: GP | Performed by: PHYSICAL THERAPIST

## 2019-12-04 PROCEDURE — 97140 MANUAL THERAPY 1/> REGIONS: CPT | Mod: GP | Performed by: PHYSICAL THERAPIST

## 2019-12-04 PROCEDURE — 97112 NEUROMUSCULAR REEDUCATION: CPT | Mod: GP | Performed by: PHYSICAL THERAPIST

## 2019-12-06 ENCOUNTER — TELEPHONE (OUTPATIENT)
Dept: PHYSICAL THERAPY | Facility: CLINIC | Age: 49
End: 2019-12-06

## 2019-12-06 ENCOUNTER — THERAPY VISIT (OUTPATIENT)
Dept: PHYSICAL THERAPY | Facility: CLINIC | Age: 49
End: 2019-12-06
Payer: OTHER MISCELLANEOUS

## 2019-12-06 DIAGNOSIS — M75.02 ADHESIVE CAPSULITIS OF LEFT SHOULDER: ICD-10-CM

## 2019-12-06 DIAGNOSIS — S46.219A BICEPS TENDON TEAR: ICD-10-CM

## 2019-12-06 PROCEDURE — 97140 MANUAL THERAPY 1/> REGIONS: CPT | Mod: GP | Performed by: PHYSICAL THERAPIST

## 2019-12-06 PROCEDURE — 97110 THERAPEUTIC EXERCISES: CPT | Mod: GP | Performed by: PHYSICAL THERAPIST

## 2019-12-06 NOTE — PROGRESS NOTES
Subjective:  HPI                    Objective:  System    Physical Exam    General     ROS    Assessment/Plan:    PROGRESS  REPORT    Progress reporting period is from 10/17/19 to 12/6/19.       SUBJECTIVE  Subjective changes noted by patient:   Pt notes that her shoulder does hurt a little more today, but just finished working.    Current pain level is  4/10.     Previous pain level was  2/10 Initial Pain level: 10/10(at worst).   Changes in function:  Yes (See Goal flowsheet attached for changes in current functional level)  Adverse reaction to treatment or activity: None    OBJECTIVE  Changes noted in objective findings:    AAROM L for flex= 112 deg w/pain+, for abd= 90 deg w/pain++. AROM L for ER = 64 deg. PROM L: flex 135 deg, abd 100 deg w/pain++, ER 65 deg w/pain. Strength L: flex 3+/5 w/pain, abd 3+/5 w/pain++, ER 4+/5 w/slight pain, biceps 4/5 w/pain.     ASSESSMENT/PLAN  Updated problem list and treatment plan: Diagnosis 1:  L adhesive capsulitis w/bicep strain  Pain -  hot/cold therapy, US, self management, education and home program  Decreased ROM/flexibility - manual therapy and therapeutic exercise  Decreased strength - therapeutic exercise and therapeutic activities  Impaired muscle performance - neuro re-education  Decreased function - therapeutic activities  Impaired posture - neuro re-education and therapeutic activities  STG/LTGs have been met or progress has been made towards goals:  Yes (See Goal flow sheet completed today.)  Assessment of Progress: The patient's condition is improving.  The patient's condition has potential to improve.  The patient has had set backs in their progress.  Patient is meeting short term goals and is progressing towards long term goals.  Self Management Plans:  Patient has been instructed in a home treatment program.  Patient  has been instructed in self management of symptoms.  I have re-evaluated this patient and find that the nature, scope, duration and intensity  of the therapy is appropriate for the medical condition of the patient.  Bettina continues to require the following intervention to meet STG and LTG's:  PT    Recommendations:  This patient would benefit from continued therapy.     Frequency:  1 X week, once daily  Duration:  for 8 weeks        Please refer to the daily flowsheet for treatment today, total treatment time and time spent performing 1:1 timed codes.

## 2019-12-06 NOTE — TELEPHONE ENCOUNTER
I called and left a message for Dorothy Saha at [facility] to get 8 more PT sessions authorized for Bettina. Will await her return call.

## 2019-12-09 ENCOUNTER — TELEPHONE (OUTPATIENT)
Dept: PHYSICAL THERAPY | Facility: CLINIC | Age: 49
End: 2019-12-09

## 2019-12-09 DIAGNOSIS — M75.02 ADHESIVE CAPSULITIS OF LEFT SHOULDER: ICD-10-CM

## 2019-12-09 DIAGNOSIS — S46.219A BICEPS TENDON TEAR: ICD-10-CM

## 2019-12-11 ENCOUNTER — THERAPY VISIT (OUTPATIENT)
Dept: PHYSICAL THERAPY | Facility: CLINIC | Age: 49
End: 2019-12-11
Payer: OTHER MISCELLANEOUS

## 2019-12-11 DIAGNOSIS — S46.219A BICEPS TENDON TEAR: ICD-10-CM

## 2019-12-11 DIAGNOSIS — M75.02 ADHESIVE CAPSULITIS OF LEFT SHOULDER: ICD-10-CM

## 2019-12-11 PROCEDURE — 97110 THERAPEUTIC EXERCISES: CPT | Mod: GP | Performed by: PHYSICAL THERAPIST

## 2019-12-11 PROCEDURE — 97140 MANUAL THERAPY 1/> REGIONS: CPT | Mod: GP | Performed by: PHYSICAL THERAPIST

## 2019-12-11 PROCEDURE — 97035 APP MDLTY 1+ULTRASOUND EA 15: CPT | Mod: GP | Performed by: PHYSICAL THERAPIST

## 2019-12-11 PROCEDURE — 97112 NEUROMUSCULAR REEDUCATION: CPT | Mod: GP | Performed by: PHYSICAL THERAPIST

## 2019-12-16 ENCOUNTER — HEALTH MAINTENANCE LETTER (OUTPATIENT)
Age: 49
End: 2019-12-16

## 2019-12-18 ENCOUNTER — THERAPY VISIT (OUTPATIENT)
Dept: PHYSICAL THERAPY | Facility: CLINIC | Age: 49
End: 2019-12-18
Payer: OTHER MISCELLANEOUS

## 2019-12-18 DIAGNOSIS — M75.02 ADHESIVE CAPSULITIS OF LEFT SHOULDER: ICD-10-CM

## 2019-12-18 DIAGNOSIS — S46.219A BICEPS TENDON TEAR: ICD-10-CM

## 2019-12-18 PROCEDURE — 97035 APP MDLTY 1+ULTRASOUND EA 15: CPT | Mod: GP

## 2019-12-18 PROCEDURE — 97140 MANUAL THERAPY 1/> REGIONS: CPT | Mod: GP

## 2019-12-18 PROCEDURE — 97110 THERAPEUTIC EXERCISES: CPT | Mod: GP

## 2019-12-20 ENCOUNTER — THERAPY VISIT (OUTPATIENT)
Dept: PHYSICAL THERAPY | Facility: CLINIC | Age: 49
End: 2019-12-20
Payer: OTHER MISCELLANEOUS

## 2019-12-20 DIAGNOSIS — S46.219A BICEPS TENDON TEAR: ICD-10-CM

## 2019-12-20 DIAGNOSIS — M75.02 ADHESIVE CAPSULITIS OF LEFT SHOULDER: ICD-10-CM

## 2019-12-20 PROCEDURE — 97110 THERAPEUTIC EXERCISES: CPT | Mod: GP

## 2019-12-20 PROCEDURE — 97140 MANUAL THERAPY 1/> REGIONS: CPT | Mod: GP

## 2019-12-20 PROCEDURE — 97035 APP MDLTY 1+ULTRASOUND EA 15: CPT | Mod: GP

## 2019-12-26 ENCOUNTER — THERAPY VISIT (OUTPATIENT)
Dept: PHYSICAL THERAPY | Facility: CLINIC | Age: 49
End: 2019-12-26
Payer: OTHER MISCELLANEOUS

## 2019-12-26 DIAGNOSIS — S46.219A BICEPS TENDON TEAR: ICD-10-CM

## 2019-12-26 DIAGNOSIS — M75.02 ADHESIVE CAPSULITIS OF LEFT SHOULDER: ICD-10-CM

## 2019-12-26 PROCEDURE — 97032 APPL MODALITY 1+ESTIM EA 15: CPT | Mod: GP | Performed by: PHYSICAL THERAPIST

## 2019-12-26 PROCEDURE — 97110 THERAPEUTIC EXERCISES: CPT | Mod: GP | Performed by: PHYSICAL THERAPIST

## 2019-12-26 NOTE — PROGRESS NOTES
HPI  Bettina Montes is a 49 year old female with a past medical history of HFrEF with an initial EF of 30-35%, now 40-45%, secondary to NICM (felt to be idiopathic), HTN, obesity, DM II, cervical spine fusion, and hyperlipidemia who presents for routine follow-up. She was last seen in [facility] [place] in November.  At that visit, Hydralazine was decreased to 50 mg twice daily due to lightheadedness, potassium was decreased to 20 meq daily, and she was instructed to take Metolazone 2.5 mg with 60 meq of supplemental potassium on 11/29 for hypervolemia.    Since her last visit, Bettina has been experiencing more palpitations that are more prominent at night when she goes to bed.  She complains of a racing HR that is regular.  She denies any lightheadedness, dizziness, or near syncopal episodes. She can't identify any alleviating factors.  She has been drinking more alcohol and had 4 jennifer's yesterday while watching the football game which she admits might be aggravating her symptoms.  She admits to more dietary indiscretion with the holiday. Typically she drinks only a serving or two of wine a night, but has been drinking more due to the holiday celebrations.    Her weight has been stable. Her CASILLAS is unchanged.  Her lower extremity edema is unchanged.  She has been missing diuretic doses twice a week due to holiday festivities.  She denies SOB at rest, PND, orthopnea, or chest pain.      PMH  Past Medical History:   Diagnosis Date     Abnormal Pap smear of cervix 09/10/2019    See problem list     Adrenal gland anomaly      CHF (congestive heart failure) (H)      Fatty liver      Gastroesophageal reflux disease      Hyperlipidemia      Hypertension      Mild persistent asthma      NICM (nonischemic cardiomyopathy) (H)      Obesity      WILLARD (obstructive sleep apnea) 1/9/2015     Type 2 diabetes mellitus (H)        Past Surgical History:   Procedure Laterality Date     COLONOSCOPY       DISCECTOMY, FUSION CERVICAL ANTERIOR  TWO LEVELS, COMBINED N/A 2019    Procedure: C4-6 anterior cervical discectomy and fusion;  Surgeon: Hollis Hurtado MD;  Location: [facility]     TUBAL LIGATION         Family History   Problem Relation Age of Onset     Diabetes Mother      Hypertension Mother      Hyperlipidemia Mother      Heart Failure Mother      Diabetes Father      Cancer Paternal Grandmother         ?       Social History     Socioeconomic History     Marital status: Single     Spouse name: None     Number of children: 1     Years of education: None     Highest education level: None   Occupational History     Occupation: CNA     Employer: OTHER   Social Needs     Financial resource strain: None     Food insecurity:     Worry: None     Inability: None     Transportation needs:     Medical: None     Non-medical: None   Tobacco Use     Smoking status: Former Smoker     Packs/day: 1.00     Years: 30.00     Pack years: 30.00     Types: Cigarettes     Last attempt to quit: 2013     Years since quittin.8     Smokeless tobacco: Former User     Tobacco comment:     Substance and Sexual Activity     Alcohol use: Yes     Alcohol/week: 0.0 standard drinks     Comment: 1     Drug use: No     Sexual activity: Yes     Partners: Male     Birth control/protection: Surgical   Lifestyle     Physical activity:     Days per week: None     Minutes per session: None     Stress: None   Relationships     Social connections:     Talks on phone: None     Gets together: None     Attends [employer] service: None     Active member of club or organization: None     Attends meetings of clubs or organizations: None     Relationship status: None     Intimate partner violence:     Fear of current or ex partner: None     Emotionally abused: None     Physically abused: None     Forced sexual activity: None   Other Topics Concern     Parent/sibling w/ CABG, MI or angioplasty before 65F 55M? No   Social History Narrative     None       ALLERGIES  Allergies    Allergen Reactions     Amlodipine Swelling     Edema on 5mg throat     Lisinopril      Swelling in throat, face  angioedema     Naprosyn [Naproxen]      Swelling, diff breathing       MEDICATIONS acetaminophen (TYLENOL) 500 MG tablet, Take 500-1,000 mg by mouth every 6 hours as needed for mild pain  BETA BLOCKER NOT PRESCRIBED, INTENTIONAL,, Beta Blocker not prescribed intentionally due to Severe Asthma  blood glucose (NO BRAND SPECIFIED) test strip, Use to test blood sugar 3-4 times daily or as directed.  carvedilol (COREG) 25 MG tablet, Take 1 tablet (25 mg) by mouth 2 times daily (with meals)  cholecalciferol ( ULTRA STRENGTH) 2000 units CAPS, Take 2,000 Units by mouth  Cholecalciferol (VITAMIN D3 PO), Take 2,000 Units by mouth daily  fluticasone-salmeterol (WIXELA INHUB) 250-50 MCG/DOSE inhaler, Inhale 1 puff into the lungs  hydrALAZINE (APRESOLINE) 100 MG tablet, Take 0.5 tablets (50 mg) by mouth 2 times daily  insulin aspart (NOVOLOG FLEXPEN) 100 UNIT/ML pen, Per sliding scale before meals and at bedtime. 100-150 1U, 151-200 2U, 201-250 4U, 251-300 6U, 301-350, 8U, 351-400 10U, >400 12U  insulin glargine (BASAGLAR KWIKPEN) 100 UNIT/ML pen, Inject 65 Units Subcutaneous At Bedtime  insulin pen needle (32G X 4 MM) 32G X 4 MM miscellaneous, Use 1 pen needles daily or as directed.  insulin pen needle (ULTICARE MICRO) 32G X 4 MM miscellaneous, Use 4 pen needles daily or as directed.  levalbuterol (XOPENEX HFA) 45 MCG/ACT Inhaler, Inhale 2 puffs into the lungs every 4 hours as needed for shortness of breath / dyspnea or wheezing  lovastatin (MEVACOR) 20 MG tablet, TAKE 1 TABLET BY MOUTH EVERY DAY AT BEDTIME  magnesium oxide (MAG-OX) 400 (241.3 Mg) MG tablet, Take 1 tablet (400 mg) by mouth daily  magnesium oxide (MAG-OX) 400 MG tablet, TAKE 1 TABLET BY MOUTH EVERY DAY  metFORMIN (GLUCOPHAGE) 500 MG tablet, Take 2 tablets (1,000 mg) by mouth 2 times daily (with meals)  metolazone (ZAROXOLYN) 2.5 MG tablet, Take  1 tab on 11/29.  Don't take any more unless directed by the clinic.  montelukast (SINGULAIR) 10 MG tablet, TAKE 1 TABLET BY MOUTH EVERYDAY AT BEDTIME  montelukast (SINGULAIR) 10 MG tablet, Take 10 mg by mouth  potassium chloride ER (K-DUR/KLOR-CON M) 10 MEQ CR tablet, Take 2 tablets (20 mEq) by mouth daily  ranitidine (ZANTAC) 150 MG/10ML syrup, Take 10 mLs (150 mg) by mouth 2 times daily  sacubitril-valsartan (ENTRESTO)  MG per tablet, Take 1 tablet by mouth 2 times daily  spironolactone (ALDACTONE) 50 MG tablet, Take 1 tablet (50 mg) by mouth daily  tiZANidine (ZANAFLEX) 2 MG tablet, TAKE 1-2 TABLETS BY MOUTH 3 TIMES DAILY AS NEEDED FOR MUSCLE SPASMS  torsemide (DEMADEX) 20 MG tablet, Take 40 mg in the am and 20 mg in the afternoon  WIXELA INHUB 250-50 MCG/DOSE inhaler, Inhale 1 puff into the lungs every 12 hours    sodium chloride (PF) 0.9% PF flush 10 mL      ROS:  Constitutional: No fever, chills, or sweats.  Weight stable.   ENT: No visual disturbance, ear ache, epistaxis, sore throat.  +nasal pruritis  Respiratory:  None.   Cardiovascular: As per HPI.   GI: No nausea, vomiting, hematemesis, melena, or hematochezia.   : See HPI  Integument: Negative for rashes or ecchymosis.   Psychiatric: Negative for mood changes, anxiety, or depression.   Neuro: Negative for numbness or tingling.   Endocrinology: Blood sugar controlled.   Musculoskeletal:  +gait instability. No gout or muscle weakness.     EXAM  /84 (BP Location: Left arm, Patient Position: Chair, Cuff Size: Adult Large)   Pulse 87   Wt 131.5 kg (290 lb)   LMP  (LMP Unknown)   SpO2 99%   BMI 46.81 kg/m    Home weight typically is 284 lbs.    General: appears comfortable, alert and articulate  Head: normocephalic, atraumatic  Eyes: anicteric sclera, EOMI  Neck: Supple, no masses.  Orophyarynx: moist mucosa, no cyanosis  Heart: Slightly irregular at times, S1/S2, no murmur, gallop, rub, estimated JVP 10 cm  Lungs: Respirations even and  unlabored, lungs clear, no rales or wheezing.    Abdomen: Soft, non-tender, bowel sounds present, no hepatomegaly  Extremities: 2+ pitting edema.  No clubbing or cyanosis.  Neurological: normal speech and affect, no gross motor deficits  Skin:  Warm and dry.      LABS  Last Comprehensive Metabolic Panel:  Sodium   Date Value Ref Range Status   11/14/2019 140 133 - 144 mmol/L Final     Potassium   Date Value Ref Range Status   11/14/2019 4.4 3.4 - 5.3 mmol/L Final     Chloride   Date Value Ref Range Status   11/14/2019 104 94 - 109 mmol/L Final     Carbon Dioxide   Date Value Ref Range Status   11/14/2019 31 20 - 32 mmol/L Final     Anion Gap   Date Value Ref Range Status   11/14/2019 5 3 - 14 mmol/L Final     Glucose   Date Value Ref Range Status   11/14/2019 65 (L) 70 - 99 mg/dL Final     Urea Nitrogen   Date Value Ref Range Status   11/14/2019 16 7 - 30 mg/dL Final     Creatinine   Date Value Ref Range Status   11/14/2019 0.96 0.52 - 1.04 mg/dL Final     GFR Estimate   Date Value Ref Range Status   11/14/2019 69 >60 mL/min/[1.73_m2] Final     Comment:     Non  GFR Calc  Starting 12/18/2018, serum creatinine based estimated GFR (eGFR) will be   calculated using the Chronic Kidney Disease Epidemiology Collaboration   (CKD-EPI) equation.       Calcium   Date Value Ref Range Status   11/14/2019 9.8 8.5 - 10.1 mg/dL Final       MOST RECENT ECHOCARDIOGRAM 10/25/19:  Interpretation Summary  Technically difficult study.  Left ventricular size is normal. Mildly (EF 40-45%, traced 45%) reduced left ventricular function is present.  Right ventricular function, chamber size, wall motion, and thickness are normal. This study was compared with the study from 3/21/18: The left ventricular function has improved    ASSESSMENT AND PLAN  Bettina Montes is a 49 year old female with NICM who appears mildly hypervolemic due to dietary indiscretion and gaps in her diuretic therapy.  I didn't make any med changes today.   She will obtain labs following this visit and I will make further recommendations from there.    I suggested wearing a Zio monitor to reassess her palpitations, which she declined. I feel that her dietary changes and her alcohol intake are contributing factors to her worsening palpitations. I will obtain a potassium, TSH, and magnesium level to rule out alternative causes for palpitations.  An EKG done in clinic showed NSR with nonspecific ST changes, and no arrhythmias.      I recommended that she avoid alcohol, which she wasn't receptive to, due to her depressed heart function. I reinforced the importance of avoiding binge drinking.  If she is unable to abstain from alcohol, I recommended decreasing her alcohol intake to one serving a few times a week.      She will return to [facility] in three months.  She will follow up with Dr. Chinchilla as scheduled in 6 months.    1. Chronic systolic heart failure/HFrEF (EF 40-45%) secondary to nonischemic cardiomyopathy  NYHA Symptom Class IIIA Stage C  Primary Cardiologist: Dr Chinchilla; Last seen 9/20/19  ACE-I/ARB/ARNi: Entresto 97/103 mg twice daily. Hydralazine 50 mg twice daily  BB yes, Carvedilol 25 mg twice daily  Aldosterone antagonist yes, Spironolactone 50 mg daily.   SCD prophylaxis does not meet criteria for implant EF 40-45%  Fluid status Hypervolemic  Cardiac Rehab: Completed  Sleep Apnea Evaluation: Documented sleep apnea.    Remote PA Pressure Monitoring (CardioMems) Deferred while medical therapy is optimized  Remote monitoring: Mychart active  Antiplatelet: none.   Anticoagulation: None.    2. Hypertension:  /84 today.  Continue Hydralazine 50 mg twice daily.  Continue Entresto 97/103 mg twice daily and Carvedilol 25 mg twice daily.    3. Alcohol use: Typically drinks two servings of wine nightly.  Recommendations as outlined above.     4. Palpitations:  History of NSVT and SVT.  Decrease alcohol intake as outlined above.  Obtain TSH, Potassium level, and  magnesium level with today's labs.  If symptoms don't improve with decreasing alcohol intake, then recommend repeating one week Zio monitor.    5.  Nasal Pruritis:  Can't identify any aggravating factors.  Denies any changes in medications, detergents, or skin care regimen/makeup. Recommend saline flushes and Vaseline to apply to nostrils as needed.  May try Loratadine as needed.  Recommend follow up with primary MD if the above measure don't help or symptoms worsen.    6.  Follow-up:   [facility] in three months.  Dr. Chinchilla in June 2020.       Angle GU, CNP  [facility]

## 2019-12-30 ENCOUNTER — TRANSFERRED RECORDS (OUTPATIENT)
Dept: HEALTH INFORMATION MANAGEMENT | Facility: CLINIC | Age: 49
End: 2019-12-30

## 2019-12-30 ENCOUNTER — OFFICE VISIT (OUTPATIENT)
Dept: CARDIOLOGY | Facility: CLINIC | Age: 49
End: 2019-12-30
Payer: COMMERCIAL

## 2019-12-30 ENCOUNTER — THERAPY VISIT (OUTPATIENT)
Dept: PHYSICAL THERAPY | Facility: CLINIC | Age: 49
End: 2019-12-30
Payer: OTHER MISCELLANEOUS

## 2019-12-30 VITALS
HEART RATE: 87 BPM | WEIGHT: 290 LBS | DIASTOLIC BLOOD PRESSURE: 84 MMHG | OXYGEN SATURATION: 99 % | BODY MASS INDEX: 46.81 KG/M2 | SYSTOLIC BLOOD PRESSURE: 124 MMHG

## 2019-12-30 DIAGNOSIS — R00.2 PALPITATIONS: ICD-10-CM

## 2019-12-30 DIAGNOSIS — S46.219A BICEPS TENDON TEAR: ICD-10-CM

## 2019-12-30 DIAGNOSIS — I50.23 ACUTE ON CHRONIC SYSTOLIC HEART FAILURE (H): Primary | ICD-10-CM

## 2019-12-30 DIAGNOSIS — I10 ESSENTIAL HYPERTENSION WITH GOAL BLOOD PRESSURE LESS THAN 140/90: ICD-10-CM

## 2019-12-30 DIAGNOSIS — Z78.9 ALCOHOL USE: ICD-10-CM

## 2019-12-30 DIAGNOSIS — L29.89 NASAL PRURITUS: ICD-10-CM

## 2019-12-30 DIAGNOSIS — I50.23 ACUTE ON CHRONIC SYSTOLIC HEART FAILURE (H): ICD-10-CM

## 2019-12-30 DIAGNOSIS — M75.02 ADHESIVE CAPSULITIS OF LEFT SHOULDER: ICD-10-CM

## 2019-12-30 LAB
ANION GAP SERPL CALCULATED.3IONS-SCNC: 5 MMOL/L (ref 3–14)
BUN SERPL-MCNC: 15 MG/DL (ref 7–30)
CALCIUM SERPL-MCNC: 9.8 MG/DL (ref 8.5–10.1)
CHLORIDE SERPL-SCNC: 103 MMOL/L (ref 94–109)
CO2 SERPL-SCNC: 30 MMOL/L (ref 20–32)
CREAT SERPL-MCNC: 0.84 MG/DL (ref 0.52–1.04)
GFR SERPL CREATININE-BSD FRML MDRD: 80 ML/MIN/{1.73_M2}
GLUCOSE SERPL-MCNC: 83 MG/DL (ref 70–99)
MAGNESIUM SERPL-MCNC: 2 MG/DL (ref 1.6–2.3)
POTASSIUM SERPL-SCNC: 3.8 MMOL/L (ref 3.4–5.3)
SODIUM SERPL-SCNC: 138 MMOL/L (ref 133–144)
TSH SERPL DL<=0.005 MIU/L-ACNC: 1.47 MU/L (ref 0.4–4)

## 2019-12-30 PROCEDURE — 99214 OFFICE O/P EST MOD 30 MIN: CPT | Performed by: NURSE PRACTITIONER

## 2019-12-30 PROCEDURE — 93000 ELECTROCARDIOGRAM COMPLETE: CPT | Performed by: NURSE PRACTITIONER

## 2019-12-30 PROCEDURE — 83735 ASSAY OF MAGNESIUM: CPT | Performed by: NURSE PRACTITIONER

## 2019-12-30 PROCEDURE — 36415 COLL VENOUS BLD VENIPUNCTURE: CPT | Performed by: NURSE PRACTITIONER

## 2019-12-30 PROCEDURE — 84443 ASSAY THYROID STIM HORMONE: CPT | Performed by: NURSE PRACTITIONER

## 2019-12-30 PROCEDURE — 97032 APPL MODALITY 1+ESTIM EA 15: CPT | Mod: GP | Performed by: PHYSICAL THERAPIST

## 2019-12-30 PROCEDURE — 97140 MANUAL THERAPY 1/> REGIONS: CPT | Mod: GP | Performed by: PHYSICAL THERAPIST

## 2019-12-30 PROCEDURE — 97110 THERAPEUTIC EXERCISES: CPT | Mod: GP | Performed by: PHYSICAL THERAPIST

## 2019-12-30 PROCEDURE — 80048 BASIC METABOLIC PNL TOTAL CA: CPT | Performed by: NURSE PRACTITIONER

## 2019-12-30 NOTE — PATIENT INSTRUCTIONS
Take your medicines every day, as directed    Changes made today:  o Recommend total abstinence of alcohol based on your heart failure history.  If you drink, recommend limiting it to one serving a couple of times a week.   o Please avoid binge drinking.   Monitor Your Weight and Symptoms    Contact us if you:      Gain 2 pounds in one day or 5 pounds in one week    Feel more short of breath    Notice more leg swelling    Feel lightheadeded   Change your lifestyle    Limit Salt or Sodium:    2000 mg  Limit Fluids:    2000 mL or approximately 64 ounces  Eat a Heart Healthy Diet    Low in saturated fats  Stay Active:    Aim to move at least 150 minutes every  week         To Contact us    During Business Hours:  [phone], option # 1 (University)  Then option # 4 (medical questions)     After hours, weekends or holidays:   [phone], Option #4  Ask to speak to the On-Call Cardiologist. Inform them you are a [facility]/heart failure patient at the [facility].     Use [employer] allows you to communicate directly with your heart team through secure messaging.    [employer] can be accessed any time on your phone, computer, or tablet.    If you need assistance, we'd be happy to help!         Keep your Heart Appointments:    1. Labs today to recheck your electrolytes and magnesium level.  2. [facility] In three months  3. Dr. Chinchilla in 6 months

## 2019-12-30 NOTE — LETTER
12/30/2019      RE: Bettina Montes  [address] [facility] [place] [address]       Dear Colleague,    Thank you for the opportunity to participate in the care of your patient, Bettina Montes, at the [facility] at [facility]. Please see a copy of my visit note below.      HPI  Bettina Montes is a 49 year old female with a past medical history of HFrEF with an initial EF of 30-35%, now 40-45%, secondary to NICM (felt to be idiopathic), HTN, obesity, DM II, cervical spine fusion, and hyperlipidemia who presents for routine follow-up. She was [place] [facility] [place].  At that visit, Hydralazine was decreased to 50 mg twice daily due to lightheadedness, potassium was decreased to 20 meq daily, and she was instructed to take Metolazone 2.5 mg with 60 meq of supplemental potassium on 11/29 for hypervolemia.    Since her last visit, Bettina has been experiencing more palpitations that are more prominent at night when she goes to bed.  She complains of a racing HR that is regular.  She denies any lightheadedness, dizziness, or near syncopal episodes. She can't identify any alleviating factors.  She has been drinking more alcohol and had 4 jennifer's yesterday while watching the football game which she admits might be aggravating her symptoms.  She admits to more dietary indiscretion with the holiday. Typically she drinks only a serving or two of wine a night, but has been drinking more due to the holiday celebrations.    Her weight has been stable. Her CASILLAS is unchanged.  Her lower extremity edema is unchanged.  She has been missing diuretic doses twice a week due to holiday festivities.  She denies SOB at rest, PND, orthopnea, or chest pain.      PMH  Past Medical History:   Diagnosis Date     Abnormal Pap smear of cervix 09/10/2019    See problem list     Adrenal gland anomaly      CHF (congestive heart failure) (H)      Fatty  liver      Gastroesophageal reflux disease      Hyperlipidemia      Hypertension      Mild persistent asthma      NICM (nonischemic cardiomyopathy) (H)      Obesity      WILLARD (obstructive sleep apnea) 2015     Type 2 diabetes mellitus (H)        Past Surgical History:   Procedure Laterality Date     COLONOSCOPY       DISCECTOMY, FUSION CERVICAL ANTERIOR TWO LEVELS, COMBINED N/A 2019    Procedure: C4-6 anterior cervical discectomy and fusion;  Surgeon: Hollis Hurtado MD;  Location: [facility] OR     TUBAL LIGATION         Family History   Problem Relation Age of Onset     Diabetes Mother      Hypertension Mother      Hyperlipidemia Mother      Heart Failure Mother      Diabetes Father      Cancer Paternal Grandmother         ?       Social History     Socioeconomic History     Marital status: Single     Spouse name: None     Number of children: 1     Years of education: None     Highest education level: None   Occupational History     Occupation: CNA     Employer: OTHER   Social Needs     Financial resource strain: None     Food insecurity:     Worry: None     Inability: None     Transportation needs:     Medical: None     Non-medical: None   Tobacco Use     Smoking status: Former Smoker     Packs/day: 1.00     Years: 30.00     Pack years: 30.00     Types: Cigarettes     Last attempt to quit: 2013     Years since quittin.8     Smokeless tobacco: Former User     Tobacco comment:     Substance and Sexual Activity     Alcohol use: Yes     Alcohol/week: 0.0 standard drinks     Comment: 1     Drug use: No     Sexual activity: Yes     Partners: Male     Birth control/protection: Surgical   Lifestyle     Physical activity:     Days per week: None     Minutes per session: None     Stress: None   Relationships     Social connections:     Talks on phone: None     Gets together: None     Attends Scientologist service: None     Active member of club or organization: None     Attends meetings of clubs or  organizations: None     Relationship status: None     Intimate partner violence:     Fear of current or ex partner: None     Emotionally abused: None     Physically abused: None     Forced sexual activity: None   Other Topics Concern     Parent/sibling w/ CABG, MI or angioplasty before 65F 55M? No   Social History Narrative     None       ALLERGIES  Allergies   Allergen Reactions     Amlodipine Swelling     Edema on 5mg throat     Lisinopril      Swelling in throat, face  angioedema     Naprosyn [Naproxen]      Swelling, diff breathing       MEDICATIONS acetaminophen (TYLENOL) 500 MG tablet, Take 500-1,000 mg by mouth every 6 hours as needed for mild pain  BETA BLOCKER NOT PRESCRIBED, INTENTIONAL,, Beta Blocker not prescribed intentionally due to Severe Asthma  blood glucose (NO BRAND SPECIFIED) test strip, Use to test blood sugar 3-4 times daily or as directed.  carvedilol (COREG) 25 MG tablet, Take 1 tablet (25 mg) by mouth 2 times daily (with meals)  cholecalciferol ( ULTRA STRENGTH) 2000 units CAPS, Take 2,000 Units by mouth  Cholecalciferol (VITAMIN D3 PO), Take 2,000 Units by mouth daily  fluticasone-salmeterol (WIXELA INHUB) 250-50 MCG/DOSE inhaler, Inhale 1 puff into the lungs  hydrALAZINE (APRESOLINE) 100 MG tablet, Take 0.5 tablets (50 mg) by mouth 2 times daily  insulin aspart (NOVOLOG FLEXPEN) 100 UNIT/ML pen, Per sliding scale before meals and at bedtime. 100-150 1U, 151-200 2U, 201-250 4U, 251-300 6U, 301-350, 8U, 351-400 10U, >400 12U  insulin glargine (BASAGLAR KWIKPEN) 100 UNIT/ML pen, Inject 65 Units Subcutaneous At Bedtime  insulin pen needle (32G X 4 MM) 32G X 4 MM miscellaneous, Use 1 pen needles daily or as directed.  insulin pen needle (ULTICARE MICRO) 32G X 4 MM miscellaneous, Use 4 pen needles daily or as directed.  levalbuterol (XOPENEX HFA) 45 MCG/ACT Inhaler, Inhale 2 puffs into the lungs every 4 hours as needed for shortness of breath / dyspnea or wheezing  lovastatin (MEVACOR) 20  MG tablet, TAKE 1 TABLET BY MOUTH EVERY DAY AT BEDTIME  magnesium oxide (MAG-OX) 400 (241.3 Mg) MG tablet, Take 1 tablet (400 mg) by mouth daily  magnesium oxide (MAG-OX) 400 MG tablet, TAKE 1 TABLET BY MOUTH EVERY DAY  metFORMIN (GLUCOPHAGE) 500 MG tablet, Take 2 tablets (1,000 mg) by mouth 2 times daily (with meals)  metolazone (ZAROXOLYN) 2.5 MG tablet, Take 1 tab on 11/29.  Don't take any more unless directed by the clinic.  montelukast (SINGULAIR) 10 MG tablet, TAKE 1 TABLET BY MOUTH EVERYDAY AT BEDTIME  montelukast (SINGULAIR) 10 MG tablet, Take 10 mg by mouth  potassium chloride ER (K-DUR/KLOR-CON M) 10 MEQ CR tablet, Take 2 tablets (20 mEq) by mouth daily  ranitidine (ZANTAC) 150 MG/10ML syrup, Take 10 mLs (150 mg) by mouth 2 times daily  sacubitril-valsartan (ENTRESTO)  MG per tablet, Take 1 tablet by mouth 2 times daily  spironolactone (ALDACTONE) 50 MG tablet, Take 1 tablet (50 mg) by mouth daily  tiZANidine (ZANAFLEX) 2 MG tablet, TAKE 1-2 TABLETS BY MOUTH 3 TIMES DAILY AS NEEDED FOR MUSCLE SPASMS  torsemide (DEMADEX) 20 MG tablet, Take 40 mg in the am and 20 mg in the afternoon  WIXELA INHUB 250-50 MCG/DOSE inhaler, Inhale 1 puff into the lungs every 12 hours    sodium chloride (PF) 0.9% PF flush 10 mL      ROS:  Constitutional: No fever, chills, or sweats.  Weight stable.   ENT: No visual disturbance, ear ache, epistaxis, sore throat.  +nasal pruritis  Respiratory:  None.   Cardiovascular: As per HPI.   GI: No nausea, vomiting, hematemesis, melena, or hematochezia.   : See HPI  Integument: Negative for rashes or ecchymosis.   Psychiatric: Negative for mood changes, anxiety, or depression.   Neuro: Negative for numbness or tingling.   Endocrinology: Blood sugar controlled.   Musculoskeletal:  +gait instability. No gout or muscle weakness.     EXAM  /84 (BP Location: Left arm, Patient Position: Chair, Cuff Size: Adult Large)   Pulse 87   Wt 131.5 kg (290 lb)   LMP  (LMP Unknown)   SpO2  99%   BMI 46.81 kg/m     Home weight typically is 284 lbs.    General: appears comfortable, alert and articulate  Head: normocephalic, atraumatic  Eyes: anicteric sclera, EOMI  Neck: Supple, no masses.  Orophyarynx: moist mucosa, no cyanosis  Heart: Slightly irregular at times, S1/S2, no murmur, gallop, rub, estimated JVP 10 cm  Lungs: Respirations even and unlabored, lungs clear, no rales or wheezing.    Abdomen: Soft, non-tender, bowel sounds present, no hepatomegaly  Extremities: 2+ pitting edema.  No clubbing or cyanosis.  Neurological: normal speech and affect, no gross motor deficits  Skin:  Warm and dry.      LABS  Last Comprehensive Metabolic Panel:  Sodium   Date Value Ref Range Status   11/14/2019 140 133 - 144 mmol/L Final     Potassium   Date Value Ref Range Status   11/14/2019 4.4 3.4 - 5.3 mmol/L Final     Chloride   Date Value Ref Range Status   11/14/2019 104 94 - 109 mmol/L Final     Carbon Dioxide   Date Value Ref Range Status   11/14/2019 31 20 - 32 mmol/L Final     Anion Gap   Date Value Ref Range Status   11/14/2019 5 3 - 14 mmol/L Final     Glucose   Date Value Ref Range Status   11/14/2019 65 (L) 70 - 99 mg/dL Final     Urea Nitrogen   Date Value Ref Range Status   11/14/2019 16 7 - 30 mg/dL Final     Creatinine   Date Value Ref Range Status   11/14/2019 0.96 0.52 - 1.04 mg/dL Final     GFR Estimate   Date Value Ref Range Status   11/14/2019 69 >60 mL/min/[1.73_m2] Final     Comment:     Non  GFR Calc  Starting 12/18/2018, serum creatinine based estimated GFR (eGFR) will be   calculated using the Chronic Kidney Disease Epidemiology Collaboration   (CKD-EPI) equation.       Calcium   Date Value Ref Range Status   11/14/2019 9.8 8.5 - 10.1 mg/dL Final       MOST RECENT ECHOCARDIOGRAM 10/25/19:  Interpretation Summary  Technically difficult study.  Left ventricular size is normal. Mildly (EF 40-45%, traced 45%) reduced left ventricular function is present.  Right ventricular  function, chamber size, wall motion, and thickness are normal. This study was compared with the study from 3/21/18: The left ventricular function has improved    ASSESSMENT AND PLAN  Bettina Montes is a 49 year old female with NICM who appears mildly hypervolemic due to dietary indiscretion and gaps in her diuretic therapy.  I didn't make any med changes today.  She will obtain labs following this visit and I will make further recommendations from there.    I suggested wearing a Zio monitor to reassess her palpitations, which she declined. I feel that her dietary changes and her alcohol intake are contributing factors to her worsening palpitations. I will obtain a potassium, TSH, and magnesium level to rule out alternative causes for palpitations.  An EKG done in clinic showed NSR with nonspecific ST changes, and no arrhythmias.      I recommended that she avoid alcohol, which she wasn't receptive to, due to her depressed heart function. I reinforced the importance of avoiding binge drinking.  If she is unable to abstain from alcohol, I recommended decreasing her alcohol intake to one serving a few times a week.      She will return to [facility] in three months.  She will follow up with Dr. Chinchilla as scheduled in 6 months.    1. Chronic systolic heart failure/HFrEF (EF 40-45%) secondary to nonischemic cardiomyopathy  NYHA Symptom Class IIIA Stage C  Primary Cardiologist: Dr Chinchilla; Last seen 9/20/19  ACE-I/ARB/ARNi: Entresto 97/103 mg twice daily. Hydralazine 50 mg twice daily  BB yes, Carvedilol 25 mg twice daily  Aldosterone antagonist yes, Spironolactone 50 mg daily.   SCD prophylaxis does not meet criteria for implant EF 40-45%  Fluid status Hypervolemic  Cardiac Rehab: Completed  Sleep Apnea Evaluation: Documented sleep apnea.    Remote PA Pressure Monitoring (CardioMems) Deferred while medical therapy is optimized  Remote monitoring: Mychart active  Antiplatelet: none.   Anticoagulation: None.    2. Hypertension:   /84 today.  Continue Hydralazine 50 mg twice daily.  Continue Entresto 97/103 mg twice daily and Carvedilol 25 mg twice daily.    3. Alcohol use: Typically drinks two servings of wine nightly.  Recommendations as outlined above.     4. Palpitations:  History of NSVT and SVT.  Decrease alcohol intake as outlined above.  Obtain TSH, Potassium level, and magnesium level with today's labs.  If symptoms don't improve with decreasing alcohol intake, then recommend repeating one week Zio monitor.    5.  Nasal Pruritis:  Can't identify any aggravating factors.  Denies any changes in medications, detergents, or skin care regimen/makeup. Recommend saline flushes and Vaseline to apply to nostrils as needed.  May try Loratadine as needed.  Recommend follow up with primary MD if the above measure don't help or symptoms worsen.    6.  Follow-up:   [facility] in three months.  Dr. Chinchilla in June 2020.       Angle GU, SHAZIA  [facility]

## 2019-12-30 NOTE — NURSING NOTE
"Chief Complaint   Patient presents with     RECHECK     Return [facility]; 49 year old female with chronic systolic heart failure; HFrEF EF 40-45% (ECHO 10/25/19). Pt reports increased palpitations, especially at night. CASILLAS is about the same.       Initial /84 (BP Location: Left arm, Patient Position: Chair, Cuff Size: Adult Large)   Pulse 87   Wt 131.5 kg (290 lb)   LMP  (LMP Unknown)   SpO2 99%   BMI 46.81 kg/m   Estimated body mass index is 46.81 kg/m  as calculated from the following:    Height as of 10/3/19: 1.676 m (5' 6\").    Weight as of this encounter: 131.5 kg (290 lb)..  BP completed using cuff size: deisi Dietz MA  "

## 2020-01-02 ENCOUNTER — THERAPY VISIT (OUTPATIENT)
Dept: PHYSICAL THERAPY | Facility: CLINIC | Age: 50
End: 2020-01-02
Payer: OTHER MISCELLANEOUS

## 2020-01-02 DIAGNOSIS — M75.02 ADHESIVE CAPSULITIS OF LEFT SHOULDER: ICD-10-CM

## 2020-01-02 DIAGNOSIS — S46.219A BICEPS TENDON TEAR: ICD-10-CM

## 2020-01-02 PROCEDURE — 97110 THERAPEUTIC EXERCISES: CPT | Mod: GP | Performed by: PHYSICAL THERAPIST

## 2020-01-02 PROCEDURE — 97112 NEUROMUSCULAR REEDUCATION: CPT | Mod: GP | Performed by: PHYSICAL THERAPIST

## 2020-01-02 PROCEDURE — 97032 APPL MODALITY 1+ESTIM EA 15: CPT | Mod: GP | Performed by: PHYSICAL THERAPIST

## 2020-01-06 ENCOUNTER — THERAPY VISIT (OUTPATIENT)
Dept: PHYSICAL THERAPY | Facility: CLINIC | Age: 50
End: 2020-01-06
Payer: OTHER MISCELLANEOUS

## 2020-01-06 DIAGNOSIS — S46.219A BICEPS TENDON TEAR: ICD-10-CM

## 2020-01-06 DIAGNOSIS — M75.02 ADHESIVE CAPSULITIS OF LEFT SHOULDER: ICD-10-CM

## 2020-01-06 PROCEDURE — 97032 APPL MODALITY 1+ESTIM EA 15: CPT | Mod: GP

## 2020-01-06 PROCEDURE — 97110 THERAPEUTIC EXERCISES: CPT | Mod: GP

## 2020-01-08 ENCOUNTER — TELEPHONE (OUTPATIENT)
Dept: PHYSICAL THERAPY | Facility: CLINIC | Age: 50
End: 2020-01-08

## 2020-01-08 ENCOUNTER — THERAPY VISIT (OUTPATIENT)
Dept: PHYSICAL THERAPY | Facility: CLINIC | Age: 50
End: 2020-01-08
Payer: OTHER MISCELLANEOUS

## 2020-01-08 DIAGNOSIS — M75.02 ADHESIVE CAPSULITIS OF LEFT SHOULDER: ICD-10-CM

## 2020-01-08 DIAGNOSIS — S46.219A BICEPS TENDON TEAR: ICD-10-CM

## 2020-01-08 PROCEDURE — 97110 THERAPEUTIC EXERCISES: CPT | Mod: GP | Performed by: PHYSICAL THERAPIST

## 2020-01-08 PROCEDURE — 97014 ELECTRIC STIMULATION THERAPY: CPT | Mod: GP | Performed by: PHYSICAL THERAPIST

## 2020-01-08 NOTE — TELEPHONE ENCOUNTER
Left a message with Dorothy MAYBERRY at [facility] to request 6 more visits of PT for Bettina. Will wait to hear back from her.

## 2020-01-08 NOTE — PROGRESS NOTES
Subjective:  HPI                    Objective:  System    Physical Exam    General     ROS    Assessment/Plan:    PROGRESS  REPORT    Progress reporting period is from 12/6/19 to 1/8/20.       SUBJECTIVE  Subjective changes noted by patient:  Pt notes that she was pumping her arms hard w/fast walking this afternoon and not much pain in the shoulder.    Current pain level is  2/10.     Previous pain level was  2/10 Initial Pain level: 10/10(at worst).   Changes in function:  Yes (See Goal flowsheet attached for changes in current functional level)  Adverse reaction to treatment or activity: None    OBJECTIVE  Changes noted in objective findings:    AROM L shoulder; flex 103 deg, abd 80 deg, ER 50 deg, IR/ext gets thumb to lateral L buttock. PROM L: flex 135 deg w/pain+, abd 100 deg w/pain++, ER 62 deg w/pain.  Strength L: flex 4-/5 w/pain, abd 3+/5 w/pain, ER 4/5 w/pain, biceps 3/5 w/pain++.      ASSESSMENT/PLAN  Updated problem list and treatment plan: Diagnosis 1:  L shoulder adhesive capsulitis w/biceps tear  Pain -  hot/cold therapy, electric stimulation, self management and home program  Decreased ROM/flexibility - manual therapy and therapeutic exercise  Decreased strength - therapeutic exercise and therapeutic activities  Impaired muscle performance - neuro re-education  Decreased function - therapeutic activities  STG/LTGs have been met or progress has been made towards goals:  Yes (See Goal flow sheet completed today.)  Assessment of Progress: The patient's condition has potential to improve.  The patient's progress has plateaued.  The patient's progress has slowed.  Patient is meeting short term goals and is progressing towards long term goals.  Self Management Plans:  Patient has been instructed in a home treatment program.  Patient  has been instructed in self management of symptoms.  I have re-evaluated this patient and find that the nature, scope, duration and intensity of the therapy is appropriate for  the medical condition of the patient.  Bettina continues to require the following intervention to meet STG and LTG's:  PT    Recommendations:  This patient would benefit from continued therapy.     Frequency:  1 X week, once daily  Duration:  for 6 weeks        Please refer to the daily flowsheet for treatment today, total treatment time and time spent performing 1:1 timed codes.

## 2020-01-10 ENCOUNTER — TELEPHONE (OUTPATIENT)
Dept: PHYSICAL THERAPY | Facility: CLINIC | Age: 50
End: 2020-01-10

## 2020-01-10 DIAGNOSIS — S46.219A BICEPS TENDON TEAR: ICD-10-CM

## 2020-01-10 DIAGNOSIS — M75.02 ADHESIVE CAPSULITIS OF LEFT SHOULDER: ICD-10-CM

## 2020-01-12 DIAGNOSIS — E78.2 MIXED HYPERLIPIDEMIA: ICD-10-CM

## 2020-01-12 NOTE — TELEPHONE ENCOUNTER
"Requested Prescriptions   Pending Prescriptions Disp Refills     lovastatin (MEVACOR) 20 MG tablet [Pharmacy Med Name: LOVASTATIN 20 MG TABLET]  Last Written Prescription Date:  6/18/19  Last Fill Quantity: 90,  # refills: 1   Last Office Visit with FMRIGO, JEROME or [facility] prescribing provider:  10/3/19   Future Office Visit:      90 tablet 1     Sig: TAKE 1 TABLET BY MOUTH EVERYDAY AT BEDTIME       Statins Protocol Passed - 1/12/2020  3:02 PM        Passed - LDL on file in past 12 months     Recent Labs   Lab Test 05/17/19  1306   LDL 64             Passed - No abnormal creatine kinase in past 12 months     No lab results found.             Passed - Recent (12 mo) or future (30 days) visit within the authorizing provider's specialty     Patient has had an office visit with the authorizing provider or a provider within the authorizing providers department within the previous 12 mos or has a future within next 30 days. See \"Patient Info\" tab in inbasket, or \"Choose Columns\" in Meds & Orders section of the refill encounter.              Passed - Medication is active on med list        Passed - Patient is age 18 or older        Passed - No active pregnancy on record        Passed - No positive pregnancy test in past 12 months          "

## 2020-01-15 ENCOUNTER — THERAPY VISIT (OUTPATIENT)
Dept: PHYSICAL THERAPY | Facility: CLINIC | Age: 50
End: 2020-01-15
Payer: OTHER MISCELLANEOUS

## 2020-01-15 DIAGNOSIS — S46.219A BICEPS TENDON TEAR: ICD-10-CM

## 2020-01-15 DIAGNOSIS — M75.02 ADHESIVE CAPSULITIS OF LEFT SHOULDER: ICD-10-CM

## 2020-01-15 PROCEDURE — 97035 APP MDLTY 1+ULTRASOUND EA 15: CPT | Mod: GP

## 2020-01-15 PROCEDURE — 97110 THERAPEUTIC EXERCISES: CPT | Mod: GP

## 2020-01-15 PROCEDURE — 97140 MANUAL THERAPY 1/> REGIONS: CPT | Mod: GP

## 2020-01-15 RX ORDER — LOVASTATIN 20 MG
TABLET ORAL
Qty: 90 TABLET | Refills: 1 | Status: SHIPPED | OUTPATIENT
Start: 2020-01-15 | End: 2020-06-05

## 2020-01-15 NOTE — TELEPHONE ENCOUNTER
Prescription approved per [facility] Refill Protocol.      Roberta Covarrubias RN

## 2020-01-17 ENCOUNTER — THERAPY VISIT (OUTPATIENT)
Dept: PHYSICAL THERAPY | Facility: CLINIC | Age: 50
End: 2020-01-17
Payer: OTHER MISCELLANEOUS

## 2020-01-17 DIAGNOSIS — S46.219A BICEPS TENDON TEAR: ICD-10-CM

## 2020-01-17 DIAGNOSIS — M75.02 ADHESIVE CAPSULITIS OF LEFT SHOULDER: ICD-10-CM

## 2020-01-17 PROCEDURE — 97032 APPL MODALITY 1+ESTIM EA 15: CPT | Mod: GP

## 2020-01-17 PROCEDURE — 97110 THERAPEUTIC EXERCISES: CPT | Mod: GP

## 2020-01-21 ENCOUNTER — THERAPY VISIT (OUTPATIENT)
Dept: PHYSICAL THERAPY | Facility: CLINIC | Age: 50
End: 2020-01-21
Payer: OTHER MISCELLANEOUS

## 2020-01-21 DIAGNOSIS — M75.02 ADHESIVE CAPSULITIS OF LEFT SHOULDER: ICD-10-CM

## 2020-01-21 DIAGNOSIS — S46.219A BICEPS TENDON TEAR: ICD-10-CM

## 2020-01-21 PROCEDURE — 97032 APPL MODALITY 1+ESTIM EA 15: CPT | Mod: GP | Performed by: PHYSICAL THERAPIST

## 2020-01-21 PROCEDURE — 97110 THERAPEUTIC EXERCISES: CPT | Mod: GP | Performed by: PHYSICAL THERAPIST

## 2020-01-21 PROCEDURE — 97112 NEUROMUSCULAR REEDUCATION: CPT | Mod: GP | Performed by: PHYSICAL THERAPIST

## 2020-01-22 NOTE — PATIENT INSTRUCTIONS
[facility]    If you have any questions regarding to your visit please contact your care team:       Team Red:   Clinic Hours Telephone Number   Dr. Ambreen Pringle, NP   7am-7pm  Monday - Thursday   7am-5pm  Fridays  [phone]  (Appointment scheduling available 24/7)    Questions about your visit?   Team Line  [phone]   [facility] [place] [facility] 11am-9pm Monday-Friday Saturday-Sunday- 9am-5pm   [place] - 5pm-9pm Monday-Friday Saturday-Sunday- 9am-5pm  [phone] - [place]   [phone] - [place]       What options do I have for visits at the clinic other than the traditional office visit?  To expand how we care for you, many of our providers are utilizing electronic visits (e-visits) and telephone visits, when medically appropriate, for interactions with their patients rather than a visit in the clinic.   We also offer nurse visits for many medical concerns. Just like any other service, we will bill your insurance company for this type of visit based on time spent on the phone with your provider. Not all insurance companies cover these visits. Please check with your medical insurance if this type of visit is covered. You will be responsible for any charges that are not paid by your insurance.      E-visits via [employer]:  generally incur a $35.00 fee.  Telephone visits:  Time spent on the phone: *charged based on time that is spent on the phone in increments of 10 minutes. Estimated cost:   5-10 mins $30.00   11-20 mins. $59.00   21-30 mins. $85.00     Use [employer] (secure email communication and access to your chart) to send your primary care provider a message or make an appointment. Ask someone on your Team how to sign up for [employer].  For a Price Quote for your services, please call our Consumer Price Line at [phone].      As always, Thank you for trusting us with your health care needs!     Detail Level: Zone

## 2020-01-24 ENCOUNTER — THERAPY VISIT (OUTPATIENT)
Dept: PHYSICAL THERAPY | Facility: CLINIC | Age: 50
End: 2020-01-24
Payer: OTHER MISCELLANEOUS

## 2020-01-24 DIAGNOSIS — M75.02 ADHESIVE CAPSULITIS OF LEFT SHOULDER: ICD-10-CM

## 2020-01-24 DIAGNOSIS — S46.219A BICEPS TENDON TEAR: ICD-10-CM

## 2020-01-24 PROCEDURE — 97110 THERAPEUTIC EXERCISES: CPT | Mod: GP | Performed by: PHYSICAL THERAPIST

## 2020-01-24 PROCEDURE — 97032 APPL MODALITY 1+ESTIM EA 15: CPT | Mod: GP | Performed by: PHYSICAL THERAPIST

## 2020-01-24 PROCEDURE — 97140 MANUAL THERAPY 1/> REGIONS: CPT | Mod: GP | Performed by: PHYSICAL THERAPIST

## 2020-01-27 ENCOUNTER — THERAPY VISIT (OUTPATIENT)
Dept: PHYSICAL THERAPY | Facility: CLINIC | Age: 50
End: 2020-01-27
Payer: OTHER MISCELLANEOUS

## 2020-01-27 DIAGNOSIS — M75.02 ADHESIVE CAPSULITIS OF LEFT SHOULDER: ICD-10-CM

## 2020-01-27 DIAGNOSIS — S46.219A BICEPS TENDON TEAR: ICD-10-CM

## 2020-01-27 PROCEDURE — 97032 APPL MODALITY 1+ESTIM EA 15: CPT | Mod: GP | Performed by: PHYSICAL THERAPIST

## 2020-01-27 PROCEDURE — 97110 THERAPEUTIC EXERCISES: CPT | Mod: GP | Performed by: PHYSICAL THERAPIST

## 2020-01-27 PROCEDURE — 97112 NEUROMUSCULAR REEDUCATION: CPT | Mod: GP | Performed by: PHYSICAL THERAPIST

## 2020-01-30 ENCOUNTER — THERAPY VISIT (OUTPATIENT)
Dept: PHYSICAL THERAPY | Facility: CLINIC | Age: 50
End: 2020-01-30
Payer: OTHER MISCELLANEOUS

## 2020-01-30 DIAGNOSIS — S46.219A BICEPS TENDON TEAR: ICD-10-CM

## 2020-01-30 DIAGNOSIS — M75.02 ADHESIVE CAPSULITIS OF LEFT SHOULDER: ICD-10-CM

## 2020-01-30 PROCEDURE — 97110 THERAPEUTIC EXERCISES: CPT | Mod: GP | Performed by: PHYSICAL THERAPIST

## 2020-01-30 NOTE — PROGRESS NOTES
Subjective:  HPI                    Objective:  System    Physical Exam    General     ROS    Assessment/Plan:    PROGRESS  REPORT    Progress reporting period is from 1/8/20 to 1/30/20.       SUBJECTIVE  Subjective changes noted by patient:  Pt notes that she took it easy yesterday, didn't work and only went to a movie. Today her shoulder is feeling a little better, but she also just did desk work at work today (not out on the floor).    Current pain level is 2/10.     Previous pain level was  4/10 at last session Initial Pain level: 10/10(at worst).   Changes in function:  Yes, but then regressed recently, no overall changes.  Adverse reaction to treatment or activity: None    OBJECTIVE  Changes noted in objective findings:    L shoulder AROM: flex 105 deg, abd 75 deg w/pain++, ER 50 deg, IR/ext gets thumb to L lateral buttock w/pain+.  PROM: flex 135 deg w/pain, abd 95 deg, ER 60 deg.  Strength: flex 3/5 w/pain+, abd 2+/5,  ER 4+/5, biceps 3+/5 w/pain.   Initially, her L shoulder PROM was flex 130 deg, abd 90 deg w/pain++, ER 50 deg.       ASSESSMENT/PLAN  Updated problem list and treatment plan: Diagnosis 1:  L biceps tear w/adhesive capsulitis  Pain -  hot/cold therapy, US, self management, education and home program  Decreased ROM/flexibility - manual therapy, therapeutic exercise and home program  Decreased strength - therapeutic exercise, therapeutic activities and home program  Impaired muscle performance - neuro re-education and home program  Decreased function - therapeutic activities and home program  STG/LTGs have been met or progress has been made towards goals:  Yes, but then in the past 2 weeks has regressed, so now has not made any progress toward goals (see Goal Flow Sheet attached)  Assessment of Progress: The patient's condition is unchanged.  The patient has had set backs in their progress.  The patient's condition has exacerbated.  Self Management Plans:  Patient has been instructed in a home  treatment program.  Patient  has been instructed in self management of symptoms.  Patient has been diligent in doing her HEP.  I have re-evaluated this patient and find that the nature, scope, duration and intensity of the therapy is appropriate for the medical condition of the patient.  Bettina continues to require the following intervention to meet STG and LTG's:  Hold off on any further PT at this time.    Recommendations:  This patient would benefit from further evaluation. She has worsened recently in PT and may need additional imaging and/or testing to best determine the next course of action.    Please refer to the daily flowsheet for treatment today, total treatment time and time spent performing 1:1 timed codes.

## 2020-02-19 ENCOUNTER — TELEPHONE (OUTPATIENT)
Dept: PHYSICAL THERAPY | Facility: CLINIC | Age: 50
End: 2020-02-19

## 2020-02-19 DIAGNOSIS — M75.02 ADHESIVE CAPSULITIS OF LEFT SHOULDER: ICD-10-CM

## 2020-02-19 DIAGNOSIS — S46.219A BICEPS TENDON TEAR: ICD-10-CM

## 2020-02-19 NOTE — TELEPHONE ENCOUNTER
Dorothy called back and authorized Bettina's appt/session on 2/26/20 and will wait to hear back from me after that visit.

## 2020-02-19 NOTE — TELEPHONE ENCOUNTER
I left a message to request one visit to be authorized for now until meeting with Bettina on 2/26/20 to better assess how many more visits will be needed since her consult with her orthopedist.

## 2020-03-04 ENCOUNTER — THERAPY VISIT (OUTPATIENT)
Dept: PHYSICAL THERAPY | Facility: CLINIC | Age: 50
End: 2020-03-04
Payer: OTHER MISCELLANEOUS

## 2020-03-04 ENCOUNTER — TELEPHONE (OUTPATIENT)
Dept: PHYSICAL THERAPY | Facility: CLINIC | Age: 50
End: 2020-03-04

## 2020-03-04 DIAGNOSIS — S46.219A BICEPS TENDON TEAR: ICD-10-CM

## 2020-03-04 DIAGNOSIS — M75.02 ADHESIVE CAPSULITIS OF LEFT SHOULDER: ICD-10-CM

## 2020-03-04 PROCEDURE — 97140 MANUAL THERAPY 1/> REGIONS: CPT | Mod: GP | Performed by: PHYSICAL THERAPIST

## 2020-03-04 PROCEDURE — 97110 THERAPEUTIC EXERCISES: CPT | Mod: GP | Performed by: PHYSICAL THERAPIST

## 2020-03-04 NOTE — PROGRESS NOTES
Subjective:  HPI  Physical Exam                    Objective:  System    Physical Exam    General     ROS    Assessment/Plan:    PROGRESS  REPORT    Progress reporting period is from 1/30/20 to 3/4/20.       SUBJECTIVE  Subjective changes noted by patient:   Pt notes that she did get another injection and a few days after, she noticed that she could move her arm farther and has had much less pain than before. She still can't reach behind her back at all, though.    Current pain level is  0/10.     Previous pain level was  5/10 Initial Pain level: 10/10(at worst).   Changes in function:  Yes (See Goal flowsheet attached for changes in current functional level)  Adverse reaction to treatment or activity: None    OBJECTIVE  Changes noted in objective findings:    L shoulder AROM: flex 122, abd 95, ER 55, IR/ext gets thumb to lateral buttock.  PROM L: flex 125, abd 95,  ER 55, IR 58. Strength L = flex 3/5 d/t pain, abd 4-/5 w/slight pain, ER 5/5.     ASSESSMENT/PLAN  Updated problem list and treatment plan: Diagnosis 1:  L shoulder adhesive capsulitis  Pain -  hot/cold therapy, US, self management, education and home program  Decreased ROM/flexibility - manual therapy and therapeutic exercise  Decreased strength - therapeutic exercise and therapeutic activities  Inflammation - US and self management/home program  Impaired muscle performance - neuro re-education  Decreased function - therapeutic activities  STG/LTGs have been met or progress has been made towards goals:  Yes (See Goal flow sheet completed today.)  Assessment of Progress: The patient's condition has potential to improve.  Patient is meeting short term goals and is progressing towards long term goals.  Self Management Plans:  Patient has been instructed in a home treatment program.  Patient  has been instructed in self management of symptoms.  I have re-evaluated this patient and find that the nature, scope, duration and intensity of the therapy is  appropriate for the medical condition of the patient.  Bettina continues to require the following intervention to meet STG and LTG's:  PT    Recommendations:  This patient would benefit from continued therapy.     Frequency:  1 X week, once daily  Duration:  for 8 weeks        Please refer to the daily flowsheet for treatment today, total treatment time and time spent performing 1:1 timed codes.

## 2020-03-05 ENCOUNTER — TELEPHONE (OUTPATIENT)
Dept: PHYSICAL THERAPY | Facility: CLINIC | Age: 50
End: 2020-03-05

## 2020-03-05 DIAGNOSIS — M75.02 ADHESIVE CAPSULITIS OF LEFT SHOULDER: ICD-10-CM

## 2020-03-05 DIAGNOSIS — S46.219A BICEPS TENDON TEAR: ICD-10-CM

## 2020-03-06 DIAGNOSIS — J45.30 MILD PERSISTENT ASTHMA WITHOUT COMPLICATION: ICD-10-CM

## 2020-03-06 NOTE — TELEPHONE ENCOUNTER
"Requested Prescriptions   Pending Prescriptions Disp Refills     montelukast (SINGULAIR) 10 MG tablet [Pharmacy Med Name: MONTELUKAST SOD 10 MG TABLET]  Last Written Prescription Date:  09/10/19  Last Fill Quantity: 90,  # refills: 0   Last Office Visit with [facility], [facility] or [facility] prescribing provider:  10/03/19-Ho   Future Office Visit:    90 tablet 0     Sig: TAKE 1 TABLET BY MOUTH EVERYDAY AT BEDTIME       Leukotriene Inhibitors Protocol Failed - 3/6/2020  7:56 AM        Failed - Asthma control assessment score within normal limits in last 6 months     Please review ACT score.           Passed - Patient is age 12 or older     If patient is under 16, ok to refill using age based dosing.           Passed - Medication is active on med list        Passed - Recent (6 mo) or future (30 days) visit within the authorizing provider's specialty     Patient had office visit in the last 6 months or has a visit in the next 30 days with authorizing provider or within the authorizing provider's specialty.  See \"Patient Info\" tab in inbasket, or \"Choose Columns\" in Meds & Orders section of the refill encounter.              "

## 2020-03-10 RX ORDER — MONTELUKAST SODIUM 10 MG/1
TABLET ORAL
Qty: 90 TABLET | Refills: 0 | Status: SHIPPED | OUTPATIENT
Start: 2020-03-10 | End: 2020-06-25

## 2020-03-10 NOTE — TELEPHONE ENCOUNTER
Routing refill request to provider for review/approval because:  Failed ACT.    Aleena Perry RN, [facility] Triage

## 2020-03-17 ENCOUNTER — PATIENT OUTREACH (OUTPATIENT)
Dept: CARDIOLOGY | Facility: CLINIC | Age: 50
End: 2020-03-17

## 2020-03-17 NOTE — PROGRESS NOTES
Left message for patient to discuss changing CORE appointment on 3/30/20 at 3:30pm to a telephone appointment the same day.  Left return number to call. Will continue to follow.   Heaven Puentes RN Care Coordinator  Heart Failure CORE

## 2020-03-19 ENCOUNTER — PATIENT OUTREACH (OUTPATIENT)
Dept: CARDIOLOGY | Facility: CLINIC | Age: 50
End: 2020-03-19

## 2020-03-19 NOTE — PROGRESS NOTES
Called patient to discuss changing CORE appointment on 3/30/20 at 3:30pm to a telephone appointment the same day. Reviewed with patient why we wanted to change the appt to a telephone call. Patient was not agreeable to the plan of care. Pt did not want to do a telephone call appt. She did not want to reschedule her appointment either. She has an appt scheduled with  in June that she wanted to keep. I asked if she would be agreeable to me calling her periodically to see how she is doing. She was agreeable to that. Will continue to follow.   Heaven Puentes RN Care Coordinator  Heart Failure CORE

## 2020-03-22 ENCOUNTER — HEALTH MAINTENANCE LETTER (OUTPATIENT)
Age: 50
End: 2020-03-22

## 2020-04-28 ENCOUNTER — TELEPHONE (OUTPATIENT)
Dept: CARDIOLOGY | Facility: CLINIC | Age: 50
End: 2020-04-28

## 2020-04-28 NOTE — LETTER
May 1, 2020      Bettina Montes  [address] [address]            To whom it may concern,       This is to certify that Bettina Montes has a serious heart condition that increases her chances of complications from possible COVID-19 infection.  If possible, to reduce her risk of acquiring infection, she should work in a position that minimizes exposure.       Sincerely,     El Chinchilla MD      Cardiac Imaging and Prevention   [facility]   Pager: [phone]

## 2020-04-28 NOTE — TELEPHONE ENCOUNTER
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 PT can be reached at the above # if any questions.      Action Taken: Message routed to:  Adult Clinics: Cardiology p [phone]    Travel Screening: Not Applicable

## 2020-04-29 NOTE — TELEPHONE ENCOUNTER
Called and left message for patient that call was being returned regarding a note and will try back again. Phone number also left for her.     CONCETTA Thomas

## 2020-05-01 NOTE — TELEPHONE ENCOUNTER
Patient called back. 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 With the Covid situation, she is now having to wear a face mask and a face shield. She states that when she is wearing this she begins to get palpitations, and can't breathe well. She wants to continue to work, but cannot do so in this position. She is requesting a note stating that because of her health condition she should be moved to a different position that does not require her to wear PPE.   Advised that I would send a note to Dr Chinchilla asking if he would approve and call her back    CONCETTA Thomas

## 2020-05-01 NOTE — TELEPHONE ENCOUNTER
Second attempt to reach patient regarding a note she is requesting. Left phone number asking her to call back.     CONCETTA Thomas

## 2020-05-01 NOTE — TELEPHONE ENCOUNTER
Called and spoke to patient. Advised that Dr Chinchilla has scripted a letter, sent to her email per her request.    CONCETTA Thomas

## 2020-06-02 ENCOUNTER — TELEPHONE (OUTPATIENT)
Dept: CARDIOLOGY | Facility: CLINIC | Age: 50
End: 2020-06-02

## 2020-06-02 NOTE — TELEPHONE ENCOUNTER
MARBIN Walker.visit with lab. Prior due note. Left message to return clinic call to .  Jerome Rhoades L.P.N.

## 2020-06-05 ENCOUNTER — VIRTUAL VISIT (OUTPATIENT)
Dept: CARDIOLOGY | Facility: CLINIC | Age: 50
End: 2020-06-05
Payer: COMMERCIAL

## 2020-06-05 DIAGNOSIS — E78.2 MIXED HYPERLIPIDEMIA: ICD-10-CM

## 2020-06-05 DIAGNOSIS — I50.22 CHRONIC SYSTOLIC HEART FAILURE (H): ICD-10-CM

## 2020-06-05 DIAGNOSIS — I50.22 CHRONIC SYSTOLIC CONGESTIVE HEART FAILURE (H): ICD-10-CM

## 2020-06-05 DIAGNOSIS — I50.23 ACUTE ON CHRONIC SYSTOLIC HEART FAILURE (H): ICD-10-CM

## 2020-06-05 DIAGNOSIS — I10 ESSENTIAL HYPERTENSION WITH GOAL BLOOD PRESSURE LESS THAN 140/90: ICD-10-CM

## 2020-06-05 PROCEDURE — 99214 OFFICE O/P EST MOD 30 MIN: CPT | Mod: 95 | Performed by: INTERNAL MEDICINE

## 2020-06-05 RX ORDER — CARVEDILOL 25 MG/1
25 TABLET ORAL 2 TIMES DAILY WITH MEALS
Qty: 180 TABLET | Refills: 3 | Status: SHIPPED | OUTPATIENT
Start: 2020-06-05 | End: 2021-07-09

## 2020-06-05 RX ORDER — SPIRONOLACTONE 50 MG/1
50 TABLET, FILM COATED ORAL DAILY
Qty: 90 TABLET | Refills: 3 | Status: SHIPPED | OUTPATIENT
Start: 2020-06-05 | End: 2021-08-17

## 2020-06-05 RX ORDER — HYDRALAZINE HYDROCHLORIDE 100 MG/1
100 TABLET, FILM COATED ORAL 2 TIMES DAILY
Qty: 180 TABLET | Refills: 3 | Status: SHIPPED | OUTPATIENT
Start: 2020-06-05 | End: 2020-06-10

## 2020-06-05 RX ORDER — LOVASTATIN 20 MG
TABLET ORAL
Qty: 90 TABLET | Refills: 1 | Status: SHIPPED | OUTPATIENT
Start: 2020-06-05 | End: 2021-01-14

## 2020-06-05 RX ORDER — HYDRALAZINE HYDROCHLORIDE 100 MG/1
50 TABLET, FILM COATED ORAL 2 TIMES DAILY
Qty: 30 TABLET | Refills: 11 | Status: CANCELLED | OUTPATIENT
Start: 2020-06-05

## 2020-06-05 NOTE — PROGRESS NOTES
"Bettina Montes is a 50 year old female who is being evaluated via a billable video visit.      The patient has been notified of following:     \"This video visit will be conducted via a call between you and your physician/provider. We have found that certain health care needs can be provided without the need for an in-person physical exam.  This service lets us provide the care you need with a video conversation.  If a prescription is necessary we can send it directly to your pharmacy.  If lab work is needed we can place an order for that and you can then stop by our lab to have the test done at a later time.    Video visits are billed at different rates depending on your insurance coverage.  Please reach out to your insurance provider with any questions.    If during the course of the call the physician/provider feels a video visit is not appropriate, you will not be charged for this service.\"    Patient has given verbal consent for Video visit? Yes    How would you like to obtain your AVS? Baltazar    Patient would like the video invitation sent by: text [phone]    Will anyone else be joining your video visit? no      [facility] Cardiology Virtual Visit:    Assessment and Plan:     1.  Chronic systolic heart failure, LVEF of 45%, idiopathic nonischemic cardiomyopathy (stress MRI - no ischemia, mild septal fibrosis): Improved LV fxn with medical rx.   Continue Carvedilol to 25 mg BID, Entresto  mg BID, Hydralazine 50 mg BID, and Spironolactone 50 mg daily  Continue torsemide. Labs next week.    Given symptoms of dyspnea on exertion and abdominal discomfort, I am concerned that this may represent fluid overload.  I think she needs an in person evaluation. Will schedule for in-person CORE or CHF clinic visit at [facility], re: chf  Virtual CORE visit with Heide in 3 months.   Echo in 4 months.   F/u with me annual.     2. Hypertension  3. Obesity  4. Diabetes  5. Asthma       El Chinchilla, " MD    Cardiac Imaging and Prevention  [facility]  [email or web address] Pager: [phone]    HPI: Overall she has been in stable health since her last visit.  She initially followed up regularly with the core clinic for medication titration.  However since her regular provider left, she was hesitant to do a virtual visit with a new provider.  Echocardiogram done late last year showed improvement in her LV function to 45% range.  She has been on a stable CHF regimen.  Her Lasix was switched to torsemide.  She reports worsening dyspnea on exertion recently.  She has not been checking her blood pressure or weights regularly.  In addition, she also complains of having abdominal discomfort that she thinks is related to her pills.  She will occasionally skip pills and feels that this helps with her abdominal discomfort.  There are no recent labs for me to review.  She is also scheduled to have repeat orthopedic surgery done.    EXAM:  GEN/CONSTITUIONAL: Appears comfortable, in no apparent distress   EYES: No icterus noted.   JVP:  Not visible  RESPIRATORY: No cough or labored breathing   NEUROLOGIC: No tremor noted  PSYCHIATRIC: Normal affect  EXT: No edema noted.  Skin: No rash visible  The rest of a comprehensive physical examination is deferred due to PHE (public health emergency) video visit restrictions.    PAST MEDICAL HISTORY:  Past Medical History:   Diagnosis Date     Abnormal Pap smear of cervix 09/10/2019    See problem list     Adrenal gland anomaly      CHF (congestive heart failure) (H)      Fatty liver      Gastroesophageal reflux disease      Hyperlipidemia      Hypertension      Mild persistent asthma      NICM (nonischemic cardiomyopathy) (H)      Obesity      WILLARD (obstructive sleep apnea) 1/9/2015     Type 2 diabetes mellitus (H)        CURRENT MEDICATIONS:  Current Outpatient Medications   Medication     acetaminophen (TYLENOL) 500 MG tablet     carvedilol (COREG) 25 MG  tablet     Cholecalciferol (VITAMIN D3 PO)     fluticasone-salmeterol (WIXELA INHUB) 250-50 MCG/DOSE inhaler     hydrALAZINE (APRESOLINE) 100 MG tablet     insulin glargine (BASAGLAR KWIKPEN) 100 UNIT/ML pen     levalbuterol (XOPENEX HFA) 45 MCG/ACT Inhaler     lovastatin (MEVACOR) 20 MG tablet     magnesium oxide (MAG-OX) 400 MG tablet     metFORMIN (GLUCOPHAGE) 500 MG tablet     montelukast (SINGULAIR) 10 MG tablet     potassium chloride ER (K-DUR/KLOR-CON M) 10 MEQ CR tablet     sacubitril-valsartan (ENTRESTO)  MG per tablet     spironolactone (ALDACTONE) 50 MG tablet     tiZANidine (ZANAFLEX) 2 MG tablet     BETA BLOCKER NOT PRESCRIBED, INTENTIONAL,     blood glucose (NO BRAND SPECIFIED) test strip     cholecalciferol ( ULTRA STRENGTH) 2000 units CAPS     insulin aspart (NOVOLOG FLEXPEN) 100 UNIT/ML pen     insulin pen needle (32G X 4 MM) 32G X 4 MM miscellaneous     insulin pen needle (ULTICARE MICRO) 32G X 4 MM miscellaneous     magnesium oxide (MAG-OX) 400 (241.3 Mg) MG tablet     metolazone (ZAROXOLYN) 2.5 MG tablet     montelukast (SINGULAIR) 10 MG tablet     ranitidine (ZANTAC) 150 MG/10ML syrup     torsemide (DEMADEX) 20 MG tablet     WIXELA INHUB 250-50 MCG/DOSE inhaler     No current facility-administered medications for this visit.      Facility-Administered Medications Ordered in Other Visits   Medication     sodium chloride (PF) 0.9% PF flush 10 mL       PAST SURGICAL HISTORY:  Past Surgical History:   Procedure Laterality Date     COLONOSCOPY       DISCECTOMY, FUSION CERVICAL ANTERIOR TWO LEVELS, COMBINED N/A 4/5/2019    Procedure: C4-6 anterior cervical discectomy and fusion;  Surgeon: Hollis Hurtdao MD;  Location: [facility] OR     TUBAL LIGATION         ALLERGIES     Allergies   Allergen Reactions     Amlodipine Swelling     Edema on 5mg throat     Lisinopril      Swelling in throat, face  angioedema     Naprosyn [Naproxen]      Swelling, diff breathing       FAMILY  HISTORY:  Family History   Problem Relation Age of Onset     Diabetes Mother      Hypertension Mother      Hyperlipidemia Mother      Heart Failure Mother      Diabetes Father      Cancer Paternal Grandmother         ?       SOCIAL HISTORY:  Social History     Socioeconomic History     Marital status: Single     Spouse name: Not on file     Number of children: 1     Years of education: Not on file     Highest education level: Not on file   Occupational History     Occupation: CNA     Employer: OTHER   Social Needs     Financial resource strain: Not on file     Food insecurity     Worry: Not on file     Inability: Not on file     Transportation needs     Medical: Not on file     Non-medical: Not on file   Tobacco Use     Smoking status: Former Smoker     Packs/day: 1.00     Years: 30.00     Pack years: 30.00     Types: Cigarettes     Last attempt to quit: 2013     Years since quittin.4     Smokeless tobacco: Former User     Tobacco comment:     Substance and Sexual Activity     Alcohol use: Yes     Alcohol/week: 0.0 standard drinks     Comment: 1     Drug use: No     Sexual activity: Yes     Partners: Male     Birth control/protection: Surgical   Lifestyle     Physical activity     Days per week: Not on file     Minutes per session: Not on file     Stress: Not on file   Relationships     Social connections     Talks on phone: Not on file     Gets together: Not on file     Attends Yazidi service: Not on file     Active member of club or organization: Not on file     Attends meetings of clubs or organizations: Not on file     Relationship status: Not on file     Intimate partner violence     Fear of current or ex partner: Not on file     Emotionally abused: Not on file     Physically abused: Not on file     Forced sexual activity: Not on file   Other Topics Concern     Parent/sibling w/ CABG, MI or angioplasty before 65F 55M? No   Social History Narrative     Not on file       ROS:   Constitutional: No  fever, chills, or sweats. No weight gain/loss   ENT: No visual disturbance, ear ache, epistaxis, sore throat  Allergies/Immunologic: Negative.   Respiratory: No cough, hemoptysia  Cardiovascular: As per HPI  GI: No nausea, vomiting, hematemesis, melena, or hematochezia  : No urinary frequency, dysuria, or hematuria  Integument: Negative  Psychiatric: Negative  Neuro: Negative  Endocrinology: Negative   Musculoskeletal: Negative    ADDITIONAL COMMENTS:     I reviewed the patient's medications:     I reviewed the patient's pertinent clinical laboratory studies:     I reviewed the patient's pertinent imaging studies:   I reviewed the patient's ECG:       Video-Visit Details    Type of service:  Video Visit    Video Start Time: 3:42 PM  Video End Time: 4:15 PM    Originating Location (pt. Location): Home    Distant Location (provider location):  [facility]     Platform used for Video Visit: Nic Chinchilla MD

## 2020-06-05 NOTE — PATIENT INSTRUCTIONS
No change in meds.   Labs next week.   Please schedule for in-person CORE or CHF clinic visit at [facility], re: chf  Virtual CORE visit with Heide in 3 months.   Echo in 4 months.   F/u with me annual.

## 2020-06-08 ENCOUNTER — TELEPHONE (OUTPATIENT)
Dept: CARDIOLOGY | Facility: CLINIC | Age: 50
End: 2020-06-08

## 2020-06-08 DIAGNOSIS — I50.23 ACUTE ON CHRONIC SYSTOLIC HEART FAILURE (H): ICD-10-CM

## 2020-06-08 DIAGNOSIS — I50.22 CHRONIC SYSTOLIC CONGESTIVE HEART FAILURE (H): ICD-10-CM

## 2020-06-08 LAB
ANION GAP SERPL CALCULATED.3IONS-SCNC: 9 MMOL/L (ref 3–14)
BUN SERPL-MCNC: 11 MG/DL (ref 7–30)
CALCIUM SERPL-MCNC: 9.5 MG/DL (ref 8.5–10.1)
CHLORIDE SERPL-SCNC: 99 MMOL/L (ref 94–109)
CO2 SERPL-SCNC: 28 MMOL/L (ref 20–32)
CREAT SERPL-MCNC: 0.86 MG/DL (ref 0.52–1.04)
ERYTHROCYTE [DISTWIDTH] IN BLOOD BY AUTOMATED COUNT: 14.3 % (ref 10–15)
GFR SERPL CREATININE-BSD FRML MDRD: 78 ML/MIN/{1.73_M2}
GLUCOSE SERPL-MCNC: 123 MG/DL (ref 70–99)
HCT VFR BLD AUTO: 37.3 % (ref 35–47)
HGB BLD-MCNC: 12 G/DL (ref 11.7–15.7)
MCH RBC QN AUTO: 26.4 PG (ref 26.5–33)
MCHC RBC AUTO-ENTMCNC: 32.2 G/DL (ref 31.5–36.5)
MCV RBC AUTO: 82 FL (ref 78–100)
PLATELET # BLD AUTO: 395 10E9/L (ref 150–450)
POTASSIUM SERPL-SCNC: 4 MMOL/L (ref 3.4–5.3)
RBC # BLD AUTO: 4.54 10E12/L (ref 3.8–5.2)
SODIUM SERPL-SCNC: 136 MMOL/L (ref 133–144)
WBC # BLD AUTO: 10.4 10E9/L (ref 4–11)

## 2020-06-08 PROCEDURE — 85027 COMPLETE CBC AUTOMATED: CPT | Performed by: NURSE PRACTITIONER

## 2020-06-08 PROCEDURE — 36415 COLL VENOUS BLD VENIPUNCTURE: CPT | Performed by: NURSE PRACTITIONER

## 2020-06-08 PROCEDURE — 80048 BASIC METABOLIC PNL TOTAL CA: CPT | Performed by: NURSE PRACTITIONER

## 2020-06-08 NOTE — TELEPHONE ENCOUNTER
[facility] Call Center    Phone Message    May a detailed message be left on voicemail: yes     Reason for Call: Other: Shayla from [facility] calling regarding Bettina. She states Bettina had a virtual visit with Dr. Chinchilla on 6/5/20 and it was requested by Dr. Chinchilla that Bettina followup with an in person office visit with CORE clinic or CHF clinic at [facility] as soon as possible due to concern with abdomnial discomfort and fluid overload. Please review and give the patient a call to assist with scheduling. Thank you.     Action Taken: Message routed to:  [facility] ([facility]): Cardiology    Travel Screening: Not Applicable

## 2020-06-08 NOTE — TELEPHONE ENCOUNTER
Called pt to get her scheduled with the HF DOD on Wednesday as there are no CORE face to face appts this week. . Reached CRISSY LEON requesting a return call. Kaykay Olivas RN CORE Care Coordinator

## 2020-06-08 NOTE — TELEPHONE ENCOUNTER
[facility] Call Center    Phone Message    May a detailed message be left on voicemail: yes     Reason for Call: Other: Pt is returning call from [place]. Please follow up     Action Taken: Message routed to:  [facility] ([facility]): Heart    Travel Screening: Not Applicable

## 2020-06-08 NOTE — TELEPHONE ENCOUNTER
Returned call to pt. She is scheduled with the DOD on Wednesday at 10 am with labs prior. Kaykay Olivas RN CORE Care Coordinator

## 2020-06-10 ENCOUNTER — OFFICE VISIT (OUTPATIENT)
Dept: CARDIOLOGY | Facility: CLINIC | Age: 50
End: 2020-06-10
Attending: INTERNAL MEDICINE
Payer: COMMERCIAL

## 2020-06-10 VITALS
HEART RATE: 95 BPM | DIASTOLIC BLOOD PRESSURE: 81 MMHG | BODY MASS INDEX: 47.09 KG/M2 | HEIGHT: 66 IN | SYSTOLIC BLOOD PRESSURE: 113 MMHG | WEIGHT: 293 LBS | OXYGEN SATURATION: 98 %

## 2020-06-10 DIAGNOSIS — I50.23 ACUTE ON CHRONIC SYSTOLIC HEART FAILURE (H): ICD-10-CM

## 2020-06-10 PROCEDURE — 99215 OFFICE O/P EST HI 40 MIN: CPT | Mod: ZP | Performed by: INTERNAL MEDICINE

## 2020-06-10 PROCEDURE — G0463 HOSPITAL OUTPT CLINIC VISIT: HCPCS | Mod: ZF

## 2020-06-10 RX ORDER — HYDRALAZINE HYDROCHLORIDE 100 MG/1
50 TABLET, FILM COATED ORAL 2 TIMES DAILY
Qty: 90 TABLET | Refills: 3 | Status: SHIPPED | OUTPATIENT
Start: 2020-06-10 | End: 2020-09-16

## 2020-06-10 ASSESSMENT — MIFFLIN-ST. JEOR: SCORE: 1988.01

## 2020-06-10 ASSESSMENT — PAIN SCALES - GENERAL: PAINLEVEL: NO PAIN (0)

## 2020-06-10 NOTE — NURSING NOTE
Chief Complaint   Patient presents with     New Patient     NHF     Vitals were taken and medications were reconciled.     Maty Monzon CMA    8:15 AM

## 2020-06-10 NOTE — NURSING NOTE
Diet: Patient instructed regarding a heart failure healthy diet, including discussion of reduced fat and 2000 mg daily sodium restriction, daily weights, medication purpose and compliance, fluid restrictions and resources for patient and family to access for assistance with heart failure management.       Labs: Patient was given results of the laboratory testing obtained today and patient was instructed about when to return for the next laboratory testing.     Med Reconcile: Reviewed and verified all current medications with the patient. The updated medication list was printed and given to the patient.     Med changes: reminded her of the importance of taking her torsemide 2 times daily and decreased hydralazine 50 mg BID    Return Appointment: Patient given instructions regarding scheduling next clinic visit: CORE in 6-8 weeks, she would really like to be seen in person    Patient stated she understood all health information given and agreed to call with further questions or concerns.     Nathalia Mcnulty RN

## 2020-06-10 NOTE — LETTER
6/10/2020      RE: Bettina Montes  [address] [facility] [place] [address]       Dear Colleague,    Thank you for the opportunity to participate in the care of your patient, Bettina Montes, at the [facility] at [facility]. Please see a copy of my visit note below.      Dear Colleagues:       I had the pleasure of seeing Bettina Montes in the [facility] for an in-person volume check given worsening shortness of breath and weight gain    .  As you know, she is a very pleasant, 50-year-old woman with a past medical history of heart failure with reduced ejection fraction.  Her initial ejection fraction was in the 30%-35% range and now is 40%-45% on medical therapy.  She has several risk factors for heart failure, including hypertension, diabetes and obesity.         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  Even walking a few blocks causes her to wheeze substantially.  She endorses PND and orthopnea.  She denies lower extremity edema or increased abdominal girth; however, her weight is up between 5-10 pounds.  She feels her palpitations are improved over her last visit.  She denies chest heaviness or pressure.  She has been using her inhalers.      She also complains of some dizziness with position changes, and blood pressures at work have been 90s-110s systolic.      PAST MEDICAL HISTORY:  Heart failure as detailed above, fatty liver, GERD, hypertension, hyperlipidemia, mild persistent asthma, type 2 diabetes.      FAMILY HISTORY:  Notable for mother with diabetes, hypertension and heart failure.  Father with diabetes and hypertension as well.        ALLERGIES:  Amlodipine, lisinopril and naproxen.      CURRENT MEDICATIONS:   1.  Coreg 25 b.i.d.    2.  Hydralazine 100 twice a day.     3.  Fluticasone salmeterol 250/500, 1 puff daily.   4.  Insulin.     5.  Albuterol  p.r.n.    6.  Lovastatin 20 daily.   7.  Metformin 1000 twice a day.     8.  Metolazone p.r.n.   9.  Potassium 20 daily.     10.  Entresto 97/103.   11.  Spironolactone 50 daily.     12.  Torsemide 40 in the morning.  She is not taking the afternoon dose of 20 mg that she had taken before.      REVIEW OF SYSTEMS:   CONSTITUTIONAL:  Positive for fatigue.  Weight is increased.  No fevers, chills or sweats.   ENT:  No earache, epistaxis, sore throat.     RESPIRATORY:  See HPI.   CARDIOVASCULAR:  See HPI.   GASTROINTESTINAL:  No nausea, vomiting, hematemesis, melena, hematochezia.   GENITOURINARY:  Negative.   SKIN:  Negative.   PSYCHIATRIC:  Does report some increase in stress over recent events in [place].     NEUROLOGIC:  Negative.     MUSCULOSKELETAL:  No gait difficulty.  No muscle weakness.        PHYSICAL EXAMINATION:     VITAL SIGNS:  Blood pressure 113/81, pulse 95, height 5 feet 6 inches, weight 295, oxygen saturation 98%, BMI 48.   GENERAL:  She appears comfortable, alert and articulate, although getting up on the exam table caused wheezing.     HEENT:  Moist mucous membranes.  No scleral icterus.   NECK:  No thyromegaly or masses.   CARDIOVASCULAR:  Regular rate and rhythm.  S1 and S2.  No murmur.  JVP estimated at 14 cm.   LUNGS:  Expiratory wheeze is present; otherwise, no fine crackles appreciated.   EXTREMITIES:  Trace to 1+ lower extremity edema.   ABDOMEN:  Soft, nontender, nondistended.   SKIN:  Warm and dry.       Data:      Most recent echocardiogram was reviewed showing ejection fraction 40%-45%, normal RV size and function.  Zio patch from 01/25/2020 shows no VT, AF or heart block.       Cardiac MRI performed 03/27/2019 showed an ejection fraction of 32%.  Normal atrial size.  No evidence of infiltrative disease.  No evidence of scar and no inducible ischemia.      In summary, this is a very pleasant, 50-year-old woman with a history of nonischemic cardiomyopathy who presents today with weight  gain and worsening symptoms.  Her ejection fraction has improved on medical therapy to 40%-45%.  While it is reassuring that she has had a negative stress MRI, a definitive ischemia evaluation could be considered in the future.      She is on an excellent medical regimen, including max-dose Entresto, Coreg and spironolactone.  Given she is having intermittent dizziness and low blood pressures in the setting of volume overload on exam, we are reducing her hydralazine to 50 twice a day.  If she is still having blood pressures that are 90 to low 100s systolic at work, she could consider stopping the hydralazine.  I am increasing her torsemide to 40 and 20 again.  She was not taking the afternoon dose of 20.  I am scheduling an interval C.O.R.E. visit in 2 months prior to her previously scheduled one in September.  She is NYHA class II, stage C.      Other things that could be considered include Jardiance as well as a CardioMEMS given difficult-to-control volume.  Given she has not had a hospitalization, she could potentially qualify for the GUIDE trial.      Please feel free to call me if you have any further questions.  I would be happy to see her back at any time.      Sincerely,      Maddy Fournier MD   Cardiology Staff     Please do not hesitate to contact me if you have any questions/concerns.     Sincerely,      Cardiology Advanced DOD

## 2020-06-10 NOTE — PROGRESS NOTES
Service Date: 06/10/2020      RE: Bettina Montes    MRN: [id number]   : 1970      Dear Colleagues:       I had the pleasure of seeing Bettina Montes in the [facility] for an in-person volume check given worsening shortness of breath and weight gain    .  As you know, she is a very pleasant, 50-year-old woman with a past medical history of heart failure with reduced ejection fraction.  Her initial ejection fraction was in the 30%-35% range and now is 40%-45% on medical therapy.  She has several risk factors for heart failure, including hypertension, diabetes and obesity.         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  Even walking a few blocks causes her to wheeze substantially.  She endorses PND and orthopnea.  She denies lower extremity edema or increased abdominal girth; however, her weight is up between 5-10 pounds.  She feels her palpitations are improved over her last visit.  She denies chest heaviness or pressure.  She has been using her inhalers.      She also complains of some dizziness with position changes, and blood pressures at work have been 90s-110s systolic.      PAST MEDICAL HISTORY:  Heart failure as detailed above, fatty liver, GERD, hypertension, hyperlipidemia, mild persistent asthma, type 2 diabetes.      FAMILY HISTORY:  Notable for mother with diabetes, hypertension and heart failure.  Father with diabetes and hypertension as well.        ALLERGIES:  Amlodipine, lisinopril and naproxen.      CURRENT MEDICATIONS:   1.  Coreg 25 b.i.d.    2.  Hydralazine 100 twice a day.     3.  Fluticasone salmeterol 250/500, 1 puff daily.   4.  Insulin.     5.  Albuterol p.r.n.    6.  Lovastatin 20 daily.   7.  Metformin 1000 twice a day.     8.  Metolazone p.r.n.   9.  Potassium 20 daily.     10.  Entresto 97/103.   11.  Spironolactone 50 daily.     12.  Torsemide 40 in the morning.  She is not taking  the afternoon dose of 20 mg that she had taken before.      REVIEW OF SYSTEMS:   CONSTITUTIONAL:  Positive for fatigue.  Weight is increased.  No fevers, chills or sweats.   ENT:  No earache, epistaxis, sore throat.     RESPIRATORY:  See HPI.   CARDIOVASCULAR:  See HPI.   GASTROINTESTINAL:  No nausea, vomiting, hematemesis, melena, hematochezia.   GENITOURINARY:  Negative.   SKIN:  Negative.   PSYCHIATRIC:  Does report some increase in stress over recent events in [place].     NEUROLOGIC:  Negative.     MUSCULOSKELETAL:  No gait difficulty.  No muscle weakness.        PHYSICAL EXAMINATION:     VITAL SIGNS:  Blood pressure 113/81, pulse 95, height 5 feet 6 inches, weight 295, oxygen saturation 98%, BMI 48.   GENERAL:  She appears comfortable, alert and articulate, although getting up on the exam table caused wheezing.     HEENT:  Moist mucous membranes.  No scleral icterus.   NECK:  No thyromegaly or masses.   CARDIOVASCULAR:  Regular rate and rhythm.  S1 and S2.  No murmur.  JVP estimated at 14 cm.   LUNGS:  Expiratory wheeze is present; otherwise, no fine crackles appreciated.   EXTREMITIES:  Trace to 1+ lower extremity edema.   ABDOMEN:  Soft, nontender, nondistended.   SKIN:  Warm and dry.       Data:      Most recent echocardiogram was reviewed showing ejection fraction 40%-45%, normal RV size and function.  Zio patch from 01/25/2020 shows no VT, AF or heart block.       Cardiac MRI performed 03/27/2019 showed an ejection fraction of 32%.  Normal atrial size.  No evidence of infiltrative disease.  No evidence of scar and no inducible ischemia.      In summary, this is a very pleasant, 50-year-old woman with a history of nonischemic cardiomyopathy who presents today with weight gain and worsening symptoms.  Her ejection fraction has improved on medical therapy to 40%-45%.  While it is reassuring that she has had a negative stress MRI, a definitive ischemia evaluation could be considered in the future.      She  is on an excellent medical regimen, including max-dose Entresto, Coreg and spironolactone.  Given she is having intermittent dizziness and low blood pressures in the setting of volume overload on exam, we are reducing her hydralazine to 50 twice a day.  If she is still having blood pressures that are 90 to low 100s systolic at work, she could consider stopping the hydralazine.  I am increasing her torsemide to 40 and 20 again.  She was not taking the afternoon dose of 20.  I am scheduling an interval C.O.R.E. visit in 2 months prior to her previously scheduled one in September.  She is NYHA class II, stage C.      Other things that could be considered include Jardiance as well as a CardioMEMS given difficult-to-control volume.  Given she has not had a hospitalization, she could potentially qualify for the GUIDE trial.      Please feel free to call me if you have any further questions.  I would be happy to see her back at any time.      Sincerely,      Sajan Kaur MD   Cardiology Staff         SAJAN KAUR MD             D: 06/10/2020   T: 06/10/2020   MT: milton      Name:     FERNNADO PIERCE   MRN:      [id number]        Account:      [id number]   :      1970           Service Date: 06/10/2020      Document: [id number]

## 2020-06-10 NOTE — PATIENT INSTRUCTIONS
Cardiology Providers you saw during your visit:  Dr. Fournier    Medication changes:  1.  Please take the torsemide 40 mg in the morning and 20 mg in the afternoon until you have lost 4-5 lbs  2.  Decrease your hydralazine to 50 mg twice a day to see if this helps with the dizziness    Follow up:  1.  Follow up with Heide VILLAGRAN in 6-8 weeks    Labs:  Results for FERNANDO PEIRCE (MRN [id number]) as of 6/10/2020 08:46   Ref. Range 6/8/2020 12:36   Sodium Latest Ref Range: 133 - 144 mmol/L 136   Potassium Latest Ref Range: 3.4 - 5.3 mmol/L 4.0   Chloride Latest Ref Range: 94 - 109 mmol/L 99   Carbon Dioxide Latest Ref Range: 20 - 32 mmol/L 28   Urea Nitrogen Latest Ref Range: 7 - 30 mg/dL 11   Creatinine Latest Ref Range: 0.52 - 1.04 mg/dL 0.86   GFR Estimate Latest Ref Range: >60 mL/min/1.73_m2 78   GFR Estimate If Black Latest Ref Range: >60 mL/min/1.73_m2 90   Calcium Latest Ref Range: 8.5 - 10.1 mg/dL 9.5   Anion Gap Latest Ref Range: 3 - 14 mmol/L 9   Glucose Latest Ref Range: 70 - 99 mg/dL 123 (H)   WBC Latest Ref Range: 4.0 - 11.0 10e9/L 10.4   Hemoglobin Latest Ref Range: 11.7 - 15.7 g/dL 12.0   Hematocrit Latest Ref Range: 35.0 - 47.0 % 37.3   Platelet Count Latest Ref Range: 150 - 450 10e9/L 395   RBC Count Latest Ref Range: 3.8 - 5.2 10e12/L 4.54   MCV Latest Ref Range: 78 - 100 fl 82   MCH Latest Ref Range: 26.5 - 33.0 pg 26.4 (L)   MCHC Latest Ref Range: 31.5 - 36.5 g/dL 32.2   RDW Latest Ref Range: 10.0 - 15.0 % 14.3       Please call if you have :  1. Weight gain of more than 2 pounds in a day or 5 pounds in a week  2. Increased shortness of breath, swelling or bloating  3. Dizziness, lightheadedness   4. Any questions or concerns.       Follow the American Heart Association Diet and Lifestyle recommendations:  Limit saturated fat, trans fat, sodium, red meat, sweets and sugar-sweetened beverages. If you choose to eat red meat, compare labels and select the leanest cuts available.  Aim for at least 150  "minutes of moderate physical activity or 75 minutes of vigorous physical activity - or an equal combination of both - each week.      During business hours: [phone], press option # 1 to be routed to the [facility] then option # 4 for \"To send a message to your care team\"      After hours, weekends or holidays: On Call Cardiologist- [phone]   option #4 and ask to speak to the on-call Cardiologist. Inform them you are a CORE/heart failure patient at the [facility].      Heart Failure Support Group  [facility]  The Board Room, 8th Floor  [address] [place] [address]     Meetings   1-2 pm  January 6  March 2  May 4  July 6  September 14  November 2      Nathalia Mcnulty RN BSN CHFN  Cardiology Care Coordinator - Heart Failure/ C.O.R.E. Clinic  [facility]   Questions and [phone]   First press #1 for the [place] [facility] and then press #4 for \"To send a message to your care team\"    "

## 2020-06-18 DIAGNOSIS — Z79.4 TYPE 2 DIABETES MELLITUS WITH HYPERGLYCEMIA, WITH LONG-TERM CURRENT USE OF INSULIN (H): ICD-10-CM

## 2020-06-18 DIAGNOSIS — E11.65 TYPE 2 DIABETES MELLITUS WITH HYPERGLYCEMIA, WITH LONG-TERM CURRENT USE OF INSULIN (H): ICD-10-CM

## 2020-06-22 NOTE — TELEPHONE ENCOUNTER
Please call patient and assist with scheduling a visit. Patient over due for diabetes visit.  Please ask patient if they have enough medication to last them until appointment and then route back to RNs.    Marie Mccormack RN

## 2020-06-23 NOTE — TELEPHONE ENCOUNTER
This writer attempted to contact pt on 06/23/20      Reason for call schedule virtual visit and left message.      If patient calls back:   Schedule virtual appointment within 2 weeks with primary care, document that pt called and close encounter     Also sent [employer] message    Mindy Velazquez MA

## 2020-06-25 ENCOUNTER — VIRTUAL VISIT (OUTPATIENT)
Dept: FAMILY MEDICINE | Facility: CLINIC | Age: 50
End: 2020-06-25
Payer: COMMERCIAL

## 2020-06-25 DIAGNOSIS — Z79.4 TYPE 2 DIABETES MELLITUS WITH HYPERGLYCEMIA, WITH LONG-TERM CURRENT USE OF INSULIN (H): ICD-10-CM

## 2020-06-25 DIAGNOSIS — E66.01 OBESITY, CLASS III, BMI 40-49.9 (MORBID OBESITY) (H): ICD-10-CM

## 2020-06-25 DIAGNOSIS — J45.30 MILD PERSISTENT ASTHMA WITHOUT COMPLICATION: ICD-10-CM

## 2020-06-25 DIAGNOSIS — E11.65 TYPE 2 DIABETES MELLITUS WITH HYPERGLYCEMIA, WITH LONG-TERM CURRENT USE OF INSULIN (H): ICD-10-CM

## 2020-06-25 DIAGNOSIS — I42.8 NONISCHEMIC CARDIOMYOPATHY (H): ICD-10-CM

## 2020-06-25 DIAGNOSIS — E78.2 MIXED HYPERLIPIDEMIA: ICD-10-CM

## 2020-06-25 DIAGNOSIS — I10 ESSENTIAL HYPERTENSION: Primary | ICD-10-CM

## 2020-06-25 DIAGNOSIS — I47.29 NSVT (NONSUSTAINED VENTRICULAR TACHYCARDIA) (H): ICD-10-CM

## 2020-06-25 PROCEDURE — 99214 OFFICE O/P EST MOD 30 MIN: CPT | Mod: 95 | Performed by: FAMILY MEDICINE

## 2020-06-25 RX ORDER — MONTELUKAST SODIUM 10 MG/1
10 TABLET ORAL AT BEDTIME
Qty: 90 TABLET | Refills: 1 | Status: SHIPPED | OUTPATIENT
Start: 2020-06-25 | End: 2020-09-22

## 2020-06-25 NOTE — PROGRESS NOTES
"Bettina Montes is a 50 year old female who is being evaluated via a billable telephone visit.      The patient has been notified of following:     \"This telephone visit will be conducted via a call between you and your physician/provider. We have found that certain health care needs can be provided without the need for a physical exam.  This service lets us provide the care you need with a short phone conversation.  If a prescription is necessary we can send it directly to your pharmacy.  If lab work is needed we can place an order for that and you can then stop by our lab to have the test done at a later time.    Telephone visits are billed at different rates depending on your insurance coverage. During this emergency period, for some insurers they may be billed the same as an in-person visit.  Please reach out to your insurance provider with any questions.    If during the course of the call the physician/provider feels a telephone visit is not appropriate, you will not be charged for this service.\"    Patient has given verbal consent for Telephone visit?  Yes    What phone number would you like to be contacted at? [phone]    How would you like to obtain your AVS? Mail a copy    Subjective     Bettina Montes is a 50 year old female who presents via phone visit today for the following health issues:    HPI  Diabetes Follow-up    How often are you checking your blood sugar? A few times a month when not feeling good  What time of day are you checking your blood sugars (select all that apply)?  varies  Have you had any blood sugars above 200?  Yes  Have you had any blood sugars below 70?  No    What symptoms do you notice when your blood sugar is low?  Doesn't feel good    What concerns do you have today about your diabetes? Medication refills     Do you have any of these symptoms? (Select all that apply)  No numbness or tingling in feet.  No redness, sores or blisters on feet.  No complaints of excessive " thirst.  No reports of blurry vision.  No significant changes to weight.    Have you had a diabetic eye exam in the last 12 months? No        BP Readings from Last 2 Encounters:   06/10/20 113/81   12/30/19 124/84     Hemoglobin A1C (%)   Date Value   09/10/2019 6.5 (H)   05/17/2019 7.7 (H)     LDL Cholesterol Calculated (mg/dL)   Date Value   05/17/2019 64   05/13/2019 63           How many servings of fruits and vegetables do you eat daily?  0-1    On average, how many sweetened beverages do you drink each day (Examples: soda, juice, sweet tea, etc.  Do NOT count diet or artificially sweetened beverages)?   0    How many days per week do you exercise enough to make your heart beat faster? 3 or less    How many minutes a day do you exercise enough to make your heart beat faster? 9 or less    How many days per week do you miss taking your medication? Few times a month         Hyperlipidemia Follow-Up      Are you regularly taking any medication or supplement to lower your cholesterol?   Yes- lovastatin    Are you having muscle aches or other side effects that you think could be caused by your cholesterol lowering medication?  No    Hypertension Follow-up      Do you check your blood pressure regularly outside of the clinic? Yes     Are you following a low salt diet? Yes    Are your blood pressures ever more than 140 on the top number (systolic) OR more   than 90 on the bottom number (diastolic), for example 140/90? No    Heart Failure Follow-up    Are you experiencing any shortness of breath? No    Are you experiencing any swelling in your legs or feet?  No    Are you using more pillows than usual? No    Do you cough at night?  No    Do you check your weight daily?  Yes    Have you had a weight change recently?  No    Are you having any of the following side effects from your medications? (Select all that apply)  The patient does not report symptoms of dizziness, fatigue, cough, swelling, or slow heart  beat.    Since your last visit, how many times have you gone to the cardiologist, urgent care, emergency room, or hospital because of your heart failure?   None    Asthma Follow-Up    Was ACT completed today?    Yes    ACT Total Scores 9/10/2019   ACT TOTAL SCORE (Goal Greater than or Equal to 20) 16   In the past 12 months, how many times did you visit the emergency room for your asthma without being admitted to the hospital? 0   In the past 12 months, how many times were you hospitalized overnight because of your asthma? 0         How many days per week do you miss taking your asthma controller medication?  0    Please describe any recent triggers for your asthma: None    Have you had any Emergency Room Visits, Urgent Care Visits, or Hospital Admissions since your last office visit?  No      Patient Active Problem List   Diagnosis     Hypertension     Type 2 diabetes mellitus (H)     CHF (congestive heart failure) (H)     Hyperlipidemia     Morbid obesity due to excess calories (H)     Intermittent asthma, uncomplicated     Microscopic hematuria     Adrenal gland anomaly     Moderate persistent asthma without complication     Callus of foot     Nonischemic cardiomyopathy (H)     S/P cervical spinal fusion     WILLARD (obstructive sleep apnea)     ASCUS of cervix with negative high risk HPV     Adhesive capsulitis of left shoulder     Biceps tendon tear     NSVT (nonsustained ventricular tachycardia) (H)     Past Surgical History:   Procedure Laterality Date     COLONOSCOPY       DISCECTOMY, FUSION CERVICAL ANTERIOR TWO LEVELS, COMBINED N/A 2019    Procedure: C4-6 anterior cervical discectomy and fusion;  Surgeon: Hollis Hurtado MD;  Location: [facility] OR     TUBAL LIGATION         Social History     Tobacco Use     Smoking status: Former Smoker     Packs/day: 1.00     Years: 30.00     Pack years: 30.00     Types: Cigarettes     Last attempt to quit: 2013     Years since quittin.4     Smokeless  tobacco: Former User     Tobacco comment:     Substance Use Topics     Alcohol use: Yes     Alcohol/week: 0.0 standard drinks     Comment: 1     Family History   Problem Relation Age of Onset     Diabetes Mother      Hypertension Mother      Hyperlipidemia Mother      Heart Failure Mother      Diabetes Father      Cancer Paternal Grandmother         ?         Current Outpatient Medications   Medication Sig Dispense Refill     acetaminophen (TYLENOL) 500 MG tablet Take 500-1,000 mg by mouth every 6 hours as needed for mild pain       BETA BLOCKER NOT PRESCRIBED, INTENTIONAL, Beta Blocker not prescribed intentionally due to Severe Asthma  0     blood glucose (NO BRAND SPECIFIED) test strip Use to test blood sugar 3-4 times daily or as directed. 200 strip 1     carvedilol (COREG) 25 MG tablet Take 1 tablet (25 mg) by mouth 2 times daily (with meals) 180 tablet 3     cholecalciferol ( ULTRA STRENGTH) 2000 units CAPS Take 2,000 Units by mouth       Cholecalciferol (VITAMIN D3 PO) Take 2,000 Units by mouth daily       fluticasone-salmeterol (WIXELA INHUB) 250-50 MCG/DOSE inhaler Inhale 1 puff into the lungs       hydrALAZINE (APRESOLINE) 100 MG tablet Take 0.5 tablets (50 mg) by mouth 2 times daily 90 tablet 3     insulin aspart (NOVOLOG FLEXPEN) 100 UNIT/ML pen Per sliding scale before meals and at bedtime. 100-150 1U, 151-200 2U, 201-250 4U, 251-300 6U, 301-350, 8U, 351-400 10U, >400 12U 3 mL 3     insulin glargine (BASAGLAR KWIKPEN) 100 UNIT/ML pen Inject 65 Units Subcutaneous At Bedtime 60 mL 0     insulin pen needle (32G X 4 MM) 32G X 4 MM miscellaneous Use 1 pen needles daily or as directed. 100 each 3     insulin pen needle (ULTICARE MICRO) 32G X 4 MM miscellaneous Use 4 pen needles daily or as directed. 100 each 3     levalbuterol (XOPENEX HFA) 45 MCG/ACT Inhaler Inhale 2 puffs into the lungs every 4 hours as needed for shortness of breath / dyspnea or wheezing 15 g 3     lovastatin (MEVACOR) 20 MG tablet  TAKE 1 TABLET BY MOUTH EVERYDAY AT BEDTIME 90 tablet 1     magnesium oxide (MAG-OX) 400 (241.3 Mg) MG tablet Take 1 tablet (400 mg) by mouth daily 100 tablet 3     magnesium oxide (MAG-OX) 400 MG tablet TAKE 1 TABLET BY MOUTH EVERY DAY 30 tablet 5     metFORMIN (GLUCOPHAGE) 500 MG tablet Take 2 tablets (1,000 mg) by mouth 2 times daily (with meals) 360 tablet 1     metolazone (ZAROXOLYN) 2.5 MG tablet Take 1 tab on 11/29.  Don't take any more unless directed by the clinic. 5 tablet 0     montelukast (SINGULAIR) 10 MG tablet TAKE 1 TABLET BY MOUTH EVERYDAY AT BEDTIME 90 tablet 0     montelukast (SINGULAIR) 10 MG tablet Take 10 mg by mouth       potassium chloride ER (K-DUR/KLOR-CON M) 10 MEQ CR tablet Take 2 tablets (20 mEq) by mouth daily 60 tablet 11     ranitidine (ZANTAC) 150 MG/10ML syrup Take 10 mLs (150 mg) by mouth 2 times daily 240 mL 1     sacubitril-valsartan (ENTRESTO)  MG per tablet Take 1 tablet by mouth 2 times daily 180 tablet 3     spironolactone (ALDACTONE) 50 MG tablet Take 1 tablet (50 mg) by mouth daily 90 tablet 3     tiZANidine (ZANAFLEX) 2 MG tablet TAKE 1-2 TABLETS BY MOUTH 3 TIMES DAILY AS NEEDED FOR MUSCLE SPASMS 90 tablet 1     torsemide (DEMADEX) 20 MG tablet Take 40 mg in the am and 20 mg in the afternoon 90 tablet 11     WIXELA INHUB 250-50 MCG/DOSE inhaler Inhale 1 puff into the lungs every 12 hours       Allergies   Allergen Reactions     Amlodipine Swelling     Edema on 5mg throat     Lisinopril      Swelling in throat, face  angioedema     Naprosyn [Naproxen]      Swelling, diff breathing     Recent Labs   Lab Test 06/08/20  1236 12/30/19  1801  09/10/19  1624  05/17/19  1306 05/13/19  1421  05/02/18  1517   A1C  --   --   --  6.5*  --  7.7* 7.7*   < > 8.3*   LDL  --   --   --   --   --  64 63  --  57   HDL  --   --   --   --   --  66 55  --  71   TRIG  --   --   --   --   --  83 104  --  123   ALT  --   --   --   --   --  22 20  --  34   CR 0.86 0.84   < >  --    < > 0.80  0.84   < > 0.82   GFRESTIMATED 78 80   < >  --    < > 86 81   < > 75   GFRESTBLACK 90 >90   < >  --    < > >90 >90   < > >90   POTASSIUM 4.0 3.8   < >  --    < > 3.8 3.9   < > 3.7   TSH  --  1.47  --   --   --   --  0.71  --   --     < > = values in this interval not displayed.      BP Readings from Last 3 Encounters:   06/10/20 113/81   12/30/19 124/84   11/21/19 107/74    Wt Readings from Last 3 Encounters:   06/10/20 135.6 kg (299 lb)   12/30/19 131.5 kg (290 lb)   11/21/19 131.5 kg (290 lb)                    Reviewed and updated as needed this visit by Provider         Review of Systems   Constitutional, HEENT, cardiovascular, pulmonary, gi and gu systems are negative, except as otherwise noted.       Objective   Reported vitals:  LMP  (LMP Unknown)    healthy, alert and no distress  PSYCH: Alert and oriented times 3; coherent speech, normal   rate and volume, able to articulate logical thoughts, able   to abstract reason, no tangential thoughts, no hallucinations   or delusions  Her affect is normal  RESP: No cough, no audible wheezing, able to talk in full sentences  Remainder of exam unable to be completed due to telephone visits    Diagnostic Test Results:  Labs reviewed in Epic  Orders Only on 06/08/2020   Component Date Value Ref Range Status     Sodium 06/08/2020 136  133 - 144 mmol/L Final     Potassium 06/08/2020 4.0  3.4 - 5.3 mmol/L Final     Chloride 06/08/2020 99  94 - 109 mmol/L Final     Carbon Dioxide 06/08/2020 28  20 - 32 mmol/L Final     Anion Gap 06/08/2020 9  3 - 14 mmol/L Final     Glucose 06/08/2020 123* 70 - 99 mg/dL Final     Urea Nitrogen 06/08/2020 11  7 - 30 mg/dL Final     Creatinine 06/08/2020 0.86  0.52 - 1.04 mg/dL Final     GFR Estimate 06/08/2020 78  >60 mL/min/[1.73_m2] Final    Comment: Non  GFR Calc  Starting 12/18/2018, serum creatinine based estimated GFR (eGFR) will be   calculated using the Chronic Kidney Disease Epidemiology Collaboration   (CKD-EPI)  equation.       GFR Estimate If Black 06/08/2020 90  >60 mL/min/[1.73_m2] Final    Comment:  GFR Calc  Starting 12/18/2018, serum creatinine based estimated GFR (eGFR) will be   calculated using the Chronic Kidney Disease Epidemiology Collaboration   (CKD-EPI) equation.       Calcium 06/08/2020 9.5  8.5 - 10.1 mg/dL Final     WBC 06/08/2020 10.4  4.0 - 11.0 10e9/L Final     RBC Count 06/08/2020 4.54  3.8 - 5.2 10e12/L Final     Hemoglobin 06/08/2020 12.0  11.7 - 15.7 g/dL Final     Hematocrit 06/08/2020 37.3  35.0 - 47.0 % Final     MCV 06/08/2020 82  78 - 100 fl Final     MCH 06/08/2020 26.4* 26.5 - 33.0 pg Final     MCHC 06/08/2020 32.2  31.5 - 36.5 g/dL Final     RDW 06/08/2020 14.3  10.0 - 15.0 % Final     Platelet Count 06/08/2020 395  150 - 450 10e9/L Final              Assessment/Plan:  1. Type 2 diabetes mellitus with hyperglycemia, with long-term current use of insulin (H)  Labs done this month but did not have A1C drawn. Due and needs eye appointment. Blood sugar OK  - OPTOMETRY REFERRAL  - metFORMIN (GLUCOPHAGE) 500 MG tablet; Take 2 tablets (1,000 mg) by mouth 2 times daily (with meals)  Dispense: 360 tablet; Refill: 1  - **A1C FUTURE anytime; Future    2. Mild persistent asthma without complication  Some dyspnea at times. Working in health care  - montelukast (SINGULAIR) 10 MG tablet; Take 1 tablet (10 mg) by mouth At Bedtime  Dispense: 90 tablet; Refill: 1    3. NSVT (nonsustained ventricular tachycardia) (H)  Stable and just seen by cardiology    4. Obesity, Class III, BMI 40-49.9 (morbid obesity) (H)  Weight stable     5. Essential hypertension  Well controlled on medications     6. Mixed hyperlipidemia  recheck  - Lipid panel reflex to direct LDL Fasting; Future  - **ALT FUTURE anytime; Future    7. Nonischemic cardiomyopathy (H)  Stable       No follow-ups on file.      Phone call duration:  7 minutes    Briseida Rhoades MD

## 2020-06-25 NOTE — TELEPHONE ENCOUNTER
This writer attempted to contact pt on 06/25/20        Reason for call schedule virtual visit and left message.        If patient calls back:              Schedule virtual appointment within 2 weeks with primary care, document that pt called and close encounter      Also sent [employer] message     Mindy Velazquez MA

## 2020-07-21 ENCOUNTER — ANCILLARY PROCEDURE (OUTPATIENT)
Dept: CARDIOLOGY | Facility: CLINIC | Age: 50
End: 2020-07-21
Attending: INTERNAL MEDICINE
Payer: COMMERCIAL

## 2020-07-21 DIAGNOSIS — I50.22 CHRONIC SYSTOLIC CONGESTIVE HEART FAILURE (H): ICD-10-CM

## 2020-07-21 RX ADMIN — Medication 5 ML: at 15:45

## 2020-07-30 DIAGNOSIS — I42.8 NONISCHEMIC CARDIOMYOPATHY (H): Primary | ICD-10-CM

## 2020-08-04 DIAGNOSIS — Z79.4 TYPE 2 DIABETES MELLITUS WITH HYPERGLYCEMIA, WITH LONG-TERM CURRENT USE OF INSULIN (H): ICD-10-CM

## 2020-08-04 DIAGNOSIS — E11.65 TYPE 2 DIABETES MELLITUS WITH HYPERGLYCEMIA, WITH LONG-TERM CURRENT USE OF INSULIN (H): ICD-10-CM

## 2020-08-04 DIAGNOSIS — I42.8 NONISCHEMIC CARDIOMYOPATHY (H): ICD-10-CM

## 2020-08-04 LAB — HBA1C MFR BLD: 11 % (ref 0–5.6)

## 2020-08-04 PROCEDURE — 80048 BASIC METABOLIC PNL TOTAL CA: CPT | Performed by: NURSE PRACTITIONER

## 2020-08-04 PROCEDURE — 36415 COLL VENOUS BLD VENIPUNCTURE: CPT | Performed by: NURSE PRACTITIONER

## 2020-08-04 PROCEDURE — 83036 HEMOGLOBIN GLYCOSYLATED A1C: CPT | Performed by: FAMILY MEDICINE

## 2020-08-05 ENCOUNTER — TELEPHONE (OUTPATIENT)
Dept: FAMILY MEDICINE | Facility: CLINIC | Age: 50
End: 2020-08-05

## 2020-08-05 DIAGNOSIS — I50.22 CHRONIC SYSTOLIC CONGESTIVE HEART FAILURE (H): ICD-10-CM

## 2020-08-05 DIAGNOSIS — I42.8 NONISCHEMIC CARDIOMYOPATHY (H): ICD-10-CM

## 2020-08-05 DIAGNOSIS — Z79.4 TYPE 2 DIABETES MELLITUS WITH HYPERGLYCEMIA, WITH LONG-TERM CURRENT USE OF INSULIN (H): ICD-10-CM

## 2020-08-05 DIAGNOSIS — E11.65 TYPE 2 DIABETES MELLITUS WITH HYPERGLYCEMIA, WITH LONG-TERM CURRENT USE OF INSULIN (H): Primary | ICD-10-CM

## 2020-08-05 DIAGNOSIS — E78.2 MIXED HYPERLIPIDEMIA: ICD-10-CM

## 2020-08-05 DIAGNOSIS — Z79.4 TYPE 2 DIABETES MELLITUS WITH HYPERGLYCEMIA, WITH LONG-TERM CURRENT USE OF INSULIN (H): Primary | ICD-10-CM

## 2020-08-05 DIAGNOSIS — E11.65 TYPE 2 DIABETES MELLITUS WITH HYPERGLYCEMIA, WITH LONG-TERM CURRENT USE OF INSULIN (H): ICD-10-CM

## 2020-08-05 DIAGNOSIS — I10 ESSENTIAL HYPERTENSION: ICD-10-CM

## 2020-08-05 DIAGNOSIS — E66.01 MORBID OBESITY DUE TO EXCESS CALORIES (H): ICD-10-CM

## 2020-08-05 LAB
ANION GAP SERPL CALCULATED.3IONS-SCNC: 8 MMOL/L (ref 3–14)
BUN SERPL-MCNC: 4 MG/DL (ref 7–30)
CALCIUM SERPL-MCNC: 9 MG/DL (ref 8.5–10.1)
CHLORIDE SERPL-SCNC: 101 MMOL/L (ref 94–109)
CO2 SERPL-SCNC: 27 MMOL/L (ref 20–32)
CREAT SERPL-MCNC: 0.9 MG/DL (ref 0.52–1.04)
GFR SERPL CREATININE-BSD FRML MDRD: 74 ML/MIN/{1.73_M2}
GLUCOSE SERPL-MCNC: 166 MG/DL (ref 70–99)
POTASSIUM SERPL-SCNC: 3.6 MMOL/L (ref 3.4–5.3)
SODIUM SERPL-SCNC: 136 MMOL/L (ref 133–144)

## 2020-08-05 RX ORDER — INSULIN GLARGINE 100 [IU]/ML
65 INJECTION, SOLUTION SUBCUTANEOUS AT BEDTIME
Qty: 60 ML | Refills: 0 | Status: SHIPPED | OUTPATIENT
Start: 2020-08-05 | End: 2020-11-04

## 2020-08-05 RX ORDER — INSULIN ASPART 100 [IU]/ML
INJECTION, SOLUTION INTRAVENOUS; SUBCUTANEOUS
Qty: 3 ML | Refills: 3 | Status: SHIPPED | OUTPATIENT
Start: 2020-08-05 | End: 2020-08-07

## 2020-08-05 RX ORDER — INSULIN GLARGINE 100 [IU]/ML
INJECTION, SOLUTION SUBCUTANEOUS
Refills: 0 | OUTPATIENT
Start: 2020-08-05

## 2020-08-05 NOTE — RESULT ENCOUNTER NOTE
Please call patient with results (If unable to reach patient, this may be sent as a letter instead):    Dear Btetina Montes,     The diabetes test is pretty high A1C 11.0 and I would like you to follow up with Dr. Noah Allred and the diabetic educator to help you with your blood sugars. I will put in a referral for diabetes education.       Briseida Rhoades MD

## 2020-08-05 NOTE — TELEPHONE ENCOUNTER
"Please call patient with results (If unable to reach patient, this may be sent as a letter instead):       Dear Bettina Montes,       The diabetes test is pretty high A1C 11.0 and I would like you to follow up with Dr. Noah Allred and the diabetic educator to help you with your blood sugars. I will put in a referral for diabetes education.           Briseida Rhoades MD       Patient informed on the message above.  \"I don't need the education, I need medication.  I was off of metformin and glargine insulin until she filled metformin in June.\"    When offered to schedule a virtual visit with PCP, patient said: \"I don't want to talk to her for 5 minutes and pay $40. I am not rich.\"    During conversation, patient kept talking to someone else.  Then she said that she was at work.      Requesting glargine insulin be sent to the pharmacy.  \"At one point I was on 75 units.\"    Honey Walker RN    "

## 2020-08-06 ENCOUNTER — TELEPHONE (OUTPATIENT)
Dept: FAMILY MEDICINE | Facility: CLINIC | Age: 50
End: 2020-08-06

## 2020-08-06 NOTE — TELEPHONE ENCOUNTER
Diabetes Education Scheduling Outreach #1:    Call to patient to schedule. Left message with phone number to call to schedule.    Plan for 2nd outreach attempt within 2 business days.    [facility]  Diabetes and Nutrition Scheduling

## 2020-08-07 NOTE — TELEPHONE ENCOUNTER
Writer contacted patient with Dr Noah Mancuso message.      Itzel Rivera, [facility]

## 2020-08-07 NOTE — TELEPHONE ENCOUNTER
Patient states she has not been taking insulin at meal time.  Please call and verify @ [phone].

## 2020-08-07 NOTE — TELEPHONE ENCOUNTER
This writer attempted to contact pt on 08/07/20      Reason for call appt and left message.      If patient calls back: per Dr. Tomlinson   Bedtime and meal insulin refilled.    Follow up due in 2 months. Schedule pt for appt        Kary Robert CMA

## 2020-08-07 NOTE — TELEPHONE ENCOUNTER
Patient can just start the bedtime insulin.  Meal time insulin canceled.    Please notify pharmacy.    Anuja Daniel M.D.

## 2020-08-10 NOTE — TELEPHONE ENCOUNTER
Diabetes Education Scheduling Outreach #2:    Call to patient to schedule. Patient declined to schedule.    Jackie Palmer  [facility]  Diabetes and Nutrition Scheduling

## 2020-09-15 ENCOUNTER — DOCUMENTATION ONLY (OUTPATIENT)
Dept: LAB | Facility: CLINIC | Age: 50
End: 2020-09-15

## 2020-09-15 DIAGNOSIS — I50.9 CHF (CONGESTIVE HEART FAILURE) (H): Primary | ICD-10-CM

## 2020-09-16 ENCOUNTER — OFFICE VISIT (OUTPATIENT)
Dept: CARDIOLOGY | Facility: CLINIC | Age: 50
End: 2020-09-16
Payer: COMMERCIAL

## 2020-09-16 VITALS
HEART RATE: 87 BPM | OXYGEN SATURATION: 100 % | BODY MASS INDEX: 48.11 KG/M2 | DIASTOLIC BLOOD PRESSURE: 81 MMHG | WEIGHT: 293 LBS | SYSTOLIC BLOOD PRESSURE: 127 MMHG

## 2020-09-16 DIAGNOSIS — I42.8 NONISCHEMIC CARDIOMYOPATHY (H): ICD-10-CM

## 2020-09-16 DIAGNOSIS — I50.9 CHF (CONGESTIVE HEART FAILURE) (H): ICD-10-CM

## 2020-09-16 DIAGNOSIS — G47.33 OSA (OBSTRUCTIVE SLEEP APNEA): ICD-10-CM

## 2020-09-16 DIAGNOSIS — I10 HYPERTENSION, UNSPECIFIED TYPE: ICD-10-CM

## 2020-09-16 DIAGNOSIS — I50.22 CHRONIC SYSTOLIC CONGESTIVE HEART FAILURE (H): Primary | ICD-10-CM

## 2020-09-16 LAB
ANION GAP SERPL CALCULATED.3IONS-SCNC: 4 MMOL/L (ref 3–14)
BUN SERPL-MCNC: 17 MG/DL (ref 7–30)
CALCIUM SERPL-MCNC: 9.4 MG/DL (ref 8.5–10.1)
CHLORIDE SERPL-SCNC: 101 MMOL/L (ref 94–109)
CO2 SERPL-SCNC: 31 MMOL/L (ref 20–32)
CREAT SERPL-MCNC: 0.97 MG/DL (ref 0.52–1.04)
GFR SERPL CREATININE-BSD FRML MDRD: 68 ML/MIN/{1.73_M2}
GLUCOSE SERPL-MCNC: 196 MG/DL (ref 70–99)
POTASSIUM SERPL-SCNC: 3.8 MMOL/L (ref 3.4–5.3)
SODIUM SERPL-SCNC: 136 MMOL/L (ref 133–144)

## 2020-09-16 PROCEDURE — 36415 COLL VENOUS BLD VENIPUNCTURE: CPT | Performed by: NURSE PRACTITIONER

## 2020-09-16 PROCEDURE — 93000 ELECTROCARDIOGRAM COMPLETE: CPT | Performed by: NURSE PRACTITIONER

## 2020-09-16 PROCEDURE — 99214 OFFICE O/P EST MOD 30 MIN: CPT | Mod: 25 | Performed by: NURSE PRACTITIONER

## 2020-09-16 PROCEDURE — 80048 BASIC METABOLIC PNL TOTAL CA: CPT | Performed by: NURSE PRACTITIONER

## 2020-09-16 RX ORDER — HYDRALAZINE HYDROCHLORIDE 50 MG/1
TABLET, FILM COATED ORAL
COMMUNITY
Start: 2020-01-22 | End: 2021-08-16

## 2020-09-16 ASSESSMENT — PAIN SCALES - GENERAL: PAINLEVEL: NO PAIN (0)

## 2020-09-16 NOTE — PATIENT INSTRUCTIONS
Take your medicines every day, as directed    Changes made today:  o Torsemide 40 mg twice a day  o    Monitor Your Weight and Symptoms    Contact us if you:      Gain 2 pounds in one day or 5 pounds in one week    Feel more short of breath    Notice more leg swelling    Feel lightheadeded   Change your lifestyle    Limit Salt or Sodium:    2000 mg  Limit Fluids:    2000 mL or approximately 64 ounces  Eat a Heart Healthy Diet    Low in saturated fats  Stay Active:    Aim to move at least 150 minutes every  week         To Contact us    During Business Hours:  [phone], option # 1 (University)  Then option # 4 (medical questions)     After hours, weekends or holidays:   [phone], Option #4  Ask to speak to the On-Call Cardiologist. Inform them you are a CORE/heart failure patient at the [facility].     Use [employer] allows you to communicate directly with your heart team through secure messaging.    [employer] can be accessed any time on your phone, computer, or tablet.    If you need assistance, we'd be happy to help!         Keep your Heart Appointments:    CORE in one week- virtual

## 2020-09-16 NOTE — NURSING NOTE
Bettina Montes's goals for this visit include:   Chief Complaint   Patient presents with     RECHECK     Chronic systolic congestive heart failure        She requests these members of her care team be copied on today's visit information: no    PCP: Anuja Bruce    Referring Provider:  El Chinchilla MD  [address] [facility] [address]  [place] [address]    /81 (BP Location: Left arm, Patient Position: Sitting, Cuff Size: Adult Large)   Pulse 87   Wt 135.2 kg (298 lb 1.6 oz)   LMP  (LMP Unknown)   SpO2 100%   BMI 48.11 kg/m      Do you need any medication refills at today's visit? No    Opal Spears CMA......September 16, 2020     2:43 PM

## 2020-09-16 NOTE — PROGRESS NOTES
HPI  Bettina Montes is a 50 year old female with a past medical history of HFrEF (30-35%) secondary to NICM (felt to be idiopathic), HTN, obesity, DM II, cervical spine fusion, and hyperlipidemia who presents for routine follow-up. She was last seen by Dr. Chinchilla in 6/5/20.    Bettina Montes somewhat better since June. Systolic 100-120. Below 130 systolic for sure. No lightheadedness. She feels better at 50 mg BID of Hydralazine.  She has some SOB with activity. She has some swelling in her legs. She may have some swelling in her abdomen. Doesn't weigh herself.  She endorses nightly PND, cough, and orthopnea. Her appetite has been poor and she complains of early satiety.  She is also having shoulder surgery in early October she is working on getting clearance for it.     She denies any chest pain, palpitations, lightheadedness, dizziness, or near syncopal episodes.     PMH  Past Medical History:   Diagnosis Date     Abnormal Pap smear of cervix 09/10/2019    See problem list     Adrenal gland anomaly      CHF (congestive heart failure) (H)      Fatty liver      Gastroesophageal reflux disease      Hyperlipidemia      Hypertension      Mild persistent asthma      NICM (nonischemic cardiomyopathy) (H)      Obesity      WILLARD (obstructive sleep apnea) 1/9/2015     Type 2 diabetes mellitus (H)        Past Surgical History:   Procedure Laterality Date     COLONOSCOPY       DISCECTOMY, FUSION CERVICAL ANTERIOR TWO LEVELS, COMBINED N/A 4/5/2019    Procedure: C4-6 anterior cervical discectomy and fusion;  Surgeon: Hollis Hurtado MD;  Location: [facility]     TUBAL LIGATION         Family History   Problem Relation Age of Onset     Diabetes Mother      Hypertension Mother      Hyperlipidemia Mother      Heart Failure Mother      Diabetes Father      Cancer Paternal Grandmother         ?       Social History     Socioeconomic History     Marital status: Single     Spouse name: None     Number of children: 1     Years  of education: None     Highest education level: None   Occupational History     Occupation: CNA     Employer: OTHER   Social Needs     Financial resource strain: None     Food insecurity:     Worry: None     Inability: None     Transportation needs:     Medical: None     Non-medical: None   Tobacco Use     Smoking status: Former Smoker     Packs/day: 1.00     Years: 30.00     Pack years: 30.00     Types: Cigarettes     Last attempt to quit: 2013     Years since quittin.8     Smokeless tobacco: Former User     Tobacco comment:     Substance and Sexual Activity     Alcohol use: Yes     Alcohol/week: 0.0 standard drinks     Comment: 1     Drug use: No     Sexual activity: Yes     Partners: Male     Birth control/protection: Surgical   Lifestyle     Physical activity:     Days per week: None     Minutes per session: None     Stress: None   Relationships     Social connections:     Talks on phone: None     Gets together: None     Attends Orthodox service: None     Active member of club or organization: None     Attends meetings of clubs or organizations: None     Relationship status: None     Intimate partner violence:     Fear of current or ex partner: None     Emotionally abused: None     Physically abused: None     Forced sexual activity: None   Other Topics Concern     Parent/sibling w/ CABG, MI or angioplasty before 65F 55M? No   Social History Narrative     None       ALLERGIES  Allergies   Allergen Reactions     Amlodipine Swelling     Edema on 5mg throat     Lisinopril      Swelling in throat, face  angioedema     Naprosyn [Naproxen]      Swelling, diff breathing       MEDICATIONS acetaminophen (TYLENOL) 500 MG tablet, Take 500-1,000 mg by mouth every 6 hours as needed for mild pain  BETA BLOCKER NOT PRESCRIBED, INTENTIONAL,, Beta Blocker not prescribed intentionally due to Severe Asthma  blood glucose (NO BRAND SPECIFIED) test strip, Use to test blood sugar 3-4 times daily or as directed.  carvedilol  (COREG) 25 MG tablet, Take 1 tablet (25 mg) by mouth 2 times daily (with meals)  cholecalciferol ( ULTRA STRENGTH) 2000 units CAPS, Take 2,000 Units by mouth  Cholecalciferol (VITAMIN D3 PO), Take 2,000 Units by mouth daily  fluticasone-salmeterol (WIXELA INHUB) 250-50 MCG/DOSE inhaler, Inhale 1 puff into the lungs  insulin glargine (BASAGLAR KWIKPEN) 100 UNIT/ML pen, Inject 65 Units Subcutaneous At Bedtime  insulin pen needle (32G X 4 MM) 32G X 4 MM miscellaneous, Use 1 pen needles daily or as directed.  insulin pen needle (ULTICARE MICRO) 32G X 4 MM miscellaneous, Use 4 pen needles daily or as directed.  levalbuterol (XOPENEX HFA) 45 MCG/ACT Inhaler, Inhale 2 puffs into the lungs every 4 hours as needed for shortness of breath / dyspnea or wheezing  lovastatin (MEVACOR) 20 MG tablet, TAKE 1 TABLET BY MOUTH EVERYDAY AT BEDTIME  magnesium oxide (MAG-OX) 400 (241.3 Mg) MG tablet, Take 1 tablet (400 mg) by mouth daily  magnesium oxide (MAG-OX) 400 MG tablet, TAKE 1 TABLET BY MOUTH EVERY DAY  metFORMIN (GLUCOPHAGE) 500 MG tablet, Take 2 tablets (1,000 mg) by mouth 2 times daily (with meals)  montelukast (SINGULAIR) 10 MG tablet, Take 1 tablet (10 mg) by mouth At Bedtime  potassium chloride ER (K-DUR/KLOR-CON M) 10 MEQ CR tablet, Take 2 tablets (20 mEq) by mouth daily  sacubitril-valsartan (ENTRESTO)  MG per tablet, Take 1 tablet by mouth 2 times daily  spironolactone (ALDACTONE) 50 MG tablet, Take 1 tablet (50 mg) by mouth daily  tiZANidine (ZANAFLEX) 2 MG tablet, TAKE 1-2 TABLETS BY MOUTH 3 TIMES DAILY AS NEEDED FOR MUSCLE SPASMS  torsemide (DEMADEX) 20 MG tablet, Take 40 mg in the am and 20 mg in the afternoon  WIXELA INHUB 250-50 MCG/DOSE inhaler, Inhale 1 puff into the lungs every 12 hours  hydrALAZINE (APRESOLINE) 50 MG tablet, TAKE 1 TABLET BY MOUTH TWICE A DAY  metolazone (ZAROXOLYN) 2.5 MG tablet, Take 1 tab on 11/29.  Don't take any more unless directed by the clinic. (Patient not taking: Reported  on 9/16/2020)  ranitidine (ZANTAC) 150 MG/10ML syrup, Take 10 mLs (150 mg) by mouth 2 times daily    sodium chloride (PF) 0.9% PF flush 10 mL      ROS:  Constitutional: No fever, chills, or sweats.  + fatigue.  ENT: No visual disturbance, ear ache, epistaxis, sore throat.    Respiratory: , no hemoptysis.   Cardiovascular: As per HPI.   GI: No nausea, vomiting, hematemesis, melena, or hematochezia.   : See HPI  Integument: Negative for rashes or ecchymosis.   Psychiatric: Negative for mood changes, anxiety, or depression.   Neuro: Negative for numbness or tingling.   Endocrinology: Blood sugar controlled.   Musculoskeletal:  +gait instability. No gout or muscle weakness.     EXAM  /81 (BP Location: Left arm, Patient Position: Sitting, Cuff Size: Adult Large)   Pulse 87   Wt 135.2 kg (298 lb 1.6 oz)   LMP  (LMP Unknown)   SpO2 100%   BMI 48.11 kg/m    Home weight typically is 284 lbs.    Vitals:    09/16/20 1441   Weight: 135.2 kg (298 lb 1.6 oz)     General: appears comfortable, alert and articulate  Head: normocephalic, atraumatic  Eyes: anicteric sclera, EOMI  Neck: Supple, no adenopathy  Orophyarynx: moist mucosa, no cyanosis  Heart: regular, S1/S2, no murmur, gallop, rub, estimated JVP not detected at 90 degrees  Lungs: Respirations even and unlabored, lungs clear, no rales or wheezing.  Lungs sounds decreased bilaterally in the bases.  Abdomen:  distended and firmer, non-tender, bowel sounds present, no hepatomegaly  Extremities: no clubbing, cyanosis. Has trace edema  Neurological: normal speech and affect, no gross motor deficits  Skin:  Warm and dry.      LABS  Last Comprehensive Metabolic Panel:  Sodium   Date Value Ref Range Status   09/16/2020 136 133 - 144 mmol/L Final     Potassium   Date Value Ref Range Status   09/16/2020 3.8 3.4 - 5.3 mmol/L Final     Chloride   Date Value Ref Range Status   09/16/2020 101 94 - 109 mmol/L Final     Carbon Dioxide   Date Value Ref Range Status   09/16/2020  31 20 - 32 mmol/L Final     Anion Gap   Date Value Ref Range Status   09/16/2020 4 3 - 14 mmol/L Final     Glucose   Date Value Ref Range Status   09/16/2020 196 (H) 70 - 99 mg/dL Final     Comment:     Non Fasting     Urea Nitrogen   Date Value Ref Range Status   09/16/2020 17 7 - 30 mg/dL Final     Creatinine   Date Value Ref Range Status   09/16/2020 0.97 0.52 - 1.04 mg/dL Final     GFR Estimate   Date Value Ref Range Status   09/16/2020 68 >60 mL/min/[1.73_m2] Final     Comment:     Non  GFR Calc  Starting 12/18/2018, serum creatinine based estimated GFR (eGFR) will be   calculated using the Chronic Kidney Disease Epidemiology Collaboration   (CKD-EPI) equation.       Calcium   Date Value Ref Range Status   09/16/2020 9.4 8.5 - 10.1 mg/dL Final       MOST RECENT ECHOCARDIOGRAM 10/25/19:  Interpretation Summary  Technically difficult study.  Left ventricular size is normal. Mildly (EF 40-45%, traced 45%) reduced left ventricular function is present.  Right ventricular function, chamber size, wall motion, and thickness are normal. This study was compared with the study from 3/21/18: The left ventricular function has improved    ASSESSMENT AND PLAN  Bettina Montes is a 50 year old female with NICM who appears hypervolemic today.  We will increase her Torsemide for a week and see how she does. She also had an EKG done in clinic because she is having surgery in a few weeks on her shoulder.     She will return for labs in 1 week to reassess her potassium and kidney function.  She will return to the [facility] in 2 weeks time.  She was instructed to contact me in the interim if her symptoms worsen or do not improve.    1. Chronic systolic heart failure/HFrEF (EF 40-45%) secondary to nonischemic cardiomyopathy  NYHA Symptom Class IIIB  Stage C  Primary Cardiologist: Dr Chinchilla; Last seen 9/20/19  ACE-I/ARB/ARNi:  Entresto.  BB yes, Carvedilol 25 mg twice daily  Aldosterone antagonist yes, Spironolactone  50 mg daily.   SCD prophylaxis Decision deferred during medication uptitration  Fluid status Hypervolemic  Cardiac Rehab: Completed  Sleep Apnea Evaluation: Documented sleep apnea.    Remote PA Pressure Monitoring (CardioMems) Deferred while medical therapy is optimized  Remote monitoring: Mychart active  Antiplatelet: none.   Anticoagulation: None.    2.  Obesity: BMI 47.29.  She is working on weight loss.  Feel part of this is fluid retention.  Continue to reassess at subsequent visits.     3.  HTN:   continue Entresto as outlined above.  Continue to monitor BP readings at home.  Re-evaluate in one week     4.  Follow-up:  Torsemide  40 mg BID  [facility] in one week

## 2020-09-22 ENCOUNTER — OFFICE VISIT (OUTPATIENT)
Dept: FAMILY MEDICINE | Facility: CLINIC | Age: 50
End: 2020-09-22
Payer: COMMERCIAL

## 2020-09-22 VITALS
BODY MASS INDEX: 47.09 KG/M2 | HEIGHT: 66 IN | RESPIRATION RATE: 20 BRPM | DIASTOLIC BLOOD PRESSURE: 83 MMHG | SYSTOLIC BLOOD PRESSURE: 126 MMHG | OXYGEN SATURATION: 99 % | HEART RATE: 84 BPM | WEIGHT: 293 LBS | TEMPERATURE: 97.9 F

## 2020-09-22 DIAGNOSIS — J45.30 MILD PERSISTENT ASTHMA WITHOUT COMPLICATION: ICD-10-CM

## 2020-09-22 DIAGNOSIS — Z01.818 PREOP GENERAL PHYSICAL EXAM: Primary | ICD-10-CM

## 2020-09-22 PROCEDURE — 99214 OFFICE O/P EST MOD 30 MIN: CPT | Performed by: INTERNAL MEDICINE

## 2020-09-22 RX ORDER — MONTELUKAST SODIUM 10 MG/1
10 TABLET ORAL AT BEDTIME
Qty: 90 TABLET | Refills: 1 | Status: SHIPPED | OUTPATIENT
Start: 2020-09-22 | End: 2021-01-14

## 2020-09-22 ASSESSMENT — PAIN SCALES - GENERAL: PAINLEVEL: NO PAIN (0)

## 2020-09-22 ASSESSMENT — MIFFLIN-ST. JEOR: SCORE: 1992.54

## 2020-09-22 NOTE — PROGRESS NOTES
[address] [address]  Phone: [phone]  Primary Provider: Anuja Bruce  Pre-op Performing Provider: SARA VELAZQUEZ    PREOPERATIVE EVALUATION:  Today's date: 9/22/2020    Bettina Montes is a 50 year old female who presents for a preoperative evaluation.    Surgical Information:  Surgery Details 9/22/2020   Surgery/Procedure: LEFT SHOULDER ARTHROSCOPIC   Surgery Location: [facility]   Surgeon: DR CARRINGTON   Surgery Date: 10/5/2020   Time of Surgery: TBD   Where patient plans to recover: At home alone     Fax number for surgical facility: [phone]  Type of Anesthesia Anticipated: General    Subjective     HPI related to upcoming procedure: ongoing left shoulder pain going to left shoulder arthroscopy with debridement and distal clavicle excision   Preop Questions 9/22/2020   1. Have you ever had a heart attack or stroke? No   2. Have you ever had surgery on your heart or blood vessels, such as a stent placement, a coronary artery bypass, or surgery on an artery in your head, neck, heart, or legs? No   3. Do you have chest pain with activity? No   4. Do you have a history of  heart failure? YES    5. Do you currently have a cold, bronchitis or symptoms of other infection? No   6. Do you have a cough, shortness of breath, or wheezing? YES -   7. Do you or anyone in your family have previous history of blood clots? No   8. Do you or does anyone in your family have a serious bleeding problem such as prolonged bleeding following surgeries or cuts? No   9. Have you ever had problems with anemia or been told to take iron pills? No   10. Have you had any abnormal blood loss such as black, tarry or bloody stools, or abnormal vaginal bleeding? No   11. Have you ever had a blood transfusion? No   Are you willing to have a blood transfusion if it is medically needed before, during, or after your surgery? Yes   13. Have you  or any of your relatives ever had problems with anesthesia? No   14. Do you have sleep apnea, excessive snoring or daytime drowsiness? No   15. Do you have any artifical heart valves or other implanted medical devices like a pacemaker, defibrillator, or continuous glucose monitor? No   16. Do you have artificial joints? No   17. Are you allergic to latex? No   18. Is there any chance that you may be pregnant? No     Patient does not have a Health Care Directive or Living Will: Discussed advance care planning with patient; however, patient declined at this time.    RX [place] ([place] reviewed: Not applicable, patient doesn not take any narcotics    CHF - Patient has a longstanding history of moderate-severe CHF. Exacerbating conditions include Idipathic. Currently the patient's condition is waxing and waning. Current treatment regimen includes Coreg, diuretic and spirolactone. The patient denies chest pain, edema, orthopnea, SOB or recent weight gain. Last Echocardiogram 10/19/19, EKG 9/16/2020    DIABETES - Patient has a longstanding history of DiabetesType Type II . Patient is being treated with insulin injections and denies significant side effects. Control has been poor. Complicating factors include but are not limited to: morbid obesity .       Review of Systems  CONSTITUTIONAL: NEGATIVE for fever, chills, change in weight  INTEGUMENTARY/SKIN: NEGATIVE for worrisome rashes, moles or lesions  EYES: NEGATIVE for vision changes or irritation  ENT/MOUTH: NEGATIVE for ear, mouth and throat problems  RESP:NEGATIVE for significant cough or SOB and dyspnea on exertion  BREAST: NEGATIVE for masses, tenderness or discharge  CV: NEGATIVE for chest pain, palpitations or peripheral edema  GI: NEGATIVE for nausea, abdominal pain, heartburn, or change in bowel habits  : NEGATIVE for frequency, dysuria, or hematuria  MUSCULOSKELETAL: NEGATIVE for significant arthralgias or myalgia  NEURO: NEGATIVE for weakness,  dizziness or paresthesias  ENDOCRINE: NEGATIVE for temperature intolerance, skin/hair changes  HEME: NEGATIVE for bleeding problems  PSYCHIATRIC: NEGATIVE for changes in mood or affect    Patient Active Problem List    Diagnosis Date Noted     NSVT (nonsustained ventricular tachycardia) (H) 06/25/2020     Priority: Medium     Adhesive capsulitis of left shoulder 10/17/2019     Priority: Medium     Biceps tendon tear 10/17/2019     Priority: Medium     ASCUS of cervix with negative high risk HPV 09/10/2019     Priority: Medium     2009, 2011, 2014 NIL paps.  9/10/19 ASCUS pap with Neg HPV. Plan cotest in 3 years.        S/P cervical spinal fusion 04/05/2019     Priority: Medium     Nonischemic cardiomyopathy (H) 03/20/2019     Priority: Medium     Callus of foot 10/10/2018     Priority: Medium     Moderate persistent asthma without complication 05/07/2018     Priority: Medium     Adrenal gland anomaly      Priority: Medium     Microscopic hematuria 01/18/2017     Priority: Medium     Intermittent asthma, uncomplicated 01/09/2017     Priority: Medium     Morbid obesity due to excess calories (H) 03/21/2016     Priority: Medium     Hypertension      Priority: Medium     Type 2 diabetes mellitus (H)      Priority: Medium     CHF (congestive heart failure) (H)      Priority: Medium     EF 45%       Hyperlipidemia      Priority: Medium     WILLARD (obstructive sleep apnea) 01/09/2015     Priority: Medium     Overview:   PSG 1/7/14 with AHI 6, RDI 20.  Rec trial of CPAP.        Past Medical History:   Diagnosis Date     Abnormal Pap smear of cervix 09/10/2019    See problem list     Adrenal gland anomaly      CHF (congestive heart failure) (H)      Fatty liver      Gastroesophageal reflux disease      Hyperlipidemia      Hypertension      Mild persistent asthma      NICM (nonischemic cardiomyopathy) (H)      Obesity      WILLARD (obstructive sleep apnea) 1/9/2015     Type 2 diabetes mellitus (H)      Past Surgical History:    Procedure Laterality Date     COLONOSCOPY       DISCECTOMY, FUSION CERVICAL ANTERIOR TWO LEVELS, COMBINED N/A 4/5/2019    Procedure: C4-6 anterior cervical discectomy and fusion;  Surgeon: Hollis Hurtado MD;  Location: [facility] OR     TUBAL LIGATION       Current Outpatient Medications   Medication Sig Dispense Refill     acetaminophen (TYLENOL) 500 MG tablet Take 500-1,000 mg by mouth every 6 hours as needed for mild pain       BETA BLOCKER NOT PRESCRIBED, INTENTIONAL, Beta Blocker not prescribed intentionally due to Severe Asthma  0     blood glucose (NO BRAND SPECIFIED) test strip Use to test blood sugar 3-4 times daily or as directed. 200 strip 1     carvedilol (COREG) 25 MG tablet Take 1 tablet (25 mg) by mouth 2 times daily (with meals) 180 tablet 3     cholecalciferol ( ULTRA STRENGTH) 2000 units CAPS Take 2,000 Units by mouth       Cholecalciferol (VITAMIN D3 PO) Take 2,000 Units by mouth daily       fluticasone-salmeterol (WIXELA INHUB) 250-50 MCG/DOSE inhaler Inhale 1 puff into the lungs       hydrALAZINE (APRESOLINE) 50 MG tablet TAKE 1 TABLET BY MOUTH TWICE A DAY       insulin glargine (BASAGLAR KWIKPEN) 100 UNIT/ML pen Inject 65 Units Subcutaneous At Bedtime 60 mL 0     insulin pen needle (32G X 4 MM) 32G X 4 MM miscellaneous Use 1 pen needles daily or as directed. 100 each 3     insulin pen needle (ULTICARE MICRO) 32G X 4 MM miscellaneous Use 4 pen needles daily or as directed. 100 each 3     levalbuterol (XOPENEX HFA) 45 MCG/ACT Inhaler Inhale 2 puffs into the lungs every 4 hours as needed for shortness of breath / dyspnea or wheezing 15 g 3     lovastatin (MEVACOR) 20 MG tablet TAKE 1 TABLET BY MOUTH EVERYDAY AT BEDTIME 90 tablet 1     magnesium oxide (MAG-OX) 400 (241.3 Mg) MG tablet Take 1 tablet (400 mg) by mouth daily 100 tablet 3     magnesium oxide (MAG-OX) 400 MG tablet TAKE 1 TABLET BY MOUTH EVERY DAY 30 tablet 5     metFORMIN (GLUCOPHAGE) 500 MG tablet Take 2 tablets (1,000 mg)  by mouth 2 times daily (with meals) 360 tablet 1     montelukast (SINGULAIR) 10 MG tablet Take 1 tablet (10 mg) by mouth At Bedtime 90 tablet 1     potassium chloride ER (K-DUR/KLOR-CON M) 10 MEQ CR tablet Take 2 tablets (20 mEq) by mouth daily 60 tablet 11     sacubitril-valsartan (ENTRESTO)  MG per tablet Take 1 tablet by mouth 2 times daily 180 tablet 3     spironolactone (ALDACTONE) 50 MG tablet Take 1 tablet (50 mg) by mouth daily 90 tablet 3     tiZANidine (ZANAFLEX) 2 MG tablet TAKE 1-2 TABLETS BY MOUTH 3 TIMES DAILY AS NEEDED FOR MUSCLE SPASMS 90 tablet 1     torsemide (DEMADEX) 20 MG tablet Take 40 mg in the am and 20 mg in the afternoon 90 tablet 11     WIXELA INHUB 250-50 MCG/DOSE inhaler Inhale 1 puff into the lungs every 12 hours       metolazone (ZAROXOLYN) 2.5 MG tablet Take 1 tab on .  Don't take any more unless directed by the clinic. (Patient not taking: Reported on 2020) 5 tablet 0     ranitidine (ZANTAC) 150 MG/10ML syrup Take 10 mLs (150 mg) by mouth 2 times daily 240 mL 1       Allergies   Allergen Reactions     Amlodipine Swelling     Edema on 5mg throat     Lisinopril      Swelling in throat, face  angioedema     Naprosyn [Naproxen]      Swelling, diff breathing        Social History     Tobacco Use     Smoking status: Former Smoker     Packs/day: 1.00     Years: 30.00     Pack years: 30.00     Types: Cigarettes     Last attempt to quit: 2013     Years since quittin.7     Smokeless tobacco: Former User     Tobacco comment:     Substance Use Topics     Alcohol use: Yes     Alcohol/week: 0.0 standard drinks     Comment: 1     Family History   Problem Relation Age of Onset     Diabetes Mother      Hypertension Mother      Hyperlipidemia Mother      Heart Failure Mother      Diabetes Father      Cancer Paternal Grandmother         ?     History   Drug Use No            Objective   /83 (BP Location: Left arm, Patient Position: Sitting, Cuff Size: Thigh)   Pulse 84    "Temp 97.9  F (36.6  C) (Tympanic)   Resp 20   Ht 1.676 m (5' 6\")   Wt 136.1 kg (300 lb)   LMP  (LMP Unknown)   SpO2 99%   BMI 48.42 kg/m    Physical Exam    GENERAL APPEARANCE: healthy, alert and no distress     EYES: EOMI,  PERRL     HENT: ear canals and TM's normal and nose and mouth without ulcers or lesions     NECK: no adenopathy, no asymmetry, masses, or scars and thyroid normal to palpation     RESP: lungs clear to auscultation - no rales, rhonchi or wheezes     CV: regular rates and rhythm, normal S1 S2, no S3 or S4 and no murmur, click or rub     ABDOMEN:  soft, nontender, no HSM or masses and bowel sounds normal     ABDOMEN: No edema     MS: extremities normal- no gross deformities noted, no evidence of inflammation in joints, FROM in all extremities.     MS: Pain on lifting of her left shoulder     SKIN: no suspicious lesions or rashes     NEURO: Normal strength and tone, sensory exam grossly normal, mentation intact and speech normal     PSYCH: mentation appears normal. and affect normal/bright     LYMPHATICS: No cervical adenopathy    Recent Labs   Lab Test 09/16/20  1407 08/04/20  1544 06/08/20  1236  09/10/19  1624  05/13/19  1421   HGB  --   --  12.0  --   --   --  11.6*   PLT  --   --  395  --   --   --  384    136 136   < >  --    < > 138   POTASSIUM 3.8 3.6 4.0   < >  --    < > 3.9   CR 0.97 0.90 0.86   < >  --    < > 0.84   A1C  --  11.0*  --   --  6.5*   < > 7.7*    < > = values in this interval not displayed.        PRE-OP Diagnostics:  No labs were ordered during this visit.  No EKG this visit, completed in the last 90 days.  Read by cardiologist at within normal limits       Assessment & Plan   The proposed surgical procedure is considered LOW risk.    REVISED CARDIAC RISK INDEX  The patient has the following serious cardiovascular risks for perioperative complications:  Congestive Heart Failure (pulmonary edema, PND, s3 sara, CXR with pulmonary congestion, basilar rales) = 1 " point  Diabetes Mellitus (on Insulin) = 1 point    INTERPRETATION: 2 points: Class III (moderate risk - 6.6% complication rate)    - Performs 4 METS exercise without symptoms (e.g., light housework, stairs, 4 mph walk, 7 mph bike, slow step dance)    1. Preop general physical exam  This patient has got chronic systolic heart failure which is nonischemic and idiopathic in nature  She is closely followed by the cardiologist  Her diuretic dose was recently increased by them  In the clinic today she was euvolemic  She does do household work and exercise tolerance is at least 4 METS  She does get short of breath on exertion but that is multifactorial from morbid obesity and systolic heart failure   Her EKG recently at the cardiology office was within normal limits according to cardiology  I am faxing a copy of that along with this     The patient has the following additional risks and recommendations for perioperative complications:     - Morbid obesity (BMI >40)  Need to be careful about narcotics as she almost certainly has underlying sleep apnea  Please consult hospitalist if she is admitted overnight  DIABETES:  - Patient is on insulin therapy; diabetic NPO guidelines provided and discussed.  She takes long-acting insulin  Discussed with her about n.p.o. and asked her to take half the dose the night before  MEDICATION INSTRUCTIONS:  Patient is to take all scheduled medications on the day of surgery EXCEPT for modifications listed below:  Secubitril/Valsartan  Metformin  Hydralazine  RECOMMENDATION:  APPROVAL GIVEN to proceed with the procedure without any further additional testing  Consult hospitalist if she is admitted overnight  She does understand that she is at moderate risk because of her underlying conditions    No follow-ups on file.    Signed Electronically by: Denton Verdugo MD    Copy of this evaluation report is provided to requesting physician.    [facility] Preop  Guidelines    Revised Cardiac Risk Index

## 2020-09-22 NOTE — PATIENT INSTRUCTIONS
"  Preparing for Your Surgery  Getting started  A surgery nurse will call you to review your health history and instructions. They will give you an arrival time based on your scheduled surgery time.  Please be ready to share the following:    Your doctor's clinic name and phone number    Your medical, surgical and anesthesia history    A list of allergies and sensitivities    A list of medicines, including herbal treatments and over-the-counter drugs    Whether the patient has a legal guardian (ask how to send us the papers in advance)  If your child is having surgery, please ask for a copy of Preparing for Your Child's Surgery.    Preparing for surgery    Within 30 days of surgery: Have an exam at your family clinic (primary care clinic), or go to a pre-operative clinic. This exam is called a \"History and Physical,\" or H&P.    At your H&P exam, talk to your care team about all medicines you take. If you need to stop any medicines before surgery, ask when to start taking them again.  ? We do this for your safety. Many medicines can make you bleed too much during surgery. Some change how well surgery (anesthesia) drugs work.    Call your insurance company to see what it will and won't pay for. Ask if they need to pre-approve the surgery. (If no insurance, call [phone].)    Call your surgeon's clinic if there's any change in your health. This includes signs of a cold or flu (sore throat, runny nose, cough, rash, fever). It also includes a scrape or scratch near the surgery site.    If you have questions on the day of surgery, call your surgery center.  Eating and drinking guidelines  For your safety: Unless your surgeon tells you otherwise, follow the guidelines below.    Eat and drink as usual until 8 hours before surgery. After that, no food or milk.    Drink clear liquids until 2 hours before surgery. These are liquids you can see through, like water, Gatorade and Propel Water. You may also have black coffee " and tea (no cream or milk).    Nothing by mouth within 2 hours of surgery. This includes gum, candy and breath mints.    Stop alcohol the midnight before surgery.    If your family clinic tells you to take medicine on the morning of surgery, it's okay to take it with a sip of water.  Preventing infection    Shower or bathe the night before and morning of your surgery. Follow the instructions your clinic gave you. (If no instructions, use regular soap.)    Don't shave or clip hair near your surgery site. This can lead to skin infection.    Don't smoke the morning of surgery. Smoking increases the risk of infection. You may chew nicotine gum up to 2 hours before surgery. A nicotine patch is okay.  ? Note: Some surgeries require you to completely quit smoking and nicotine. Check with your surgeon.    Your care team will make every effort to keep you safe from infection. We will:  ? Clean our hands often with soap and water (or an alcohol-based hand rub).  ? Clean the skin at your surgery site with a special soap that kills germs. We'll also remove hair from the site as needed.  ? Wear special hair covers, masks, gowns and gloves during surgery.  ? Give antibiotic medicine, if prescribed. Not all surgeries need antibiotics.  What to bring on the day of surgery    Photo ID and insurance card    Copy of your health care directive, if you have one    Glasses and hearing aides (bring cases)  ? You can't wear contacts during surgery    Inhaler and eye drops, if you use them (tell us about these when you arrive)    CPAP machine or breathing device, if you use them    A few personal items, if spending the night    If you have . . .  ? A pacemaker or ICD (cardiac defibrillator): Bring the ID card.  ? An implanted stimulator: Bring the remote control.  ? A legal guardian: Bring a copy of the certified (court-stamped) guardianship papers.  Please remove any jewelry, including body piercings. Leave jewelry and other valuables at  home.  If you're going home the day of surgery  Important: If you don't follow the rules below, we must cancel your surgery.     Arrange for someone to drive you home after surgery. You may not drive, take a taxi or take public transportation by yourself (unless you'll have local anesthesia only).    Arrange for a responsible adult to stay with you overnight. If you don't, we may keep you in the hospital overnight, and you may need to pay the costs yourself.  Questions?   If you have any questions for your care team, list them here: _________________________________________________________________________________________________________________________________________________________________________________________________________________________________________________________________________________________________________________________  For informational purposes only. Not to replace the advice of your health care provider. Copyright   1786-6230 [facility]. All rights reserved. Clinically reviewed by Adela Smyth MD. [employer]ART[employer] 161870 - REV 07/19.      At [facility], we strive to deliver an exceptional experience to you, every time we see you. If you receive a survey, please complete it as we do value your feedback.  If you have MyChart, you can expect to receive results automatically within 24 hours of their completion.  Your provider will send a note interpreting your results as well.   If you do not have MyChart, you should receive your results in about a week by mail.    Your care team:                            Family Medicine Internal Medicine   MD Fran Gabriel MD Shantel Branch-Fleming, MD Srinivasa Vaka, MD Katya Georgiev PA-C Megan Hill, APRN SHAZIA Robbins MD Pediatrics   Ricardo Arellano, PAManuelC  Patricia Ramirez, MD Darby Ortiz APRN CNP   MD Latrice Piña, MD Goins  MD Lona Rhoades APRN CNP Christine Bouman, PA-C Emily Bunt, MD Kendra Magallanes MD Angela Wermerskirchen, MD      Clinic hours: Monday - Thursday 7 am-7 pm; Fridays 7 am-5 pm.   Urgent care: Monday - Friday 11 am-9 pm; Saturday and Sunday 9 am-5 pm.    Clinic: [phone]       [facility]: Monday - Thursday 8 am - 7 pm; Friday 8 am - 6 pm  [facility]: [phone]     Use [email or web address] for 24/7 diagnosis and treatment of dozens of conditions.'

## 2020-09-23 ENCOUNTER — VIRTUAL VISIT (OUTPATIENT)
Dept: CARDIOLOGY | Facility: CLINIC | Age: 50
End: 2020-09-23
Payer: COMMERCIAL

## 2020-09-23 DIAGNOSIS — I10 HYPERTENSION, UNSPECIFIED TYPE: ICD-10-CM

## 2020-09-23 DIAGNOSIS — E66.01 MORBID OBESITY DUE TO EXCESS CALORIES (H): ICD-10-CM

## 2020-09-23 DIAGNOSIS — I50.22 CHRONIC SYSTOLIC CONGESTIVE HEART FAILURE (H): ICD-10-CM

## 2020-09-23 DIAGNOSIS — G47.33 OSA (OBSTRUCTIVE SLEEP APNEA): ICD-10-CM

## 2020-09-23 DIAGNOSIS — I50.22 CHRONIC SYSTOLIC CONGESTIVE HEART FAILURE (H): Primary | ICD-10-CM

## 2020-09-23 DIAGNOSIS — I42.8 NONISCHEMIC CARDIOMYOPATHY (H): ICD-10-CM

## 2020-09-23 LAB
ANION GAP SERPL CALCULATED.3IONS-SCNC: 8 MMOL/L (ref 3–14)
BUN SERPL-MCNC: 10 MG/DL (ref 7–30)
CALCIUM SERPL-MCNC: 9.1 MG/DL (ref 8.5–10.1)
CHLORIDE SERPL-SCNC: 100 MMOL/L (ref 94–109)
CO2 SERPL-SCNC: 28 MMOL/L (ref 20–32)
CREAT SERPL-MCNC: 1 MG/DL (ref 0.52–1.04)
GFR SERPL CREATININE-BSD FRML MDRD: 65 ML/MIN/{1.73_M2}
GLUCOSE SERPL-MCNC: 264 MG/DL (ref 70–99)
POTASSIUM SERPL-SCNC: 4.2 MMOL/L (ref 3.4–5.3)
SODIUM SERPL-SCNC: 136 MMOL/L (ref 133–144)

## 2020-09-23 PROCEDURE — 99214 OFFICE O/P EST MOD 30 MIN: CPT | Mod: 95 | Performed by: NURSE PRACTITIONER

## 2020-09-23 PROCEDURE — 80048 BASIC METABOLIC PNL TOTAL CA: CPT | Performed by: NURSE PRACTITIONER

## 2020-09-23 PROCEDURE — 36415 COLL VENOUS BLD VENIPUNCTURE: CPT | Performed by: NURSE PRACTITIONER

## 2020-09-23 NOTE — PROGRESS NOTES
"Bettina Montes is a 50 year old female who is being evaluated via a billable video visit.      The patient has been notified of following:     \"This video visit will be conducted via a call between you and your physician/provider. We have found that certain health care needs can be provided without the need for an in-person physical exam.  This service lets us provide the care you need with a video conversation.  If a prescription is necessary we can send it directly to your pharmacy.  If lab work is needed we can place an order for that and you can then stop by our lab to have the test done at a later time.    Video visits are billed at different rates depending on your insurance coverage.  Please reach out to your insurance provider with any questions.    If during the course of the call the physician/provider feels a video visit is not appropriate, you will not be charged for this service.\"    Patient has given verbal consent for Video visit? yes  How would you like to obtain your AVS? mychart  If you are dropped from the video visit, the video invite should be resent to: [phone]  Will anyone else be joining your video visit? no    Video-Visit Details    Type of service:  Video Visit    Video Start Time: 12:20  Video End Time: 12:35     Originating Location (pt. Location): home    Distant Location (provider location):  [facility]     [facility]        HPI  Bettina Montes is a 50 year old female with a past medical history of HFrEF (30-35%) secondary to NICM (felt to be idiopathic), HTN, obesity, DM II, cervical spine fusion, and hyperlipidemia who presents for routine follow-up. She was last seen by Dr. Chinchilla in 6/5/20.    Bettina had an appt about a week ago. Her diuretic was increased.  No lightheadedness. She feels better at 50 mg BID of Hydralazine.  She has some SOB with activity. She has some swelling in her legs. She may have some swelling in her " abdomen. 293-297 lbs. at home.    She endorses nightly PND, cough, and orthopnea. Her appetite has been fine. She denies early satiety.  She is also having shoulder surgery in early October she just got cleared she said.     She denies any chest pain, palpitations, lightheadedness, dizziness, or near syncopal episodes.     PMH  Past Medical History:   Diagnosis Date     Abnormal Pap smear of cervix 09/10/2019    See problem list     Adrenal gland anomaly      CHF (congestive heart failure) (H)      Fatty liver      Gastroesophageal reflux disease      Hyperlipidemia      Hypertension      Mild persistent asthma      NICM (nonischemic cardiomyopathy) (H)      Obesity      WILLARD (obstructive sleep apnea) 2015     Type 2 diabetes mellitus (H)        Past Surgical History:   Procedure Laterality Date     COLONOSCOPY       DISCECTOMY, FUSION CERVICAL ANTERIOR TWO LEVELS, COMBINED N/A 2019    Procedure: C4-6 anterior cervical discectomy and fusion;  Surgeon: Hollis Hurtado MD;  Location: [facility] OR     TUBAL LIGATION         Family History   Problem Relation Age of Onset     Diabetes Mother      Hypertension Mother      Hyperlipidemia Mother      Heart Failure Mother      Diabetes Father      Cancer Paternal Grandmother         ?       Social History     Socioeconomic History     Marital status: Single     Spouse name: None     Number of children: 1     Years of education: None     Highest education level: None   Occupational History     Occupation: CNA     Employer: OTHER   Social Needs     Financial resource strain: None     Food insecurity:     Worry: None     Inability: None     Transportation needs:     Medical: None     Non-medical: None   Tobacco Use     Smoking status: Former Smoker     Packs/day: 1.00     Years: 30.00     Pack years: 30.00     Types: Cigarettes     Last attempt to quit: 2013     Years since quittin.8     Smokeless tobacco: Former User     Tobacco comment:     Substance and  Sexual Activity     Alcohol use: Yes     Alcohol/week: 0.0 standard drinks     Comment: 1     Drug use: No     Sexual activity: Yes     Partners: Male     Birth control/protection: Surgical   Lifestyle     Physical activity:     Days per week: None     Minutes per session: None     Stress: None   Relationships     Social connections:     Talks on phone: None     Gets together: None     Attends Hinduism service: None     Active member of club or organization: None     Attends meetings of clubs or organizations: None     Relationship status: None     Intimate partner violence:     Fear of current or ex partner: None     Emotionally abused: None     Physically abused: None     Forced sexual activity: None   Other Topics Concern     Parent/sibling w/ CABG, MI or angioplasty before 65F 55M? No   Social History Narrative     None       ALLERGIES  Allergies   Allergen Reactions     Amlodipine Swelling     Edema on 5mg throat     Lisinopril      Swelling in throat, face  angioedema     Naprosyn [Naproxen]      Swelling, diff breathing       MEDICATIONS acetaminophen (TYLENOL) 500 MG tablet, Take 500-1,000 mg by mouth every 6 hours as needed for mild pain  carvedilol (COREG) 25 MG tablet, Take 1 tablet (25 mg) by mouth 2 times daily (with meals)  Cholecalciferol (VITAMIN D3 PO), Take 2,000 Units by mouth daily  fluticasone-salmeterol (WIXELA INHUB) 250-50 MCG/DOSE inhaler, Inhale 1 puff into the lungs  hydrALAZINE (APRESOLINE) 50 MG tablet, TAKE 1 TABLET BY MOUTH TWICE A DAY  insulin glargine (BASAGLAR KWIKPEN) 100 UNIT/ML pen, Inject 65 Units Subcutaneous At Bedtime  levalbuterol (XOPENEX HFA) 45 MCG/ACT Inhaler, Inhale 2 puffs into the lungs every 4 hours as needed for shortness of breath / dyspnea or wheezing  lovastatin (MEVACOR) 20 MG tablet, TAKE 1 TABLET BY MOUTH EVERYDAY AT BEDTIME  magnesium oxide (MAG-OX) 400 (241.3 Mg) MG tablet, Take 1 tablet (400 mg) by mouth daily  metFORMIN (GLUCOPHAGE) 500 MG tablet, Take 2  tablets (1,000 mg) by mouth 2 times daily (with meals)  montelukast (SINGULAIR) 10 MG tablet, Take 1 tablet (10 mg) by mouth At Bedtime  potassium chloride ER (K-DUR/KLOR-CON M) 10 MEQ CR tablet, Take 2 tablets (20 mEq) by mouth daily  sacubitril-valsartan (ENTRESTO)  MG per tablet, Take 1 tablet by mouth 2 times daily  spironolactone (ALDACTONE) 50 MG tablet, Take 1 tablet (50 mg) by mouth daily  tiZANidine (ZANAFLEX) 2 MG tablet, TAKE 1-2 TABLETS BY MOUTH 3 TIMES DAILY AS NEEDED FOR MUSCLE SPASMS  torsemide (DEMADEX) 20 MG tablet, Take 40 mg in the am and 20 mg in the afternoon (Patient taking differently: Take 40 mg in the am and 40 mg in the afternoon)  BETA BLOCKER NOT PRESCRIBED, INTENTIONAL,, Beta Blocker not prescribed intentionally due to Severe Asthma  blood glucose (NO BRAND SPECIFIED) test strip, Use to test blood sugar 3-4 times daily or as directed.  cholecalciferol ( ULTRA STRENGTH) 2000 units CAPS, Take 2,000 Units by mouth  insulin pen needle (32G X 4 MM) 32G X 4 MM miscellaneous, Use 1 pen needles daily or as directed.  insulin pen needle (ULTICARE MICRO) 32G X 4 MM miscellaneous, Use 4 pen needles daily or as directed.  magnesium oxide (MAG-OX) 400 MG tablet, TAKE 1 TABLET BY MOUTH EVERY DAY  ranitidine (ZANTAC) 150 MG/10ML syrup, Take 10 mLs (150 mg) by mouth 2 times daily  WIXELA INHUB 250-50 MCG/DOSE inhaler, Inhale 1 puff into the lungs every 12 hours    sodium chloride (PF) 0.9% PF flush 10 mL          ROS:   Constitutional: No fever, chills, or sweats.  ENT: No visual disturbance, ear ache, epistaxis, sore throat.   Allergies/Immunologic: Negative  Respiratory: +SOB with and without activity   Cardiovascular: As per HPI.   GI: As per HPI.   : No urinary frequency, dysuria, or hematuria.   Integument: Negative.   Psychiatric: Negative.   Neuro: Negative.   Endocrinology: negative   Musculoskeletal: negative    EXAM:   Constitutional - no acute distress   Eyes - no redness or  discharge, nonicteric sclera  Respiratory - nonlabored breathing, able to speak in full sentences without difficulty  CV: no visible edema of visualized upper extremities.  Neurological - alert and oriented, speech fluent/appropriate  PSYCH: cooperative, affect appropriate  DERM: no rashes on visualized face/neck/upper extremities     The rest of a comprehensive physical examination is deferred due to PHE (public health emergency) video visit restrictions        LABS  Last Comprehensive Metabolic Panel:  Sodium   Date Value Ref Range Status   09/16/2020 136 133 - 144 mmol/L Final     Potassium   Date Value Ref Range Status   09/16/2020 3.8 3.4 - 5.3 mmol/L Final     Chloride   Date Value Ref Range Status   09/16/2020 101 94 - 109 mmol/L Final     Carbon Dioxide   Date Value Ref Range Status   09/16/2020 31 20 - 32 mmol/L Final     Anion Gap   Date Value Ref Range Status   09/16/2020 4 3 - 14 mmol/L Final     Glucose   Date Value Ref Range Status   09/16/2020 196 (H) 70 - 99 mg/dL Final     Comment:     Non Fasting     Urea Nitrogen   Date Value Ref Range Status   09/16/2020 17 7 - 30 mg/dL Final     Creatinine   Date Value Ref Range Status   09/16/2020 0.97 0.52 - 1.04 mg/dL Final     GFR Estimate   Date Value Ref Range Status   09/16/2020 68 >60 mL/min/[1.73_m2] Final     Comment:     Non  GFR Calc  Starting 12/18/2018, serum creatinine based estimated GFR (eGFR) will be   calculated using the Chronic Kidney Disease Epidemiology Collaboration   (CKD-EPI) equation.       Calcium   Date Value Ref Range Status   09/16/2020 9.4 8.5 - 10.1 mg/dL Final       MOST RECENT ECHOCARDIOGRAM 10/25/19:  Interpretation Summary  Technically difficult study.  Left ventricular size is normal. Mildly (EF 40-45%, traced 45%) reduced left ventricular function is present.  Right ventricular function, chamber size, wall motion, and thickness are normal. This study was compared with the study from 3/21/18: The left  ventricular function has improved    ASSESSMENT AND PLAN  Bettina Montes is a 50 year old female with NICM who appears hypervolemic today.  We will increase her Torsemide , I have asked that she get labs today and then we can increase the diuretic appropriately.     1. Chronic systolic heart failure/HFrEF (EF 40-45%) secondary to nonischemic cardiomyopathy  NYHA Symptom Class IIIB  Stage C  Primary Cardiologist: Dr Chinchilla; Last seen 6/5/ 20  ACE-I/ARB/ARNi:  Entresto.  BB yes, Carvedilol 25 mg twice daily  Aldosterone antagonist yes, Spironolactone 50 mg daily.   SCD prophylaxis Decision deferred during medication uptitration  Fluid status Hypervolemic  Cardiac Rehab: Completed  Sleep Apnea Evaluation: Documented sleep apnea.    Remote PA Pressure Monitoring (CardioMems) Deferred while medical therapy is optimized  Remote monitoring: Mychart active  Antiplatelet: none.   Anticoagulation: None.    2.  Obesity: BMI 47.29.  She is working on weight loss.  Feel part of this is fluid retention.  Continue to reassess at subsequent visits.     3.  HTN:   continue Entresto   Continue to monitor BP readings at home.      4.  Follow-up:  BMP today - if WNL - increase Torsemide to 60 mg BID   CORE and labs in one week   CORE ( 10/1 @ 3pm)  Labs day prior 9/30 - BMP

## 2020-09-23 NOTE — PATIENT INSTRUCTIONS
Take your medicines every day, as directed    Changes made today:  o Will call after labs are done  o    Monitor Your Weight and Symptoms    Contact us if you:      Gain 2 pounds in one day or 5 pounds in one week    Feel more short of breath    Notice more leg swelling    Feel lightheadeded   Change your lifestyle    Limit Salt or Sodium:    2000 mg  Limit Fluids:    2000 mL or approximately 64 ounces  Eat a Heart Healthy Diet    Low in saturated fats  Stay Active:    Aim to move at least 150 minutes every  week         To Contact us    During Business Hours:  [phone], option # 1 (University)  Then option # 4 (medical questions)     After hours, weekends or holidays:   [phone], Option #4  Ask to speak to the On-Call Cardiologist. Inform them you are a CORE/heart failure patient at the [facility].     Use [employer] allows you to communicate directly with your heart team through secure messaging.    [employer] can be accessed any time on your phone, computer, or tablet.    If you need assistance, we'd be happy to help!         Keep your Heart Appointments:    Labs today     CORE on 10/1 Virtual at 3 pm

## 2020-09-24 ENCOUNTER — TELEPHONE (OUTPATIENT)
Dept: CARDIOLOGY | Facility: CLINIC | Age: 50
End: 2020-09-24

## 2020-09-24 DIAGNOSIS — I50.23 ACUTE ON CHRONIC SYSTOLIC HEART FAILURE (H): ICD-10-CM

## 2020-09-24 RX ORDER — POTASSIUM CHLORIDE 750 MG/1
30 TABLET, EXTENDED RELEASE ORAL DAILY
Qty: 60 TABLET | Refills: 11 | COMMUNITY
Start: 2020-09-24 | End: 2021-03-18

## 2020-09-24 RX ORDER — TORSEMIDE 20 MG/1
TABLET ORAL
Qty: 90 TABLET | Refills: 11 | COMMUNITY
Start: 2020-09-24 | End: 2020-12-14

## 2020-09-28 DIAGNOSIS — I50.22 CHRONIC SYSTOLIC CONGESTIVE HEART FAILURE (H): Primary | ICD-10-CM

## 2020-09-30 DIAGNOSIS — I50.22 CHRONIC SYSTOLIC CONGESTIVE HEART FAILURE (H): ICD-10-CM

## 2020-09-30 LAB
ANION GAP SERPL CALCULATED.3IONS-SCNC: 6 MMOL/L (ref 3–14)
BUN SERPL-MCNC: 15 MG/DL (ref 7–30)
CALCIUM SERPL-MCNC: 8.7 MG/DL (ref 8.5–10.1)
CHLORIDE SERPL-SCNC: 98 MMOL/L (ref 94–109)
CO2 SERPL-SCNC: 29 MMOL/L (ref 20–32)
CREAT SERPL-MCNC: 1.02 MG/DL (ref 0.52–1.04)
GFR SERPL CREATININE-BSD FRML MDRD: 63 ML/MIN/{1.73_M2}
GLUCOSE SERPL-MCNC: 294 MG/DL (ref 70–99)
POTASSIUM SERPL-SCNC: 3.8 MMOL/L (ref 3.4–5.3)
SODIUM SERPL-SCNC: 133 MMOL/L (ref 133–144)

## 2020-09-30 PROCEDURE — 36415 COLL VENOUS BLD VENIPUNCTURE: CPT | Performed by: NURSE PRACTITIONER

## 2020-09-30 PROCEDURE — 80048 BASIC METABOLIC PNL TOTAL CA: CPT | Performed by: NURSE PRACTITIONER

## 2020-10-01 ENCOUNTER — VIRTUAL VISIT (OUTPATIENT)
Dept: CARDIOLOGY | Facility: CLINIC | Age: 50
End: 2020-10-01
Payer: COMMERCIAL

## 2020-10-01 VITALS — BODY MASS INDEX: 47.29 KG/M2 | DIASTOLIC BLOOD PRESSURE: 86 MMHG | SYSTOLIC BLOOD PRESSURE: 124 MMHG | WEIGHT: 293 LBS

## 2020-10-01 DIAGNOSIS — I10 HYPERTENSION, UNSPECIFIED TYPE: ICD-10-CM

## 2020-10-01 DIAGNOSIS — I42.8 NONISCHEMIC CARDIOMYOPATHY (H): ICD-10-CM

## 2020-10-01 DIAGNOSIS — E66.01 MORBID OBESITY DUE TO EXCESS CALORIES (H): ICD-10-CM

## 2020-10-01 DIAGNOSIS — I50.22 CHRONIC SYSTOLIC HEART FAILURE (H): Primary | ICD-10-CM

## 2020-10-01 DIAGNOSIS — G47.33 OSA (OBSTRUCTIVE SLEEP APNEA): ICD-10-CM

## 2020-10-01 PROCEDURE — 99214 OFFICE O/P EST MOD 30 MIN: CPT | Mod: 95 | Performed by: NURSE PRACTITIONER

## 2020-10-01 NOTE — PATIENT INSTRUCTIONS
Take your medicines every day, as directed    Changes made today:    No med changes today     Monitor Your Weight and Symptoms    Contact us if you:      Gain 2 pounds in one day or 5 pounds in one week    Feel more short of breath    Notice more leg swelling    Feel lightheadeded   Change your lifestyle    Limit Salt or Sodium:    2000 mg  Limit Fluids:    2000 mL or approximately 64 ounces  Eat a Heart Healthy Diet    Low in saturated fats  Stay Active:    Aim to move at least 150 minutes every  week         To Contact us    During Business Hours:  [phone], option # 1 (University)  Then option # 4 (medical questions)     After hours, weekends or holidays:   [phone], Option #4  Ask to speak to the On-Call Cardiologist. Inform them you are a CORE/heart failure patient at the [facility].     Use [employer] allows you to communicate directly with your heart team through secure messaging.    [employer] can be accessed any time on your phone, computer, or tablet.    If you need assistance, we'd be happy to help!         Keep your Heart Appointments:      Next CORE follow up on 10/14 at 11:15am - Video appt, BMP prior if able.

## 2020-10-01 NOTE — NURSING NOTE
"Chief Complaint   Patient presents with     Heart Failure     chronic systolic heart failure; HFrEF EF 40-45% (ECHO 10/25/19) presents for follow up with labs prior. Per patient sob.,edema as usual       Initial /86   Wt 132.9 kg (293 lb)   LMP  (LMP Unknown)   BMI 47.29 kg/m   Estimated body mass index is 47.29 kg/m  as calculated from the following:    Height as of 9/22/20: 1.676 m (5' 6\").    Weight as of this encounter: 132.9 kg (293 lb)..  BP completed using cuff size: deisi Rhoades L.P.N.    "

## 2020-10-01 NOTE — LETTER
"10/1/2020      RE: Bettina Montes  [address]  [place] [address]       Dear Colleague,    Thank you for the opportunity to participate in the care of your patient, Bettina Montes, at the [facility] at [facility]. Please see a copy of my visit note below.    Bettina Montes is a 50 year old female who is being evaluated via a billable video visit.      The patient has been notified of following:     \"This video visit will be conducted via a call between you and your physician/provider. We have found that certain health care needs can be provided without the need for an in-person physical exam.  This service lets us provide the care you need with a video conversation.  If a prescription is necessary we can send it directly to your pharmacy.  If lab work is needed we can place an order for that and you can then stop by our lab to have the test done at a later time.    Video visits are billed at different rates depending on your insurance coverage.  Please reach out to your insurance provider with any questions.    If during the course of the call the physician/provider feels a video visit is not appropriate, you will not be charged for this service.\"    Patient has given verbal consent for Video visit? Yes  How would you like to obtain your AVS? MyChart  If you are dropped from the video visit, the video invite should be resent to: Text to cell phone: [phone]  Will anyone else be joining your video visit? No        Video-Visit Details    Type of service:  Video Visit    Video Start Time: 3:00  Video End Time: 3:14  Originating Location (pt. Location): video    Distant Location (provider location):  [facility]     Platform used for Video Visit: doximity    Bettina Montes is a 50 year old female who is being evaluated via a billable video visit.        PI  Bettina Montes is a 50 year old female with a past " medical history of HFrEF (30-35%) secondary to NICM (felt to be idiopathic), HTN, obesity, DM II, cervical spine fusion, and hyperlipidemia who presents for routine follow-up. She was last seen by Dr. Chinchilla in 6/5/20.    Bettina had an appt about a week ago. Her diuretic was increased.  No lightheadedness. She feels better at 50 mg BID of Hydralazine.  Reports feeling well after the increase in diuretic . She says there is now no swelling in her legs or abdomen. Her weight today was 293.4 lbs. at home.    She endorses a big improvement in her nightly PND, cough, and orthopnea. Her appetite has been fine. She denies early satiety.  She is having shoulder surgery on Monday.      She denies any chest pain, palpitations, lightheadedness, dizziness, or near syncopal episodes.     PMH  Past Medical History:   Diagnosis Date     Abnormal Pap smear of cervix 09/10/2019    See problem list     Adrenal gland anomaly      CHF (congestive heart failure) (H)      Fatty liver      Gastroesophageal reflux disease      Hyperlipidemia      Hypertension      Mild persistent asthma      NICM (nonischemic cardiomyopathy) (H)      Obesity      WILLARD (obstructive sleep apnea) 1/9/2015     Type 2 diabetes mellitus (H)        Past Surgical History:   Procedure Laterality Date     COLONOSCOPY       DISCECTOMY, FUSION CERVICAL ANTERIOR TWO LEVELS, COMBINED N/A 4/5/2019    Procedure: C4-6 anterior cervical discectomy and fusion;  Surgeon: Hollis Hurtado MD;  Location: [facility] OR     TUBAL LIGATION         Family History   Problem Relation Age of Onset     Diabetes Mother      Hypertension Mother      Hyperlipidemia Mother      Heart Failure Mother      Diabetes Father      Cancer Paternal Grandmother         ?       Social History     Socioeconomic History     Marital status: Single     Spouse name: None     Number of children: 1     Years of education: None     Highest education level: None   Occupational History     Occupation: CNA      Employer: OTHER   Social Needs     Financial resource strain: None     Food insecurity:     Worry: None     Inability: None     Transportation needs:     Medical: None     Non-medical: None   Tobacco Use     Smoking status: Former Smoker     Packs/day: 1.00     Years: 30.00     Pack years: 30.00     Types: Cigarettes     Last attempt to quit: 2013     Years since quittin.8     Smokeless tobacco: Former User     Tobacco comment:     Substance and Sexual Activity     Alcohol use: Yes     Alcohol/week: 0.0 standard drinks     Comment: 1     Drug use: No     Sexual activity: Yes     Partners: Male     Birth control/protection: Surgical   Lifestyle     Physical activity:     Days per week: None     Minutes per session: None     Stress: None   Relationships     Social connections:     Talks on phone: None     Gets together: None     Attends Gnosticist service: None     Active member of club or organization: None     Attends meetings of clubs or organizations: None     Relationship status: None     Intimate partner violence:     Fear of current or ex partner: None     Emotionally abused: None     Physically abused: None     Forced sexual activity: None   Other Topics Concern     Parent/sibling w/ CABG, MI or angioplasty before 65F 55M? No   Social History Narrative     None       ALLERGIES  Allergies   Allergen Reactions     Amlodipine Swelling     Edema on 5mg throat     Lisinopril      Swelling in throat, face  angioedema     Naprosyn [Naproxen]      Swelling, diff breathing       MEDICATIONS      acetaminophen (TYLENOL) 500 MG tablet, Take 500-1,000 mg by mouth every 6 hours as needed for mild pain       BETA BLOCKER NOT PRESCRIBED, INTENTIONAL,, Beta Blocker not prescribed intentionally due to Severe Asthma       blood glucose (NO BRAND SPECIFIED) test strip, Use to test blood sugar 3-4 times daily or as directed.       carvedilol (COREG) 25 MG tablet, Take 1 tablet (25 mg) by mouth 2 times daily (with  meals)       cholecalciferol ( ULTRA STRENGTH) 2000 units CAPS, Take 2,000 Units by mouth       Cholecalciferol (VITAMIN D3 PO), Take 2,000 Units by mouth daily       fluticasone-salmeterol (WIXELA INHUB) 250-50 MCG/DOSE inhaler, Inhale 1 puff into the lungs       hydrALAZINE (APRESOLINE) 50 MG tablet, TAKE 1 TABLET BY MOUTH TWICE A DAY       insulin glargine (BASAGLAR KWIKPEN) 100 UNIT/ML pen, Inject 65 Units Subcutaneous At Bedtime       insulin pen needle (32G X 4 MM) 32G X 4 MM miscellaneous, Use 1 pen needles daily or as directed.       insulin pen needle (ULTICARE MICRO) 32G X 4 MM miscellaneous, Use 4 pen needles daily or as directed.       levalbuterol (XOPENEX HFA) 45 MCG/ACT Inhaler, Inhale 2 puffs into the lungs every 4 hours as needed for shortness of breath / dyspnea or wheezing       lovastatin (MEVACOR) 20 MG tablet, TAKE 1 TABLET BY MOUTH EVERYDAY AT BEDTIME       magnesium oxide (MAG-OX) 400 (241.3 Mg) MG tablet, Take 1 tablet (400 mg) by mouth daily       magnesium oxide (MAG-OX) 400 MG tablet, TAKE 1 TABLET BY MOUTH EVERY DAY       metFORMIN (GLUCOPHAGE) 500 MG tablet, Take 2 tablets (1,000 mg) by mouth 2 times daily (with meals)       montelukast (SINGULAIR) 10 MG tablet, Take 1 tablet (10 mg) by mouth At Bedtime       potassium chloride ER (KLOR-CON M) 10 MEQ CR tablet, Take 3 tablets (30 mEq) by mouth daily       sacubitril-valsartan (ENTRESTO)  MG per tablet, Take 1 tablet by mouth 2 times daily       spironolactone (ALDACTONE) 50 MG tablet, Take 1 tablet (50 mg) by mouth daily       tiZANidine (ZANAFLEX) 2 MG tablet, TAKE 1-2 TABLETS BY MOUTH 3 TIMES DAILY AS NEEDED FOR MUSCLE SPASMS       torsemide (DEMADEX) 20 MG tablet, Take 60 mg in the am and 60 mg in the afternoon       WIXELA INHUB 250-50 MCG/DOSE inhaler, Inhale 1 puff into the lungs every 12 hours       ranitidine (ZANTAC) 150 MG/10ML syrup, Take 10 mLs (150 mg) by mouth 2 times daily         sodium chloride (PF) 0.9% PF  flush 10 mL          ROS:   Constitutional: No fever, chills, or sweats.  ENT: No visual disturbance, ear ache, epistaxis, sore throat.   Allergies/Immunologic: Negative  Respiratory: +SOB with activity   Cardiovascular: As per HPI.   GI: As per HPI.   : No urinary frequency, dysuria, or hematuria.   Integument: Negative.   Psychiatric: Negative.   Neuro: Negative.   Endocrinology: negative   Musculoskeletal: negative    EXAM:   Constitutional - no acute distress   Eyes - no redness or discharge, nonicteric sclera  Respiratory - nonlabored breathing, able to speak in full sentences without difficulty  CV: no visible edema of visualized upper extremities.  Neurological - alert and oriented, speech fluent/appropriate  PSYCH: cooperative, affect appropriate  DERM: no rashes on visualized face/neck/upper extremities     The rest of a comprehensive physical examination is deferred due to PHE (public health emergency) video visit restrictions        LABS  Last Comprehensive Metabolic Panel:  Sodium   Date Value Ref Range Status   09/30/2020 133 133 - 144 mmol/L Final     Potassium   Date Value Ref Range Status   09/30/2020 3.8 3.4 - 5.3 mmol/L Final     Chloride   Date Value Ref Range Status   09/30/2020 98 94 - 109 mmol/L Final     Carbon Dioxide   Date Value Ref Range Status   09/30/2020 29 20 - 32 mmol/L Final     Anion Gap   Date Value Ref Range Status   09/30/2020 6 3 - 14 mmol/L Final     Glucose   Date Value Ref Range Status   09/30/2020 294 (H) 70 - 99 mg/dL Final     Urea Nitrogen   Date Value Ref Range Status   09/30/2020 15 7 - 30 mg/dL Final     Creatinine   Date Value Ref Range Status   09/30/2020 1.02 0.52 - 1.04 mg/dL Final     GFR Estimate   Date Value Ref Range Status   09/30/2020 63 >60 mL/min/[1.73_m2] Final     Comment:     Non  GFR Calc  Starting 12/18/2018, serum creatinine based estimated GFR (eGFR) will be   calculated using the Chronic Kidney Disease Epidemiology Collaboration    (CKD-EPI) equation.       Calcium   Date Value Ref Range Status   09/30/2020 8.7 8.5 - 10.1 mg/dL Final       MOST RECENT ECHOCARDIOGRAM 10/25/19:  Interpretation Summary  Technically difficult study.  Left ventricular size is normal. Mildly (EF 40-45%, traced 45%) reduced left ventricular function is present.  Right ventricular function, chamber size, wall motion, and thickness are normal. This study was compared with the study from 3/21/18: The left ventricular function has improved    ASSESSMENT AND PLAN  Bettina Montes is a 50 year old female with NICM who appears euvolemic today.  We will continue her Torsemide at 60 mg BID. I would like to follow up on 10/14 , patient is in favor as well.     1. Chronic systolic heart failure/HFrEF (EF 40-45%) secondary to nonischemic cardiomyopathy  NYHA Symptom Class IIIB  Stage C  Primary Cardiologist: Dr Chinchilla; Last seen 6/5/ 20  ACE-I/ARB/ARNi:  Entresto  mg  BB yes, Carvedilol 25 mg twice daily  Aldosterone antagonist yes, Spironolactone 50 mg daily.   SCD prophylaxis Decision deferred during medication uptitration  Fluid status Euvolemic  Cardiac Rehab: Completed  Sleep Apnea Evaluation: Documented sleep apnea.    Remote PA Pressure Monitoring (CardioMems) Deferred while medical therapy is optimized  Remote monitoring: Mychart active  Antiplatelet: none.   Anticoagulation: None.    2.  Obesity: BMI 47.29.  She is working on weight loss.  Feel part of this is fluid retention.  Continue to reassess at subsequent visits.     3.  HTN:   continue Entresto   Continue to monitor BP readings at home.      4.  Follow-up:  CORE follow up 10/14 at 11:15 - Virtual video  BMP prior if able .   No med changes today        Please do not hesitate to contact me if you have any questions/concerns.     Sincerely,     Heide Woodall, RICCARDO CNP

## 2020-10-01 NOTE — PROGRESS NOTES
"Bettina Montes is a 50 year old female who is being evaluated via a billable video visit.      The patient has been notified of following:     \"This video visit will be conducted via a call between you and your physician/provider. We have found that certain health care needs can be provided without the need for an in-person physical exam.  This service lets us provide the care you need with a video conversation.  If a prescription is necessary we can send it directly to your pharmacy.  If lab work is needed we can place an order for that and you can then stop by our lab to have the test done at a later time.    Video visits are billed at different rates depending on your insurance coverage.  Please reach out to your insurance provider with any questions.    If during the course of the call the physician/provider feels a video visit is not appropriate, you will not be charged for this service.\"    Patient has given verbal consent for Video visit? Yes  How would you like to obtain your AVS? MyChart  If you are dropped from the video visit, the video invite should be resent to: Text to cell phone: [phone]  Will anyone else be joining your video visit? No        Video-Visit Details    Type of service:  Video Visit    Video Start Time: 3:00  Video End Time: 3:14  Originating Location (pt. Location): video    Distant Location (provider location):  [facility]     Platform used for Video Visit: doximity    Bettnia Montes is a 50 year old female who is being evaluated via a billable video visit.        SANJAY  Bettina Montes is a 50 year old female with a past medical history of HFrEF (30-35%) secondary to NICM (felt to be idiopathic), HTN, obesity, DM II, cervical spine fusion, and hyperlipidemia who presents for routine follow-up. She was last seen by Dr. Chinchilla in 6/5/20.    Bettina had an appt about a week ago. Her diuretic was increased.  No lightheadedness. She feels better at 50 mg BID " of Hydralazine.  Reports feeling well after the increase in diuretic . She says there is now no swelling in her legs or abdomen. Her weight today was 293.4 lbs. at home.    She endorses a big improvement in her nightly PND, cough, and orthopnea. Her appetite has been fine. She denies early satiety.  She is having shoulder surgery on Monday.      She denies any chest pain, palpitations, lightheadedness, dizziness, or near syncopal episodes.     PMH  Past Medical History:   Diagnosis Date     Abnormal Pap smear of cervix 09/10/2019    See problem list     Adrenal gland anomaly      CHF (congestive heart failure) (H)      Fatty liver      Gastroesophageal reflux disease      Hyperlipidemia      Hypertension      Mild persistent asthma      NICM (nonischemic cardiomyopathy) (H)      Obesity      WILLARD (obstructive sleep apnea) 1/9/2015     Type 2 diabetes mellitus (H)        Past Surgical History:   Procedure Laterality Date     COLONOSCOPY       DISCECTOMY, FUSION CERVICAL ANTERIOR TWO LEVELS, COMBINED N/A 4/5/2019    Procedure: C4-6 anterior cervical discectomy and fusion;  Surgeon: Hollis Hurtado MD;  Location: [facility] OR     TUBAL LIGATION         Family History   Problem Relation Age of Onset     Diabetes Mother      Hypertension Mother      Hyperlipidemia Mother      Heart Failure Mother      Diabetes Father      Cancer Paternal Grandmother         ?       Social History     Socioeconomic History     Marital status: Single     Spouse name: None     Number of children: 1     Years of education: None     Highest education level: None   Occupational History     Occupation: CNA     Employer: OTHER   Social Needs     Financial resource strain: None     Food insecurity:     Worry: None     Inability: None     Transportation needs:     Medical: None     Non-medical: None   Tobacco Use     Smoking status: Former Smoker     Packs/day: 1.00     Years: 30.00     Pack years: 30.00     Types: Cigarettes     Last attempt  to quit: 2013     Years since quittin.8     Smokeless tobacco: Former User     Tobacco comment:     Substance and Sexual Activity     Alcohol use: Yes     Alcohol/week: 0.0 standard drinks     Comment: 1     Drug use: No     Sexual activity: Yes     Partners: Male     Birth control/protection: Surgical   Lifestyle     Physical activity:     Days per week: None     Minutes per session: None     Stress: None   Relationships     Social connections:     Talks on phone: None     Gets together: None     Attends Nondenominational service: None     Active member of club or organization: None     Attends meetings of clubs or organizations: None     Relationship status: None     Intimate partner violence:     Fear of current or ex partner: None     Emotionally abused: None     Physically abused: None     Forced sexual activity: None   Other Topics Concern     Parent/sibling w/ CABG, MI or angioplasty before 65F 55M? No   Social History Narrative     None       ALLERGIES  Allergies   Allergen Reactions     Amlodipine Swelling     Edema on 5mg throat     Lisinopril      Swelling in throat, face  angioedema     Naprosyn [Naproxen]      Swelling, diff breathing       MEDICATIONS      acetaminophen (TYLENOL) 500 MG tablet, Take 500-1,000 mg by mouth every 6 hours as needed for mild pain       BETA BLOCKER NOT PRESCRIBED, INTENTIONAL,, Beta Blocker not prescribed intentionally due to Severe Asthma       blood glucose (NO BRAND SPECIFIED) test strip, Use to test blood sugar 3-4 times daily or as directed.       carvedilol (COREG) 25 MG tablet, Take 1 tablet (25 mg) by mouth 2 times daily (with meals)       cholecalciferol ( ULTRA STRENGTH) 2000 units CAPS, Take 2,000 Units by mouth       Cholecalciferol (VITAMIN D3 PO), Take 2,000 Units by mouth daily       fluticasone-salmeterol (WIXELA INHUB) 250-50 MCG/DOSE inhaler, Inhale 1 puff into the lungs       hydrALAZINE (APRESOLINE) 50 MG tablet, TAKE 1 TABLET BY MOUTH TWICE A  DAY       insulin glargine (BASAGLAR KWIKPEN) 100 UNIT/ML pen, Inject 65 Units Subcutaneous At Bedtime       insulin pen needle (32G X 4 MM) 32G X 4 MM miscellaneous, Use 1 pen needles daily or as directed.       insulin pen needle (ULTICARE MICRO) 32G X 4 MM miscellaneous, Use 4 pen needles daily or as directed.       levalbuterol (XOPENEX HFA) 45 MCG/ACT Inhaler, Inhale 2 puffs into the lungs every 4 hours as needed for shortness of breath / dyspnea or wheezing       lovastatin (MEVACOR) 20 MG tablet, TAKE 1 TABLET BY MOUTH EVERYDAY AT BEDTIME       magnesium oxide (MAG-OX) 400 (241.3 Mg) MG tablet, Take 1 tablet (400 mg) by mouth daily       magnesium oxide (MAG-OX) 400 MG tablet, TAKE 1 TABLET BY MOUTH EVERY DAY       metFORMIN (GLUCOPHAGE) 500 MG tablet, Take 2 tablets (1,000 mg) by mouth 2 times daily (with meals)       montelukast (SINGULAIR) 10 MG tablet, Take 1 tablet (10 mg) by mouth At Bedtime       potassium chloride ER (KLOR-CON M) 10 MEQ CR tablet, Take 3 tablets (30 mEq) by mouth daily       sacubitril-valsartan (ENTRESTO)  MG per tablet, Take 1 tablet by mouth 2 times daily       spironolactone (ALDACTONE) 50 MG tablet, Take 1 tablet (50 mg) by mouth daily       tiZANidine (ZANAFLEX) 2 MG tablet, TAKE 1-2 TABLETS BY MOUTH 3 TIMES DAILY AS NEEDED FOR MUSCLE SPASMS       torsemide (DEMADEX) 20 MG tablet, Take 60 mg in the am and 60 mg in the afternoon       WIXELA INHUB 250-50 MCG/DOSE inhaler, Inhale 1 puff into the lungs every 12 hours       ranitidine (ZANTAC) 150 MG/10ML syrup, Take 10 mLs (150 mg) by mouth 2 times daily         sodium chloride (PF) 0.9% PF flush 10 mL          ROS:   Constitutional: No fever, chills, or sweats.  ENT: No visual disturbance, ear ache, epistaxis, sore throat.   Allergies/Immunologic: Negative  Respiratory: +SOB with activity   Cardiovascular: As per HPI.   GI: As per HPI.   : No urinary frequency, dysuria, or hematuria.   Integument: Negative.   Psychiatric:  Negative.   Neuro: Negative.   Endocrinology: negative   Musculoskeletal: negative    EXAM:   Constitutional - no acute distress   Eyes - no redness or discharge, nonicteric sclera  Respiratory - nonlabored breathing, able to speak in full sentences without difficulty  CV: no visible edema of visualized upper extremities.  Neurological - alert and oriented, speech fluent/appropriate  PSYCH: cooperative, affect appropriate  DERM: no rashes on visualized face/neck/upper extremities     The rest of a comprehensive physical examination is deferred due to PHE (public health emergency) video visit restrictions        LABS  Last Comprehensive Metabolic Panel:  Sodium   Date Value Ref Range Status   09/30/2020 133 133 - 144 mmol/L Final     Potassium   Date Value Ref Range Status   09/30/2020 3.8 3.4 - 5.3 mmol/L Final     Chloride   Date Value Ref Range Status   09/30/2020 98 94 - 109 mmol/L Final     Carbon Dioxide   Date Value Ref Range Status   09/30/2020 29 20 - 32 mmol/L Final     Anion Gap   Date Value Ref Range Status   09/30/2020 6 3 - 14 mmol/L Final     Glucose   Date Value Ref Range Status   09/30/2020 294 (H) 70 - 99 mg/dL Final     Urea Nitrogen   Date Value Ref Range Status   09/30/2020 15 7 - 30 mg/dL Final     Creatinine   Date Value Ref Range Status   09/30/2020 1.02 0.52 - 1.04 mg/dL Final     GFR Estimate   Date Value Ref Range Status   09/30/2020 63 >60 mL/min/[1.73_m2] Final     Comment:     Non  GFR Calc  Starting 12/18/2018, serum creatinine based estimated GFR (eGFR) will be   calculated using the Chronic Kidney Disease Epidemiology Collaboration   (CKD-EPI) equation.       Calcium   Date Value Ref Range Status   09/30/2020 8.7 8.5 - 10.1 mg/dL Final       MOST RECENT ECHOCARDIOGRAM 10/25/19:  Interpretation Summary  Technically difficult study.  Left ventricular size is normal. Mildly (EF 40-45%, traced 45%) reduced left ventricular function is present.  Right ventricular function,  chamber size, wall motion, and thickness are normal. This study was compared with the study from 3/21/18: The left ventricular function has improved    ASSESSMENT AND PLAN  Bettina Montes is a 50 year old female with NICM who appears euvolemic today.  We will continue her Torsemide at 60 mg BID. I would like to follow up on 10/14 , patient is in favor as well.     1. Chronic systolic heart failure/HFrEF (EF 40-45%) secondary to nonischemic cardiomyopathy  NYHA Symptom Class IIIB  Stage C  Primary Cardiologist: Dr Chinchilla; Last seen 6/5/ 20  ACE-I/ARB/ARNi:  Entresto  mg  BB yes, Carvedilol 25 mg twice daily  Aldosterone antagonist yes, Spironolactone 50 mg daily.   SCD prophylaxis Decision deferred during medication uptitration  Fluid status Euvolemic  Cardiac Rehab: Completed  Sleep Apnea Evaluation: Documented sleep apnea.    Remote PA Pressure Monitoring (CardioMems) Deferred while medical therapy is optimized  Remote monitoring: Mychart active  Antiplatelet: none.   Anticoagulation: None.    2.  Obesity: BMI 47.29.  She is working on weight loss.  Feel part of this is fluid retention.  Continue to reassess at subsequent visits.     3.  HTN:   continue Entresto   Continue to monitor BP readings at home.      4.  Follow-up:  CORE follow up 10/14 at 11:15 - Virtual video  BMP prior if able .   No med changes today      Heide GU NP-C

## 2020-10-09 PROBLEM — S46.219A BICEPS TENDON TEAR: Status: RESOLVED | Noted: 2019-10-17 | Resolved: 2020-10-09

## 2020-10-09 PROBLEM — M75.02 ADHESIVE CAPSULITIS OF LEFT SHOULDER: Status: RESOLVED | Noted: 2019-10-17 | Resolved: 2020-10-09

## 2020-10-09 NOTE — PROGRESS NOTES
Patient did not return for further treatment and no additional progress was noted. Attempts to contact patient were made, but no response from patient to let her know we had reopened. Please refer to the progress note and goal flowsheet completed on 03/04/20 for discharge information.

## 2020-10-12 ENCOUNTER — TELEPHONE (OUTPATIENT)
Dept: PULMONOLOGY | Facility: CLINIC | Age: 50
End: 2020-10-12

## 2020-10-12 DIAGNOSIS — I50.22 CHRONIC SYSTOLIC CONGESTIVE HEART FAILURE (H): Primary | ICD-10-CM

## 2020-10-12 DIAGNOSIS — I50.22 CHRONIC SYSTOLIC CONGESTIVE HEART FAILURE (H): ICD-10-CM

## 2020-10-12 LAB
ANION GAP SERPL CALCULATED.3IONS-SCNC: 6 MMOL/L (ref 3–14)
BUN SERPL-MCNC: 13 MG/DL (ref 7–30)
CALCIUM SERPL-MCNC: 9.1 MG/DL (ref 8.5–10.1)
CHLORIDE SERPL-SCNC: 98 MMOL/L (ref 94–109)
CO2 SERPL-SCNC: 30 MMOL/L (ref 20–32)
CREAT SERPL-MCNC: 1.15 MG/DL (ref 0.52–1.04)
GFR SERPL CREATININE-BSD FRML MDRD: 55 ML/MIN/{1.73_M2}
GLUCOSE SERPL-MCNC: 195 MG/DL (ref 70–99)
POTASSIUM SERPL-SCNC: 4 MMOL/L (ref 3.4–5.3)
SODIUM SERPL-SCNC: 134 MMOL/L (ref 133–144)

## 2020-10-12 PROCEDURE — 80048 BASIC METABOLIC PNL TOTAL CA: CPT | Performed by: NURSE PRACTITIONER

## 2020-10-12 NOTE — TELEPHONE ENCOUNTER
[facility] contacted clinic regarding pts lab orders. Patient is currently at the BP lab to have a BMP drawn for [facility] 10/14 follow up.     BP  requesting BMP orders be entered. Lab will draw BMP today. Will route to cardiology for BMP .      Jason Wynne LPN    Rheumatology / Pulmonology  [facility]

## 2020-10-14 ENCOUNTER — VIRTUAL VISIT (OUTPATIENT)
Dept: CARDIOLOGY | Facility: CLINIC | Age: 50
End: 2020-10-14
Payer: COMMERCIAL

## 2020-10-14 DIAGNOSIS — M79.651 PAIN OF RIGHT THIGH: ICD-10-CM

## 2020-10-14 DIAGNOSIS — M25.519 SHOULDER PAIN, UNSPECIFIED CHRONICITY, UNSPECIFIED LATERALITY: ICD-10-CM

## 2020-10-14 DIAGNOSIS — E66.01 MORBID OBESITY DUE TO EXCESS CALORIES (H): ICD-10-CM

## 2020-10-14 DIAGNOSIS — I10 HYPERTENSION, UNSPECIFIED TYPE: ICD-10-CM

## 2020-10-14 DIAGNOSIS — G47.33 OSA (OBSTRUCTIVE SLEEP APNEA): ICD-10-CM

## 2020-10-14 DIAGNOSIS — I50.22 CHRONIC SYSTOLIC CONGESTIVE HEART FAILURE (H): Primary | ICD-10-CM

## 2020-10-14 DIAGNOSIS — I42.8 NONISCHEMIC CARDIOMYOPATHY (H): ICD-10-CM

## 2020-10-14 PROCEDURE — 99214 OFFICE O/P EST MOD 30 MIN: CPT | Mod: 95 | Performed by: NURSE PRACTITIONER

## 2020-10-14 NOTE — PROGRESS NOTES
"Bettina Montes is a 50 year old female who is being evaluated via a billable video visit.      The patient has been notified of following:     \"This video visit will be conducted via a call between you and your physician/provider. We have found that certain health care needs can be provided without the need for an in-person physical exam.  This service lets us provide the care you need with a video conversation.  If a prescription is necessary we can send it directly to your pharmacy.  If lab work is needed we can place an order for that and you can then stop by our lab to have the test done at a later time.    Video visits are billed at different rates depending on your insurance coverage.  Please reach out to your insurance provider with any questions.    If during the course of the call the physician/provider feels a video visit is not appropriate, you will not be charged for this service.\"    Patient has given verbal consent for Video visit? Yes  How would you like to obtain your AVS? MyChart  If you are dropped from the video visit, the video invite should be resent to: Text to cell phone: [phone]  Will anyone else be joining your video visit? No        Video-Visit Details    Type of service:  Video Visit time on - 11: 17  Time ended 11: 30    Originating Location (pt. Location): Home    Distant Location (provider location):  [facility] HEART [facility]     Platform used for Video Visit: Dillon Pham RN                PI  Bettina Montes is a 50 year old female with a past medical history of HFrEF (30-35%) secondary to NICM (felt to be idiopathic), HTN, obesity, DM II, cervical spine fusion, and hyperlipidemia who presents for routine follow-up. She was last seen by Dr. Chinchilla on 6/5/20.    Bettina had an appt a few weeks ago.She had shoulder surgery.  She recently started PT and is doing okay . Her diuretic was increased at a previous CORE appt.  No lightheadedness. She can't " tell is she has SOB as she hasn't been active. She denies SOB at rest. She says there is now no swelling in her legs or abdomen. Her weight today was 293.4 lbs. at home, but she says she hasn't checked the weight recently.    She endorses a big improvement in her nightly PND, cough, and orthopnea. Her appetite has been fine. She denies early satiety.  She reports some outer thigh pain right side after surgery.     She denies any chest pain, palpitations, lightheadedness, dizziness, or near syncopal episodes.     PMH  Past Medical History:   Diagnosis Date     Abnormal Pap smear of cervix 09/10/2019    See problem list     Adrenal gland anomaly      CHF (congestive heart failure) (H)      Fatty liver      Gastroesophageal reflux disease      Hyperlipidemia      Hypertension      Mild persistent asthma      NICM (nonischemic cardiomyopathy) (H)      Obesity      WILLARD (obstructive sleep apnea) 1/9/2015     Type 2 diabetes mellitus (H)        Past Surgical History:   Procedure Laterality Date     COLONOSCOPY       DISCECTOMY, FUSION CERVICAL ANTERIOR TWO LEVELS, COMBINED N/A 4/5/2019    Procedure: C4-6 anterior cervical discectomy and fusion;  Surgeon: Hollis Hurtado MD;  Location: [facility] OR     TUBAL LIGATION         Family History   Problem Relation Age of Onset     Diabetes Mother      Hypertension Mother      Hyperlipidemia Mother      Heart Failure Mother      Diabetes Father      Cancer Paternal Grandmother         ?       Social History     Socioeconomic History     Marital status: Single     Spouse name: None     Number of children: 1     Years of education: None     Highest education level: None   Occupational History     Occupation: CNA     Employer: OTHER   Social Needs     Financial resource strain: None     Food insecurity:     Worry: None     Inability: None     Transportation needs:     Medical: None     Non-medical: None   Tobacco Use     Smoking status: Former Smoker     Packs/day: 1.00      Years: 30.00     Pack years: 30.00     Types: Cigarettes     Last attempt to quit: 2013     Years since quittin.8     Smokeless tobacco: Former User     Tobacco comment:     Substance and Sexual Activity     Alcohol use: Yes     Alcohol/week: 0.0 standard drinks     Comment: 1     Drug use: No     Sexual activity: Yes     Partners: Male     Birth control/protection: Surgical   Lifestyle     Physical activity:     Days per week: None     Minutes per session: None     Stress: None   Relationships     Social connections:     Talks on phone: None     Gets together: None     Attends Bahai service: None     Active member of club or organization: None     Attends meetings of clubs or organizations: None     Relationship status: None     Intimate partner violence:     Fear of current or ex partner: None     Emotionally abused: None     Physically abused: None     Forced sexual activity: None   Other Topics Concern     Parent/sibling w/ CABG, MI or angioplasty before 65F 55M? No   Social History Narrative     None       ALLERGIES  Allergies   Allergen Reactions     Amlodipine Swelling     Edema on 5mg throat     Lisinopril      Swelling in throat, face  angioedema     Naprosyn [Naproxen]      Swelling, diff breathing       MEDICATIONS      acetaminophen (TYLENOL) 500 MG tablet, Take 500-1,000 mg by mouth every 6 hours as needed for mild pain       carvedilol (COREG) 25 MG tablet, Take 1 tablet (25 mg) by mouth 2 times daily (with meals)       cholecalciferol ( ULTRA STRENGTH) 2000 units CAPS, Take 2,000 Units by mouth       Cholecalciferol (VITAMIN D3 PO), Take 2,000 Units by mouth daily       fluticasone-salmeterol (WIXELA INHUB) 250-50 MCG/DOSE inhaler, Inhale 1 puff into the lungs       hydrALAZINE (APRESOLINE) 50 MG tablet, TAKE 1 TABLET BY MOUTH TWICE A DAY       insulin glargine (BASAGLAR KWIKPEN) 100 UNIT/ML pen, Inject 65 Units Subcutaneous At Bedtime       insulin pen needle (32G X 4 MM) 32G X 4 MM  miscellaneous, Use 1 pen needles daily or as directed.       insulin pen needle (ULTICARE MICRO) 32G X 4 MM miscellaneous, Use 4 pen needles daily or as directed.       levalbuterol (XOPENEX HFA) 45 MCG/ACT Inhaler, Inhale 2 puffs into the lungs every 4 hours as needed for shortness of breath / dyspnea or wheezing       magnesium oxide (MAG-OX) 400 (241.3 Mg) MG tablet, Take 1 tablet (400 mg) by mouth daily       magnesium oxide (MAG-OX) 400 MG tablet, TAKE 1 TABLET BY MOUTH EVERY DAY       metFORMIN (GLUCOPHAGE) 500 MG tablet, Take 2 tablets (1,000 mg) by mouth 2 times daily (with meals)       montelukast (SINGULAIR) 10 MG tablet, Take 1 tablet (10 mg) by mouth At Bedtime       potassium chloride ER (KLOR-CON M) 10 MEQ CR tablet, Take 3 tablets (30 mEq) by mouth daily       spironolactone (ALDACTONE) 50 MG tablet, Take 1 tablet (50 mg) by mouth daily       tiZANidine (ZANAFLEX) 2 MG tablet, TAKE 1-2 TABLETS BY MOUTH 3 TIMES DAILY AS NEEDED FOR MUSCLE SPASMS       torsemide (DEMADEX) 20 MG tablet, Take 60 mg in the am and 60 mg in the afternoon       BETA BLOCKER NOT PRESCRIBED, INTENTIONAL,, Beta Blocker not prescribed intentionally due to Severe Asthma       blood glucose (NO BRAND SPECIFIED) test strip, Use to test blood sugar 3-4 times daily or as directed.       lovastatin (MEVACOR) 20 MG tablet, TAKE 1 TABLET BY MOUTH EVERYDAY AT BEDTIME       ranitidine (ZANTAC) 150 MG/10ML syrup, Take 10 mLs (150 mg) by mouth 2 times daily       sacubitril-valsartan (ENTRESTO)  MG per tablet, Take 1 tablet by mouth 2 times daily       WIXELA INHUB 250-50 MCG/DOSE inhaler, Inhale 1 puff into the lungs every 12 hours         sodium chloride (PF) 0.9% PF flush 10 mL          ROS:   Constitutional: No fever, chills, or sweats.  ENT: No visual disturbance, ear ache, epistaxis, sore throat.   Allergies/Immunologic: Negative  Respiratory: +SOB with activity   Cardiovascular: As per HPI.   GI: As per HPI.   : No urinary  frequency, dysuria, or hematuria.   Integument: Negative.   Psychiatric: Negative.   Neuro: Negative.   Endocrinology: negative   Musculoskeletal: negative    EXAM:   Constitutional - no acute distress   Eyes - no redness or discharge, nonicteric sclera  Respiratory - nonlabored breathing, able to speak in full sentences without difficulty  CV: no visible edema of visualized upper extremities.  Neurological - alert and oriented, speech fluent/appropriate  PSYCH: cooperative, affect appropriate  DERM: no rashes on visualized face/neck/upper extremities  Thigh pain - post op - out rt thigh no redness, warmth or swelling   The rest of a comprehensive physical examination is deferred due to PHE (public health emergency) video visit restrictions        LABS  Last Comprehensive Metabolic Panel:  Sodium   Date Value Ref Range Status   10/12/2020 134 133 - 144 mmol/L Final     Potassium   Date Value Ref Range Status   10/12/2020 4.0 3.4 - 5.3 mmol/L Final     Chloride   Date Value Ref Range Status   10/12/2020 98 94 - 109 mmol/L Final     Carbon Dioxide   Date Value Ref Range Status   10/12/2020 30 20 - 32 mmol/L Final     Anion Gap   Date Value Ref Range Status   10/12/2020 6 3 - 14 mmol/L Final     Glucose   Date Value Ref Range Status   10/12/2020 195 (H) 70 - 99 mg/dL Final     Comment:     Non Fasting     Urea Nitrogen   Date Value Ref Range Status   10/12/2020 13 7 - 30 mg/dL Final     Creatinine   Date Value Ref Range Status   10/12/2020 1.15 (H) 0.52 - 1.04 mg/dL Final     GFR Estimate   Date Value Ref Range Status   10/12/2020 55 (L) >60 mL/min/[1.73_m2] Final     Comment:     Non  GFR Calc  Starting 12/18/2018, serum creatinine based estimated GFR (eGFR) will be   calculated using the Chronic Kidney Disease Epidemiology Collaboration   (CKD-EPI) equation.       Calcium   Date Value Ref Range Status   10/12/2020 9.1 8.5 - 10.1 mg/dL Final       MOST RECENT ECHOCARDIOGRAM 10/25/19:  Interpretation  Summary  Technically difficult study.  Left ventricular size is normal. Mildly (EF 40-45%, traced 45%) reduced left ventricular function is present.  Right ventricular function, chamber size, wall motion, and thickness are normal. This study was compared with the study from 3/21/18: The left ventricular function has improved    ASSESSMENT AND PLAN  Bettina Montes is a 50 year old female with NICM who appears euvolemic today.  We will continue her Torsemide at 60 mg BID. Follow up in 3-4 weeks virtual is fine, BMP prior.    1. Chronic systolic heart failure/HFrEF (EF 40-45%) secondary to nonischemic cardiomyopathy  NYHA Symptom Class IIIB  Stage C  Primary Cardiologist: Dr Chinchilla; Last seen 6/5/ 20  ACE-I/ARB/ARNi:  Entresto  mg  BB yes, Carvedilol 25 mg twice daily  Aldosterone antagonist yes, Spironolactone 50 mg daily.   SCD prophylaxis Decision deferred during medication uptitration  Fluid status Euvolemic  Cardiac Rehab: Completed  Sleep Apnea Evaluation: Documented sleep apnea.    Remote PA Pressure Monitoring (CardioMems) Deferred while medical therapy is optimized  Remote monitoring: Mychart active  Antiplatelet: none.   Anticoagulation: None.    2.  Obesity: BMI 47.29.  She is working on weight loss.  Feel part of this is fluid retention.  Continue to reassess at subsequent visits.     3.  HTN:   continue Entresto   Continue to monitor BP readings at home.      4 . Recent Shoulder surgery. Has some pain in Rt thigh - no redness, swelling and not warm to touch per patient. May be some muscle strain possible with surgery positioning. Will follow up with PCP if pain /burning doesn't subside with OTC balms.     4.  Follow-up:  CORE follow up 3-4 weeks - Virtual video  BMP prior -  .   No med changes today      Heide GU NP-C

## 2020-10-14 NOTE — PATIENT INSTRUCTIONS
Take your medicines every day, as directed    Changes made today:  o none  o    Monitor Your Weight and Symptoms    Contact us if you:      Gain 2 pounds in one day or 5 pounds in one week    Feel more short of breath    Notice more leg swelling    Feel lightheadeded   Change your lifestyle    Limit Salt or Sodium:    2000 mg  Limit Fluids:    2000 mL or approximately 64 ounces  Eat a Heart Healthy Diet    Low in saturated fats  Stay Active:    Aim to move at least 150 minutes every  week         To Contact us    During Business Hours:  [phone], option # 1 (University)  Then option # 4 (medical questions)     After hours, weekends or holidays:   [phone], Option #4  Ask to speak to the On-Call Cardiologist. Inform them you are a CORE/heart failure patient at the [facility].     Use [employer] allows you to communicate directly with your heart team through secure messaging.    [employer] can be accessed any time on your phone, computer, or tablet.    If you need assistance, we'd be happy to help!         Keep your Heart Appointments:    Follow up CORE in 3-4 weeks with BMP prior. Virtual appt fine

## 2020-10-19 ENCOUNTER — TELEPHONE (OUTPATIENT)
Dept: CARDIOLOGY | Facility: CLINIC | Age: 50
End: 2020-10-19

## 2020-10-19 NOTE — TELEPHONE ENCOUNTER
----- Message from Katie Sinclair RN sent at 10/19/2020  9:23 AM CDT -----  Hello,     Patient needs Follow up CORE in 3-4 weeks with BMP prior. Virtual appt fine    Thank you  Nola

## 2020-10-19 NOTE — TELEPHONE ENCOUNTER
1st Attempt LVM for Bettina to call back to schedule a 3-4 week follow up (virtual okay) with Janice Woodall along with a lab appointment the day prior to the follow up. I asked Bettina to please call [phone] to schedule.     Amol Coelho  Procedure , [place]  [facility] Specialty and Adult Endocrinology

## 2020-10-23 ENCOUNTER — MYC MEDICAL ADVICE (OUTPATIENT)
Dept: CARDIOLOGY | Facility: CLINIC | Age: 50
End: 2020-10-23

## 2020-10-23 NOTE — TELEPHONE ENCOUNTER
2nd Attempt Vaultivet message sent to patient.     Amol Coelho  Procedure , [facility] Specialty and Adult Endocrinology

## 2020-10-28 NOTE — TELEPHONE ENCOUNTER
3rd Attempt Letter sent.     Amol Coelho  Procedure , [facility] Specialty and Adult Endocrinology

## 2020-11-01 DIAGNOSIS — Z79.4 TYPE 2 DIABETES MELLITUS WITH HYPERGLYCEMIA, WITH LONG-TERM CURRENT USE OF INSULIN (H): ICD-10-CM

## 2020-11-01 DIAGNOSIS — E11.65 TYPE 2 DIABETES MELLITUS WITH HYPERGLYCEMIA, WITH LONG-TERM CURRENT USE OF INSULIN (H): ICD-10-CM

## 2020-11-04 ENCOUNTER — TELEPHONE (OUTPATIENT)
Dept: CARDIOLOGY | Facility: CLINIC | Age: 50
End: 2020-11-04

## 2020-11-04 RX ORDER — INSULIN GLARGINE 100 [IU]/ML
65 INJECTION, SOLUTION SUBCUTANEOUS AT BEDTIME
Qty: 60 ML | Refills: 0 | Status: SHIPPED | OUTPATIENT
Start: 2020-11-04 | End: 2020-12-16

## 2020-11-04 NOTE — TELEPHONE ENCOUNTER
1st attempt     Left voicemail to schedule follow up appt with Heide around 11/11/20 and labs to be done prior.    Susie Wynn  Med Spec Procedure   [facility] [place]

## 2020-11-04 NOTE — TELEPHONE ENCOUNTER
Prescription approved per G Refill Protocol.    Aleena Perry RN, [facility] Triage

## 2020-11-10 NOTE — TELEPHONE ENCOUNTER
3rd attempt.     Oxagenhart message went unread and voicemail message left. Chart letter printed and sent.    Susie Wynn  Medical Specialty Procedure   [facility]  [phone]

## 2020-12-12 DIAGNOSIS — E11.65 TYPE 2 DIABETES MELLITUS WITH HYPERGLYCEMIA, WITH LONG-TERM CURRENT USE OF INSULIN (H): ICD-10-CM

## 2020-12-12 DIAGNOSIS — I50.23 ACUTE ON CHRONIC SYSTOLIC HEART FAILURE (H): ICD-10-CM

## 2020-12-12 DIAGNOSIS — Z79.4 TYPE 2 DIABETES MELLITUS WITH HYPERGLYCEMIA, WITH LONG-TERM CURRENT USE OF INSULIN (H): ICD-10-CM

## 2020-12-14 NOTE — TELEPHONE ENCOUNTER
TORSEMIDE 20 MG TABLET     Requested:TAKE 2 TABLETS BY MOUTH IN THE MORNING AND 1 TABLET IN THE AFTERNOON     Last Written Prescription Date:  HISTORICAL  Take 60 mg in the am and 60 mg in the afternoon    Last Office Visit : 10-14-20  Future Office visit:  none    Last clinic note: ASSESSMENT AND PLAN  Bettina Montes is a 50 year old female with NICM who appears euvolemic today.  We will continue her Torsemide at 60 mg BID. Follow up in 3-4 weeks virtual is fine, BMP prior.    Routing refill request to provider for review/approval because:  Medication is reported/historical    Requested direction does not match current/clinic note   FYI: abnormal Cr  No appt/lab done ( FYI clinic RN, lab due)    Scheduling has been notified to contact the pt for appointment.

## 2020-12-15 RX ORDER — TORSEMIDE 20 MG/1
TABLET ORAL
Qty: 180 TABLET | Refills: 0 | Status: SHIPPED | OUTPATIENT
Start: 2020-12-15 | End: 2021-01-27

## 2020-12-16 RX ORDER — INSULIN GLARGINE 100 [IU]/ML
65 INJECTION, SOLUTION SUBCUTANEOUS AT BEDTIME
Qty: 18 ML | Refills: 0 | Status: SHIPPED | OUTPATIENT
Start: 2020-12-16 | End: 2021-01-14

## 2020-12-22 DIAGNOSIS — I50.22 CHRONIC SYSTOLIC CONGESTIVE HEART FAILURE (H): ICD-10-CM

## 2020-12-22 LAB
ANION GAP SERPL CALCULATED.3IONS-SCNC: 7 MMOL/L (ref 3–14)
BUN SERPL-MCNC: 16 MG/DL (ref 7–30)
CALCIUM SERPL-MCNC: 9.6 MG/DL (ref 8.5–10.1)
CHLORIDE SERPL-SCNC: 100 MMOL/L (ref 94–109)
CO2 SERPL-SCNC: 29 MMOL/L (ref 20–32)
CREAT SERPL-MCNC: 1.06 MG/DL (ref 0.52–1.04)
GFR SERPL CREATININE-BSD FRML MDRD: 60 ML/MIN/{1.73_M2}
GLUCOSE SERPL-MCNC: 151 MG/DL (ref 70–99)
POTASSIUM SERPL-SCNC: 4 MMOL/L (ref 3.4–5.3)
SODIUM SERPL-SCNC: 136 MMOL/L (ref 133–144)

## 2020-12-22 PROCEDURE — 80048 BASIC METABOLIC PNL TOTAL CA: CPT | Performed by: NURSE PRACTITIONER

## 2020-12-22 PROCEDURE — 36415 COLL VENOUS BLD VENIPUNCTURE: CPT | Performed by: NURSE PRACTITIONER

## 2020-12-23 ENCOUNTER — VIRTUAL VISIT (OUTPATIENT)
Dept: CARDIOLOGY | Facility: CLINIC | Age: 50
End: 2020-12-23
Payer: COMMERCIAL

## 2020-12-23 DIAGNOSIS — I50.22 CHRONIC SYSTOLIC CONGESTIVE HEART FAILURE (H): Primary | ICD-10-CM

## 2020-12-23 DIAGNOSIS — Z79.4 TYPE 2 DIABETES MELLITUS WITH HYPEROSMOLARITY WITHOUT COMA, WITH LONG-TERM CURRENT USE OF INSULIN (H): ICD-10-CM

## 2020-12-23 DIAGNOSIS — I42.8 NONISCHEMIC CARDIOMYOPATHY (H): ICD-10-CM

## 2020-12-23 DIAGNOSIS — G47.33 OSA (OBSTRUCTIVE SLEEP APNEA): ICD-10-CM

## 2020-12-23 DIAGNOSIS — I10 HYPERTENSION, UNSPECIFIED TYPE: ICD-10-CM

## 2020-12-23 DIAGNOSIS — E11.00 TYPE 2 DIABETES MELLITUS WITH HYPEROSMOLARITY WITHOUT COMA, WITH LONG-TERM CURRENT USE OF INSULIN (H): ICD-10-CM

## 2020-12-23 PROCEDURE — 99214 OFFICE O/P EST MOD 30 MIN: CPT | Mod: 95 | Performed by: NURSE PRACTITIONER

## 2020-12-23 NOTE — PROGRESS NOTES
SANJAY  Bettina Montes is a 50 year old female with a past medical history of HFrEF (30-35%) secondary to NICM (felt to be idiopathic), HTN, obesity, DM II, cervical spine fusion, and hyperlipidemia who presents for routine follow-up. She was last seen by Dr. Chinchilla on 6/5/20.    Bettina had her last CORE visit on 10/14/20.  She denies SOB at rest. She says there is now no swelling in her legs or abdomen. Her weight today was 295-300 lbs. She endorses a big improvement in her nightly PND, cough, and orthopnea. Her appetite has been fine. She denies early satiety.  Her blood sugars have been elevated she is trying to find a new PCP to help her manage better.    She denies any chest pain, palpitations, lightheadedness, dizziness, or near syncopal episodes.     PMH  Past Medical History:   Diagnosis Date     Abnormal Pap smear of cervix 09/10/2019    See problem list     Adrenal gland anomaly      CHF (congestive heart failure) (H)      Fatty liver      Gastroesophageal reflux disease      Hyperlipidemia      Hypertension      Mild persistent asthma      NICM (nonischemic cardiomyopathy) (H)      Obesity      WILLARD (obstructive sleep apnea) 1/9/2015     Type 2 diabetes mellitus (H)        Past Surgical History:   Procedure Laterality Date     COLONOSCOPY       DISCECTOMY, FUSION CERVICAL ANTERIOR TWO LEVELS, COMBINED N/A 4/5/2019    Procedure: C4-6 anterior cervical discectomy and fusion;  Surgeon: Hollis Hurtado MD;  Location: [facility]     TUBAL LIGATION         Family History   Problem Relation Age of Onset     Diabetes Mother      Hypertension Mother      Hyperlipidemia Mother      Heart Failure Mother      Diabetes Father      Cancer Paternal Grandmother         ?       Social History     Socioeconomic History     Marital status: Single     Spouse name: None     Number of children: 1     Years of education: None     Highest education level: None   Occupational History     Occupation: CNA     Employer: OTHER    Social Needs     Financial resource strain: None     Food insecurity:     Worry: None     Inability: None     Transportation needs:     Medical: None     Non-medical: None   Tobacco Use     Smoking status: Former Smoker     Packs/day: 1.00     Years: 30.00     Pack years: 30.00     Types: Cigarettes     Last attempt to quit: 2013     Years since quittin.8     Smokeless tobacco: Former User     Tobacco comment:     Substance and Sexual Activity     Alcohol use: Yes     Alcohol/week: 0.0 standard drinks     Comment: 1     Drug use: No     Sexual activity: Yes     Partners: Male     Birth control/protection: Surgical   Lifestyle     Physical activity:     Days per week: None     Minutes per session: None     Stress: None   Relationships     Social connections:     Talks on phone: None     Gets together: None     Attends Evangelical service: None     Active member of club or organization: None     Attends meetings of clubs or organizations: None     Relationship status: None     Intimate partner violence:     Fear of current or ex partner: None     Emotionally abused: None     Physically abused: None     Forced sexual activity: None   Other Topics Concern     Parent/sibling w/ CABG, MI or angioplasty before 65F 55M? No   Social History Narrative     None       ALLERGIES  Allergies   Allergen Reactions     Amlodipine Swelling     Edema on 5mg throat     Lisinopril      Swelling in throat, face  angioedema     Naprosyn [Naproxen]      Swelling, diff breathing       MEDICATIONS      acetaminophen (TYLENOL) 500 MG tablet, Take 500-1,000 mg by mouth every 6 hours as needed for mild pain       carvedilol (COREG) 25 MG tablet, Take 1 tablet (25 mg) by mouth 2 times daily (with meals)       cholecalciferol ( ULTRA STRENGTH) 2000 units CAPS, Take 2,000 Units by mouth       Cholecalciferol (VITAMIN D3 PO), Take 2,000 Units by mouth daily       fluticasone-salmeterol (WIXELA INHUB) 250-50 MCG/DOSE inhaler, Inhale 1  puff into the lungs       hydrALAZINE (APRESOLINE) 50 MG tablet, TAKE 1 TABLET BY MOUTH TWICE A DAY       insulin glargine (BASAGLAR KWIKPEN) 100 UNIT/ML pen, INJECT 65 UNITS SUBCUTANEOUS AT BEDTIME       levalbuterol (XOPENEX HFA) 45 MCG/ACT Inhaler, Inhale 2 puffs into the lungs every 4 hours as needed for shortness of breath / dyspnea or wheezing       lovastatin (MEVACOR) 20 MG tablet, TAKE 1 TABLET BY MOUTH EVERYDAY AT BEDTIME       magnesium oxide (MAG-OX) 400 (241.3 Mg) MG tablet, Take 1 tablet (400 mg) by mouth daily       metFORMIN (GLUCOPHAGE) 500 MG tablet, Take 2 tablets (1,000 mg) by mouth 2 times daily (with meals)       montelukast (SINGULAIR) 10 MG tablet, Take 1 tablet (10 mg) by mouth At Bedtime       potassium chloride ER (KLOR-CON M) 10 MEQ CR tablet, Take 3 tablets (30 mEq) by mouth daily       sacubitril-valsartan (ENTRESTO)  MG per tablet, Take 1 tablet by mouth 2 times daily       spironolactone (ALDACTONE) 50 MG tablet, Take 1 tablet (50 mg) by mouth daily       tiZANidine (ZANAFLEX) 2 MG tablet, TAKE 1-2 TABLETS BY MOUTH 3 TIMES DAILY AS NEEDED FOR MUSCLE SPASMS       torsemide (DEMADEX) 20 MG tablet, TAKE 60 MG  IN THE MORNING AND 60 MG IN THE AFTERNOON. Please schedule clinic appt, lab due for refills.       BETA BLOCKER NOT PRESCRIBED, INTENTIONAL,, Beta Blocker not prescribed intentionally due to Severe Asthma       blood glucose (NO BRAND SPECIFIED) test strip, Use to test blood sugar 3-4 times daily or as directed.       insulin pen needle (32G X 4 MM) 32G X 4 MM miscellaneous, Use 1 pen needles daily or as directed.       insulin pen needle (ULTICARE MICRO) 32G X 4 MM miscellaneous, Use 4 pen needles daily or as directed.       magnesium oxide (MAG-OX) 400 MG tablet, TAKE 1 TABLET BY MOUTH EVERY DAY       ranitidine (ZANTAC) 150 MG/10ML syrup, Take 10 mLs (150 mg) by mouth 2 times daily (Patient not taking: Reported on 12/23/2020)       WIXELA INHUB 250-50 MCG/DOSE inhaler,  Inhale 1 puff into the lungs every 12 hours         sodium chloride (PF) 0.9% PF flush 10 mL            ROS:   Constitutional: No fever, chills, or sweats.  ENT: No visual disturbance, ear ache, epistaxis, sore throat.   Allergies/Immunologic: Negative  Respiratory: No cough, hemoptysis.   Cardiovascular: As per HPI.   GI: As per HPI.   : No urinary frequency, dysuria, or hematuria.   Integument: Negative.   Psychiatric: Negative.   Neuro: Negative.   Endocrinology: negative   Musculoskeletal: negative    EXAM:   Constitutional - WDWN, no acute distress   Eyes - no redness or discharge, nonicteric sclera  Respiratory - nonlabored breathing, able to speak in full sentences without difficulty  CV: no visible edema of visualized upper extremities.  Neurological - alert and oriented, speech fluent/appropriate  PSYCH: cooperative, affect appropriate  DERM: no rashes on visualized face/neck/upper extremities     The rest of a comprehensive physical examination is deferred due to PHE (public health emergency) video visit restrictions        LABS  Last Comprehensive Metabolic Panel:  Sodium   Date Value Ref Range Status   12/22/2020 136 133 - 144 mmol/L Final     Potassium   Date Value Ref Range Status   12/22/2020 4.0 3.4 - 5.3 mmol/L Final     Chloride   Date Value Ref Range Status   12/22/2020 100 94 - 109 mmol/L Final     Carbon Dioxide   Date Value Ref Range Status   12/22/2020 29 20 - 32 mmol/L Final     Anion Gap   Date Value Ref Range Status   12/22/2020 7 3 - 14 mmol/L Final     Glucose   Date Value Ref Range Status   12/22/2020 151 (H) 70 - 99 mg/dL Final     Comment:     Non Fasting     Urea Nitrogen   Date Value Ref Range Status   12/22/2020 16 7 - 30 mg/dL Final     Creatinine   Date Value Ref Range Status   12/22/2020 1.06 (H) 0.52 - 1.04 mg/dL Final     GFR Estimate   Date Value Ref Range Status   12/22/2020 60 (L) >60 mL/min/[1.73_m2] Final     Comment:     Non  GFR Calc  Starting  12/18/2018, serum creatinine based estimated GFR (eGFR) will be   calculated using the Chronic Kidney Disease Epidemiology Collaboration   (CKD-EPI) equation.       Calcium   Date Value Ref Range Status   12/22/2020 9.6 8.5 - 10.1 mg/dL Final       MOST RECENT ECHOCARDIOGRAM 10/25/19:  Interpretation Summary  Technically difficult study.  Left ventricular size is normal. Mildly (EF 40-45%, traced 45%) reduced left ventricular function is present.  Right ventricular function, chamber size, wall motion, and thickness are normal. This study was compared with the study from 3/21/18: The left ventricular function has improved    ASSESSMENT AND PLAN  Bettina Montes is a 50 year old female with NICM who appears euvolemic today.  We will continue her Torsemide at 60 mg BID. Follow up in 3-4 weeks virtual is fine, BMP prior.    1. Chronic systolic heart failure/HFrEF (EF 40-45%) secondary to nonischemic cardiomyopathy  NYHA Symptom Class IIIB  Stage C  Primary Cardiologist: Dr Chinchilla; Last seen 6/5/ 20  ACE-I/ARB/ARNi:  Entresto  mg  BB yes, Carvedilol 25 mg twice daily  Aldosterone antagonist yes, Spironolactone 50 mg daily.   SCD prophylaxis EF > 35%   Fluid status Euvolemic  Cardiac Rehab: Completed  Sleep Apnea Evaluation: Documented sleep apnea.    Remote monitoring: Mychart active  Antiplatelet: none.   Anticoagulation: None.    2.  Obesity: BMI 47.29.  She is working on weight loss.  Continue to reassess at subsequent visits.     3.  HTN:   continue Entresto   Continue to monitor BP readings at home.      4 . Recent Shoulder surgery. Doing well .     4.  Follow-up:  CORE follow up 3-4 weeks - Virtual horacio FORTE prior -  .   No med changes today      Heide GU NP-C       Details    Type of service:  Video Visit    Video Start Time:3:48  Video End Time: 4:00    Originating Location (pt. Location): home    Distant Location (provider location):  [facility]     Platform used  for Video Visit: [place]

## 2020-12-23 NOTE — PATIENT INSTRUCTIONS
Take your medicines every day, as directed    Changes made today:  o 80 mg in am Torsemide 60 mg in pm until Monday 12/28 - then go back to 60 mg twice a day  o    Monitor Your Weight and Symptoms    Contact us if you:      Gain 2 pounds in one day or 5 pounds in one week    Feel more short of breath    Notice more leg swelling    Feel lightheadeded   Change your lifestyle    Limit Salt or Sodium:    2000 mg  Limit Fluids:    2000 mL or approximately 64 ounces  Eat a Heart Healthy Diet    Low in saturated fats  Stay Active:    Aim to move at least 150 minutes every  week         To Contact us    During Business Hours:  [phone], option # 1 (University)  Then option # 4 (medical questions)     After hours, weekends or holidays:   [phone], Option #4  Ask to speak to the On-Call Cardiologist. Inform them you are a CORE/heart failure patient at the [place].     Use [employer] allows you to communicate directly with your heart team through secure messaging.    [employer] can be accessed any time on your phone, computer, or tablet.    If you need assistance, we'd be happy to help!         Keep your Heart Appointments:    Visit in CORE in 3 months virtual is fine. Labs done day prior

## 2020-12-28 ENCOUNTER — PATIENT OUTREACH (OUTPATIENT)
Dept: CARDIOLOGY | Facility: CLINIC | Age: 50
End: 2020-12-28

## 2020-12-28 NOTE — TELEPHONE ENCOUNTER
Patient was seen in the CORE clinic on 12/23/20. The plan following the visit was for the patient to take 80mg Torsemide in the AM/60mg in the PM until Monday then go back to 60mg BID. At that visit the patient reported their weight to be 295-300 pounds    I called and spoke to the patient today to see how they have been doing since their visit.     Patient states that she lost 1 pound with the increase dose of Torsemide and otherwise noticed no changes. She has minimal shortness of breath and no swelling.     Confirmed that today patient will go back to her normal dose of Torsemide of 60 mg BID.     Will route to [facility] to review and advise if any other changes are recommended.     Anusha Lovelace RN   Medical Specialty Clinic Care Coordinator  [facility]

## 2020-12-28 NOTE — TELEPHONE ENCOUNTER
Notified patient that per Heide no changes to the plan. Patient to follow up with labs and a visit in March.   Anusha Lovelace RN   Medical Specialty Clinic Care Coordinator  [facility]

## 2021-01-14 ENCOUNTER — OFFICE VISIT (OUTPATIENT)
Dept: FAMILY MEDICINE | Facility: CLINIC | Age: 51
End: 2021-01-14
Payer: COMMERCIAL

## 2021-01-14 VITALS
TEMPERATURE: 97.9 F | OXYGEN SATURATION: 98 % | BODY MASS INDEX: 47.09 KG/M2 | HEIGHT: 66 IN | WEIGHT: 293 LBS | DIASTOLIC BLOOD PRESSURE: 78 MMHG | HEART RATE: 88 BPM | SYSTOLIC BLOOD PRESSURE: 108 MMHG

## 2021-01-14 DIAGNOSIS — J45.30 MILD PERSISTENT ASTHMA WITHOUT COMPLICATION: ICD-10-CM

## 2021-01-14 DIAGNOSIS — Z00.00 ROUTINE GENERAL MEDICAL EXAMINATION AT A HEALTH CARE FACILITY: Primary | ICD-10-CM

## 2021-01-14 DIAGNOSIS — E11.65 TYPE 2 DIABETES MELLITUS WITH HYPERGLYCEMIA, WITH LONG-TERM CURRENT USE OF INSULIN (H): ICD-10-CM

## 2021-01-14 DIAGNOSIS — Z79.4 TYPE 2 DIABETES MELLITUS WITH HYPERGLYCEMIA, WITH LONG-TERM CURRENT USE OF INSULIN (H): ICD-10-CM

## 2021-01-14 DIAGNOSIS — E78.5 HYPERLIPIDEMIA LDL GOAL <100: ICD-10-CM

## 2021-01-14 LAB
CREAT UR-MCNC: 178 MG/DL
HBA1C MFR BLD: 9.6 % (ref 0–5.6)
LDLC SERPL DIRECT ASSAY-MCNC: 83 MG/DL
MICROALBUMIN UR-MCNC: 10 MG/L
MICROALBUMIN/CREAT UR: 5.67 MG/G CR (ref 0–25)

## 2021-01-14 PROCEDURE — 36415 COLL VENOUS BLD VENIPUNCTURE: CPT | Performed by: FAMILY MEDICINE

## 2021-01-14 PROCEDURE — 83721 ASSAY OF BLOOD LIPOPROTEIN: CPT | Performed by: FAMILY MEDICINE

## 2021-01-14 PROCEDURE — 83036 HEMOGLOBIN GLYCOSYLATED A1C: CPT | Performed by: FAMILY MEDICINE

## 2021-01-14 PROCEDURE — 99396 PREV VISIT EST AGE 40-64: CPT | Performed by: FAMILY MEDICINE

## 2021-01-14 PROCEDURE — 82043 UR ALBUMIN QUANTITATIVE: CPT | Performed by: FAMILY MEDICINE

## 2021-01-14 RX ORDER — METFORMIN HCL 500 MG
500 TABLET, EXTENDED RELEASE 24 HR ORAL
Qty: 90 TABLET | Refills: 3 | Status: SHIPPED | OUTPATIENT
Start: 2021-01-14 | End: 2021-09-01

## 2021-01-14 RX ORDER — PEN NEEDLE, DIABETIC 32GX 5/32"
NEEDLE, DISPOSABLE MISCELLANEOUS
Qty: 400 EACH | Refills: 11 | Status: SHIPPED | OUTPATIENT
Start: 2021-01-14 | End: 2022-09-01

## 2021-01-14 RX ORDER — MONTELUKAST SODIUM 10 MG/1
10 TABLET ORAL AT BEDTIME
Qty: 90 TABLET | Refills: 3 | Status: SHIPPED | OUTPATIENT
Start: 2021-01-14 | End: 2022-02-01

## 2021-01-14 RX ORDER — LOVASTATIN 20 MG
TABLET ORAL
Qty: 90 TABLET | Refills: 3 | Status: SHIPPED | OUTPATIENT
Start: 2021-01-14 | End: 2022-02-07

## 2021-01-14 RX ORDER — INSULIN GLARGINE 100 [IU]/ML
65 INJECTION, SOLUTION SUBCUTANEOUS AT BEDTIME
Qty: 18 ML | Refills: 1 | Status: SHIPPED | OUTPATIENT
Start: 2021-01-14 | End: 2021-03-18

## 2021-01-14 RX ORDER — INSULIN ASPART 100 [IU]/ML
INJECTION, SOLUTION INTRAVENOUS; SUBCUTANEOUS
Qty: 15 ML | Refills: 3 | Status: SHIPPED | OUTPATIENT
Start: 2021-01-14 | End: 2021-03-18

## 2021-01-14 ASSESSMENT — PAIN SCALES - GENERAL: PAINLEVEL: SEVERE PAIN (6)

## 2021-01-14 ASSESSMENT — MIFFLIN-ST. JEOR: SCORE: 2001.05

## 2021-01-14 NOTE — PROGRESS NOTES
SUBJECTIVE:   CC: Bettina Montes is an 50 year old woman who presents for preventive health visit.       Patient has been advised of split billing requirements and indicates understanding: Yes  Healthy Habits:    Do you get at least three servings of calcium containing foods daily (dairy, green leafy vegetables, etc.)?no, taking calcium and/or vitamin D supplement: no    Amount of exercise or daily activities, outside of work: none    Problems taking medications regularly Yes Diarrhea    Medication side effects: No    Have you had an eye exam in the past two years? no    Do you see a dentist twice per year? no    Do you have sleep apnea, excessive snoring or daytime drowsiness?tired       Diabetes Follow-up      How often are you checking your blood sugar? Not at all    What concerns do you have today about your diabetes? Other: levels      Do you have any of these symptoms? (Select all that apply)  Excessive thirst and Weight gain    Have you had a diabetic eye exam in the last 12 months? No        BP Readings from Last 2 Encounters:   21 108/78   10/01/20 124/86     Hemoglobin A1C (%)   Date Value   2020 11.0 (H)   09/10/2019 6.5 (H)     LDL Cholesterol Calculated (mg/dL)   Date Value   2019 64   2019 63           Today's PHQ-2 Score:   PHQ-2 (  [employer]) 2021   Q1: Little interest or pleasure in doing things 0 0   Q2: Feeling down, depressed or hopeless 0 0   PHQ-2 Score 0 0       Abuse: Current or Past(Physical, Sexual or Emotional)- No  Do you feel safe in your environment? Yes        Social History     Tobacco Use     Smoking status: Former Smoker     Packs/day: 1.00     Years: 30.00     Pack years: 30.00     Types: Cigarettes     Quit date: 2013     Years since quittin.0     Smokeless tobacco: Former User     Tobacco comment:     Substance Use Topics     Alcohol use: Yes     Alcohol/week: 0.0 standard drinks     Comment: 1     If you drink alcohol do you  typically have >3 drinks per day or >7 drinks per week? No                     Reviewed orders with patient.  Reviewed health maintenance and updated orders accordingly - Yes  Lab work is in process  Labs reviewed in EPIC  BP Readings from Last 3 Encounters:   21 108/78   10/01/20 124/86   20 126/83    Wt Readings from Last 3 Encounters:   21 136.5 kg (301 lb)   10/01/20 132.9 kg (293 lb)   20 136.1 kg (300 lb)                  Patient Active Problem List   Diagnosis     Hypertension     Type 2 diabetes mellitus (H)     Mild persistent asthma without complication     CHF (congestive heart failure) (H)     Hyperlipidemia LDL goal <100     Morbid obesity due to excess calories (H)     Intermittent asthma, uncomplicated     Microscopic hematuria     Adrenal gland anomaly     Moderate persistent asthma without complication     Callus of foot     Nonischemic cardiomyopathy (H)     S/P cervical spinal fusion     WILLARD (obstructive sleep apnea)     ASCUS of cervix with negative high risk HPV     NSVT (nonsustained ventricular tachycardia) (H)     Past Surgical History:   Procedure Laterality Date     COLONOSCOPY       DISCECTOMY, FUSION CERVICAL ANTERIOR TWO LEVELS, COMBINED N/A 2019    Procedure: C4-6 anterior cervical discectomy and fusion;  Surgeon: Hollis Hurtado MD;  Location: [facility] OR     TUBAL LIGATION         Social History     Tobacco Use     Smoking status: Former Smoker     Packs/day: 1.00     Years: 30.00     Pack years: 30.00     Types: Cigarettes     Quit date: 2013     Years since quittin.0     Smokeless tobacco: Former User     Tobacco comment:     Substance Use Topics     Alcohol use: Yes     Alcohol/week: 0.0 standard drinks     Comment: 1     Family History   Problem Relation Age of Onset     Diabetes Mother      Hypertension Mother      Hyperlipidemia Mother      Heart Failure Mother      Diabetes Father      Cancer Paternal Grandmother         ?          Current Outpatient Medications   Medication Sig Dispense Refill     BETA BLOCKER NOT PRESCRIBED, INTENTIONAL, Beta Blocker not prescribed intentionally due to Severe Asthma  0     blood glucose (NO BRAND SPECIFIED) test strip Use to test blood sugar 3-4 times daily or as directed. 200 strip 1     carvedilol (COREG) 25 MG tablet Take 1 tablet (25 mg) by mouth 2 times daily (with meals) 180 tablet 3     cholecalciferol ( ULTRA STRENGTH) 2000 units CAPS Take 2,000 Units by mouth       Cholecalciferol (VITAMIN D3 PO) Take 2,000 Units by mouth daily       fluticasone-salmeterol (WIXELA INHUB) 250-50 MCG/DOSE inhaler Inhale 1 puff into the lungs       hydrALAZINE (APRESOLINE) 50 MG tablet TAKE 1 TABLET BY MOUTH TWICE A DAY       insulin aspart (NOVOLOG FLEXPEN) 100 UNIT/ML pen Per sliding scale before meals and at bedtime. 100-150 1U, 151-200 2U, 201-250 4U, 251-300 6U, 301-350, 8U, 351-400 10U, >400 12U 15 mL 3     insulin glargine (BASAGLAR KWIKPEN) 100 UNIT/ML pen Inject 65 Units Subcutaneous At Bedtime 18 mL 1     insulin pen needle (ULTICARE MICRO) 32G X 4 MM miscellaneous Use 4 pen needles daily or as directed. 400 each 11     levalbuterol (XOPENEX HFA) 45 MCG/ACT Inhaler Inhale 2 puffs into the lungs every 4 hours as needed for shortness of breath / dyspnea or wheezing 15 g 3     lovastatin (MEVACOR) 20 MG tablet TAKE 1 TABLET BY MOUTH EVERYDAY AT BEDTIME 90 tablet 3     magnesium oxide (MAG-OX) 400 (241.3 Mg) MG tablet Take 1 tablet (400 mg) by mouth daily 100 tablet 3     metFORMIN (GLUCOPHAGE-XR) 500 MG 24 hr tablet Take 1 tablet (500 mg) by mouth daily (with dinner) 90 tablet 3     montelukast (SINGULAIR) 10 MG tablet Take 1 tablet (10 mg) by mouth At Bedtime 90 tablet 3     potassium chloride ER (KLOR-CON M) 10 MEQ CR tablet Take 3 tablets (30 mEq) by mouth daily 60 tablet 11     sacubitril-valsartan (ENTRESTO)  MG per tablet Take 1 tablet by mouth 2 times daily 180 tablet 3     spironolactone  (ALDACTONE) 50 MG tablet Take 1 tablet (50 mg) by mouth daily 90 tablet 3     tiZANidine (ZANAFLEX) 2 MG tablet TAKE 1-2 TABLETS BY MOUTH 3 TIMES DAILY AS NEEDED FOR MUSCLE SPASMS 90 tablet 1     torsemide (DEMADEX) 20 MG tablet TAKE 60 MG  IN THE MORNING AND 60 MG IN THE AFTERNOON. Please schedule clinic appt, lab due for refills. 180 tablet 0     WIXELA INHUB 250-50 MCG/DOSE inhaler Inhale 1 puff into the lungs every 12 hours       acetaminophen (TYLENOL) 500 MG tablet Take 500-1,000 mg by mouth every 6 hours as needed for mild pain       Allergies   Allergen Reactions     Amlodipine Swelling     Edema on 5mg throat     Lisinopril      Swelling in throat, face  angioedema     Naprosyn [Naproxen]      Swelling, diff breathing     Recent Labs   Lab Test 12/22/20  1600 10/12/20  1558 08/04/20  1544 08/04/20  1544 12/30/19  1801 12/30/19  1801 09/10/19  1624 09/10/19  1624 05/17/19  1306 05/17/19  1306 05/13/19  1421 05/02/18  1517 05/02/18  1517   A1C  --   --   --  11.0*  --   --   --  6.5*  --  7.7* 7.7*   < > 8.3*   LDL  --   --   --   --   --   --   --   --   --  64 63  --  57   HDL  --   --   --   --   --   --   --   --   --  66 55  --  71   TRIG  --   --   --   --   --   --   --   --   --  83 104  --  123   ALT  --   --   --   --   --   --   --   --   --  22 20  --  34   CR 1.06* 1.15*   < > 0.90   < > 0.84   < >  --    < > 0.80 0.84   < > 0.82   GFRESTIMATED 60* 55*   < > 74   < > 80   < >  --    < > 86 81   < > 75   GFRESTBLACK 70 63   < > 86   < > >90   < >  --    < > >90 >90   < > >90   POTASSIUM 4.0 4.0   < > 3.6   < > 3.8   < >  --    < > 3.8 3.9   < > 3.7   TSH  --   --   --   --   --  1.47  --   --   --   --  0.71  --   --     < > = values in this interval not displayed.        Mammogram Screening: Patient over age 50, mutual decision to screen reflected in health maintenance.    Pertinent mammograms are reviewed under the imaging tab.  History of abnormal Pap smear: NO - age 30-65 PAP every 5 years  "with negative HPV co-testing recommended  PAP / HPV Latest Ref Rng & Units 9/10/2019 2/17/2016   PAP - ASC-US(A) NIL   HPV 16 DNA NEG:Negative Negative -   HPV 18 DNA NEG:Negative Negative -   OTHER HR HPV NEG:Negative Negative -     Reviewed and updated as needed this visit by clinical staff  Tobacco  Allergies  Meds   Med Hx  Surg Hx  Fam Hx          Reviewed and updated as needed this visit by Provider                    ROS:  CONSTITUTIONAL: NEGATIVE for fever, chills, change in weight  INTEGUMENTARY/SKIN: NEGATIVE for worrisome rashes, moles or lesions  EYES: NEGATIVE for vision changes or irritation  ENT: NEGATIVE for ear, mouth and throat problems  RESP: NEGATIVE for significant cough or SOB  BREAST: NEGATIVE for masses, tenderness or discharge  CV: NEGATIVE for chest pain, palpitations or peripheral edema  GI: NEGATIVE for nausea, abdominal pain, heartburn, or change in bowel habits  : NEGATIVE for unusual urinary or vaginal symptoms. No vaginal bleeding.  MUSCULOSKELETAL: NEGATIVE for significant arthralgias or myalgia  NEURO: NEGATIVE for weakness, dizziness or paresthesias  PSYCHIATRIC: NEGATIVE for changes in mood or affect     OBJECTIVE:   /78 (BP Location: Left arm, Patient Position: Chair, Cuff Size: Adult Regular)   Pulse 88   Temp 97.9  F (36.6  C) (Tympanic)   Ht 1.683 m (5' 6.25\")   Wt 136.5 kg (301 lb)   LMP  (LMP Unknown)   SpO2 98%   Breastfeeding No   BMI 48.22 kg/m    EXAM:  GENERAL: healthy, alert and no distress  NECK: no adenopathy, no asymmetry, masses, or scars and thyroid normal to palpation  RESP: lungs clear to auscultation - no rales, rhonchi or wheezes  CV: regular rate and rhythm, normal S1 S2, no S3 or S4, no murmur, click or rub, no peripheral edema and peripheral pulses strong  ABDOMEN: soft, nontender, no hepatosplenomegaly, no masses and bowel sounds normal  MS: no gross musculoskeletal defects noted, no edema  Diabetic foot exam: normal DP and PT pulses, " "no trophic changes or ulcerative lesions and normal sensory exam    Diagnostic Test Results:  Labs reviewed in Epic    ASSESSMENT/PLAN:   1. Routine general medical examination at a health care facility  As below.    2. Type 2 diabetes mellitus with hyperglycemia, with long-term current use of insulin (H)  Not controlled. Added ac meal and bedtime sliding scale insulin. Recheck in 3 months.  - insulin glargine (BASAGLAR KWIKPEN) 100 UNIT/ML pen; Inject 65 Units Subcutaneous At Bedtime  Dispense: 18 mL; Refill: 1  - metFORMIN (GLUCOPHAGE-XR) 500 MG 24 hr tablet; Take 1 tablet (500 mg) by mouth daily (with dinner)  Dispense: 90 tablet; Refill: 3  - insulin aspart (NOVOLOG FLEXPEN) 100 UNIT/ML pen; Per sliding scale before meals and at bedtime. 100-150 1U, 151-200 2U, 201-250 4U, 251-300 6U, 301-350, 8U, 351-400 10U, >400 12U  Dispense: 15 mL; Refill: 3  - insulin pen needle (ULTICARE MICRO) 32G X 4 MM miscellaneous; Use 4 pen needles daily or as directed.  Dispense: 400 each; Refill: 11  - Hemoglobin A1c  - Albumin Random Urine Quantitative with Creat Ratio    3. Hyperlipidemia LDL goal <100  Usually controlled. Recheck.  - LDL cholesterol direct    4. Mild persistent asthma without complication    - montelukast (SINGULAIR) 10 MG tablet; Take 1 tablet (10 mg) by mouth At Bedtime  Dispense: 90 tablet; Refill: 3    Patient has been advised of split billing requirements and indicates understanding: Yes  COUNSELING:   Reviewed preventive health counseling, as reflected in patient instructions       Regular exercise       Healthy diet/nutrition       Vision screening    Estimated body mass index is 48.22 kg/m  as calculated from the following:    Height as of this encounter: 1.683 m (5' 6.25\").    Weight as of this encounter: 136.5 kg (301 lb).        She reports that she quit smoking about 8 years ago. Her smoking use included cigarettes. She has a 30.00 pack-year smoking history. She has quit using smokeless " tobacco.      Counseling Resources:  ATP IV Guidelines  Pooled Cohorts Equation Calculator  Breast Cancer Risk Calculator  BRCA-Related Cancer Risk Assessment: FHS-7 Tool  FRAX Risk Assessment  ICSI Preventive Guidelines  Dietary Guidelines for Americans, 2010  USDA's MyPlate  ASA Prophylaxis  Lung CA Screening    Prabhu Robbins MD, MD  [facility]

## 2021-01-14 NOTE — PATIENT INSTRUCTIONS
At [facility], we strive to deliver an exceptional experience to you, every time we see you. If you receive a survey, please complete it as we do value your feedback.  If you have MyChart, you can expect to receive results automatically within 24 hours of their completion.  Your provider will send a note interpreting your results as well.   If you do not have MyChart, you should receive your results in about a week by mail.    Your care team:                            Family Medicine Internal Medicine   MD Fran Gabriel, MD Denton García MD Katya Georgiev PA-C  Brenda Malik, APRN CNP    Prabhu Robbins, MD Pediatrics   Ricardo Arellano, PAManuelC  Patricia Ramirez, CNP MD Darby Anderson APRN CNP   MD Latrice Piña MD Deborah Mielke, MD Lona Chase, APRN CNP  Evangelina Lazaro, PAHUMBERTO Regalado, CNP  MD Kendra Mukherjee MD Angela Wermerskirchen, MD      Clinic hours: Monday - Thursday 7 am-7 pm; Fridays 7 am-5 pm.   Urgent care: Monday - Friday 11 am-9 pm; Saturday and Sunday 9 am-5 pm.    Clinic: [phone]       [facility]: Monday - Thursday 8 am - 7 pm; Friday 8 am - 6 pm  [facility]: [phone]     Use www.oncare.org for 24/7 diagnosis and treatment of dozens of conditions.  Preventive Health Recommendations  Female Ages 50 - 64    Yearly exam: See your health care provider every year in order to  o Review health changes.   o Discuss preventive care.    o Review your medicines if your doctor has prescribed any.      Get a Pap test every three years (unless you have an abnormal result and your provider advises testing more often).    If you get Pap tests with HPV test, you only need to test every 5 years, unless you have an abnormal result.     You do not need a Pap test if your uterus was removed (hysterectomy) and you have not  had cancer.    You should be tested each year for STDs (sexually transmitted diseases) if you're at risk.     Have a mammogram every 1 to 2 years.    Have a colonoscopy at age 50, or have a yearly FIT test (stool test). These exams screen for colon cancer.      Have a cholesterol test every 5 years, or more often if advised.    Have a diabetes test (fasting glucose) every three years. If you are at risk for diabetes, you should have this test more often.     If you are at risk for osteoporosis (brittle bone disease), think about having a bone density scan (DEXA).    Shots: Get a flu shot each year. Get a tetanus shot every 10 years.    Nutrition:     Eat at least 5 servings of fruits and vegetables each day.    Eat whole-grain bread, whole-wheat pasta and brown rice instead of white grains and rice.    Get adequate Calcium and Vitamin D.     Lifestyle    Exercise at least 150 minutes a week (30 minutes a day, 5 days a week). This will help you control your weight and prevent disease.    Limit alcohol to one drink per day.    No smoking.     Wear sunscreen to prevent skin cancer.     See your dentist every six months for an exam and cleaning.    See your eye doctor every 1 to 2 years.

## 2021-01-15 ENCOUNTER — HEALTH MAINTENANCE LETTER (OUTPATIENT)
Age: 51
End: 2021-01-15

## 2021-01-15 ASSESSMENT — ASTHMA QUESTIONNAIRES: ACT_TOTALSCORE: 20

## 2021-01-23 DIAGNOSIS — I50.23 ACUTE ON CHRONIC SYSTOLIC HEART FAILURE (H): ICD-10-CM

## 2021-01-24 ENCOUNTER — HEALTH MAINTENANCE LETTER (OUTPATIENT)
Age: 51
End: 2021-01-24

## 2021-01-27 NOTE — TELEPHONE ENCOUNTER
TORSEMIDE 20 MG TABLET  Requested:     TAKE 2 TABLETS BY MOUTH IN THE MORNING AND 1 TABLET IN THE AFTERNOON  Current: TAKE 60 MG  IN THE MORNING AND 60 MG IN THE AFTERNOON  Last Written Prescription Date:  12-15-20  Last Fill Quantity: 180,   # refills: 0  Last Office Visit : 12-23-20  Future Office visit:  3-24-21    Abnormal lab: Cr, Reviewed in clinic note    [id number] clinic note: We will continue her Torsemide at 60 mg BID. Follow up in 3-4 weeks virtual is fine, BMP prior.    Routing refill request to provider for review/approval because:  Requested directions do not match current Rx directions  Has not completed follow up BMP

## 2021-01-28 RX ORDER — TORSEMIDE 20 MG/1
60 TABLET ORAL 2 TIMES DAILY
Qty: 180 TABLET | Refills: 1 | Status: SHIPPED | OUTPATIENT
Start: 2021-01-28 | End: 2021-06-11

## 2021-03-18 ENCOUNTER — OFFICE VISIT (OUTPATIENT)
Dept: FAMILY MEDICINE | Facility: CLINIC | Age: 51
End: 2021-03-18
Payer: COMMERCIAL

## 2021-03-18 ENCOUNTER — TELEPHONE (OUTPATIENT)
Dept: FAMILY MEDICINE | Facility: CLINIC | Age: 51
End: 2021-03-18

## 2021-03-18 DIAGNOSIS — J20.9 ACUTE BRONCHITIS, UNSPECIFIED ORGANISM: Primary | ICD-10-CM

## 2021-03-18 DIAGNOSIS — I50.23 ACUTE ON CHRONIC SYSTOLIC HEART FAILURE (H): ICD-10-CM

## 2021-03-18 DIAGNOSIS — Z79.4 TYPE 2 DIABETES MELLITUS WITH HYPERGLYCEMIA, WITH LONG-TERM CURRENT USE OF INSULIN (H): ICD-10-CM

## 2021-03-18 DIAGNOSIS — E11.65 TYPE 2 DIABETES MELLITUS WITH HYPERGLYCEMIA, WITH LONG-TERM CURRENT USE OF INSULIN (H): ICD-10-CM

## 2021-03-18 PROCEDURE — 99213 OFFICE O/P EST LOW 20 MIN: CPT | Performed by: FAMILY MEDICINE

## 2021-03-18 RX ORDER — PREDNISONE 20 MG/1
40 TABLET ORAL DAILY
Qty: 10 TABLET | Refills: 0 | Status: SHIPPED | OUTPATIENT
Start: 2021-03-18 | End: 2021-03-23

## 2021-03-18 RX ORDER — POTASSIUM CHLORIDE 750 MG/1
30 TABLET, EXTENDED RELEASE ORAL DAILY
Qty: 60 TABLET | Refills: 11 | Status: CANCELLED | OUTPATIENT
Start: 2021-03-18

## 2021-03-18 RX ORDER — POTASSIUM CHLORIDE 750 MG/1
30 TABLET, EXTENDED RELEASE ORAL DAILY
Qty: 60 TABLET | Refills: 11 | Status: SHIPPED | OUTPATIENT
Start: 2021-03-18 | End: 2021-04-07

## 2021-03-18 RX ORDER — INSULIN GLARGINE 100 [IU]/ML
75 INJECTION, SOLUTION SUBCUTANEOUS AT BEDTIME
Qty: 90 ML | Refills: 1 | Status: SHIPPED | OUTPATIENT
Start: 2021-03-18 | End: 2022-04-21

## 2021-03-18 RX ORDER — INSULIN ASPART 100 [IU]/ML
INJECTION, SOLUTION INTRAVENOUS; SUBCUTANEOUS
Qty: 15 ML | Refills: 3 | Status: SHIPPED | OUTPATIENT
Start: 2021-03-18 | End: 2022-03-01

## 2021-03-18 RX ORDER — AZITHROMYCIN 250 MG/1
TABLET, FILM COATED ORAL
Qty: 6 TABLET | Refills: 0 | Status: CANCELLED | OUTPATIENT
Start: 2021-03-18 | End: 2021-03-23

## 2021-03-18 RX ORDER — DOXYCYCLINE 100 MG/1
100 CAPSULE ORAL 2 TIMES DAILY
Qty: 20 CAPSULE | Refills: 0 | Status: SHIPPED | OUTPATIENT
Start: 2021-03-18 | End: 2021-03-28

## 2021-03-18 NOTE — TELEPHONE ENCOUNTER
Patient is calling states she has cold symptoms and coughing up green phlegm. Would like PCP to prescribe an antibiotic. TC advised patient may need to be seen for this. Patient still wanted message put in.  Please call patient to advise.   [facility]    LILIAM Palacio

## 2021-03-18 NOTE — TELEPHONE ENCOUNTER
Called and informed the patient of the need to be seen and scheduled the patient for today.    Aleena Perry RN, [facility] Triage

## 2021-03-18 NOTE — PROGRESS NOTES
Levi Dunbar is a 51 year old who presents for the following health issues:    HPI     Acute Illness  Acute illness concerns: cough  Onset/Duration: monday  Symptoms:  Fever: no  Chills/Sweats: no  Headache (location?): no  Sinus Pressure: no  Conjunctivitis:  no  Ear Pain: YES: right  Rhinorrhea: YES  Congestion: YES  Sore Throat: no  Cough: YES-productive of green sputum  Wheeze: YES  Decreased Appetite: no  Nausea: no  Vomiting: no  Diarrhea: no  Dysuria/Freq.: no  Dysuria or Hematuria: no  Fatigue/Achiness: YES- just tired.  Sick/Strep Exposure: no  Therapies tried and outcome: OTC cough syrup     Rapid COVID-19 test was negative this morning.    Review of Systems   Constitutional, HEENT, cardiovascular, pulmonary, GI, , musculoskeletal, neuro, skin, endocrine and psych systems are negative, except as otherwise noted.      Objective    LMP  (LMP Unknown)   There is no height or weight on file to calculate BMI.  Physical Exam   GENERAL: healthy, alert and no distress  NECK: no adenopathy, no asymmetry, masses, or scars and thyroid normal to palpation  RESP: lungs clear to auscultation - no rales, rhonchi or wheezes  CV: regular rate and rhythm, normal S1 S2, no S3 or S4, no murmur, click or rub, no peripheral edema and peripheral pulses strong  ABDOMEN: soft, nontender, no hepatosplenomegaly, no masses and bowel sounds normal  MS: no gross musculoskeletal defects noted, no edema      A/P:  (J20.9) Acute bronchitis, unspecified organism  (primary encounter diagnosis)  Comment:   Plan: doxycycline monohydrate (MONODOX) 100 MG         capsule, predniSONE (DELTASONE) 20 MG tablet        With asthma exacerbation. Treat with oral steroidal burst with abx. RTC if not improving.    (I50.23) Acute on chronic systolic heart failure (H)  Comment: needed refills.  Plan: potassium chloride ER (KLOR-CON M) 10 MEQ CR         tablet            (E11.65,  Z79.4) Type 2 diabetes mellitus with hyperglycemia, with  long-term current use of insulin (H)  Comment:   Plan: insulin glargine (BASAGLAR KWIKPEN) 100 UNIT/ML        pen, insulin aspart (NOVOLOG FLEXPEN) 100         UNIT/ML pen        Needed refills. RTC in 2 months for recheck.    Prabhu Robbins MD

## 2021-03-23 DIAGNOSIS — I50.22 CHRONIC SYSTOLIC CONGESTIVE HEART FAILURE (H): ICD-10-CM

## 2021-03-23 LAB
ANION GAP SERPL CALCULATED.3IONS-SCNC: 3 MMOL/L (ref 3–14)
BUN SERPL-MCNC: 24 MG/DL (ref 7–30)
CALCIUM SERPL-MCNC: 9.9 MG/DL (ref 8.5–10.1)
CHLORIDE SERPL-SCNC: 95 MMOL/L (ref 94–109)
CO2 SERPL-SCNC: 35 MMOL/L (ref 20–32)
CREAT SERPL-MCNC: 1.18 MG/DL (ref 0.52–1.04)
GFR SERPL CREATININE-BSD FRML MDRD: 53 ML/MIN/{1.73_M2}
GLUCOSE SERPL-MCNC: 260 MG/DL (ref 70–99)
POTASSIUM SERPL-SCNC: 3.8 MMOL/L (ref 3.4–5.3)
SODIUM SERPL-SCNC: 133 MMOL/L (ref 133–144)

## 2021-03-23 PROCEDURE — 36415 COLL VENOUS BLD VENIPUNCTURE: CPT | Performed by: NURSE PRACTITIONER

## 2021-03-23 PROCEDURE — 80048 BASIC METABOLIC PNL TOTAL CA: CPT | Performed by: NURSE PRACTITIONER

## 2021-03-24 ENCOUNTER — VIRTUAL VISIT (OUTPATIENT)
Dept: CARDIOLOGY | Facility: CLINIC | Age: 51
End: 2021-03-24
Payer: COMMERCIAL

## 2021-03-24 ENCOUNTER — TELEPHONE (OUTPATIENT)
Dept: CARDIOLOGY | Facility: CLINIC | Age: 51
End: 2021-03-24

## 2021-03-24 DIAGNOSIS — I50.22 CHRONIC SYSTOLIC HEART FAILURE (H): Primary | ICD-10-CM

## 2021-03-24 DIAGNOSIS — J45.20 INTERMITTENT ASTHMA, UNCOMPLICATED: ICD-10-CM

## 2021-03-24 DIAGNOSIS — G47.33 OSA (OBSTRUCTIVE SLEEP APNEA): ICD-10-CM

## 2021-03-24 DIAGNOSIS — I10 HYPERTENSION, UNSPECIFIED TYPE: ICD-10-CM

## 2021-03-24 DIAGNOSIS — I42.8 NONISCHEMIC CARDIOMYOPATHY (H): ICD-10-CM

## 2021-03-24 PROCEDURE — 99214 OFFICE O/P EST MOD 30 MIN: CPT | Mod: 95 | Performed by: NURSE PRACTITIONER

## 2021-03-24 NOTE — PROGRESS NOTES
Bettina is a 51 year old who is being evaluated via a billable video visit.            Video-Visit Details    Type of service:  Video Visit    Video Start Time: 1:16    Video End Time:1:35    Originating Location (pt. Location): Home    Distant Location (provider location):  [facility] HEART [facility]     Platform used for Video Visit: Dillon GRACE  Bettina Montes is a 51 year old female with a past medical history of HFrEF (30-35%) secondary to NICM (felt to be idiopathic), HTN, obesity, DM II, cervical spine fusion, and hyperlipidemia who presents for routine follow-up. She was last seen by Dr. Chinchilla on 6/5/20.    Bettina had her last CORE visit on 12/23. Today she denies SOB at rest. She says there is now no swelling in her legs or abdomen. Her weight today was 293-297 lbs. She endorses a big improvement in her nightly PND, cough, and orthopnea. Her appetite has been fine. She denies early satiety.  Her blood sugars have been elevated she is working with her PCP. Continues to self titrate her afternoon diuretic doses. Most often takes 20 mg in the afternoon as opposed to ( 60 mg as prescribed). She says the 60 mg BID makes her feel very dry.   She denies any chest pain, palpitations, lightheadedness, dizziness, or near syncopal episodes.     PMH  Past Medical History:   Diagnosis Date     Abnormal Pap smear of cervix 09/10/2019    See problem list     Adrenal gland anomaly      CHF (congestive heart failure) (H)      Fatty liver      Gastroesophageal reflux disease      Hyperlipidemia      Hypertension      Mild persistent asthma      NICM (nonischemic cardiomyopathy) (H)      Obesity      WILLARD (obstructive sleep apnea) 1/9/2015     Type 2 diabetes mellitus (H)        Past Surgical History:   Procedure Laterality Date     COLONOSCOPY       DISCECTOMY, FUSION CERVICAL ANTERIOR TWO LEVELS, COMBINED N/A 4/5/2019    Procedure: C4-6 anterior cervical discectomy and fusion;  Surgeon: Bryant  Hollis Cotter MD;  Location: [facility] OR     TUBAL LIGATION         Family History   Problem Relation Age of Onset     Diabetes Mother      Hypertension Mother      Hyperlipidemia Mother      Heart Failure Mother      Diabetes Father      Cancer Paternal Grandmother         ?       Social History     Socioeconomic History     Marital status: Single     Spouse name: None     Number of children: 1     Years of education: None     Highest education level: None   Occupational History     Occupation: CNA     Employer: OTHER   Social Needs     Financial resource strain: None     Food insecurity:     Worry: None     Inability: None     Transportation needs:     Medical: None     Non-medical: None   Tobacco Use     Smoking status: Former Smoker     Packs/day: 1.00     Years: 30.00     Pack years: 30.00     Types: Cigarettes     Last attempt to quit: 2013     Years since quittin.8     Smokeless tobacco: Former User     Tobacco comment:     Substance and Sexual Activity     Alcohol use: Yes     Alcohol/week: 0.0 standard drinks     Comment: 1     Drug use: No     Sexual activity: Yes     Partners: Male     Birth control/protection: Surgical   Lifestyle     Physical activity:     Days per week: None     Minutes per session: None     Stress: None   Relationships     Social connections:     Talks on phone: None     Gets together: None     Attends Pentecostalism service: None     Active member of club or organization: None     Attends meetings of clubs or organizations: None     Relationship status: None     Intimate partner violence:     Fear of current or ex partner: None     Emotionally abused: None     Physically abused: None     Forced sexual activity: None   Other Topics Concern     Parent/sibling w/ CABG, MI or angioplasty before 65F 55M? No   Social History Narrative     None       ALLERGIES  Allergies   Allergen Reactions     Amlodipine Swelling     Edema on 5mg throat     Lisinopril      Swelling in throat,  face  angioedema     Naprosyn [Naproxen]      Swelling, diff breathing       MEDICATIONS acetaminophen (TYLENOL) 500 MG tablet, Take 500-1,000 mg by mouth every 6 hours as needed for mild pain  carvedilol (COREG) 25 MG tablet, Take 1 tablet (25 mg) by mouth 2 times daily (with meals)  cholecalciferol ( ULTRA STRENGTH) 2000 units CAPS, Take 2,000 Units by mouth  doxycycline monohydrate (MONODOX) 100 MG capsule, Take 1 capsule (100 mg) by mouth 2 times daily for 10 days  fluticasone-salmeterol (WIXELA INHUB) 250-50 MCG/DOSE inhaler, Inhale 1 puff into the lungs  hydrALAZINE (APRESOLINE) 50 MG tablet, TAKE 1 TABLET BY MOUTH TWICE A DAY  insulin aspart (NOVOLOG FLEXPEN) 100 UNIT/ML pen, Per sliding scale before meals and at bedtime. 100-150 1U, 151-200 2U, 201-250 4U, 251-300 6U, 301-350, 8U, 351-400 10U, >400 12U  insulin glargine (BASAGLAR KWIKPEN) 100 UNIT/ML pen, Inject 75 Units Subcutaneous At Bedtime  levalbuterol (XOPENEX HFA) 45 MCG/ACT Inhaler, Inhale 2 puffs into the lungs every 4 hours as needed for shortness of breath / dyspnea or wheezing  lovastatin (MEVACOR) 20 MG tablet, TAKE 1 TABLET BY MOUTH EVERYDAY AT BEDTIME  magnesium oxide (MAG-OX) 400 (241.3 Mg) MG tablet, Take 1 tablet (400 mg) by mouth daily  metFORMIN (GLUCOPHAGE-XR) 500 MG 24 hr tablet, Take 1 tablet (500 mg) by mouth daily (with dinner)  montelukast (SINGULAIR) 10 MG tablet, Take 1 tablet (10 mg) by mouth At Bedtime  potassium chloride ER (KLOR-CON M) 10 MEQ CR tablet, Take 3 tablets (30 mEq) by mouth daily  sacubitril-valsartan (ENTRESTO)  MG per tablet, Take 1 tablet by mouth 2 times daily  spironolactone (ALDACTONE) 50 MG tablet, Take 1 tablet (50 mg) by mouth daily  torsemide (DEMADEX) 20 MG tablet, Take 3 tablets (60 mg) by mouth 2 times daily  BETA BLOCKER NOT PRESCRIBED, INTENTIONAL,, Beta Blocker not prescribed intentionally due to Severe Asthma  blood glucose (NO BRAND SPECIFIED) test strip, Use to test blood sugar 3-4  times daily or as directed.  Cholecalciferol (VITAMIN D3 PO), Take 2,000 Units by mouth daily  insulin pen needle (ULTICARE MICRO) 32G X 4 MM miscellaneous, Use 4 pen needles daily or as directed.  [] predniSONE (DELTASONE) 20 MG tablet, Take 2 tablets (40 mg) by mouth daily for 5 days  tiZANidine (ZANAFLEX) 2 MG tablet, TAKE 1-2 TABLETS BY MOUTH 3 TIMES DAILY AS NEEDED FOR MUSCLE SPASMS (Patient not taking: Reported on 3/18/2021)    sodium chloride (PF) 0.9% PF flush 10 mL            ROS:   Constitutional: No fever, chills, or sweats.  ENT: No visual disturbance, ear ache, epistaxis, sore throat.   Allergies/Immunologic: Negative  Respiratory: No cough, hemoptysis.   Cardiovascular: As per HPI.   GI: As per HPI.   : No urinary frequency, dysuria, or hematuria.   Integument: Negative.   Psychiatric: Negative.   Neuro: Negative.   Endocrinology: negative   Musculoskeletal: negative    EXAM:   Constitutional - WDWN, no acute distress   Eyes - no redness or discharge, nonicteric sclera  Respiratory - nonlabored breathing, able to speak in full sentences without difficulty  CV: no visible edema of visualized upper extremities.  Neurological - alert and oriented, speech fluent/appropriate  PSYCH: cooperative, affect appropriate  DERM: no rashes on visualized face/neck/upper extremities     The rest of a comprehensive physical examination is deferred due to PHE (public health emergency) video visit restrictions        LABS  Last Comprehensive Metabolic Panel:  Sodium   Date Value Ref Range Status   2021 133 133 - 144 mmol/L Final     Potassium   Date Value Ref Range Status   2021 3.8 3.4 - 5.3 mmol/L Final     Chloride   Date Value Ref Range Status   2021 95 94 - 109 mmol/L Final     Carbon Dioxide   Date Value Ref Range Status   2021 35 (H) 20 - 32 mmol/L Final     Anion Gap   Date Value Ref Range Status   2021 3 3 - 14 mmol/L Final     Glucose   Date Value Ref Range Status    03/23/2021 260 (H) 70 - 99 mg/dL Final     Urea Nitrogen   Date Value Ref Range Status   03/23/2021 24 7 - 30 mg/dL Final     Creatinine   Date Value Ref Range Status   03/23/2021 1.18 (H) 0.52 - 1.04 mg/dL Final     GFR Estimate   Date Value Ref Range Status   03/23/2021 53 (L) >60 mL/min/[1.73_m2] Final     Comment:     Non  GFR Calc  Starting 12/18/2018, serum creatinine based estimated GFR (eGFR) will be   calculated using the Chronic Kidney Disease Epidemiology Collaboration   (CKD-EPI) equation.       Calcium   Date Value Ref Range Status   03/23/2021 9.9 8.5 - 10.1 mg/dL Final       MOST RECENT ECHOCARDIOGRAM 10/25/19:  Interpretation Summary  Technically difficult study.  Left ventricular size is normal. Mildly (EF 40-45%, traced 45%) reduced left ventricular function is present.  Right ventricular function, chamber size, wall motion, and thickness are normal. This study was compared with the study from 3/21/18: The left ventricular function has improved    ASSESSMENT AND PLAN  Bettina Montes is a 51 year old female with NICM who appears euvolemic today.  We will continue her Torsemide at 60 mg am. She isn't comfortable with a set dose in the afternoon she prefers to self titrate based off how she is feeling. We will not make any changes to her medications today       1. Chronic systolic heart failure/HFrEF (EF 40-45%) secondary to nonischemic cardiomyopathy  NYHA Symptom Class IIIB  Stage C  Primary Cardiologist: Dr Chinchilla; Last seen 6/5/20  ACE-I/ARB/ARNi:  Entresto  mg  BB yes, Carvedilol 25 mg twice daily  Aldosterone antagonist yes, Spironolactone 50 mg daily.   SCD prophylaxis EF > 35%   Fluid status Euvolemic  Cardiac Rehab: Completed  Sleep Apnea Evaluation: Documented sleep apnea.    Remote monitoring: Mychart active  Antiplatelet: none.   Anticoagulation: None.    # Obesity: BMI 47.29.  She is working on weight loss.  Continue to reassess at subsequent visits.     #  HTN:    continue Entresto   Continue to monitor BP readings at home.      #. Shoulder surgery. - October - still doing Therapy     4.  Follow-up:  Dr. Chinchilla in June  CORE in September           Heide SAHA

## 2021-03-24 NOTE — TELEPHONE ENCOUNTER
Left message for pt to return call and schedule a appt with Heide in Sept. With labs prior.    Opal Spears CMA......March 24, 2021     2:44 PM

## 2021-03-24 NOTE — PATIENT INSTRUCTIONS
Take your medicines every day, as directed    Changes made today:  o none  o    Monitor Your Weight and Symptoms    Contact us if you:      Gain 2 pounds in one day or 5 pounds in one week    Feel more short of breath    Notice more leg swelling    Feel lightheadeded   Change your lifestyle    Limit Salt or Sodium:    2000 mg  Limit Fluids:    2000 mL or approximately 64 ounces  Eat a Heart Healthy Diet    Low in saturated fats  Stay Active:    Aim to move at least 150 minutes every  week         To Contact us    During Business Hours:  [phone], option # 1 (University)  Then option # 4 (medical questions)     After hours, weekends or holidays:   [phone], Option #4  Ask to speak to the On-Call Cardiologist. Inform them you are a CORE/heart failure patient at the [facility].     Use [employer] allows you to communicate directly with your heart team through secure messaging.    [employer] can be accessed any time on your phone, computer, or tablet.    If you need assistance, we'd be happy to help!         Keep your Heart Appointments:    Dr. Chinchilla in June     CORE in September

## 2021-04-02 NOTE — LETTER
Close follow-up with oncologist Dr. Del Toro    [facility]TH[facility]  [address]  [facility] [address] [phone]    2019  Re: Bettina Montes   :   1970  MRN:  [id number]   REFERRING PHYSICIAN:   Supriya Case  [facility]  Date of Initial Evaluation:  2019  Visits:  Rxs Used: 11  Reason for Referral:     Neck pain  S/P spinal fusion  Chronic left shoulder pain  EVALUATION SUMMARY  PROGRESS  REPORT  Progress reporting period is from 19 to 19.     SUBJECTIVE  Subjective changes noted by patient:  Pt notes that her L arm/shoulder is feeling really good today, but has a day off from work and hasn't done anything yet today. She does still get some tingling/numbness in her fingertips, but feels that that is easing up. Her shoulder pain is not getting better, though.    Current pain level is  1/10 (increased to 5/10 by end of session).     Previous pain level was  4/10 Initial Pain level: 9/10(at worst).   Changes in function:  Yes (See Goal flowsheet attached for changes in current functional level)  Adverse reaction to treatment or activity: None  OBJECTIVE  Changes noted in objective findings:    L shoulder AROM: flex 78 deg, abd 57 deg, ER 70 deg, IR/ext thumb to L buttock laterally w/pain++; L shoulder PROM: abd 90 deg (pain++ in ant/lat L shoulder, no pain in neck), flex 120 w/pain++, ER 70 deg w/pain++. CROM: flex full, ext 60%, SB L 50%, SB R 75%, rot L 50%, rot R 75%, more pain w/L SB and L rot.  strength L: avg of 26# psi (R has an avg of 52# psi).    ASSESSMENT/PLAN  Updated problem list and treatment plan: Diagnosis 1:  S/p cervical fusion w/possible underlying RC injury  Pain -  hot/cold therapy, manual therapy, self management and home program  Decreased ROM/flexibility - manual therapy and therapeutic exercise  Decreased strength - therapeutic exercise and therapeutic activities  Impaired muscle performance - neuro  re-education  Decreased function - therapeutic activities  STG/LTGs have been met or progress has been made towards goals:  Yes (See Goal flow sheet completed today.)  Assessment of Progress: The patient's condition is improving.  The patient's condition has potential to improve.  The patient's progress has slowed.  The patient has had set backs in their progress.  Patient is meeting short term goals and is progressing towards long term goals.  Self Management Plans:  Patient has been instructed in a home treatment program.  Patient is independent in a home treatment program.  Patient  has been instructed in self management of symptoms.  Patient is independent in self management of symptoms.  I have re-evaluated this patient and find that the nature, scope, duration and intensity of the therapy is appropriate for the medical condition of the patient.  Bettina continues to require the following intervention to meet STG and LTG's:  PT  Recommendations:  This patient would benefit from continued therapy.     Frequency:  1 X week, once daily  Duration:  for 6 weeks  Please refer to the daily flowsheet for treatment today, total treatment time and time spent performing 1:1 timed codes.  Thank you for your referral.  INQUIRIES  Therapist: Aan María Weiner, [facility]   [facility]  [address]  [facility] [address]  Phone: [phone]  Fax: [phone]

## 2021-04-05 DIAGNOSIS — I50.23 ACUTE ON CHRONIC SYSTOLIC HEART FAILURE (H): ICD-10-CM

## 2021-04-07 RX ORDER — POTASSIUM CHLORIDE 750 MG/1
30 TABLET, EXTENDED RELEASE ORAL DAILY
Qty: 270 TABLET | Refills: 1 | Status: SHIPPED | OUTPATIENT
Start: 2021-04-07 | End: 2021-10-11

## 2021-04-29 ENCOUNTER — TRANSFERRED RECORDS (OUTPATIENT)
Dept: HEALTH INFORMATION MANAGEMENT | Facility: CLINIC | Age: 51
End: 2021-04-29

## 2021-04-29 LAB — RETINOPATHY: NEGATIVE

## 2021-05-05 ENCOUNTER — TELEPHONE (OUTPATIENT)
Dept: FAMILY MEDICINE | Facility: CLINIC | Age: 51
End: 2021-05-05

## 2021-05-05 NOTE — TELEPHONE ENCOUNTER
.Reason for Call:  Form, our goal is to have forms completed with 72 hours, however, some forms may require a visit or additional information.    Type of letter, form or note:  DMV    Who is the form from?: Patient    Where did the form come from: Patient or family brought in       What clinic location was the form placed at?: [place]    Where the form was placed: [facility] Box/Folder    What number is listed as a contact on the form?: [phone]       Additional comments: Please fax to [phone] and contact patient once complete    Thank you     Call taken on 5/5/2021 at 5:48 PM by JU ZHANG

## 2021-05-06 NOTE — TELEPHONE ENCOUNTER
Patient's form is faxed to fax # [phone].  Vish Dallas,  For [facility] Floor [facility]    Copy is put in abstraction basket.  Vish Dallas,  For [facility]st Floor Primary Care

## 2021-05-06 NOTE — TELEPHONE ENCOUNTER
Form is received from the  and forward to provider to address.  Vish Dallas,  For 1st Floor Primary Care

## 2021-05-16 ENCOUNTER — HEALTH MAINTENANCE LETTER (OUTPATIENT)
Age: 51
End: 2021-05-16

## 2021-06-11 ENCOUNTER — OFFICE VISIT (OUTPATIENT)
Dept: CARDIOLOGY | Facility: CLINIC | Age: 51
End: 2021-06-11
Payer: COMMERCIAL

## 2021-06-11 VITALS
HEIGHT: 66 IN | DIASTOLIC BLOOD PRESSURE: 76 MMHG | WEIGHT: 293 LBS | OXYGEN SATURATION: 99 % | SYSTOLIC BLOOD PRESSURE: 118 MMHG | HEART RATE: 86 BPM | BODY MASS INDEX: 47.09 KG/M2

## 2021-06-11 DIAGNOSIS — I50.23 ACUTE ON CHRONIC SYSTOLIC HEART FAILURE (H): ICD-10-CM

## 2021-06-11 LAB — HBA1C MFR BLD: 10.1 % (ref 0–5.6)

## 2021-06-11 PROCEDURE — 83036 HEMOGLOBIN GLYCOSYLATED A1C: CPT | Performed by: INTERNAL MEDICINE

## 2021-06-11 PROCEDURE — 99215 OFFICE O/P EST HI 40 MIN: CPT | Mod: GC | Performed by: INTERNAL MEDICINE

## 2021-06-11 PROCEDURE — 36415 COLL VENOUS BLD VENIPUNCTURE: CPT | Performed by: INTERNAL MEDICINE

## 2021-06-11 RX ORDER — TORSEMIDE 20 MG/1
60 TABLET ORAL 2 TIMES DAILY
Qty: 180 TABLET | Refills: 11 | Status: SHIPPED | OUTPATIENT
Start: 2021-06-11 | End: 2022-04-06

## 2021-06-11 ASSESSMENT — MIFFLIN-ST. JEOR: SCORE: 1980.28

## 2021-06-11 ASSESSMENT — PAIN SCALES - GENERAL: PAINLEVEL: MILD PAIN (2)

## 2021-06-11 NOTE — RESULT ENCOUNTER NOTE
The test result is high and unchanged. No change in clinical plan. Follow up as recommended.     Dr Chinchilla

## 2021-06-11 NOTE — PROGRESS NOTES
[facility] Cardiology Consultation:    Assessment and Plan:     1.  Chronic systolic heart failure, LVEF of 45%, idiopathic nonischemic cardiomyopathy (stress MRI - no ischemia, mild septal fibrosis): Improved LV fxn with medical rx.   Continue Carvedilol to 25 mg BID, Entresto  mg BID, Hydralazine 50 mg BID, and Spironolactone 50 mg daily  Continue torsemide 60mg qday with qpm prn  F/u CORE visit with Batul.   F/u with me annual, with annual echo.     2. Hypertension  3. Obesity  4. Diabetes - inconsistent blood sugar control - will refer to MTM to consider SGLT2 inhibitor  5. Asthma     RTC in 1 year    Patient was seen and discussed with attending physician, Dr. El Chinchilla MD.     Isiah Johnson MD, PhD  Cardiology Fellow  Click to text page [phone]    I have seen and examined the patient, reviewed labs and tests. I have discussed my findings and treatment recommendations with the house staff and/or Cardiology fellow and agree with their assessment and plan as outlined in the note.    El Chinchilla MD    Cardiac Imaging and Prevention  [facility]  [email or web address] I Pager: [phone]    HPI:     Bettina Montes is a 51 year old female with a history of chronic idiopathic nonischemic cardiomyopathy, HTN, obesity, and DM2 who presents for routine follow up. Pt thinks that her dizziness has increased in the past month - 1-2 episodes a day. When pt is ambulating - eg walking across the room - pt will feel disoriented, short of breath, but not necessarily vision or hearing changes. Fortunately pt has not had a loss of consciousness episode. As soon as pt sits down to rest, her sx will resolve. Pt does not experience these sx when she first stands up.     Fortunately pt does not feel any more dyspneic than usual.     Pt does not have an upper arm BP cuff at home. She has not tried to buy one. Pt does not monitor her weight very often.     Pt is taking  "hydralazine 50mg BID, coreg 25 BID, entresto 97/103, spironolactone 50 qday and torsemide 60mg qday or BID prn. Pt has not changed her medications in the past month.       EXAM:  /76 (BP Location: Right arm, Patient Position: Sitting, Cuff Size: Adult Large)   Pulse 86   Ht 1.676 m (5' 6\")   Wt 135.4 kg (298 lb 6.4 oz)   LMP  (LMP Unknown)   SpO2 99%   BMI 48.16 kg/m    GEN/CONSTITUTIONAL: Appears comfortable, in no apparent distress   EYES: No icterus  ENT/MOUTH: Normal  JVP:  Not visible  RESPIRATORY: Clear to auscultation bilaterally   CARDIOVASCULAR: Regular S1 and S2, no murmurs, rubs, or gallops.   ABDOMEN: Soft, non-tender, positive bowel sounds   NEUROLOGIC: Grossly non-focal   PSYCHIATRIC: Normal affect  EXT: Trace edema. Normal pedal pulses.  Skin: No petechiae, purpura or rash    PAST MEDICAL HISTORY:  Past Medical History:   Diagnosis Date     Abnormal Pap smear of cervix 09/10/2019    See problem list     Adrenal gland anomaly      CHF (congestive heart failure) (H)      Fatty liver      Gastroesophageal reflux disease      Hyperlipidemia      Hypertension      Mild persistent asthma      NICM (nonischemic cardiomyopathy) (H)      Obesity      WILLARD (obstructive sleep apnea) 1/9/2015     Type 2 diabetes mellitus (H)        CURRENT MEDICATIONS:  Current Outpatient Medications   Medication     acetaminophen (TYLENOL) 500 MG tablet     BETA BLOCKER NOT PRESCRIBED, INTENTIONAL,     blood glucose (NO BRAND SPECIFIED) test strip     carvedilol (COREG) 25 MG tablet     cholecalciferol ( ULTRA STRENGTH) 2000 units CAPS     Cholecalciferol (VITAMIN D3 PO)     fluticasone-salmeterol (WIXELA INHUB) 250-50 MCG/DOSE inhaler     hydrALAZINE (APRESOLINE) 50 MG tablet     insulin aspart (NOVOLOG FLEXPEN) 100 UNIT/ML pen     insulin glargine (BASAGLAR KWIKPEN) 100 UNIT/ML pen     insulin pen needle (ULTICARE MICRO) 32G X 4 MM miscellaneous     levalbuterol (XOPENEX HFA) 45 MCG/ACT Inhaler     lovastatin " (MEVACOR) 20 MG tablet     magnesium oxide (MAG-OX) 400 (241.3 Mg) MG tablet     metFORMIN (GLUCOPHAGE-XR) 500 MG 24 hr tablet     montelukast (SINGULAIR) 10 MG tablet     potassium chloride ER (KLOR-CON M) 10 MEQ CR tablet     sacubitril-valsartan (ENTRESTO)  MG per tablet     spironolactone (ALDACTONE) 50 MG tablet     tiZANidine (ZANAFLEX) 2 MG tablet     torsemide (DEMADEX) 20 MG tablet     No current facility-administered medications for this visit.      Facility-Administered Medications Ordered in Other Visits   Medication     sodium chloride (PF) 0.9% PF flush 10 mL       PAST SURGICAL HISTORY:  Past Surgical History:   Procedure Laterality Date     COLONOSCOPY       DISCECTOMY, FUSION CERVICAL ANTERIOR TWO LEVELS, COMBINED N/A 4/5/2019    Procedure: C4-6 anterior cervical discectomy and fusion;  Surgeon: Hollis Hurtado MD;  Location: [facility] OR     TUBAL LIGATION         ALLERGIES     Allergies   Allergen Reactions     Amlodipine Swelling     Edema on 5mg throat     Lisinopril      Swelling in throat, face  angioedema     Naprosyn [Naproxen]      Swelling, diff breathing       FAMILY HISTORY:  Family History   Problem Relation Age of Onset     Diabetes Mother      Hypertension Mother      Hyperlipidemia Mother      Heart Failure Mother      Diabetes Father      Cancer Paternal Grandmother         ?       SOCIAL HISTORY:  Social History     Socioeconomic History     Marital status: Single     Spouse name: Not on file     Number of children: 1     Years of education: Not on file     Highest education level: Not on file   Occupational History     Occupation: CNA     Employer: OTHER   Social Needs     Financial resource strain: Not on file     Food insecurity     Worry: Not on file     Inability: Not on file     Transportation needs     Medical: Not on file     Non-medical: Not on file   Tobacco Use     Smoking status: Former Smoker     Packs/day: 1.00     Years: 30.00     Pack years: 30.00      Types: Cigarettes     Quit date: 2013     Years since quittin.4     Smokeless tobacco: Former User     Tobacco comment:     Substance and Sexual Activity     Alcohol use: Yes     Alcohol/week: 0.0 standard drinks     Comment: 1     Drug use: No     Sexual activity: Yes     Partners: Male     Birth control/protection: Surgical   Lifestyle     Physical activity     Days per week: Not on file     Minutes per session: Not on file     Stress: Not on file   Relationships     Social connections     Talks on phone: Not on file     Gets together: Not on file     Attends Jehovah's witness service: Not on file     Active member of club or organization: Not on file     Attends meetings of clubs or organizations: Not on file     Relationship status: Not on file     Intimate partner violence     Fear of current or ex partner: Not on file     Emotionally abused: Not on file     Physically abused: Not on file     Forced sexual activity: Not on file   Other Topics Concern     Parent/sibling w/ CABG, MI or angioplasty before 65F 55M? No   Social History Narrative     Not on file       ROS:   Constitutional: No fever, chills, or sweats. No weight gain/loss   ENT: No visual disturbance, ear ache, epistaxis, sore throat  Allergies/Immunologic: Negative.   Respiratory: No cough, hemoptysia  Cardiovascular: As per HPI  GI: No nausea, vomiting, hematemesis, melena, or hematochezia  : No urinary frequency, dysuria, or hematuria  Integument: Negative  Psychiatric: Negative  Neuro: Negative  Endocrinology: Negative   Musculoskeletal: Negative    ADDITIONAL COMMENTS:     I reviewed the patient's medications:     I reviewed the patient's pertinent clinical laboratory studies:     I reviewed the patient's pertinent imaging studies: TTE from 1 year ago demonstrated moderately reduced LV function, but normal RV function and no change from 2 years ago.   I reviewed the patient's ECG:         The 10-year ASCVD risk score ([place] JANI Nielsen., et al.,  2013) is: 3%    Values used to calculate the score:      Age: 51 years      Sex: Female      Is Non- : Yes      Diabetic: Yes      Tobacco smoker: No      Systolic Blood Pressure: 108 mmHg      Is BP treated: Yes      HDL Cholesterol: 66 mg/dL      Total Cholesterol: 147 mg/dL

## 2021-06-11 NOTE — PROGRESS NOTES
"Bettina Montes's goals for this visit include:   Chief Complaint   Patient presents with     Follow Up     one year follow up       She requests these members of her care team be copied on today's visit information: yes    PCP: Anuja Bruce    Referring Provider:  No referring provider defined for this encounter.    /76 (BP Location: Right arm, Patient Position: Sitting, Cuff Size: Adult Large)   Pulse 86   Ht 1.676 m (5' 6\")   Wt 135.4 kg (298 lb 6.4 oz)   LMP  (LMP Unknown)   SpO2 99%   BMI 48.16 kg/m      Do you need any medication refills at today's visit? Yes    Marshal Mckinley CMA      "

## 2021-06-14 ENCOUNTER — TELEPHONE (OUTPATIENT)
Dept: FAMILY MEDICINE | Facility: CLINIC | Age: 51
End: 2021-06-14

## 2021-06-24 ENCOUNTER — ANCILLARY PROCEDURE (OUTPATIENT)
Dept: CARDIOLOGY | Facility: CLINIC | Age: 51
End: 2021-06-24
Attending: INTERNAL MEDICINE
Payer: COMMERCIAL

## 2021-06-24 DIAGNOSIS — I50.23 ACUTE ON CHRONIC SYSTOLIC HEART FAILURE (H): ICD-10-CM

## 2021-06-24 PROCEDURE — 93306 TTE W/DOPPLER COMPLETE: CPT | Performed by: INTERNAL MEDICINE

## 2021-06-25 ENCOUNTER — TELEPHONE (OUTPATIENT)
Dept: CARDIOLOGY | Facility: CLINIC | Age: 51
End: 2021-06-25

## 2021-06-25 NOTE — TELEPHONE ENCOUNTER
----- Message from El Chinchilla MD sent at 6/24/2021  5:17 PM CDT -----  The test result is stable and unchanged. No change in clinical plan. Follow up as recommended.

## 2021-06-25 NOTE — TELEPHONE ENCOUNTER
Writer called patient to deliver Dr. Alvarado findings on the echo. Patient verbalized their understanding and was encouraged to call or send a message with any further questions.     Yonas Chris, EMT  Clinic Support  [facility]  [place]    Employed by [facility]

## 2021-07-07 DIAGNOSIS — I50.22 CHRONIC SYSTOLIC HEART FAILURE (H): ICD-10-CM

## 2021-07-07 DIAGNOSIS — I50.22 CHRONIC SYSTOLIC CONGESTIVE HEART FAILURE (H): ICD-10-CM

## 2021-07-09 RX ORDER — CARVEDILOL 25 MG/1
25 TABLET ORAL 2 TIMES DAILY WITH MEALS
Qty: 180 TABLET | Refills: 3 | Status: SHIPPED | OUTPATIENT
Start: 2021-07-09 | End: 2022-07-22

## 2021-07-09 NOTE — TELEPHONE ENCOUNTER
6/11/2021 last visit Dr Amor MCCLURE [facility] [place]  Cr elevated and stable / reviewed 6/11/21 by provider

## 2021-08-07 NOTE — TELEPHONE ENCOUNTER
Patient called clinic S/p C4-6 ACDF on 4/5 to report some issues with dizziness over the past couple of days. She denies any falls, dizziness is better when lying or sitting. Patient has a history of HTN and DM, she has been monitoring both and states her readings have been good. She did report having some issues with hypotension while she was in-patient. I encouraged patient to continue to monitor blood pressure and blood glucose closely. I also advised patient to be sure to get up slowly from sitting or lying position and to make sure she feels steady before walking/moving to prevent falls.     Patient is also reporting that she hasn't had a bowel movement since surgery, but is passing lots of flatus. She does states her appetite has improve along with her swallowing, she is still eating a softer diet due to some continued discomfort with swallowing. I encouraged a higher fiber diet if possible, increasing water intake, and increase ambulation as tolerated. Patient is taking her narcotics regularly through out the day, explained how these could be contributing to her constipation and maybe even her dizziness. Patient does have both miralax and milk of mag of home, I advised patient to take a dose of miralax today. She was then instructed to return call to clinic tomorrow for a follow up call or sooner if anything changes. Patient verbalized understanding and is in agreement with plan.    Pt. Will be reminded to bring in the copy of living will if he does not bring it by tomorrow.

## 2021-08-12 DIAGNOSIS — I50.23 ACUTE ON CHRONIC SYSTOLIC (CONGESTIVE) HEART FAILURE (H): ICD-10-CM

## 2021-08-13 DIAGNOSIS — I10 ESSENTIAL HYPERTENSION WITH GOAL BLOOD PRESSURE LESS THAN 140/90: ICD-10-CM

## 2021-08-13 DIAGNOSIS — I50.22 CHRONIC SYSTOLIC CONGESTIVE HEART FAILURE (H): ICD-10-CM

## 2021-08-16 NOTE — TELEPHONE ENCOUNTER
hydrALAZINE (APRESOLINE) 50 MG tablet      Last Written Prescription Date:  *Historical     Last Office Visit : 6/11/2021  Future Office visit:  9/29/2021    Routing refill request to provider for review/approval because:  Refill needs review- new tab strength requested.  - last ordered as 100 mg tabs take 0.5 tab (50 mg) BID (6/10/2020)  - requested as 50 mg tabs BID  - Medication currently on medication list as reported/historical

## 2021-08-17 NOTE — TELEPHONE ENCOUNTER
spironolactone (ALDACTONE) 50 MG tablet      Last Written Prescription Date:  6/5/2020  Last Fill Quantity: 90 tab,   # refills: 3  Last Office Visit : 6/11/2021  Future Office visit:  9/29/2021    Routing refill request to provider for review/approval because:  Failed medication protocol: abnormal lab  - Creatinine (H)    Creatinine   Date Value Ref Range Status   03/23/2021 1.18 (H) 0.52 - 1.04 mg/dL Final

## 2021-08-18 RX ORDER — SPIRONOLACTONE 50 MG/1
50 TABLET, FILM COATED ORAL DAILY
Qty: 90 TABLET | Refills: 3 | Status: SHIPPED | OUTPATIENT
Start: 2021-08-18 | End: 2022-09-12

## 2021-08-30 RX ORDER — HYDRALAZINE HYDROCHLORIDE 50 MG/1
50 TABLET, FILM COATED ORAL 2 TIMES DAILY
Qty: 180 TABLET | Refills: 3 | Status: SHIPPED | OUTPATIENT
Start: 2021-08-30 | End: 2021-12-01

## 2021-08-30 NOTE — TELEPHONE ENCOUNTER
"Requested Prescriptions   Pending Prescriptions Disp Refills     hydrALAZINE (APRESOLINE) 50 MG tablet [Pharmacy Med Name: HYDRALAZINE 50 MG TABLET] 180 tablet 3     Sig: Take 1 tablet (50 mg) by mouth 2 times daily       Vasodilators Passed - 8/30/2021  4:51 PM        Passed - Most recent BP less than 140/90 on record     BP Readings from Last 3 Encounters:   06/11/21 118/76   01/14/21 108/78   10/01/20 124/86                 Passed - Most recent encounter is not a hospital encounter. Patient has recent (12 mos) or future (1 mos) visit with authorizing provider's specialty     Patient's most recent encounter is NOT a hospital encounter and has had an office visit in the last 12 months or has a visit in the next 30 days with authorizing provider or within the authorizing provider's specialty.      See \"Patient Info\" tab in inbasket, or \"Choose Columns\" in Meds & Orders section of the refill encounter.      If most recent encounter is a hospital encounter AND the patient does NOT have an appointment scheduled with the authorizing provider or authorizing provider's specialty within the next 30 days, forward refill to authorizing provider for medication review.          Passed - Medication is active on med list        Passed - Patient is of age 18 years or older        Passed - Patient is not pregnant        Passed - Patient has not had a positive pregnancy test within the past 12 months           Cardiology refill passed.     Katie Sinclair RN  Medical Speciality Care Coordinator  [facility]  Phone: [phone]    "

## 2021-09-01 ENCOUNTER — ALLIED HEALTH/NURSE VISIT (OUTPATIENT)
Dept: PHARMACY | Facility: CLINIC | Age: 51
End: 2021-09-01
Payer: COMMERCIAL

## 2021-09-01 DIAGNOSIS — E11.65 TYPE 2 DIABETES MELLITUS WITH HYPERGLYCEMIA, WITH LONG-TERM CURRENT USE OF INSULIN (H): ICD-10-CM

## 2021-09-01 DIAGNOSIS — Z79.4 TYPE 2 DIABETES MELLITUS WITH HYPERGLYCEMIA, WITH LONG-TERM CURRENT USE OF INSULIN (H): ICD-10-CM

## 2021-09-01 PROCEDURE — 99605 MTMS BY PHARM NP 15 MIN: CPT | Performed by: PHARMACIST

## 2021-09-01 RX ORDER — METFORMIN HCL 500 MG
2000 TABLET, EXTENDED RELEASE 24 HR ORAL
Qty: 360 TABLET | Refills: 0 | Status: SHIPPED | OUTPATIENT
Start: 2021-09-01 | End: 2021-10-19

## 2021-09-01 RX ORDER — DULAGLUTIDE 0.75 MG/.5ML
0.75 INJECTION, SOLUTION SUBCUTANEOUS
Qty: 2 ML | Refills: 0 | Status: SHIPPED | OUTPATIENT
Start: 2021-09-01 | End: 2021-09-22 | Stop reason: DRUGHIGH

## 2021-09-01 NOTE — PROGRESS NOTES
Medication Therapy Management (MTM) Encounter    ASSESSMENT:                            Medication Adherence/Access: See below for considerations    Type 2 Diabetes: Patient is not meeting A1c goal of < 7%. Patient would benefit from Metformin :  increase dose to 200mg daily. Farxiga causing recurrent yeast infections and is bothersome for patient. SGLT-2 inhibitor started for HF and DM but unable to tolerate. May benefit from discontinuing Farxiga and starting Trulicity 0.75mg weekly.     PLAN:                          Stop Farxiga   Increase metformin XR to 2000mg daily as tolerated.   Start Trulicity 0.75mg weekly    Follow-up: 2 weeks via MyChart    SUBJECTIVE/OBJECTIVE:                          Bettina Montes is a 51 year old female contacted via telephone or a follow up visit. She was referred to me from Dr. Chinchilla.  Follow up from 6/28/2021.    Reason for visit: Yeast infections with Faxiga  Needs refill on metformin    Allergies/ADRs: Reviewed in chart  Tobacco: She reports that she quit smoking about 8 years ago. Her smoking use included cigarettes. She has a 30.00 pack-year smoking history. She has quit using smokeless tobacco.  Alcohol: 1-3 beverages / week  Caffeine: 1 sodas/week  Activity: None  Past Medical History: Reviewed in chart    Medication Adherence/Access: Patient uses pill box(es).  Per patient, misses medication 1 times per week.   Medication barriers: none.   The patient fills medications at [place]: NO, fills medications at [facility].    Type 2 Diabetes:  Currently taking metformin XR 1500mg with dinner, Basaglar 75 units at bedtime, Farxiga 10mg daily, Novolog sliding scale before meals and at bedtime (usually only takes twice daily),   100-150=1 unit  151-200=2 units  201-250=4 units  251-300=6 units  301-350=8 units  351-400=10 units  >400=12 units  Blood sugar monitoring: two-three time(s) daily. Ranges (patient reported): 145-230mg/dL in the morning. Has continued to take  Farxiga because it is helping her blood sugars but Farxiga is causing recurrent yeast infections and having to treat every other other week with Monitstat. Patient has been using the 1 day Monistat. Can go 3-4 days and then will need to treat. This is bothersome for patient.   Took 1000mg in the morning and 500mg of metformin (dose increase Saturday) so far patient is tolerating well. Does have some diarrhea but is manageable.   Symptoms of low blood sugar? Shaky, clammy, Frequency of lows- rarely  Symptoms of high blood sugar? none  Eye exam: up to date  Foot exam: up to date  Diet/Exercise: Usually doesn't eat lunch.   Aspirin: Not taking;assess further on follow up  Statin: Yes: Mevacor   ACEi/ARB: Yes: Entresto.   Urine Albumin:   Lab Results   Component Value Date    UMALCR 5.67 01/14/2021      Lab Results   Component Value Date    A1C 10.1 06/11/2021    A1C 9.6 01/14/2021    A1C 11.0 08/04/2020    A1C 6.5 09/10/2019    A1C 7.7 05/17/2019     GFR Estimate   Date Value Ref Range Status   03/23/2021 53 (L) >60 mL/min/[1.73_m2] Final     Comment:     Non  GFR Calc  Starting 12/18/2018, serum creatinine based estimated GFR (eGFR) will be   calculated using the Chronic Kidney Disease Epidemiology Collaboration   (CKD-EPI) equation.       GFR Estimate If Black   Date Value Ref Range Status   03/23/2021 61 >60 mL/min/[1.73_m2] Final     Comment:      GFR Calc  Starting 12/18/2018, serum creatinine based estimated GFR (eGFR) will be   calculated using the Chronic Kidney Disease Epidemiology Collaboration   (CKD-EPI) equation.       Today's Vitals: LMP  (LMP Unknown)   ----------------      I spent 38 minutes with this patient today. All changes were made via collaborative practice agreement with Anuja Bruce. A copy of the visit note was provided to the patient's primary care and referring provider.    The patient was sent via [employer] a summary of these recommendations.      Yasemin Mcmahon, Pharm.D, BCACP  Medication Therapy Management Pharmacist    Telemedicine Visit Details  Type of service:  Telemedicine  Start Time: 11:02AM  End Time: 11:17AM  Originating Location (patient location): Home  Distant Location (provider location):  [facility]        Medication Therapy Recommendations  Type 2 diabetes mellitus (H)    Current Medication: dapagliflozin (FARXIGA) 10 MG TABS tablet (Discontinued)   Rationale: Undesirable effect - Adverse medication event - Safety   Recommendation: Change Medication   Status: Accepted per CPA          Current Medication: metFORMIN (GLUCOPHAGE-XR) 500 MG 24 hr tablet   Rationale: Dose too low - Dosage too low - Effectiveness   Recommendation: Increase Dose   Status: Accepted per CPA

## 2021-09-01 NOTE — PATIENT INSTRUCTIONS
Recommendations from today's MTM visit:                                                      Today we reviewed what your medicines are for, how to know if they are working, that your medicines are safe and how to make your medicine regimen as easy as possible.      Stop Farxiga   Increase metformin XR to 2000mg daily as tolerated.   Start Trulicity 0.75mg weekly    Follow-up: 2 weeks via [employer]    It was great to speak with you today.  I value your experience and would be very thankful for your time with providing feedback on our clinic survey. You may receive a survey via email or text message in the next few days.     To schedule another MTM appointment, please call the clinic directly or you may call the MTM scheduling line at [phone] or toll-free at [phone].     My Clinical Pharmacist's contact information:                                                      Please feel free to contact me with any questions or concerns you have.      Yasemin Mcmahon, Pharm.D, BCACP  Medication Therapy Management Pharmacist

## 2021-09-05 ENCOUNTER — HEALTH MAINTENANCE LETTER (OUTPATIENT)
Age: 51
End: 2021-09-05

## 2021-09-29 ENCOUNTER — LAB (OUTPATIENT)
Dept: LAB | Facility: CLINIC | Age: 51
End: 2021-09-29
Payer: COMMERCIAL

## 2021-09-29 ENCOUNTER — OFFICE VISIT (OUTPATIENT)
Dept: CARDIOLOGY | Facility: CLINIC | Age: 51
End: 2021-09-29
Payer: COMMERCIAL

## 2021-09-29 VITALS
HEART RATE: 86 BPM | OXYGEN SATURATION: 99 % | HEIGHT: 66 IN | DIASTOLIC BLOOD PRESSURE: 80 MMHG | BODY MASS INDEX: 46.54 KG/M2 | WEIGHT: 289.6 LBS | SYSTOLIC BLOOD PRESSURE: 116 MMHG

## 2021-09-29 DIAGNOSIS — I42.8 NONISCHEMIC CARDIOMYOPATHY (H): ICD-10-CM

## 2021-09-29 DIAGNOSIS — E11.65 TYPE 2 DIABETES MELLITUS WITH HYPERGLYCEMIA, WITH LONG-TERM CURRENT USE OF INSULIN (H): ICD-10-CM

## 2021-09-29 DIAGNOSIS — I50.32 CHRONIC DIASTOLIC HEART FAILURE (H): ICD-10-CM

## 2021-09-29 DIAGNOSIS — Z79.4 TYPE 2 DIABETES MELLITUS WITH HYPERGLYCEMIA, WITH LONG-TERM CURRENT USE OF INSULIN (H): ICD-10-CM

## 2021-09-29 DIAGNOSIS — I50.22 CHRONIC SYSTOLIC HEART FAILURE (H): Primary | ICD-10-CM

## 2021-09-29 DIAGNOSIS — J45.20 INTERMITTENT ASTHMA, UNCOMPLICATED: ICD-10-CM

## 2021-09-29 DIAGNOSIS — R00.2 PALPITATIONS: ICD-10-CM

## 2021-09-29 DIAGNOSIS — E78.2 MIXED HYPERLIPIDEMIA: ICD-10-CM

## 2021-09-29 DIAGNOSIS — I10 ESSENTIAL HYPERTENSION WITH GOAL BLOOD PRESSURE LESS THAN 140/90: ICD-10-CM

## 2021-09-29 DIAGNOSIS — Z79.4 TYPE 2 DIABETES MELLITUS WITH HYPEROSMOLARITY WITHOUT COMA, WITH LONG-TERM CURRENT USE OF INSULIN (H): ICD-10-CM

## 2021-09-29 DIAGNOSIS — E11.00 TYPE 2 DIABETES MELLITUS WITH HYPEROSMOLARITY WITHOUT COMA, WITH LONG-TERM CURRENT USE OF INSULIN (H): ICD-10-CM

## 2021-09-29 LAB
ALT SERPL W P-5'-P-CCNC: 22 U/L (ref 0–50)
ANION GAP SERPL CALCULATED.3IONS-SCNC: 6 MMOL/L (ref 3–14)
BUN SERPL-MCNC: 11 MG/DL (ref 7–30)
CALCIUM SERPL-MCNC: 9.2 MG/DL (ref 8.5–10.1)
CHLORIDE BLD-SCNC: 103 MMOL/L (ref 94–109)
CHOLEST SERPL-MCNC: 128 MG/DL
CO2 SERPL-SCNC: 29 MMOL/L (ref 20–32)
CREAT SERPL-MCNC: 1.11 MG/DL (ref 0.52–1.04)
FASTING STATUS PATIENT QL REPORTED: YES
GFR SERPL CREATININE-BSD FRML MDRD: 57 ML/MIN/1.73M2
GLUCOSE BLD-MCNC: 104 MG/DL (ref 70–99)
HBA1C MFR BLD: 9 % (ref 0–5.6)
HDLC SERPL-MCNC: 42 MG/DL
HGB BLD-MCNC: 11.3 G/DL (ref 11.7–15.7)
LDLC SERPL CALC-MCNC: 58 MG/DL
NONHDLC SERPL-MCNC: 86 MG/DL
POTASSIUM BLD-SCNC: 4.3 MMOL/L (ref 3.4–5.3)
SODIUM SERPL-SCNC: 138 MMOL/L (ref 133–144)
TRIGL SERPL-MCNC: 140 MG/DL

## 2021-09-29 PROCEDURE — 80048 BASIC METABOLIC PNL TOTAL CA: CPT

## 2021-09-29 PROCEDURE — 80061 LIPID PANEL: CPT

## 2021-09-29 PROCEDURE — 84460 ALANINE AMINO (ALT) (SGPT): CPT

## 2021-09-29 PROCEDURE — 83036 HEMOGLOBIN GLYCOSYLATED A1C: CPT

## 2021-09-29 PROCEDURE — 85018 HEMOGLOBIN: CPT

## 2021-09-29 PROCEDURE — 99214 OFFICE O/P EST MOD 30 MIN: CPT | Performed by: NURSE PRACTITIONER

## 2021-09-29 PROCEDURE — 36415 COLL VENOUS BLD VENIPUNCTURE: CPT

## 2021-09-29 ASSESSMENT — PAIN SCALES - GENERAL: PAINLEVEL: NO PAIN (0)

## 2021-09-29 ASSESSMENT — MIFFLIN-ST. JEOR: SCORE: 1940.37

## 2021-09-29 NOTE — PATIENT INSTRUCTIONS
Take your medicines every day, as directed    Changes made today:  Continue with the MTM to work on the blood sugars.   Zio patch 14 day      Monitor Your Weight and Symptoms    Contact us if you:      Gain 2 pounds in one day or 5 pounds in one week    Feel more short of breath    Notice more leg swelling    Feel lightheadeded   Change your lifestyle    Limit Salt or Sodium:    2000 mg  Limit Fluids:    2000 mL or approximately 64 ounces  Eat a Heart Healthy Diet    Low in saturated fats  Stay Active:    Aim to move at least 150 minutes every  week         To Contact us    During Business Hours:  [phone]      After hours, weekends or holidays:   [phone], Option #4  Ask to speak to the On-Call Cardiologist. Inform them you are a CORE/heart failure patient at the [facility].     Use [employer] allows you to communicate directly with your heart team through secure messaging.    [employer] can be accessed any time on your phone, computer, or tablet.    If you need assistance, we'd be happy to help!         Keep your Heart Appointments:       CORE - 2 months with labs

## 2021-09-29 NOTE — PROGRESS NOTES
SANJAY  Bettina Montes is a 51 year old female with a past medical history of HFrEF (30-35%) secondary to NICM (felt to be idiopathic), HTN, obesity, DM II, cervical spine fusion, and hyperlipidemia who presents for routine follow-up. She was last seen by Dr. Chinchilla on 6/5/20.    Bettina had her last CORE visit on 3/24/21. She has been seeing the MTM for  DM management. She had yeast infections with the Farxiga so they discontinued it. She is using Trulicity for her DM. She says her BS are very labile.  She has been having some palpitations she says. They are not present all the time. She has some SOB at rest - same as before.  She says there is now no swelling in her legs or abdomen. Home weight 289-293 lbs. She endorses a big improvement in her nightly PND, cough, and orthopnea. Her appetite has been fine. She denies early satiety.   Continues to self titrate her afternoon diuretic doses. Takes 60 mg every morning of Torsemide     She denies any chest pain, lightheadedness, dizziness, or near syncopal episodes.     Pt is taking hydralazine 50mg BID, coreg 25 BID, entresto 97/103 BID, spironolactone 50 qday and torsemide 60mg qday or BID prn.       PMH  Past Medical History:   Diagnosis Date     Abnormal Pap smear of cervix 09/10/2019    See problem list     Adrenal gland anomaly      CHF (congestive heart failure) (H)      Fatty liver      Gastroesophageal reflux disease      Hyperlipidemia      Hypertension      Mild persistent asthma      NICM (nonischemic cardiomyopathy) (H)      Obesity      WILLARD (obstructive sleep apnea) 1/9/2015     Type 2 diabetes mellitus (H)        Past Surgical History:   Procedure Laterality Date     COLONOSCOPY       DISCECTOMY, FUSION CERVICAL ANTERIOR TWO LEVELS, COMBINED N/A 4/5/2019    Procedure: C4-6 anterior cervical discectomy and fusion;  Surgeon: Hollis Hurtado MD;  Location: [facility] OR     TUBAL LIGATION         Family History   Problem Relation Age of Onset     Diabetes  Mother      Hypertension Mother      Hyperlipidemia Mother      Heart Failure Mother      Diabetes Father      Cancer Paternal Grandmother         ?       Social History     Socioeconomic History     Marital status: Single     Spouse name: None     Number of children: 1     Years of education: None     Highest education level: None   Occupational History     Occupation: CNA     Employer: OTHER   Social Needs     Financial resource strain: None     Food insecurity:     Worry: None     Inability: None     Transportation needs:     Medical: None     Non-medical: None   Tobacco Use     Smoking status: Former Smoker     Packs/day: 1.00     Years: 30.00     Pack years: 30.00     Types: Cigarettes     Last attempt to quit: 2013     Years since quittin.8     Smokeless tobacco: Former User     Tobacco comment:     Substance and Sexual Activity     Alcohol use: Yes     Alcohol/week: 0.0 standard drinks     Comment: 1     Drug use: No     Sexual activity: Yes     Partners: Male     Birth control/protection: Surgical   Lifestyle     Physical activity:     Days per week: None     Minutes per session: None     Stress: None   Relationships     Social connections:     Talks on phone: None     Gets together: None     Attends Confucianist service: None     Active member of club or organization: None     Attends meetings of clubs or organizations: None     Relationship status: None     Intimate partner violence:     Fear of current or ex partner: None     Emotionally abused: None     Physically abused: None     Forced sexual activity: None   Other Topics Concern     Parent/sibling w/ CABG, MI or angioplasty before 65F 55M? No   Social History Narrative     None       ALLERGIES  Allergies   Allergen Reactions     Amlodipine Swelling     Edema on 5mg throat     Lisinopril      Swelling in throat, face  angioedema     Naprosyn [Naproxen]      Swelling, diff breathing       MEDICATIONS acetaminophen (TYLENOL) 500 MG tablet, Take  500-1,000 mg by mouth every 6 hours as needed for mild pain  blood glucose (NO BRAND SPECIFIED) test strip, Use to test blood sugar 3-4 times daily or as directed.  carvedilol (COREG) 25 MG tablet, Take 1 tablet (25 mg) by mouth 2 times daily (with meals)  Chlorpheniramine-DM (CORICIDIN HBP COUGH/COLD) 4-30 MG TABS, Take 1 tablet by mouth as needed  cholecalciferol ( ULTRA STRENGTH) 2000 units CAPS, Take 2,000 Units by mouth daily   dextromethorphan-guaiFENesin (MUCINEX DM)  MG 12 hr tablet, Take 1 tablet by mouth daily  dulaglutide (TRULICITY) 1.5 MG/0.5ML pen, Inject 1.5 mg Subcutaneous every 7 days  fluticasone-salmeterol (WIXELA INHUB) 250-50 MCG/DOSE inhaler, Inhale 1 puff into the lungs 2 times daily   hydrALAZINE (APRESOLINE) 50 MG tablet, Take 1 tablet (50 mg) by mouth 2 times daily  insulin aspart (NOVOLOG FLEXPEN) 100 UNIT/ML pen, Per sliding scale before meals and at bedtime. 100-150 1U, 151-200 2U, 201-250 4U, 251-300 6U, 301-350, 8U, 351-400 10U, >400 12U  insulin glargine (BASAGLAR KWIKPEN) 100 UNIT/ML pen, Inject 75 Units Subcutaneous At Bedtime  insulin pen needle (ULTICARE MICRO) 32G X 4 MM miscellaneous, Use 4 pen needles daily or as directed.  levalbuterol (XOPENEX HFA) 45 MCG/ACT Inhaler, Inhale 2 puffs into the lungs every 4 hours as needed for shortness of breath / dyspnea or wheezing  lovastatin (MEVACOR) 20 MG tablet, TAKE 1 TABLET BY MOUTH EVERYDAY AT BEDTIME  magnesium oxide (MAG-OX) 400 (241.3 Mg) MG tablet, Take 1 tablet (400 mg) by mouth daily  metFORMIN (GLUCOPHAGE-XR) 500 MG 24 hr tablet, Take 4 tablets (2,000 mg) by mouth daily (with dinner)  montelukast (SINGULAIR) 10 MG tablet, Take 1 tablet (10 mg) by mouth At Bedtime  potassium chloride ER (KLOR-CON M) 10 MEQ CR tablet, Take 3 tablets (30 mEq) by mouth daily  sacubitril-valsartan (ENTRESTO)  MG per tablet, Take 1 tablet by mouth 2 times daily  spironolactone (ALDACTONE) 50 MG tablet, Take 1 tablet (50 mg) by mouth  daily  tiZANidine (ZANAFLEX) 2 MG tablet, TAKE 1-2 TABLETS BY MOUTH 3 TIMES DAILY AS NEEDED FOR MUSCLE SPASMS  torsemide (DEMADEX) 20 MG tablet, Take 3 tablets (60 mg) by mouth 2 times daily (Patient taking differently: Take 60 mg by mouth 2 times daily Takes 60mg in the morning and if short of breath or edema will take 60mg twice daily)    sodium chloride (PF) 0.9% PF flush 10 mL            ROS:   Constitutional: No fever, chills, or sweats.  ENT: No visual disturbance, ear ache, epistaxis, sore throat.   Allergies/Immunologic: Negative  Respiratory: No cough, hemoptysis.   Cardiovascular: As per HPI.   GI: As per HPI.   : No urinary frequency, dysuria, or hematuria.   Integument: Negative.   Psychiatric: Negative.   Neuro: Negative.   Endocrinology: negative   Musculoskeletal: negative    EXAM:   General: appears comfortable, alert and articulate  Head: normocephalic, atraumatic  Eyes: anicteric sclera, EOMI  Neck: Supple, no adenopathy  Orophyarynx: moist mucosa, no cyanosis  Heart: regular, S1/S2, no murmur, gallop, rub, estimated JVP not detected at 90 degrees  Lungs: Respirations even and unlabored, lungs clear, no rales or wheezing.  Lungs sounds decreased bilaterally in the bases.  Abdomen:  distended and firmer, non-tender, bowel sounds present, no hepatomegaly  Extremities: no clubbing, cyanosis. Has trace edema  Neurological: normal speech and affect, no gross motor deficits  Skin:  Warm and dry.      LABS  Last Comprehensive Metabolic Panel:  Sodium   Date Value Ref Range Status   03/23/2021 133 133 - 144 mmol/L Final     Potassium   Date Value Ref Range Status   03/23/2021 3.8 3.4 - 5.3 mmol/L Final     Chloride   Date Value Ref Range Status   03/23/2021 95 94 - 109 mmol/L Final     Carbon Dioxide   Date Value Ref Range Status   03/23/2021 35 (H) 20 - 32 mmol/L Final     Anion Gap   Date Value Ref Range Status   03/23/2021 3 3 - 14 mmol/L Final     Glucose   Date Value Ref Range Status   03/23/2021  260 (H) 70 - 99 mg/dL Final     Urea Nitrogen   Date Value Ref Range Status   03/23/2021 24 7 - 30 mg/dL Final     Creatinine   Date Value Ref Range Status   03/23/2021 1.18 (H) 0.52 - 1.04 mg/dL Final     GFR Estimate   Date Value Ref Range Status   03/23/2021 53 (L) >60 mL/min/[1.73_m2] Final     Comment:     Non  GFR Calc  Starting 12/18/2018, serum creatinine based estimated GFR (eGFR) will be   calculated using the Chronic Kidney Disease Epidemiology Collaboration   (CKD-EPI) equation.       Calcium   Date Value Ref Range Status   03/23/2021 9.9 8.5 - 10.1 mg/dL Final       MOST RECENT ECHOCARDIOGRAM 10/25/19:  Interpretation Summary  Technically difficult study.  Left ventricular size is normal. Mildly (EF 40-45%, traced 45%) reduced left ventricular function is present.  Right ventricular function, chamber size, wall motion, and thickness are normal. This study was compared with the study from 3/21/18: The left ventricular function has improved    ASSESSMENT AND PLAN  Bettina Montes is a 51 year old female with NICM who appears euvolemic today. She has been at [facility] for her HF. Unfortunately she couldn't tolerating the dapaglifozin. We will continue her Torsemide at 60 mg am and use in the pm if needed.  We will not make any changes to her medications today. Due to the palpitations being more often, I have ordered a 14 day ziopatch.     1. Chronic systolic heart failure/HFrEF (EF 40-45%) secondary to nonischemic cardiomyopathy  NYHA Symptom Class IIIB  Stage C  Primary Cardiologist: Dr Chinchilla; Last seen 6/5/20  ACE-I/ARB/ARNi:  Entresto  mg  BB yes, Carvedilol 25 mg twice daily  Aldosterone antagonist yes, Spironolactone 50 mg daily.   SCD prophylaxis EF > 35%   Fluid status Euvolemic  Cardiac Rehab: Completed  Sleep Apnea Evaluation: Documented sleep apnea.    Remote monitoring: Mychart active  Antiplatelet: none.   Anticoagulation: None.  SGLT II- unable to tolerate the dapaglifozin-  frequent yeast infections.     # Obesity: BMI 47.29.  She is working on weight loss.  Continue to reassess at subsequent visits.     #  HTN:   continue Entresto   Continue to monitor BP readings at home.      #. Shoulder surgery. - October - still doing Therapy     4.  Follow-up:  Continue with the MTM to work on the blood sugars.   Zio patch 14 day   CORE - 2 months           Heide GU NP-C

## 2021-09-29 NOTE — PROGRESS NOTES
"Bettina Montes's goals for this visit include: Figuring out why she is still experiencing palpitations.     She requests these members of her care team be copied on today's visit information: Dr. Robbins    PCP: Anuja Bruce    Referring Provider:  No referring provider defined for this encounter.    /80 (BP Location: Right arm, Patient Position: Sitting, Cuff Size: Adult Regular)   Pulse 86   Ht 1.676 m (5' 6\")   Wt 131.4 kg (289 lb 9.6 oz)   LMP  (LMP Unknown)   SpO2 99%   BMI 46.74 kg/m      Do you need any medication refills at today's visit? No. Coming due soon.     Yonas Chris, EMT  Clinic Support  [facility]    [phone]    Employed by [facility]        "

## 2021-09-30 ENCOUNTER — ANCILLARY PROCEDURE (OUTPATIENT)
Dept: CARDIOLOGY | Facility: CLINIC | Age: 51
End: 2021-09-30
Attending: NURSE PRACTITIONER
Payer: COMMERCIAL

## 2021-09-30 DIAGNOSIS — I50.32 CHRONIC DIASTOLIC HEART FAILURE (H): ICD-10-CM

## 2021-09-30 PROCEDURE — 93245 EXT ECG>7D<15D REC SCAN A/R: CPT | Performed by: INTERNAL MEDICINE

## 2021-09-30 NOTE — PATIENT INSTRUCTIONS
Patient has been prescribed a ZioPatch holter for 14 days.  Patient was instructed regarding the indication, function, care and prompt return of the ZioPatch holter monitor. The monitor, with S/N [id number],  was placed on the patient with instructions regarding care of the skin, electrodes, and monitor, as well as documentation in the patient diary. Patient demonstrated understanding of this information and agreed to call [employer] with further questions or concerns.

## 2021-10-10 DIAGNOSIS — I50.23 ACUTE ON CHRONIC SYSTOLIC HEART FAILURE (H): ICD-10-CM

## 2021-10-11 RX ORDER — POTASSIUM CHLORIDE 750 MG/1
TABLET, EXTENDED RELEASE ORAL
Qty: 270 TABLET | Refills: 1 | Status: SHIPPED | OUTPATIENT
Start: 2021-10-11 | End: 2021-12-01

## 2021-12-01 ENCOUNTER — LAB (OUTPATIENT)
Dept: LAB | Facility: CLINIC | Age: 51
End: 2021-12-01
Payer: COMMERCIAL

## 2021-12-01 ENCOUNTER — OFFICE VISIT (OUTPATIENT)
Dept: CARDIOLOGY | Facility: CLINIC | Age: 51
End: 2021-12-01
Payer: COMMERCIAL

## 2021-12-01 VITALS
BODY MASS INDEX: 45.64 KG/M2 | DIASTOLIC BLOOD PRESSURE: 66 MMHG | WEIGHT: 284 LBS | OXYGEN SATURATION: 100 % | HEART RATE: 94 BPM | HEIGHT: 66 IN | SYSTOLIC BLOOD PRESSURE: 94 MMHG

## 2021-12-01 DIAGNOSIS — G47.33 OSA (OBSTRUCTIVE SLEEP APNEA): ICD-10-CM

## 2021-12-01 DIAGNOSIS — I50.23 ACUTE ON CHRONIC SYSTOLIC (CONGESTIVE) HEART FAILURE (H): ICD-10-CM

## 2021-12-01 DIAGNOSIS — I10 ESSENTIAL HYPERTENSION WITH GOAL BLOOD PRESSURE LESS THAN 140/90: ICD-10-CM

## 2021-12-01 DIAGNOSIS — E11.00 TYPE 2 DIABETES MELLITUS WITH HYPEROSMOLARITY WITHOUT COMA, WITH LONG-TERM CURRENT USE OF INSULIN (H): ICD-10-CM

## 2021-12-01 DIAGNOSIS — I50.32 CHRONIC DIASTOLIC HEART FAILURE (H): ICD-10-CM

## 2021-12-01 DIAGNOSIS — Z79.4 TYPE 2 DIABETES MELLITUS WITH HYPEROSMOLARITY WITHOUT COMA, WITH LONG-TERM CURRENT USE OF INSULIN (H): ICD-10-CM

## 2021-12-01 DIAGNOSIS — Z79.4 TYPE 2 DIABETES MELLITUS WITH HYPERGLYCEMIA, WITH LONG-TERM CURRENT USE OF INSULIN (H): ICD-10-CM

## 2021-12-01 DIAGNOSIS — R00.2 PALPITATIONS: ICD-10-CM

## 2021-12-01 DIAGNOSIS — J45.20 INTERMITTENT ASTHMA, UNCOMPLICATED: ICD-10-CM

## 2021-12-01 DIAGNOSIS — I42.8 NONISCHEMIC CARDIOMYOPATHY (H): ICD-10-CM

## 2021-12-01 DIAGNOSIS — E11.65 TYPE 2 DIABETES MELLITUS WITH HYPERGLYCEMIA, WITH LONG-TERM CURRENT USE OF INSULIN (H): ICD-10-CM

## 2021-12-01 DIAGNOSIS — I50.32 CHRONIC DIASTOLIC HEART FAILURE (H): Primary | ICD-10-CM

## 2021-12-01 LAB
ANION GAP SERPL CALCULATED.3IONS-SCNC: 4 MMOL/L (ref 3–14)
BUN SERPL-MCNC: 25 MG/DL (ref 7–30)
CALCIUM SERPL-MCNC: 9 MG/DL (ref 8.5–10.1)
CHLORIDE BLD-SCNC: 98 MMOL/L (ref 94–109)
CO2 SERPL-SCNC: 29 MMOL/L (ref 20–32)
CREAT SERPL-MCNC: 1.91 MG/DL (ref 0.52–1.04)
GFR SERPL CREATININE-BSD FRML MDRD: 30 ML/MIN/1.73M2
GLUCOSE BLD-MCNC: 345 MG/DL (ref 70–99)
HBA1C MFR BLD: 10.1 % (ref 0–5.6)
POTASSIUM BLD-SCNC: 4.7 MMOL/L (ref 3.4–5.3)
SODIUM SERPL-SCNC: 131 MMOL/L (ref 133–144)

## 2021-12-01 PROCEDURE — 83036 HEMOGLOBIN GLYCOSYLATED A1C: CPT

## 2021-12-01 PROCEDURE — 80048 BASIC METABOLIC PNL TOTAL CA: CPT

## 2021-12-01 PROCEDURE — 36415 COLL VENOUS BLD VENIPUNCTURE: CPT

## 2021-12-01 PROCEDURE — 99214 OFFICE O/P EST MOD 30 MIN: CPT | Performed by: NURSE PRACTITIONER

## 2021-12-01 RX ORDER — HYDRALAZINE HYDROCHLORIDE 25 MG/1
25 TABLET, FILM COATED ORAL 2 TIMES DAILY
Qty: 60 TABLET | Refills: 1 | Status: SHIPPED | OUTPATIENT
Start: 2021-12-01 | End: 2021-12-31

## 2021-12-01 ASSESSMENT — MIFFLIN-ST. JEOR: SCORE: 1914.1

## 2021-12-01 NOTE — PROGRESS NOTES
"Bettina Montes's goals for this visit include: Discover cause of dizziness    She requests these members of her care team be copied on today's visit information: PCP    PCP: Prabhu Robbins    Referring Provider:  No referring provider defined for this encounter.    BP 94/66 (BP Location: Right arm, Patient Position: Sitting, Cuff Size: Adult Regular)   Pulse 102   Ht 1.675 m (5' 5.95\")   Wt 128.8 kg (284 lb)   LMP  (LMP Unknown)   SpO2 100%   BMI 45.92 kg/m      Do you need any medication refills at today's visit? Unknown.     Yonas Chris, EMT  Clinic Support  [facility]    [phone]    Employed by [facility]        "

## 2021-12-01 NOTE — PATIENT INSTRUCTIONS
Take your medicines every day, as directed    Changes made today:  o Take 40 mg of Torsemide today only - resume 60 mg daily tomorrow   o Stop potassium pills  o Decrease Hydralazine to 25 mg BID    Monitor Your Weight and Symptoms    Contact us if you:      Gain 2 pounds in one day or 5 pounds in one week    Feel more short of breath    Notice more leg swelling    Feel lightheadeded   Change your lifestyle    Limit Salt or Sodium:    2000 mg  Limit Fluids:    2000 mL or approximately 64 ounces  Eat a Heart Healthy Diet    Low in saturated fats  Stay Active:    Aim to move at least 150 minutes every  week         To Contact us    During Business Hours:  [phone], option # 1 (University)  Then option # 4 (medical questions)     After hours, weekends or holidays:   [phone], Option #4  Ask to speak to the On-Call Cardiologist. Inform them you are a CORE/heart failure patient at the [place].     Use muzu tv allows you to communicate directly with your heart team through secure messaging.    L[employer] can be accessed any time on your phone, computer, or tablet.    If you need assistance, we'd be happy to help!         Keep your Heart Appointments:    BMP on Friday   BP - checks 2-3 times a week   CORE in one month

## 2021-12-03 ENCOUNTER — LAB (OUTPATIENT)
Dept: LAB | Facility: CLINIC | Age: 51
End: 2021-12-03
Payer: COMMERCIAL

## 2021-12-03 DIAGNOSIS — I50.32 CHRONIC DIASTOLIC HEART FAILURE (H): ICD-10-CM

## 2021-12-03 LAB
ANION GAP SERPL CALCULATED.3IONS-SCNC: 6 MMOL/L (ref 3–14)
BUN SERPL-MCNC: 22 MG/DL (ref 7–30)
CALCIUM SERPL-MCNC: 9.4 MG/DL (ref 8.5–10.1)
CHLORIDE BLD-SCNC: 101 MMOL/L (ref 94–109)
CO2 SERPL-SCNC: 28 MMOL/L (ref 20–32)
CREAT SERPL-MCNC: 1.3 MG/DL (ref 0.52–1.04)
GFR SERPL CREATININE-BSD FRML MDRD: 47 ML/MIN/1.73M2
GLUCOSE BLD-MCNC: 165 MG/DL (ref 70–99)
POTASSIUM BLD-SCNC: 4.7 MMOL/L (ref 3.4–5.3)
SODIUM SERPL-SCNC: 135 MMOL/L (ref 133–144)

## 2021-12-03 PROCEDURE — 36415 COLL VENOUS BLD VENIPUNCTURE: CPT

## 2021-12-03 PROCEDURE — 80048 BASIC METABOLIC PNL TOTAL CA: CPT

## 2021-12-06 ENCOUNTER — PATIENT OUTREACH (OUTPATIENT)
Dept: CARDIOLOGY | Facility: CLINIC | Age: 51
End: 2021-12-06
Payer: COMMERCIAL

## 2021-12-06 NOTE — TELEPHONE ENCOUNTER
Follow up call to patient to see how patient is doing since their most recent medication change. The plan on 12/1/21  was for the patient to Stop potassium   Decrease Torsemide to 40 mg today only then resume regular dose starting tomorrow  Hydralazine 25 mg BID -   BMP on Friday   BP - checks 2-3 times a week  . Patients weight at that time was 280-285 pounds.      Most recent weights: Holding steady 280-285 pounds  Most recent BPs: Hasn't checked blood pressure lately. She will check today. Instructed patient to let us know if he blood pressure is running higher than normal.     Patient states their shortness of breath is the same. Patient denies swelling.      Other Symptoms/Concerns: None.    Current Medications: Hydralazine 25mg BID  Torsemide 60 mg BID    Follow up Appointments: January 5 CORE and labs.     Reviewed labs with patient, much improved from last Wednesday.     Message will be sent to Heide Woodall CNP to review.     Anusha Lovelace RN   Cardiology Care Coordinator  [facility]   Phone: [phone]  Fax: [phone]

## 2021-12-30 DIAGNOSIS — I50.23 ACUTE ON CHRONIC SYSTOLIC (CONGESTIVE) HEART FAILURE (H): ICD-10-CM

## 2021-12-30 DIAGNOSIS — I50.32 CHRONIC DIASTOLIC HEART FAILURE (H): Primary | ICD-10-CM

## 2021-12-31 RX ORDER — HYDRALAZINE HYDROCHLORIDE 25 MG/1
25 TABLET, FILM COATED ORAL 2 TIMES DAILY
Qty: 60 TABLET | Refills: 11 | Status: SHIPPED | OUTPATIENT
Start: 2021-12-31 | End: 2022-06-17

## 2022-01-05 ENCOUNTER — PATIENT OUTREACH (OUTPATIENT)
Dept: CARDIOLOGY | Facility: CLINIC | Age: 52
End: 2022-01-05

## 2022-01-05 ENCOUNTER — OFFICE VISIT (OUTPATIENT)
Dept: CARDIOLOGY | Facility: CLINIC | Age: 52
End: 2022-01-05
Payer: COMMERCIAL

## 2022-01-05 ENCOUNTER — LAB (OUTPATIENT)
Dept: LAB | Facility: CLINIC | Age: 52
End: 2022-01-05
Payer: COMMERCIAL

## 2022-01-05 VITALS
DIASTOLIC BLOOD PRESSURE: 83 MMHG | OXYGEN SATURATION: 100 % | BODY MASS INDEX: 46.08 KG/M2 | HEART RATE: 96 BPM | WEIGHT: 285 LBS | SYSTOLIC BLOOD PRESSURE: 124 MMHG

## 2022-01-05 DIAGNOSIS — I50.32 CHRONIC DIASTOLIC HEART FAILURE (H): ICD-10-CM

## 2022-01-05 DIAGNOSIS — E11.00 TYPE 2 DIABETES MELLITUS WITH HYPEROSMOLARITY WITHOUT COMA, WITH LONG-TERM CURRENT USE OF INSULIN (H): ICD-10-CM

## 2022-01-05 DIAGNOSIS — I10 ESSENTIAL HYPERTENSION WITH GOAL BLOOD PRESSURE LESS THAN 140/90: ICD-10-CM

## 2022-01-05 DIAGNOSIS — G47.33 OSA (OBSTRUCTIVE SLEEP APNEA): ICD-10-CM

## 2022-01-05 DIAGNOSIS — I42.8 NONISCHEMIC CARDIOMYOPATHY (H): ICD-10-CM

## 2022-01-05 DIAGNOSIS — E66.01 MORBID OBESITY (H): ICD-10-CM

## 2022-01-05 DIAGNOSIS — Z79.4 TYPE 2 DIABETES MELLITUS WITH HYPEROSMOLARITY WITHOUT COMA, WITH LONG-TERM CURRENT USE OF INSULIN (H): ICD-10-CM

## 2022-01-05 DIAGNOSIS — I50.22 CHRONIC SYSTOLIC HEART FAILURE (H): Primary | ICD-10-CM

## 2022-01-05 LAB
ANION GAP SERPL CALCULATED.3IONS-SCNC: 7 MMOL/L (ref 3–14)
BUN SERPL-MCNC: 12 MG/DL (ref 7–30)
CALCIUM SERPL-MCNC: 9.6 MG/DL (ref 8.5–10.1)
CHLORIDE BLD-SCNC: 99 MMOL/L (ref 94–109)
CO2 SERPL-SCNC: 30 MMOL/L (ref 20–32)
CREAT SERPL-MCNC: 1.12 MG/DL (ref 0.52–1.04)
GFR SERPL CREATININE-BSD FRML MDRD: 59 ML/MIN/1.73M2
GLUCOSE BLD-MCNC: 193 MG/DL (ref 70–99)
POTASSIUM BLD-SCNC: 3.7 MMOL/L (ref 3.4–5.3)
SODIUM SERPL-SCNC: 136 MMOL/L (ref 133–144)

## 2022-01-05 PROCEDURE — 80048 BASIC METABOLIC PNL TOTAL CA: CPT

## 2022-01-05 PROCEDURE — 99214 OFFICE O/P EST MOD 30 MIN: CPT | Performed by: NURSE PRACTITIONER

## 2022-01-05 PROCEDURE — 36415 COLL VENOUS BLD VENIPUNCTURE: CPT

## 2022-01-05 ASSESSMENT — PAIN SCALES - GENERAL: PAINLEVEL: NO PAIN (0)

## 2022-01-05 NOTE — NURSING NOTE
"Chief Complaint   Patient presents with     Follow Up       Initial /83 (BP Location: Right arm, Patient Position: Sitting, Cuff Size: Adult Large)   Pulse 96   Wt 129.3 kg (285 lb)   LMP  (LMP Unknown)   SpO2 100%   BMI 46.08 kg/m   Estimated body mass index is 46.08 kg/m  as calculated from the following:    Height as of 12/1/21: 1.675 m (5' 5.95\").    Weight as of this encounter: 129.3 kg (285 lb)..    She requests these members of her care team be copied on today's visit information:     PCP: Prabhu Robbins    Do you need any medication refills at today's visit? NO     Lisbet Palmer MA   Email: [email or web address]  [facility] - Rheumatology  Phone: [phone]  Fax: [phone]      "

## 2022-01-05 NOTE — PATIENT INSTRUCTIONS
Take your medicines every day, as directed    Changes made today:  o none  o    Monitor Your Weight and Symptoms    Contact us if you:      Gain 2 pounds in one day or 5 pounds in one week    Feel more short of breath    Notice more leg swelling    Feel lightheadeded   Change your lifestyle    Limit Salt or Sodium:    2000 mg  Limit Fluids:    2000 mL or approximately 64 ounces  Eat a Heart Healthy Diet    Low in saturated fats  Stay Active:    Aim to move at least 150 minutes every  week         To Contact us    During Business Hours:  [phone], option # 1 (University)  Then option # 4 (medical questions)     After hours, weekends or holidays:   [phone], Option #4  Ask to speak to the On-Call Cardiologist. Inform them you are a CORE/heart failure patient at the [facility].     Use [employer] allows you to communicate directly with your heart team through secure messaging.    [employer] can be accessed any time on your phone, computer, or tablet.    If you need assistance, we'd be happy to help!         Keep your Heart Appointments:    CORE in 3 months

## 2022-01-05 NOTE — TELEPHONE ENCOUNTER
Patient has appt with Batul today at 3:45pm. Called to see if patient would be able to come at 12:15 or 2:45pm today. Asked patient to call back to let us know. Her lab appt will need to be moved as well.     It is okay if patient cannot move appt earlier.     Will await return call.     Anusha Lovelace RN   Cardiology Care Coordinator  [facility]   Phone: [phone]  Fax: [phone]

## 2022-01-05 NOTE — PROGRESS NOTES
SANJAY  Bettina Montes is a 51 year old female with a past medical history of HFrEF (30-35%) secondary to NICM (felt to be idiopathic), HTN, obesity, DM II, cervical spine fusion, and hyperlipidemia who presents for routine follow-up. She was last seen by Dr. Chinchilla on 6/5/20.    Bettina had her last CORE visit on 12/1/21.   She has been seeing the MTM for DM management.  She had a decrease in her metformin. She is on a higher dose of Trulicity. She says her BS are very labile. She has been having some palpitations she says. They are not present all the time. She has some SOB at rest - same as before.  She says there is now no swelling in her legs or abdomen. Home weight 281-286 lbs. She endorses a big improvement in her nightly orthopnea. Her appetite has been fine. She denies early satiety. Continues to self titrate her afternoon diuretic doses. Takes 60 mg every morning of Torsemide.    She denies any chest pain, lightheadedness, dizziness, or near syncopal episodes.     Pt is taking hydralazine 50mg BID, coreg 25 BID, entresto 97/103 BID, spironolactone 50 qday and torsemide 60mg qday.       PMH  Past Medical History:   Diagnosis Date     Abnormal Pap smear of cervix 09/10/2019    See problem list     Adrenal gland anomaly      CHF (congestive heart failure) (H)      Fatty liver      Gastroesophageal reflux disease      Hyperlipidemia      Hypertension      Mild persistent asthma      NICM (nonischemic cardiomyopathy) (H)      Obesity      WILLARD (obstructive sleep apnea) 1/9/2015     Type 2 diabetes mellitus (H)        Past Surgical History:   Procedure Laterality Date     COLONOSCOPY       DISCECTOMY, FUSION CERVICAL ANTERIOR TWO LEVELS, COMBINED N/A 4/5/2019    Procedure: C4-6 anterior cervical discectomy and fusion;  Surgeon: Hollis Hurtado MD;  Location: [facility] OR     TUBAL LIGATION         Family History   Problem Relation Age of Onset     Diabetes Mother      Hypertension Mother      Hyperlipidemia  Mother      Heart Failure Mother      Diabetes Father      Cancer Paternal Grandmother         ?       Social History     Socioeconomic History     Marital status: Single     Spouse name: None     Number of children: 1     Years of education: None     Highest education level: None   Occupational History     Occupation: CNA     Employer: OTHER   Social Needs     Financial resource strain: None     Food insecurity:     Worry: None     Inability: None     Transportation needs:     Medical: None     Non-medical: None   Tobacco Use     Smoking status: Former Smoker     Packs/day: 1.00     Years: 30.00     Pack years: 30.00     Types: Cigarettes     Last attempt to quit: 2013     Years since quittin.8     Smokeless tobacco: Former User     Tobacco comment:     Substance and Sexual Activity     Alcohol use: Yes     Alcohol/week: 0.0 standard drinks     Comment: 1     Drug use: No     Sexual activity: Yes     Partners: Male     Birth control/protection: Surgical   Lifestyle     Physical activity:     Days per week: None     Minutes per session: None     Stress: None   Relationships     Social connections:     Talks on phone: None     Gets together: None     Attends Yazidi service: None     Active member of club or organization: None     Attends meetings of clubs or organizations: None     Relationship status: None     Intimate partner violence:     Fear of current or ex partner: None     Emotionally abused: None     Physically abused: None     Forced sexual activity: None   Other Topics Concern     Parent/sibling w/ CABG, MI or angioplasty before 65F 55M? No   Social History Narrative     None       ALLERGIES  Allergies   Allergen Reactions     Amlodipine Swelling     Edema on 5mg throat     Lisinopril      Swelling in throat, face  angioedema     Naprosyn [Naproxen]      Swelling, diff breathing       MEDICATIONS acetaminophen (TYLENOL) 500 MG tablet, Take 500-1,000 mg by mouth every 6 hours as needed for mild  pain  blood glucose (NO BRAND SPECIFIED) test strip, Use to test blood sugar 3-4 times daily or as directed.  carvedilol (COREG) 25 MG tablet, Take 1 tablet (25 mg) by mouth 2 times daily (with meals)  Chlorpheniramine-DM (CORICIDIN HBP COUGH/COLD) 4-30 MG TABS, Take 1 tablet by mouth as needed  cholecalciferol ( ULTRA STRENGTH) 2000 units CAPS, Take 2,000 Units by mouth daily   dextromethorphan-guaiFENesin (MUCINEX DM)  MG 12 hr tablet, Take 1 tablet by mouth daily  Dulaglutide (TRULICITY) 4.5 MG/0.5ML SOPN, Inject 4.5 mg Subcutaneous once a week  fluticasone-salmeterol (WIXELA INHUB) 250-50 MCG/DOSE inhaler, Inhale 1 puff into the lungs 2 times daily   hydrALAZINE (APRESOLINE) 25 MG tablet, Take 1 tablet (25 mg) by mouth 2 times daily  insulin aspart (NOVOLOG FLEXPEN) 100 UNIT/ML pen, Per sliding scale before meals and at bedtime. 100-150 1U, 151-200 2U, 201-250 4U, 251-300 6U, 301-350, 8U, 351-400 10U, >400 12U  insulin glargine (BASAGLAR KWIKPEN) 100 UNIT/ML pen, Inject 75 Units Subcutaneous At Bedtime  insulin pen needle (ULTICARE MICRO) 32G X 4 MM miscellaneous, Use 4 pen needles daily or as directed.  levalbuterol (XOPENEX HFA) 45 MCG/ACT Inhaler, Inhale 2 puffs into the lungs every 4 hours as needed for shortness of breath / dyspnea or wheezing  lovastatin (MEVACOR) 20 MG tablet, TAKE 1 TABLET BY MOUTH EVERYDAY AT BEDTIME  magnesium oxide (MAG-OX) 400 (241.3 Mg) MG tablet, Take 1 tablet (400 mg) by mouth daily  metFORMIN (GLUCOPHAGE-XR) 500 MG 24 hr tablet, Take 2 tablets (1,000 mg) by mouth daily (with dinner)  montelukast (SINGULAIR) 10 MG tablet, Take 1 tablet (10 mg) by mouth At Bedtime  sacubitril-valsartan (ENTRESTO)  MG per tablet, Take 1 tablet by mouth 2 times daily  spironolactone (ALDACTONE) 50 MG tablet, Take 1 tablet (50 mg) by mouth daily  tiZANidine (ZANAFLEX) 2 MG tablet, TAKE 1-2 TABLETS BY MOUTH 3 TIMES DAILY AS NEEDED FOR MUSCLE SPASMS  torsemide (DEMADEX) 20 MG tablet,  Take 3 tablets (60 mg) by mouth 2 times daily (Patient taking differently: Take 60 mg by mouth 2 times daily Takes 60mg in the morning and if short of breath or edema will take 60mg twice daily)    sodium chloride (PF) 0.9% PF flush 10 mL            ROS:   Constitutional: No fever, chills, or sweats.  ENT: No visual disturbance, ear ache, epistaxis, sore throat.   Allergies/Immunologic: Negative  Respiratory: No cough, hemoptysis.   Cardiovascular: As per HPI.   GI: As per HPI.   : No urinary frequency, dysuria, or hematuria.   Integument: Negative.   Psychiatric: Negative.   Neuro: Negative.   Endocrinology: negative   Musculoskeletal: negative    EXAM:   General: appears comfortable, alert and articulate  Head: normocephalic, atraumatic  Eyes: anicteric sclera, EOMI  Neck: Supple, no adenopathy  Orophyarynx: moist mucosa, no cyanosis  Heart: regular, S1/S2, no murmur, gallop, rub, estimated JVP not detected at 90 degrees  Lungs: Respirations even and unlabored, lungs clear, no rales or wheezing.  Lungs sounds decreased bilaterally in the bases.  Abdomen:  distended and firmer, non-tender, bowel sounds present, no hepatomegaly  Extremities: no clubbing, cyanosis. Has trace edema  Neurological: normal speech and affect, no gross motor deficits  Skin:  Warm and dry.      LABS  Last Comprehensive Metabolic Panel:  Sodium   Date Value Ref Range Status   12/03/2021 135 133 - 144 mmol/L Final   03/23/2021 133 133 - 144 mmol/L Final     Potassium   Date Value Ref Range Status   12/03/2021 4.7 3.4 - 5.3 mmol/L Final   03/23/2021 3.8 3.4 - 5.3 mmol/L Final     Chloride   Date Value Ref Range Status   12/03/2021 101 94 - 109 mmol/L Final   03/23/2021 95 94 - 109 mmol/L Final     Carbon Dioxide   Date Value Ref Range Status   03/23/2021 35 (H) 20 - 32 mmol/L Final     Carbon Dioxide (CO2)   Date Value Ref Range Status   12/03/2021 28 20 - 32 mmol/L Final     Anion Gap   Date Value Ref Range Status   12/03/2021 6 3 - 14  mmol/L Final   03/23/2021 3 3 - 14 mmol/L Final     Glucose   Date Value Ref Range Status   12/03/2021 165 (H) 70 - 99 mg/dL Final   03/23/2021 260 (H) 70 - 99 mg/dL Final     Urea Nitrogen   Date Value Ref Range Status   12/03/2021 22 7 - 30 mg/dL Final   03/23/2021 24 7 - 30 mg/dL Final     Creatinine   Date Value Ref Range Status   12/03/2021 1.30 (H) 0.52 - 1.04 mg/dL Final   03/23/2021 1.18 (H) 0.52 - 1.04 mg/dL Final     GFR Estimate   Date Value Ref Range Status   12/03/2021 47 (L) >60 mL/min/1.73m2 Final     Comment:     As of July 11, 2021, eGFR is calculated by the CKD-EPI creatinine equation, without race adjustment. eGFR can be influenced by muscle mass, exercise, and diet. The reported eGFR is an estimation only and is only applicable if the renal function is stable.   03/23/2021 53 (L) >60 mL/min/[1.73_m2] Final     Comment:     Non  GFR Calc  Starting 12/18/2018, serum creatinine based estimated GFR (eGFR) will be   calculated using the Chronic Kidney Disease Epidemiology Collaboration   (CKD-EPI) equation.       Calcium   Date Value Ref Range Status   12/03/2021 9.4 8.5 - 10.1 mg/dL Final   03/23/2021 9.9 8.5 - 10.1 mg/dL Final       MOST RECENT ECHOCARDIOGRAM 10/25/19:  Interpretation Summary  Technically difficult study.  Left ventricular size is normal. Mildly (EF 40-45%, traced 45%) reduced left ventricular function is present.  Right ventricular function, chamber size, wall motion, and thickness are normal. This study was compared with the study from 3/21/18: The left ventricular function has improved    ASSESSMENT AND PLAN  Bettina Montes is a 51 year old female with NICM who appears euvolemic today. [facility]  Unfortunately she couldn't tolerate the dapaglifozin.     1. Chronic systolic heart failure/HFrEF (EF 40-45%) secondary to nonischemic cardiomyopathy  NYHA Symptom Class IIIB  Stage C  Primary Cardiologist: Dr Chinchilla; Last seen  6/5/20  ACE-I/ARB/ARNi:  Entresto  mg- titration complete  BB yes, Carvedilol 25 mg twice daily- titration complete  Aldosterone antagonist yes, Spironolactone 50 mg daily.   SCD prophylaxis EF > 35%   Fluid status Euvolemic  Cardiac Rehab: Completed  Sleep Apnea Evaluation: Documented sleep apnea.    Remote monitoring: Mychart active  Antiplatelet: none.   Anticoagulation: None.  SGLT II- unable to tolerate the dapaglifozin- frequent yeast infections.     # Obesity: BMI 47.29.  She is working on weight loss.  Continue to reassess at subsequent visits.     #  HTN:   continue Entresto   Continue to monitor BP readings at home.      #. Shoulder surgery. - October - 2021    4.  Follow-up:  CORE in 3 months         Heide JULIENC

## 2022-01-29 DIAGNOSIS — J45.30 MILD PERSISTENT ASTHMA WITHOUT COMPLICATION: ICD-10-CM

## 2022-01-30 DIAGNOSIS — E11.65 TYPE 2 DIABETES MELLITUS WITH HYPERGLYCEMIA, WITH LONG-TERM CURRENT USE OF INSULIN (H): ICD-10-CM

## 2022-01-30 DIAGNOSIS — Z79.4 TYPE 2 DIABETES MELLITUS WITH HYPERGLYCEMIA, WITH LONG-TERM CURRENT USE OF INSULIN (H): ICD-10-CM

## 2022-01-31 NOTE — TELEPHONE ENCOUNTER
"Requested Prescriptions   Pending Prescriptions Disp Refills     Dulaglutide (TRULICITY) 4.5 MG/0.5ML SOPN 2 mL 1     Sig: Inject 4.5 mg Subcutaneous once a week       GLP-1 Agonists Protocol Failed - 1/30/2022 10:35 AM        Failed - Normal serum creatinine on file in past 12 months     Recent Labs   Lab Test 01/05/22  1516   CR 1.12*       Ok to refill medication if creatinine is low          Failed - Recent (6 mo) or future (30 days) visit within the authorizing provider's specialty     Patient had office visit in the last 6 months or has a visit in the next 30 days with authorizing provider.  See \"Patient Info\" tab in inbasket, or \"Choose Columns\" in Meds & Orders section of the refill encounter.            Passed - HgbA1C in past 3 or 6 months     If HgbA1C is 8 or greater, it needs to be on file within the past 3 months.  If less than 8, must be on file within the past 6 months.     Recent Labs   Lab Test 12/01/21  0921   A1C 10.1*             Passed - Medication is active on med list        Passed - Patient is age 18 or older        Passed - No active pregnancy on record        Passed - No positive pregnancy test in past 12 months             Katie Johnston BSN, RN    "

## 2022-01-31 NOTE — TELEPHONE ENCOUNTER
Routing refill request to provider for review/approval because:  Patient needs to be seen because:  overdue for asthma check up  ACT not done in over 1 year:    ACT Total Scores 2/27/2019 9/10/2019 1/14/2021   ACT TOTAL SCORE (Goal Greater than or Equal to 20) 20 16 20   In the past 12 months, how many times did you visit the emergency room for your asthma without being admitted to the hospital? 0 0 0   In the past 12 months, how many times were you hospitalized overnight because of your asthma? 0 0 0

## 2022-02-01 RX ORDER — MONTELUKAST SODIUM 10 MG/1
TABLET ORAL
Qty: 90 TABLET | Refills: 0 | Status: SHIPPED | OUTPATIENT
Start: 2022-02-01 | End: 2022-02-02

## 2022-02-01 RX ORDER — DULAGLUTIDE 4.5 MG/.5ML
4.5 INJECTION, SOLUTION SUBCUTANEOUS WEEKLY
Qty: 2 ML | Refills: 1 | Status: SHIPPED | OUTPATIENT
Start: 2022-02-01 | End: 2022-03-01

## 2022-02-02 ENCOUNTER — MYC REFILL (OUTPATIENT)
Dept: FAMILY MEDICINE | Facility: CLINIC | Age: 52
End: 2022-02-02
Payer: COMMERCIAL

## 2022-02-02 DIAGNOSIS — J45.30 MILD PERSISTENT ASTHMA WITHOUT COMPLICATION: ICD-10-CM

## 2022-02-03 RX ORDER — MONTELUKAST SODIUM 10 MG/1
1 TABLET ORAL AT BEDTIME
Qty: 90 TABLET | Refills: 0 | Status: SHIPPED | OUTPATIENT
Start: 2022-02-03 | End: 2022-06-09

## 2022-02-03 NOTE — TELEPHONE ENCOUNTER
Routing refill request to provider for review/approval because:  ACT score not current.      Farrah Moy RN  [facility]

## 2022-02-20 ENCOUNTER — HEALTH MAINTENANCE LETTER (OUTPATIENT)
Age: 52
End: 2022-02-20

## 2022-02-22 ENCOUNTER — TELEPHONE (OUTPATIENT)
Dept: PHARMACY | Facility: CLINIC | Age: 52
End: 2022-02-22
Payer: COMMERCIAL

## 2022-02-22 NOTE — TELEPHONE ENCOUNTER
Called patient to schedule a follow up MTM appt. Patient is scheduled for 3/1.    Yasemin Mcmahon, Pharm.D, [facility]  Medication Therapy Management Pharmacist

## 2022-03-01 ENCOUNTER — OFFICE VISIT (OUTPATIENT)
Dept: PHARMACY | Facility: CLINIC | Age: 52
End: 2022-03-01

## 2022-03-01 VITALS — WEIGHT: 284 LBS | BODY MASS INDEX: 45.92 KG/M2

## 2022-03-01 DIAGNOSIS — Z79.4 TYPE 2 DIABETES MELLITUS WITH HYPERGLYCEMIA, WITH LONG-TERM CURRENT USE OF INSULIN (H): Primary | ICD-10-CM

## 2022-03-01 DIAGNOSIS — I50.22 CHRONIC SYSTOLIC CONGESTIVE HEART FAILURE (H): ICD-10-CM

## 2022-03-01 DIAGNOSIS — E11.65 TYPE 2 DIABETES MELLITUS WITH HYPERGLYCEMIA, WITH LONG-TERM CURRENT USE OF INSULIN (H): Primary | ICD-10-CM

## 2022-03-01 PROCEDURE — 99605 MTMS BY PHARM NP 15 MIN: CPT | Performed by: PHARMACIST

## 2022-03-01 PROCEDURE — 99607 MTMS BY PHARM ADDL 15 MIN: CPT | Performed by: PHARMACIST

## 2022-03-01 RX ORDER — DULAGLUTIDE 4.5 MG/.5ML
4.5 INJECTION, SOLUTION SUBCUTANEOUS WEEKLY
Qty: 6 ML | Refills: 1 | Status: SHIPPED | OUTPATIENT
Start: 2022-03-01 | End: 2022-06-09

## 2022-03-01 RX ORDER — INSULIN ASPART 100 [IU]/ML
INJECTION, SOLUTION INTRAVENOUS; SUBCUTANEOUS
Qty: 15 ML | Refills: 3 | Status: SHIPPED | OUTPATIENT
Start: 2022-03-01 | End: 2022-03-11

## 2022-03-01 RX ORDER — FLASH GLUCOSE SENSOR
1 KIT MISCELLANEOUS
Qty: 2 EACH | Refills: 6 | Status: SHIPPED | OUTPATIENT
Start: 2022-03-01

## 2022-03-01 NOTE — PATIENT INSTRUCTIONS
Recommendations from today's MTM visit:                                                      Today we reviewed what your medicines are for, how to know if they are working, that your medicines are safe and how to make your medicine regimen as easy as possible.      Take 5 units of Novolog before each meal in addition to your sliding scale  Prescription sent to pharmacy for Freestyle Rukhsana to  Specialty Pharmacy.     Follow-up: 1-2 weeks via [employer]    It was great to speak with you today.  I value your experience and would be very thankful for your time with providing feedback on our clinic survey. You may receive a survey via email or text message in the next few days.     To schedule another MTM appointment, please call the clinic directly or you may call the MTM scheduling line at [phone] or toll-free at [phone].     My Clinical Pharmacist's contact information:                                                      Please feel free to contact me with any questions or concerns you have.      Yasemin Mcmahon, Pharm.D, BCACP  Medication Therapy Management Pharmacist

## 2022-03-04 DIAGNOSIS — E11.65 TYPE 2 DIABETES MELLITUS WITH HYPERGLYCEMIA, WITH LONG-TERM CURRENT USE OF INSULIN (H): Primary | ICD-10-CM

## 2022-03-04 DIAGNOSIS — Z79.4 TYPE 2 DIABETES MELLITUS WITH HYPERGLYCEMIA, WITH LONG-TERM CURRENT USE OF INSULIN (H): Primary | ICD-10-CM

## 2022-03-04 RX ORDER — PROCHLORPERAZINE 25 MG/1
SUPPOSITORY RECTAL
Qty: 1 EACH | Refills: 0 | Status: SHIPPED | OUTPATIENT
Start: 2022-03-04 | End: 2023-06-16

## 2022-03-04 RX ORDER — PROCHLORPERAZINE 25 MG/1
SUPPOSITORY RECTAL
Qty: 1 EACH | Refills: 3 | Status: SHIPPED | OUTPATIENT
Start: 2022-03-04 | End: 2023-04-12

## 2022-03-04 NOTE — TELEPHONE ENCOUNTER
Routing refill request to provider for review/approval because:  Medication is not on active med list.   Patient is due for a visit. Last visit was 1/14/21.    Yasemin Aguirre RN  [facility]

## 2022-03-04 NOTE — TELEPHONE ENCOUNTER
"Please send new Rx's for Dexcom G6 / Transmitter/ Sensors to [facility] ([phone])      Dexcom G6   Qty: 1  Refills: 0  Sig \"USE TO READ BLOOD SUGARS PER  INSTRUCTIONS\"    Dexcom G6 Sensors  Qty: 9    Refills: Can have up to 3 additional refills  Sig \"CHANGE EVERY 10 DAYS\"    Dexcom G6 Transmitter:  Qty:1  Refills: can have 3 additional refill  Sig \"CHANGE EVERY 90 DAYS\"    Please call [phone] and speak to one of our Durable Medical Equipment Team members if you have any questions.    "

## 2022-03-04 NOTE — TELEPHONE ENCOUNTER
See message below from pharmacy.  Dexcom G6 sensors were already refilled to  mail order pharmacy on 3/1/22 and should have on file. Refilled by [facility] pharmacist during OV.    Rx's for transmitter and  T'd up for review.      Laura Medel RN  [facility]

## 2022-03-08 ENCOUNTER — TELEPHONE (OUTPATIENT)
Dept: FAMILY MEDICINE | Facility: CLINIC | Age: 52
End: 2022-03-08
Payer: COMMERCIAL

## 2022-03-08 DIAGNOSIS — E11.65 TYPE 2 DIABETES MELLITUS WITH HYPERGLYCEMIA, WITH LONG-TERM CURRENT USE OF INSULIN (H): Primary | ICD-10-CM

## 2022-03-08 DIAGNOSIS — Z79.4 TYPE 2 DIABETES MELLITUS WITH HYPERGLYCEMIA, WITH LONG-TERM CURRENT USE OF INSULIN (H): Primary | ICD-10-CM

## 2022-03-08 RX ORDER — PROCHLORPERAZINE 25 MG/1
SUPPOSITORY RECTAL
Qty: 9 EACH | Refills: 3 | Status: SHIPPED | OUTPATIENT
Start: 2022-03-08 | End: 2023-04-12

## 2022-03-08 NOTE — CONFIDENTIAL NOTE
Prescription approved per [facility] Refill Protocol.    Laura Medel RN  [facility]

## 2022-03-08 NOTE — TELEPHONE ENCOUNTER
"Please send new Rx's for Dexcom G6  Sensors to [facility] ([phone])           Dexcom G6 Sensors  Qty: 9                 Refills: Can have up to 3 additional refills  Sig \"CHANGE EVERY 10 DAYS\"     We did not rec the sensors please send over script     Please call [phone] and speak to one of our Durable Medical Equipment Team members if you have any questions.  "

## 2022-04-06 ENCOUNTER — TELEPHONE (OUTPATIENT)
Dept: CARDIOLOGY | Facility: CLINIC | Age: 52
End: 2022-04-06

## 2022-04-06 ENCOUNTER — LAB (OUTPATIENT)
Dept: LAB | Facility: CLINIC | Age: 52
End: 2022-04-06
Payer: COMMERCIAL

## 2022-04-06 ENCOUNTER — OFFICE VISIT (OUTPATIENT)
Dept: CARDIOLOGY | Facility: CLINIC | Age: 52
End: 2022-04-06
Payer: COMMERCIAL

## 2022-04-06 VITALS
HEART RATE: 94 BPM | BODY MASS INDEX: 45.92 KG/M2 | SYSTOLIC BLOOD PRESSURE: 109 MMHG | WEIGHT: 284 LBS | OXYGEN SATURATION: 99 % | DIASTOLIC BLOOD PRESSURE: 76 MMHG

## 2022-04-06 DIAGNOSIS — G47.33 OSA (OBSTRUCTIVE SLEEP APNEA): ICD-10-CM

## 2022-04-06 DIAGNOSIS — Z79.4 TYPE 2 DIABETES MELLITUS WITH HYPEROSMOLARITY WITHOUT COMA, WITH LONG-TERM CURRENT USE OF INSULIN (H): ICD-10-CM

## 2022-04-06 DIAGNOSIS — I42.8 NONISCHEMIC CARDIOMYOPATHY (H): ICD-10-CM

## 2022-04-06 DIAGNOSIS — I50.23 ACUTE ON CHRONIC SYSTOLIC HEART FAILURE (H): ICD-10-CM

## 2022-04-06 DIAGNOSIS — E11.00 TYPE 2 DIABETES MELLITUS WITH HYPEROSMOLARITY WITHOUT COMA, WITH LONG-TERM CURRENT USE OF INSULIN (H): ICD-10-CM

## 2022-04-06 DIAGNOSIS — I10 ESSENTIAL HYPERTENSION WITH GOAL BLOOD PRESSURE LESS THAN 140/90: ICD-10-CM

## 2022-04-06 DIAGNOSIS — J45.20 INTERMITTENT ASTHMA, UNCOMPLICATED: ICD-10-CM

## 2022-04-06 DIAGNOSIS — E66.01 MORBID OBESITY (H): ICD-10-CM

## 2022-04-06 DIAGNOSIS — I50.32 CHRONIC DIASTOLIC HEART FAILURE (H): Primary | ICD-10-CM

## 2022-04-06 DIAGNOSIS — I50.22 CHRONIC SYSTOLIC HEART FAILURE (H): ICD-10-CM

## 2022-04-06 LAB
ANION GAP SERPL CALCULATED.3IONS-SCNC: 11 MMOL/L (ref 3–14)
BUN SERPL-MCNC: 15 MG/DL (ref 7–30)
CALCIUM SERPL-MCNC: 9.7 MG/DL (ref 8.5–10.1)
CHLORIDE BLD-SCNC: 99 MMOL/L (ref 94–109)
CO2 SERPL-SCNC: 27 MMOL/L (ref 20–32)
CREAT SERPL-MCNC: 1.03 MG/DL (ref 0.52–1.04)
GFR SERPL CREATININE-BSD FRML MDRD: 65 ML/MIN/1.73M2
GLUCOSE BLD-MCNC: 229 MG/DL (ref 70–99)
POTASSIUM BLD-SCNC: 3.8 MMOL/L (ref 3.4–5.3)
SODIUM SERPL-SCNC: 137 MMOL/L (ref 133–144)

## 2022-04-06 PROCEDURE — 36415 COLL VENOUS BLD VENIPUNCTURE: CPT

## 2022-04-06 PROCEDURE — 99214 OFFICE O/P EST MOD 30 MIN: CPT | Performed by: NURSE PRACTITIONER

## 2022-04-06 PROCEDURE — 80048 BASIC METABOLIC PNL TOTAL CA: CPT

## 2022-04-06 RX ORDER — TORSEMIDE 20 MG/1
TABLET ORAL
Qty: 180 TABLET | Refills: 11 | Status: SHIPPED | OUTPATIENT
Start: 2022-04-06 | End: 2023-02-15

## 2022-04-06 NOTE — NURSING NOTE
Bettina Montes's goals for this visit include: Checking in. Has been very fatigued.     She requests these members of her care team be copied on today's visit information: PCP    PCP: Prabhu Robbins    Referring Provider:  No referring provider defined for this encounter.    /76 (BP Location: Left arm, Patient Position: Sitting, Cuff Size: Adult Large)   Pulse 94   Wt 128.8 kg (284 lb)   LMP  (LMP Unknown)   SpO2 99%   BMI 45.92 kg/m      Do you need any medication refills at today's visit? Yes. Basaglar.

## 2022-04-06 NOTE — PROGRESS NOTES
SANJAY  Bettina Montes is a 52 year old female with a past medical history of HFrEF (30-35%) secondary to NICM (felt to be idiopathic), HTN, obesity, DM II, cervical spine fusion, and hyperlipidemia who presents for routine follow-up. She was last seen by Dr. Chinchilla on 6/5/20.    Bettina had her last CORE visit on 1/5/22.   She has been seeing the MTM for DM management.  She had a decrease in her metformin. She is on a higher dose of Trulicity. She says her BS have improved but still need work.  No SOB at rest and no CASILLAS. She feels she has been dry- dry mouth / eyes.  She says there is now no swelling in her legs or abdomen. Home weight 283 lbs. Her appetite has been fine. She denies early satiety. Continues to self titrate her afternoon diuretic doses. Takes 60 mg every morning of Torsemide.    She denies any chest pain, lightheadedness, dizziness, or near syncopal episodes.     Pt is taking hydralazine 50mg BID, coreg 25 BID, entresto 97/103 BID, spironolactone 50 qday and torsemide 60mg qday.       PMH  Past Medical History:   Diagnosis Date     Abnormal Pap smear of cervix 09/10/2019    See problem list     Adrenal gland anomaly      CHF (congestive heart failure) (H)      Fatty liver      Gastroesophageal reflux disease      Hyperlipidemia      Hypertension      Mild persistent asthma      NICM (nonischemic cardiomyopathy) (H)      Obesity      WILLARD (obstructive sleep apnea) 1/9/2015     Type 2 diabetes mellitus (H)        Past Surgical History:   Procedure Laterality Date     COLONOSCOPY       DISCECTOMY, FUSION CERVICAL ANTERIOR TWO LEVELS, COMBINED N/A 4/5/2019    Procedure: C4-6 anterior cervical discectomy and fusion;  Surgeon: Hollis Hurtado MD;  Location: [facility] OR     TUBAL LIGATION         Family History   Problem Relation Age of Onset     Diabetes Mother      Hypertension Mother      Hyperlipidemia Mother      Heart Failure Mother      Diabetes Father      Cancer Paternal Grandmother         ?        Social History     Socioeconomic History     Marital status: Single     Spouse name: None     Number of children: 1     Years of education: None     Highest education level: None   Occupational History     Occupation: CNA     Employer: OTHER   Social Needs     Financial resource strain: None     Food insecurity:     Worry: None     Inability: None     Transportation needs:     Medical: None     Non-medical: None   Tobacco Use     Smoking status: Former Smoker     Packs/day: 1.00     Years: 30.00     Pack years: 30.00     Types: Cigarettes     Last attempt to quit: 2013     Years since quittin.8     Smokeless tobacco: Former User     Tobacco comment:     Substance and Sexual Activity     Alcohol use: Yes     Alcohol/week: 0.0 standard drinks     Comment: 1     Drug use: No     Sexual activity: Yes     Partners: Male     Birth control/protection: Surgical   Lifestyle     Physical activity:     Days per week: None     Minutes per session: None     Stress: None   Relationships     Social connections:     Talks on phone: None     Gets together: None     Attends Tenriism service: None     Active member of club or organization: None     Attends meetings of clubs or organizations: None     Relationship status: None     Intimate partner violence:     Fear of current or ex partner: None     Emotionally abused: None     Physically abused: None     Forced sexual activity: None   Other Topics Concern     Parent/sibling w/ CABG, MI or angioplasty before 65F 55M? No   Social History Narrative     None       ALLERGIES  Allergies   Allergen Reactions     Amlodipine Swelling     Edema on 5mg throat     Lisinopril      Swelling in throat, face  angioedema     Naprosyn [Naproxen]      Swelling, diff breathing       MEDICATIONS acetaminophen (TYLENOL) 500 MG tablet, Take 500-1,000 mg by mouth every 6 hours as needed for mild pain  blood glucose (NO BRAND SPECIFIED) test strip, Use to test blood sugar 3-4 times daily or as  directed.  carvedilol (COREG) 25 MG tablet, Take 1 tablet (25 mg) by mouth 2 times daily (with meals)  Chlorpheniramine-DM (CORICIDIN HBP COUGH/COLD) 4-30 MG TABS, Take 1 tablet by mouth as needed  cholecalciferol ( ULTRA STRENGTH) 2000 units CAPS, Take 2,000 Units by mouth daily   Dulaglutide (TRULICITY) 4.5 MG/0.5ML SOPN, Inject 4.5 mg Subcutaneous once a week  fluticasone-salmeterol (WIXELA INHUB) 250-50 MCG/DOSE inhaler, Inhale 1 puff into the lungs 2 times daily   hydrALAZINE (APRESOLINE) 25 MG tablet, Take 1 tablet (25 mg) by mouth 2 times daily  insulin aspart (NOVOLOG FLEXPEN) 100 UNIT/ML pen, Inject 13 units before each meal plus sliding scale before meals and at bedtime. 100-150 1U, 151-200 2U, 201-250 4U, 251-300 6U, 301-350, 8U, 351-400 10U, >400 12U  insulin glargine (BASAGLAR KWIKPEN) 100 UNIT/ML pen, Inject 75 Units Subcutaneous At Bedtime  insulin pen needle (ULTICARE MICRO) 32G X 4 MM miscellaneous, Use 4 pen needles daily or as directed.  levalbuterol (XOPENEX HFA) 45 MCG/ACT Inhaler, Inhale 2 puffs into the lungs every 4 hours as needed for shortness of breath / dyspnea or wheezing  lovastatin (MEVACOR) 20 MG tablet, TAKE 1 TABLET BY MOUTH EVERYDAY AT BEDTIME  magnesium oxide (MAG-OX) 400 (241.3 Mg) MG tablet, Take 1 tablet (400 mg) by mouth daily  metFORMIN (GLUCOPHAGE-XR) 500 MG 24 hr tablet, Take 2 tablets (1,000 mg) by mouth daily (with dinner)  montelukast (SINGULAIR) 10 MG tablet, Take 1 tablet (10 mg) by mouth At Bedtime  sacubitril-valsartan (ENTRESTO)  MG per tablet, Take 1 tablet by mouth 2 times daily  spironolactone (ALDACTONE) 50 MG tablet, Take 1 tablet (50 mg) by mouth daily  torsemide (DEMADEX) 20 MG tablet, Take 3 tablets (60 mg) by mouth 2 times daily (Patient taking differently: Take 60 mg by mouth 2 times daily Takes 60mg in the morning and if short of breath or edema will take 60mg twice daily)  Continuous Blood Gluc  (DEXCOM G6 ) JEANNE, Use to read  blood sugars as per 's instructions. (Patient not taking: Reported on 4/6/2022)  Continuous Blood Gluc Sensor (DEXCOM G6 SENSOR) MISC, Change every 10 days. (Patient not taking: Reported on 4/6/2022)  Continuous Blood Gluc Sensor (FREESTYLE NAETA 14 DAY SENSOR) MISC, 1 Device every 14 days Change sensor as directed every 14 days (Patient not taking: Reported on 4/6/2022)  Continuous Blood Gluc Transmit (DEXCOM G6 TRANSMITTER) MISC, Change every 3 months. (Patient not taking: Reported on 4/6/2022)    sodium chloride (PF) 0.9% PF flush 10 mL            ROS:   Constitutional: No fever, chills, or sweats.  ENT: No visual disturbance, ear ache, epistaxis, sore throat.   Allergies/Immunologic: Negative  Respiratory: No cough, hemoptysis.   Cardiovascular: As per HPI.   GI: As per HPI.   : No urinary frequency, dysuria, or hematuria.   Integument: Negative.   Psychiatric: Negative.   Neuro: Negative.   Endocrinology: negative   Musculoskeletal: negative    EXAM:   General: appears comfortable, alert and articulate  Head: normocephalic, atraumatic  Eyes: anicteric sclera, EOMI  Neck: Supple, no adenopathy  Orophyarynx: moist mucosa, no cyanosis  Heart: regular, S1/S2, no murmur, gallop, rub, estimated JVP not detected at 90 degrees  Lungs: Respirations even and unlabored, lungs clear, no rales or wheezing.  Lungs sounds decreased bilaterally in the bases.  Abdomen:  distended and firmer, non-tender, bowel sounds present, no hepatomegaly  Extremities: no clubbing, cyanosis. Has trace edema  Neurological: normal speech and affect, no gross motor deficits  Skin:  Warm and dry.      LABS  Last Comprehensive Metabolic Panel:  Sodium   Date Value Ref Range Status   01/05/2022 136 133 - 144 mmol/L Final   03/23/2021 133 133 - 144 mmol/L Final     Potassium   Date Value Ref Range Status   01/05/2022 3.7 3.4 - 5.3 mmol/L Final   03/23/2021 3.8 3.4 - 5.3 mmol/L Final     Chloride   Date Value Ref Range Status    01/05/2022 99 94 - 109 mmol/L Final   03/23/2021 95 94 - 109 mmol/L Final     Carbon Dioxide   Date Value Ref Range Status   03/23/2021 35 (H) 20 - 32 mmol/L Final     Carbon Dioxide (CO2)   Date Value Ref Range Status   01/05/2022 30 20 - 32 mmol/L Final     Anion Gap   Date Value Ref Range Status   01/05/2022 7 3 - 14 mmol/L Final   03/23/2021 3 3 - 14 mmol/L Final     Glucose   Date Value Ref Range Status   01/05/2022 193 (H) 70 - 99 mg/dL Final   03/23/2021 260 (H) 70 - 99 mg/dL Final     Urea Nitrogen   Date Value Ref Range Status   01/05/2022 12 7 - 30 mg/dL Final   03/23/2021 24 7 - 30 mg/dL Final     Creatinine   Date Value Ref Range Status   01/05/2022 1.12 (H) 0.52 - 1.04 mg/dL Final   03/23/2021 1.18 (H) 0.52 - 1.04 mg/dL Final     GFR Estimate   Date Value Ref Range Status   01/05/2022 59 (L) >60 mL/min/1.73m2 Final     Comment:     Effective December 21, 2021 eGFRcr in adults is calculated using the 2021 CKD-EPI creatinine equation which includes age and gender (Roxanne et al., NEJM, DOI: 10.1056/KMARsx6682211)   03/23/2021 53 (L) >60 mL/min/[1.73_m2] Final     Comment:     Non  GFR Calc  Starting 12/18/2018, serum creatinine based estimated GFR (eGFR) will be   calculated using the Chronic Kidney Disease Epidemiology Collaboration   (CKD-EPI) equation.       Calcium   Date Value Ref Range Status   01/05/2022 9.6 8.5 - 10.1 mg/dL Final   03/23/2021 9.9 8.5 - 10.1 mg/dL Final       MOST RECENT ECHOCARDIOGRAM 10/25/19:  Interpretation Summary  Technically difficult study.  Left ventricular size is normal. Mildly (EF 40-45%, traced 45%) reduced left ventricular function is present.  Right ventricular function, chamber size, wall motion, and thickness are normal. This study was compared with the study from 3/21/18: The left ventricular function has improved    ASSESSMENT AND PLAN  Bettinaandres Montes is a 52 year old female with NICM who appears hypovolemic today. She has been at [facility] for her  HF.  Unfortunately she couldn't tolerate the dapaglifozin. Since the patient is feeling extremely dry ( mouth, eyes, skin) and weight is stable and symptoms improved we will decrease the Torsemide to 40 mg daily and have her take an extra 20 mg if the weight goes over 285 lbs . We will call to check in on this in 1-2 weeks.  She says the dryness has been worse even though the blood sugars are getting better controlled.     1. Chronic systolic heart failure/HFrEF (EF 40-45%) secondary to nonischemic cardiomyopathy  NYHA Symptom Class IIIB  Stage C  Primary Cardiologist: Dr Chinchilla; Last seen 6/5/20  ACE-I/ARB/ARNi:  Entresto  mg- titration complete  BB yes, Carvedilol 25 mg twice daily- titration complete  Aldosterone antagonist yes, Spironolactone 50 mg daily.   SCD prophylaxis EF > 35%   Fluid status hypovolemic  Cardiac Rehab: Completed  Sleep Apnea Evaluation: Documented sleep apnea.    Remote monitoring: Mychart active  Antiplatelet: none.   Anticoagulation: None.  SGLT II- unable to tolerate the dapaglifozin- frequent yeast infections.     # Obesity: BMI 47.29.  She is working on weight loss.  Continue to reassess at subsequent visits.     #  HTN:   continue Entresto   Continue to monitor BP readings at home.      #. Shoulder surgery. - October - 2021    4.  Follow-up:  We will decrease the Torsemide to 40 mg daily and have her take an extra 20 mg if the weight goes over 285 lbs   CORE follow up August           Heide GU NP-C

## 2022-04-06 NOTE — PATIENT INSTRUCTIONS
Take your medicines every day, as directed    Changes made today:  o Torsemide 40 mg daily - take an extra 20 mg if weight goes 285 lbs or higher   o    Monitor Your Weight and Symptoms    Contact us if you:      Gain 2 pounds in one day or 5 pounds in one week    Feel more short of breath    Notice more leg swelling    Feel lightheadeded   Change your lifestyle    Limit Salt or Sodium:    2000 mg  Limit Fluids:    2000 mL or approximately 64 ounces  Eat a Heart Healthy Diet    Low in saturated fats  Stay Active:    Aim to move at least 150 minutes every  week         To Contact us    During Business Hours:  [phone], option # 1      After hours, weekends or holidays:   [phone], Option #4  Ask to speak to the On-Call Cardiologist. Inform them you are a CORE/heart failure patient at the [facility].     Use [facility] allows you to communicate directly with your heart team through secure messaging.    [facility] can be accessed any time on your phone, computer, or tablet.    If you need assistance, we'd be happy to help!         Keep your Heart Appointments:    RN to call in 1-2 weeks to see how often you've needed the extra 20 mg of Torsemide.     CORE in August

## 2022-04-06 NOTE — TELEPHONE ENCOUNTER
Called Bettina to try and bring her into clinic earlier per provider request. Left message with availability of provider.     Yonas Chris, EMT  Clinic Support  [facility]    [phone]    Employed by [facility]

## 2022-04-09 ENCOUNTER — MYC REFILL (OUTPATIENT)
Dept: PHARMACY | Facility: CLINIC | Age: 52
End: 2022-04-09
Payer: COMMERCIAL

## 2022-04-09 DIAGNOSIS — Z79.4 TYPE 2 DIABETES MELLITUS WITH HYPERGLYCEMIA, WITH LONG-TERM CURRENT USE OF INSULIN (H): ICD-10-CM

## 2022-04-09 DIAGNOSIS — E11.65 TYPE 2 DIABETES MELLITUS WITH HYPERGLYCEMIA, WITH LONG-TERM CURRENT USE OF INSULIN (H): ICD-10-CM

## 2022-04-11 RX ORDER — METFORMIN HCL 500 MG
1000 TABLET, EXTENDED RELEASE 24 HR ORAL
Qty: 360 TABLET | Refills: 0 | Status: SHIPPED | OUTPATIENT
Start: 2022-04-11 | End: 2022-05-16 | Stop reason: SINTOL

## 2022-04-11 NOTE — TELEPHONE ENCOUNTER
"Requested Prescriptions   Pending Prescriptions Disp Refills    metFORMIN (GLUCOPHAGE-XR) 500 MG 24 hr tablet 360 tablet 0     Sig: Take 2 tablets (1,000 mg) by mouth daily (with dinner)        Biguanide Agents Failed - 4/9/2022 11:23 AM        Failed - Patient has documented A1c within the specified period of time.     If HgbA1C is 8 or greater, it needs to be on file within the past 3 months.  If less than 8, must be on file within the past 6 months.     Recent Labs   Lab Test 12/01/21  0921   A1C 10.1*             Failed - Recent (6 mo) or future (30 days) visit within the authorizing provider's specialty     Patient had office visit in the last 6 months or has a visit in the next 30 days with authorizing provider or within the authorizing provider's specialty.  See \"Patient Info\" tab in inbasket, or \"Choose Columns\" in Meds & Orders section of the refill encounter.            Passed - Patient is age 10 or older        Passed - Patient's CR is NOT>1.4 OR Patient's EGFR is NOT<45 within past 12 mos.       Recent Labs   Lab Test 04/06/22  1552 09/29/21  0931 03/23/21  1716   GFRESTIMATED 65   < > 53*   GFRESTBLACK  --   --  61    < > = values in this interval not displayed.       Recent Labs   Lab Test 04/06/22  1552   CR 1.03             Passed - Patient does NOT have a diagnosis of CHF.        Passed - Medication is active on med list        Passed - Patient is not pregnant        Passed - Patient has not had a positive pregnancy test within the past 12 mos.               "

## 2022-04-13 ENCOUNTER — PATIENT OUTREACH (OUTPATIENT)
Dept: CARDIOLOGY | Facility: CLINIC | Age: 52
End: 2022-04-13
Payer: COMMERCIAL

## 2022-04-13 NOTE — TELEPHONE ENCOUNTER
Patient was seen in the CORE clinic on 4/6/22. The plan following the visit was: decrease the Torsemide to 40 mg daily and have her take an extra 20 mg if the weight goes over 285 lbs. At that visit the patient reported their weight to be 283 pounds.     LM for patient to call back, when patient calls back need to evaluate weight, symptoms and find out how often she has been taking the extra 20 mg of Torsemide.     I spoke to patient, she is doing really well. She has not had to take the extra Torsemide at all since her visit on 4/6/22. Her weight today is 284 pounds. She has no swelling or shortness of breath. She states she is feeling good.     Patient instructed to call with weight gain (2 pounds in a day, 5 pounds in 7 days), increasing shortness of breath, or swelling. I also asked patient to let us know if she is taking the Torsemide frequently. Patient agreeable to plan.     Will update Heide.     Anusha Lovelace RN   Cardiology Care Coordinator  [facility]   Phone: [phone]  Fax: [phone]

## 2022-04-17 ENCOUNTER — HEALTH MAINTENANCE LETTER (OUTPATIENT)
Age: 52
End: 2022-04-17

## 2022-05-16 ENCOUNTER — TELEPHONE (OUTPATIENT)
Dept: PHARMACY | Facility: CLINIC | Age: 52
End: 2022-05-16
Payer: COMMERCIAL

## 2022-06-06 DIAGNOSIS — Z79.4 TYPE 2 DIABETES MELLITUS WITH HYPERGLYCEMIA, WITH LONG-TERM CURRENT USE OF INSULIN (H): ICD-10-CM

## 2022-06-06 DIAGNOSIS — J45.30 MILD PERSISTENT ASTHMA WITHOUT COMPLICATION: ICD-10-CM

## 2022-06-06 DIAGNOSIS — E11.65 TYPE 2 DIABETES MELLITUS WITH HYPERGLYCEMIA, WITH LONG-TERM CURRENT USE OF INSULIN (H): ICD-10-CM

## 2022-06-06 NOTE — TELEPHONE ENCOUNTER
Patient calling for a refill on 4 medications to be sent to [facility] pharmacy.  Kristyn Sheridan MA  [facility]  2nd Floor  Primary Care

## 2022-06-09 RX ORDER — MONTELUKAST SODIUM 10 MG/1
1 TABLET ORAL AT BEDTIME
Qty: 90 TABLET | Refills: 0 | Status: SHIPPED | OUTPATIENT
Start: 2022-06-09 | End: 2022-09-01

## 2022-06-09 RX ORDER — INSULIN GLARGINE 100 [IU]/ML
75 INJECTION, SOLUTION SUBCUTANEOUS AT BEDTIME
Qty: 15 ML | Refills: 0 | Status: SHIPPED | OUTPATIENT
Start: 2022-06-09 | End: 2022-06-30

## 2022-06-09 RX ORDER — DULAGLUTIDE 4.5 MG/.5ML
4.5 INJECTION, SOLUTION SUBCUTANEOUS WEEKLY
Qty: 6 ML | Refills: 1 | Status: SHIPPED | OUTPATIENT
Start: 2022-06-09 | End: 2022-09-27 | Stop reason: ALTCHOICE

## 2022-06-09 RX ORDER — INSULIN ASPART 100 [IU]/ML
INJECTION, SOLUTION INTRAVENOUS; SUBCUTANEOUS
Qty: 15 ML | Refills: 0 | Status: SHIPPED | OUTPATIENT
Start: 2022-06-09 | End: 2022-07-01

## 2022-06-17 ENCOUNTER — OFFICE VISIT (OUTPATIENT)
Dept: CARDIOLOGY | Facility: CLINIC | Age: 52
End: 2022-06-17
Payer: COMMERCIAL

## 2022-06-17 VITALS
WEIGHT: 281 LBS | DIASTOLIC BLOOD PRESSURE: 78 MMHG | HEART RATE: 100 BPM | OXYGEN SATURATION: 99 % | SYSTOLIC BLOOD PRESSURE: 114 MMHG | BODY MASS INDEX: 45.43 KG/M2

## 2022-06-17 DIAGNOSIS — E11.00 TYPE 2 DIABETES MELLITUS WITH HYPEROSMOLARITY WITHOUT COMA, WITH LONG-TERM CURRENT USE OF INSULIN (H): Primary | ICD-10-CM

## 2022-06-17 DIAGNOSIS — I50.23 ACUTE ON CHRONIC SYSTOLIC (CONGESTIVE) HEART FAILURE (H): ICD-10-CM

## 2022-06-17 DIAGNOSIS — Z79.4 TYPE 2 DIABETES MELLITUS WITH HYPEROSMOLARITY WITHOUT COMA, WITH LONG-TERM CURRENT USE OF INSULIN (H): Primary | ICD-10-CM

## 2022-06-17 DIAGNOSIS — I42.8 NONISCHEMIC CARDIOMYOPATHY (H): Primary | ICD-10-CM

## 2022-06-17 PROCEDURE — 99215 OFFICE O/P EST HI 40 MIN: CPT | Performed by: INTERNAL MEDICINE

## 2022-06-17 RX ORDER — HYDRALAZINE HYDROCHLORIDE 10 MG/1
10 TABLET, FILM COATED ORAL 2 TIMES DAILY
Qty: 90 TABLET | Refills: 3 | Status: SHIPPED | OUTPATIENT
Start: 2022-06-17 | End: 2023-02-15

## 2022-06-17 ASSESSMENT — PAIN SCALES - GENERAL: PAINLEVEL: NO PAIN (0)

## 2022-06-17 NOTE — PROGRESS NOTES
[facility] Cardiology Consultation:    Assessment and Plan:     1.  Chronic systolic heart failure, LVEF of 45%, idiopathic nonischemic cardiomyopathy (stress MRI - no ischemia, mild septal fibrosis): Improved LV fxn with medical rx.   Continue Carvedilol, Entresto, and Spironolactone.  Decrease hydralazine to 10 twice daily due to orthostatic dizziness.  If she continues to have those episodes, will DC hydralazine.  SGLT2 inhibitor stopped due to frequent yeast infections.  Follows with core clinic, on torsemide, appears euvolemic  F/u with me annual, with annual echo.     2. Hypertension, controlled on current regimen  3. Obesity  4. Diabetes, uncontrolled: On GLP-1 agonist and insulin.  On lovastatin with good lipid profile.  We will reach out to her pharmacist to see if she would benefit from consulting with an endocrinologist.  5. Asthma   6. Palpitations, sinus tachycardia, multiple episodes of brief SVT with average heart rate 120, PVC burden 5.5%    RTC in 1 year    A total of 40 minutes were spent on the day of the visit for chart review, care coordination, face-to-face consultation with the patient, and documentation.    El Chinchilla MD    Cardiac Imaging and Prevention  [facility]  Pager: [phone]      HPI: Bettina Montes is with a history of chronic idiopathic nonischemic cardiomyopathy, HTN, obesity, and DM2 who presents for routine follow up.   Her renal function has fluctuated over the past year.  Most recent labs from a few months ago showed normal electrolytes and renal function.  Last hemoglobin A1c is 10.1.  Lipid profile checked last year looks excellent, with LDL cholesterol of 58.  She has been following up with a pharmacist for management of her diabetes.  SGLT2 inhibitor was started but was finally stopped due to frequent yeast infections, she is now on a GLP-1 agonist.  I reviewed her echo that was done last year.  It showed stable mild  cardiomyopathy with LVEF 40 to 45%, normal RV function, normal IVC.  I reviewed her cardiac monitor that was done last year to evaluate palpitations.  It had documented multiple episodes of an SVT that were brief with the longest episode lasting for a few minutes. The average heart rate during these episodes was in the 120s.  Baseline rhythm was sinus tachycardia with average heart rate of 97.  PVC burden of 5.5%.  She reports that her palpitations have improved.  She complains of occasional lightheadedness on standing up quickly, denies any syncope.  Due to this, her hydralazine has been cut back.  At one point she had stopped taking the hydralazine which resolved these episodes.  She also complains of seasonal nasal and forehead redness and itching.  Asked her to follow-up with the PCP for what appears to be allergic rhinitis.    EXAM:  /78   Pulse 100   Wt 127.5 kg (281 lb)   LMP  (LMP Unknown)   SpO2 99%   BMI 45.43 kg/m    GEN/CONSTITUTIONAL: Appears comfortable, in no apparent distress   EYES: No icterus  ENT/MOUTH: Normal  JVP:  Not visible  RESPIRATORY: Clear to auscultation bilaterally   CARDIOVASCULAR: Regular S1 and S2, no murmurs, rubs, or gallops.   ABDOMEN: Soft, non-tender, positive bowel sounds   NEUROLOGIC: Grossly non-focal   PSYCHIATRIC: Normal affect  EXT: Trace edema. Normal pedal pulses.  Skin: No petechiae, purpura or rash    PAST MEDICAL HISTORY:  Past Medical History:   Diagnosis Date     Abnormal Pap smear of cervix 09/10/2019    See problem list     Adrenal gland anomaly      CHF (congestive heart failure) (H)      Fatty liver      Gastroesophageal reflux disease      Hyperlipidemia      Hypertension      Mild persistent asthma      NICM (nonischemic cardiomyopathy) (H)      Obesity      WILLARD (obstructive sleep apnea) 1/9/2015     Type 2 diabetes mellitus (H)        CURRENT MEDICATIONS:  Current Outpatient Medications   Medication     acetaminophen (TYLENOL) 500 MG tablet      blood glucose (NO BRAND SPECIFIED) test strip     carvedilol (COREG) 25 MG tablet     Chlorpheniramine-DM (CORICIDIN HBP COUGH/COLD) 4-30 MG TABS     cholecalciferol ( ULTRA STRENGTH) 2000 units CAPS     Continuous Blood Gluc  (DEXCOM G6 ) JEANNE     Continuous Blood Gluc Sensor (DEXCOM G6 SENSOR) MISC     Continuous Blood Gluc Sensor (FREESTYLE ANETA 14 DAY SENSOR) MISC     Continuous Blood Gluc Transmit (DEXCOM G6 TRANSMITTER) MISC     Dulaglutide (TRULICITY) 4.5 MG/0.5ML SOPN     fluticasone-salmeterol (WIXELA INHUB) 250-50 MCG/DOSE inhaler     hydrALAZINE (APRESOLINE) 25 MG tablet     insulin aspart (NOVOLOG FLEXPEN) 100 UNIT/ML pen     insulin glargine (BASAGLAR KWIKPEN) 100 UNIT/ML pen     insulin pen needle (ULTICARE MICRO) 32G X 4 MM miscellaneous     levalbuterol (XOPENEX HFA) 45 MCG/ACT Inhaler     lovastatin (MEVACOR) 20 MG tablet     magnesium oxide (MAG-OX) 400 (241.3 Mg) MG tablet     montelukast (SINGULAIR) 10 MG tablet     sacubitril-valsartan (ENTRESTO)  MG per tablet     spironolactone (ALDACTONE) 50 MG tablet     torsemide (DEMADEX) 20 MG tablet     No current facility-administered medications for this visit.     Facility-Administered Medications Ordered in Other Visits   Medication     sodium chloride (PF) 0.9% PF flush 10 mL       PAST SURGICAL HISTORY:  Past Surgical History:   Procedure Laterality Date     COLONOSCOPY       DISCECTOMY, FUSION CERVICAL ANTERIOR TWO LEVELS, COMBINED N/A 4/5/2019    Procedure: C4-6 anterior cervical discectomy and fusion;  Surgeon: Hollis Hurtado MD;  Location: [facility] OR     TUBAL LIGATION         ALLERGIES     Allergies   Allergen Reactions     Amlodipine Swelling     Edema on 5mg throat     Lisinopril      Swelling in throat, face  angioedema     Naprosyn [Naproxen]      Swelling, diff breathing       FAMILY HISTORY:  Family History   Problem Relation Age of Onset     Diabetes Mother      Hypertension Mother      Hyperlipidemia  Mother      Heart Failure Mother      Diabetes Father      Cancer Paternal Grandmother         ?       SOCIAL HISTORY:  Social History     Socioeconomic History     Marital status: Legally      Spouse name: Not on file     Number of children: 1     Years of education: Not on file     Highest education level: Not on file   Occupational History     Occupation: CNA     Employer: OTHER   Tobacco Use     Smoking status: Former Smoker     Packs/day: 1.00     Years: 30.00     Pack years: 30.00     Types: Cigarettes     Quit date: 2013     Years since quittin.4     Smokeless tobacco: Former User     Tobacco comment:     Substance and Sexual Activity     Alcohol use: Yes     Alcohol/week: 0.0 standard drinks     Comment: 1     Drug use: No     Sexual activity: Yes     Partners: Male     Birth control/protection: Surgical   Other Topics Concern     Parent/sibling w/ CABG, MI or angioplasty before 65F 55M? No   Social History Narrative     Not on file     Social Determinants of Health     Financial Resource Strain: Not on file   Food Insecurity: Not on file   Transportation Needs: Not on file   Physical Activity: Not on file   Stress: Not on file   Social Connections: Not on file   Intimate Partner Violence: Not on file   Housing Stability: Not on file       ROS:   Constitutional: No fever, chills, or sweats. No weight gain/loss   ENT: No visual disturbance, ear ache, epistaxis, sore throat  Allergies/Immunologic: Negative.   Respiratory: No cough, hemoptysia  Cardiovascular: As per HPI  GI: No nausea, vomiting, hematemesis, melena, or hematochezia  : No urinary frequency, dysuria, or hematuria  Integument: Negative  Psychiatric: Negative  Neuro: Negative  Endocrinology: Negative   Musculoskeletal: Negative    ADDITIONAL COMMENTS:     I reviewed the patient's medications:     I reviewed the patient's pertinent clinical laboratory studies:     I reviewed the patient's pertinent imaging studies:   I reviewed  the patient's ECG:

## 2022-06-17 NOTE — PATIENT INSTRUCTIONS
Change hydralazine to 10 mg twice daily  Let us know if you still have dizzy episodes  Schedule echo

## 2022-06-17 NOTE — NURSING NOTE
Bettina Montes's goals for this visit include: medication refills  She requests these members of her care team be copied on today's visit information: yes    PCP: Prabhu Robbins    Referring Provider:  El Chinchilla MD  [address] [place] [address] [id number]  [place] [address]    /78   Pulse 100   Wt 127.5 kg (281 lb)   LMP  (LMP Unknown)   SpO2 99%   BMI 45.43 kg/m      Do you need any medication refills at today's visit? none

## 2022-06-27 ENCOUNTER — TELEPHONE (OUTPATIENT)
Dept: PHARMACY | Facility: CLINIC | Age: 52
End: 2022-06-27

## 2022-06-27 NOTE — TELEPHONE ENCOUNTER
Called patient to check on blood sugars. Patient did not have time to talk today. Asked to send blood sugars through [employer] when she has a chance to. Patient was agreeable and will try and send them later tonight.    Yasemin Mcmahon, Pharm.D, BCACP  Medication Therapy Management Pharmacist

## 2022-06-29 ENCOUNTER — MYC REFILL (OUTPATIENT)
Dept: FAMILY MEDICINE | Facility: CLINIC | Age: 52
End: 2022-06-29

## 2022-06-29 DIAGNOSIS — Z79.4 TYPE 2 DIABETES MELLITUS WITH HYPERGLYCEMIA, WITH LONG-TERM CURRENT USE OF INSULIN (H): ICD-10-CM

## 2022-06-29 DIAGNOSIS — E78.2 MIXED HYPERLIPIDEMIA: ICD-10-CM

## 2022-06-29 DIAGNOSIS — E11.65 TYPE 2 DIABETES MELLITUS WITH HYPERGLYCEMIA, WITH LONG-TERM CURRENT USE OF INSULIN (H): ICD-10-CM

## 2022-06-30 RX ORDER — INSULIN GLARGINE 100 [IU]/ML
INJECTION, SOLUTION SUBCUTANEOUS
Qty: 15 ML | Refills: 0 | Status: SHIPPED | OUTPATIENT
Start: 2022-06-30 | End: 2022-08-09

## 2022-06-30 RX ORDER — LOVASTATIN 20 MG
TABLET ORAL
Qty: 90 TABLET | Refills: 0 | Status: SHIPPED | OUTPATIENT
Start: 2022-06-30 | End: 2022-09-01

## 2022-06-30 NOTE — TELEPHONE ENCOUNTER
"Requested Prescriptions   Pending Prescriptions Disp Refills    insulin glargine (BASAGLAR KWIKPEN) 100 UNIT/ML pen [Pharmacy Med Name: BASAGLAR 100 UNIT/ML KWIKPEN]       Sig: INJECT 75 UNITS SUBCUTANEOUSLY AT BEDTIME        Long Acting Insulin Protocol Failed - 6/29/2022 10:14 AM        Failed - HgbA1C in past 3 or 6 months     If HgbA1C is 8 or greater, it needs to be on file within the past 3 months.  If less than 8, must be on file within the past 6 months.     Recent Labs   Lab Test 12/01/21  0921   A1C 10.1*             Failed - Recent (6 mo) or future (30 days) visit within the authorizing provider's specialty     Patient had office visit in the last 6 months or has a visit in the next 30 days with authorizing provider or within the authorizing provider's specialty.  See \"Patient Info\" tab in inbasket, or \"Choose Columns\" in Meds & Orders section of the refill encounter.            Passed - Serum creatinine on file in past 12 months     Recent Labs   Lab Test 04/06/22  1552   CR 1.03       Ok to refill medication if creatinine is low          Passed - Medication is active on med list        Passed - Patient is age 18 or older              "

## 2022-07-20 DIAGNOSIS — I50.23 ACUTE ON CHRONIC SYSTOLIC HEART FAILURE (H): ICD-10-CM

## 2022-07-20 DIAGNOSIS — I50.22 CHRONIC SYSTOLIC CONGESTIVE HEART FAILURE (H): ICD-10-CM

## 2022-07-21 DIAGNOSIS — I50.22 CHRONIC SYSTOLIC CONGESTIVE HEART FAILURE (H): Primary | ICD-10-CM

## 2022-07-22 RX ORDER — TORSEMIDE 20 MG/1
TABLET ORAL
Qty: 540 TABLET | Refills: 3 | OUTPATIENT
Start: 2022-07-22

## 2022-07-22 RX ORDER — CARVEDILOL 25 MG/1
25 TABLET ORAL 2 TIMES DAILY WITH MEALS
Qty: 180 TABLET | Refills: 3 | Status: SHIPPED | OUTPATIENT
Start: 2022-07-22 | End: 2023-06-16

## 2022-07-22 NOTE — TELEPHONE ENCOUNTER
CARVEDILOL 25 MG TABLET  Last Clinic Visit: 6/17/2022  [facility]      TORSEMIDE 20 MG TABLET-refill denied   Incorrect dose

## 2022-07-25 DIAGNOSIS — I50.22 CHRONIC SYSTOLIC HEART FAILURE (H): ICD-10-CM

## 2022-07-27 RX ORDER — SACUBITRIL AND VALSARTAN 97; 103 MG/1; MG/1
1 TABLET, FILM COATED ORAL 2 TIMES DAILY
Qty: 180 TABLET | Refills: 3 | Status: SHIPPED | OUTPATIENT
Start: 2022-07-27 | End: 2023-06-16

## 2022-07-27 NOTE — TELEPHONE ENCOUNTER
ENTRESTO 97 MG-103 MG TABLET    Last Written Prescription Date:  7/9/2021  Last Fill Quantity: 180,   # refills: 3  Last Office Visit :  6/17/2022  Future Office visit:   8/10/2022  180 Tabs, 3 Refills sent to pharm 7/27/2022      Rere Loredo RN  Central Triage Red Flags/Med Refills        Warnings Override History for sacubitril-valsartan (ENTRESTO)  MG per tablet [[id number]]    Overridden by El Chinchilla MD on Aug 18, 2021 4:31 PM   Drug-Drug   1. POTASSIUM-SPARING DIURETICS / ANGIOTENSIN II RECEPTOR ANTAGONISTS [Level: Major]   Other Orders: spironolactone (ALDACTONE) 50 MG tablet         Overridden by Seble Cabral RN on Jul 9, 2021 4:07 PM   Drug-Drug   1. POTASSIUM-SPARING DIURETICS / ANGIOTENSIN II RECEPTOR ANTAGONISTS [Level: Major] [Reason: Tolerated medication/side effects in past]   Other Orders: spironolactone (ALDACTONE) 50 MG tablet      Allergy/Contraindication   1. LISINOPRIL [Level: Cross-sensitive Class Match] [Reason: Tolerated medication/side effects in past]

## 2022-08-02 ENCOUNTER — TELEPHONE (OUTPATIENT)
Dept: PHARMACY | Facility: CLINIC | Age: 52
End: 2022-08-02

## 2022-08-02 NOTE — TELEPHONE ENCOUNTER
Called patient to review blood sugars. LM for patient return call.  Yasemin Mcmahon, Pharm.D, BCACP  Medication Therapy Management Pharmacist

## 2022-08-07 ENCOUNTER — HEALTH MAINTENANCE LETTER (OUTPATIENT)
Age: 52
End: 2022-08-07

## 2022-08-08 DIAGNOSIS — E11.65 TYPE 2 DIABETES MELLITUS WITH HYPERGLYCEMIA, WITH LONG-TERM CURRENT USE OF INSULIN (H): ICD-10-CM

## 2022-08-08 DIAGNOSIS — Z79.4 TYPE 2 DIABETES MELLITUS WITH HYPERGLYCEMIA, WITH LONG-TERM CURRENT USE OF INSULIN (H): ICD-10-CM

## 2022-08-09 RX ORDER — INSULIN GLARGINE 100 [IU]/ML
INJECTION, SOLUTION SUBCUTANEOUS
Qty: 15 ML | Refills: 0 | Status: SHIPPED | OUTPATIENT
Start: 2022-08-09 | End: 2022-08-15

## 2022-08-10 ENCOUNTER — LAB (OUTPATIENT)
Dept: LAB | Facility: CLINIC | Age: 52
End: 2022-08-10
Payer: COMMERCIAL

## 2022-08-10 ENCOUNTER — OFFICE VISIT (OUTPATIENT)
Dept: CARDIOLOGY | Facility: CLINIC | Age: 52
End: 2022-08-10
Payer: COMMERCIAL

## 2022-08-10 VITALS
WEIGHT: 279.7 LBS | DIASTOLIC BLOOD PRESSURE: 87 MMHG | HEART RATE: 88 BPM | OXYGEN SATURATION: 100 % | SYSTOLIC BLOOD PRESSURE: 139 MMHG | BODY MASS INDEX: 45.22 KG/M2

## 2022-08-10 DIAGNOSIS — E11.65 TYPE 2 DIABETES MELLITUS WITH HYPERGLYCEMIA, WITH LONG-TERM CURRENT USE OF INSULIN (H): ICD-10-CM

## 2022-08-10 DIAGNOSIS — J45.20 INTERMITTENT ASTHMA, UNCOMPLICATED: ICD-10-CM

## 2022-08-10 DIAGNOSIS — G47.33 OSA (OBSTRUCTIVE SLEEP APNEA): ICD-10-CM

## 2022-08-10 DIAGNOSIS — I50.22 CHRONIC SYSTOLIC CONGESTIVE HEART FAILURE (H): ICD-10-CM

## 2022-08-10 DIAGNOSIS — I50.22 CHRONIC SYSTOLIC HEART FAILURE (H): Primary | ICD-10-CM

## 2022-08-10 DIAGNOSIS — E11.00 TYPE 2 DIABETES MELLITUS WITH HYPEROSMOLARITY WITHOUT COMA, WITH LONG-TERM CURRENT USE OF INSULIN (H): ICD-10-CM

## 2022-08-10 DIAGNOSIS — I42.8 NONISCHEMIC CARDIOMYOPATHY (H): ICD-10-CM

## 2022-08-10 DIAGNOSIS — Z79.4 TYPE 2 DIABETES MELLITUS WITH HYPERGLYCEMIA, WITH LONG-TERM CURRENT USE OF INSULIN (H): ICD-10-CM

## 2022-08-10 DIAGNOSIS — Z79.4 TYPE 2 DIABETES MELLITUS WITH HYPEROSMOLARITY WITHOUT COMA, WITH LONG-TERM CURRENT USE OF INSULIN (H): ICD-10-CM

## 2022-08-10 DIAGNOSIS — I10 ESSENTIAL HYPERTENSION WITH GOAL BLOOD PRESSURE LESS THAN 140/90: ICD-10-CM

## 2022-08-10 LAB
ANION GAP SERPL CALCULATED.3IONS-SCNC: 6 MMOL/L (ref 3–14)
BUN SERPL-MCNC: 13 MG/DL (ref 7–30)
CALCIUM SERPL-MCNC: 9.5 MG/DL (ref 8.5–10.1)
CHLORIDE BLD-SCNC: 102 MMOL/L (ref 94–109)
CO2 SERPL-SCNC: 29 MMOL/L (ref 20–32)
CREAT SERPL-MCNC: 1.02 MG/DL (ref 0.52–1.04)
GFR SERPL CREATININE-BSD FRML MDRD: 65 ML/MIN/1.73M2
GLUCOSE BLD-MCNC: 262 MG/DL (ref 70–99)
HBA1C MFR BLD: 9.7 % (ref 0–5.6)
POTASSIUM BLD-SCNC: 3.6 MMOL/L (ref 3.4–5.3)
SODIUM SERPL-SCNC: 137 MMOL/L (ref 133–144)

## 2022-08-10 PROCEDURE — 83036 HEMOGLOBIN GLYCOSYLATED A1C: CPT

## 2022-08-10 PROCEDURE — 36415 COLL VENOUS BLD VENIPUNCTURE: CPT

## 2022-08-10 PROCEDURE — 80048 BASIC METABOLIC PNL TOTAL CA: CPT | Performed by: NURSE PRACTITIONER

## 2022-08-10 PROCEDURE — 99213 OFFICE O/P EST LOW 20 MIN: CPT | Performed by: NURSE PRACTITIONER

## 2022-08-10 NOTE — PROGRESS NOTES
SANJAY  Bettina Montes is a 52 year old female with a past medical history of HFrEF (30-35%) secondary to NICM (felt to be idiopathic), HTN, obesity, DM II, cervical spine fusion, and hyperlipidemia who presents for routine follow-up. She was last seen by Dr. Chinchilla on 6/5/20.    Bettina had her last CORE visit on 4/6/22.   She has been seeing the MTM for DM management.  She had a decrease in her metformin a while back. She is on a higher dose of Trulicity. She says her BS have improved but still need work, she will be seeing an endocrinologist soon she says.  No SOB at rest and some CASILLAS- but doesn't need to stop what she is doing.  She says there is now no swelling in her legs or abdomen. Home weight 275-280 lbs. Her appetite has been fine. She denies early satiety. Continues to self titrate her afternoon diuretic doses. She does take 60 mg daily and cuts down to 40 mg daily and watches her symptoms.     She denies any chest pain, lightheadedness, dizziness, or near syncopal episodes.     Pt is taking hydralazine 50mg BID, coreg 25 BID, entresto 97/103 BID, spironolactone 50 qday and torsemide 60mg qday.       PMH  Past Medical History:   Diagnosis Date     Abnormal Pap smear of cervix 09/10/2019    See problem list     Adrenal gland anomaly      CHF (congestive heart failure) (H)      Fatty liver      Gastroesophageal reflux disease      Hyperlipidemia      Hypertension      Mild persistent asthma      NICM (nonischemic cardiomyopathy) (H)      Obesity      WILLARD (obstructive sleep apnea) 1/9/2015     Type 2 diabetes mellitus (H)        Past Surgical History:   Procedure Laterality Date     COLONOSCOPY       DISCECTOMY, FUSION CERVICAL ANTERIOR TWO LEVELS, COMBINED N/A 4/5/2019    Procedure: C4-6 anterior cervical discectomy and fusion;  Surgeon: Hollis Hurtado MD;  Location: [facility] OR     TUBAL LIGATION         Family History   Problem Relation Age of Onset     Diabetes Mother      Hypertension Mother       Hyperlipidemia Mother      Heart Failure Mother      Diabetes Father      Cancer Paternal Grandmother         ?       Social History     Socioeconomic History     Marital status: Single     Spouse name: None     Number of children: 1     Years of education: None     Highest education level: None   Occupational History     Occupation: CNA     Employer: OTHER   Social Needs     Financial resource strain: None     Food insecurity:     Worry: None     Inability: None     Transportation needs:     Medical: None     Non-medical: None   Tobacco Use     Smoking status: Former Smoker     Packs/day: 1.00     Years: 30.00     Pack years: 30.00     Types: Cigarettes     Last attempt to quit: 2013     Years since quittin.8     Smokeless tobacco: Former User     Tobacco comment:     Substance and Sexual Activity     Alcohol use: Yes     Alcohol/week: 0.0 standard drinks     Comment: 1     Drug use: No     Sexual activity: Yes     Partners: Male     Birth control/protection: Surgical   Lifestyle     Physical activity:     Days per week: None     Minutes per session: None     Stress: None   Relationships     Social connections:     Talks on phone: None     Gets together: None     Attends Christian service: None     Active member of club or organization: None     Attends meetings of clubs or organizations: None     Relationship status: None     Intimate partner violence:     Fear of current or ex partner: None     Emotionally abused: None     Physically abused: None     Forced sexual activity: None   Other Topics Concern     Parent/sibling w/ CABG, MI or angioplasty before 65F 55M? No   Social History Narrative     None       ALLERGIES  Allergies   Allergen Reactions     Amlodipine Swelling     Edema on 5mg throat     Lisinopril      Swelling in throat, face  angioedema     Naprosyn [Naproxen]      Swelling, diff breathing       MEDICATIONS acetaminophen (TYLENOL) 500 MG tablet, Take 500-1,000 mg by mouth every 6 hours as  needed for mild pain  blood glucose (NO BRAND SPECIFIED) test strip, Use to test blood sugar 3-4 times daily or as directed.  carvedilol (COREG) 25 MG tablet, Take 1 tablet (25 mg) by mouth 2 times daily (with meals)  Chlorpheniramine-DM (CORICIDIN HBP COUGH/COLD) 4-30 MG TABS, Take 1 tablet by mouth as needed  cholecalciferol 50 MCG (2000 UT) CAPS, Take 2,000 Units by mouth daily   Dulaglutide (TRULICITY) 4.5 MG/0.5ML SOPN, Inject 4.5 mg Subcutaneous once a week  fluticasone-salmeterol (ADVAIR) 250-50 MCG/DOSE inhaler, Inhale 1 puff into the lungs 2 times daily   hydrALAZINE (APRESOLINE) 10 MG tablet, Take 1 tablet (10 mg) by mouth 2 times daily  insulin aspart (NOVOLOG FLEXPEN) 100 UNIT/ML pen, Inject 15 Units Subcutaneous 3 times daily (with meals)  insulin glargine (BASAGLAR KWIKPEN) 100 UNIT/ML pen, INJECT 75 UNITS SUBCUTANEOUSLY AT BEDTIME  insulin pen needle (ULTICARE MICRO) 32G X 4 MM miscellaneous, Use 4 pen needles daily or as directed.  levalbuterol (XOPENEX HFA) 45 MCG/ACT Inhaler, Inhale 2 puffs into the lungs every 4 hours as needed for shortness of breath / dyspnea or wheezing  lovastatin (MEVACOR) 20 MG tablet, TAKE 1 TABLET BY MOUTH EVERYDAY AT BEDTIME  magnesium oxide (MAG-OX) 400 (241.3 Mg) MG tablet, Take 1 tablet (400 mg) by mouth daily  montelukast (SINGULAIR) 10 MG tablet, Take 1 tablet (10 mg) by mouth At Bedtime  sacubitril-valsartan (ENTRESTO)  MG per tablet, Take 1 tablet by mouth 2 times daily  spironolactone (ALDACTONE) 50 MG tablet, Take 1 tablet (50 mg) by mouth daily  torsemide (DEMADEX) 20 MG tablet, Patient to take 40 mg daily and take an extra 20 mg if weight goes 285 lbs or higher.  Continuous Blood Gluc  (DEXCOM G6 ) JEANNE, Use to read blood sugars as per 's instructions. (Patient not taking: No sig reported)  Continuous Blood Gluc Sensor (DEXCOM G6 SENSOR) MISC, Change every 10 days. (Patient not taking: No sig reported)  Continuous Blood Gluc  Sensor (FREESTYLE ANETA 14 DAY SENSOR) MISC, 1 Device every 14 days Change sensor as directed every 14 days (Patient not taking: No sig reported)  Continuous Blood Gluc Transmit (DEXCOM G6 TRANSMITTER) MISC, Change every 3 months. (Patient not taking: No sig reported)    sodium chloride (PF) 0.9% PF flush 10 mL            ROS:   Constitutional: No fever, chills, or sweats.  ENT: No visual disturbance, ear ache, epistaxis, sore throat.   Allergies/Immunologic: Negative  Respiratory: No cough, hemoptysis.   Cardiovascular: As per HPI.   GI: As per HPI.   : No urinary frequency, dysuria, or hematuria.   Integument: Negative.   Psychiatric: Negative.   Neuro: Negative.   Endocrinology: negative   Musculoskeletal: negative    EXAM:   General: appears comfortable, alert and articulate  Head: normocephalic, atraumatic  Eyes: anicteric sclera, EOMI  Neck: Supple, no adenopathy  Orophyarynx: moist mucosa, no cyanosis  Heart: regular, S1/S2, no murmur, gallop, rub, estimated JVP not detected at 90 degrees  Lungs: Respirations even and unlabored, lungs clear, no rales or wheezing.  Lungs sounds decreased bilaterally in the bases.  Abdomen:  distended and firmer, non-tender, bowel sounds present, no hepatomegaly  Extremities: no clubbing, cyanosis. Has trace edema  Neurological: normal speech and affect, no gross motor deficits  Skin:  Warm and dry.      LABS  Last Comprehensive Metabolic Panel:  Sodium   Date Value Ref Range Status   04/06/2022 137 133 - 144 mmol/L Final   03/23/2021 133 133 - 144 mmol/L Final     Potassium   Date Value Ref Range Status   04/06/2022 3.8 3.4 - 5.3 mmol/L Final   03/23/2021 3.8 3.4 - 5.3 mmol/L Final     Chloride   Date Value Ref Range Status   04/06/2022 99 94 - 109 mmol/L Final   03/23/2021 95 94 - 109 mmol/L Final     Carbon Dioxide   Date Value Ref Range Status   03/23/2021 35 (H) 20 - 32 mmol/L Final     Carbon Dioxide (CO2)   Date Value Ref Range Status   04/06/2022 27 20 - 32 mmol/L  Final     Anion Gap   Date Value Ref Range Status   04/06/2022 11 3 - 14 mmol/L Final   03/23/2021 3 3 - 14 mmol/L Final     Glucose   Date Value Ref Range Status   04/06/2022 229 (H) 70 - 99 mg/dL Final   03/23/2021 260 (H) 70 - 99 mg/dL Final     Urea Nitrogen   Date Value Ref Range Status   04/06/2022 15 7 - 30 mg/dL Final   03/23/2021 24 7 - 30 mg/dL Final     Creatinine   Date Value Ref Range Status   04/06/2022 1.03 0.52 - 1.04 mg/dL Final   03/23/2021 1.18 (H) 0.52 - 1.04 mg/dL Final     GFR Estimate   Date Value Ref Range Status   04/06/2022 65 >60 mL/min/1.73m2 Final     Comment:     Effective December 21, 2021 eGFRcr in adults is calculated using the 2021 CKD-EPI creatinine equation which includes age and gender (Roxanne argueta al., NEJ, DOI: 10.1056/SSVVww6075352)   03/23/2021 53 (L) >60 mL/min/[1.73_m2] Final     Comment:     Non  GFR Calc  Starting 12/18/2018, serum creatinine based estimated GFR (eGFR) will be   calculated using the Chronic Kidney Disease Epidemiology Collaboration   (CKD-EPI) equation.       Calcium   Date Value Ref Range Status   04/06/2022 9.7 8.5 - 10.1 mg/dL Final   03/23/2021 9.9 8.5 - 10.1 mg/dL Final       MOST RECENT ECHOCARDIOGRAM 10/25/19:  Interpretation Summary  Technically difficult study.  Left ventricular size is normal. Mildly (EF 40-45%, traced 45%) reduced left ventricular function is present.  Right ventricular function, chamber size, wall motion, and thickness are normal. This study was compared with the study from 3/21/18: The left ventricular function has improved    ASSESSMENT AND PLAN  Bettina Montes is a 52 year old female with NICM who appears euvolemic today. [facility].  Unfortunately she couldn't tolerate the dapaglifozin. She has been feeling well and is successfully self -titrating her diuretic.      1. Chronic systolic heart failure/HFrEF (EF 40-45%) secondary to nonischemic cardiomyopathy  NYHA Symptom Class IIIB  Stage  C  Primary Cardiologist: Dr Chinchilla; Last seen 6/17/22  ACE-I/ARB/ARNi:  Entresto  mg- titration complete  BB yes, Carvedilol 25 mg twice daily- titration complete  Aldosterone antagonist yes, Spironolactone 50 mg daily.   SCD prophylaxis EF > 35%   Fluid status hypovolemic  Cardiac Rehab: Completed  Sleep Apnea Evaluation: Documented sleep apnea.    Remote monitoring: Mychart active  Antiplatelet: none.   Anticoagulation: None.  SGLT II- unable to tolerate the dapaglifozin- frequent yeast infections.     # Obesity: BMI 47.29.  She is working on weight loss.  Continue to reassess at subsequent visits.     #  HTN:   continue Entresto   Continue to monitor BP readings at home.      #. Shoulder surgery. - October - 2021    4.  Follow-up:    CORE in 6 months             Heide GU NP-C

## 2022-08-10 NOTE — PROGRESS NOTES
Bettina Montes's goals for this visit include:     She requests these members of her care team be copied on today's visit information: PCP    PCP: Prabhu Robbins    Referring Provider:  No referring provider defined for this encounter.    /87 (BP Location: Right arm, Patient Position: Sitting, Cuff Size: Adult Regular)   Pulse 88   Wt 126.9 kg (279 lb 11.2 oz)   LMP  (LMP Unknown)   SpO2 100%   BMI 45.22 kg/m      Do you need any medication refills at today's visit? No.    Yonas Chris, EMT  Clinic Support  [facility]    [phone]    Employed by [facility] [employer]

## 2022-08-10 NOTE — PATIENT INSTRUCTIONS
Take your medicines every day, as directed    Changes made today:  None     Monitor Your Weight and Symptoms    Contact us if you:    Gain 2 pounds in one day or 5 pounds in one week  Feel more short of breath  Notice more leg swelling  Feel lightheadeded   Change your lifestyle    Limit Salt or Sodium:  2000 mg  Limit Fluids:  2000 mL or approximately 64 ounces  Eat a Heart Healthy Diet  Low in saturated fats  Stay Active:  Aim to move at least 150 minutes every  week         To Contact us    During Business Hours:  [phone], option # 1      After hours, weekends or holidays:   [phone], Option #4  Ask to speak to the On-Call Cardiologist. Inform them you are a CORE/heart failure patient at the [facility].     Use [employer] | [employer] allows you to communicate directly with your heart team through secure messaging.  [employer] can be accessed any time on your phone, computer, or tablet.  If you need assistance, we'd be happy to help!         Keep your Heart Appointments:    CORE in 6 months

## 2022-08-15 ENCOUNTER — TELEPHONE (OUTPATIENT)
Dept: PHARMACY | Facility: CLINIC | Age: 52
End: 2022-08-15

## 2022-08-15 DIAGNOSIS — E11.65 TYPE 2 DIABETES MELLITUS WITH HYPERGLYCEMIA, WITH LONG-TERM CURRENT USE OF INSULIN (H): Primary | ICD-10-CM

## 2022-08-15 DIAGNOSIS — Z79.4 TYPE 2 DIABETES MELLITUS WITH HYPERGLYCEMIA, WITH LONG-TERM CURRENT USE OF INSULIN (H): Primary | ICD-10-CM

## 2022-08-15 DIAGNOSIS — E11.65 TYPE 2 DIABETES MELLITUS WITH HYPERGLYCEMIA, WITH LONG-TERM CURRENT USE OF INSULIN (H): ICD-10-CM

## 2022-08-15 DIAGNOSIS — Z79.4 TYPE 2 DIABETES MELLITUS WITH HYPERGLYCEMIA, WITH LONG-TERM CURRENT USE OF INSULIN (H): ICD-10-CM

## 2022-08-15 RX ORDER — INSULIN ASPART 100 [IU]/ML
18 INJECTION, SOLUTION INTRAVENOUS; SUBCUTANEOUS
Qty: 15 ML | Refills: 0
Start: 2022-08-15 | End: 2022-09-01

## 2022-08-15 RX ORDER — INSULIN GLARGINE 100 [IU]/ML
INJECTION, SOLUTION SUBCUTANEOUS
Qty: 15 ML | Refills: 0
Start: 2022-08-15 | End: 2022-09-01

## 2022-08-15 NOTE — TELEPHONE ENCOUNTER
We are requesting for the Acclaimde 2 sensors. Its not on the med list can we please get this sent over.   Majo tatum n   [phone]  [facility]

## 2022-08-15 NOTE — TELEPHONE ENCOUNTER
Called patient to review blood sugars.  Patient is currently taking 77 units of Basaglar, and Novolog 15 units twice daily.  Reports no low blood sugars.  Doesn't have her meter with her today. Mainly checking   Just picked up a 3 month supply of Trulicity.  Has not set up an appointment with endocrinology.       Recommend increasing Novolog to 18 units twice daily.  Recommend scheduling an appointment with endocrinology.  Could consider Mounjaro as an alternative to Trulicity when Trulicity supply is exhausted.   Will check on Dexcom coverage. (all together $150 for transmitter,  and sensors)    Yasemin Mcmahon, Pharm.D, BCACP  Medication Therapy Management Pharmacist

## 2022-08-15 NOTE — TELEPHONE ENCOUNTER
Routing to provider to review and approve if appropriate.     Yasemin Aguirre RN  [facility]

## 2022-08-17 ENCOUNTER — TELEPHONE (OUTPATIENT)
Dept: FAMILY MEDICINE | Facility: CLINIC | Age: 52
End: 2022-08-17

## 2022-08-17 NOTE — TELEPHONE ENCOUNTER
{Screenshot of Product, Insurance, and Cardholder ID)  Product freestyle elzbieta 2 sensor  Insurance: [employer]  Cardholder id: [id number]        Please route determinations to the Pharm Diabetes pool ([id number]).      Thank you!    Diabetes Care Services Team   [facility]  [address] [place] [address]

## 2022-08-17 NOTE — TELEPHONE ENCOUNTER
Central Prior Authorization Team   Phone: [phone]      PA Initiation    Medication: Continuous Blood Gluc Sensor (FREESTYLE ANETA 2 SENSOR) MISC - INITIATED  Insurance Company: [employer] - Phone [phone] Fax [phone]  Pharmacy Filling the Rx: [facility] - [place], [place] - [address]  Filling Pharmacy Phone: [phone]  Filling Pharmacy Fax:    Start Date: 8/17/2022

## 2022-08-18 NOTE — TELEPHONE ENCOUNTER
PRIOR AUTHORIZATION DENIED    Medication: Continuous Blood Gluc Sensor (FREESTYLE ANETA 2 SENSOR) MISC - DENIED    Denial Date: 8/18/2022    Denial Rational: FORMULARY ALTERNATIVE: DEXCOM      Appeal Information: IF PROVIDER WOULD LIKE TO APPEAL THIS DECISION PLEASE PROVIDE PA TEAM WITH LETTER OF MEDICAL NECESSITY

## 2022-08-23 NOTE — TELEPHONE ENCOUNTER
Hello-  Pt's CGM was denied through insurance. In the denial it states if provider would like to appeal, please provide the PA team with letter of medical necessity.  Fill that out, and send back to the Pa team or Jackie Schreiber.     Thank you,   Opal BARAJAS Team   [facility]

## 2022-08-25 ENCOUNTER — ANCILLARY PROCEDURE (OUTPATIENT)
Dept: CARDIOLOGY | Facility: CLINIC | Age: 52
End: 2022-08-25
Attending: INTERNAL MEDICINE
Payer: COMMERCIAL

## 2022-08-25 DIAGNOSIS — I42.8 NONISCHEMIC CARDIOMYOPATHY (H): ICD-10-CM

## 2022-08-25 LAB — LVEF ECHO: NORMAL

## 2022-08-25 PROCEDURE — 93306 TTE W/DOPPLER COMPLETE: CPT | Performed by: INTERNAL MEDICINE

## 2022-08-25 RX ADMIN — Medication 7 ML: at 16:14

## 2022-08-29 ENCOUNTER — TELEPHONE (OUTPATIENT)
Dept: PHARMACY | Facility: CLINIC | Age: 52
End: 2022-08-29

## 2022-08-29 NOTE — TELEPHONE ENCOUNTER
Called patient to review blood sugars. LM for patient to return call or reply to [employer] message.    Yasemin Mcmahon, Pharm.D, BCACP  Medication Therapy Management Pharmacist

## 2022-09-01 ENCOUNTER — OFFICE VISIT (OUTPATIENT)
Dept: FAMILY MEDICINE | Facility: CLINIC | Age: 52
End: 2022-09-01
Payer: COMMERCIAL

## 2022-09-01 VITALS
HEART RATE: 92 BPM | HEIGHT: 66 IN | SYSTOLIC BLOOD PRESSURE: 111 MMHG | WEIGHT: 280.4 LBS | OXYGEN SATURATION: 99 % | TEMPERATURE: 98.2 F | DIASTOLIC BLOOD PRESSURE: 75 MMHG | BODY MASS INDEX: 45.06 KG/M2

## 2022-09-01 DIAGNOSIS — B96.89 BACTERIAL VAGINOSIS: ICD-10-CM

## 2022-09-01 DIAGNOSIS — J45.30 MILD PERSISTENT ASTHMA WITHOUT COMPLICATION: ICD-10-CM

## 2022-09-01 DIAGNOSIS — N89.8 VAGINAL DISCHARGE: ICD-10-CM

## 2022-09-01 DIAGNOSIS — Z12.31 VISIT FOR SCREENING MAMMOGRAM: ICD-10-CM

## 2022-09-01 DIAGNOSIS — Z79.4 TYPE 2 DIABETES MELLITUS WITH HYPERGLYCEMIA, WITH LONG-TERM CURRENT USE OF INSULIN (H): Primary | ICD-10-CM

## 2022-09-01 DIAGNOSIS — E78.5 HYPERLIPIDEMIA LDL GOAL <100: ICD-10-CM

## 2022-09-01 DIAGNOSIS — E11.65 TYPE 2 DIABETES MELLITUS WITH HYPERGLYCEMIA, WITH LONG-TERM CURRENT USE OF INSULIN (H): Primary | ICD-10-CM

## 2022-09-01 DIAGNOSIS — Z12.11 SCREEN FOR COLON CANCER: ICD-10-CM

## 2022-09-01 DIAGNOSIS — N76.0 BACTERIAL VAGINOSIS: ICD-10-CM

## 2022-09-01 DIAGNOSIS — I10 ESSENTIAL HYPERTENSION WITH GOAL BLOOD PRESSURE LESS THAN 140/90: ICD-10-CM

## 2022-09-01 DIAGNOSIS — E66.01 MORBID OBESITY (H): ICD-10-CM

## 2022-09-01 LAB
CLUE CELLS: PRESENT
TRICHOMONAS, WET PREP: ABNORMAL
WBC'S/HIGH POWER FIELD, WET PREP: ABNORMAL
YEAST, WET PREP: ABNORMAL

## 2022-09-01 PROCEDURE — 99214 OFFICE O/P EST MOD 30 MIN: CPT | Performed by: FAMILY MEDICINE

## 2022-09-01 PROCEDURE — 87210 SMEAR WET MOUNT SALINE/INK: CPT | Performed by: FAMILY MEDICINE

## 2022-09-01 RX ORDER — MONTELUKAST SODIUM 10 MG/1
1 TABLET ORAL AT BEDTIME
Qty: 90 TABLET | Refills: 3 | Status: SHIPPED | OUTPATIENT
Start: 2022-09-01 | End: 2023-09-18

## 2022-09-01 RX ORDER — INSULIN ASPART 100 [IU]/ML
18 INJECTION, SOLUTION INTRAVENOUS; SUBCUTANEOUS
Qty: 60 ML | Refills: 1 | Status: SHIPPED | OUTPATIENT
Start: 2022-09-01 | End: 2023-09-12

## 2022-09-01 RX ORDER — INSULIN GLARGINE 100 [IU]/ML
INJECTION, SOLUTION SUBCUTANEOUS
Qty: 30 ML | Refills: 1 | Status: SHIPPED | OUTPATIENT
Start: 2022-09-01 | End: 2022-10-18

## 2022-09-01 RX ORDER — METRONIDAZOLE 500 MG/1
500 TABLET ORAL 2 TIMES DAILY
Qty: 14 TABLET | Refills: 0 | Status: SHIPPED | OUTPATIENT
Start: 2022-09-01 | End: 2022-09-08

## 2022-09-01 RX ORDER — FLUTICASONE PROPIONATE AND SALMETEROL 250; 50 UG/1; UG/1
1 POWDER RESPIRATORY (INHALATION) EVERY 12 HOURS
Qty: 1 EACH | Refills: 11 | Status: SHIPPED | OUTPATIENT
Start: 2022-09-01

## 2022-09-01 RX ORDER — PEN NEEDLE, DIABETIC 32GX 5/32"
NEEDLE, DISPOSABLE MISCELLANEOUS
Qty: 400 EACH | Refills: 11 | Status: SHIPPED | OUTPATIENT
Start: 2022-09-01

## 2022-09-01 RX ORDER — SACUBITRIL AND VALSARTAN 97; 103 MG/1; MG/1
1 TABLET, FILM COATED ORAL 2 TIMES DAILY
Qty: 180 TABLET | Refills: 3 | Status: CANCELLED | OUTPATIENT
Start: 2022-09-01

## 2022-09-01 RX ORDER — SPIRONOLACTONE 50 MG/1
50 TABLET, FILM COATED ORAL DAILY
Qty: 90 TABLET | Refills: 3 | Status: CANCELLED | OUTPATIENT
Start: 2022-09-01

## 2022-09-01 RX ORDER — LOVASTATIN 20 MG
TABLET ORAL
Qty: 90 TABLET | Refills: 3 | Status: SHIPPED | OUTPATIENT
Start: 2022-09-01 | End: 2023-08-03 | Stop reason: ALTCHOICE

## 2022-09-01 RX ORDER — TORSEMIDE 20 MG/1
TABLET ORAL
Qty: 180 TABLET | Refills: 11 | Status: CANCELLED | OUTPATIENT
Start: 2022-09-01

## 2022-09-01 ASSESSMENT — ASTHMA QUESTIONNAIRES
QUESTION_5 LAST FOUR WEEKS HOW WOULD YOU RATE YOUR ASTHMA CONTROL: SOMEWHAT CONTROLLED
QUESTION_3 LAST FOUR WEEKS HOW OFTEN DID YOUR ASTHMA SYMPTOMS (WHEEZING, COUGHING, SHORTNESS OF BREATH, CHEST TIGHTNESS OR PAIN) WAKE YOU UP AT NIGHT OR EARLIER THAN USUAL IN THE MORNING: FOUR OR MORE NIGHTS A WEEK
ACT_TOTALSCORE: 9
ACT_TOTALSCORE: 9
QUESTION_1 LAST FOUR WEEKS HOW MUCH OF THE TIME DID YOUR ASTHMA KEEP YOU FROM GETTING AS MUCH DONE AT WORK, SCHOOL OR AT HOME: ALL OF THE TIME
QUESTION_4 LAST FOUR WEEKS HOW OFTEN HAVE YOU USED YOUR RESCUE INHALER OR NEBULIZER MEDICATION (SUCH AS ALBUTEROL): TWO OR THREE TIMES PER WEEK
QUESTION_2 LAST FOUR WEEKS HOW OFTEN HAVE YOU HAD SHORTNESS OF BREATH: MORE THAN ONCE A DAY

## 2022-09-01 ASSESSMENT — PAIN SCALES - GENERAL: PAINLEVEL: MILD PAIN (3)

## 2022-09-01 NOTE — PROGRESS NOTES
Levi Dunbar is a 52 year old presenting for the following health issues:  Diabetes (insulin)      History of Present Illness     Asthma:  She presents for follow up of asthma.  She has some cough, some wheezing, and some shortness of breath. She is using a relief medication a few times a week. She does not have a controller medication. Patient is aware of the following triggers: cold air and strong odors and fumes. The patient has not had a visit to the Emergency Room, Urgent Care or Hospital due to asthma since the last clinic visit.     Diabetes:   She presents for follow up of diabetes.  She is checking home blood glucose one time daily. She checks blood glucose before meals.  Blood glucose is sometimes over 200 and never under 70. She is aware of hypoglycemia symptoms including weakness, lethargy and other. She is concerned about blood sugar frequently over 200. She is having excessive thirst. The patient has had a diabetic eye exam in the last 12 months.         Heart Failure:  She presents for follow up of heart failure. She is experiencing shortness of breath with activity only and at night or when lying flat, which is same as usual. She is experiencing lower extremity edema which is same as usual. She denies orthopenea and coughs at night. Patient is not checking weight daily. She has recently had a None. She has no side effects from medications.  She has has a medical visit for heart failure 3 or more times since the last visit.    Hypertension: She presents for follow up of hypertension.  She does not check blood pressure  regularly outside of the clinic. Outpatient blood pressures have not been over 140/90. She does not follow a low salt diet.       Patient c/o vaginal discharge for months.    Review of Systems   Constitutional, HEENT, cardiovascular, pulmonary, GI, , musculoskeletal, neuro, skin, endocrine and psych systems are negative, except as otherwise noted.      Objective    /75  "(BP Location: Left arm, Patient Position: Sitting, Cuff Size: Adult Large)   Pulse 92   Temp 98.2  F (36.8  C) (Tympanic)   Ht 1.68 m (5' 6.14\")   Wt 127.2 kg (280 lb 6.4 oz)   LMP  (LMP Unknown)   SpO2 99%   BMI 45.06 kg/m    Body mass index is 45.06 kg/m .  Physical Exam   GENERAL: healthy, alert and no distress  NECK: no adenopathy, no asymmetry, masses, or scars and thyroid normal to palpation  RESP: lungs clear to auscultation - no rales, rhonchi or wheezes  CV: regular rate and rhythm, normal S1 S2, no S3 or S4, no murmur, click or rub, no peripheral edema and peripheral pulses strong  ABDOMEN: soft, nontender, no hepatosplenomegaly, no masses and bowel sounds normal  MS: no gross musculoskeletal defects noted, no edema  Diabetic foot exam: normal DP and PT pulses, no trophic changes or ulcerative lesions and normal sensory exam    A/P:    (E11.65,  Z79.4) Type 2 diabetes mellitus with hyperglycemia, with long-term current use of insulin (H)  (primary encounter diagnosis)  Comment:   Plan: insulin pen needle (ULTICARE MICRO) 32G X 4 MM         miscellaneous, insulin glargine (BASAGLAR         KWIKPEN) 100 UNIT/ML pen, insulin aspart         (NOVOLOG FLEXPEN) 100 UNIT/ML pen, Adult         Endocrinology  Referral        Not controlled. Patient rarely checks sugars but fasting usually above 200. Adjust glargine to 85U. Patient will follow up with MTM and Endocrinology.    (I10) Essential hypertension with goal blood pressure less than 140/90  Comment:   Plan: controlled.    (E78.5) Hyperlipidemia LDL goal <100  Comment:   Plan: lovastatin (MEVACOR) 20 MG tablet        Controlled.    (J45.30) Mild persistent asthma without complication  Comment:   Plan: montelukast (SINGULAIR) 10 MG tablet,         fluticasone-salmeterol (WIXELA INHUB) 250-50         MCG/ACT inhaler        Not controlled. Not taking maintenance Wixela. This was refilled.    (Z12.31) Visit for screening mammogram  Comment:   Plan: " MA SCREENING DIGITAL BILAT - Future  (s+30)            (Z12.11) Screen for colon cancer  Comment:   Plan: Colonoscopy Screening  Referral            (N89.8) Vaginal discharge  Comment:   Plan: Wet prep - lab collect            (E66.01) Morbid obesity (H)  Comment:   Plan: Comprehensive Weight Management            Prabhu Robbins MD              [unfilled]  [unfilled]

## 2022-09-02 ENCOUNTER — TELEPHONE (OUTPATIENT)
Dept: GASTROENTEROLOGY | Facility: CLINIC | Age: 52
End: 2022-09-02

## 2022-09-02 NOTE — TELEPHONE ENCOUNTER
[facility] [place], [place] PLAN -          Screening Questions  BlueKIND OF PREP RedLOCATION [review exclusion criteria] GreenSEDATION TYPE      1.  YES Are you active on mychart?       2.  Prabhu Robbins MD Ordering/Referring Provider:      3. [facility] [place], [place]?  What insurance is in the chart?    4.  Y Do you have a legal guardian or medical Power of ?              Are you able to give consent for your medical care?                (Sedation review/consideration needed)      5.   45.06  BMI  [BMI OVER 40-EXTENDED PREP]  If greater than 40 review exclusion criteria [PAC APPT IF @ [facility]]       6.   N Have you had a positive covid test in the last 90 days?      7.   Respiratory Screening :  [If yes to any of the following HOSPITAL setting only]                     N Do you use daily home oxygen?   N Do you have mod to severe Obstructive Sleep Apnea?  [OKAY @ [facility] SH PH RI]                  N Do you have Pulmonary Hypertension?                   N Do you have UNCONTROLLED asthma?         8.   N Have you had a heart or lung transplant?       9.   N Are you currently on dialysis?   [ If yes, G-PREP & HOSPITAL setting only]      10.   N  Do you have chronic kidney disease?  [ If yes, G-PREP ]     11.  N Have you had a stroke or Transient ischemic attack (TIA - aka  mini stroke ) within 6 months?   (If yes, please review exclusion criteria)     12.   N In the past 6 months, have you had any heart related issues including cardiomyopathy or heart attack?           N If yes, did it require cardiac stenting or other implantable device?       13.   N Do you have any implantable devices in your body (pacemaker, defib, LVAD)?  (If yes, please review exclusion criteria)     14.   N Do you take nitroglycerin?            N If yes, how often? n  (if yes, HOSPITAL setting ONLY)     15.   N Are you currently taking any blood thinners?           [IF YES, INFORM PATIENT TO FOLLOW UP W/ ORDERING PROVIDER FOR BRIDGING INSTRUCTIONS]      16.    Y  Do you have a  diagnosis of diabetes?  [ If yes, G-PREP ]     17.   [FEMALES] N Are you currently pregnant?    N If yes, how many weeks?      18.    N  Are you taking any prescription pain medications on a routine schedule?    [ If yes, EXTENDED PREP.] [If yes, MAC]    22.  Do you take the medication Phentermine?     Yes-> Hold for 7 days before procedure.  Please consult your prescribing provider if you have questions about holding this medication.     No-> Continue to next question.     19.   N Do you have any chemical dependencies such as alcohol, street drugs, or methadone?   [If yes, MAC]     20.   N Do you have any history of post-traumatic stress syndrome, severe anxiety or history of psychosis?   [If yes, MAC]     21.   Y Do you transfer independently?       22.   N  On a regular basis do you go 3-5 days between bowel movements?  [ If yes, EXTENDED PREP.]     23.    Preferred LOCAL Pharmacy for Pre Prescription     [id number] [place] [address] [place]            Scheduling Details       Bettina : Caller   (Please ask for phone number if not scheduled by patient)    Lower Endoscopy [Colonoscopy] : Type of Procedure Scheduled   EXTENDED Which Colonoscopy Prep was Sent?     Larissa Eubanks MD Surgeon    10/24 Date of Procedure   MG Location    MOD Sedation Type     Conscious Sedation- Needs  for 6 hours after the procedure  MAC/General-Needs  for 24 hours after procedure     N Pre-op Required at [facility] [place], [place] and OR for MAC sedation   (advise patient they will need a pre-op prior to procedure -)       Y Informed patient they will need an adult    Cannot take any type of public or medical transportation alone     HOME Pre-Procedure Covid test to be completed at [facility] or Externally  [[facility] PCR Testing Required]     Y  Confirmed Nurse will call to complete assessment     Additional comments:

## 2022-09-12 DIAGNOSIS — I10 ESSENTIAL HYPERTENSION WITH GOAL BLOOD PRESSURE LESS THAN 140/90: ICD-10-CM

## 2022-09-12 DIAGNOSIS — I50.22 CHRONIC SYSTOLIC CONGESTIVE HEART FAILURE (H): ICD-10-CM

## 2022-09-12 RX ORDER — SPIRONOLACTONE 50 MG/1
50 TABLET, FILM COATED ORAL DAILY
Qty: 90 TABLET | Refills: 3 | Status: SHIPPED | OUTPATIENT
Start: 2022-09-12 | End: 2023-02-15

## 2022-09-19 ENCOUNTER — TELEPHONE (OUTPATIENT)
Dept: PHARMACY | Facility: CLINIC | Age: 52
End: 2022-09-19

## 2022-09-19 NOTE — TELEPHONE ENCOUNTER
Called patient to review blood sugars. LM for patient to return call or respond to [employer] message    Yasemin Mcmahon, Pharm.D, BCACP  Medication Therapy Management Pharmacist

## 2022-09-27 ENCOUNTER — TELEPHONE (OUTPATIENT)
Dept: PHARMACY | Facility: CLINIC | Age: 52
End: 2022-09-27

## 2022-09-27 ENCOUNTER — TRANSFERRED RECORDS (OUTPATIENT)
Dept: HEALTH INFORMATION MANAGEMENT | Facility: CLINIC | Age: 52
End: 2022-09-27

## 2022-09-27 DIAGNOSIS — E11.65 TYPE 2 DIABETES MELLITUS WITH HYPERGLYCEMIA, WITH LONG-TERM CURRENT USE OF INSULIN (H): Primary | ICD-10-CM

## 2022-09-27 DIAGNOSIS — Z79.4 TYPE 2 DIABETES MELLITUS WITH HYPERGLYCEMIA, WITH LONG-TERM CURRENT USE OF INSULIN (H): Primary | ICD-10-CM

## 2022-09-27 RX ORDER — TIRZEPATIDE 2.5 MG/.5ML
2.5 INJECTION, SOLUTION SUBCUTANEOUS WEEKLY
Qty: 2 ML | Refills: 0 | Status: SHIPPED | OUTPATIENT
Start: 2022-09-27 | End: 2022-10-18 | Stop reason: ALTCHOICE

## 2022-09-27 NOTE — TELEPHONE ENCOUNTER
Per Dr. Rosendo alcazar to switch Trulicity to Mounjaro. New prescription sent. Will pursue PA if necessary.    Yasemin Mcmahon, Pharm.D, BCACP  Medication Therapy Management Pharmacist

## 2022-09-27 NOTE — TELEPHONE ENCOUNTER
Called patient to review blood sugars. Current therapy includes basaglar 45 units twice daily novolog 20 units before each meal (usually doesn't eat lunch)    Has not missed any doses of Trulicity or insulin. Does not report lows. On last box of Trulicity (has 3 syringes left). Doesn't feel like the Trulicity curbs appetite.     Date FBG/ 2hours post Lunch/2hours post Dinner /2hours post    9/27 343 (steak, potatoes, bread, margaritas)      9/25 222      9/24 306      9/23 163      9/22 208      9/21 137      9/20 340      9/19 268        Recommend increasing Novolog to 22 units twice daily. Will also reach out to Dr. Robbins about recommendation to switch Trulicity to Mounjaro.    Will follow up with patient in 2 weeks.    Yasemin Mcmahon, Pharm.D, BCACP  Medication Therapy Management Pharmacist

## 2022-10-12 RX ORDER — BISACODYL 5 MG
TABLET, DELAYED RELEASE (ENTERIC COATED) ORAL
Qty: 4 TABLET | Refills: 0 | Status: SHIPPED | OUTPATIENT
Start: 2022-10-12 | End: 2022-10-26

## 2022-10-17 ENCOUNTER — MYC MEDICAL ADVICE (OUTPATIENT)
Dept: ENDOCRINOLOGY | Facility: CLINIC | Age: 52
End: 2022-10-17

## 2022-10-17 NOTE — PROGRESS NOTES
Outcome for 10/17/22 10:13 AM: [employer] message sent to find out if using a meter or CGM.  Cora Montgoemry [facility]  Adult Endocrinology  Health system, [place]  Outcome for 10/17/22 2:49 PM: Per patient, will send BG readings via [employer], VF  Outcome for 10/18/22 9:41 AM: Glucose Readings sent via [employer], VF   Outcome for 10/18/22 10:03 AM: Glucose Readings sent via [employer]   October 5  7 AM  192  October 6 7 AM  208  October 7  7   October 8 ~9 AM  193  October 9  9 AM  176  October 10  7   October 11  7   October 12  7 AM  316  October 13  7 AM  188  October 14  7 AM  119  October 15  7   October 16  9   October 17  7 AM  174  October 18  7 AM  219    Marco Antonio Cano [facility]  Adult Endocrinology  [facility], [place]      Bettina Montes  is being evaluated via a billable video visit.      How would you like to obtain your AVS? [employer]  For the video visit, send the invitation by: Text to cell phone: [phone]  Will anyone else be joining your video visit? No        Yasemin Eduardo [facility]

## 2022-10-18 ENCOUNTER — VIRTUAL VISIT (OUTPATIENT)
Dept: ENDOCRINOLOGY | Facility: CLINIC | Age: 52
End: 2022-10-18
Attending: FAMILY MEDICINE
Payer: COMMERCIAL

## 2022-10-18 DIAGNOSIS — E11.65 TYPE 2 DIABETES MELLITUS WITH HYPERGLYCEMIA, WITH LONG-TERM CURRENT USE OF INSULIN (H): Primary | ICD-10-CM

## 2022-10-18 DIAGNOSIS — I10 HYPERTENSION, UNSPECIFIED TYPE: ICD-10-CM

## 2022-10-18 DIAGNOSIS — E66.01 MORBID OBESITY (H): ICD-10-CM

## 2022-10-18 DIAGNOSIS — Z79.4 TYPE 2 DIABETES MELLITUS WITH HYPERGLYCEMIA, WITH LONG-TERM CURRENT USE OF INSULIN (H): Primary | ICD-10-CM

## 2022-10-18 PROCEDURE — 99204 OFFICE O/P NEW MOD 45 MIN: CPT | Mod: 95 | Performed by: INTERNAL MEDICINE

## 2022-10-18 RX ORDER — INSULIN GLARGINE 300 U/ML
100 INJECTION, SOLUTION SUBCUTANEOUS DAILY
Qty: 30 ML | Refills: 0 | Status: SHIPPED | OUTPATIENT
Start: 2022-10-18 | End: 2022-11-12

## 2022-10-18 NOTE — LETTER
10/18/2022         RE: Bettina Montes  [address]  [facility] [address]        Dear Colleague,    Thank you for referring your patient, Bettina Montes, to the [facility]. Please see a copy of my visit note below.    Outcome for 10/17/22 10:13 AM: [employer] message sent to find out if using a meter or CGM.  Cora Montgomery [facility]  Adult Endocrinology  [facility]  Outcome for 10/17/22 2:49 PM: Per patient, will send BG readings via [employer], VF  Outcome for 10/18/22 9:41 AM: Glucose Readings sent via [employer], VF   Outcome for 10/18/22 10:03 AM: Glucose Readings sent via [employer]   October 5  7 AM  192  October 6 7 AM  208  October 7  7   October 8 ~9 AM  193  October 9  9 AM  176  October 10  7   October 11  7   October 12  7 AM  316  October 13  7 AM  188  October 14  7 AM  119  October 15  7   October 16  9   October 17  7 AM  174  October 18  7 AM  219    Marco Antonio Cano [facility]  Adult Endocrinology  [facility]      Bettina Montes  is being evaluated via a billable video visit.      How would you like to obtain your AVS? [employer]  For the video visit, send the invitation by: Text to cell phone: [phone]  Will anyone else be joining your video visit? No        Yasemin Eduardo, [facility]        Endocrinology Clinic Visit    Chief Complaint: New Patient     Information obtained from:Patient      Assessment/Treatment Plan:      Type 2 diabetes currently uncontrolled  Past medical history of heart failure, obstructive sleep apnea & morbid obesity Estimated body mass index is 45.06 kg/m  as     Hemoglobin A1c 9.7 with fasting blood sugar as documented below above the target range.  Did not tolerate metformin [due to GI side effects] and SGLT2 inhibitors [due to yeast infection]  Currently on Trulicity 4.5 mg weekly.  Tolerating it well.  Noted that Trulicity was changed to Tirzepatide.   Will switch to Ozempic instead at 1 mg every 7 days and then to be increased to 2 mg every 7 days after 4 weeks.   Currently on insulin glargine however as she is approaching 100 units daily absorption is an issue with this concentration therefore medication was changed to insulin glargine U300 with the same instructions of 100 units daily.  Mealtime insulin to continue at the same dose for now.  Please check before meals blood sugar and will adjust the dose based on results.    Patient Instructions   Adam Dunbar  Started on semaglutide 1 mg every 7 days.  After 4 weeks this medication can be increased to 2 mg every 7 days therefore please contact us before you run out of the 1 mg pen.  Insulin glargine ( U300)  100 units once a day  Continue with mealtime insulin of NovoLog 25 units with meals 3 times per day  Check blood sugar fasting, before meals and bedtime.  Referral to diabetes educator for comprehensive diabetes education and diagnostic continuous glucose monitor.      Return to clinic in 2-3 months.    Test and/or medications prescribed today:  Orders Placed This Encounter   Procedures     Amb Adult Diabetes Educator Referral         Cadence Akbar MD  Staff Endocrinologist    Division of Endocrinology and Diabetes      Subjective:         HPI: Bettina Montes is a 52 year old female with history of type 2 diabetes who is seen in consultation at Prabhu Robbins's request for the same.    Type 2 diabetes diagnosed: 20 years ago  Insulin glargine 90 units   Novolog 24 unis with meals   Trulicty 4.5 weekly    October 5  7 AM  192  October 6 7 AM  208  October 7  7   October 8 ~9 AM  193  October 9  9 AM  176  October 10  7   October 11  7   October 12  7 AM  316  October 13  7 AM  188  October 14  7 AM  119  October 15  7   October 16  9   October 17  7 AM  174  October 18 7 AM  219  Past medical history of heart failure currently clinically stable.  Scheduled  with bariatric surgery for consultation of weight loss procedures.  Has tried multiple diets including weight watchers however sticking to the diet has been difficult.  Lost some weight [about 10 pounds] onn Trulicity but not significant.  Allergies   Allergen Reactions     Amlodipine Swelling     Edema on 5mg throat     Lisinopril      Swelling in throat, face  angioedema     Naprosyn [Naproxen]      Swelling, diff breathing       Current Outpatient Medications   Medication Sig Dispense Refill     acetaminophen (TYLENOL) 500 MG tablet Take 500-1,000 mg by mouth every 6 hours as needed for mild pain       blood glucose (NO BRAND SPECIFIED) test strip Use to test blood sugar 3-4 times daily or as directed. 200 strip 1     carvedilol (COREG) 25 MG tablet Take 1 tablet (25 mg) by mouth 2 times daily (with meals) 180 tablet 3     Chlorpheniramine-DM (CORICIDIN HBP COUGH/COLD) 4-30 MG TABS Take 1 tablet by mouth as needed       cholecalciferol 50 MCG (2000 UT) CAPS Take 2,000 Units by mouth daily        fluticasone-salmeterol (WIXELA INHUB) 250-50 MCG/ACT inhaler Inhale 1 puff into the lungs every 12 hours 1 each 11     hydrALAZINE (APRESOLINE) 10 MG tablet Take 1 tablet (10 mg) by mouth 2 times daily 90 tablet 3     insulin aspart (NOVOLOG FLEXPEN) 100 UNIT/ML pen Inject 18 Units Subcutaneous 3 times daily (with meals) 60 mL 1     insulin glargine U-300 (TOUJEO MAX SOLOSTAR) 300 UNIT/ML (2 units dial) pen Inject 100 Units Subcutaneous daily 30 mL 0     insulin pen needle (ULTICARE MICRO) 32G X 4 MM miscellaneous Use 4 pen needles daily or as directed. 400 each 11     levalbuterol (XOPENEX HFA) 45 MCG/ACT Inhaler Inhale 2 puffs into the lungs every 4 hours as needed for shortness of breath / dyspnea or wheezing 15 g 3     lovastatin (MEVACOR) 20 MG tablet TAKE 1 TABLET BY MOUTH EVERYDAY AT BEDTIME 90 tablet 3     magnesium oxide (MAG-OX) 400 (241.3 Mg) MG tablet Take 1 tablet (400 mg) by mouth daily 100 tablet 3      montelukast (SINGULAIR) 10 MG tablet Take 1 tablet (10 mg) by mouth At Bedtime 90 tablet 3     sacubitril-valsartan (ENTRESTO)  MG per tablet Take 1 tablet by mouth 2 times daily 180 tablet 3     Semaglutide, 1 MG/DOSE, 4 MG/3ML SOPN Inject 1 mg Subcutaneous every 7 days 3 mL 0     spironolactone (ALDACTONE) 50 MG tablet Take 1 tablet (50 mg) by mouth daily 90 tablet 3     torsemide (DEMADEX) 20 MG tablet Patient to take 40 mg daily and take an extra 20 mg if weight goes 285 lbs or higher. 180 tablet 11     bisacodyl (DULCOLAX) 5 MG EC tablet 2 days prior to procedure, take 2 tablets at 4 pm. 1 day prior to procedure, take 2 tablets at 4 pm. For additional instructions refer to your colonoscopy prep instructions. (Patient not taking: Reported on 10/18/2022) 4 tablet 0     Continuous Blood Gluc  (DEXCOM G6 ) JEANNE Use to read blood sugars as per 's instructions. (Patient not taking: No sig reported) 1 each 0     Continuous Blood Gluc Sensor (DEXCOM G6 SENSOR) MISC Change every 10 days. (Patient not taking: No sig reported) 9 each 3     Continuous Blood Gluc Sensor (FREESTYLE ANETA 14 DAY SENSOR) MISC 1 Device every 14 days Change sensor as directed every 14 days (Patient not taking: No sig reported) 2 each 6     Continuous Blood Gluc Sensor (FREESTYLE ANETA 2 SENSOR) MISC 1 each every 14 days (Patient not taking: No sig reported) 6 each 11     Continuous Blood Gluc Transmit (DEXCOM G6 TRANSMITTER) MISC Change every 3 months. (Patient not taking: No sig reported) 1 each 3     polyethylene glycol (GOLYTELY) 236 g suspension 2 days prior at 5pm, mix and drink half of a jug of Golytely. Drink an 8 oz. glass of Golytely every 15 minutes until half of the jug is gone. Place remainder of Golytely in the refrigerator. 1 day prior at 5 pm, drink the 2nd half of a jug of Golytely bowel prep. 6 hours before your check-in time, drink an 8 oz. glass of Golytely every 15 minutes until half of the  2nd jug of [employer] is gone. Discard remainder of second jug. (Patient not taking: Reported on 10/18/2022) 8000 mL 0       Review of Systems     8 point review system (Constitutional, HENT, Eyes, Respiratory, Cardiovascular, Gastrointestinal, Genitourinary, Musculoskeletal,Neurological, Psychiatric/Behavioural, Endocrine) is negative or is as per HPI above  Past medical history, past surgical history, social and family history reviewed in epic.        Objective:   GENERAL: alert and no distress  EYES: Eyes grossly normal to inspection.    RESP: No audible wheeze, cough, or visible cyanosis.      SKIN: Visible skin clear. NEURO: Cranial nerves grossly intact.  Mentation and speech appropriate for age.  PSYCH: Mentation appears normal, affect normal/bright, judgement and insight intact, normal speech and appearance well-groomed.  In House Labs:   Lab Results   Component Value Date    A1C 9.7 08/10/2022    A1C 10.1 12/01/2021    A1C 9.0 09/29/2021    A1C 10.1 06/11/2021    A1C 9.6 01/14/2021    A1C 11.0 08/04/2020    A1C 6.5 09/10/2019    A1C 7.7 05/17/2019       TSH   Date Value Ref Range Status   12/30/2019 1.47 0.40 - 4.00 mU/L Final   05/13/2019 0.71 0.40 - 4.00 mU/L Final   05/10/2017 0.84 0.40 - 4.00 mU/L Final   02/17/2016 1.04 0.40 - 4.00 mU/L Final       Creatinine   Date Value Ref Range Status   08/10/2022 1.02 0.52 - 1.04 mg/dL Final   03/23/2021 1.18 (H) 0.52 - 1.04 mg/dL Final   ]    Recent Labs   Lab Test 09/29/21  0931 01/14/21  1622 05/17/19  1306   CHOL 128  --  147   HDL 42*  --  66   LDL 58 83 64   TRIG 140  --  83       This note has been dictated using voice recognition software.  As a result, there may be errors in the documentation that have gone undetected.  Please consider this when interpreting information in this documentation.      Video-Visit Details  Type of service:  Video Visit  Video Start Time:3:30 PM  Vide End Time:3:59 PM  Originating Location (pt. Location): Other office  Distant  Location (provider location):  Off-site.   Platform used for Video Visit: [employer]  50 minutes spent on the date of the encounter doing chart review, history , counseling, documentation and further activities per the note.        Again, thank you for allowing me to participate in the care of your patient.        Sincerely,        Cadence Akbar MD

## 2022-10-18 NOTE — PATIENT INSTRUCTIONS
Adam Dunbar  Started on semaglutide 1 mg every 7 days.  After 4 weeks this medication can be increased to 2 mg every 7 days therefore please contact us before you run out of the 1 mg pen.  Insulin glargine ( U300)  100 units once a day  Continue with mealtime insulin of NovoLog 25 units with meals 3 times per day  Check blood sugar fasting, before meals and bedtime.  Referral to diabetes educator for comprehensive diabetes education and diagnostic continuous glucose monitor.

## 2022-10-18 NOTE — PROGRESS NOTES
Endocrinology Clinic Visit    Chief Complaint: New Patient     Information obtained from:Patient      Assessment/Treatment Plan:      Type 2 diabetes currently uncontrolled  Past medical history of heart failure, obstructive sleep apnea & morbid obesity Estimated body mass index is 45.06 kg/m  as     Hemoglobin A1c 9.7 with fasting blood sugar as documented below above the target range.  Did not tolerate metformin [due to GI side effects] and SGLT2 inhibitors [due to yeast infection]  Currently on Trulicity 4.5 mg weekly.  Tolerating it well.  Noted that Trulicity was changed to Tirzepatide.  Will switch to Ozempic instead at 1 mg every 7 days and then to be increased to 2 mg every 7 days after 4 weeks.   Currently on insulin glargine however as she is approaching 100 units daily absorption is an issue with this concentration therefore medication was changed to insulin glargine U300 with the same instructions of 100 units daily.  Mealtime insulin to continue at the same dose for now.  Please check before meals blood sugar and will adjust the dose based on results.    Patient Instructions   dAam Dunbar Trulicity  Started on semaglutide 1 mg every 7 days.  After 4 weeks this medication can be increased to 2 mg every 7 days therefore please contact us before you run out of the 1 mg pen.  Insulin glargine ( U300)  100 units once a day  Continue with mealtime insulin of NovoLog 25 units with meals 3 times per day  Check blood sugar fasting, before meals and bedtime.  Referral to diabetes educator for comprehensive diabetes education and diagnostic continuous glucose monitor.      Return to clinic in 2-3 months.    Test and/or medications prescribed today:  Orders Placed This Encounter   Procedures     Amb Adult Diabetes Educator Referral         Cadence Akbar MD  Staff Endocrinologist    Division of Endocrinology and Diabetes      Subjective:         HPI: Bettina CAMPBELL Simon is a 52 year old female with history  of type 2 diabetes who is seen in consultation at [facility]'s request for the same.    Type 2 diabetes diagnosed: 20 years ago  Insulin glargine 90 units   Novolog 24 unis with meals   Trulicty 4.5 weekly    October 5 7 AM  192  October 6 7 AM  208  October 7  7   October 8 ~9 AM  193  October 9  9 AM  176  October 10  7   October 11  7   October 12  7 AM  316  October 13  7 AM  188  October 14  7 AM  119  October 15  7   October 16 9   October 17 7 AM  174  October 18 7 AM  219  Past medical history of heart failure currently clinically stable.  Scheduled with bariatric surgery for consultation of weight loss procedures.  Has tried multiple diets including weight watchers however sticking to the diet has been difficult.  Lost some weight [about 10 pounds] onn Trulicity but not significant.  Allergies   Allergen Reactions     Amlodipine Swelling     Edema on 5mg throat     Lisinopril      Swelling in throat, face  angioedema     Naprosyn [Naproxen]      Swelling, diff breathing       Current Outpatient Medications   Medication Sig Dispense Refill     acetaminophen (TYLENOL) 500 MG tablet Take 500-1,000 mg by mouth every 6 hours as needed for mild pain       blood glucose (NO BRAND SPECIFIED) test strip Use to test blood sugar 3-4 times daily or as directed. 200 strip 1     carvedilol (COREG) 25 MG tablet Take 1 tablet (25 mg) by mouth 2 times daily (with meals) 180 tablet 3     Chlorpheniramine-DM (CORICIDIN HBP COUGH/COLD) 4-30 MG TABS Take 1 tablet by mouth as needed       cholecalciferol 50 MCG (2000 UT) CAPS Take 2,000 Units by mouth daily        fluticasone-salmeterol (WIXELA INHUB) 250-50 MCG/ACT inhaler Inhale 1 puff into the lungs every 12 hours 1 each 11     hydrALAZINE (APRESOLINE) 10 MG tablet Take 1 tablet (10 mg) by mouth 2 times daily 90 tablet 3     insulin aspart (NOVOLOG FLEXPEN) 100 UNIT/ML pen Inject 18 Units Subcutaneous 3 times daily (with meals) 60 mL 1      insulin glargine U-300 (TOUJEO MAX SOLOSTAR) 300 UNIT/ML (2 units dial) pen Inject 100 Units Subcutaneous daily 30 mL 0     insulin pen needle (ULTICARE MICRO) 32G X 4 MM miscellaneous Use 4 pen needles daily or as directed. 400 each 11     levalbuterol (XOPENEX HFA) 45 MCG/ACT Inhaler Inhale 2 puffs into the lungs every 4 hours as needed for shortness of breath / dyspnea or wheezing 15 g 3     lovastatin (MEVACOR) 20 MG tablet TAKE 1 TABLET BY MOUTH EVERYDAY AT BEDTIME 90 tablet 3     magnesium oxide (MAG-OX) 400 (241.3 Mg) MG tablet Take 1 tablet (400 mg) by mouth daily 100 tablet 3     montelukast (SINGULAIR) 10 MG tablet Take 1 tablet (10 mg) by mouth At Bedtime 90 tablet 3     sacubitril-valsartan (ENTRESTO)  MG per tablet Take 1 tablet by mouth 2 times daily 180 tablet 3     Semaglutide, 1 MG/DOSE, 4 MG/3ML SOPN Inject 1 mg Subcutaneous every 7 days 3 mL 0     spironolactone (ALDACTONE) 50 MG tablet Take 1 tablet (50 mg) by mouth daily 90 tablet 3     torsemide (DEMADEX) 20 MG tablet Patient to take 40 mg daily and take an extra 20 mg if weight goes 285 lbs or higher. 180 tablet 11     bisacodyl (DULCOLAX) 5 MG EC tablet 2 days prior to procedure, take 2 tablets at 4 pm. 1 day prior to procedure, take 2 tablets at 4 pm. For additional instructions refer to your colonoscopy prep instructions. (Patient not taking: Reported on 10/18/2022) 4 tablet 0     Continuous Blood Gluc  (DEXCOM G6 ) JEANNE Use to read blood sugars as per 's instructions. (Patient not taking: No sig reported) 1 each 0     Continuous Blood Gluc Sensor (DEXCOM G6 SENSOR) MISC Change every 10 days. (Patient not taking: No sig reported) 9 each 3     Continuous Blood Gluc Sensor (FREESTYLE ANETA 14 DAY SENSOR) MISC 1 Device every 14 days Change sensor as directed every 14 days (Patient not taking: No sig reported) 2 each 6     Continuous Blood Gluc Sensor (FREESTYLE ANETA 2 SENSOR) MISC 1 each every 14 days  (Patient not taking: No sig reported) 6 each 11     Continuous Blood Gluc Transmit (DEXCOM G6 TRANSMITTER) MISC Change every 3 months. (Patient not taking: No sig reported) 1 each 3     polyethylene glycol (GOLYTELY) 236 g suspension 2 days prior at 5pm, mix and drink half of a jug of Golytely. Drink an 8 oz. glass of Golytely every 15 minutes until half of the jug is gone. Place remainder of Golytely in the refrigerator. 1 day prior at 5 pm, drink the 2nd half of a jug of Golytely bowel prep. 6 hours before your check-in time, drink an 8 oz. glass of Golytely every 15 minutes until half of the 2nd jug of Golytely is gone. Discard remainder of second jug. (Patient not taking: Reported on 10/18/2022) 8000 mL 0       Review of Systems     8 point review system (Constitutional, HENT, Eyes, Respiratory, Cardiovascular, Gastrointestinal, Genitourinary, Musculoskeletal,Neurological, Psychiatric/Behavioural, Endocrine) is negative or is as per HPI above  Past medical history, past surgical history, social and family history reviewed in epic.        Objective:   GENERAL: alert and no distress  EYES: Eyes grossly normal to inspection.    RESP: No audible wheeze, cough, or visible cyanosis.      SKIN: Visible skin clear. NEURO: Cranial nerves grossly intact.  Mentation and speech appropriate for age.  PSYCH: Mentation appears normal, affect normal/bright, judgement and insight intact, normal speech and appearance well-groomed.  In House Labs:   Lab Results   Component Value Date    A1C 9.7 08/10/2022    A1C 10.1 12/01/2021    A1C 9.0 09/29/2021    A1C 10.1 06/11/2021    A1C 9.6 01/14/2021    A1C 11.0 08/04/2020    A1C 6.5 09/10/2019    A1C 7.7 05/17/2019       TSH   Date Value Ref Range Status   12/30/2019 1.47 0.40 - 4.00 mU/L Final   05/13/2019 0.71 0.40 - 4.00 mU/L Final   05/10/2017 0.84 0.40 - 4.00 mU/L Final   02/17/2016 1.04 0.40 - 4.00 mU/L Final       Creatinine   Date Value Ref Range Status   08/10/2022 1.02 0.52 -  1.04 mg/dL Final   03/23/2021 1.18 (H) 0.52 - 1.04 mg/dL Final   ]    Recent Labs   Lab Test 09/29/21  0931 01/14/21  1622 05/17/19  1306   CHOL 128  --  147   HDL 42*  --  66   LDL 58 83 64   TRIG 140  --  83       This note has been dictated using voice recognition software.  As a result, there may be errors in the documentation that have gone undetected.  Please consider this when interpreting information in this documentation.      Video-Visit Details  Type of service:  Video Visit  Video Start Time:3:30 PM  Vide End Time:3:59 PM  Originating Location (pt. Location): Other office  Distant Location (provider location):  Off-site.   Platform used for Video Visit: Gucash  50 minutes spent on the date of the encounter doing chart review, history , counseling, documentation and further activities per the note.

## 2022-10-23 ENCOUNTER — HEALTH MAINTENANCE LETTER (OUTPATIENT)
Age: 52
End: 2022-10-23

## 2022-10-24 ENCOUNTER — TRANSFERRED RECORDS (OUTPATIENT)
Dept: HEALTH INFORMATION MANAGEMENT | Facility: CLINIC | Age: 52
End: 2022-10-24

## 2022-10-24 ENCOUNTER — HOSPITAL ENCOUNTER (OUTPATIENT)
Facility: AMBULATORY SURGERY CENTER | Age: 52
Discharge: HOME | End: 2022-10-24
Attending: SURGERY | Admitting: SURGERY
Payer: COMMERCIAL

## 2022-10-24 VITALS
DIASTOLIC BLOOD PRESSURE: 65 MMHG | TEMPERATURE: 97.4 F | RESPIRATION RATE: 16 BRPM | HEART RATE: 80 BPM | SYSTOLIC BLOOD PRESSURE: 104 MMHG | OXYGEN SATURATION: 98 %

## 2022-10-24 DIAGNOSIS — Z12.11 SCREENING FOR COLON CANCER: Primary | ICD-10-CM

## 2022-10-24 LAB
COLONOSCOPY: NORMAL
GLUCOSE BLDC GLUCOMTR-MCNC: 143 MG/DL (ref 70–99)

## 2022-10-24 PROCEDURE — 88305 TISSUE EXAM BY PATHOLOGIST: CPT | Performed by: PATHOLOGY

## 2022-10-24 PROCEDURE — G8918 PT W/O PREOP ORDER IV AB PRO: HCPCS

## 2022-10-24 PROCEDURE — G8907 PT DOC NO EVENTS ON DISCHARG: HCPCS

## 2022-10-24 PROCEDURE — 45385 COLONOSCOPY W/LESION REMOVAL: CPT

## 2022-10-24 RX ORDER — NALOXONE HYDROCHLORIDE 0.4 MG/ML
0.4 INJECTION, SOLUTION INTRAMUSCULAR; INTRAVENOUS; SUBCUTANEOUS
Status: DISCONTINUED | OUTPATIENT
Start: 2022-10-24 | End: 2022-10-25 | Stop reason: HOSPADM

## 2022-10-24 RX ORDER — ONDANSETRON 2 MG/ML
4 INJECTION INTRAMUSCULAR; INTRAVENOUS
Status: DISCONTINUED | OUTPATIENT
Start: 2022-10-24 | End: 2022-10-25 | Stop reason: HOSPADM

## 2022-10-24 RX ORDER — PROCHLORPERAZINE MALEATE 10 MG
10 TABLET ORAL EVERY 6 HOURS PRN
Status: DISCONTINUED | OUTPATIENT
Start: 2022-10-24 | End: 2022-10-25 | Stop reason: HOSPADM

## 2022-10-24 RX ORDER — ONDANSETRON 2 MG/ML
4 INJECTION INTRAMUSCULAR; INTRAVENOUS EVERY 6 HOURS PRN
Status: DISCONTINUED | OUTPATIENT
Start: 2022-10-24 | End: 2022-10-25 | Stop reason: HOSPADM

## 2022-10-24 RX ORDER — LIDOCAINE 40 MG/G
CREAM TOPICAL
Status: DISCONTINUED | OUTPATIENT
Start: 2022-10-24 | End: 2022-10-25 | Stop reason: HOSPADM

## 2022-10-24 RX ORDER — ONDANSETRON 4 MG/1
4 TABLET, ORALLY DISINTEGRATING ORAL EVERY 6 HOURS PRN
Status: DISCONTINUED | OUTPATIENT
Start: 2022-10-24 | End: 2022-10-25 | Stop reason: HOSPADM

## 2022-10-24 RX ORDER — NALOXONE HYDROCHLORIDE 0.4 MG/ML
0.2 INJECTION, SOLUTION INTRAMUSCULAR; INTRAVENOUS; SUBCUTANEOUS
Status: DISCONTINUED | OUTPATIENT
Start: 2022-10-24 | End: 2022-10-25 | Stop reason: HOSPADM

## 2022-10-24 RX ORDER — FLUMAZENIL 0.1 MG/ML
0.2 INJECTION, SOLUTION INTRAVENOUS
Status: ACTIVE | OUTPATIENT
Start: 2022-10-24 | End: 2022-10-24

## 2022-10-24 RX ORDER — FENTANYL CITRATE 50 UG/ML
INJECTION, SOLUTION INTRAMUSCULAR; INTRAVENOUS PRN
Status: DISCONTINUED | OUTPATIENT
Start: 2022-10-24 | End: 2022-10-24 | Stop reason: HOSPADM

## 2022-10-24 NOTE — H&P
[facility] Anesthesia Pre-op History and Physical    Bettina Montes MRN# [id number]   Age: 52 year old YOB: 1970      Date of Surgery: 10/24/22   [facility]      Date of Exam 10/24/2022 Facility (Same day)       Home clinic:   Primary care provider: Prabhu Robbins         Chief Complaint and/or Reason for Procedure:   No chief complaint on file.  Surveillance colonoscopy         Active problem list:     Patient Active Problem List    Diagnosis Date Noted     NSVT (nonsustained ventricular tachycardia) 06/25/2020     Priority: Medium     ASCUS of cervix with negative high risk HPV 09/10/2019     Priority: Medium     2009, 2011, 2014 NIL paps.  9/10/19 ASCUS pap with Neg HPV. Plan cotest in 3 years.        S/P cervical spinal fusion 04/05/2019     Priority: Medium     Nonischemic cardiomyopathy (H) 03/20/2019     Priority: Medium     Callus of foot 10/10/2018     Priority: Medium     Moderate persistent asthma without complication 05/07/2018     Priority: Medium     Adrenal gland anomaly      Priority: Medium     Microscopic hematuria 01/18/2017     Priority: Medium     Intermittent asthma, uncomplicated 01/09/2017     Priority: Medium     Morbid obesity due to excess calories (H) 03/21/2016     Priority: Medium     Hypertension      Priority: Medium     Type 2 diabetes mellitus (H)      Priority: Medium     Mild persistent asthma without complication      Priority: Medium     CHF (congestive heart failure) (H)      Priority: Medium     EF 45%       Hyperlipidemia LDL goal <100      Priority: Medium     WILLARD (obstructive sleep apnea) 01/09/2015     Priority: Medium     Overview:   PSG 1/7/14 with AHI 6, RDI 20.  Rec trial of CPAP.              Medications (include herbals and vitamins):   Any Plavix use in the last 7 days?  No     Current Outpatient Medications   Medication Sig     acetaminophen (TYLENOL) 500 MG tablet Take 500-1,000 mg by mouth every 6 hours as  needed for mild pain     bisacodyl (DULCOLAX) 5 MG EC tablet 2 days prior to procedure, take 2 tablets at 4 pm. 1 day prior to procedure, take 2 tablets at 4 pm. For additional instructions refer to your colonoscopy prep instructions. (Patient not taking: Reported on 10/18/2022)     carvedilol (COREG) 25 MG tablet Take 1 tablet (25 mg) by mouth 2 times daily (with meals)     Chlorpheniramine-DM (CORICIDIN HBP COUGH/COLD) 4-30 MG TABS Take 1 tablet by mouth as needed     cholecalciferol 50 MCG (2000 UT) CAPS Take 2,000 Units by mouth daily      fluticasone-salmeterol (WIXELA INHUB) 250-50 MCG/ACT inhaler Inhale 1 puff into the lungs every 12 hours     hydrALAZINE (APRESOLINE) 10 MG tablet Take 1 tablet (10 mg) by mouth 2 times daily     insulin aspart (NOVOLOG FLEXPEN) 100 UNIT/ML pen Inject 18 Units Subcutaneous 3 times daily (with meals)     insulin glargine U-300 (TOUJEO MAX SOLOSTAR) 300 UNIT/ML (2 units dial) pen Inject 100 Units Subcutaneous daily     levalbuterol (XOPENEX HFA) 45 MCG/ACT Inhaler Inhale 2 puffs into the lungs every 4 hours as needed for shortness of breath / dyspnea or wheezing     lovastatin (MEVACOR) 20 MG tablet TAKE 1 TABLET BY MOUTH EVERYDAY AT BEDTIME     magnesium oxide (MAG-OX) 400 (241.3 Mg) MG tablet Take 1 tablet (400 mg) by mouth daily     montelukast (SINGULAIR) 10 MG tablet Take 1 tablet (10 mg) by mouth At Bedtime     polyethylene glycol (GOLYTELY) 236 g suspension 2 days prior at 5pm, mix and drink half of a jug of Golytely. Drink an 8 oz. glass of Golytely every 15 minutes until half of the jug is gone. Place remainder of Golytely in the refrigerator. 1 day prior at 5 pm, drink the 2nd half of a jug of Golytely bowel prep. 6 hours before your check-in time, drink an 8 oz. glass of Golytely every 15 minutes until half of the 2nd jug of Golytely is gone. Discard remainder of second jug. (Patient not taking: Reported on 10/18/2022)     sacubitril-valsartan (ENTRESTO)  MG per  tablet Take 1 tablet by mouth 2 times daily     Semaglutide, 1 MG/DOSE, 4 MG/3ML SOPN Inject 1 mg Subcutaneous every 7 days     spironolactone (ALDACTONE) 50 MG tablet Take 1 tablet (50 mg) by mouth daily     torsemide (DEMADEX) 20 MG tablet Patient to take 40 mg daily and take an extra 20 mg if weight goes 285 lbs or higher.     blood glucose (NO BRAND SPECIFIED) test strip Use to test blood sugar 3-4 times daily or as directed.     Continuous Blood Gluc  (DEXCOM G6 ) JEANNE Use to read blood sugars as per 's instructions. (Patient not taking: No sig reported)     Continuous Blood Gluc Sensor (DEXCOM G6 SENSOR) MISC Change every 10 days. (Patient not taking: No sig reported)     Continuous Blood Gluc Sensor (FREESTYLE ANETA 14 DAY SENSOR) MISC 1 Device every 14 days Change sensor as directed every 14 days (Patient not taking: No sig reported)     Continuous Blood Gluc Sensor (FREESTYLE ANETA 2 SENSOR) MISC 1 each every 14 days (Patient not taking: No sig reported)     Continuous Blood Gluc Transmit (DEXCOM G6 TRANSMITTER) MISC Change every 3 months. (Patient not taking: No sig reported)     insulin pen needle (ULTICARE MICRO) 32G X 4 MM miscellaneous Use 4 pen needles daily or as directed.     Current Facility-Administered Medications   Medication     lidocaine (LMX4) kit     lidocaine 1 % 0.1-1 mL     ondansetron (ZOFRAN) injection 4 mg     sodium chloride (PF) 0.9% PF flush 3 mL     sodium chloride (PF) 0.9% PF flush 3 mL     Facility-Administered Medications Ordered in Other Encounters   Medication     sodium chloride (PF) 0.9% PF flush 10 mL             Allergies:      Allergies   Allergen Reactions     Amlodipine Swelling     Edema on 5mg throat     Lisinopril      Swelling in throat, face  angioedema     Naprosyn [Naproxen]      Swelling, diff breathing     Allergy to Latex?  No  Allergy to tape?    No  Intolerances:             Physical Exam:   All vitals have been reviewed  Patient  Vitals for the past 8 hrs:   BP Temp Resp SpO2   10/24/22 0837 120/83 97.4  F (36.3  C) 18 99 %     No intake/output data recorded.  Airway assessment:   Patient is able to open mouth wide  Patient is able to stick out tongue  Mallampatti classification: Class II (visualization of the soft palate, fauces, and uvula)}      ENT:   Normocephalic, without obvious abnormality, atraumatic, sinuses nontender on palpation, external ears without lesions, oral pharynx with moist mucous membranes, tonsils without erythema or exudates, gums normal and good dentition.     Neck:   Supple, symmetrical, trachea midline, no adenopathy, thyroid symmetric, not enlarged and no tenderness, skin normal     Lungs:   No increased work of breathing, good air exchange, clear to auscultation bilaterally, no crackles or wheezing     Cardiovascular:   Normal apical impulse, regular rate and rhythm, normal S1 and S2, no S3 or S4, and no murmur noted             Lab / Radiology Results:     Lab Results   Component Value Date    WBC 10.4 06/08/2020    RBC 4.54 06/08/2020    HGB 11.3 09/29/2021    HGB 12.0 06/08/2020    HCT 37.3 06/08/2020    MCV 82 06/08/2020    RDW 14.3 06/08/2020     06/08/2020             Anesthetic risk and/or ASA classification:   2    Larissa Eubanks MD

## 2022-10-25 ENCOUNTER — TELEPHONE (OUTPATIENT)
Dept: SURGERY | Facility: CLINIC | Age: 52
End: 2022-10-25

## 2022-10-25 ENCOUNTER — ANCILLARY PROCEDURE (OUTPATIENT)
Dept: MAMMOGRAPHY | Facility: CLINIC | Age: 52
End: 2022-10-25
Attending: FAMILY MEDICINE
Payer: COMMERCIAL

## 2022-10-25 DIAGNOSIS — Z12.31 VISIT FOR SCREENING MAMMOGRAM: ICD-10-CM

## 2022-10-25 LAB
PATH REPORT.COMMENTS IMP SPEC: NORMAL
PATH REPORT.COMMENTS IMP SPEC: NORMAL
PATH REPORT.FINAL DX SPEC: NORMAL
PATH REPORT.GROSS SPEC: NORMAL
PATH REPORT.MICROSCOPIC SPEC OTHER STN: NORMAL
PATH REPORT.RELEVANT HX SPEC: NORMAL
PHOTO IMAGE: NORMAL

## 2022-10-25 PROCEDURE — 77067 SCR MAMMO BI INCL CAD: CPT | Mod: TC | Performed by: RADIOLOGY

## 2022-10-25 NOTE — PROGRESS NOTES
"Bettina is a 52 year old who is being evaluated via a billable video visit.      If the video visit is dropped, the invitation should be resent by: Text to cell phone: [phone]  Will anyone else be joining your video visit? No      Video-Visit Details    Type of service:  Video Visit    Video Start Time: 10:07    Video End Time: 10:46    60 minutes spent on the date of the encounter doing chart review, review of test results, patient visit and documentation     Originating Location (pt. Location): Home    Distant Location (provider location):  [facility] [place]     Platform used for Video Visit: Homeschool Snowboarding Virtual Bariatric Surgery Consultation Note        2022    RE: Bettina Montes  MR#: [id number]  : 1970      Referring provider: No flowsheet data found.    Chief Complaint/Reason for visit: evaluation for possible weight loss surgery    Dear Prabhu Robbins MD (General),    I had the pleasure of seeing your patient, Bettina Montes, to evaluate her obesity and consider her for possible weight loss surgery. As you know, Bettina Montes is 52 year old.  She has a height of 5' 6\"[Pt reported[, a weight of 280 lbs 0 oz, and calculated Body mass index is 45.19 kg/m .     Assessment & Plan   Problem List Items Addressed This Visit     Type 2 diabetes mellitus (H)    CHF (congestive heart failure) (H)    Moderate persistent asthma without complication   Other Visit Diagnoses     Morbid obesity (H)    -  Primary    Relevant Orders    Vitamin D Deficiency    CBC with platelets    Hepatic panel    NUTRITION REFERRAL    Sleep Study Referral    Sleep disturbance        Relevant Orders    Sleep Study Referral    Screening for iron deficiency anemia        Relevant Orders    CBC with platelets    Screening for endocrine, nutritional, metabolic and immunity disorder        Relevant Orders    Vitamin D Deficiency           In summary, Bettina Montes has " Class III obesity with a Body mass index is 45.19 kg/m .  and the comorbidities stated above. She completed an informational seminar and is a possible candidate for bariatric surgery.   She will have to complete the following pre-requisites:      Goal Weight: Will be established in clinic  Have preoperative laboratory tests drawn.   Sleep clearance   Psychological Evaluation with MMPI and clearance for weight loss surgery  Support group  Hemoglobin A1C below 8  Achieve clearance from dietitian to see surgeon.  Insurance coverage  Cardiac Clearance    Teeth replaced       Pt to follow up in 4-8 wks for a 60 minute IN PERSON visit with either Dr Leal or Aleena Capone PA-C due to my MARINA. Today's visit was an overview of the surgical process and a task list was developed. Given her insulin dependent DM patient would like to pursue the Gastric Bypass. She has watched the online seminar.  At the follow up visit will discuss the gastric bypass along with preoperative, perioperative, and postoperative processes, management, and follow up. Will also need to go over risks and complications. A comprehensive physical exam will be done    Patient did state that she has a bottom partial that she does not wear. Did let her know that she will need to get her partial replaced/repaired.          HISTORY OF PRESENT ILLNESS:  Weight Loss History Reviewed with Patient    How long have you been overweight? Last couple of years    What is the most that you have ever weighed? 315 lbs    What is the most weight you have lost?    I have tried the following methods to lose weight Exercising, eating better    I have tried the following weight loss medications? (Check all that apply) Ozempic   Have you worked with any weight loss programs previously for weight loss or weight loss surgery?    Have you ever had weight loss surgery? No       Patient is a Type 2 diabetic on insulin for over 10 years.  Is on ozempic      CO-MORBIDITIES OF  OBESITY INCLUDE:        I have the following health issues associated with obesity: DMT2 with insulin, high cholesterol     Tested for WILLARD many years ago through [facility].  Not sure of the outcome. Has gained weight since then.         PAST MEDICAL HISTORY:  Past Medical History:   Diagnosis Date     Abnormal Pap smear of cervix 09/10/2019    See problem list     Adrenal gland anomaly      CHF (congestive heart failure) (H)      Fatty liver      Gastroesophageal reflux disease      Hyperlipidemia      Hypertension      Mild persistent asthma      NICM (nonischemic cardiomyopathy) (H)      Obesity      WILLARD (obstructive sleep apnea) 1/9/2015     Type 2 diabetes mellitus (H)        PAST SURGICAL HISTORY:  Past Surgical History:   Procedure Laterality Date     COLONOSCOPY       COLONOSCOPY N/A 10/24/2022    Procedure: COLONOSCOPY, FLEXIBLE, WITH LESION REMOVAL USING SNARE;  Surgeon: Larissa Eubanks MD;  Location: [facility] OR     COLONOSCOPY WITH CO2 INSUFFLATION N/A 10/24/2022    Procedure: COLONOSCOPY, WITH CO2 INSUFFLATION;  Surgeon: Larissa Eubanks MD;  Location: [facility] OR     DISCECTOMY, FUSION CERVICAL ANTERIOR TWO LEVELS, COMBINED N/A 4/5/2019    Procedure: C4-6 anterior cervical discectomy and fusion;  Surgeon: Hollis Hurtado MD;  Location: [facility] OR     TUBAL LIGATION       No personal history of blood clots or anesthesia concerns  Mother is currently being treated for a blood clot. Patient does not know the details      FAMILY HISTORY:   Family History   Problem Relation Age of Onset     Diabetes Mother      Hypertension Mother      Hyperlipidemia Mother      Heart Failure Mother      Diabetes Father      Cancer Paternal Grandmother         ?         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    Which best describes your marital status:    Do you have children? yes   Who do you have  in your support network that can be available to help you for the first 2 weeks after surgery? Daughter or friend   Who can you count on for support throughout your weight loss surgery journey? Daughter   Can you afford 3 meals a day?  No   Can you afford 50-60 dollars a month for vitamins? No     Social History     Tobacco Use     Smoking status: Former     Packs/day: 1.00     Years: 30.00     Pack years: 30.00     Types: Cigarettes     Quit date: 2013     Years since quittin.8     Smokeless tobacco: Former     Tobacco comments:         Substance Use Topics     Alcohol use: Yes     Comment: four drinks a month     Drug use: No        HABITS:        How often do you drink alcohol? weekends   If you do drink alcohol, how many drinks might you have in a day? (one drink = 5 oz. wine, 1 can/bottle of beer, 1 shot liquor) 4 alcoholic beverages weekly    Have you ever used any of the following nicotine products? 2013 quit cigarettes   Have you or are you currently using street drugs or prescription strength medication for which you do not have a prescription for?    Do you have a history of chemical dependency (alcohol or drug abuse)? No       PSYCHOLOGICAL HISTORY:       Have you ever attempted suicide? No   Have you had thoughts of suicide in the past year? No   Have you ever been hospitalized for mental illness or a suicide attempt? No   Do you have a history of chronic pain? No   Have you ever been diagnosed with fibromyalgia? No   Are you currently seeing a therapist or counselor?  No   Are you currently seeing a psychiatrist? No     ROS        Skin:  Denies rashes, intertrigo,    HEENT: Denies Asthma, allergies, thyroid problems   Musculoskeletal: Denies Joint Pain, Back pain, LE edema   Cardiovascular: Congestive heart failure   Pulmonary: Asthma   Gastrointestinal: Denies Nausea, Vomiting, Diarrhea, Constipation   Genitourinary: Denies stress incontinence, kidney stones    Hematological: Denies hx of  clotting disorders, DVT, anemia   Neurological: Denies headaches, seizures   Female only: Denies Polycystic ovarian syndrome (PCOS),  Postmenopausal for years       EATING BEHAVIORS:        Have you or anyone else thought that you had an eating disorder? No   Do you currently binge eat? Not often. Mostly holidays   Are you an emotional eater? sometimes   Do you get up to eat after falling asleep? Not weekly     EXERCISE:        How often do you exercise? No   What long (in minutes)?    What types of exercise do you do?    What keeps you from being more active?  Just does not have the energy     MEDICATIONS:  Current Outpatient Medications   Medication     acetaminophen (TYLENOL) 500 MG tablet     blood glucose (NO BRAND SPECIFIED) test strip     carvedilol (COREG) 25 MG tablet     Chlorpheniramine-DM (CORICIDIN HBP COUGH/COLD) 4-30 MG TABS     cholecalciferol 50 MCG (2000 UT) CAPS     Continuous Blood Gluc  (DEXCOM G6 ) JEANNE     Continuous Blood Gluc Sensor (DEXCOM G6 SENSOR) MISC     Continuous Blood Gluc Sensor (FREESTYLE ANETA 14 DAY SENSOR) MISC     Continuous Blood Gluc Sensor (FREESTYLE ANETA 2 SENSOR) MISC     Continuous Blood Gluc Transmit (DEXCOM G6 TRANSMITTER) MISC     hydrALAZINE (APRESOLINE) 10 MG tablet     insulin aspart (NOVOLOG FLEXPEN) 100 UNIT/ML pen     insulin glargine U-300 (TOUJEO MAX SOLOSTAR) 300 UNIT/ML (2 units dial) pen     insulin pen needle (ULTICARE MICRO) 32G X 4 MM miscellaneous     lovastatin (MEVACOR) 20 MG tablet     magnesium oxide (MAG-OX) 400 (241.3 Mg) MG tablet     montelukast (SINGULAIR) 10 MG tablet     sacubitril-valsartan (ENTRESTO)  MG per tablet     Semaglutide, 1 MG/DOSE, 4 MG/3ML SOPN     spironolactone (ALDACTONE) 50 MG tablet     torsemide (DEMADEX) 20 MG tablet     fluticasone-salmeterol (WIXELA INHUB) 250-50 MCG/ACT inhaler     levalbuterol (XOPENEX HFA) 45 MCG/ACT Inhaler     No current facility-administered medications for this visit.      Facility-Administered Medications Ordered in Other Visits   Medication     sodium chloride (PF) 0.9% PF flush 10 mL        ALLERGIES:  Allergies   Allergen Reactions     Amlodipine Swelling     Edema on 5mg throat     Lisinopril      Swelling in throat, face  angioedema     Naprosyn [Naproxen]      Swelling, diff breathing           LABS AND RECORDS REVIEWED:  Hemoglobin A1C   Date Value Ref Range Status   08/10/2022 9.7 (H) 0.0 - 5.6 % Final     Comment:     Normal <5.7%   Prediabetes 5.7-6.4%    Diabetes 6.5% or higher     Note: Adopted from ADA consensus guidelines.   06/11/2021 10.1 (H) 0 - 5.6 % Final     Comment:     Normal <5.7% Prediabetes 5.7-6.4%  Diabetes 6.5% or higher - adopted from ADA   consensus guidelines.       TSH   Date Value Ref Range Status   12/30/2019 1.47 0.40 - 4.00 mU/L Final     Sodium   Date Value Ref Range Status   08/10/2022 137 133 - 144 mmol/L Final   03/23/2021 133 133 - 144 mmol/L Final     Potassium   Date Value Ref Range Status   08/10/2022 3.6 3.4 - 5.3 mmol/L Final   03/23/2021 3.8 3.4 - 5.3 mmol/L Final     Chloride   Date Value Ref Range Status   08/10/2022 102 94 - 109 mmol/L Final   03/23/2021 95 94 - 109 mmol/L Final     Carbon Dioxide   Date Value Ref Range Status   03/23/2021 35 (H) 20 - 32 mmol/L Final     Carbon Dioxide (CO2)   Date Value Ref Range Status   08/10/2022 29 20 - 32 mmol/L Final     Anion Gap   Date Value Ref Range Status   08/10/2022 6 3 - 14 mmol/L Final   03/23/2021 3 3 - 14 mmol/L Final     Glucose   Date Value Ref Range Status   08/10/2022 262 (H) 70 - 99 mg/dL Final   03/23/2021 260 (H) 70 - 99 mg/dL Final     GLUCOSE BY METER POCT   Date Value Ref Range Status   10/24/2022 143 (H) 70 - 99 mg/dL Final     Urea Nitrogen   Date Value Ref Range Status   08/10/2022 13 7 - 30 mg/dL Final   03/23/2021 24 7 - 30 mg/dL Final     Creatinine   Date Value Ref Range Status   08/10/2022 1.02 0.52 - 1.04 mg/dL Final   03/23/2021 1.18 (H) 0.52 - 1.04 mg/dL  Final     GFR Estimate   Date Value Ref Range Status   08/10/2022 65 >60 mL/min/1.73m2 Final     Comment:     Effective December 21, 2021 eGFRcr in adults is calculated using the 2021 CKD-EPI creatinine equation which includes age and gender (Roxanne et al., NEJ, DOI: 10.1056/NSMAyb5554641)   03/23/2021 53 (L) >60 mL/min/[1.73_m2] Final     Comment:     Non  GFR Calc  Starting 12/18/2018, serum creatinine based estimated GFR (eGFR) will be   calculated using the Chronic Kidney Disease Epidemiology Collaboration   (CKD-EPI) equation.       Calcium   Date Value Ref Range Status   08/10/2022 9.5 8.5 - 10.1 mg/dL Final   03/23/2021 9.9 8.5 - 10.1 mg/dL Final     Bilirubin Total   Date Value Ref Range Status   05/13/2019 0.6 0.2 - 1.3 mg/dL Final     Alkaline Phosphatase   Date Value Ref Range Status   05/13/2019 104 40 - 150 U/L Final     ALT   Date Value Ref Range Status   09/29/2021 22 0 - 50 U/L Final   05/17/2019 22 0 - 50 U/L Final     AST   Date Value Ref Range Status   05/13/2019 13 0 - 45 U/L Final     Cholesterol   Date Value Ref Range Status   09/29/2021 128 <200 mg/dL Final   05/17/2019 147 <200 mg/dL Final     HDL Cholesterol   Date Value Ref Range Status   05/17/2019 66 >49 mg/dL Final     Direct Measure HDL   Date Value Ref Range Status   09/29/2021 42 (L) >=50 mg/dL Final     LDL Cholesterol Calculated   Date Value Ref Range Status   09/29/2021 58 <=100 mg/dL Final   05/17/2019 64 <100 mg/dL Final     Comment:     Desirable:       <100 mg/dl     LDL Cholesterol Direct   Date Value Ref Range Status   01/14/2021 83 <100 mg/dL Final     Comment:     Desirable:       <100 mg/dl     Triglycerides   Date Value Ref Range Status   09/29/2021 140 <150 mg/dL Final   05/17/2019 83 <150 mg/dL Final     Comment:     Non Fasting     WBC   Date Value Ref Range Status   06/08/2020 10.4 4.0 - 11.0 10e9/L Final     Hemoglobin   Date Value Ref Range Status   09/29/2021 11.3 (L) 11.7 - 15.7 g/dL Final  "  06/08/2020 12.0 11.7 - 15.7 g/dL Final     Hematocrit   Date Value Ref Range Status   06/08/2020 37.3 35.0 - 47.0 % Final     MCV   Date Value Ref Range Status   06/08/2020 82 78 - 100 fl Final     Platelet Count   Date Value Ref Range Status   06/08/2020 395 150 - 450 10e9/L Final         PHYSICAL EXAM:  Ht 5' 6\" (1.676 m)   Wt 280 lb (127 kg)   LMP  (LMP Unknown)   BMI 45.19 kg/m    GENERAL: Healthy, alert and no distress  EYES: Eyes grossly normal to inspection.  No discharge or erythema, or obvious scleral/conjunctival abnormalities.  RESP: No audible wheeze, cough, or visible cyanosis.  No visible retractions or increased work of breathing.    SKIN: Visible skin clear. No significant rash, abnormal pigmentation or lesions.  NEURO: Cranial nerves grossly intact.  Mentation and speech appropriate for age.  PSYCH: Mentation appears normal, affect normal/bright, judgement and insight intact, normal speech and appearance well-groomed.         Sincerely,     Khris Arriaga PA-C              "

## 2022-10-25 NOTE — TELEPHONE ENCOUNTER
Left voicemail message for patient reminding them to complete the new patient questionnaire before their appointment.    Deanna WINN MA

## 2022-10-26 ENCOUNTER — VIRTUAL VISIT (OUTPATIENT)
Dept: SURGERY | Facility: CLINIC | Age: 52
End: 2022-10-26
Attending: FAMILY MEDICINE
Payer: COMMERCIAL

## 2022-10-26 VITALS — BODY MASS INDEX: 45 KG/M2 | WEIGHT: 280 LBS | HEIGHT: 66 IN

## 2022-10-26 DIAGNOSIS — Z13.21 SCREENING FOR ENDOCRINE, NUTRITIONAL, METABOLIC AND IMMUNITY DISORDER: ICD-10-CM

## 2022-10-26 DIAGNOSIS — G47.9 SLEEP DISTURBANCE: ICD-10-CM

## 2022-10-26 DIAGNOSIS — Z13.0 SCREENING FOR ENDOCRINE, NUTRITIONAL, METABOLIC AND IMMUNITY DISORDER: ICD-10-CM

## 2022-10-26 DIAGNOSIS — J45.40 MODERATE PERSISTENT ASTHMA WITHOUT COMPLICATION: ICD-10-CM

## 2022-10-26 DIAGNOSIS — Z79.4 TYPE 2 DIABETES MELLITUS WITH HYPEROSMOLARITY WITHOUT COMA, WITH LONG-TERM CURRENT USE OF INSULIN (H): ICD-10-CM

## 2022-10-26 DIAGNOSIS — E66.01 MORBID OBESITY DUE TO EXCESS CALORIES (H): ICD-10-CM

## 2022-10-26 DIAGNOSIS — Z13.29 SCREENING FOR ENDOCRINE, NUTRITIONAL, METABOLIC AND IMMUNITY DISORDER: ICD-10-CM

## 2022-10-26 DIAGNOSIS — E66.01 MORBID OBESITY (H): Primary | ICD-10-CM

## 2022-10-26 DIAGNOSIS — I50.22 CHRONIC SYSTOLIC CONGESTIVE HEART FAILURE (H): ICD-10-CM

## 2022-10-26 DIAGNOSIS — Z13.228 SCREENING FOR ENDOCRINE, NUTRITIONAL, METABOLIC AND IMMUNITY DISORDER: ICD-10-CM

## 2022-10-26 DIAGNOSIS — Z13.0 SCREENING FOR IRON DEFICIENCY ANEMIA: ICD-10-CM

## 2022-10-26 DIAGNOSIS — E11.00 TYPE 2 DIABETES MELLITUS WITH HYPEROSMOLARITY WITHOUT COMA, WITH LONG-TERM CURRENT USE OF INSULIN (H): ICD-10-CM

## 2022-10-26 PROCEDURE — 97802 MEDICAL NUTRITION INDIV IN: CPT | Mod: GT | Performed by: DIETITIAN, REGISTERED

## 2022-10-26 PROCEDURE — 99205 OFFICE O/P NEW HI 60 MIN: CPT | Mod: 95 | Performed by: PHYSICIAN ASSISTANT

## 2022-10-26 NOTE — PROGRESS NOTES
"Video-Visit Details    Type of service:  Video Visit    Video Start Time (time video started): 11:02    Video End Time (time video stopped): 12:50    Originating Location (pt. Location): Other work      Distant Location (provider location):  On-site    Mode of Communication:  Video Conference [facility]      New Bariatric Nutrition Consultation Note    Reason For Visit: Nutrition Assessment    Bettina Montes is a 52 year old presenting today for new bariatric nutrition consult.  Pt is interested in laparoscopic gastric bypass.  Patient is accompanied by self.      ANTHROPOMETRICS:  Estimated body mass index is 45.19 kg/m  as calculated from the following:    Height as of an earlier encounter on 10/26/22: 1.676 m (5' 6\").    Weight as of an earlier encounter on 10/26/22: 127 kg (280 lb).    Required weight loss goal pre-op: to be determined at face to face visit with provider    Support person-daughter-does not live with her    SUPPLEMENT INFORMATION:  5000 international unit(s) Vitamin D    NUTRITION HISTORY:  Breakfast: skips most days, but always has Coke or [employer] McGriddle with sausage, Coke or sweet tea  Lunch: brings from home frozen entree (what ever is on sale) or cup of soup or cup of mac and cheese @ 10:30-12:00  Dinner: fried cabbage, fried chicken, corn bread, rice @ 7:30-8:00  Snacks: all day at work-candy or small bag of chips or crackers (vending or brings stuff)  Beverages: 16 oz water, 1 can or 32 oz Coke per day, sweet tea, ETOH-4 drinks (wine or margaritas) week  Dinning out:3X per week-taco truck or burrito steak or chicken bowl  Emotional eating: denies  Night time eatinx per month-a few chips or crackers   Binge eating: denies-but \"overeats on holidays\"  Exercise:none due to fatigue    ADDITIONAL INFORMATION:  No Patient questionnaire completed in [facility]. Patient cooks and shop for herself. Cooking has become more of a chore. Does not tolerate ice cream,yogurt, milk. Biggest " cravings are for salty foods or sweets. Has a few acquaintances that have had weight loss surgery.        NUTRITION DIAGNOSIS:  Obesity r/t long history of self-monitoring deficit and excessive energy intake aeb BMI >30 kg/m2.    INTERVENTION:  Intervention Provided/Education Provided on post-op diet guidelines, vitamins/minerals essential post-operatively, GI anatomy of bariatric surgeries, ways to help prepare for post-op diet guidelines pre-operatively, portion/calorie-control, mindful eating.  Provided pt with list of goals RD contact information.        Patient Understanding: fair-good  Expected Compliance: fair-good    GOALS:  Increase water to at least 32 oz per day  Eat breakfast daily or have protein drink  Criteria for selecting a protein drink/8-12 ounces:  < 250 calories  15-30 grams protein  < 10 grams total fat  < 10 grams sugar  Start: 1 multivitamin/ mineral with iron (example generic children's chewable complete)    Follow-Up:   Recommend 2-3 follow up visits to assist with lifestyle changes or per insurance.      Time spent with patient: 48 minutes.  Jaret Miranda RD, LD  [facility]

## 2022-10-26 NOTE — PATIENT INSTRUCTIONS
"  Nice to talk with you today. Thank you for allowing me the privilege of caring for you. We hope we provided you with the excellent service you deserve.     To ensure the quality of our services you may receive a patient satisfaction survey from an independent monitoring company.  The greatest compliment you can give is \"Likely to Recommend\"    Below is our plan we discussed.-  ETIENNE Pinedo        Please call [phone] and schedule a 60 minute IN PERSON bariatric surgical visit in 4-8 weeks.  If you need to reach me sooner you can do so by calling [phone].    Have a great day!               Bariatric Task List      Goal Weight: Will be established in clinic  Have preoperative laboratory tests drawn.   Sleep clearance   Psychological Evaluation with MMPI and clearance for weight loss surgery  Support group  Hemoglobin A1C below 8  Achieve clearance from dietitian to see surgeon.  Insurance coverage  Cardiac Clearance    Teeth replaced  "

## 2022-10-26 NOTE — PATIENT INSTRUCTIONS
Alexander Dunbar-  Paulacome to the [facility], [place]! It was great to visit with you and learn about your interest in weight loss surgery. Below are the goals we discussed.  GOALS:  Increase water to at least 32 oz per day  Eat breakfast daily or have protein drink  Criteria for selecting a protein drink/8-12 ounces:  < 250 calories  15-30 grams protein  < 10 grams total fat  < 10 grams sugar  Start: 1 multivitamin/ mineral with iron (example generic children's chewable complete)      Nutrition Educational Materials:  Diet Guidelines after Weight-loss Surgery  [email or web address]     Your Stage 1 Diet: Clear Liquids  [email or web address]     Your Stage 2 Diet: Low-fat Full Liquids  [email or web address]     Your Stage 3 Diet: Pureed Foods  [email or web address]     Pureed Food Recipes  [email or web address]    Your Stage 4 Diet: Soft Foods  [email or web address]    Your Stage 5 Diet: Regular Foods  [email or web address]    Keeping Track of Your Fluids  [email or web address]    Supplements after Sleeve Gastrectomy, Gastric Bypass or Single Anastomosis Duodenal Switch Surgery  [email or web address]      Please call [phone] to schedule your next visit with a Dietitian in 1 month.  Thanks!  Jaret Miranda, ELI, LD  [facility]

## 2022-11-09 ENCOUNTER — TELEPHONE (OUTPATIENT)
Dept: PHARMACY | Facility: CLINIC | Age: 52
End: 2022-11-09

## 2022-11-09 NOTE — TELEPHONE ENCOUNTER
Called patient to schedule follow appt. Is currently taking Ozempic 1mg weekly and Lantus U300 100 units daily. Tolerating Ozempic well.    This am was 98mg/dL, yesterday 129mg/dL.     Has been following with endocrinology and will follow up with endocrinology in 2-3 weeks. Prefers not to schedule with MTM at this time.    Yasemin Mcmahon, Pharm.D, BCACP  Medication Therapy Management Pharmacist

## 2022-11-18 ENCOUNTER — MYC MEDICAL ADVICE (OUTPATIENT)
Dept: ENDOCRINOLOGY | Facility: CLINIC | Age: 52
End: 2022-11-18

## 2022-11-18 DIAGNOSIS — E11.65 TYPE 2 DIABETES MELLITUS WITH HYPERGLYCEMIA, WITH LONG-TERM CURRENT USE OF INSULIN (H): Primary | ICD-10-CM

## 2022-11-18 DIAGNOSIS — E11.65 TYPE 2 DIABETES MELLITUS WITH HYPERGLYCEMIA, WITH LONG-TERM CURRENT USE OF INSULIN (H): ICD-10-CM

## 2022-11-18 DIAGNOSIS — Z79.4 TYPE 2 DIABETES MELLITUS WITH HYPERGLYCEMIA, WITH LONG-TERM CURRENT USE OF INSULIN (H): Primary | ICD-10-CM

## 2022-11-18 DIAGNOSIS — E66.01 MORBID OBESITY (H): ICD-10-CM

## 2022-11-18 DIAGNOSIS — Z79.4 TYPE 2 DIABETES MELLITUS WITH HYPERGLYCEMIA, WITH LONG-TERM CURRENT USE OF INSULIN (H): ICD-10-CM

## 2022-11-21 NOTE — TELEPHONE ENCOUNTER
Semaglutide, 2 MG/DOSE, 8 MG/3ML SOPN 9 mL 1 11/21/2022  --   Sig - Route: Inject 2 mg Subcutaneous every 7 days - Subcutaneous   Class: E-Prescribe   Order: [id number]     Printout Tracking    External Result Report     Pharmacy    [id number] - [place], [place] - [address]

## 2022-11-23 RX ORDER — SEMAGLUTIDE 1.34 MG/ML
INJECTION, SOLUTION SUBCUTANEOUS
OUTPATIENT
Start: 2022-11-23

## 2022-11-23 NOTE — TELEPHONE ENCOUNTER
Ozempic 2 mg pens not in stock  Will route to pharmacy liason with options:  Can you get her the 1 mg pens and have her take 2 injections per week. Otherwise, have you reached out to other pharmacies that have it in stock?  Sarah Prince RN, [facility]

## 2022-12-02 ENCOUNTER — TELEPHONE (OUTPATIENT)
Dept: SURGERY | Facility: CLINIC | Age: 52
End: 2022-12-02

## 2022-12-02 NOTE — TELEPHONE ENCOUNTER
Forms received from patients insurance requesting Colonoscopy results in order to finalize the prior authorization process. Results from Colonoscopy per  written on forms and faxed to [phone]. Successful transmission confirmed..Carie Ramos RN

## 2022-12-07 ENCOUNTER — LAB (OUTPATIENT)
Dept: LAB | Facility: CLINIC | Age: 52
End: 2022-12-07
Payer: COMMERCIAL

## 2022-12-07 DIAGNOSIS — E11.65 TYPE 2 DIABETES MELLITUS WITH HYPERGLYCEMIA, WITH LONG-TERM CURRENT USE OF INSULIN (H): ICD-10-CM

## 2022-12-07 DIAGNOSIS — Z79.4 TYPE 2 DIABETES MELLITUS WITH HYPERGLYCEMIA, WITH LONG-TERM CURRENT USE OF INSULIN (H): ICD-10-CM

## 2022-12-07 LAB — HBA1C MFR BLD: 8.1 % (ref 0–5.6)

## 2022-12-07 PROCEDURE — 83036 HEMOGLOBIN GLYCOSYLATED A1C: CPT

## 2022-12-07 PROCEDURE — 36415 COLL VENOUS BLD VENIPUNCTURE: CPT

## 2022-12-26 DIAGNOSIS — I50.22 CHRONIC SYSTOLIC CONGESTIVE HEART FAILURE (H): Primary | ICD-10-CM

## 2022-12-30 ENCOUNTER — E-VISIT (OUTPATIENT)
Dept: FAMILY MEDICINE | Facility: CLINIC | Age: 52
End: 2022-12-30
Payer: COMMERCIAL

## 2022-12-30 ENCOUNTER — NURSE TRIAGE (OUTPATIENT)
Dept: FAMILY MEDICINE | Facility: CLINIC | Age: 52
End: 2022-12-30

## 2022-12-30 DIAGNOSIS — K13.0 ANGULAR CHEILITIS: Primary | ICD-10-CM

## 2022-12-30 PROCEDURE — 99421 OL DIG E/M SVC 5-10 MIN: CPT | Performed by: FAMILY MEDICINE

## 2022-12-30 NOTE — TELEPHONE ENCOUNTER
Patient calling and reports upper lip is slightly red, swollen, itchy, tingling, and numb. Patient was exposed to the cold because she has to walk into work and face not protected. Has been keeping Carmex, Vaseline and Aquaphor on lips. Lips are chapped and are worse in the morning. Red flag symptoms such as tongue swelling, face swelling, difficulty breathing, chest pain, feeling faint or weak, she would need to be evaluated in the emergency room. Patient denies symptoms. If fever, nausea, open sores, or lips becomes sore and painful, patient will need to be evaluated in the urgent care. Urgent care hour and locations given.    Urgent care hours at [facility]:   M- F: 10 am- 8pm    Sat/ Sun: 9 am- 8pm  Urgent care hours at [facility] ([address]):   M- F: 10 am -8 pm    Sat/ Sun: 9-5    Advised patient to make an E-Visit with primary care provider. Alerted patient if primary care provider unable to assess through E-Visit, she may need to come in to the clinic to be evaluated. Patient will submit E-Visit with Dr. Robbins.     Supriya Engel RN          Reason for Disposition    Lip swelling lasts > 3 days    Additional Information    Negative: Unresponsive, passed out or very weak    Negative: Swollen tongue    Negative: Difficulty breathing or wheezing    Negative: [1] Life-threatening reaction in the past to similar substance (e.g., food, insect bite/sting, chemical, etc.) AND [2] < 2 hours since exposure    Negative: Sounds like a life-threatening emergency to the triager    Negative: Followed a lip injury    Negative: Entire face is swollen    Negative: Followed an insect bite to the area    Negative: Cold sore suspected (sore on outer lip)     Patient reports small bump on upper lip. Not open, and bump is not painful.    Negative: Mouth sores or ulcers on inner lip    Negative: [1] Contact with a chemical AND [2] some may have been swallowed    Negative: [1] Contact with a chemical  "AND [2] none entered the mouth    Negative: Taking an ACE Inhibitor medication (e.g., benazepril/LOTENSIN, captopril/CAPOTEN, enalapril/VASOTEC, lisinopril/ZESTRIL)    Negative: [1] Severe swelling AND [2] cause unknown    Negative: Patient sounds very sick or weak to the triager    Negative: [1] Swelling is red AND [2] fever    Negative: [1] Swelling is painful to touch AND [2] fever    Negative: [1] Looks infected AND [2] large red area (> 2 in. or 5 cm)    Negative: Toothache    Negative: Swelling is painful to touch    Negative: Looks like a boil or infected sore    Negative: [1] Mild lip swelling from food reaction AND [2] diagnosis never confirmed by a doctor (or NP/PA)    Answer Assessment - Initial Assessment Questions  1. ONSET: \"When did the swelling start?\" (e.g., minutes, hours, days)      Last week, sides of the mouth cracked and broke out. Healed but scars there.   2. SEVERITY: \"How swollen is it?\"      Minimal, symptoms are mild.  3. ITCHING: \"Is there any itching?\" If Yes, ask: \"How much?\"   (Scale 1-10; mild, moderate or severe)      Yes, 3  4. PAIN: \"Is the swelling painful to touch?\" If Yes, ask: \"How painful is it?\"   (Scale 1-10; mild, moderate or severe)      NO pain  5. CAUSE: \"What do you think is causing the lip swelling?\"      Being out in the cold, not sure.   6. RECURRENT SYMPTOM: \"Have you had lip swelling before?\" If Yes, ask: \"When was the last time?\" \"What happened that time?\"      Numbness, and has been using carmex and various lips balms.   7. OTHER SYMPTOMS: \"Do you have any other symptoms?\" (e.g., toothache)      Top of lip is the only lip swollen. Slightly red and mildly swollen. Can barely notice.   8. PREGNANCY: \"Is there any chance you are pregnant?\" \"When was your last menstrual period?\"      No.    Protocols used: LIP SWELLING-A-AH      "

## 2022-12-31 RX ORDER — KETOCONAZOLE 20 MG/G
CREAM TOPICAL 2 TIMES DAILY
Qty: 60 G | Refills: 1 | Status: SHIPPED | OUTPATIENT
Start: 2022-12-31

## 2022-12-31 RX ORDER — TRIAMCINOLONE ACETONIDE 1 MG/G
CREAM TOPICAL 2 TIMES DAILY
Qty: 60 G | Refills: 1 | Status: SHIPPED | OUTPATIENT
Start: 2022-12-31

## 2023-02-11 NOTE — PROGRESS NOTES
Medication Therapy Management (MTM) Encounter    ASSESSMENT:                            Medication Adherence/Access: No issues identified    HFrEF Stable.     Type 2 Diabetes: Patient is not meeting A1c goal of < 7%. May benefit from CGM and scheduled Novolog before meals. Not tolerating metformin, unable to increase dose at this time. Recurrent yeast infections with SGLT-2. May benefit from taking Novolog consistently before meals in addition to sliding scale.    PLAN:                            Recommend Novolog 5 units before each meal plus sliding scale  Recommend CGM-prescription sent to pharmacy.    Follow-up: 1-2 weeks via [email or web address]    SUBJECTIVE/OBJECTIVE:                          Bettina Montes is a 52 year old female coming in for an initial visit for 2022. She was referred to me from . Follow up from 9/1/2021.    Reason for visit: Medication Review, blood sugars.    Allergies/ADRs: Reviewed in chart  Past Medical History: Reviewed in chart  Tobacco: She reports that she quit smoking about 9 years ago. Her smoking use included cigarettes. She has a 30.00 pack-year smoking history. She has quit using smokeless tobacco.  Alcohol: occasionally when out to dinner ~1-2 times a week  Caffeine: 1 soda a week  Activity: None    Medication Adherence/Access: Per patient, misses medication 0 times per week.   Medication barriers: none.   The patient fills medications at [place]: NO, fills medications at [facility].    HFrEF: Current medications include carvedilol 25mg twice daily, torsemide 60mg twice daily (takes 60mg in the morning and 60mg later in the day if SOB or edema; usually has to take twice daily once a week depending on on sodium intake), hydralazine 25mg twice daily, spironolactone 50mg daily and sacubitril/valsartan (Entresto) 97/103mg twice daily. Patient reports the following medication side effects: dizziness.  ECHO:  Date 6/24/2021, EF 40-45%  Patient is not complaining of HF symptoms.   Sleeps elevated  Patient is measuring daily weights. Every 3 days stays within 280-285lb.   Patient does self-monitor blood pressure about once a week. 120-125/80-85mmHg.  Patient is not following a low sodium diet, is not avoiding EtOH.  Patient follows with CORE clinic.  BP Readings from Last 3 Encounters:   01/05/22 124/83   12/01/21 94/66   09/29/21 116/80     Type 2 Diabetes:  Currently taking metformin XR 1000mg with dinner, Basaglar 75 units at bedtime, Novolog sliding scale before meals and at bedtime (usually only takes once daily). Trulicity 4.5mg weekly (Thursday). Still experiencing loose stools with metformin.  Tried Farxiga in fall of 2021 and experienced recurrent yeast infections. Has not had any yeast infections since stopping Farxiga.   Novolog sliding sclae.  100-150=1 unit  151-200=2 units  201-250=4 units  251-300=6 units  301-350=8 units  351-400=10 units  >400=12 units  Was having GI side effects when on IR formulation.  Blood sugar monitoring: once  Daily fasting consistently, sometimes before bed and if not feeling well. Ranges (patient reported): 200-260mg/dL in the morning, before bedtime ~300-350mg/dL.   Symptoms of low blood sugar? Shaky, clammy, Frequency of lows- rarely  Symptoms of high blood sugar? Tingling in fingers  Eye exam: up to date  Foot exam: due  Diet/Exercise: Breakfast-donuts, some sort of carb. Lunch if she doesn't skip lunch it will be out-[employer], [employer], Supper-protein, rice, noodles, bread. doesn't do a lot of veggies-not many she likes; would eat raw broccoli, potatoes, lima beans, black eye peas, adriana greens, mustard/turnip greens, sweet potatoes (no carrots, green beans)  Snacks-especially if she skips lunch-bag of chips, candy (at work)  Doesn't eat much dairy because of diarrhea.   Doesn't eat nuts because of her teeth.  Occasional apple.   Aspirin: Not taking;assess further on follow up  Statin: Yes: Mevacor   ACEi/ARB: Yes: Entresto.   Urine Albumin:    Lab Results   Component Value Date    MICROL 10 01/14/2021        Lab Results   Component Value Date    A1C 10.1 12/01/2021    A1C 9.0 09/29/2021    A1C 10.1 06/11/2021    A1C 9.6 01/14/2021    A1C 11.0 08/04/2020    A1C 6.5 09/10/2019    A1C 7.7 05/17/2019     Today's Vitals: Wt 284 lb (128.8 kg)   LMP  (LMP Unknown)   BMI 45.92 kg/m    ----------------    I spent 45 minutes with this patient today (out of pocket cost for today's MTM visit was reviewed with the patient). All changes were made via collaborative practice agreement with Prabhu Robbins . A copy of the visit note was provided to the patient's provider(s).    The patient was given a summary of these recommendations.     Yasemin Mcmahon, Pharm.D, BCACP  Medication Therapy Management Pharmacist     Medication Therapy Recommendations  Type 2 diabetes mellitus (H)    Current Medication: Continuous Blood Gluc Sensor (FREESTYLE ANETA 14 DAY SENSOR) [facility]   Rationale: More effective medication available - Ineffective medication - Effectiveness   Recommendation: Change Medication   Status: Accepted per CPA          Current Medication: insulin aspart (NOVOLOG FLEXPEN) 100 UNIT/ML pen   Rationale: Dose too low - Dosage too low - Effectiveness   Recommendation: Increase Dose   Status: Accepted per CPA                 PAST SURGICAL HISTORY:  No significant past surgical history

## 2023-02-15 ENCOUNTER — OFFICE VISIT (OUTPATIENT)
Dept: CARDIOLOGY | Facility: CLINIC | Age: 53
End: 2023-02-15
Payer: COMMERCIAL

## 2023-02-15 ENCOUNTER — LAB (OUTPATIENT)
Dept: LAB | Facility: CLINIC | Age: 53
End: 2023-02-15
Payer: COMMERCIAL

## 2023-02-15 VITALS
BODY MASS INDEX: 45.18 KG/M2 | WEIGHT: 279.9 LBS | OXYGEN SATURATION: 98 % | SYSTOLIC BLOOD PRESSURE: 131 MMHG | DIASTOLIC BLOOD PRESSURE: 85 MMHG | HEART RATE: 97 BPM

## 2023-02-15 DIAGNOSIS — E78.5 HYPERLIPIDEMIA LDL GOAL <100: ICD-10-CM

## 2023-02-15 DIAGNOSIS — E11.00 TYPE 2 DIABETES MELLITUS WITH HYPEROSMOLARITY WITHOUT COMA, WITH LONG-TERM CURRENT USE OF INSULIN (H): ICD-10-CM

## 2023-02-15 DIAGNOSIS — I50.23 ACUTE ON CHRONIC SYSTOLIC (CONGESTIVE) HEART FAILURE (H): ICD-10-CM

## 2023-02-15 DIAGNOSIS — E11.9 TYPE 2 DIABETES MELLITUS (H): ICD-10-CM

## 2023-02-15 DIAGNOSIS — I50.22 CHRONIC SYSTOLIC CONGESTIVE HEART FAILURE (H): ICD-10-CM

## 2023-02-15 DIAGNOSIS — Z79.4 TYPE 2 DIABETES MELLITUS WITH HYPEROSMOLARITY WITHOUT COMA, WITH LONG-TERM CURRENT USE OF INSULIN (H): ICD-10-CM

## 2023-02-15 DIAGNOSIS — I50.22 CHRONIC SYSTOLIC HEART FAILURE (H): ICD-10-CM

## 2023-02-15 DIAGNOSIS — I50.22 CHRONIC SYSTOLIC HEART FAILURE (H): Primary | ICD-10-CM

## 2023-02-15 DIAGNOSIS — Z13.220 SCREENING FOR HYPERLIPIDEMIA: ICD-10-CM

## 2023-02-15 DIAGNOSIS — Z11.59 NEED FOR HEPATITIS C SCREENING TEST: ICD-10-CM

## 2023-02-15 DIAGNOSIS — G47.33 OSA (OBSTRUCTIVE SLEEP APNEA): ICD-10-CM

## 2023-02-15 DIAGNOSIS — I10 HYPERTENSION, UNSPECIFIED TYPE: ICD-10-CM

## 2023-02-15 DIAGNOSIS — I10 ESSENTIAL HYPERTENSION WITH GOAL BLOOD PRESSURE LESS THAN 140/90: ICD-10-CM

## 2023-02-15 DIAGNOSIS — I50.23 ACUTE ON CHRONIC SYSTOLIC HEART FAILURE (H): ICD-10-CM

## 2023-02-15 DIAGNOSIS — J45.20 INTERMITTENT ASTHMA, UNCOMPLICATED: ICD-10-CM

## 2023-02-15 DIAGNOSIS — I42.8 NONISCHEMIC CARDIOMYOPATHY (H): ICD-10-CM

## 2023-02-15 LAB
ALT SERPL W P-5'-P-CCNC: 24 U/L (ref 0–50)
ANION GAP SERPL CALCULATED.3IONS-SCNC: 7 MMOL/L (ref 3–14)
BUN SERPL-MCNC: 12 MG/DL (ref 7–30)
CALCIUM SERPL-MCNC: 9.6 MG/DL (ref 8.5–10.1)
CHLORIDE BLD-SCNC: 100 MMOL/L (ref 94–109)
CO2 SERPL-SCNC: 29 MMOL/L (ref 20–32)
CREAT SERPL-MCNC: 0.96 MG/DL (ref 0.52–1.04)
CREAT UR-MCNC: 379 MG/DL
GFR SERPL CREATININE-BSD FRML MDRD: 70 ML/MIN/1.73M2
GLUCOSE BLD-MCNC: 282 MG/DL (ref 70–99)
HCV AB SERPL QL IA: NONREACTIVE
MICROALBUMIN UR-MCNC: 15 MG/L
MICROALBUMIN/CREAT UR: 3.96 MG/G CR (ref 0–25)
POTASSIUM BLD-SCNC: 3.5 MMOL/L (ref 3.4–5.3)
SODIUM SERPL-SCNC: 136 MMOL/L (ref 133–144)
TSH SERPL DL<=0.005 MIU/L-ACNC: 1.01 MU/L (ref 0.4–4)

## 2023-02-15 PROCEDURE — 80048 BASIC METABOLIC PNL TOTAL CA: CPT

## 2023-02-15 PROCEDURE — 84443 ASSAY THYROID STIM HORMONE: CPT

## 2023-02-15 PROCEDURE — 84460 ALANINE AMINO (ALT) (SGPT): CPT

## 2023-02-15 PROCEDURE — 84244 ASSAY OF RENIN: CPT | Mod: 90

## 2023-02-15 PROCEDURE — 99214 OFFICE O/P EST MOD 30 MIN: CPT | Performed by: NURSE PRACTITIONER

## 2023-02-15 PROCEDURE — 82088 ASSAY OF ALDOSTERONE: CPT

## 2023-02-15 PROCEDURE — 36415 COLL VENOUS BLD VENIPUNCTURE: CPT

## 2023-02-15 PROCEDURE — 82043 UR ALBUMIN QUANTITATIVE: CPT

## 2023-02-15 PROCEDURE — 82570 ASSAY OF URINE CREATININE: CPT

## 2023-02-15 PROCEDURE — 86803 HEPATITIS C AB TEST: CPT

## 2023-02-15 RX ORDER — HYDRALAZINE HYDROCHLORIDE 10 MG/1
10 TABLET, FILM COATED ORAL 2 TIMES DAILY
Qty: 90 TABLET | Refills: 3 | Status: SHIPPED | OUTPATIENT
Start: 2023-02-15 | End: 2023-09-20

## 2023-02-15 RX ORDER — SPIRONOLACTONE 50 MG/1
50 TABLET, FILM COATED ORAL DAILY
Qty: 90 TABLET | Refills: 3 | Status: SHIPPED | OUTPATIENT
Start: 2023-02-15 | End: 2024-03-30

## 2023-02-15 RX ORDER — TORSEMIDE 20 MG/1
TABLET ORAL
Qty: 180 TABLET | Refills: 11 | Status: SHIPPED | OUTPATIENT
Start: 2023-02-15 | End: 2024-02-17

## 2023-02-15 NOTE — NURSING NOTE
Labs: Patient was given results of the laboratory testing obtained today.  Patient demonstrated understanding of this information and agreed to call with further questions or concerns.     Med Reconcile: Reviewed and verified all current medications with the patient. The updated medication list was printed and given to the patient. No medication changes today.    Return Appointment: Patient given instructions regarding scheduling next clinic visit. Patient demonstrated understanding of this information and agreed to call with further questions or concerns. CORE in 6 months.    Patient stated she understood all health information given and agreed to call with further questions or concerns.    Rochelle Rock RN, BSN  Cardiology RN Care Coordinator   [place]   Phone: [phone]  Fax: [phone] ([place]) [phone] ([place])

## 2023-02-15 NOTE — PROGRESS NOTES
Bettina Montes's goals for this visit include:     She requests these members of her care team be copied on today's visit information: PCP    PCP: Prabhu Robbins    Referring Provider:  No referring provider defined for this encounter.    /85 (BP Location: Left arm, Patient Position: Sitting, Cuff Size: Adult Large)   Pulse 97   Wt 127 kg (279 lb 14.4 oz)   LMP  (LMP Unknown)   SpO2 98%   BMI 45.18 kg/m      Do you need any medication refills at today's visit? No.    Yonas Chris, EMT  Clinic Support  [facility]    [phone]    Employed by [facility] [employer]

## 2023-02-15 NOTE — PATIENT INSTRUCTIONS
Take your medicines every day, as directed    Changes made today:  none     Monitor Your Weight and Symptoms    Contact us if you:    Gain 2 pounds in one day or 5 pounds in one week  Feel more short of breath  Notice more leg swelling  Feel lightheadeded   Change your lifestyle    Limit Salt or Sodium:  2000 mg  Limit Fluids:  2000 mL or approximately 64 ounces  Eat a Heart Healthy Diet  Low in saturated fats  Stay Active:  Aim to move at least 150 minutes every  week         To Contact us    During Business Hours:  [phone], option # 1      After hours, weekends or holidays:   [phone], Option #4  Ask to speak to the On-Call Cardiologist. Inform them you are a CORE/heart failure patient at the [place].     Use [employer] allows you to communicate directly with your heart team through secure messaging.  Localist can be accessed any time on your phone, computer, or tablet.  If you need assistance, we'd be happy to help!         Keep your Heart Appointments:    CORE in 6 months      Please consider attending our virtual support group which is held monthly. Please reach out to Jb at [phone] for more information if you are interested in attending.       2023 dates:    Monday, March 6th , 1-2pm (Medications Review)  Monday, April 3rd, 1-2pm  Monday, May 1st, 1-2pm  Monday, June 5th, 1-2pm  Monday, July 3rd, 1-2pm  Monday, August 7th, 1-2pm  Monday, September 11th, 1-2pm  Monday, October 2nd, 1-2pm  Monday, November 6th, 1-2pm  Monday, December 4th, 1-2pm

## 2023-02-15 NOTE — PROGRESS NOTES
SANJAY  Bettina Montes is a 53 year old female with a past medical history of HFrEF (30-35%) secondary to NICM (felt to be idiopathic), HTN, obesity, DM II, cervical spine fusion, and hyperlipidemia who presents for routine follow-up. She was last seen by Dr. Chinchilla on 6/5/20.    Bettina had her last CORE visit on 8/10/22.   She has been seeing the MTM for DM management.  She had a decrease in her metformin a while back. She stopped Trulicity - she is now on Ozempic.  She says her BS have improved but still need work, she will be seeing an endocrinologist soon she says.  No SOB at rest and some CASILLAS- but doesn't need to stop what she is doing.  She says there is now no swelling in her legs or abdomen. Home weight 275-280 lbs. Her appetite has been fine. She denies early satiety. Continues to self titrate her afternoon diuretic doses. She does take 60 mg daily and cuts down to 40 mg daily and watches her symptoms. Labs pending    She denies any chest pain, lightheadedness, dizziness, or near syncopal episodes.     Pt is taking hydralazine 50mg BID, coreg 25 BID, entresto 97/103 BID, spironolactone 50 qday and torsemide 40mg qday.       PMH  Past Medical History:   Diagnosis Date     Abnormal Pap smear of cervix 09/10/2019    See problem list     Adrenal gland anomaly      CHF (congestive heart failure) (H)      Fatty liver      Gastroesophageal reflux disease      Hyperlipidemia      Hypertension      Mild persistent asthma      NICM (nonischemic cardiomyopathy) (H)      Obesity      WILLARD (obstructive sleep apnea) 1/9/2015     Type 2 diabetes mellitus (H)        Past Surgical History:   Procedure Laterality Date     COLONOSCOPY       COLONOSCOPY N/A 10/24/2022    Procedure: COLONOSCOPY, FLEXIBLE, WITH LESION REMOVAL USING SNARE;  Surgeon: Larissa Eubanks MD;  Location: [facility] OR     COLONOSCOPY WITH CO2 INSUFFLATION N/A 10/24/2022    Procedure: COLONOSCOPY, WITH CO2 INSUFFLATION;  Surgeon: Larissa Eubanks MD;   Location: [facility] OR     DISCECTOMY, FUSION CERVICAL ANTERIOR TWO LEVELS, COMBINED N/A 2019    Procedure: C4-6 anterior cervical discectomy and fusion;  Surgeon: Hollis Hurtado MD;  Location: [facility] OR     TUBAL LIGATION         Family History   Problem Relation Age of Onset     Diabetes Mother      Hypertension Mother      Hyperlipidemia Mother      Heart Failure Mother      Diabetes Father      Cancer Paternal Grandmother         ?       Social History     Socioeconomic History     Marital status: Single     Spouse name: None     Number of children: 1     Years of education: None     Highest education level: None   Occupational History     Occupation: CNA     Employer: OTHER   Social Needs     Financial resource strain: None     Food insecurity:     Worry: None     Inability: None     Transportation needs:     Medical: None     Non-medical: None   Tobacco Use     Smoking status: Former Smoker     Packs/day: 1.00     Years: 30.00     Pack years: 30.00     Types: Cigarettes     Last attempt to quit: 2013     Years since quittin.8     Smokeless tobacco: Former User     Tobacco comment:     Substance and Sexual Activity     Alcohol use: Yes     Alcohol/week: 0.0 standard drinks     Comment: 1     Drug use: No     Sexual activity: Yes     Partners: Male     Birth control/protection: Surgical   Lifestyle     Physical activity:     Days per week: None     Minutes per session: None     Stress: None   Relationships     Social connections:     Talks on phone: None     Gets together: None     Attends Sikh service: None     Active member of club or organization: None     Attends meetings of clubs or organizations: None     Relationship status: None     Intimate partner violence:     Fear of current or ex partner: None     Emotionally abused: None     Physically abused: None     Forced sexual activity: None   Other Topics Concern     Parent/sibling w/ CABG, MI or angioplasty before 65F 55M? No   Social  History Narrative     None       ALLERGIES  Allergies   Allergen Reactions     Amlodipine Swelling     Edema on 5mg throat     Lisinopril      Swelling in throat, face  angioedema     Naprosyn [Naproxen]      Swelling, diff breathing       MEDICATIONS acetaminophen (TYLENOL) 500 MG tablet, Take 500-1,000 mg by mouth every 6 hours as needed for mild pain  blood glucose (NO BRAND SPECIFIED) test strip, Use to test blood sugar 3-4 times daily or as directed.  carvedilol (COREG) 25 MG tablet, Take 1 tablet (25 mg) by mouth 2 times daily (with meals)  Chlorpheniramine-DM (CORICIDIN HBP COUGH/COLD) 4-30 MG TABS, Take 1 tablet by mouth as needed  cholecalciferol 50 MCG (2000 UT) CAPS, Take 2,000 Units by mouth daily   fluticasone-salmeterol (WIXELA INHUB) 250-50 MCG/ACT inhaler, Inhale 1 puff into the lungs every 12 hours  hydrALAZINE (APRESOLINE) 10 MG tablet, Take 1 tablet (10 mg) by mouth 2 times daily  insulin aspart (NOVOLOG FLEXPEN) 100 UNIT/ML pen, Inject 18 Units Subcutaneous 3 times daily (with meals)  insulin glargine U-300 (TOUJEO MAX SOLOSTAR) 300 UNIT/ML (2 units dial) pen, Inject 100 Units Subcutaneous daily  insulin pen needle (ULTICARE MICRO) 32G X 4 MM miscellaneous, Use 4 pen needles daily or as directed.  levalbuterol (XOPENEX HFA) 45 MCG/ACT Inhaler, Inhale 2 puffs into the lungs every 4 hours as needed for shortness of breath / dyspnea or wheezing  lovastatin (MEVACOR) 20 MG tablet, TAKE 1 TABLET BY MOUTH EVERYDAY AT BEDTIME  magnesium oxide (MAG-OX) 400 (241.3 Mg) MG tablet, Take 1 tablet (400 mg) by mouth daily  montelukast (SINGULAIR) 10 MG tablet, Take 1 tablet (10 mg) by mouth At Bedtime  sacubitril-valsartan (ENTRESTO)  MG per tablet, Take 1 tablet by mouth 2 times daily  spironolactone (ALDACTONE) 50 MG tablet, Take 1 tablet (50 mg) by mouth daily  torsemide (DEMADEX) 20 MG tablet, Patient to take 40 mg daily and take an extra 20 mg if weight goes 285 lbs or higher.  Continuous Blood Gluc   (DEXCOM G6 ) JEANNE, Use to read blood sugars as per 's instructions.  Continuous Blood Gluc Sensor (DEXCOM G6 SENSOR) MISC, Change every 10 days.  Continuous Blood Gluc Sensor (FREESTYLE ANETA 14 DAY SENSOR) MISC, 1 Device every 14 days Change sensor as directed every 14 days (Patient not taking: Reported on 2/15/2023)  Continuous Blood Gluc Sensor (FREESTYLE ANETA 2 SENSOR) MISC, 1 each every 14 days (Patient not taking: Reported on 2/15/2023)  Continuous Blood Gluc Transmit (DEXCOM G6 TRANSMITTER) MISC, Change every 3 months.  ketoconazole (NIZORAL) 2 % external cream, Apply topically 2 times daily For 2 weeks. (Patient not taking: Reported on 2/15/2023)  Semaglutide, 2 MG/DOSE, 8 MG/3ML SOPN, Inject 2 mg Subcutaneous every 7 days (Patient not taking: Reported on 2/15/2023)  triamcinolone (KENALOG) 0.1 % external cream, Apply topically 2 times daily For maximum 2 weeks. (Patient not taking: Reported on 2/15/2023)    sodium chloride (PF) 0.9% PF flush 10 mL            ROS:   Constitutional: No fever, chills, or sweats.  ENT: No visual disturbance, ear ache, epistaxis, sore throat.   Allergies/Immunologic: Negative  Respiratory: No cough, hemoptysis.   Cardiovascular: As per HPI.   GI: As per HPI.   : No urinary frequency, dysuria, or hematuria.   Integument: Negative.   Psychiatric: Negative.   Neuro: Negative.   Endocrinology: negative   Musculoskeletal: negative    EXAM:   General: appears comfortable, alert and articulate  Head: normocephalic, atraumatic  Eyes: anicteric sclera, EOMI  Neck: Supple, no adenopathy  Orophyarynx: moist mucosa, no cyanosis  Heart: regular, S1/S2, no murmur, gallop, rub, estimated JVP not detected at 90 degrees  Lungs: Respirations even and unlabored, lungs clear, no rales or wheezing.  Lungs sounds decreased bilaterally in the bases.  Abdomen:  distended and firmer, non-tender, bowel sounds present, no hepatomegaly  Extremities: no clubbing, cyanosis.  Has trace edema  Neurological: normal speech and affect, no gross motor deficits  Skin:  Warm and dry.      LABS  Last Comprehensive Metabolic Panel:  Sodium   Date Value Ref Range Status   08/10/2022 137 133 - 144 mmol/L Final   03/23/2021 133 133 - 144 mmol/L Final     Potassium   Date Value Ref Range Status   08/10/2022 3.6 3.4 - 5.3 mmol/L Final   03/23/2021 3.8 3.4 - 5.3 mmol/L Final     Chloride   Date Value Ref Range Status   08/10/2022 102 94 - 109 mmol/L Final   03/23/2021 95 94 - 109 mmol/L Final     Carbon Dioxide   Date Value Ref Range Status   03/23/2021 35 (H) 20 - 32 mmol/L Final     Carbon Dioxide (CO2)   Date Value Ref Range Status   08/10/2022 29 20 - 32 mmol/L Final     Anion Gap   Date Value Ref Range Status   08/10/2022 6 3 - 14 mmol/L Final   03/23/2021 3 3 - 14 mmol/L Final     Glucose   Date Value Ref Range Status   08/10/2022 262 (H) 70 - 99 mg/dL Final   03/23/2021 260 (H) 70 - 99 mg/dL Final     GLUCOSE BY METER POCT   Date Value Ref Range Status   10/24/2022 143 (H) 70 - 99 mg/dL Final     Urea Nitrogen   Date Value Ref Range Status   08/10/2022 13 7 - 30 mg/dL Final   03/23/2021 24 7 - 30 mg/dL Final     Creatinine   Date Value Ref Range Status   08/10/2022 1.02 0.52 - 1.04 mg/dL Final   03/23/2021 1.18 (H) 0.52 - 1.04 mg/dL Final     GFR Estimate   Date Value Ref Range Status   08/10/2022 65 >60 mL/min/1.73m2 Final     Comment:     Effective December 21, 2021 eGFRcr in adults is calculated using the 2021 CKD-EPI creatinine equation which includes age and gender (Roxanne et al., NEJM, DOI: 10.1056/ODTYcc0355822)   03/23/2021 53 (L) >60 mL/min/[1.73_m2] Final     Comment:     Non  GFR Calc  Starting 12/18/2018, serum creatinine based estimated GFR (eGFR) will be   calculated using the Chronic Kidney Disease Epidemiology Collaboration   (CKD-EPI) equation.       Calcium   Date Value Ref Range Status   08/10/2022 9.5 8.5 - 10.1 mg/dL Final   03/23/2021 9.9 8.5 - 10.1 mg/dL  Final       MOST RECENT ECHOCARDIOGRAM 10/25/19:  Interpretation Summary  Technically difficult study.  Left ventricular size is normal. Mildly (EF 40-45%, traced 45%) reduced left ventricular function is present.  Right ventricular function, chamber size, wall motion, and thickness are normal. This study was compared with the study from 3/21/18: The left ventricular function has improved    ASSESSMENT AND PLAN  Bettina Montes is a 53 year old female with NICM who appears euvolemic today. She has been at [facility] her HF.  Unfortunately she couldn't tolerate the dapaglifozin. She has been feeling well and is successfully self -titrating her diuretic.      1. Chronic systolic heart failure/HFrEF (EF 40-45%) secondary to nonischemic cardiomyopathy  NYHA Symptom Class IIIB  Stage C  Primary Cardiologist: Dr Chinchilla; Last seen 6/17/22  ACE-I/ARB/ARNi:  Entresto  mg- titration complete  BB yes, Carvedilol 25 mg twice daily- titration complete  Aldosterone antagonist yes, Spironolactone 50 mg daily.   SCD prophylaxis EF > 35%   Fluid status hypovolemic  Cardiac Rehab: Completed  Sleep Apnea Evaluation: Documented sleep apnea.    Remote monitoring: Mychart active  Antiplatelet: none.   Anticoagulation: None.  SGLT II- unable to tolerate the dapaglifozin- frequent yeast infections.     # Obesity: BMI 47.29.  She is working on weight loss.  Continue to reassess at subsequent visits.     #  HTN:   continue Entresto   Continue to monitor BP readings at home.      #. Shoulder surgery. - October - 2021    4.  Follow-up:    CORE in 6 months             Heide JULIENC

## 2023-02-17 LAB — ALDOST SERPL-MCNC: 14.8 NG/DL (ref 0–31)

## 2023-02-22 LAB — RENIN PLAS-CCNC: 34.4 NG/ML/HR

## 2023-02-23 NOTE — RESULT ENCOUNTER NOTE
Dear Bettina,     Here are your recent results.   Aldosterone and renin activity hormones checked as part of work-up for high blood pressure are within the expected range.  The renin activity was on the high side and this could be expected finding in the setting of medications like spironolactone.  Other labs are stable with the exception of high blood sugar.  Please continue your diabetic medications.      Please let us know if you have any questions or concerns.    Regards,  Cadence Akbar MD

## 2023-02-28 ENCOUNTER — NURSE TRIAGE (OUTPATIENT)
Dept: FAMILY MEDICINE | Facility: CLINIC | Age: 53
End: 2023-02-28

## 2023-02-28 NOTE — TELEPHONE ENCOUNTER
"  Reason for Disposition    SEVERE dizziness (e.g., unable to stand, requires support to walk, feels like passing out now)    Additional Information    Negative: SEVERE difficulty breathing (e.g., struggling for each breath, speaks in single words)    Negative: [1] Difficulty breathing or swallowing AND [2] started suddenly after medicine, an allergic food or bee sting    Negative: Shock suspected (e.g., cold/pale/clammy skin, too weak to stand, low BP, rapid pulse)    Negative: Difficult to awaken or acting confused (e.g., disoriented, slurred speech)    Negative: [1] Weakness (i.e., paralysis, loss of muscle strength) of the face, arm or leg on one side of the body AND [2] sudden onset AND [3] present now    Negative: [1] Numbness (i.e., loss of sensation) of the face, arm or leg on one side of the body AND [2] sudden onset AND [3] present now    Negative: [1] Loss of speech or garbled speech AND [2] sudden onset AND [3] present now    Negative: Overdose (accidental or intentional) of medications    Negative: [1] Fainted > 15 minutes ago AND [2] still feels too weak or dizzy to stand    Negative: Heart beating < 50 beats per minute OR > 140 beats per minute    Negative: Sounds like a life-threatening emergency to the triager    Negative: Difficulty breathing    Answer Assessment - Initial Assessment Questions  1. DESCRIPTION: \"Describe your dizziness.\"      Feels like she is spinning as well as the room  2. LIGHTHEADED: \"Do you feel lightheaded?\" (e.g., somewhat faint, woozy, weak upon standing)      Yes, guillermo when goes from sitting to standing  3. VERTIGO: \"Do you feel like either you or the room is spinning or tilting?\" (i.e. vertigo)      both  4. SEVERITY: \"How bad is it?\"  \"Do you feel like you are going to faint?\" \"Can you stand and walk?\"    - MILD: Feels slightly dizzy, but walking normally.    - MODERATE: Feels unsteady when walking, but not falling; interferes with normal activities (e.g., school, " "work).    - SEVERE: Unable to walk without falling, or requires assistance to walk without falling; feels like passing out now.       severe  5. ONSET:  \"When did the dizziness begin?\"      1 week on and off, but constant this morning  6. AGGRAVATING FACTORS: \"Does anything make it worse?\" (e.g., standing, change in head position)      no  7. HEART RATE: \"Can you tell me your heart rate?\" \"How many beats in 15 seconds?\"  (Note: not all patients can do this)        n/a  8. CAUSE: \"What do you think is causing the dizziness?\"      unknown  9. RECURRENT SYMPTOM: \"Have you had dizziness before?\" If Yes, ask: \"When was the last time?\" \"What happened that time?\"      no  10. OTHER SYMPTOMS: \"Do you have any other symptoms?\" (e.g., fever, chest pain, vomiting, diarrhea, bleeding)        Feels nauseous  11. PREGNANCY: \"Is there any chance you are pregnant?\" \"When was your last menstrual period?\"        n/a    Protocols used: DIZZINESS - [id number]    "

## 2023-03-01 ENCOUNTER — TELEPHONE (OUTPATIENT)
Dept: FAMILY MEDICINE | Facility: CLINIC | Age: 53
End: 2023-03-01
Payer: COMMERCIAL

## 2023-03-01 NOTE — TELEPHONE ENCOUNTER
Patient calling clinic requesting visit with provider for ED follow up. Patient was seen at [facility] 2/28/23 after being triaged for severe dizziness. She states she was prescribed meclizine and tylenol and discharged to home 2/28/23. RN is unable to pull in ED note.     She states today she feels better but still has dizziness. She states sitting down is when it gets worse. She states she is able to walk but feels like she needs to hold on to furniture to get around.     RN scheduled at 8 am 3/2/23 for virtual visit with Dr. Robbins.     Routing to provider to please review and advise if patient should be seen sooner and if able to see patient sooner.     Yasemin Aguirre, EVELYNN, RN  [facility]

## 2023-03-02 ENCOUNTER — VIRTUAL VISIT (OUTPATIENT)
Dept: FAMILY MEDICINE | Facility: CLINIC | Age: 53
End: 2023-03-02
Payer: COMMERCIAL

## 2023-03-02 DIAGNOSIS — H81.10 BENIGN PAROXYSMAL POSITIONAL VERTIGO, UNSPECIFIED LATERALITY: Primary | ICD-10-CM

## 2023-03-02 PROCEDURE — 99213 OFFICE O/P EST LOW 20 MIN: CPT | Mod: 95 | Performed by: FAMILY MEDICINE

## 2023-03-02 ASSESSMENT — ASTHMA QUESTIONNAIRES
QUESTION_2 LAST FOUR WEEKS HOW OFTEN HAVE YOU HAD SHORTNESS OF BREATH: NOT AT ALL
QUESTION_5 LAST FOUR WEEKS HOW WOULD YOU RATE YOUR ASTHMA CONTROL: WELL CONTROLLED
ACT_TOTALSCORE: 24
QUESTION_1 LAST FOUR WEEKS HOW MUCH OF THE TIME DID YOUR ASTHMA KEEP YOU FROM GETTING AS MUCH DONE AT WORK, SCHOOL OR AT HOME: NONE OF THE TIME
ACT_TOTALSCORE: 24
QUESTION_4 LAST FOUR WEEKS HOW OFTEN HAVE YOU USED YOUR RESCUE INHALER OR NEBULIZER MEDICATION (SUCH AS ALBUTEROL): NOT AT ALL
QUESTION_3 LAST FOUR WEEKS HOW OFTEN DID YOUR ASTHMA SYMPTOMS (WHEEZING, COUGHING, SHORTNESS OF BREATH, CHEST TIGHTNESS OR PAIN) WAKE YOU UP AT NIGHT OR EARLIER THAN USUAL IN THE MORNING: NOT AT ALL

## 2023-03-02 NOTE — PROGRESS NOTES
Bettina is a 53 year old who is being evaluated via a billable telephone visit.      What phone number would you like to be contacted at? [phone]  How would you like to obtain your AVS? MyChart  Distant Location (provider location):  On-site      Subjective   Bettina is a 53 year old presenting for the following health issues:  No chief complaint on file.      HPI     ED/UC Followup:    Facility:  [facility]   Date of visit: 02/28/2023  Reason for visit: Dizziness  Current Status: Better, but still experiencing symptoms with spinning especially when moving head towards the right. Patient had some ringing in the ears at ER visit but since none. No hearing loss.      Review of Systems   Constitutional, HEENT, cardiovascular, pulmonary, GI, , musculoskeletal, neuro, skin, endocrine and psych systems are negative, except as otherwise noted.      Objective           Vitals:  No vitals were obtained today due to virtual visit.    Physical Exam   healthy, alert and no distress  PSYCH: Alert and oriented times 3; coherent speech, normal   rate and volume, able to articulate logical thoughts, able   to abstract reason, no tangential thoughts, no hallucinations   or delusions  Her affect is normal  RESP: No cough, no audible wheezing, able to talk in full sentences  Remainder of exam unable to be completed due to telephone visits    A/P:  (H81.10) Benign paroxysmal positional vertigo, unspecified laterality  (primary encounter diagnosis)  Comment:   Plan: Physical Therapy Referral        Sounds like bppv by history. Patient referred to physical therapy for evaluation and treatment. If no improvements, patient will let us know.    Prabhu Robbins MD          Phone call duration: 5 minutes

## 2023-03-04 ENCOUNTER — HOSPITAL ENCOUNTER (OUTPATIENT)
Dept: PHYSICAL THERAPY | Facility: CLINIC | Age: 53
Setting detail: THERAPIES SERIES
Discharge: HOME | End: 2023-03-04
Attending: FAMILY MEDICINE
Payer: COMMERCIAL

## 2023-03-04 PROCEDURE — 97161 PT EVAL LOW COMPLEX 20 MIN: CPT | Mod: GP | Performed by: PHYSICAL THERAPIST

## 2023-03-04 PROCEDURE — 95992 CANALITH REPOSITIONING PROC: CPT | Mod: GP | Performed by: PHYSICAL THERAPIST

## 2023-03-04 NOTE — PROGRESS NOTES
03/04/23 0900   Quick Adds   Quick Adds Vestibular Eval   Type of Visit Initial OP PT Evaluation   General Information   Start of Care Date 03/04/23   Referring Physician Prabhu Robbins MD   Orders Evaluate and Treat as Indicated   Order Date 03/02/23   Medical Diagnosis Benign paroxysmal positional vertigo, unspecified laterality   Onset of illness/injury or Date of Surgery 02/28/23   Surgical/Medical history reviewed No   Pertinent history of current problem (include personal factors and/or comorbidities that impact the POC) 2 week hostory of episodes of room spinning dizziness including visit to ED on 2/28.She notes that currently can't lay on her right side because she will get dizzy when rolling onto her right side.   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   Patient/Family Goals Statement Resovle dizziness   General Information Comments Slow movement, guarded posture.   Fall Risk Screen   Fall screen completed by PT   Have you fallen 2 or more times in the past year? No   Have you fallen and had an injury in the past year? No   Is patient a fall risk? No   Abuse Screen (yes response referral indicated)   Feels Unsafe at Home or Work/School no   Feels Threatened by Someone no   Does Anyone Try to Keep You From Having Contact with Others or Doing Things Outside Your Home? no   Physical Signs of Abuse Present no   System Outcome Measures   Outcome Measures BPPV   Dizziness Handicap Inventory (score out of 100) A decrease in score by 17.18 or greater indicates a clinically significant change in symptoms. 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   Oculomotor Exam   Smooth Pursuit Normal   Saccades Normal   Infrared Goggle Exam or Frenzel Lense Exam   Vestibular Suppressant in Last 24 Hours? No  (Does have meclizine at home from ED- last took it yesterday AM)   Exam completed with Infrared Goggles   Spontaneous Nystagmus Negative   Gaze Evoked Nystagmus Negative   Gaze Evoked Nystagmus comments Looked like 1-2 beats of upward right torsional movement when looking up   Head Shake Horizontal Nystagmus Other   Head Shake Horizontal Nystagmus comments No nystagmus but reports nausea and tolerated fair   Suellen-Hallpike (right) Upbeating R torsional   Berea-Hallpike (right) comments Lasting 3-5 seconds   Suellen-Hallpike (Left) Negative   HSCC Supine Roll Test (Right) Negative   HSCC Supine Roll Test (Left) Negative   HSCC Forward Roll Test (Right) Negative   HSCC Forward Roll Test (Left) Negative   BPPV Canal(s) R Posterior   BPPV Type Canalithasis   Planned Therapy Interventions   Planned Therapy Interventions neuromuscular re-education   Clinical Impression   Criteria for Skilled Therapeutic Interventions Met yes, treatment indicated   PT Diagnosis Dizziness   Influenced by the following impairments Dizziness, impaired balance when dizzy   Functional limitations due to impairments Fear of falling when dizzy, cannot tolerate functional movement when dizzy, hasn't been able to work full days   Clinical Presentation Evolving/Changing   Clinical Presentation Rationale Clinicla judgement, symptom  report   Clinical Decision Making (Complexity) Low complexity   Therapy Frequency 1 time/week   Predicted Duration of Therapy Intervention (days/wks) 8 weeks   Risk & Benefits of therapy have been explained Yes   Patient, Family & other staff in agreement with plan of care Yes   Clinical Impression Comments Bettina presents with history of dizziness that is described as room spinning and is assocaited with position chnages, mainly to the right. Her symptoms align today with a R BPPV and she was treated accordingly. She also has a history of a cervicla fusion due to injury so this will also need to be ruled out if it is contributing to overall symptoms. She will benefit from skilled PT resolve dizziness so she can tolerate full days at work. 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   GOALS   PT Eval Goals 1;2;3   Goal 1   Goal Identifier DHI   Goal Description Bettina will demonstrate a 20 point or greater improvement on the Dizziness Handicap Inventory with initial score of 66/100.   Target Date 05/31/23   Goal 2   Goal Identifier FT Work   Goal Description Bettina will report no dizziness with ability to work full time, 5 days a week for 2 weeks inidcating an improvement in overall symptoms when working at her computer.   Target Date 05/31/23   Goal 3   Goal Identifier Roll   Goal Description Bettina will be able to roll and lay on her right side on a nightly basis for sleep for 2 weeks with no symptoms.   Target Date 05/31/23   Total Evaluation Time   PT Eval, Low Complexity Minutes (39893) 30

## 2023-03-15 ENCOUNTER — TELEPHONE (OUTPATIENT)
Dept: FAMILY MEDICINE | Facility: CLINIC | Age: 53
End: 2023-03-15
Payer: COMMERCIAL

## 2023-03-15 NOTE — TELEPHONE ENCOUNTER
Patient Quality Outreach    Patient is due for the following:   Diabetes -  A1C and Eye Exam  Cervical Cancer Screening - PAP Needed  Physical Preventive Adult Physical   Urine Drug Screen  Lung Cancer Screening      Topic Date Due     Hepatitis B Vaccine (1 of 3 - 3-dose series) Never done     Pneumococcal Vaccine (2 - PCV) 02/17/2017       Next Steps:   Patient has upcoming appointment, these items will be addressed at that time.    Type of outreach:    Chart review performed, no outreach needed.      Questions for provider review:    None     Portia Ruvalcaba

## 2023-03-20 ENCOUNTER — HOSPITAL ENCOUNTER (OUTPATIENT)
Dept: PHYSICAL THERAPY | Facility: CLINIC | Age: 53
Setting detail: THERAPIES SERIES
Discharge: HOME | End: 2023-03-20
Attending: FAMILY MEDICINE
Payer: COMMERCIAL

## 2023-03-20 ENCOUNTER — TELEPHONE (OUTPATIENT)
Dept: CARDIOLOGY | Facility: CLINIC | Age: 53
End: 2023-03-20
Payer: COMMERCIAL

## 2023-03-20 PROCEDURE — 95992 CANALITH REPOSITIONING PROC: CPT | Mod: GP | Performed by: PHYSICAL THERAPIST

## 2023-03-20 PROCEDURE — 97750 PHYSICAL PERFORMANCE TEST: CPT | Mod: GP | Performed by: PHYSICAL THERAPIST

## 2023-03-20 NOTE — TELEPHONE ENCOUNTER
Amor, MD Shweta Wright Chana, RN; P [facility]  Caller: Unspecified (Today, 11:30 AM)  The episodes are likely brief SVT - similar to what the monitor showed in 2021. They are brief and not very fast so I am not concerned. However, we can do a 7-d zio to make sure its not Afib.       Called and reviewed Dr Chinchilla's message, Patient does not know if 7 days will be long enough but also stated the last one fell off . She also said she would not be pushing any button for symptoms because she doesn't have any, she is just notified by her watch.   She will think about wearing the monitor. Will also ask Dr Chinchilla if we can extend the time length.     Katie Sinclair RN  Cardiology Care Coordinator  [facility] [place]  Phone: [phone]

## 2023-03-20 NOTE — TELEPHONE ENCOUNTER
Received call from patient. Patient states that last week she had an episode in the middle of the night where she woke up and her Apple watch informed her that her heart rate was round 110s. She says that did not have symptoms of feeling that her heart was racing. Her heart rate came back down to the 90s. Patient reports that she had another episode last night on 3/19/23. Patient woke up at 1am and her Apple watch informed her that her heart rate was around 110s. She says she did not have any feeling of racing heart or pain. She says that her heart rate again came down to the 90s. She says she has not had any episodes when awake and that it has only happened when she was sleeping.     Patient reports that her blood pressure is in the 120s/80s. BP this morning was 120/83. Heart rate noticed to be in the 90s more frequently. Patient reports that her weight is staying stable at 270-275lbs. No swelling at this time. Patient reports that her breathing has slightly increased periodically but does not feel it has significantly worsened.    Patient does report she was recently diagnosed with veritgo 4 weeks ago. Otherwise, patient reports no other changes. Informed patient that Dr. Chinchilla would be updated.     Rochelle Rock, RN, BSN  Cardiology RN Care Coordinator   [place]   Phone: [phone]  Fax: [phone] ([place]) [phone] ([place])

## 2023-03-20 NOTE — TELEPHONE ENCOUNTER
Attempted to call patient. Left message for patient to call back if still needing assistance.    Rochelle Rock, RN, BSN  Cardiology RN Care Coordinator   [place]   Phone: [phone]  Fax: [phone] ([place]) [phone] ([place])

## 2023-03-20 NOTE — TELEPHONE ENCOUNTER
Patient left voicemail on [facility] nursing line requesting a call back from Heide's nurse. No information left on what the call is regarding. Message sent to cardiology team for follow up.      Jason Wynne LPN  Pulmonary Medicine:  [facility]  Phone: [phone] Fax: [phone]

## 2023-03-21 NOTE — PROGRESS NOTES
23 1000   Signing Clinician's Name / Credentials   Signing clinician's name / credentials Flor Johnston DPJOSE NCS   Dynamic Gait Index (Jakob and Jo Karnes, 1995)   Gait Level Surface 2  (speed is slower)   Change in Gait Speed 1   Gait and Horizontal Head Turns 1  (slows)   Gait with Vertical Head Turns 1  (slows)   Gait and Pivot Turns 2     4-Item Dynamic Gait Index: Is a measure of gait responses to changing task demands in people with balance and vestibular disorders. (gait on level, w/ lateral & vertical head turns, and w/ change of speed)    Gait assistive device used: none    Patient score: 5/12  Scores <9/12 are correlated with increased risk of falling according to Paula & Maddie 2006.     Assessment (rationale for performing, application to patient s function & care plan): at risk for falls  Minutes billed as physical performance test: 8    Flor Johnston DPT, MPT, NCS  Physical Therapist   Board Certified Neurologic Clinical Specialist     [facility], [facility]   [address] [place] [address]   [email or web address]  [email or web address]   [phone]   Clinic: [phone] //   Fax: [phone]

## 2023-03-24 DIAGNOSIS — R00.2 PALPITATIONS: Primary | ICD-10-CM

## 2023-03-24 NOTE — PROGRESS NOTES
El Chinchilla MD  You; [facility] Cardiology Adult [place] 4 days ago     PN  Fine with me.       You  El Chinchilla MD; [facility] Cardiology Adult [place] 4 days ago     DB  Hi,   Can I order a 14 day instead of a 7 day?        Order for monitor placed. Patient will let us know if and when she wants to wear it.  Katie Sinclair, RN  Cardiology Care Coordinator  [facility]  Phone: [phone]

## 2023-04-02 ENCOUNTER — HEALTH MAINTENANCE LETTER (OUTPATIENT)
Age: 53
End: 2023-04-02

## 2023-04-08 ENCOUNTER — HOSPITAL ENCOUNTER (OUTPATIENT)
Dept: PHYSICAL THERAPY | Facility: CLINIC | Age: 53
Setting detail: THERAPIES SERIES
Discharge: HOME | End: 2023-04-08
Attending: FAMILY MEDICINE
Payer: COMMERCIAL

## 2023-04-08 PROCEDURE — 97112 NEUROMUSCULAR REEDUCATION: CPT | Mod: GP | Performed by: PHYSICAL THERAPIST

## 2023-04-10 ENCOUNTER — TELEPHONE (OUTPATIENT)
Dept: FAMILY MEDICINE | Facility: CLINIC | Age: 53
End: 2023-04-10
Payer: COMMERCIAL

## 2023-04-10 DIAGNOSIS — R11.0 NAUSEA: ICD-10-CM

## 2023-04-10 DIAGNOSIS — R42 DIZZINESS: Primary | ICD-10-CM

## 2023-04-10 RX ORDER — ONDANSETRON 4 MG/1
4 TABLET, FILM COATED ORAL EVERY 6 HOURS PRN
Qty: 20 TABLET | Refills: 3 | Status: SHIPPED | OUTPATIENT
Start: 2023-04-10 | End: 2024-06-14

## 2023-04-10 NOTE — TELEPHONE ENCOUNTER
----- Message from Danika Johnston PT sent at 4/10/2023  7:59 AM CDT -----  Regarding: medication for nasuea-vestibular rehab  HI! I have been working with Bettina in Vestibular rehab for her vertigo/dizziness. She did have BPPV and we have eliminated that. She continues to have some dizziness and nausea that I think is also inner ear related.You gave her a script for meclazine and she only take is 2 times a week when she is nauseated. We are not big fans of meclazine in [place] as it is a vestibular suppressant that makes the system fluctuate when they take it and don't take it. I would prefer if she had an antinausea med to take when nauseated. IF you agree can you please send a prescription for Zofran or other anit-nausea med to her pharmacy so she can stop taking the meclazine.  Thanks! Flor Johnston DPT, MPT, NCS  Physical Therapist   Board Certified Neurologic Clinical Specialist     [facility], [facility]   [address] [place] [address]   [email or web address]   [phone]   Clinic: [phone] //   Fax: [phone]

## 2023-04-12 DIAGNOSIS — Z79.4 TYPE 2 DIABETES MELLITUS WITH HYPERGLYCEMIA, WITH LONG-TERM CURRENT USE OF INSULIN (H): ICD-10-CM

## 2023-04-12 DIAGNOSIS — E11.65 TYPE 2 DIABETES MELLITUS WITH HYPERGLYCEMIA, WITH LONG-TERM CURRENT USE OF INSULIN (H): ICD-10-CM

## 2023-04-12 RX ORDER — PROCHLORPERAZINE 25 MG/1
SUPPOSITORY RECTAL
Qty: 9 EACH | Refills: 3 | Status: SHIPPED | OUTPATIENT
Start: 2023-04-12 | End: 2024-04-20

## 2023-04-12 RX ORDER — PROCHLORPERAZINE 25 MG/1
SUPPOSITORY RECTAL
Qty: 1 EACH | Refills: 3 | Status: SHIPPED | OUTPATIENT
Start: 2023-04-12 | End: 2024-04-20

## 2023-04-20 ENCOUNTER — HOSPITAL ENCOUNTER (OUTPATIENT)
Dept: PHYSICAL THERAPY | Facility: CLINIC | Age: 53
Setting detail: THERAPIES SERIES
Discharge: HOME | End: 2023-04-20
Attending: FAMILY MEDICINE
Payer: COMMERCIAL

## 2023-04-20 PROCEDURE — 97112 NEUROMUSCULAR REEDUCATION: CPT | Mod: GP | Performed by: PHYSICAL THERAPIST

## 2023-04-20 PROCEDURE — 97750 PHYSICAL PERFORMANCE TEST: CPT | Mod: GP | Performed by: PHYSICAL THERAPIST

## 2023-04-20 NOTE — PROGRESS NOTES
"   23 1600   Signing Clinician's Name / Credentials   Signing clinician's name / credentials Flor Johnston DPT NCS   Functional Gait Assessment (STACY Brown, DANIEL Mitchell, et al. (2004))   1. GAIT LEVEL SURFACE 3   2. CHANGE IN GAIT SPEED 2  (\"I dont walk fast, never have\")   3. GAIT WITH HORIZONTAL HEAD TURNS 3   4. GAIT WITH VERTICAL HEAD TURNS 3   5. GAIT AND PIVOT TURN 3   6. STEP OVER OBSTACLE 3   7. GAIT WITH NARROW BASE OF SUPPORT 0   8. GAIT WITH EYES CLOSED 2  (7.31)   9. AMBULATING BACKWARDS 3   10. STEPS 2   Total Functional Gait Assessment Score   TOTAL SCORE: (MAXIMUM SCORE 30) 24     Functional Gait Assessment (FGA): The FGA assesses postural stability during various walking tasks.   Gait assistive device used: None    Scores of <22 /30 have been correlated with predicting falls in community-dwelling older adults according to Kevin & Catalino 2010.   Scores of <18 /30 have been correlated with increased risk for falls in patients with Parkinsons Disease according to Mikey Almonte Zhou et al 2014.  Minimal Detectable Change for patients with acute/chronic stroke = 4.2 according to Christian & Jose 2009  Minimal Detectable Change for patients with vestibular disorder = 8 according to Kevin & Catalino 2010    Assessment (rationale for performing, application to patient s function & care plan): low risk of falls  (Minutes billed as physical performance test):  12    Flor Johnston DPT, MPT, NCS  Physical Therapist   Board Certified Neurologic Clinical Specialist     [facility]   [address] [place] [address]   [email or web address]   [phone]   Clinic: [phone] //   Fax: [phone]   "

## 2023-04-20 NOTE — PROGRESS NOTES
[facility] Rehabilitation Service    Outpatient Physical Therapy Discharge Note  Patient: Bettina Montes  : 1970    Beginning/End Dates of Reporting Period:  3/4/2023 to 2023. She was seen for 4 visits. Treated for right side PSCC BPPV and gazes tab exs for VOR training.    Referring Provider: DR Prabhu Robbins    Therapy Diagnosis: BPPV     Client Self Report: NO nausea, no dizziness. 100% better. Normal routine. I can even shake my head sisde to side now. Not as bohtersome sleeping on right side.    Objective Measurements:  Objective Measure: 25ftw at self selected speed 7.56 seconds and 14 steps. Repeat 7.44 seconds and 14 steps.  Details: FGA     Objective Measure: mCTSIB 30/10 (tested twice) shoes on.    Outcome Measures (most recent score):  Dizziness Handicap Inventory (score out of 100) A decrease in score by 17.18 or greater indicates a clinically significant change in symptoms.: 12 (eval it was 66)    Goals:  Goal Identifier DHI   Goal Description Bettina will demonstrate a 20 point or greater improvement on the Dizziness Handicap Inventory with initial score of 66/100.   Target Date 23   Date Met  23   Progress (detail required for progress note): MET, score today 12/100     Goal Identifier FT Work   Goal Description Bettina will report no dizziness with ability to work full time, 5 days a week for 2 weeks inidcating an improvement in overall symptoms when working at her computer.   Target Date 23   Date Met  23   Progress (detail required for progress note): MET     Goal Identifier Roll   Goal Description Bettina will be able to roll and lay on her right side on a nightly basis for sleep for 2 weeks with no symptoms.   Target Date 23   Date Met  23   Progress (detail required for progress note): met     Goal Identifier     Goal Description     Target Date     Date Met       Progress (detail required for progress note):       Goal Identifier     Goal Description     Target Date     Date Met      Progress (detail required for progress note):       Goal Identifier     Goal Description     Target Date     Date Met      Progress (detail required for progress note):       Goal Identifier     Goal Description     Target Date     Date Met      Progress (detail required for progress note):       Goal Identifier     Goal Description     Target Date     Date Met      Progress (detail required for progress note):         Plan:  Discharge from therapy.    Reason for Discharge: Patient has met all goals.    Discharge Plan: no need to do any further vestibular exs.    Flor Johnston DPT, MPT, NCS  Physical Therapist   Board Certified Neurologic Clinical Specialist     [facility], [facility]   [address] [place] [address]   [email or web address]  [email or web address]   [phone]   Clinic: [phone] //   Fax: [phone]

## 2023-04-25 NOTE — PROGRESS NOTES
Endocrinology Clinic Visit    Chief Complaint: Video Visit     Information obtained from:Patient      Assessment/Treatment Plan:      Type 2 diabetes currently uncontrolled  Past medical history of heart failure, obstructive sleep apnea & morbid obesity Estimated body mass index is 45.06 kg/m  as Obesity.     Hemoglobin A1c estimated 6.9% with 78% of the time within the target range BG readings.   Did not tolerate metformin [due to GI side effects] and SGLT2 inhibitors [due to yeast infection]. Open to try metformin again however not SGLT-2 inhibitor.   Currently Ozempic 2 mg every 7 days, tolerating well.  Increased semaglutide or 2.4 mg every 7 days if covered.  We have adjusted insulin regimen as documented below.  We are adding metformin with supper and if tolerated to increase to 1000 mg daily.    Left adrenal hyperplasia/thickining CT incidentals finding from 2017: will check adrenal MRI.      Patient Instructions   Bettina,      Start metformin 500 mg extended release with supper.  After 10 days if tolerated well increase to 500 mg extended release twice daily with meals.  Semaglutide 1 mg every 7 days.  Sent a prescription to our pharmacy for semaglutide or Wegovy 2.4 mg pen.  Insulin glargine ( U300)  100 units once a day  Continue with mealtime insulin of NovoLog 15 to 25 units with meals 3 times per day depending on carbohydrate content.   Check blood sugar fasting, before meals and bedtime.        Test and/or medications prescribed today:  Orders Placed This Encounter   Procedures     Hemoglobin A1c         Cadence Akbar MD  Staff Endocrinologist    Division of Endocrinology and Diabetes      Subjective:         HPI: Bettina Montes is a 53 year old female with history of type 2 diabetes who is here for routine follow-up.  This is her second visit with us.  Type 2 diabetes diagnosed: 20 years ago that the intermittent   Insulin glargine 100  units   Novolog 15 unis with meals   Ozempic 2 mg every  7 days tolerating all medications well.                    Past medical history of heart failure currently clinically stable.  Scheduled with bariatric surgery for consultation of weight loss procedures.  Not currently pursuing bariatric surgery.  Has tried multiple diets including weight watchers however sticking to the diet has been difficult.  Lost some weight [about 10 pounds] on semaglutide.  Allergies   Allergen Reactions     Amlodipine Swelling     Edema on 5mg throat     Lisinopril      Swelling in throat, face  angioedema     Naprosyn [Naproxen]      Swelling, diff breathing       Current Outpatient Medications   Medication Sig Dispense Refill     acetaminophen (TYLENOL) 500 MG tablet Take 500-1,000 mg by mouth every 6 hours as needed for mild pain       carvedilol (COREG) 25 MG tablet Take 1 tablet (25 mg) by mouth 2 times daily (with meals) 180 tablet 3     Chlorpheniramine-DM (CORICIDIN HBP COUGH/COLD) 4-30 MG TABS Take 1 tablet by mouth as needed       cholecalciferol 50 MCG (2000 UT) CAPS Take 2,000 Units by mouth daily        Continuous Blood Gluc Sensor (DEXCOM G6 SENSOR) MISC Change every 10 days. 9 each 3     Continuous Blood Gluc Transmit (DEXCOM G6 TRANSMITTER) MISC Change every 3 months. 1 each 3     fluticasone-salmeterol (WIXELA INHUB) 250-50 MCG/ACT inhaler Inhale 1 puff into the lungs every 12 hours 1 each 11     hydrALAZINE (APRESOLINE) 10 MG tablet Take 1 tablet (10 mg) by mouth 2 times daily 90 tablet 3     insulin aspart (NOVOLOG FLEXPEN) 100 UNIT/ML pen Inject 18 Units Subcutaneous 3 times daily (with meals) 60 mL 1     insulin glargine U-300 (TOUJEO MAX SOLOSTAR) 300 UNIT/ML (2 units dial) pen Inject 100 Units Subcutaneous daily 30 mL 11     insulin pen needle (ULTICARE MICRO) 32G X 4 MM miscellaneous Use 4 pen needles daily or as directed. 400 each 11     levalbuterol (XOPENEX HFA) 45 MCG/ACT Inhaler Inhale 2 puffs into the lungs every 4 hours as needed for shortness of breath /  dyspnea or wheezing 15 g 3     lovastatin (MEVACOR) 20 MG tablet TAKE 1 TABLET BY MOUTH EVERYDAY AT BEDTIME 90 tablet 3     magnesium oxide (MAG-OX) 400 (241.3 Mg) MG tablet Take 1 tablet (400 mg) by mouth daily 100 tablet 3     metFORMIN (GLUCOPHAGE XR) 500 MG 24 hr tablet Take 1 tablet (500 mg) by mouth daily (with dinner) for 10 days, THEN 2 tablets (1,000 mg) 2 times daily (with meals) for 350 days. 190 tablet 3     montelukast (SINGULAIR) 10 MG tablet Take 1 tablet (10 mg) by mouth At Bedtime 90 tablet 3     ondansetron (ZOFRAN) 4 MG tablet Take 1 tablet (4 mg) by mouth every 6 hours as needed for nausea 20 tablet 3     sacubitril-valsartan (ENTRESTO)  MG per tablet Take 1 tablet by mouth 2 times daily 180 tablet 3     Semaglutide, 2 MG/DOSE, 8 MG/3ML SOPN Inject 2 mg Subcutaneous every 7 days 9 mL 1     spironolactone (ALDACTONE) 50 MG tablet Take 1 tablet (50 mg) by mouth daily 90 tablet 3     torsemide (DEMADEX) 20 MG tablet Patient to take 40 mg daily and take an extra 20 mg if weight goes 285 lbs or higher. 180 tablet 11     blood glucose (NO BRAND SPECIFIED) test strip Use to test blood sugar 3-4 times daily or as directed. (Patient not taking: Reported on 5/2/2023) 200 strip 1     Continuous Blood Gluc  (DEXCOM G6 ) JEANNE Use to read blood sugars as per 's instructions. 1 each 0     Continuous Blood Gluc Sensor (FREESTYLE ANETA 14 DAY SENSOR) MISC 1 Device every 14 days Change sensor as directed every 14 days (Patient not taking: Reported on 2/15/2023) 2 each 6     Continuous Blood Gluc Sensor (FREESTYLE ANETA 2 SENSOR) MISC 1 each every 14 days (Patient not taking: Reported on 2/15/2023) 6 each 11     ketoconazole (NIZORAL) 2 % external cream Apply topically 2 times daily For 2 weeks. (Patient not taking: Reported on 2/15/2023) 60 g 1     Semaglutide-Weight Management (WEGOVY) 2.4 MG/0.75ML pen Inject 2.4 mg Subcutaneous once a week 3 mL 3     triamcinolone (KENALOG) 0.1  % external cream Apply topically 2 times daily For maximum 2 weeks. (Patient not taking: Reported on 2/15/2023) 60 g 1       Review of Systems     8 point review system (Constitutional, HENT, Eyes, Respiratory, Cardiovascular, Gastrointestinal, Genitourinary, Musculoskeletal,Neurological, Psychiatric/Behavioural, Endocrine) is negative or is as per HPI above  Past medical history, past surgical history, social and family history reviewed in epic.        Objective:   GENERAL: alert and no distress  EYES: Eyes grossly normal to inspection.    RESP: No audible wheeze, cough, or visible cyanosis.      SKIN: Visible skin clear. NEURO: Cranial nerves grossly intact.  Mentation and speech appropriate for age.  PSYCH: Mentation appears normal, affect normal/bright, judgement and insight intact, normal speech and appearance well-groomed.  In House Labs:   Lab Results   Component Value Date    A1C 9.7 08/10/2022    A1C 10.1 12/01/2021    A1C 9.0 09/29/2021    A1C 10.1 06/11/2021    A1C 9.6 01/14/2021    A1C 11.0 08/04/2020    A1C 6.5 09/10/2019    A1C 7.7 05/17/2019       TSH   Date Value Ref Range Status   02/15/2023 1.01 0.40 - 4.00 mU/L Final   12/30/2019 1.47 0.40 - 4.00 mU/L Final   05/13/2019 0.71 0.40 - 4.00 mU/L Final   05/10/2017 0.84 0.40 - 4.00 mU/L Final   02/17/2016 1.04 0.40 - 4.00 mU/L Final       Creatinine   Date Value Ref Range Status   02/15/2023 0.96 0.52 - 1.04 mg/dL Final   03/23/2021 1.18 (H) 0.52 - 1.04 mg/dL Final   ]    Recent Labs   Lab Test 09/29/21  0931 01/14/21  1622 05/17/19  1306   CHOL 128  --  147   HDL 42*  --  66   LDL 58 83 64   TRIG 140  --  83       This note has been dictated using voice recognition software.  As a result, there may be errors in the documentation that have gone undetected.  Please consider this when interpreting information in this documentation.      Video-Visit Details    Type of service:  Video Visit    Video Start Time: 1:45 PM    Video End Time:2:07 PM  Distant  Location (provider location):  Off-site.   Platform used for Video Visit: PlanetTran

## 2023-04-28 ENCOUNTER — TELEPHONE (OUTPATIENT)
Dept: ENDOCRINOLOGY | Facility: CLINIC | Age: 53
End: 2023-04-28
Payer: COMMERCIAL

## 2023-04-28 DIAGNOSIS — E11.65 TYPE 2 DIABETES MELLITUS WITH HYPERGLYCEMIA, WITH LONG-TERM CURRENT USE OF INSULIN (H): Primary | ICD-10-CM

## 2023-04-28 DIAGNOSIS — Z79.4 TYPE 2 DIABETES MELLITUS WITH HYPERGLYCEMIA, WITH LONG-TERM CURRENT USE OF INSULIN (H): Primary | ICD-10-CM

## 2023-04-28 NOTE — TELEPHONE ENCOUNTER
Spoke with patient in regards to obtaining dexcom data for appointment scheduled 5/2/2023. Patient was able to generate code for dexcom sharing: [id number]. Will send data over to provider for appointment.     Patient was also wondering about having an A1C done prior to appt. Will send message on to Dr. Akbar and team in regards to if A1C is needed.    Kimmy Mike LPN 04/28/23 12:12 PM

## 2023-04-28 NOTE — TELEPHONE ENCOUNTER
[facility] Call Center    Phone Message    May a detailed message be left on voicemail: yes     Reason for Call: Other: Patient states she received a call to download her Dexcom information before her appt on Monday. She would like a call to know how she is supposed to do that, she is not sure. Please call today if possible. Thank you.     Action Taken: Other: Endo     Travel Screening: Not Applicable

## 2023-05-02 ENCOUNTER — TELEPHONE (OUTPATIENT)
Dept: ENDOCRINOLOGY | Facility: CLINIC | Age: 53
End: 2023-05-02

## 2023-05-02 ENCOUNTER — VIRTUAL VISIT (OUTPATIENT)
Dept: ENDOCRINOLOGY | Facility: CLINIC | Age: 53
End: 2023-05-02
Payer: COMMERCIAL

## 2023-05-02 DIAGNOSIS — E66.01 MORBID OBESITY (H): ICD-10-CM

## 2023-05-02 DIAGNOSIS — E27.9 ADRENAL ABNORMALITY (H): ICD-10-CM

## 2023-05-02 DIAGNOSIS — E11.65 TYPE 2 DIABETES MELLITUS WITH HYPERGLYCEMIA, WITH LONG-TERM CURRENT USE OF INSULIN (H): Primary | ICD-10-CM

## 2023-05-02 DIAGNOSIS — Z79.4 TYPE 2 DIABETES MELLITUS WITH HYPERGLYCEMIA, WITH LONG-TERM CURRENT USE OF INSULIN (H): Primary | ICD-10-CM

## 2023-05-02 PROCEDURE — 99214 OFFICE O/P EST MOD 30 MIN: CPT | Mod: VID | Performed by: INTERNAL MEDICINE

## 2023-05-02 RX ORDER — METFORMIN HCL 500 MG
TABLET, EXTENDED RELEASE 24 HR ORAL
Qty: 190 TABLET | Refills: 3 | Status: SHIPPED | OUTPATIENT
Start: 2023-05-02 | End: 2024-01-16

## 2023-05-02 NOTE — NURSING NOTE
Is the patient currently in the state of [place]? YES    Visit mode:VIDEO    If the visit is dropped, the patient can be reconnected by: VIDEO VISIT: Text to cell phone: [phone]    Will anyone else be joining the visit? NO      How would you like to obtain your AVS? MyChart    Are changes needed to the allergy or medication list? YES: See below     Patient takes a daily multivitamin.     Jerilyn Dominguez, Wanda Facilitator    Reason for visit: Video Visit

## 2023-05-02 NOTE — PATIENT INSTRUCTIONS
Bettina,      Start metformin 500 mg extended release with supper.  After 10 days if tolerated well increase to 500 mg extended release twice daily with meals.  Semaglutide 1 mg every 7 days.  Sent a prescription to our pharmacy for semaglutide or Wegovy 2.4 mg pen.  Insulin glargine ( U300)  100 units once a day  Continue with mealtime insulin of NovoLog 15 to 25 units with meals 3 times per day depending on carbohydrate content.   Check blood sugar fasting, before meals and bedtime.

## 2023-05-02 NOTE — TELEPHONE ENCOUNTER
PA Initiation    Medication: Wegovy   Insurance Company: [employer] - Phone [phone] Fax [phone]  Pharmacy Filling the Rx:    Filling Pharmacy Phone:    Filling Pharmacy Fax:    Start Date: 5/2/2023    Key: [id number]

## 2023-05-02 NOTE — LETTER
5/2/2023         RE: Bettina Montes  [address] [place] [address]        Dear Colleague,    Thank you for referring your patient, Bettina Montes, to the [facility]. Please see a copy of my visit note below.    Endocrinology Clinic Visit    Chief Complaint: Video Visit     Information obtained from:Patient      Assessment/Treatment Plan:      Type 2 diabetes currently uncontrolled  Past medical history of heart failure, obstructive sleep apnea & morbid obesity Estimated body mass index is 45.06 kg/m  as Obesity.     Hemoglobin A1c estimated 6.9% with 78% of the time within the target range BG readings.   Did not tolerate metformin [due to GI side effects] and SGLT2 inhibitors [due to yeast infection]. Open to try metformin again however not SGLT-2 inhibitor.   Currently Ozempic 2 mg every 7 days, tolerating well.  Increased semaglutide or 2.4 mg every 7 days if covered.  We have adjusted insulin regimen as documented below.  We are adding metformin with supper and if tolerated to increase to 1000 mg daily.    Left adrenal hyperplasia/thickining CT incidentals finding from 2017: will check adrenal MRI.      Patient Instructions   Bettina,      Start metformin 500 mg extended release with supper.  After 10 days if tolerated well increase to 500 mg extended release twice daily with meals.  Semaglutide 1 mg every 7 days.  Sent a prescription to our pharmacy for semaglutide or Wegovy 2.4 mg pen.  Insulin glargine ( U300)  100 units once a day  Continue with mealtime insulin of NovoLog 15 to 25 units with meals 3 times per day depending on carbohydrate content.   Check blood sugar fasting, before meals and bedtime.        Test and/or medications prescribed today:  Orders Placed This Encounter   Procedures     Hemoglobin A1c         Cadence Abkar MD  Staff Endocrinologist    Division of Endocrinology and Diabetes      Subjective:         HPI: Bettina Montes is a 53  year old female with history of type 2 diabetes who is here for routine follow-up.  This is her second visit with us.  Type 2 diabetes diagnosed: 20 years ago that the intermittent   Insulin glargine 100  units   Novolog 15 unis with meals   Ozempic 2 mg every 7 days tolerating all medications well.                    Past medical history of heart failure currently clinically stable.  Scheduled with bariatric surgery for consultation of weight loss procedures.  Not currently pursuing bariatric surgery.  Has tried multiple diets including weight watchers however sticking to the diet has been difficult.  Lost some weight [about 10 pounds] on semaglutide.  Allergies   Allergen Reactions     Amlodipine Swelling     Edema on 5mg throat     Lisinopril      Swelling in throat, face  angioedema     Naprosyn [Naproxen]      Swelling, diff breathing       Current Outpatient Medications   Medication Sig Dispense Refill     acetaminophen (TYLENOL) 500 MG tablet Take 500-1,000 mg by mouth every 6 hours as needed for mild pain       carvedilol (COREG) 25 MG tablet Take 1 tablet (25 mg) by mouth 2 times daily (with meals) 180 tablet 3     Chlorpheniramine-DM (CORICIDIN HBP COUGH/COLD) 4-30 MG TABS Take 1 tablet by mouth as needed       cholecalciferol 50 MCG (2000 UT) CAPS Take 2,000 Units by mouth daily        Continuous Blood Gluc Sensor (DEXCOM G6 SENSOR) MISC Change every 10 days. 9 each 3     Continuous Blood Gluc Transmit (DEXCOM G6 TRANSMITTER) MISC Change every 3 months. 1 each 3     fluticasone-salmeterol (WIXELA INHUB) 250-50 MCG/ACT inhaler Inhale 1 puff into the lungs every 12 hours 1 each 11     hydrALAZINE (APRESOLINE) 10 MG tablet Take 1 tablet (10 mg) by mouth 2 times daily 90 tablet 3     insulin aspart (NOVOLOG FLEXPEN) 100 UNIT/ML pen Inject 18 Units Subcutaneous 3 times daily (with meals) 60 mL 1     insulin glargine U-300 (TOUJEO MAX SOLOSTAR) 300 UNIT/ML (2 units dial) pen Inject 100 Units Subcutaneous daily  30 mL 11     insulin pen needle (ULTICARE MICRO) 32G X 4 MM miscellaneous Use 4 pen needles daily or as directed. 400 each 11     levalbuterol (XOPENEX HFA) 45 MCG/ACT Inhaler Inhale 2 puffs into the lungs every 4 hours as needed for shortness of breath / dyspnea or wheezing 15 g 3     lovastatin (MEVACOR) 20 MG tablet TAKE 1 TABLET BY MOUTH EVERYDAY AT BEDTIME 90 tablet 3     magnesium oxide (MAG-OX) 400 (241.3 Mg) MG tablet Take 1 tablet (400 mg) by mouth daily 100 tablet 3     metFORMIN (GLUCOPHAGE XR) 500 MG 24 hr tablet Take 1 tablet (500 mg) by mouth daily (with dinner) for 10 days, THEN 2 tablets (1,000 mg) 2 times daily (with meals) for 350 days. 190 tablet 3     montelukast (SINGULAIR) 10 MG tablet Take 1 tablet (10 mg) by mouth At Bedtime 90 tablet 3     ondansetron (ZOFRAN) 4 MG tablet Take 1 tablet (4 mg) by mouth every 6 hours as needed for nausea 20 tablet 3     sacubitril-valsartan (ENTRESTO)  MG per tablet Take 1 tablet by mouth 2 times daily 180 tablet 3     Semaglutide, 2 MG/DOSE, 8 MG/3ML SOPN Inject 2 mg Subcutaneous every 7 days 9 mL 1     spironolactone (ALDACTONE) 50 MG tablet Take 1 tablet (50 mg) by mouth daily 90 tablet 3     torsemide (DEMADEX) 20 MG tablet Patient to take 40 mg daily and take an extra 20 mg if weight goes 285 lbs or higher. 180 tablet 11     blood glucose (NO BRAND SPECIFIED) test strip Use to test blood sugar 3-4 times daily or as directed. (Patient not taking: Reported on 5/2/2023) 200 strip 1     Continuous Blood Gluc  (DEXCOM G6 ) JEANNE Use to read blood sugars as per 's instructions. 1 each 0     Continuous Blood Gluc Sensor (FREESTYLE ANETA 14 DAY SENSOR) MISC 1 Device every 14 days Change sensor as directed every 14 days (Patient not taking: Reported on 2/15/2023) 2 each 6     Continuous Blood Gluc Sensor (FREESTYLE ANETA 2 SENSOR) MISC 1 each every 14 days (Patient not taking: Reported on 2/15/2023) 6 each 11     ketoconazole  (NIZORAL) 2 % external cream Apply topically 2 times daily For 2 weeks. (Patient not taking: Reported on 2/15/2023) 60 g 1     Semaglutide-Weight Management (WEGOVY) 2.4 MG/0.75ML pen Inject 2.4 mg Subcutaneous once a week 3 mL 3     triamcinolone (KENALOG) 0.1 % external cream Apply topically 2 times daily For maximum 2 weeks. (Patient not taking: Reported on 2/15/2023) 60 g 1       Review of Systems     8 point review system (Constitutional, HENT, Eyes, Respiratory, Cardiovascular, Gastrointestinal, Genitourinary, Musculoskeletal,Neurological, Psychiatric/Behavioural, Endocrine) is negative or is as per HPI above  Past medical history, past surgical history, social and family history reviewed in epic.        Objective:   GENERAL: alert and no distress  EYES: Eyes grossly normal to inspection.    RESP: No audible wheeze, cough, or visible cyanosis.      SKIN: Visible skin clear. NEURO: Cranial nerves grossly intact.  Mentation and speech appropriate for age.  PSYCH: Mentation appears normal, affect normal/bright, judgement and insight intact, normal speech and appearance well-groomed.  In House Labs:   Lab Results   Component Value Date    A1C 9.7 08/10/2022    A1C 10.1 12/01/2021    A1C 9.0 09/29/2021    A1C 10.1 06/11/2021    A1C 9.6 01/14/2021    A1C 11.0 08/04/2020    A1C 6.5 09/10/2019    A1C 7.7 05/17/2019       TSH   Date Value Ref Range Status   02/15/2023 1.01 0.40 - 4.00 mU/L Final   12/30/2019 1.47 0.40 - 4.00 mU/L Final   05/13/2019 0.71 0.40 - 4.00 mU/L Final   05/10/2017 0.84 0.40 - 4.00 mU/L Final   02/17/2016 1.04 0.40 - 4.00 mU/L Final       Creatinine   Date Value Ref Range Status   02/15/2023 0.96 0.52 - 1.04 mg/dL Final   03/23/2021 1.18 (H) 0.52 - 1.04 mg/dL Final   ]    Recent Labs   Lab Test 09/29/21  0931 01/14/21  1622 05/17/19  1306   CHOL 128  --  147   HDL 42*  --  66   LDL 58 83 64   TRIG 140  --  83       This note has been dictated using voice recognition software.  As a result, there  may be errors in the documentation that have gone undetected.  Please consider this when interpreting information in this documentation.      Video-Visit Details    Type of service:  Video Visit    Video Start Time: 1:45 PM    Video End Time:2:07 PM  Distant Location (provider location):  Off-site.   Platform used for Video Visit: Omaze            Again, thank you for allowing me to participate in the care of your patient.        Sincerely,        Cadence Akbar MD

## 2023-05-03 NOTE — TELEPHONE ENCOUNTER
Prior Authorization Approval    Authorization Effective Date: 5/2/2023  Authorization Expiration Date: 12/2/2023  Medication: Wegovy   Approved Dose/Quantity: 3ml/28 days   Reference #: [id number]   Insurance Company: [employer] - Phone [phone] Fax [phone]  Expected CoPay: 50.00     CoPay Card Available:      Foundation Assistance Needed:    Which Pharmacy is filling the prescription (Not needed for infusion/clinic administered):    Pharmacy Notified:    Patient Notified:

## 2023-05-04 ENCOUNTER — TELEPHONE (OUTPATIENT)
Dept: ENDOCRINOLOGY | Facility: CLINIC | Age: 53
End: 2023-05-04
Payer: COMMERCIAL

## 2023-05-04 NOTE — TELEPHONE ENCOUNTER
Fax from [facility] pharmacy regarding Semaglutide-weight management 2.4 mg/0.75 ml. Pharmacy comment: This medication requires previous titration 1.7 mg, has patient received the lower dose?    See 5/2/23 telephone encounter. Message written on fax to pharmacy that prior auth approved.    PEREZ Bernard  Adult Endocrinology  [facility]

## 2023-05-09 ENCOUNTER — LAB (OUTPATIENT)
Dept: LAB | Facility: CLINIC | Age: 53
End: 2023-05-09
Payer: COMMERCIAL

## 2023-05-09 DIAGNOSIS — E66.01 MORBID OBESITY (H): ICD-10-CM

## 2023-05-09 DIAGNOSIS — Z79.4 TYPE 2 DIABETES MELLITUS WITH HYPERGLYCEMIA, WITH LONG-TERM CURRENT USE OF INSULIN (H): ICD-10-CM

## 2023-05-09 DIAGNOSIS — E11.65 TYPE 2 DIABETES MELLITUS WITH HYPERGLYCEMIA, WITH LONG-TERM CURRENT USE OF INSULIN (H): ICD-10-CM

## 2023-05-09 LAB — HBA1C MFR BLD: 7.6 % (ref 0–5.6)

## 2023-05-09 PROCEDURE — 36415 COLL VENOUS BLD VENIPUNCTURE: CPT

## 2023-05-09 PROCEDURE — 83036 HEMOGLOBIN GLYCOSYLATED A1C: CPT

## 2023-06-16 ENCOUNTER — OFFICE VISIT (OUTPATIENT)
Dept: CARDIOLOGY | Facility: CLINIC | Age: 53
End: 2023-06-16
Payer: COMMERCIAL

## 2023-06-16 ENCOUNTER — TRANSFERRED RECORDS (OUTPATIENT)
Dept: HEALTH INFORMATION MANAGEMENT | Facility: CLINIC | Age: 53
End: 2023-06-16

## 2023-06-16 ENCOUNTER — ANCILLARY PROCEDURE (OUTPATIENT)
Dept: CARDIOLOGY | Facility: CLINIC | Age: 53
End: 2023-06-16
Payer: COMMERCIAL

## 2023-06-16 VITALS
OXYGEN SATURATION: 98 % | BODY MASS INDEX: 44.1 KG/M2 | HEART RATE: 101 BPM | DIASTOLIC BLOOD PRESSURE: 69 MMHG | SYSTOLIC BLOOD PRESSURE: 123 MMHG | WEIGHT: 273.2 LBS

## 2023-06-16 DIAGNOSIS — I50.22 CHRONIC SYSTOLIC HEART FAILURE (H): ICD-10-CM

## 2023-06-16 DIAGNOSIS — R00.0 TACHYCARDIA: ICD-10-CM

## 2023-06-16 DIAGNOSIS — I42.8 NONISCHEMIC CARDIOMYOPATHY (H): ICD-10-CM

## 2023-06-16 DIAGNOSIS — I50.22 CHRONIC SYSTOLIC CONGESTIVE HEART FAILURE (H): ICD-10-CM

## 2023-06-16 DIAGNOSIS — R00.0 TACHYCARDIA: Primary | ICD-10-CM

## 2023-06-16 PROCEDURE — 93000 ELECTROCARDIOGRAM COMPLETE: CPT | Performed by: INTERNAL MEDICINE

## 2023-06-16 PROCEDURE — 93248 EXT ECG>7D<15D REV&INTERPJ: CPT | Performed by: INTERNAL MEDICINE

## 2023-06-16 PROCEDURE — 99215 OFFICE O/P EST HI 40 MIN: CPT | Performed by: INTERNAL MEDICINE

## 2023-06-16 PROCEDURE — 93246 EXT ECG>7D<15D RECORDING: CPT | Performed by: INTERNAL MEDICINE

## 2023-06-16 RX ORDER — CARVEDILOL 25 MG/1
25 TABLET ORAL 2 TIMES DAILY WITH MEALS
Qty: 180 TABLET | Refills: 3 | Status: SHIPPED | OUTPATIENT
Start: 2023-06-16 | End: 2024-08-28

## 2023-06-16 RX ORDER — SACUBITRIL AND VALSARTAN 97; 103 MG/1; MG/1
1 TABLET, FILM COATED ORAL 2 TIMES DAILY
Qty: 180 TABLET | Refills: 3 | Status: SHIPPED | OUTPATIENT
Start: 2023-06-16 | End: 2024-08-28

## 2023-06-16 NOTE — PROGRESS NOTES
Bettina Montes's goals for this visit include: HR has been high eeven when asleep. In the 110.     She requests these members of her care team be copied on today's visit information: PCP    PCP: Prabhu Robbins    Referring Provider:  No referring provider defined for this encounter.    /69 (BP Location: Left arm, Patient Position: Sitting, Cuff Size: Adult Regular)   Pulse 101   Wt 123.9 kg (273 lb 3.2 oz)   LMP  (LMP Unknown)   SpO2 98%   BMI 44.10 kg/m        Do you need any medication refills at today's visit? No.    Yonas Chris, EMT  Clinic Support  [facility]    [phone]    Employed by [facility] [employer]

## 2023-06-16 NOTE — PROGRESS NOTES
[facility] Cardiology Consultation:    Assessment and Plan:     1.  Chronic systolic heart failure, LVEF of 45%, idiopathic nonischemic cardiomyopathy (stress MRI - no ischemia, mild septal fibrosis): Improved LV fxn with medical rx.   Continue Carvedilol, Entresto, and Spironolactone.    SGLT2 inhibitor stopped due to frequent yeast infections.  Follows with core clinic, on torsemide, appears euvolemic  F/u with me annual, with annual echo.     2. Hypertension, controlled on current regimen  3. Obesity  4. Diabetes, follows with endocrinology: On GLP-1 agonist and insulin.  On lovastatin with good lipid profile.    5. Asthma   6. Palpitations, sinus tachycardia, multiple episodes of brief SVT with average heart rate 120, PVC burden 5.5%  Recheck cardiac monitor    RTC in 1 year    A total of 40 minutes were spent on the day of the visit for chart review, care coordination, face-to-face consultation with the patient, and documentation.    El Chinchilla MD    Cardiac Imaging and Prevention  [facility]  Pager: [phone]      HPI: Bettina Montes is with a history of chronic idiopathic nonischemic cardiomyopathy, HTN, obesity, and DM2 who presents for routine follow up.   I reviewed her echo that was done last year.  It showed stable mild cardiomyopathy with LVEF 40 to 45%, normal RV function.  At her visit last year I had decreased her hydralazine dose, since then BP has been better.  She continues to follow-up in the core clinic.  She had called us reporting palpitations with mild tachycardia and I had recommended a Zio patch cardiac monitor, however this was never done.  Her basic labs are normal.  She is tolerating all her medications without any issues.  She has established care with an endocrinologist and her diabetes is better controlled now.  I reviewed her ECG that was done in clinic today.  It shows relative sinus tachycardia at 96 bpm, with no concerning  findings and no change from prior    EXAM:  /69 (BP Location: Left arm, Patient Position: Sitting, Cuff Size: Adult Regular)   Pulse 101   Wt 123.9 kg (273 lb 3.2 oz)   LMP  (LMP Unknown)   SpO2 98%   BMI 44.10 kg/m    GEN/CONSTITUTIONAL: Appears comfortable, in no apparent distress   EYES: No icterus  ENT/MOUTH: Normal  JVP:  Not visible  RESPIRATORY: Clear to auscultation bilaterally   CARDIOVASCULAR: Regular S1 and S2, no murmurs, rubs, or gallops.   ABDOMEN: Soft, non-tender, positive bowel sounds   NEUROLOGIC: Grossly non-focal   PSYCHIATRIC: Normal affect  EXT: Trace edema. Normal pedal pulses.  Skin: No petechiae, purpura or rash    PAST MEDICAL HISTORY:  Past Medical History:   Diagnosis Date     Abnormal Pap smear of cervix 09/10/2019    See problem list     Adrenal gland anomaly      CHF (congestive heart failure) (H)      Fatty liver      Gastroesophageal reflux disease      Hyperlipidemia      Hypertension      Mild persistent asthma      NICM (nonischemic cardiomyopathy) (H)      Obesity      WILLARD (obstructive sleep apnea) 1/9/2015     Type 2 diabetes mellitus (H)        CURRENT MEDICATIONS:  Current Outpatient Medications   Medication     acetaminophen (TYLENOL) 500 MG tablet     blood glucose (NO BRAND SPECIFIED) test strip     carvedilol (COREG) 25 MG tablet     Chlorpheniramine-DM (CORICIDIN HBP COUGH/COLD) 4-30 MG TABS     cholecalciferol 50 MCG (2000 UT) CAPS     Continuous Blood Gluc  (DEXCOM G6 ) JEANNE     Continuous Blood Gluc Sensor (DEXCOM G6 SENSOR) MISC     Continuous Blood Gluc Sensor (FREESTYLE ANETA 14 DAY SENSOR) MISC     Continuous Blood Gluc Sensor (FREESTYLE ANETA 2 SENSOR) MISC     Continuous Blood Gluc Transmit (DEXCOM G6 TRANSMITTER) MISC     fluticasone-salmeterol (WIXELA INHUB) 250-50 MCG/ACT inhaler     hydrALAZINE (APRESOLINE) 10 MG tablet     insulin aspart (NOVOLOG FLEXPEN) 100 UNIT/ML pen     insulin glargine U-300 (TOUJEO MAX SOLOSTAR) 300 UNIT/ML  (2 units dial) pen     insulin pen needle (ULTICARE MICRO) 32G X 4 MM miscellaneous     ketoconazole (NIZORAL) 2 % external cream     levalbuterol (XOPENEX HFA) 45 MCG/ACT Inhaler     lovastatin (MEVACOR) 20 MG tablet     magnesium oxide (MAG-OX) 400 (241.3 Mg) MG tablet     metFORMIN (GLUCOPHAGE XR) 500 MG 24 hr tablet     montelukast (SINGULAIR) 10 MG tablet     ondansetron (ZOFRAN) 4 MG tablet     sacubitril-valsartan (ENTRESTO)  MG per tablet     Semaglutide, 2 MG/DOSE, 8 MG/3ML SOPN     Semaglutide-Weight Management (WEGOVY) 2.4 MG/0.75ML pen     spironolactone (ALDACTONE) 50 MG tablet     torsemide (DEMADEX) 20 MG tablet     triamcinolone (KENALOG) 0.1 % external cream     No current facility-administered medications for this visit.     Facility-Administered Medications Ordered in Other Visits   Medication     sodium chloride (PF) 0.9% PF flush 10 mL       PAST SURGICAL HISTORY:  Past Surgical History:   Procedure Laterality Date     COLONOSCOPY       COLONOSCOPY N/A 10/24/2022    Procedure: COLONOSCOPY, FLEXIBLE, WITH LESION REMOVAL USING SNARE;  Surgeon: Larissa Eubanks MD;  Location: [facility] OR     COLONOSCOPY WITH CO2 INSUFFLATION N/A 10/24/2022    Procedure: COLONOSCOPY, WITH CO2 INSUFFLATION;  Surgeon: Larissa Eubanks MD;  Location: [facility] OR     DISCECTOMY, FUSION CERVICAL ANTERIOR TWO LEVELS, COMBINED N/A 4/5/2019    Procedure: C4-6 anterior cervical discectomy and fusion;  Surgeon: Hollis Hurtado MD;  Location: [facility] OR     TUBAL LIGATION         ALLERGIES     Allergies   Allergen Reactions     Amlodipine Swelling     Edema on 5mg throat     Lisinopril      Swelling in throat, face  angioedema     Naprosyn [Naproxen]      Swelling, diff breathing       FAMILY HISTORY:  Family History   Problem Relation Age of Onset     Diabetes Mother      Hypertension Mother      Hyperlipidemia Mother      Heart Failure Mother      Diabetes Father      Cancer Paternal Grandmother         ?       SOCIAL  HISTORY:  Social History     Socioeconomic History     Marital status: Legally      Spouse name: Not on file     Number of children: 1     Years of education: Not on file     Highest education level: Not on file   Occupational History     Occupation: CNA     Employer: OTHER   Tobacco Use     Smoking status: Former     Packs/day: 1.00     Years: 30.00     Pack years: 30.00     Types: Cigarettes     Quit date: 1/9/2013     Years since quitting: 10.4     Smokeless tobacco: Former     Tobacco comments:         Vaping Use     Vaping status: Never Used   Substance and Sexual Activity     Alcohol use: Yes     Comment: four drinks a month     Drug use: No     Sexual activity: Yes     Partners: Male     Birth control/protection: Surgical   Other Topics Concern     Parent/sibling w/ CABG, MI or angioplasty before 65F 55M? No   Social History Narrative     Not on file     Social Determinants of Health     Financial Resource Strain: Not on file   Food Insecurity: Not on file   Transportation Needs: Not on file   Physical Activity: Not on file   Stress: Not on file   Social Connections: Not on file   Intimate Partner Violence: Not on file   Housing Stability: Not on file       ROS:   Constitutional: No fever, chills, or sweats. No weight gain/loss   ENT: No visual disturbance, ear ache, epistaxis, sore throat  Allergies/Immunologic: Negative.   Respiratory: No cough, hemoptysia  Cardiovascular: As per HPI  GI: No nausea, vomiting, hematemesis, melena, or hematochezia  : No urinary frequency, dysuria, or hematuria  Integument: Negative  Psychiatric: Negative  Neuro: Negative  Endocrinology: Negative   Musculoskeletal: Negative    ADDITIONAL COMMENTS:     I reviewed the patient's medications:     I reviewed the patient's pertinent clinical laboratory studies:     I reviewed the patient's pertinent imaging studies:   I reviewed the patient's ECG:

## 2023-06-16 NOTE — PROGRESS NOTES
Patient has been prescribed a ZioPatch holter for 7 days.  Patient was instructed regarding the indication, function, care and prompt return of the ZioPatch holter monitor. The monitor, with S/N [id number],  was placed on the patient with instructions regarding care of the skin, electrodes, and monitor, as well as documentation in the patient diary. Patient demonstrated understanding of this information and agreed to call [employer] with further questions or concerns.

## 2023-06-19 NOTE — PATIENT INSTRUCTIONS
Patient has been prescribed a ZioPatch holter for 7 days.  Patient was instructed regarding the indication, function, care and prompt return of the ZioPatch holter monitor. The monitor, with S/N [id number],  was placed on the patient with instructions regarding care of the skin, electrodes, and monitor, as well as documentation in the patient diary. Patient demonstrated understanding of this information and agreed to call [employer] with further questions or concerns.

## 2023-06-29 ENCOUNTER — TRANSFERRED RECORDS (OUTPATIENT)
Dept: HEALTH INFORMATION MANAGEMENT | Facility: CLINIC | Age: 53
End: 2023-06-29
Payer: COMMERCIAL

## 2023-07-03 ENCOUNTER — OFFICE VISIT (OUTPATIENT)
Dept: FAMILY MEDICINE | Facility: CLINIC | Age: 53
End: 2023-07-03
Payer: COMMERCIAL

## 2023-07-03 VITALS
RESPIRATION RATE: 16 BRPM | DIASTOLIC BLOOD PRESSURE: 78 MMHG | BODY MASS INDEX: 43.17 KG/M2 | HEIGHT: 66 IN | SYSTOLIC BLOOD PRESSURE: 115 MMHG | OXYGEN SATURATION: 98 % | HEART RATE: 98 BPM | WEIGHT: 268.6 LBS

## 2023-07-03 DIAGNOSIS — J45.20 INTERMITTENT ASTHMA, UNCOMPLICATED: ICD-10-CM

## 2023-07-03 DIAGNOSIS — E11.69 TYPE 2 DIABETES MELLITUS WITH OTHER SPECIFIED COMPLICATION, WITH LONG-TERM CURRENT USE OF INSULIN (H): ICD-10-CM

## 2023-07-03 DIAGNOSIS — Z00.00 ROUTINE GENERAL MEDICAL EXAMINATION AT A HEALTH CARE FACILITY: Primary | ICD-10-CM

## 2023-07-03 DIAGNOSIS — N30.00 ACUTE CYSTITIS WITHOUT HEMATURIA: ICD-10-CM

## 2023-07-03 DIAGNOSIS — E66.01 MORBID OBESITY DUE TO EXCESS CALORIES (H): ICD-10-CM

## 2023-07-03 DIAGNOSIS — I50.9 CONGESTIVE HEART FAILURE, UNSPECIFIED HF CHRONICITY, UNSPECIFIED HEART FAILURE TYPE (H): ICD-10-CM

## 2023-07-03 DIAGNOSIS — N89.8 VAGINAL ITCHING: ICD-10-CM

## 2023-07-03 DIAGNOSIS — Z87.891 FORMER SMOKER: ICD-10-CM

## 2023-07-03 DIAGNOSIS — I25.10 CORONARY ARTERY CALCIFICATION: ICD-10-CM

## 2023-07-03 DIAGNOSIS — Z79.4 TYPE 2 DIABETES MELLITUS WITH OTHER SPECIFIED COMPLICATION, WITH LONG-TERM CURRENT USE OF INSULIN (H): ICD-10-CM

## 2023-07-03 DIAGNOSIS — Z12.4 CERVICAL CANCER SCREENING: ICD-10-CM

## 2023-07-03 LAB
ALBUMIN UR-MCNC: NEGATIVE MG/DL
APPEARANCE UR: ABNORMAL
BACTERIA #/AREA URNS HPF: ABNORMAL /HPF
BILIRUB UR QL STRIP: NEGATIVE
CLUE CELLS: PRESENT
COLOR UR AUTO: YELLOW
ERYTHROCYTE [DISTWIDTH] IN BLOOD BY AUTOMATED COUNT: 13.7 % (ref 10–15)
GLUCOSE UR STRIP-MCNC: NEGATIVE MG/DL
HCT VFR BLD AUTO: 35.7 % (ref 35–47)
HGB BLD-MCNC: 11.6 G/DL (ref 11.7–15.7)
HGB UR QL STRIP: NEGATIVE
HYALINE CASTS #/AREA URNS LPF: ABNORMAL /LPF
KETONES UR STRIP-MCNC: NEGATIVE MG/DL
LEUKOCYTE ESTERASE UR QL STRIP: ABNORMAL
MCH RBC QN AUTO: 28.1 PG (ref 26.5–33)
MCHC RBC AUTO-ENTMCNC: 32.5 G/DL (ref 31.5–36.5)
MCV RBC AUTO: 86 FL (ref 78–100)
NITRATE UR QL: POSITIVE
PH UR STRIP: 5.5 [PH] (ref 5–7)
PLATELET # BLD AUTO: 431 10E3/UL (ref 150–450)
RBC # BLD AUTO: 4.13 10E6/UL (ref 3.8–5.2)
RBC #/AREA URNS AUTO: ABNORMAL /HPF
SP GR UR STRIP: 1.01 (ref 1–1.03)
SQUAMOUS #/AREA URNS AUTO: ABNORMAL /LPF
TRICHOMONAS, WET PREP: ABNORMAL
UROBILINOGEN UR STRIP-ACNC: 0.2 E.U./DL
WBC # BLD AUTO: 12.6 10E3/UL (ref 4–11)
WBC #/AREA URNS AUTO: ABNORMAL /HPF
WBC CLUMPS #/AREA URNS HPF: PRESENT /HPF
WBC'S/HIGH POWER FIELD, WET PREP: ABNORMAL
YEAST, WET PREP: ABNORMAL

## 2023-07-03 PROCEDURE — G0145 SCR C/V CYTO,THINLAYER,RESCR: HCPCS | Performed by: PHYSICIAN ASSISTANT

## 2023-07-03 PROCEDURE — 90677 PCV20 VACCINE IM: CPT | Performed by: PHYSICIAN ASSISTANT

## 2023-07-03 PROCEDURE — 81001 URINALYSIS AUTO W/SCOPE: CPT | Performed by: PHYSICIAN ASSISTANT

## 2023-07-03 PROCEDURE — 87624 HPV HI-RISK TYP POOLED RSLT: CPT | Performed by: PHYSICIAN ASSISTANT

## 2023-07-03 PROCEDURE — 90471 IMMUNIZATION ADMIN: CPT | Performed by: PHYSICIAN ASSISTANT

## 2023-07-03 PROCEDURE — 99396 PREV VISIT EST AGE 40-64: CPT | Performed by: PHYSICIAN ASSISTANT

## 2023-07-03 PROCEDURE — 36415 COLL VENOUS BLD VENIPUNCTURE: CPT | Performed by: PHYSICIAN ASSISTANT

## 2023-07-03 PROCEDURE — 99213 OFFICE O/P EST LOW 20 MIN: CPT | Mod: 25 | Performed by: PHYSICIAN ASSISTANT

## 2023-07-03 PROCEDURE — 87210 SMEAR WET MOUNT SALINE/INK: CPT | Performed by: PHYSICIAN ASSISTANT

## 2023-07-03 PROCEDURE — 80061 LIPID PANEL: CPT | Performed by: PHYSICIAN ASSISTANT

## 2023-07-03 PROCEDURE — 85027 COMPLETE CBC AUTOMATED: CPT | Performed by: PHYSICIAN ASSISTANT

## 2023-07-03 PROCEDURE — 80048 BASIC METABOLIC PNL TOTAL CA: CPT | Performed by: PHYSICIAN ASSISTANT

## 2023-07-03 PROCEDURE — 87086 URINE CULTURE/COLONY COUNT: CPT | Performed by: PHYSICIAN ASSISTANT

## 2023-07-03 PROCEDURE — 87186 SC STD MICRODIL/AGAR DIL: CPT | Performed by: PHYSICIAN ASSISTANT

## 2023-07-03 RX ORDER — NITROFURANTOIN 25; 75 MG/1; MG/1
100 CAPSULE ORAL 2 TIMES DAILY
Qty: 14 CAPSULE | Refills: 0 | Status: SHIPPED | OUTPATIENT
Start: 2023-07-03 | End: 2023-07-10

## 2023-07-03 ASSESSMENT — ENCOUNTER SYMPTOMS
COUGH: 0
WEAKNESS: 0
CHILLS: 0
HEADACHES: 1
DIZZINESS: 0
NAUSEA: 0
PALPITATIONS: 0
CONSTIPATION: 0
EYE PAIN: 0
NERVOUS/ANXIOUS: 0
JOINT SWELLING: 0
HEMATOCHEZIA: 0
HEMATURIA: 0
MYALGIAS: 0
HEARTBURN: 0
ABDOMINAL PAIN: 0
DIARRHEA: 1
BREAST MASS: 0
SORE THROAT: 0
FEVER: 0
DYSURIA: 0
PARESTHESIAS: 0
SHORTNESS OF BREATH: 0
FREQUENCY: 1
ARTHRALGIAS: 0

## 2023-07-03 ASSESSMENT — PAIN SCALES - GENERAL: PAINLEVEL: NO PAIN (0)

## 2023-07-03 NOTE — NURSING NOTE
Prior to immunization administration, verified patients identity using patient s name and date of birth. Please see Immunization Activity for additional information.     Screening Questionnaire for Adult Immunization    Are you sick today?   No   Do you have allergies to medications, food, a vaccine component or latex?   No   Have you ever had a serious reaction after receiving a vaccination?   No   Do you have a long-term health problem with heart, lung, kidney, or metabolic disease (e.g., diabetes), asthma, a blood disorder, no spleen, complement component deficiency, a cochlear implant, or a spinal fluid leak?  Are you on long-term aspirin therapy?   Diabetes    Do you have cancer, leukemia, HIV/AIDS, or any other immune system problem?   No   Do you have a parent, brother, or sister with an immune system problem?   No   In the past 3 months, have you taken medications that affect  your immune system, such as prednisone, other steroids, or anticancer drugs; drugs for the treatment of rheumatoid arthritis, Crohn s disease, or psoriasis; or have you had radiation treatments?   No   Have you had a seizure, or a brain or other nervous system problem?   No   During the past year, have you received a transfusion of blood or blood    products, or been given immune (gamma) globulin or antiviral drug?   No   For women: Are you pregnant or is there a chance you could become       pregnant during the next month?   No   Have you received any vaccinations in the past 4 weeks?   No     Immunization questionnaire was positive for at least one answer.  Pt had DM.      Patient instructed to remain in clinic for 15 minutes afterwards, and to report any adverse reactions.     Screening performed by Patrick Howell MA on 7/3/2023 at 3:26 PM.

## 2023-07-03 NOTE — PROGRESS NOTES
SUBJECTIVE:   CC: Bettina is an 53 year old who presents for preventive health visit.       7/3/2023     1:47 PM   Additional Questions   Roomed by joyce MAURICE        Asthma Follow-Up    Was ACT completed today?  Yes        3/2/2023     7:12 AM   ACT Total Scores   ACT TOTAL SCORE (Goal Greater than or Equal to 20) 24   In the past 12 months, how many times did you visit the emergency room for your asthma without being admitted to the hospital? 0   In the past 12 months, how many times were you hospitalized overnight because of your asthma? 0          How many days per week do you miss taking your asthma controller medication?  I do not have an asthma controller medication    Please describe any recent triggers for your asthma: None    Have you had any Emergency Room Visits, Urgent Care Visits, or Hospital Admissions since your last office visit?  No          /Vaginal Symptoms      Duration: 2 weeks    Description  vaginal discharge - white and itching  Frequent urination and cloudy urine     Intensity:  mild    Accompanying signs and symptoms (fever/dysuria/abdominal or back pain): urinary frequency    History  Sexually active: not at present  Possibility of pregnancy: No  Recent antibiotic use: no     Precipitating or alleviating factors: diarhea    Therapies tried and outcome: none      Have you ever done Advance Care Planning? (For example, a Health Directive, POLST, or a discussion with a medical provider or your loved ones about your wishes): No, advance care planning information given to patient to review.  Patient declined advance care planning discussion at this time.    Social History     Tobacco Use     Smoking status: Former     Packs/day: 1.00     Years: 30.00     Pack years: 30.00     Types: Cigarettes     Quit date: 1/9/2013     Years since quitting: 10.4     Smokeless tobacco: Former     Tobacco comments:         Substance Use Topics     Alcohol use: Yes     Comment: four drinks a month              7/3/2023     1:50 PM   Alcohol Use   Prescreen: >3 drinks/day or >7 drinks/week? No     Reviewed orders with patient.  Reviewed health maintenance and updated orders accordingly - Yes  Patient Active Problem List   Diagnosis     Hypertension     Type 2 diabetes mellitus (H)     Mild persistent asthma without complication     CHF (congestive heart failure) (H)     Hyperlipidemia LDL goal <100     Morbid obesity due to excess calories (H)     Intermittent asthma, uncomplicated     Microscopic hematuria     Adrenal gland anomaly     Moderate persistent asthma without complication     Callus of foot     Nonischemic cardiomyopathy (H)     S/P cervical spinal fusion     WILLARD (obstructive sleep apnea)     ASCUS of cervix with negative high risk HPV     NSVT (nonsustained ventricular tachycardia) (H)     Past Surgical History:   Procedure Laterality Date     COLONOSCOPY       COLONOSCOPY N/A 10/24/2022    Procedure: COLONOSCOPY, FLEXIBLE, WITH LESION REMOVAL USING SNARE;  Surgeon: Larissa Eubanks MD;  Location: [facility] OR     COLONOSCOPY WITH CO2 INSUFFLATION N/A 10/24/2022    Procedure: COLONOSCOPY, WITH CO2 INSUFFLATION;  Surgeon: Larissa Eubanks MD;  Location: [facility] OR     DISCECTOMY, FUSION CERVICAL ANTERIOR TWO LEVELS, COMBINED N/A 4/5/2019    Procedure: C4-6 anterior cervical discectomy and fusion;  Surgeon: Hollis Hurtado MD;  Location: [facility] OR     TUBAL LIGATION         Social History     Tobacco Use     Smoking status: Former     Packs/day: 1.00     Years: 30.00     Pack years: 30.00     Types: Cigarettes     Quit date: 1/9/2013     Years since quitting: 10.4     Smokeless tobacco: Former     Tobacco comments:         Substance Use Topics     Alcohol use: Yes     Comment: four drinks a month     Family History   Problem Relation Age of Onset     Diabetes Mother      Hypertension Mother      Hyperlipidemia Mother      Heart Failure Mother      Diabetes Father      Cancer Paternal Grandmother          ?         Current Outpatient Medications   Medication Sig Dispense Refill     acetaminophen (TYLENOL) 500 MG tablet Take 500-1,000 mg by mouth every 6 hours as needed for mild pain       blood glucose (NO BRAND SPECIFIED) test strip Use to test blood sugar 3-4 times daily or as directed. 200 strip 1     carvedilol (COREG) 25 MG tablet Take 1 tablet (25 mg) by mouth 2 times daily (with meals) 180 tablet 3     cholecalciferol 50 MCG (2000 UT) CAPS Take 2,000 Units by mouth daily        Continuous Blood Gluc Sensor (DEXCOM G6 SENSOR) MISC Change every 10 days. 9 each 3     Continuous Blood Gluc Sensor (FREESTYLE ANETA 14 DAY SENSOR) MISC 1 Device every 14 days Change sensor as directed every 14 days 2 each 6     Continuous Blood Gluc Sensor (FREESTYLE ANETA 2 SENSOR) MISC 1 each every 14 days 6 each 11     Continuous Blood Gluc Transmit (DEXCOM G6 TRANSMITTER) MISC Change every 3 months. 1 each 3     hydrALAZINE (APRESOLINE) 10 MG tablet Take 1 tablet (10 mg) by mouth 2 times daily 90 tablet 3     insulin aspart (NOVOLOG FLEXPEN) 100 UNIT/ML pen Inject 18 Units Subcutaneous 3 times daily (with meals) 60 mL 1     insulin glargine U-300 (TOUJEO MAX SOLOSTAR) 300 UNIT/ML (2 units dial) pen Inject 100 Units Subcutaneous daily 30 mL 11     levalbuterol (XOPENEX HFA) 45 MCG/ACT Inhaler Inhale 2 puffs into the lungs every 4 hours as needed for shortness of breath / dyspnea or wheezing 15 g 3     lovastatin (MEVACOR) 20 MG tablet TAKE 1 TABLET BY MOUTH EVERYDAY AT BEDTIME 90 tablet 3     magnesium oxide (MAG-OX) 400 (241.3 Mg) MG tablet Take 1 tablet (400 mg) by mouth daily 100 tablet 3     metFORMIN (GLUCOPHAGE XR) 500 MG 24 hr tablet Take 1 tablet (500 mg) by mouth daily (with dinner) for 10 days, THEN 2 tablets (1,000 mg) 2 times daily (with meals) for 350 days. 190 tablet 3     montelukast (SINGULAIR) 10 MG tablet Take 1 tablet (10 mg) by mouth At Bedtime 90 tablet 3     sacubitril-valsartan (ENTRESTO)  MG per  tablet Take 1 tablet by mouth 2 times daily 180 tablet 3     Semaglutide-Weight Management (WEGOVY) 2.4 MG/0.75ML pen Inject 2.4 mg Subcutaneous once a week 3 mL 3     spironolactone (ALDACTONE) 50 MG tablet Take 1 tablet (50 mg) by mouth daily 90 tablet 3     torsemide (DEMADEX) 20 MG tablet Patient to take 40 mg daily and take an extra 20 mg if weight goes 285 lbs or higher. 180 tablet 11     Chlorpheniramine-DM (CORICIDIN HBP COUGH/COLD) 4-30 MG TABS Take 1 tablet by mouth as needed (Patient not taking: Reported on 7/3/2023)       fluticasone-salmeterol (WIXELA INHUB) 250-50 MCG/ACT inhaler Inhale 1 puff into the lungs every 12 hours 1 each 11     insulin pen needle (ULTICARE MICRO) 32G X 4 MM miscellaneous Use 4 pen needles daily or as directed. (Patient not taking: Reported on 6/16/2023) 400 each 11     ketoconazole (NIZORAL) 2 % external cream Apply topically 2 times daily For 2 weeks. (Patient not taking: Reported on 2/15/2023) 60 g 1     ondansetron (ZOFRAN) 4 MG tablet Take 1 tablet (4 mg) by mouth every 6 hours as needed for nausea (Patient not taking: Reported on 6/16/2023) 20 tablet 3     Semaglutide, 2 MG/DOSE, 8 MG/3ML SOPN Inject 2 mg Subcutaneous every 7 days (Patient not taking: Reported on 6/16/2023) 9 mL 1     triamcinolone (KENALOG) 0.1 % external cream Apply topically 2 times daily For maximum 2 weeks. (Patient not taking: Reported on 2/15/2023) 60 g 1     Allergies   Allergen Reactions     Amlodipine Swelling     Edema on 5mg throat     Lisinopril      Swelling in throat, face  angioedema     Naprosyn [Naproxen]      Swelling, diff breathing       Breast Cancer Screening:  Any new diagnosis of family breast, ovarian, or bowel cancer? No    FHS-7:       10/25/2022     4:15 PM   Breast CA Risk Assessment (FHS-7)   Did any of your first-degree relatives have breast or ovarian cancer? No   Did any of your relatives have bilateral breast cancer? No   Did any man in your family have breast cancer? No    Did any woman in your family have breast and ovarian cancer? No   Did any woman in your family have breast cancer before age 50 y? No   Do you have 2 or more relatives with breast and/or ovarian cancer? No   Do you have 2 or more relatives with breast and/or bowel cancer? No     click delete button to remove this line now  Mammogram Screening: Recommended annual mammography  Pertinent mammograms are reviewed under the imaging tab.    History of abnormal Pap smear: NO - age 30-65 PAP every 5 years with negative HPV co-testing recommended      Latest Ref Rng & Units 9/10/2019     4:00 PM 9/10/2019     3:37 PM 2/17/2016    12:00 AM   PAP / HPV   PAP (Historical)   ASC-US  NIL    HPV 16 DNA NEG^Negative Negative      HPV 18 DNA NEG^Negative Negative      Other HR HPV NEG^Negative Negative        Reviewed and updated as needed this visit by clinical staff   Tobacco  Allergies  Meds  Problems  Med Hx  Surg Hx  Fam Hx          Reviewed and updated as needed this visit by Provider   Tobacco  Allergies  Meds  Problems  Med Hx  Surg Hx  Fam Hx             Review of Systems  CONSTITUTIONAL: NEGATIVE for fever, chills, change in weight  INTEGUMENTARY/SKIN: NEGATIVE for worrisome rashes, moles or lesions  EYES: NEGATIVE for vision changes or irritation  ENT: NEGATIVE for ear, mouth and throat problems  RESP: NEGATIVE for significant cough or SOB  BREAST: NEGATIVE for masses, tenderness or discharge  CV: NEGATIVE for chest pain, palpitations or peripheral edema  GI: NEGATIVE for nausea, abdominal pain, heartburn, or change in bowel habits  : NEGATIVE for unusual urinary or vaginal symptoms. No vaginal bleeding.  MUSCULOSKELETAL: NEGATIVE for significant arthralgias or myalgia  NEURO: NEGATIVE for weakness, dizziness or paresthesias  PSYCHIATRIC: NEGATIVE for changes in mood or affect      OBJECTIVE:   /78 (BP Location: Left arm, Patient Position: Sitting, Cuff Size: Adult Regular)   Pulse 98   Resp 16    " 1.675 m (5' 5.95\")   Wt 121.8 kg (268 lb 9.6 oz)   LMP  (LMP Unknown)   SpO2 98%   BMI 43.43 kg/m    Physical Exam  GENERAL APPEARANCE: healthy, alert and no distress  EYES: Eyes grossly normal to inspection, PERRL and conjunctivae and sclerae normal  HENT: ear canals and TM's normal, nose and mouth without ulcers or lesions, oropharynx clear and oral mucous membranes moist  NECK: no adenopathy, no asymmetry, masses, or scars and thyroid normal to palpation  RESP: lungs clear to auscultation - no rales, rhonchi or wheezes  BREAST: normal without masses, tenderness or nipple discharge and no palpable axillary masses or adenopathy  CV: regular rate and rhythm, normal S1 S2, no S3 or S4, no murmur, click or rub, no peripheral edema and peripheral pulses strong  ABDOMEN: soft, nontender, no hepatosplenomegaly, no masses and bowel sounds normal   (female): normal female external genitalia, normal urethral meatus, vaginal mucosal atrophy noted, normal cervix, adnexae, and uterus without masses or abnormal discharge  MS: no musculoskeletal defects are noted and gait is age appropriate without ataxia  SKIN: no suspicious lesions or rashes  NEURO: Normal strength and tone, sensory exam grossly normal, mentation intact and speech normal  PSYCH: mentation appears normal and affect normal/bright    Diagnostic Test Results:  Results for orders placed or performed in visit on 07/03/23 (from the past 24 hour(s))   CBC with Platelets   Result Value Ref Range    WBC Count 12.6 (H) 4.0 - 11.0 10e3/uL    RBC Count 4.13 3.80 - 5.20 10e6/uL    Hemoglobin 11.6 (L) 11.7 - 15.7 g/dL    Hematocrit 35.7 35.0 - 47.0 %    MCV 86 78 - 100 fL    MCH 28.1 26.5 - 33.0 pg    MCHC 32.5 31.5 - 36.5 g/dL    RDW 13.7 10.0 - 15.0 %    Platelet Count 431 150 - 450 10e3/uL   UA with Microscopic - lab collect   Result Value Ref Range    Color Urine Yellow Colorless, Straw, Light Yellow, Yellow    Appearance Urine Slightly Cloudy (A) Clear    " Glucose Urine Negative Negative mg/dL    Bilirubin Urine Negative Negative    Ketones Urine Negative Negative mg/dL    Specific Gravity Urine 1.015 1.003 - 1.035    Blood Urine Negative Negative    pH Urine 5.5 5.0 - 7.0    Protein Albumin Urine Negative Negative mg/dL    Urobilinogen Urine 0.2 0.2, 1.0 E.U./dL    Nitrite Urine Positive (A) Negative    Leukocyte Esterase Urine Moderate (A) Negative   Urine Microscopic Exam   Result Value Ref Range    Bacteria Urine Many (A) None Seen /HPF    RBC Urine 0-2 0-2 /HPF /HPF    WBC Urine 10-25 (A) 0-5 /HPF /HPF    Squamous Epithelials Urine Few (A) None Seen /LPF    WBC Clumps Urine Present (A) None Seen /HPF    Hyaline Casts Urine 10-25 (A) None Seen /LPF         ASSESSMENT/PLAN:   Bettina was seen today for physical and uti.    Diagnoses and all orders for this visit:    Congestive heart failure, unspecified HF chronicity, unspecified heart failure type (H)  -     CBC with Platelets; Future  -     Basic metabolic panel  (Ca, Cl, CO2, Creat, Gluc, K, Na, BUN); Future    Type 2 diabetes mellitus with other specified complication, with long-term current use of insulin (H)  -     Adult Eye  Referral; Future    Cervical cancer screening  -     Pap Screen with HPV - recommended age 30 - 65 years    Other orders  -     Pneumococcal 20 Valent Conjugate (Prevnar 20)    asthma is stable, patient does not use daily corticosteroid inhaler  Albuterol use is very infrequent     DM-stable, managed by endocrinology  CHF-stable, managed by cardiology    Patient declined refills of al medications today    Patient has been advised of split billing requirements and indicates understanding: Yes      COUNSELING:  Reviewed preventive health counseling, as reflected in patient instructions       Regular exercise       Healthy diet/nutrition       Immunizations    Vaccinated for: Pneumococcal            BMI:   Estimated body mass index is 43.43 kg/m  as calculated from the following:     "Height as of this encounter: 1.675 m (5' 5.95\").    Weight as of this encounter: 121.8 kg (268 lb 9.6 oz).   Weight management plan: Discussed healthy diet and exercise guidelines      She reports that she quit smoking about 10 years ago. Her smoking use included cigarettes. She has a 30.00 pack-year smoking history. She has quit using smokeless tobacco.      Tiki Farias PA-C  [facility]  "

## 2023-07-03 NOTE — Clinical Note
Beau Chinchilla, this is the patient that has coronary calcification as incidental finding on screening chest CT scan. Hopefully, this time you can see the attached chart Thank you  Sincerely, Tiki Farias PA-C

## 2023-07-04 LAB
ANION GAP SERPL CALCULATED.3IONS-SCNC: 16 MMOL/L (ref 7–15)
BACTERIA UR CULT: ABNORMAL
BUN SERPL-MCNC: 13.3 MG/DL (ref 6–20)
CALCIUM SERPL-MCNC: 9.7 MG/DL (ref 8.6–10)
CHLORIDE SERPL-SCNC: 98 MMOL/L (ref 98–107)
CHOLEST SERPL-MCNC: 161 MG/DL
CREAT SERPL-MCNC: 1.29 MG/DL (ref 0.51–0.95)
DEPRECATED HCO3 PLAS-SCNC: 25 MMOL/L (ref 22–29)
GFR SERPL CREATININE-BSD FRML MDRD: 49 ML/MIN/1.73M2
GLUCOSE SERPL-MCNC: 63 MG/DL (ref 70–99)
HDLC SERPL-MCNC: 40 MG/DL
LDLC SERPL CALC-MCNC: 83 MG/DL
NONHDLC SERPL-MCNC: 121 MG/DL
POTASSIUM SERPL-SCNC: 3.9 MMOL/L (ref 3.4–5.3)
SODIUM SERPL-SCNC: 139 MMOL/L (ref 136–145)
TRIGL SERPL-MCNC: 188 MG/DL

## 2023-07-05 ENCOUNTER — ANCILLARY PROCEDURE (OUTPATIENT)
Dept: CT IMAGING | Facility: CLINIC | Age: 53
End: 2023-07-05
Attending: PHYSICIAN ASSISTANT
Payer: COMMERCIAL

## 2023-07-05 ENCOUNTER — ANCILLARY PROCEDURE (OUTPATIENT)
Dept: MRI IMAGING | Facility: CLINIC | Age: 53
End: 2023-07-05
Attending: INTERNAL MEDICINE
Payer: COMMERCIAL

## 2023-07-05 DIAGNOSIS — Z87.891 FORMER SMOKER: ICD-10-CM

## 2023-07-05 DIAGNOSIS — E27.9 ADRENAL ABNORMALITY (H): ICD-10-CM

## 2023-07-05 PROCEDURE — 71271 CT THORAX LUNG CANCER SCR C-: CPT | Mod: GC | Performed by: RADIOLOGY

## 2023-07-05 PROCEDURE — A9585 GADOBUTROL INJECTION: HCPCS | Mod: JZ | Performed by: RADIOLOGY

## 2023-07-05 PROCEDURE — 74183 MRI ABD W/O CNTR FLWD CNTR: CPT | Performed by: RADIOLOGY

## 2023-07-05 RX ORDER — GADOBUTROL 604.72 MG/ML
15 INJECTION INTRAVENOUS ONCE
Status: COMPLETED | OUTPATIENT
Start: 2023-07-05 | End: 2023-07-05

## 2023-07-05 RX ADMIN — GADOBUTROL 12 ML: 604.72 INJECTION INTRAVENOUS at 16:18

## 2023-07-06 ENCOUNTER — TELEPHONE (OUTPATIENT)
Dept: ENDOCRINOLOGY | Facility: CLINIC | Age: 53
End: 2023-07-06
Payer: COMMERCIAL

## 2023-07-06 ENCOUNTER — TELEPHONE (OUTPATIENT)
Dept: FAMILY MEDICINE | Facility: CLINIC | Age: 53
End: 2023-07-06
Payer: COMMERCIAL

## 2023-07-06 DIAGNOSIS — K76.0 HEPATIC STEATOSIS: ICD-10-CM

## 2023-07-06 DIAGNOSIS — K86.2 PANCREATIC CYST: Primary | ICD-10-CM

## 2023-07-06 DIAGNOSIS — K82.4 GALL BLADDER POLYP: ICD-10-CM

## 2023-07-06 NOTE — RESULT ENCOUNTER NOTE
Please schedule an earlier appointment for a follow up of adrenal nodule: next TOMASZ at [facility] or  okay.   Hello -    Here are my comments about your recent results:  The MRI results showed adrenal nodule on the left side. This adrenal nodule has benign features. We need to discuss next steps or hormonal assessment regarding this finding. We will schedule a follow up appointment to discuss further.     The MRI findings showed incidental findings of possible gall bladder polyp, fatty liver and a 0.2 cm pancreatic cyst. Based on these findings, I recommend follow up with gastroenterology. Referral placed. The clinic should call you to make an appointment. Please note that these findings are not urgent.     Please let us know if you have any questions or concerns.     Regards,  Cadence Akbar MD

## 2023-07-06 NOTE — TELEPHONE ENCOUNTER
Becka from [facility] calling with incidental findings on the CT Chest Lung cancer screen: moderate coronary artery calcifications, and hepatic steatosis. See CT imaging ordered 7/5/23 for details.     Routing to provider.     Toño Cortez, EVELYNN, RN, PHN  [facility]

## 2023-07-06 NOTE — TELEPHONE ENCOUNTER
Hello -    Here are my comments about your recent results:  The MRI results showed adrenal nodule on the left side. This adrenal nodule has benign features. We need to discuss next steps or hormonal assessment regarding this finding. We will schedule a follow up appointment to discuss further.     The MRI findings showed incidental findings of possible gall bladder polyp, fatty liver and a 0.2 cm pancreatic cyst. Based on these findings, I recommend follow up with gastroenterology. Referral placed. The clinic should call you to make an appointment. Please note that these findings are not urgent.     Please let us know if you have any questions or concerns.      Regards,  Cadence Akbar MD    Pancreatic cyst  (primary encounter diagnosis)  Gall bladder polyp  Hepatic steatosis   Orders Placed This Encounter   Procedures     Adult GI  Referral - Consult Only   Cadence Akbar MD

## 2023-07-07 LAB
BKR LAB AP GYN ADEQUACY: NORMAL
BKR LAB AP GYN INTERPRETATION: NORMAL
BKR LAB AP HPV REFLEX: NORMAL
BKR LAB AP PREVIOUS ABNL DX: NORMAL
BKR LAB AP PREVIOUS ABNORMAL: NORMAL
PATH REPORT.COMMENTS IMP SPEC: NORMAL
PATH REPORT.COMMENTS IMP SPEC: NORMAL
PATH REPORT.RELEVANT HX SPEC: NORMAL

## 2023-07-10 ENCOUNTER — MYC MEDICAL ADVICE (OUTPATIENT)
Dept: ENDOCRINOLOGY | Facility: CLINIC | Age: 53
End: 2023-07-10

## 2023-07-10 LAB
HUMAN PAPILLOMA VIRUS 16 DNA: NEGATIVE
HUMAN PAPILLOMA VIRUS 18 DNA: NEGATIVE
HUMAN PAPILLOMA VIRUS FINAL DIAGNOSIS: NORMAL
HUMAN PAPILLOMA VIRUS OTHER HR: NEGATIVE

## 2023-07-11 PROBLEM — R87.610 ASCUS OF CERVIX WITH NEGATIVE HIGH RISK HPV: Status: ACTIVE | Noted: 2019-09-10

## 2023-07-17 NOTE — TELEPHONE ENCOUNTER
7/17 Called and spoke to patient, appointment is currently scheduled.     Radha willis Procedure   Orthopedics, Podiatry, Sports Medicine, Ent ,Eye , Audiology, Adult Endocrine & Diabetes, Nutrition & Medication Therapy Management Specialties   [facility]

## 2023-07-17 NOTE — TELEPHONE ENCOUNTER
Please note patient viewed Dr. Akbar results message. Patient has not scheduled appointment with Gastroenterology. Writer reached out to patient.    Patient confirms that she reviewed MyChart results message from Dr. Akbar and she verbalizes understanding. Writer helped patient to get scheduled for sooner visit with Dr. Akbar. Writer will also send message to clinic coordinators to assist patient in scheduling with Gastroenterology. Patient verbalizes understanding and agrees to plan.       Shireen Cazares RN  Endocrine Care Coordinator  [facility]

## 2023-07-18 ENCOUNTER — TELEPHONE (OUTPATIENT)
Dept: GASTROENTEROLOGY | Facility: CLINIC | Age: 53
End: 2023-07-18
Payer: COMMERCIAL

## 2023-07-18 DIAGNOSIS — K86.2 PANCREATIC CYST: Primary | ICD-10-CM

## 2023-07-20 PROBLEM — I25.10 CORONARY ARTERY CALCIFICATION: Status: ACTIVE | Noted: 2023-07-20

## 2023-07-31 NOTE — TELEPHONE ENCOUNTER
Advanced Endoscopy     Referring provider: Cadence Akbar MD     Referred to: Advanced Endoscopy Provider Group     Provider Requested: na     Referral Received: 7/9/23     Records received: Epic     Images received: PACS    Insurance Coverage:[employer]     Evaluation for: K86.2 (ICD-10-CM) - Pancreatic cyst      Clinical History (per RN review):     Incidental finding, MR Adrenal 7-5-23                                                                IMPRESSION:  1. 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  2. Hepatic steatosis.  3. 4 mm enhancing focus within the gallbladder wall, likely  representing a small gallbladder polyp. Given the current size, there  is no follow-up needed.  4. 2mm cystic focus in the pancreatic head may represent cyst or IPMN.  Follow up guidelines are given below.    MD review date: 07/31/23    MD Decision for clinic consultation/Orders:            Referral updates/Patient contacted:

## 2023-08-01 NOTE — TELEPHONE ENCOUNTER
Per Dr. Sagastume  Please tell the patient that this cyst has no concerning features and is tiny. I recommend MRI/MRCP with IV contrast in one year that we will organize. If she wants a clinic visit to discuss that's fine too.     Reviewed above recommendations. Patient ok with MRI/MRCP in 1 year, orders placed    ML

## 2023-08-03 ENCOUNTER — TELEPHONE (OUTPATIENT)
Dept: CARDIOLOGY | Facility: CLINIC | Age: 53
End: 2023-08-03
Payer: COMMERCIAL

## 2023-08-03 DIAGNOSIS — I25.10 CAD (CORONARY ARTERY DISEASE): Primary | ICD-10-CM

## 2023-08-03 RX ORDER — ASPIRIN 81 MG/1
81 TABLET ORAL DAILY
COMMUNITY
Start: 2023-08-03

## 2023-08-03 RX ORDER — ATORVASTATIN CALCIUM 40 MG/1
40 TABLET, FILM COATED ORAL DAILY
Qty: 90 TABLET | Refills: 3 | Status: SHIPPED | OUTPATIENT
Start: 2023-08-03 | End: 2024-08-24

## 2023-08-03 NOTE — TELEPHONE ENCOUNTER
Amor, MD Jarrett Wright Katerina O, PA-C; [facility] Cardiology Adult [place]  Suggest to make the following changes  Switch lovastatin to Lipitor 40 mg daily, goal LDL less than 70  Start aspirin 81 mg daily    Received above message from Dr Chinchilla regarding patient recommendations after the findings of coronary calcification as incidental finding on screening chest CT scan. Called and reviewed with patient, advised to change lovastatin to lipitor. Confirmed pharmacy, advised aspirin OTC . She had no questions.     Katie Sinclair RN  Cardiology Care Coordinator  [facility]  Phone: [phone]

## 2023-08-05 DIAGNOSIS — I50.22 CHRONIC SYSTOLIC CONGESTIVE HEART FAILURE (H): Primary | ICD-10-CM

## 2023-08-09 DIAGNOSIS — E66.01 MORBID OBESITY (H): ICD-10-CM

## 2023-08-15 RX ORDER — SEMAGLUTIDE 2.4 MG/.75ML
2.4 INJECTION, SOLUTION SUBCUTANEOUS WEEKLY
Qty: 3 ML | Refills: 1 | Status: SHIPPED | OUTPATIENT
Start: 2023-08-15 | End: 2023-10-09

## 2023-08-17 DIAGNOSIS — I50.22 CHRONIC SYSTOLIC CONGESTIVE HEART FAILURE (H): Primary | ICD-10-CM

## 2023-08-23 ENCOUNTER — OFFICE VISIT (OUTPATIENT)
Dept: CARDIOLOGY | Facility: CLINIC | Age: 53
End: 2023-08-23
Payer: COMMERCIAL

## 2023-08-23 ENCOUNTER — LAB (OUTPATIENT)
Dept: LAB | Facility: CLINIC | Age: 53
End: 2023-08-23
Payer: COMMERCIAL

## 2023-08-23 VITALS
OXYGEN SATURATION: 99 % | BODY MASS INDEX: 43.43 KG/M2 | DIASTOLIC BLOOD PRESSURE: 76 MMHG | WEIGHT: 268.6 LBS | HEART RATE: 83 BPM | SYSTOLIC BLOOD PRESSURE: 119 MMHG

## 2023-08-23 DIAGNOSIS — G47.33 OSA (OBSTRUCTIVE SLEEP APNEA): ICD-10-CM

## 2023-08-23 DIAGNOSIS — I50.22 CHRONIC SYSTOLIC CONGESTIVE HEART FAILURE (H): ICD-10-CM

## 2023-08-23 DIAGNOSIS — I50.22 CHRONIC SYSTOLIC CONGESTIVE HEART FAILURE (H): Primary | ICD-10-CM

## 2023-08-23 DIAGNOSIS — I10 ESSENTIAL HYPERTENSION WITH GOAL BLOOD PRESSURE LESS THAN 140/90: ICD-10-CM

## 2023-08-23 DIAGNOSIS — Z79.4 TYPE 2 DIABETES MELLITUS WITH HYPEROSMOLARITY WITHOUT COMA, WITH LONG-TERM CURRENT USE OF INSULIN (H): ICD-10-CM

## 2023-08-23 DIAGNOSIS — E11.00 TYPE 2 DIABETES MELLITUS WITH HYPEROSMOLARITY WITHOUT COMA, WITH LONG-TERM CURRENT USE OF INSULIN (H): ICD-10-CM

## 2023-08-23 DIAGNOSIS — I42.8 NONISCHEMIC CARDIOMYOPATHY (H): ICD-10-CM

## 2023-08-23 DIAGNOSIS — E11.9 TYPE 2 DIABETES MELLITUS (H): Primary | ICD-10-CM

## 2023-08-23 LAB
ANION GAP SERPL CALCULATED.3IONS-SCNC: 9 MMOL/L (ref 7–15)
BUN SERPL-MCNC: 8.4 MG/DL (ref 6–20)
CALCIUM SERPL-MCNC: 9.4 MG/DL (ref 8.6–10)
CHLORIDE SERPL-SCNC: 105 MMOL/L (ref 98–107)
CREAT SERPL-MCNC: 1 MG/DL (ref 0.51–0.95)
DEPRECATED HCO3 PLAS-SCNC: 28 MMOL/L (ref 22–29)
GFR SERPL CREATININE-BSD FRML MDRD: 67 ML/MIN/1.73M2
GLUCOSE SERPL-MCNC: 142 MG/DL (ref 70–99)
HBA1C MFR BLD: 6.5 % (ref 0–5.6)
POTASSIUM SERPL-SCNC: 3.8 MMOL/L (ref 3.4–5.3)
SODIUM SERPL-SCNC: 142 MMOL/L (ref 136–145)

## 2023-08-23 PROCEDURE — 36415 COLL VENOUS BLD VENIPUNCTURE: CPT

## 2023-08-23 PROCEDURE — 99214 OFFICE O/P EST MOD 30 MIN: CPT | Performed by: NURSE PRACTITIONER

## 2023-08-23 PROCEDURE — 80048 BASIC METABOLIC PNL TOTAL CA: CPT

## 2023-08-23 PROCEDURE — 83036 HEMOGLOBIN GLYCOSYLATED A1C: CPT

## 2023-08-23 NOTE — PROGRESS NOTES
Bettina Montes's goals for this visit include:     She requests these members of her care team be copied on today's visit information: PCP    PCP: Prabhu Robbins    Referring Provider:  RICCARDO Walker CNP  [address] [place] [address]    /76 (BP Location: Right arm, Patient Position: Sitting, Cuff Size: Adult Large)   Pulse 83   Wt 121.8 kg (268 lb 9.6 oz)   LMP  (LMP Unknown)   SpO2 99%   BMI 43.43 kg/m      Do you need any medication refills at today's visit? No.    Yonas Chris, EMT  Clinic Support  [employer] [employer]    [phone]    Employed by [facility] [employer]

## 2023-08-23 NOTE — PATIENT INSTRUCTIONS
Take your medicines every day, as directed    Changes made today:  none     Monitor Your Weight and Symptoms    Contact us if you:    Gain 2 pounds in one day or 5 pounds in one week  Feel more short of breath  Notice more leg swelling  Feel lightheadeded   Change your lifestyle    Limit Salt or Sodium:  2000 mg  Limit Fluids:  2000 mL or approximately 64 ounces  Eat a Heart Healthy Diet  Low in saturated fats  Stay Active:  Aim to move at least 150 minutes every  week         To Contact us    During Business Hours:  [phone], option # 1      After hours, weekends or holidays:   [phone], Option #4  Ask to speak to the On-Call Cardiologist. Inform them you are a CORE/heart failure patient at the [facility].     Use [employer] allows you to communicate directly with your heart team through secure messaging.  [employer] can be accessed any time on your phone, computer, or tablet.  If you need assistance, we'd be happy to help!         Keep your Heart Appointments:    CORE 6 months      Please consider attending our virtual support group which is held monthly. Please reach out to Jb at [phone] for more information if you are interested in attending.       2023 dates:    Monday, September 11th, 1-2pm  Monday, October 2nd, 1-2pm  Monday, November 6th, 1-2pm  Monday, December 4th, 1-2pm

## 2023-08-23 NOTE — PROGRESS NOTES
SANJAY  Bettina Montes is a 53 year old female with a past medical history of HFrEF (30-35%) secondary to NICM (felt to be idiopathic), HTN, obesity, DM II, cervical spine fusion, and hyperlipidemia who presents for routine follow-up. She was last seen by Dr. Chinchilla on 6/5/20.    Bettina had her last CORE visit in February.   She has been seeing the MTM for DM management.  She had a decrease in her metformin a while back. She stopped Trulicity - she is now on Wegovy.  She says her BS have improved .  No SOB at rest and no CASILLAS-   She says there is now no swelling in her legs or abdomen. Home weight 265-270 lbs. Her appetite has been fine. She denies early satiety. Continues to self titrate her afternoon diuretic doses. She does take 40 mg daily and 60 mg only if she see's more swelling.     She denies any chest pain, lightheadedness, dizziness, or near syncopal episodes.     Pt is taking hydralazine 50mg BID, coreg 25 BID, entresto 97/103 BID, spironolactone 50 qday and torsemide 40mg qday.       PMH  Past Medical History:   Diagnosis Date    Abnormal Pap smear of cervix 09/10/2019    See problem list    Adrenal gland anomaly     CHF (congestive heart failure) (H)     Fatty liver     Gastroesophageal reflux disease     Hyperlipidemia     Hypertension     Mild persistent asthma     NICM (nonischemic cardiomyopathy) (H)     Obesity     WILLARD (obstructive sleep apnea) 1/9/2015    Type 2 diabetes mellitus (H)        Past Surgical History:   Procedure Laterality Date    COLONOSCOPY      COLONOSCOPY N/A 10/24/2022    Procedure: COLONOSCOPY, FLEXIBLE, WITH LESION REMOVAL USING SNARE;  Surgeon: Larissa Eubanks MD;  Location: [facility] OR    COLONOSCOPY WITH CO2 INSUFFLATION N/A 10/24/2022    Procedure: COLONOSCOPY, WITH CO2 INSUFFLATION;  Surgeon: Larissa Eubanks MD;  Location: [facility] OR    DISCECTOMY, FUSION CERVICAL ANTERIOR TWO LEVELS, COMBINED N/A 4/5/2019    Procedure: C4-6 anterior cervical discectomy and fusion;  Surgeon:  Hollis Hurtado MD;  Location: [facility] OR    TUBAL LIGATION         Family History   Problem Relation Age of Onset    Diabetes Mother     Hypertension Mother     Hyperlipidemia Mother     Heart Failure Mother     Diabetes Father     Cancer Paternal Grandmother         ?       Social History     Socioeconomic History    Marital status: Single     Spouse name: None    Number of children: 1    Years of education: None    Highest education level: None   Occupational History    Occupation: CNA     Employer: OTHER   Social Needs    Financial resource strain: None    Food insecurity:     Worry: None     Inability: None    Transportation needs:     Medical: None     Non-medical: None   Tobacco Use    Smoking status: Former Smoker     Packs/day: 1.00     Years: 30.00     Pack years: 30.00     Types: Cigarettes     Last attempt to quit: 2013     Years since quittin.8    Smokeless tobacco: Former User    Tobacco comment:     Substance and Sexual Activity    Alcohol use: Yes     Alcohol/week: 0.0 standard drinks     Comment: 1    Drug use: No    Sexual activity: Yes     Partners: Male     Birth control/protection: Surgical   Lifestyle    Physical activity:     Days per week: None     Minutes per session: None    Stress: None   Relationships    Social connections:     Talks on phone: None     Gets together: None     Attends Jewish service: None     Active member of club or organization: None     Attends meetings of clubs or organizations: None     Relationship status: None    Intimate partner violence:     Fear of current or ex partner: None     Emotionally abused: None     Physically abused: None     Forced sexual activity: None   Other Topics Concern    Parent/sibling w/ CABG, MI or angioplasty before 65F 55M? No   Social History Narrative    None       ALLERGIES  Allergies   Allergen Reactions    Amlodipine Swelling     Edema on 5mg throat    Lisinopril      Swelling in throat, face  angioedema    Naprosyn  [Naproxen]      Swelling, diff breathing       MEDICATIONS acetaminophen (TYLENOL) 500 MG tablet, Take 500-1,000 mg by mouth every 6 hours as needed for mild pain  aspirin 81 MG EC tablet, Take 1 tablet (81 mg) by mouth daily  atorvastatin (LIPITOR) 40 MG tablet, Take 1 tablet (40 mg) by mouth daily  blood glucose (NO BRAND SPECIFIED) test strip, Use to test blood sugar 3-4 times daily or as directed.  carvedilol (COREG) 25 MG tablet, Take 1 tablet (25 mg) by mouth 2 times daily (with meals)  cholecalciferol 50 MCG (2000 UT) CAPS, Take 2,000 Units by mouth daily   Continuous Blood Gluc Sensor (DEXCOM G6 SENSOR) MISC, Change every 10 days.  Continuous Blood Gluc Sensor (FREESTYLE ANETA 14 DAY SENSOR) MISC, 1 Device every 14 days Change sensor as directed every 14 days  Continuous Blood Gluc Sensor (FREESTYLE ANETA 2 SENSOR) MISC, 1 each every 14 days  Continuous Blood Gluc Transmit (DEXCOM G6 TRANSMITTER) MISC, Change every 3 months.  fluticasone-salmeterol (WIXELA INHUB) 250-50 MCG/ACT inhaler, Inhale 1 puff into the lungs every 12 hours  hydrALAZINE (APRESOLINE) 10 MG tablet, Take 1 tablet (10 mg) by mouth 2 times daily  insulin aspart (NOVOLOG FLEXPEN) 100 UNIT/ML pen, Inject 18 Units Subcutaneous 3 times daily (with meals)  insulin glargine U-300 (TOUJEO MAX SOLOSTAR) 300 UNIT/ML (2 units dial) pen, Inject 100 Units Subcutaneous daily  levalbuterol (XOPENEX HFA) 45 MCG/ACT Inhaler, Inhale 2 puffs into the lungs every 4 hours as needed for shortness of breath / dyspnea or wheezing  magnesium oxide (MAG-OX) 400 (241.3 Mg) MG tablet, Take 1 tablet (400 mg) by mouth daily  metFORMIN (GLUCOPHAGE XR) 500 MG 24 hr tablet, Take 1 tablet (500 mg) by mouth daily (with dinner) for 10 days, THEN 2 tablets (1,000 mg) 2 times daily (with meals) for 350 days.  montelukast (SINGULAIR) 10 MG tablet, Take 1 tablet (10 mg) by mouth At Bedtime  sacubitril-valsartan (ENTRESTO)  MG per tablet, Take 1 tablet by mouth 2 times  daily  spironolactone (ALDACTONE) 50 MG tablet, Take 1 tablet (50 mg) by mouth daily  torsemide (DEMADEX) 20 MG tablet, Patient to take 40 mg daily and take an extra 20 mg if weight goes 285 lbs or higher.  WEGOVY 2.4 MG/0.75ML pen, INJECT 2.4 MG SUBCUTANEOUS ONCE A WEEK  Chlorpheniramine-DM (CORICIDIN HBP COUGH/COLD) 4-30 MG TABS, Take 1 tablet by mouth as needed (Patient not taking: Reported on 7/3/2023)  insulin pen needle (ULTICARE MICRO) 32G X 4 MM miscellaneous, Use 4 pen needles daily or as directed. (Patient not taking: Reported on 6/16/2023)  ketoconazole (NIZORAL) 2 % external cream, Apply topically 2 times daily For 2 weeks. (Patient not taking: Reported on 2/15/2023)  ondansetron (ZOFRAN) 4 MG tablet, Take 1 tablet (4 mg) by mouth every 6 hours as needed for nausea (Patient not taking: Reported on 6/16/2023)  Semaglutide, 2 MG/DOSE, 8 MG/3ML SOPN, Inject 2 mg Subcutaneous every 7 days (Patient not taking: Reported on 6/16/2023)  triamcinolone (KENALOG) 0.1 % external cream, Apply topically 2 times daily For maximum 2 weeks. (Patient not taking: Reported on 2/15/2023)    sodium chloride (PF) 0.9% PF flush 10 mL            ROS:   Constitutional: No fever, chills, or sweats.  ENT: No visual disturbance, ear ache, epistaxis, sore throat.   Allergies/Immunologic: Negative  Respiratory: No cough, hemoptysis.   Cardiovascular: As per HPI.   GI: As per HPI.   : No urinary frequency, dysuria, or hematuria.   Integument: Negative.   Psychiatric: Negative.   Neuro: Negative.   Endocrinology: negative   Musculoskeletal: negative    EXAM:   General: appears comfortable, alert and articulate  Head: normocephalic, atraumatic  Eyes: anicteric sclera, EOMI  Neck: Supple, no adenopathy  Orophyarynx: moist mucosa, no cyanosis  Heart: regular, S1/S2, no murmur, gallop, rub, estimated JVP not detected at 90 degrees  Lungs: Respirations even and unlabored, lungs clear, no rales or wheezing.  Lungs sounds decreased  bilaterally in the bases.  Abdomen:  distended and firmer, non-tender, bowel sounds present, no hepatomegaly  Extremities: no clubbing, cyanosis. Has trace edema  Neurological: normal speech and affect, no gross motor deficits  Skin:  Warm and dry.      LABS  Last Comprehensive Metabolic Panel:  Sodium   Date Value Ref Range Status   08/23/2023 142 136 - 145 mmol/L Final   03/23/2021 133 133 - 144 mmol/L Final     Potassium   Date Value Ref Range Status   08/23/2023 3.8 3.4 - 5.3 mmol/L Final   02/15/2023 3.5 3.4 - 5.3 mmol/L Final   03/23/2021 3.8 3.4 - 5.3 mmol/L Final     Chloride   Date Value Ref Range Status   08/23/2023 105 98 - 107 mmol/L Final   02/15/2023 100 94 - 109 mmol/L Final   03/23/2021 95 94 - 109 mmol/L Final     Carbon Dioxide   Date Value Ref Range Status   03/23/2021 35 (H) 20 - 32 mmol/L Final     Carbon Dioxide (CO2)   Date Value Ref Range Status   08/23/2023 28 22 - 29 mmol/L Final   02/15/2023 29 20 - 32 mmol/L Final     Anion Gap   Date Value Ref Range Status   08/23/2023 9 7 - 15 mmol/L Final   02/15/2023 7 3 - 14 mmol/L Final   03/23/2021 3 3 - 14 mmol/L Final     Glucose   Date Value Ref Range Status   08/23/2023 142 (H) 70 - 99 mg/dL Final   02/15/2023 282 (H) 70 - 99 mg/dL Final   03/23/2021 260 (H) 70 - 99 mg/dL Final     Urea Nitrogen   Date Value Ref Range Status   08/23/2023 8.4 6.0 - 20.0 mg/dL Final   02/15/2023 12 7 - 30 mg/dL Final   03/23/2021 24 7 - 30 mg/dL Final     Creatinine   Date Value Ref Range Status   08/23/2023 1.00 (H) 0.51 - 0.95 mg/dL Final   03/23/2021 1.18 (H) 0.52 - 1.04 mg/dL Final     GFR Estimate   Date Value Ref Range Status   08/23/2023 67 >60 mL/min/1.73m2 Final   03/23/2021 53 (L) >60 mL/min/[1.73_m2] Final     Comment:     Non  GFR Calc  Starting 12/18/2018, serum creatinine based estimated GFR (eGFR) will be   calculated using the Chronic Kidney Disease Epidemiology Collaboration   (CKD-EPI) equation.       Calcium   Date Value Ref  Range Status   08/23/2023 9.4 8.6 - 10.0 mg/dL Final   03/23/2021 9.9 8.5 - 10.1 mg/dL Final       MOST RECENT ECHOCARDIOGRAM 10/25/19:  Interpretation Summary  Technically difficult study.  Left ventricular size is normal. Mildly (EF 40-45%, traced 45%) reduced left ventricular function is present.  Right ventricular function, chamber size, wall motion, and thickness are normal. This study was compared with the study from 3/21/18: The left ventricular function has improved    ASSESSMENT AND PLAN  Bettina Montes is a 53 year old female with NICM who appears euvolemic today. She has been at [facility] for her HF.  Unfortunately she couldn't tolerate the dapaglifozin. She has been feeling well and is successfully self -titrating her diuretic.      1. Chronic systolic heart failure/HFrEF (EF 40-45%) secondary to nonischemic cardiomyopathy  NYHA Symptom Class IIIB  Stage C  Primary Cardiologist: Dr Chinchilla; Last seen 6/17/22  ACE-I/ARB/ARNi:  Entresto  mg- titration complete  BB yes, Carvedilol 25 mg twice daily- titration complete  Aldosterone antagonist yes, Spironolactone 50 mg daily.   SCD prophylaxis EF > 35%   Fluid status euvolemic  Cardiac Rehab: Completed  Sleep Apnea Evaluation: Documented sleep apnea.    Remote monitoring: Mychart active  Antiplatelet: none.   Anticoagulation: None.  SGLT II- unable to tolerate the dapaglifozin- frequent yeast infections.     # Obesity: BMI 47.29.  She is working on weight loss.  Continue to reassess at subsequent visits.     #  HTN:   continue Entresto   Continue to monitor BP readings at home.      #. Shoulder surgery. - October - 2021    4.  Follow-up:    [facility] in 6 months             Heide GU NP-C

## 2023-09-11 NOTE — PROGRESS NOTES
Outcome for 09/11/23 12:20 PM: [employer] message sent  Kelsi Cruz CMA  Adult Endocrinology  [facility]

## 2023-09-12 ENCOUNTER — OFFICE VISIT (OUTPATIENT)
Dept: ENDOCRINOLOGY | Facility: CLINIC | Age: 53
End: 2023-09-12
Payer: COMMERCIAL

## 2023-09-12 VITALS
WEIGHT: 267 LBS | BODY MASS INDEX: 43.17 KG/M2 | HEART RATE: 95 BPM | SYSTOLIC BLOOD PRESSURE: 125 MMHG | DIASTOLIC BLOOD PRESSURE: 83 MMHG | OXYGEN SATURATION: 99 %

## 2023-09-12 DIAGNOSIS — E27.9 ADRENAL NODULE (H): Primary | ICD-10-CM

## 2023-09-12 DIAGNOSIS — E11.65 TYPE 2 DIABETES MELLITUS WITH HYPERGLYCEMIA, WITH LONG-TERM CURRENT USE OF INSULIN (H): ICD-10-CM

## 2023-09-12 DIAGNOSIS — E66.01 MORBID OBESITY (H): ICD-10-CM

## 2023-09-12 DIAGNOSIS — K76.0 HEPATIC STEATOSIS: ICD-10-CM

## 2023-09-12 DIAGNOSIS — Z79.4 TYPE 2 DIABETES MELLITUS WITH HYPERGLYCEMIA, WITH LONG-TERM CURRENT USE OF INSULIN (H): ICD-10-CM

## 2023-09-12 LAB
ALBUMIN SERPL BCG-MCNC: 4.3 G/DL (ref 3.5–5.2)
ALP SERPL-CCNC: 114 U/L (ref 35–104)
ALT SERPL W P-5'-P-CCNC: 14 U/L (ref 0–50)
AST SERPL W P-5'-P-CCNC: 23 U/L (ref 0–45)
BILIRUB DIRECT SERPL-MCNC: <0.2 MG/DL (ref 0–0.3)
BILIRUB SERPL-MCNC: 0.5 MG/DL
HBA1C MFR BLD: 6.8 % (ref 0–5.6)
POTASSIUM SERPL-SCNC: 3.7 MMOL/L (ref 3.4–5.3)
PROT SERPL-MCNC: 7.8 G/DL (ref 6.4–8.3)

## 2023-09-12 PROCEDURE — 83036 HEMOGLOBIN GLYCOSYLATED A1C: CPT | Performed by: INTERNAL MEDICINE

## 2023-09-12 PROCEDURE — 82024 ASSAY OF ACTH: CPT | Performed by: INTERNAL MEDICINE

## 2023-09-12 PROCEDURE — 84132 ASSAY OF SERUM POTASSIUM: CPT | Performed by: INTERNAL MEDICINE

## 2023-09-12 PROCEDURE — 99000 SPECIMEN HANDLING OFFICE-LAB: CPT | Performed by: INTERNAL MEDICINE

## 2023-09-12 PROCEDURE — 83835 ASSAY OF METANEPHRINES: CPT | Mod: 90 | Performed by: INTERNAL MEDICINE

## 2023-09-12 PROCEDURE — 84244 ASSAY OF RENIN: CPT | Mod: 90 | Performed by: INTERNAL MEDICINE

## 2023-09-12 PROCEDURE — 36415 COLL VENOUS BLD VENIPUNCTURE: CPT | Performed by: INTERNAL MEDICINE

## 2023-09-12 PROCEDURE — 99215 OFFICE O/P EST HI 40 MIN: CPT | Performed by: INTERNAL MEDICINE

## 2023-09-12 PROCEDURE — 80076 HEPATIC FUNCTION PANEL: CPT | Performed by: INTERNAL MEDICINE

## 2023-09-12 PROCEDURE — 82627 DEHYDROEPIANDROSTERONE: CPT | Performed by: INTERNAL MEDICINE

## 2023-09-12 PROCEDURE — 82088 ASSAY OF ALDOSTERONE: CPT | Performed by: INTERNAL MEDICINE

## 2023-09-12 RX ORDER — INSULIN GLARGINE 300 U/ML
85 INJECTION, SOLUTION SUBCUTANEOUS DAILY
Qty: 30 ML | Refills: 11 | Status: SHIPPED | OUTPATIENT
Start: 2023-09-12

## 2023-09-12 NOTE — LETTER
"    9/12/2023         RE: Bettina Montes  [address] [place] [address]        Dear Colleague,    Thank you for referring your patient, Bettina Montes, to the [facility]. Please see a copy of my visit note below.    Outcome for 09/11/23 12:20 PM: [employer] message sent  Kelsi Cruz CMA  Adult Endocrinology  [facility]         Endocrinology Clinic Visit    Chief Complaint: Follow Up and Diabetes (Type ll)     Information obtained from:Patient      Assessment/Treatment Plan:    Adrenal nodule   I have personally reviewed adrenal nodule MRI from 7/6/2023.  Agree with interpretation of 2.2 x 1.9 cm adrenal nodule on the left side.  This nodule compared to previous study from 2017 is a stable in size.  MRI characteristics per report suggestive of fat-containing benign adrenal adenoma.  Hormonal assessment as detailed below for metanephrines, aldosterone hormones, DHEA-sulfate.  Further plan based on results.  We will proceed with Dex suppression test after the initial below labs below.    Orders Placed This Encounter   Procedures     Metanephrines Plasma Free     DHEA sulfate     Adrenal corticotropin     Aldosterone     Renin activity     Potassium     Aldosterone     Renin activity     Aldosterone Renin Ratio     Aldosterone Renin Ratio        Type 2 diabetes currently significant interval improvement of hemoglobin A1c on Wegovy.  Hemoglobin A1c improved to 6.5% from previous value of 10%.  Weight loss of about 30 pounds on Wegovy at 2.4 mg every 7 days.  Will reducing the insulin dose as documented below.  Past medical history of heart failure, obstructive sleep apnea & morbid obesity Estimated body mass index is Estimated body mass index is 43.17 kg/m  as calculated from the following:    Height as of 7/3/23: 1.675 m (5' 5.95\").    Weight as of this encounter: 121.1 kg (267 lb).   BMI has improved from 46% to 43%.  Hemoglobin A1c estimated 6.9% with 78% of the " time within the target range BG readings.   Metformin 1000 mg extended release with supper.    Semaglutide 2.4 mg every 7 days.   Reduce Insulin glargine ( U300)  85 units once a day  Patient has already stopped NovoLog insulin.  Offered follow-up with nutrition from obesity standpoint however pt declined.  Continue Wegovy at 2.4 mg every 7 days.    Hepatic steatosis  Pancreatic cyst  Gallbladder polyp     Gastroenterology follow-up placed.  Noted communication with GI.   Cadence Akbar MD  Staff Endocrinologist    Division of Endocrinology and Diabetes      Subjective:         HPI: Bettina Montes is a 53 year old female with history of type 2 diabetes who is here for routine follow-up.  This is her second visit with us.  Type 2 diabetes diagnosed: 20 years ago that the intermittent   Insulin glargine 950  units   Novolog has stopped this medication already.  Wegovy 2.4 mg every 7 days tolerating all medications well.  Metformin 1000 mg daily.  Dexcom downloaded and reviewed in detail.  Blood sugar readings more than 95% of the time within target range.    Past medical history of heart failure currently clinically stable.  Scheduled with bariatric surgery for consultation of weight loss procedures.  Not currently pursuing bariatric surgery.  Has tried multiple diets including weight watchers however sticking to the diet has been difficult.  Lost some weight [about 30 pounds] on semaglutide.  Allergies   Allergen Reactions     Amlodipine Swelling     Edema on 5mg throat     Lisinopril      Swelling in throat, face  angioedema     Naprosyn [Naproxen]      Swelling, diff breathing       Current Outpatient Medications   Medication Sig Dispense Refill     acetaminophen (TYLENOL) 500 MG tablet Take 500-1,000 mg by mouth every 6 hours as needed for mild pain       aspirin 81 MG EC tablet Take 1 tablet (81 mg) by mouth daily       atorvastatin (LIPITOR) 40 MG tablet Take 1 tablet (40 mg) by mouth daily 90 tablet 3      blood glucose (NO BRAND SPECIFIED) test strip Use to test blood sugar 3-4 times daily or as directed. 200 strip 1     carvedilol (COREG) 25 MG tablet Take 1 tablet (25 mg) by mouth 2 times daily (with meals) 180 tablet 3     Chlorpheniramine-DM (CORICIDIN HBP COUGH/COLD) 4-30 MG TABS Take 1 tablet by mouth as needed (Patient not taking: Reported on 7/3/2023)       cholecalciferol 50 MCG (2000 UT) CAPS Take 2,000 Units by mouth daily        Continuous Blood Gluc Sensor (DEXCOM G6 SENSOR) MISC Change every 10 days. 9 each 3     Continuous Blood Gluc Sensor (FREESTYLE ANETA 14 DAY SENSOR) MISC 1 Device every 14 days Change sensor as directed every 14 days 2 each 6     Continuous Blood Gluc Sensor (FREESTYLE ANETA 2 SENSOR) MISC 1 each every 14 days 6 each 11     Continuous Blood Gluc Transmit (DEXCOM G6 TRANSMITTER) MISC Change every 3 months. 1 each 3     fluticasone-salmeterol (WIXELA INHUB) 250-50 MCG/ACT inhaler Inhale 1 puff into the lungs every 12 hours 1 each 11     hydrALAZINE (APRESOLINE) 10 MG tablet Take 1 tablet (10 mg) by mouth 2 times daily 90 tablet 3     insulin aspart (NOVOLOG FLEXPEN) 100 UNIT/ML pen Inject 18 Units Subcutaneous 3 times daily (with meals) 60 mL 1     insulin glargine U-300 (TOUJEO MAX SOLOSTAR) 300 UNIT/ML (2 units dial) pen Inject 100 Units Subcutaneous daily 30 mL 11     insulin pen needle (ULTICARE MICRO) 32G X 4 MM miscellaneous Use 4 pen needles daily or as directed. (Patient not taking: Reported on 6/16/2023) 400 each 11     ketoconazole (NIZORAL) 2 % external cream Apply topically 2 times daily For 2 weeks. (Patient not taking: Reported on 2/15/2023) 60 g 1     levalbuterol (XOPENEX HFA) 45 MCG/ACT Inhaler Inhale 2 puffs into the lungs every 4 hours as needed for shortness of breath / dyspnea or wheezing 15 g 3     magnesium oxide (MAG-OX) 400 (241.3 Mg) MG tablet Take 1 tablet (400 mg) by mouth daily 100 tablet 3     metFORMIN (GLUCOPHAGE XR) 500 MG 24 hr tablet Take 1  tablet (500 mg) by mouth daily (with dinner) for 10 days, THEN 2 tablets (1,000 mg) 2 times daily (with meals) for 350 days. 190 tablet 3     montelukast (SINGULAIR) 10 MG tablet Take 1 tablet (10 mg) by mouth At Bedtime 90 tablet 3     ondansetron (ZOFRAN) 4 MG tablet Take 1 tablet (4 mg) by mouth every 6 hours as needed for nausea (Patient not taking: Reported on 6/16/2023) 20 tablet 3     sacubitril-valsartan (ENTRESTO)  MG per tablet Take 1 tablet by mouth 2 times daily 180 tablet 3     Semaglutide, 2 MG/DOSE, 8 MG/3ML SOPN Inject 2 mg Subcutaneous every 7 days (Patient not taking: Reported on 6/16/2023) 9 mL 1     spironolactone (ALDACTONE) 50 MG tablet Take 1 tablet (50 mg) by mouth daily 90 tablet 3     torsemide (DEMADEX) 20 MG tablet Patient to take 40 mg daily and take an extra 20 mg if weight goes 285 lbs or higher. 180 tablet 11     triamcinolone (KENALOG) 0.1 % external cream Apply topically 2 times daily For maximum 2 weeks. (Patient not taking: Reported on 2/15/2023) 60 g 1     WEGOVY 2.4 MG/0.75ML pen INJECT 2.4 MG SUBCUTANEOUS ONCE A WEEK 3 mL 1       Review of Systems     8 point review system (Constitutional, HENT, Eyes, Respiratory, Cardiovascular, Gastrointestinal, Genitourinary, Musculoskeletal,Neurological, Psychiatric/Behavioural, Endocrine) is negative or is as per HPI above  Past medical history, past surgical history, social and family history reviewed in epic.        Objective:   /83 (BP Location: Left arm, Patient Position: Sitting, Cuff Size: Adult Large)   Pulse 95   Wt 121.1 kg (267 lb)   LMP  (LMP Unknown)   SpO2 99%   BMI 43.17 kg/m    Constitutional: Pleasant no acute cardiopulmonary distress.   EYES: anicteric, normal extra-ocular movements, no lid lag or retraction, Pupils equal and reactive to light bilaterally.  HEENT: Mouth/Throat: Mucous membrane is moist. Thyroid examination: Difficult to appreciate due to body habitus.  Cardiovascular: RRR, S1, S2 normal.    Pulmonary/Chest: CTAB. No wheezing or rales.   Abdominal: +BS. Non tender to palpation.  Stretch marks: No significant.  Neurological: Alert and oriented.  No tremor and reflexes are symmetrical bilaterally and within the normal limits. Muscle strength 5/5.   Extremities: No edema.  Psychological: appropriate mood and affect    In House Labs:   Hemoglobin A1C   Date Value Ref Range Status   09/12/2023 6.8 (H) 0.0 - 5.6 % Final     Comment:     Normal <5.7%   Prediabetes 5.7-6.4%    Diabetes 6.5% or higher     Note: Adopted from ADA consensus guidelines.   08/23/2023 6.5 (H) 0.0 - 5.6 % Final     Comment:     Normal <5.7%   Prediabetes 5.7-6.4%    Diabetes 6.5% or higher     Note: Adopted from ADA consensus guidelines.   05/09/2023 7.6 (H) 0.0 - 5.6 % Final     Comment:     Normal <5.7%   Prediabetes 5.7-6.4%    Diabetes 6.5% or higher     Note: Adopted from ADA consensus guidelines.   06/11/2021 10.1 (H) 0 - 5.6 % Final     Comment:     Normal <5.7% Prediabetes 5.7-6.4%  Diabetes 6.5% or higher - adopted from ADA   consensus guidelines.     01/14/2021 9.6 (H) 0 - 5.6 % Final     Comment:     Normal <5.7% Prediabetes 5.7-6.4%  Diabetes 6.5% or higher - adopted from ADA   consensus guidelines.     08/04/2020 11.0 (H) 0 - 5.6 % Final     Comment:     Normal <5.7% Prediabetes 5.7-6.4%  Diabetes 6.5% or higher - adopted from ADA   consensus guidelines.  Results confirmed by repeat test        TSH   Date Value Ref Range Status   02/15/2023 1.01 0.40 - 4.00 mU/L Final   12/30/2019 1.47 0.40 - 4.00 mU/L Final   05/13/2019 0.71 0.40 - 4.00 mU/L Final   05/10/2017 0.84 0.40 - 4.00 mU/L Final   02/17/2016 1.04 0.40 - 4.00 mU/L Final       Creatinine   Date Value Ref Range Status   08/23/2023 1.00 (H) 0.51 - 0.95 mg/dL Final   03/23/2021 1.18 (H) 0.52 - 1.04 mg/dL Final   ]    Recent Labs   Lab Test 09/29/21  0931 01/14/21  1622 05/17/19  1306   CHOL 128  --  147   HDL 42*  --  66   LDL 58 83 64   TRIG 140  --  83       This  note has been dictated using voice recognition software.  As a result, there may be errors in the documentation that have gone undetected.  Please consider this when interpreting information in this documentation.    40 minutes spent by me on the date of the encounter doing chart, DEXcom, adrenal imaging review, history and exam, documentation and further activities per the note.      Again, thank you for allowing me to participate in the care of your patient.        Sincerely,        Cadence Akbar MD

## 2023-09-12 NOTE — NURSING NOTE
Bettina Montes's goals for this visit include:   Chief Complaint   Patient presents with    Follow Up    Diabetes     Type ll     She requests these members of her care team be copied on today's visit information: Yes    PCP: Prabhu Robbins    Referring Provider:  No referring provider defined for this encounter.    /83 (BP Location: Left arm, Patient Position: Sitting, Cuff Size: Adult Large)   Pulse 95   Wt 121.1 kg (267 lb)   LMP  (LMP Unknown)   SpO2 99%   BMI 43.17 kg/m      Do you need any medication refills at today's visit? Not sure    Kelsi Cruz CMA  Adult Endocrinology  [facility]

## 2023-09-12 NOTE — PROGRESS NOTES
"Endocrinology Clinic Visit    Chief Complaint: Follow Up and Diabetes (Type ll)     Information obtained from:Patient      Assessment/Treatment Plan:    Adrenal nodule   I have personally reviewed adrenal nodule MRI from 7/6/2023.  Agree with interpretation of 2.2 x 1.9 cm adrenal nodule on the left side.  This nodule compared to previous study from 2017 is a stable in size.  MRI characteristics per report suggestive of fat-containing benign adrenal adenoma.  Hormonal assessment as detailed below for metanephrines, aldosterone hormones, DHEA-sulfate.  Further plan based on results.  We will proceed with Dex suppression test after the initial below labs below.    Orders Placed This Encounter   Procedures    Metanephrines Plasma Free    DHEA sulfate    Adrenal corticotropin    Aldosterone    Renin activity    Potassium    Aldosterone    Renin activity    Aldosterone Renin Ratio    Aldosterone Renin Ratio        Type 2 diabetes currently significant interval improvement of hemoglobin A1c on Wegovy.  Hemoglobin A1c improved to 6.5% from previous value of 10%.  Weight loss of about 30 pounds on Wegovy at 2.4 mg every 7 days.  Will reducing the insulin dose as documented below.  Past medical history of heart failure, obstructive sleep apnea & morbid obesity Estimated body mass index is Estimated body mass index is 43.17 kg/m  as calculated from the following:    Height as of 7/3/23: 1.675 m (5' 5.95\").    Weight as of this encounter: 121.1 kg (267 lb).   BMI has improved from 46% to 43%.  Hemoglobin A1c estimated 6.9% with 78% of the time within the target range BG readings.   Metformin 1000 mg extended release with supper.    Semaglutide 2.4 mg every 7 days.   Reduce Insulin glargine ( U300)  85 units once a day  Patient has already stopped NovoLog insulin.  Offered follow-up with nutrition from obesity standpoint however pt declined.  Continue Wegovy at 2.4 mg every 7 days.    Hepatic steatosis  Pancreatic " cyst  Gallbladder polyp     Gastroenterology follow-up placed.  Noted communication with GI.   Cadence Akbar MD  Staff Endocrinologist    Division of Endocrinology and Diabetes      Subjective:         HPI: Bettina Montes is a 53 year old female with history of type 2 diabetes who is here for routine follow-up.  This is her second visit with us.  Type 2 diabetes diagnosed: 20 years ago that the intermittent   Insulin glargine 950  units   Novolog has stopped this medication already.  Wegovy 2.4 mg every 7 days tolerating all medications well.  Metformin 1000 mg daily.  Dexcom downloaded and reviewed in detail.  Blood sugar readings more than 95% of the time within target range.    Past medical history of heart failure currently clinically stable.  Scheduled with bariatric surgery for consultation of weight loss procedures.  Not currently pursuing bariatric surgery.  Has tried multiple diets including weight watchers however sticking to the diet has been difficult.  Lost some weight [about 30 pounds] on semaglutide.  Allergies   Allergen Reactions    Amlodipine Swelling     Edema on 5mg throat    Lisinopril      Swelling in throat, face  angioedema    Naprosyn [Naproxen]      Swelling, diff breathing       Current Outpatient Medications   Medication Sig Dispense Refill    acetaminophen (TYLENOL) 500 MG tablet Take 500-1,000 mg by mouth every 6 hours as needed for mild pain      aspirin 81 MG EC tablet Take 1 tablet (81 mg) by mouth daily      atorvastatin (LIPITOR) 40 MG tablet Take 1 tablet (40 mg) by mouth daily 90 tablet 3    blood glucose (NO BRAND SPECIFIED) test strip Use to test blood sugar 3-4 times daily or as directed. 200 strip 1    carvedilol (COREG) 25 MG tablet Take 1 tablet (25 mg) by mouth 2 times daily (with meals) 180 tablet 3    Chlorpheniramine-DM (CORICIDIN HBP COUGH/COLD) 4-30 MG TABS Take 1 tablet by mouth as needed (Patient not taking: Reported on 7/3/2023)      cholecalciferol 50 MCG  (2000 UT) CAPS Take 2,000 Units by mouth daily       Continuous Blood Gluc Sensor (DEXCOM G6 SENSOR) MISC Change every 10 days. 9 each 3    Continuous Blood Gluc Sensor (FREESTYLE ANETA 14 DAY SENSOR) MISC 1 Device every 14 days Change sensor as directed every 14 days 2 each 6    Continuous Blood Gluc Sensor (FREESTYLE ANETA 2 SENSOR) MISC 1 each every 14 days 6 each 11    Continuous Blood Gluc Transmit (DEXCOM G6 TRANSMITTER) MISC Change every 3 months. 1 each 3    fluticasone-salmeterol (WIXELA INHUB) 250-50 MCG/ACT inhaler Inhale 1 puff into the lungs every 12 hours 1 each 11    hydrALAZINE (APRESOLINE) 10 MG tablet Take 1 tablet (10 mg) by mouth 2 times daily 90 tablet 3    insulin aspart (NOVOLOG FLEXPEN) 100 UNIT/ML pen Inject 18 Units Subcutaneous 3 times daily (with meals) 60 mL 1    insulin glargine U-300 (TOUJEO MAX SOLOSTAR) 300 UNIT/ML (2 units dial) pen Inject 100 Units Subcutaneous daily 30 mL 11    insulin pen needle (ULTICARE MICRO) 32G X 4 MM miscellaneous Use 4 pen needles daily or as directed. (Patient not taking: Reported on 6/16/2023) 400 each 11    ketoconazole (NIZORAL) 2 % external cream Apply topically 2 times daily For 2 weeks. (Patient not taking: Reported on 2/15/2023) 60 g 1    levalbuterol (XOPENEX HFA) 45 MCG/ACT Inhaler Inhale 2 puffs into the lungs every 4 hours as needed for shortness of breath / dyspnea or wheezing 15 g 3    magnesium oxide (MAG-OX) 400 (241.3 Mg) MG tablet Take 1 tablet (400 mg) by mouth daily 100 tablet 3    metFORMIN (GLUCOPHAGE XR) 500 MG 24 hr tablet Take 1 tablet (500 mg) by mouth daily (with dinner) for 10 days, THEN 2 tablets (1,000 mg) 2 times daily (with meals) for 350 days. 190 tablet 3    montelukast (SINGULAIR) 10 MG tablet Take 1 tablet (10 mg) by mouth At Bedtime 90 tablet 3    ondansetron (ZOFRAN) 4 MG tablet Take 1 tablet (4 mg) by mouth every 6 hours as needed for nausea (Patient not taking: Reported on 6/16/2023) 20 tablet 3     sacubitril-valsartan (ENTRESTO)  MG per tablet Take 1 tablet by mouth 2 times daily 180 tablet 3    Semaglutide, 2 MG/DOSE, 8 MG/3ML SOPN Inject 2 mg Subcutaneous every 7 days (Patient not taking: Reported on 6/16/2023) 9 mL 1    spironolactone (ALDACTONE) 50 MG tablet Take 1 tablet (50 mg) by mouth daily 90 tablet 3    torsemide (DEMADEX) 20 MG tablet Patient to take 40 mg daily and take an extra 20 mg if weight goes 285 lbs or higher. 180 tablet 11    triamcinolone (KENALOG) 0.1 % external cream Apply topically 2 times daily For maximum 2 weeks. (Patient not taking: Reported on 2/15/2023) 60 g 1    WEGOVY 2.4 MG/0.75ML pen INJECT 2.4 MG SUBCUTANEOUS ONCE A WEEK 3 mL 1       Review of Systems     8 point review system (Constitutional, HENT, Eyes, Respiratory, Cardiovascular, Gastrointestinal, Genitourinary, Musculoskeletal,Neurological, Psychiatric/Behavioural, Endocrine) is negative or is as per HPI above  Past medical history, past surgical history, social and family history reviewed in epic.        Objective:   /83 (BP Location: Left arm, Patient Position: Sitting, Cuff Size: Adult Large)   Pulse 95   Wt 121.1 kg (267 lb)   LMP  (LMP Unknown)   SpO2 99%   BMI 43.17 kg/m    Constitutional: Pleasant no acute cardiopulmonary distress.   EYES: anicteric, normal extra-ocular movements, no lid lag or retraction, Pupils equal and reactive to light bilaterally.  HEENT: Mouth/Throat: Mucous membrane is moist. Thyroid examination: Difficult to appreciate due to body habitus.  Cardiovascular: RRR, S1, S2 normal.   Pulmonary/Chest: CTAB. No wheezing or rales.   Abdominal: +BS. Non tender to palpation.  Stretch marks: No significant.  Neurological: Alert and oriented.  No tremor and reflexes are symmetrical bilaterally and within the normal limits. Muscle strength 5/5.   Extremities: No edema.  Psychological: appropriate mood and affect    In House Labs:   Hemoglobin A1C   Date Value Ref Range Status    09/12/2023 6.8 (H) 0.0 - 5.6 % Final     Comment:     Normal <5.7%   Prediabetes 5.7-6.4%    Diabetes 6.5% or higher     Note: Adopted from ADA consensus guidelines.   08/23/2023 6.5 (H) 0.0 - 5.6 % Final     Comment:     Normal <5.7%   Prediabetes 5.7-6.4%    Diabetes 6.5% or higher     Note: Adopted from ADA consensus guidelines.   05/09/2023 7.6 (H) 0.0 - 5.6 % Final     Comment:     Normal <5.7%   Prediabetes 5.7-6.4%    Diabetes 6.5% or higher     Note: Adopted from ADA consensus guidelines.   06/11/2021 10.1 (H) 0 - 5.6 % Final     Comment:     Normal <5.7% Prediabetes 5.7-6.4%  Diabetes 6.5% or higher - adopted from ADA   consensus guidelines.     01/14/2021 9.6 (H) 0 - 5.6 % Final     Comment:     Normal <5.7% Prediabetes 5.7-6.4%  Diabetes 6.5% or higher - adopted from ADA   consensus guidelines.     08/04/2020 11.0 (H) 0 - 5.6 % Final     Comment:     Normal <5.7% Prediabetes 5.7-6.4%  Diabetes 6.5% or higher - adopted from ADA   consensus guidelines.  Results confirmed by repeat test        TSH   Date Value Ref Range Status   02/15/2023 1.01 0.40 - 4.00 mU/L Final   12/30/2019 1.47 0.40 - 4.00 mU/L Final   05/13/2019 0.71 0.40 - 4.00 mU/L Final   05/10/2017 0.84 0.40 - 4.00 mU/L Final   02/17/2016 1.04 0.40 - 4.00 mU/L Final       Creatinine   Date Value Ref Range Status   08/23/2023 1.00 (H) 0.51 - 0.95 mg/dL Final   03/23/2021 1.18 (H) 0.52 - 1.04 mg/dL Final   ]    Recent Labs   Lab Test 09/29/21  0931 01/14/21  1622 05/17/19  1306   CHOL 128  --  147   HDL 42*  --  66   LDL 58 83 64   TRIG 140  --  83       This note has been dictated using voice recognition software.  As a result, there may be errors in the documentation that have gone undetected.  Please consider this when interpreting information in this documentation.    40 minutes spent by me on the date of the encounter doing chart, DEXcom, adrenal imaging review, history and exam, documentation and further activities per the note.

## 2023-09-12 NOTE — PATIENT INSTRUCTIONS
Bettina,      Metformin 1000 mg extended release with supper.    Semaglutide 2.4 mg every 7 days.   Reduce Insulin glargine ( U300)  85 units once a day      [facility]-Department of Endocrinology  Diabetes Educators:   Isabella Forrester, RN and Sarah Prince RN  Clinic Nurse: CONCETTA Iyer  CMA's: Kelsi Shepherd and Gage   EMT: Rosendo  Scheduling/Clinic phone number : [phone]   Clinic Fax: [phone]  On-Call Endocrine at [facility] (after hours/weekends): [phone] option 4    Please call the number below to schedule your labs.  [facility] [phone]   [facility] [phone]   [place] [phone]   [facility]  [phone]   [facility] [phone]   [place] [phone]   [facility]) [phone]   [facility] Walk-In Only   [place] [phone]   [place] [phone]   [place] [phone]   [place] [phone]     Please reach out to the following centers to schedule your imaging appointment:  Imaging (DEXA, CT, MRI, XRAY)    [facility] ([facility], [facility], [place]) [phone]   [facility] ([place], [place]) [phone]   [facility] ([facility]) [phone]   [facility] ([facility]) [phone]     Appointment Reminders:  * Please bring meter with for staff to download  * If you are due ONLY for an A1C, it is scheduled with the nurse and will be done in clinic. You do not need to schedule a lab appointment. Fasting is not required for an A1C.  * Refill request should be submitted to your pharmacy. They will contact clinic for approval.

## 2023-09-13 LAB
ACTH PLAS-MCNC: 35 PG/ML
DHEA-S SERPL-MCNC: 86 UG/DL (ref 35–430)

## 2023-09-15 LAB — ALDOST SERPL-MCNC: 16.3 NG/DL (ref 0–31)

## 2023-09-16 DIAGNOSIS — J45.30 MILD PERSISTENT ASTHMA WITHOUT COMPLICATION: ICD-10-CM

## 2023-09-16 DIAGNOSIS — I50.23 ACUTE ON CHRONIC SYSTOLIC (CONGESTIVE) HEART FAILURE (H): ICD-10-CM

## 2023-09-16 LAB
ANNOTATION COMMENT IMP: NORMAL
METANEPHS SERPL-SCNC: 0.22 NMOL/L
NORMETANEPHRINE SERPL-SCNC: 0.41 NMOL/L
RENIN PLAS-CCNC: 19.6 NG/ML/HR

## 2023-09-18 LAB — ALDOST/RENIN PLAS-RTO: 0.8 {RATIO} (ref 0–25)

## 2023-09-18 RX ORDER — MONTELUKAST SODIUM 10 MG/1
1 TABLET ORAL AT BEDTIME
Qty: 90 TABLET | Refills: 3 | Status: SHIPPED | OUTPATIENT
Start: 2023-09-18 | End: 2024-09-29

## 2023-09-19 DIAGNOSIS — E27.9 ADRENAL NODULE (H): Primary | ICD-10-CM

## 2023-09-19 RX ORDER — DEXAMETHASONE 1 MG
1 TABLET ORAL ONCE
Qty: 1 TABLET | Refills: 0 | Status: SHIPPED | OUTPATIENT
Start: 2023-09-19 | End: 2023-09-19

## 2023-09-19 NOTE — RESULT ENCOUNTER NOTE
Hello -    Adrenal labs which were done as a follow-up to the adrenal nodule are normal.  1.  Metanephrine levels which were checked to assess for pheochromocytoma are within the normal limits.  2.  Aldosterone levels are within the expected range.  3.  ACTH and DHEA-sulfate levels are within the normal limits.  As a next step, I recommend checking cortisol at 8 AM in the morning after taking 1 tablet of dexamethasone at 11 PM the night before.  This test can be pursued at your convenience.  The details and steps of this test are further documented below.  I have sent a prescription for dexamethasone to your pharmacy.  Step 1- make blood work appointment at 8:00 am  Step 2 - take dexamethasone at 11:00 PM the night before blood work  Step 3 -on the day of blood work go to the lab at 8 AM to have cortisol lab checked.      Please let us know if you have any questions or concerns.     Regards,  Cadence Akbar MD

## 2023-09-19 NOTE — PROGRESS NOTES
Hello -    Adrenal labs which were done as a follow-up to the adrenal nodule are normal.  1.  Metanephrine levels which were checked to assess for pheochromocytoma are within the normal limits.  2.  Aldosterone levels are within the expected range.  3.  ACTH and DHEA-sulfate levels are within the normal limits.  As a next step, I recommend checking cortisol at 8 AM in the morning after taking 1 tablet of dexamethasone at 11 PM the night before.  This test can be pursued at your convenience.  The details and steps of this test are further documented below.  I have sent a prescription for dexamethasone to your pharmacy.  Step 1- make blood work appointment at 8:00 am  Step 2 - take dexamethasone at 11:00 PM the night before blood work  Step 3 -on the day of blood work go to the lab at 8 AM to have cortisol lab checked.      Please let us know if you have any questions or concerns.      Regards,  Cadence Akbar MD    Orders Placed This Encounter   Procedures    Cortisol, Dexamethasone Suppression

## 2023-09-20 RX ORDER — HYDRALAZINE HYDROCHLORIDE 10 MG/1
10 TABLET, FILM COATED ORAL 2 TIMES DAILY
Qty: 60 TABLET | Refills: 3 | Status: SHIPPED | OUTPATIENT
Start: 2023-09-20 | End: 2024-02-24

## 2023-09-20 NOTE — TELEPHONE ENCOUNTER
Vasodilators Passed     hydrALAZINE (APRESOLINE) 10 MG tablet     90 tablets,  3 refills, 2-  Last Office Visit : 8-   Next visit   2-    Heide Woodall   cardiology

## 2023-10-06 DIAGNOSIS — E66.01 MORBID OBESITY (H): ICD-10-CM

## 2023-10-09 RX ORDER — SEMAGLUTIDE 2.4 MG/.75ML
2.4 INJECTION, SOLUTION SUBCUTANEOUS WEEKLY
Qty: 3 ML | Refills: 1 | Status: SHIPPED | OUTPATIENT
Start: 2023-10-09 | End: 2023-12-06

## 2023-10-09 NOTE — TELEPHONE ENCOUNTER
WEGOVY 2.4 MG/0.75ML pen 3 mL 1 8/15/2023     Last Office Visit: 9/12/23  Future Office visit:   1/16/24    Routing refill request to provider for review/approval because:  Drug not on the  refill protocol     Katie Sinclair RN

## 2023-10-09 NOTE — TELEPHONE ENCOUNTER
WEGOVY 2.4 MG/0.75ML pen 3 mL 1 8/15/2023     Last Office Visit: 9/12/23  Future Office visit:       Routing refill request to provider for review/approval because:  Drug not on the [facility], [facility] or [facility] refill protocol or controlled substance

## 2023-10-13 NOTE — PATIENT INSTRUCTIONS
MTM Referral re: start SGLT2i  No change in medications.   Schedule echo  Lab today.   [facility] clinic in september  
Initiate Treatment: Amlactin
Detail Level: Zone
Render In Strict Bullet Format?: No
17

## 2023-11-20 DIAGNOSIS — E11.65 TYPE 2 DIABETES MELLITUS WITH HYPERGLYCEMIA, WITH LONG-TERM CURRENT USE OF INSULIN (H): ICD-10-CM

## 2023-11-20 DIAGNOSIS — E66.01 MORBID OBESITY (H): ICD-10-CM

## 2023-11-20 DIAGNOSIS — Z79.4 TYPE 2 DIABETES MELLITUS WITH HYPERGLYCEMIA, WITH LONG-TERM CURRENT USE OF INSULIN (H): ICD-10-CM

## 2023-11-24 NOTE — TELEPHONE ENCOUNTER
OZEMPIC 8 MG/3 ML (2 MG/DOSE)   Last Written Prescription Date:  ?  Last Fill Quantity: ?,   # refills: ?  Last Office Visit : 9/12/2023  Future Office visit:  1/16/2024    Routing refill request to provider for review/approval because:  Drug not active on patient's medication list    Rere Loredo RN  Central Triage Red Flags/Med Refills

## 2023-11-27 ENCOUNTER — TELEPHONE (OUTPATIENT)
Dept: ENDOCRINOLOGY | Facility: CLINIC | Age: 53
End: 2023-11-27
Payer: COMMERCIAL

## 2023-11-27 DIAGNOSIS — E66.01 MORBID OBESITY (H): Primary | ICD-10-CM

## 2023-11-27 DIAGNOSIS — E11.65 TYPE 2 DIABETES MELLITUS WITH HYPERGLYCEMIA, WITH LONG-TERM CURRENT USE OF INSULIN (H): ICD-10-CM

## 2023-11-27 DIAGNOSIS — Z79.4 TYPE 2 DIABETES MELLITUS WITH HYPERGLYCEMIA, WITH LONG-TERM CURRENT USE OF INSULIN (H): ICD-10-CM

## 2023-11-27 RX ORDER — SEMAGLUTIDE 2.68 MG/ML
2 INJECTION, SOLUTION SUBCUTANEOUS
OUTPATIENT
Start: 2023-11-27

## 2023-11-27 NOTE — TELEPHONE ENCOUNTER
Disp Refills Start End CINDY   Semaglutide-Weight Management (WEGOVY) 2.4 MG/0.75ML pen 3 mL 1 10/9/2023  No   Sig - Route: Inject 2.4 mg Subcutaneous once a week - Subcutaneous   Sent to pharmacy as: Wegovy 2.4 MG/0.75ML Subcutaneous Solution Auto-injector (Semaglutide-Weight Management)   Class: E-Prescribe   Order: [id number]   E-Prescribing Status: Receipt confirmed by pharmacy (10/9/2023  5:58 PM CDT)         Updated prescription sent on 10/9/23 by Dr. Akbar.      Shireen Cazares, RN  Endocrine Care Coordinator  [facility]

## 2023-11-27 NOTE — TELEPHONE ENCOUNTER
PA Initiation    Medication: WEGOVY 2.4 MG/0.75ML SC SOAJ  Insurance Company: [employer] Non-Specialty PA's - Phone [phone] Fax [phone]  Pharmacy Filling the Rx:    Filling Pharmacy Phone:    Filling Pharmacy Fax:    Start Date: 11/27/2023    Key:[id number]

## 2023-11-29 NOTE — TELEPHONE ENCOUNTER
Wegovy Renewal denied, please refer to denial letter below. Please advise on how you would like to proceed. If appealed please provide medical rational.

## 2023-11-29 NOTE — TELEPHONE ENCOUNTER
PRIOR AUTHORIZATION DENIED    Medication: WEGOVY 2.4 MG/0.75ML SC SOAJ  Insurance Company: [employer] Non-Specialty PA's - Phone [phone] Fax [phone]  Denial Date: 11/27/2023  Denial Rational:   Appeal Information:

## 2023-12-01 NOTE — TELEPHONE ENCOUNTER
12/1/23 Writer called n/a left message to call clinic back. Will try again later.    Insurance denied PA for Wegovy.     Called and spoke to pt, pt wants to know if PA is denied, what would Provider want pt to take. Is there another option?      Giuseppe Almonte -General Care Navigator

## 2023-12-06 RX ORDER — TIRZEPATIDE 5 MG/.5ML
5 INJECTION, SOLUTION SUBCUTANEOUS
Qty: 2 ML | Refills: 0 | Status: SHIPPED | OUTPATIENT
Start: 2023-12-06 | End: 2024-01-04

## 2023-12-06 NOTE — TELEPHONE ENCOUNTER
Please let patient know that Dr. Akbar wants to refer her to Weight Management for further recommendations on weight loss medications.      Shireen Cazares, RN  Endocrine Care Coordinator  [facility]

## 2023-12-06 NOTE — TELEPHONE ENCOUNTER
Writer called and spoke to pt. Informed pt that Dr. Akbar sent a referral to weight Management for further recommendation on weight loss medication. Pt was under the impression that Wegovy was for her diabetes not weight loss. Pt can not take Metformin due to having symptoms and Wegovy was other option.  Will have RN advise.    Giuseppe Almonte -General Care Navigator

## 2023-12-06 NOTE — TELEPHONE ENCOUNTER
[facility] Call Center    Phone Message    May a detailed message be left on voicemail: yes     Reason for Call: Medication Question or concern regarding medication   Prescription Clarification  Name of Medication: Wegovy 2.4 mg   Prescribing Provider: Cadence Akbar MD       Pharmacy:   [facility] [id number] [place] [address]      What on the order needs clarification?   The patient wanted to discuss alternative options like if an appeal or a different medication would be a good idea to try please review and follow up thank you.      Action Taken: Message routed to:  [facility]     Travel Screening: Not Applicable

## 2023-12-06 NOTE — TELEPHONE ENCOUNTER
Per Dr. Akbar:    I don't remember how we went to wegovy. Ozempic or Monjaro is an option based on Type 2 diabetes. Please call and discuss with the patient. I sent prescription for Ozempic but noted in the past documentation that she declined that?  Please clarify with pt regarding the above options and we can send a prescription for her         Writer contacted patient to review above from Dr. Akbar. Patient states that she was not able to get Ozempic in the past because it was out of stock. Patient would prefer to try Mounjaro at her local pharmacy.     Writer will update Dr. Akbar.    Shireen Cazares, RN  Endocrine Care Coordinator  [facility]

## 2023-12-06 NOTE — TELEPHONE ENCOUNTER
Ozempic or Monjaro is an option based on Type 2 diabetes. Please call and discuss with the patient. I sent prescription for Ozempic but noted in the past documentation that she declined that?  Please clarify with pt regarding the above options and we can send a prescription for her. Cadence Akbar MD

## 2023-12-11 NOTE — TELEPHONE ENCOUNTER
Per Dr. Akbar:      Prescription sent.  Does she needs injection instructions for Mounjaro?       Writer reached out to patient and reviewed above details. Patient verbalizes understanding. Patient was able to  prescription for Mounjaro. Patient notes she does not need injection training and has no questions at this time.      Shireen Cazares RN  Endocrine Care Coordinator  [facility]

## 2023-12-28 DIAGNOSIS — E66.01 MORBID OBESITY (H): ICD-10-CM

## 2023-12-28 DIAGNOSIS — E11.65 TYPE 2 DIABETES MELLITUS WITH HYPERGLYCEMIA, WITH LONG-TERM CURRENT USE OF INSULIN (H): ICD-10-CM

## 2023-12-28 DIAGNOSIS — Z79.4 TYPE 2 DIABETES MELLITUS WITH HYPERGLYCEMIA, WITH LONG-TERM CURRENT USE OF INSULIN (H): ICD-10-CM

## 2024-01-04 RX ORDER — TIRZEPATIDE 5 MG/.5ML
5 INJECTION, SOLUTION SUBCUTANEOUS
Qty: 2 ML | Refills: 3 | Status: SHIPPED | OUTPATIENT
Start: 2024-01-04 | End: 2024-03-25

## 2024-01-04 NOTE — TELEPHONE ENCOUNTER
MOUNJARO 5 MG/0.5 ML PEN      Last Written Prescription Date:  12/6/23  Last Fill Quantity: 2ml,   # refills: 0  Last Office Visit : 9/12/23  Future Office visit:  1/16/24    Routing refill request to provider for review/approval because:  Abnormal creatinine  Creatinine   Date Value Ref Range Status   08/23/2023 1.00 (H) 0.51 - 0.95 mg/dL Final   03/23/2021 1.18 (H) 0.52 - 1.04 mg/dL Final

## 2024-01-14 ENCOUNTER — HEALTH MAINTENANCE LETTER (OUTPATIENT)
Age: 54
End: 2024-01-14

## 2024-01-16 ENCOUNTER — OFFICE VISIT (OUTPATIENT)
Dept: ENDOCRINOLOGY | Facility: CLINIC | Age: 54
End: 2024-01-16
Payer: COMMERCIAL

## 2024-01-16 VITALS
HEART RATE: 87 BPM | RESPIRATION RATE: 18 BRPM | BODY MASS INDEX: 42.36 KG/M2 | DIASTOLIC BLOOD PRESSURE: 82 MMHG | SYSTOLIC BLOOD PRESSURE: 123 MMHG | OXYGEN SATURATION: 100 % | WEIGHT: 262 LBS

## 2024-01-16 DIAGNOSIS — E11.65 TYPE 2 DIABETES MELLITUS WITH HYPERGLYCEMIA, WITH LONG-TERM CURRENT USE OF INSULIN (H): Primary | ICD-10-CM

## 2024-01-16 DIAGNOSIS — Z79.4 TYPE 2 DIABETES MELLITUS WITH HYPERGLYCEMIA, WITH LONG-TERM CURRENT USE OF INSULIN (H): Primary | ICD-10-CM

## 2024-01-16 DIAGNOSIS — E27.9 ADRENAL NODULE (H): ICD-10-CM

## 2024-01-16 DIAGNOSIS — E66.01 MORBID OBESITY (H): ICD-10-CM

## 2024-01-16 LAB — HBA1C MFR BLD: 7.6 % (ref 4.3–?)

## 2024-01-16 PROCEDURE — 99215 OFFICE O/P EST HI 40 MIN: CPT | Performed by: INTERNAL MEDICINE

## 2024-01-16 PROCEDURE — 83036 HEMOGLOBIN GLYCOSYLATED A1C: CPT | Performed by: INTERNAL MEDICINE

## 2024-01-16 RX ORDER — METFORMIN HCL 500 MG
500 TABLET, EXTENDED RELEASE 24 HR ORAL 2 TIMES DAILY WITH MEALS
Qty: 180 TABLET | Refills: 3 | Status: SHIPPED | OUTPATIENT
Start: 2024-01-16 | End: 2024-05-18

## 2024-01-16 NOTE — LETTER
"    1/16/2024         RE: Bettina Montes  [address] [facility], [place], operated by [facility] [place] [address]        Dear Colleague,    Thank you for referring your patient, Bettina Montes, to the [facility]. Please see a copy of my visit note below.                Endocrinology Clinic Visit    Chief Complaint: Follow Up (Follow up)     Information obtained from:Patient      Assessment/Treatment Plan:    Adrenal nodule   I have personally reviewed adrenal nodule MRI from 7/6/2023.  Agree with interpretation of 2.2 x 1.9 cm adrenal nodule on the left side.  This nodule compared to previous study from 2017 is a stable in size.  MRI characteristics per report suggestive of fat-containing benign adrenal adenoma.  Hormonal assessment as detailed below for metanephrines, aldosterone hormones, DHEA-sulfate within the normal limits.  Cortisol after Dex suppression test ordered.      Component      Latest Ref Rng 9/12/2023  4:36 PM   Metanephrine      0.00 - 0.49 nmol/L 0.22    Normetanephrine      0.00 - 0.89 nmol/L 0.41    Metanephrines Interpretation See Note    DHEA Sulfate      35 - 430 ug/dL 86    Adrenal Corticotropin      <47 pg/mL 35    Potassium      3.4 - 5.3 mmol/L 3.7    Aldosterone      0.0 - 31.0 ng/dL 16.3    Renin Activity      ng/mL/hr 19.6    Aldosterone Renin Ratio      0.0 - 25.0  0.8        Type 2 diabetes /morbid obesity  Hemoglobin A1c has slightly worsened and CGM also shows postprandial hyperglycemia.  We are adjusting Mounjaro dose as detailed below.  After she is on optimal dose of Mounjaro dose we are introducing NovoLog insulin with breakfast and supper - 10 units.   Past medical history of heart failure, obstructive sleep apnea & morbid obesity Estimated body mass index is Estimated body mass index is 42.36 kg/m  as calculated from the following:    Height as of 7/3/23: 1.675 m (5' 5.95\").    Weight as of this encounter: 118.8 kg (262 lb).   BMI has improved from 46% to 42% -has lost 30 " pounds on Wegovy previously.  Wegovy is not any longer covered.  Metformin 500 mg extended release with supper -did not tolerate higher dose.  Mounjaro 5 mg every 7 days for additional 2 doses and then increase the dose to Mounjaro 7.5 mg every 7 days and after 4 weeks on 7.5 mg then further increase to Mounjaro 10 mg every 7 days.  Continue insulin glargine ( U300)  85 units once a day  NovoLog insulin 10 units with breakfast and supper.  Take NovoLog insulin 10 minutes before you eat.     Hepatic steatosis  Pancreatic cyst  Gallbladder polyp     Gastroenterology follow-up placed previously.  Please complete MRI abdomen MRCP ordered by gastroenterology.      Cadence Akbar MD  Staff Endocrinologist    Division of Endocrinology and Diabetes      Subjective:         HPI: Bettina Montes is a 53 year old female with history of type 2 diabetes who is here for routine follow-up.   Type 2 diabetes diagnosed: 20 years ago that the intermittent   Insulin glargine 85  units   Mounjaro 5 mg every 7 days which was changed from Wegovy of 2.4 as Wegovy is no longer covered by insurance.  Metformin 500 mg extended release daily.  Dexcom downloaded and reviewed in detail.   Past medical history of heart failure currently clinically stable.  Scheduled with bariatric surgery for consultation of weight loss procedures.  Not currently pursuing bariatric surgery.  Has tried multiple diets including weight watchers however sticking to the diet has been difficult.  Lost weight [about 30 pounds] on semaglutide until it was switched to Mounjaro due to coverage.  Allergies   Allergen Reactions     Amlodipine Swelling     Edema on 5mg throat     Lisinopril      Swelling in throat, face  angioedema     Naprosyn [Naproxen]      Swelling, diff breathing       Current Outpatient Medications   Medication Sig Dispense Refill     acetaminophen (TYLENOL) 500 MG tablet Take 500-1,000 mg by mouth every 6 hours as needed for mild pain        aspirin 81 MG EC tablet Take 1 tablet (81 mg) by mouth daily       atorvastatin (LIPITOR) 40 MG tablet Take 1 tablet (40 mg) by mouth daily 90 tablet 3     carvedilol (COREG) 25 MG tablet Take 1 tablet (25 mg) by mouth 2 times daily (with meals) 180 tablet 3     cholecalciferol 50 MCG (2000 UT) CAPS Take 2,000 Units by mouth daily        Continuous Blood Gluc Sensor (DEXCOM G6 SENSOR) MISC Change every 10 days. 9 each 3     Continuous Blood Gluc Transmit (DEXCOM G6 TRANSMITTER) MISC Change every 3 months. 1 each 3     fluticasone-salmeterol (WIXELA INHUB) 250-50 MCG/ACT inhaler Inhale 1 puff into the lungs every 12 hours 1 each 11     hydrALAZINE (APRESOLINE) 10 MG tablet Take 1 tablet (10 mg) by mouth 2 times daily 60 tablet 3     insulin glargine U-300 (TOUJEO MAX SOLOSTAR) 300 UNIT/ML (2 units dial) pen Inject 85 Units Subcutaneous daily 30 mL 11     insulin pen needle (ULTICARE MICRO) 32G X 4 MM miscellaneous Use 4 pen needles daily or as directed. 400 each 11     levalbuterol (XOPENEX HFA) 45 MCG/ACT Inhaler Inhale 2 puffs into the lungs every 4 hours as needed for shortness of breath / dyspnea or wheezing 15 g 3     magnesium oxide (MAG-OX) 400 (241.3 Mg) MG tablet Take 1 tablet (400 mg) by mouth daily 100 tablet 3     metFORMIN (GLUCOPHAGE XR) 500 MG 24 hr tablet Take 1 tablet (500 mg) by mouth daily (with dinner) for 10 days, THEN 2 tablets (1,000 mg) 2 times daily (with meals) for 350 days. 190 tablet 3     montelukast (SINGULAIR) 10 MG tablet TAKE 1 TABLET BY MOUTH EVERYDAY AT BEDTIME 90 tablet 3     sacubitril-valsartan (ENTRESTO)  MG per tablet Take 1 tablet by mouth 2 times daily 180 tablet 3     spironolactone (ALDACTONE) 50 MG tablet Take 1 tablet (50 mg) by mouth daily 90 tablet 3     tirzepatide (MOUNJARO) 5 MG/0.5ML pen Inject 5 mg Subcutaneous every 7 days 2 mL 3     torsemide (DEMADEX) 20 MG tablet Patient to take 40 mg daily and take an extra 20 mg if weight goes 285 lbs or higher. 180  tablet 11     blood glucose (NO BRAND SPECIFIED) test strip Use to test blood sugar 3-4 times daily or as directed. (Patient not taking: Reported on 9/12/2023) 200 strip 1     Chlorpheniramine-DM (CORICIDIN HBP COUGH/COLD) 4-30 MG TABS Take 1 tablet by mouth as needed (Patient not taking: Reported on 7/3/2023)       Continuous Blood Gluc Sensor (FREESTYLE ANETA 14 DAY SENSOR) MISC 1 Device every 14 days Change sensor as directed every 14 days (Patient not taking: Reported on 9/12/2023) 2 each 6     Continuous Blood Gluc Sensor (FREESTYLE ANETA 2 SENSOR) MISC 1 each every 14 days (Patient not taking: Reported on 9/12/2023) 6 each 11     dexAMETHasone (DECADRON) 1 MG tablet Take 1 tablet (1 mg) by mouth once for 1 dose At 11:00 PM. The next morning after taking the medication go to he lab at 8:00 am. 1 tablet 0     ketoconazole (NIZORAL) 2 % external cream Apply topically 2 times daily For 2 weeks. (Patient not taking: Reported on 2/15/2023) 60 g 1     ondansetron (ZOFRAN) 4 MG tablet Take 1 tablet (4 mg) by mouth every 6 hours as needed for nausea (Patient not taking: Reported on 6/16/2023) 20 tablet 3     triamcinolone (KENALOG) 0.1 % external cream Apply topically 2 times daily For maximum 2 weeks. (Patient not taking: Reported on 2/15/2023) 60 g 1       Review of Systems     8 point review system (Constitutional, HENT, Eyes, Respiratory, Cardiovascular, Gastrointestinal, Genitourinary, Musculoskeletal,Neurological, Psychiatric/Behavioural, Endocrine) is negative or is as per HPI above  Past medical history, past surgical history, social and family history reviewed in epic.        Objective:   /82 (BP Location: Left arm, Patient Position: Sitting, Cuff Size: Adult Large)   Pulse 87   Resp 18   Wt 118.8 kg (262 lb)   LMP  (LMP Unknown)   SpO2 100%   BMI 42.36 kg/m    Constitutional: Pleasant no acute cardiopulmonary distress.   EYES: anicteric, normal extra-ocular movements, no lid lag or retraction,  Pupils equal and reactive to light bilaterally.  HEENT: Mouth/Throat: Mucous membrane is moist. Thyroid examination: Difficult to appreciate due to body habitus.  Cardiovascular: RRR, S1, S2 normal.   Pulmonary/Chest: CTAB. No wheezing or rales.   Abdominal: +BS. Non tender to palpation.  Stretch marks: No significant.  Neurological: Alert and oriented.  No tremor and reflexes are symmetrical bilaterally and within the normal limits. Muscle strength 5/5.   Extremities: No edema.  Psychological: appropriate mood and affect    In House Labs:   Hemoglobin A1C   Date Value Ref Range Status   09/12/2023 6.8 (H) 0.0 - 5.6 % Final     Comment:     Normal <5.7%   Prediabetes 5.7-6.4%    Diabetes 6.5% or higher     Note: Adopted from ADA consensus guidelines.   08/23/2023 6.5 (H) 0.0 - 5.6 % Final     Comment:     Normal <5.7%   Prediabetes 5.7-6.4%    Diabetes 6.5% or higher     Note: Adopted from ADA consensus guidelines.   05/09/2023 7.6 (H) 0.0 - 5.6 % Final     Comment:     Normal <5.7%   Prediabetes 5.7-6.4%    Diabetes 6.5% or higher     Note: Adopted from ADA consensus guidelines.   06/11/2021 10.1 (H) 0 - 5.6 % Final     Comment:     Normal <5.7% Prediabetes 5.7-6.4%  Diabetes 6.5% or higher - adopted from ADA   consensus guidelines.     01/14/2021 9.6 (H) 0 - 5.6 % Final     Comment:     Normal <5.7% Prediabetes 5.7-6.4%  Diabetes 6.5% or higher - adopted from ADA   consensus guidelines.     08/04/2020 11.0 (H) 0 - 5.6 % Final     Comment:     Normal <5.7% Prediabetes 5.7-6.4%  Diabetes 6.5% or higher - adopted from ADA   consensus guidelines.  Results confirmed by repeat test        TSH   Date Value Ref Range Status   02/15/2023 1.01 0.40 - 4.00 mU/L Final   12/30/2019 1.47 0.40 - 4.00 mU/L Final   05/13/2019 0.71 0.40 - 4.00 mU/L Final   05/10/2017 0.84 0.40 - 4.00 mU/L Final   02/17/2016 1.04 0.40 - 4.00 mU/L Final       Creatinine   Date Value Ref Range Status   08/23/2023 1.00 (H) 0.51 - 0.95 mg/dL Final    03/23/2021 1.18 (H) 0.52 - 1.04 mg/dL Final   ]    Recent Labs   Lab Test 09/29/21  0931 01/14/21  1622 05/17/19  1306   CHOL 128  --  147   HDL 42*  --  66   LDL 58 83 64   TRIG 140  --  83       This note has been dictated using voice recognition software.  As a result, there may be errors in the documentation that have gone undetected.  Please consider this when interpreting information in this documentation.    40 minutes spent by me on the date of the encounter doing chart, DEXcom, counseling on management of type 2 diabetes and morbid obesity, history and exam, documentation and further activities per the note.  More than 50% directly spent face-to-face with the patient.      Again, thank you for allowing me to participate in the care of your patient.        Sincerely,        Cadence Akbar MD

## 2024-01-16 NOTE — NURSING NOTE
Bettina Montes's goals for this visit include:   Chief Complaint   Patient presents with    Follow Up     Follow up       She requests these members of her care team be copied on today's visit information: yes    PCP: Prabhu Robbins    Referring Provider:  No referring provider defined for this encounter.    /82 (BP Location: Left arm, Patient Position: Sitting, Cuff Size: Adult Large)   Pulse 87   Resp 18   Wt 118.8 kg (262 lb)   LMP  (LMP Unknown)   SpO2 100%   BMI 42.36 kg/m      Do you need any medication refills at today's visit? None    Rosendo Lorenz, EMT

## 2024-01-16 NOTE — PATIENT INSTRUCTIONS
Adrenal labs which were done as a follow-up to the adrenal nodule are normal.  1.  Metanephrine levels which were checked to assess for pheochromocytoma are within the normal limits.  2.  Aldosterone levels are within the expected range.  3.  ACTH and DHEA-sulfate levels are within the normal limits.  As a next step, I recommend checking cortisol at 8 AM in the morning after taking 1 tablet of dexamethasone at 11 PM the night before.  This test can be pursued at your convenience.  The details and steps of this test are further documented below.  I have sent a prescription for dexamethasone to your pharmacy.  Step 1- make blood work appointment at 8:00 am  Step 2 - take dexamethasone at 11:00 PM the night before blood work  Step 3 -on the day of blood work go to the lab at 8 AM to have cortisol lab checked.      Novolog 10 units with breakfast and supper. Take it 10 minutes before you eat until you increase the Mounjaro 10 mg weekly.       tirzepatide (MOUNJARO) 7.5 MG/0.5ML pen Inject 7.5 mg Subcutaneous every 7 days 2 mL 0 ordered 01/16/2024 -- -- -- --         Summary: Inject 7.5 mg Subcutaneous every 7 days, Disp-2 mL, R-0, E-Prescribe  Guidelines: Start Date & Time: 01/16/2024Pharmacy: [id number] [place], [place] [facility]/Sold: 01/16/2024 (O)Ordered On: 01/16/2024Re           tirzepatide (MOUNJARO) 10 MG/0.5ML pen (Future) Inject 10 mg Subcutaneous every 7 days 2 mL 3 ordered 03/12/2024 -- -- -- --        Summary: Inject 10 mg Subcutaneous every 7 days, Disp-2 mL, R-3, E-Prescribe  Guidelines: Start Date & Time: 03/12/2024Pharmacy: [id number] [place], [place] -

## 2024-01-16 NOTE — PROGRESS NOTES
"Endocrinology Clinic Visit    Chief Complaint: Follow Up (Follow up)     Information obtained from:Patient      Assessment/Treatment Plan:    Adrenal nodule   I have personally reviewed adrenal nodule MRI from 7/6/2023.  Agree with interpretation of 2.2 x 1.9 cm adrenal nodule on the left side.  This nodule compared to previous study from 2017 is a stable in size.  MRI characteristics per report suggestive of fat-containing benign adrenal adenoma.  Hormonal assessment as detailed below for metanephrines, aldosterone hormones, DHEA-sulfate within the normal limits.  Cortisol after Dex suppression test ordered.      Component      Latest Ref Rng 9/12/2023  4:36 PM   Metanephrine      0.00 - 0.49 nmol/L 0.22    Normetanephrine      0.00 - 0.89 nmol/L 0.41    Metanephrines Interpretation See Note    DHEA Sulfate      35 - 430 ug/dL 86    Adrenal Corticotropin      <47 pg/mL 35    Potassium      3.4 - 5.3 mmol/L 3.7    Aldosterone      0.0 - 31.0 ng/dL 16.3    Renin Activity      ng/mL/hr 19.6    Aldosterone Renin Ratio      0.0 - 25.0  0.8        Type 2 diabetes /morbid obesity  Hemoglobin A1c has slightly worsened and CGM also shows postprandial hyperglycemia.  We are adjusting Mounjaro dose as detailed below.  After she is on optimal dose of Mounjaro dose we are introducing NovoLog insulin with breakfast and supper - 10 units.   Past medical history of heart failure, obstructive sleep apnea & morbid obesity Estimated body mass index is Estimated body mass index is 42.36 kg/m  as calculated from the following:    Height as of 7/3/23: 1.675 m (5' 5.95\").    Weight as of this encounter: 118.8 kg (262 lb).   BMI has improved from 46% to 42% -has lost 30 pounds on Wegovy previously.  Wegovy is not any longer covered.  Metformin 500 mg extended release with supper -did not tolerate higher dose.  Mounjaro 5 mg every 7 days for additional 2 doses and then increase the dose to Mounjaro 7.5 mg every 7 days and after 4 weeks on " 7.5 mg then further increase to Mounjaro 10 mg every 7 days.  Continue insulin glargine ( U300)  85 units once a day  NovoLog insulin 10 units with breakfast and supper.  Take NovoLog insulin 10 minutes before you eat.     Hepatic steatosis  Pancreatic cyst  Gallbladder polyp     Gastroenterology follow-up placed previously.  Please complete MRI abdomen MRCP ordered by gastroenterology.      Cadence Akbar MD  Staff Endocrinologist    Division of Endocrinology and Diabetes      Subjective:         HPI: Bettina Montes is a 53 year old female with history of type 2 diabetes who is here for routine follow-up.   Type 2 diabetes diagnosed: 20 years ago that the intermittent   Insulin glargine 85  units   Mounjaro 5 mg every 7 days which was changed from Wegovy of 2.4 as Wegovy is no longer covered by insurance.  Metformin 500 mg extended release daily.  Dexcom downloaded and reviewed in detail.   Past medical history of heart failure currently clinically stable.  Scheduled with bariatric surgery for consultation of weight loss procedures.  Not currently pursuing bariatric surgery.  Has tried multiple diets including weight watchers however sticking to the diet has been difficult.  Lost weight [about 30 pounds] on semaglutide until it was switched to Mounjaro due to coverage.  Allergies   Allergen Reactions    Amlodipine Swelling     Edema on 5mg throat    Lisinopril      Swelling in throat, face  angioedema    Naprosyn [Naproxen]      Swelling, diff breathing       Current Outpatient Medications   Medication Sig Dispense Refill    acetaminophen (TYLENOL) 500 MG tablet Take 500-1,000 mg by mouth every 6 hours as needed for mild pain      aspirin 81 MG EC tablet Take 1 tablet (81 mg) by mouth daily      atorvastatin (LIPITOR) 40 MG tablet Take 1 tablet (40 mg) by mouth daily 90 tablet 3    carvedilol (COREG) 25 MG tablet Take 1 tablet (25 mg) by mouth 2 times daily (with meals) 180 tablet 3    cholecalciferol 50  MCG (2000 UT) CAPS Take 2,000 Units by mouth daily       Continuous Blood Gluc Sensor (DEXCOM G6 SENSOR) MISC Change every 10 days. 9 each 3    Continuous Blood Gluc Transmit (DEXCOM G6 TRANSMITTER) MISC Change every 3 months. 1 each 3    fluticasone-salmeterol (WIXELA INHUB) 250-50 MCG/ACT inhaler Inhale 1 puff into the lungs every 12 hours 1 each 11    hydrALAZINE (APRESOLINE) 10 MG tablet Take 1 tablet (10 mg) by mouth 2 times daily 60 tablet 3    insulin glargine U-300 (TOUJEO MAX SOLOSTAR) 300 UNIT/ML (2 units dial) pen Inject 85 Units Subcutaneous daily 30 mL 11    insulin pen needle (ULTICARE MICRO) 32G X 4 MM miscellaneous Use 4 pen needles daily or as directed. 400 each 11    levalbuterol (XOPENEX HFA) 45 MCG/ACT Inhaler Inhale 2 puffs into the lungs every 4 hours as needed for shortness of breath / dyspnea or wheezing 15 g 3    magnesium oxide (MAG-OX) 400 (241.3 Mg) MG tablet Take 1 tablet (400 mg) by mouth daily 100 tablet 3    metFORMIN (GLUCOPHAGE XR) 500 MG 24 hr tablet Take 1 tablet (500 mg) by mouth daily (with dinner) for 10 days, THEN 2 tablets (1,000 mg) 2 times daily (with meals) for 350 days. 190 tablet 3    montelukast (SINGULAIR) 10 MG tablet TAKE 1 TABLET BY MOUTH EVERYDAY AT BEDTIME 90 tablet 3    sacubitril-valsartan (ENTRESTO)  MG per tablet Take 1 tablet by mouth 2 times daily 180 tablet 3    spironolactone (ALDACTONE) 50 MG tablet Take 1 tablet (50 mg) by mouth daily 90 tablet 3    tirzepatide (MOUNJARO) 5 MG/0.5ML pen Inject 5 mg Subcutaneous every 7 days 2 mL 3    torsemide (DEMADEX) 20 MG tablet Patient to take 40 mg daily and take an extra 20 mg if weight goes 285 lbs or higher. 180 tablet 11    blood glucose (NO BRAND SPECIFIED) test strip Use to test blood sugar 3-4 times daily or as directed. (Patient not taking: Reported on 9/12/2023) 200 strip 1    Chlorpheniramine-DM (CORICIDIN HBP COUGH/COLD) 4-30 MG TABS Take 1 tablet by mouth as needed (Patient not taking: Reported on  7/3/2023)      Continuous Blood Gluc Sensor (FREESTYLE ANETA 14 DAY SENSOR) MISC 1 Device every 14 days Change sensor as directed every 14 days (Patient not taking: Reported on 9/12/2023) 2 each 6    Continuous Blood Gluc Sensor (FREESTYLE ANETA 2 SENSOR) MISC 1 each every 14 days (Patient not taking: Reported on 9/12/2023) 6 each 11    dexAMETHasone (DECADRON) 1 MG tablet Take 1 tablet (1 mg) by mouth once for 1 dose At 11:00 PM. The next morning after taking the medication go to he lab at 8:00 am. 1 tablet 0    ketoconazole (NIZORAL) 2 % external cream Apply topically 2 times daily For 2 weeks. (Patient not taking: Reported on 2/15/2023) 60 g 1    ondansetron (ZOFRAN) 4 MG tablet Take 1 tablet (4 mg) by mouth every 6 hours as needed for nausea (Patient not taking: Reported on 6/16/2023) 20 tablet 3    triamcinolone (KENALOG) 0.1 % external cream Apply topically 2 times daily For maximum 2 weeks. (Patient not taking: Reported on 2/15/2023) 60 g 1       Review of Systems     8 point review system (Constitutional, HENT, Eyes, Respiratory, Cardiovascular, Gastrointestinal, Genitourinary, Musculoskeletal,Neurological, Psychiatric/Behavioural, Endocrine) is negative or is as per HPI above  Past medical history, past surgical history, social and family history reviewed in epic.        Objective:   /82 (BP Location: Left arm, Patient Position: Sitting, Cuff Size: Adult Large)   Pulse 87   Resp 18   Wt 118.8 kg (262 lb)   LMP  (LMP Unknown)   SpO2 100%   BMI 42.36 kg/m    Constitutional: Pleasant no acute cardiopulmonary distress.   EYES: anicteric, normal extra-ocular movements, no lid lag or retraction, Pupils equal and reactive to light bilaterally.  HEENT: Mouth/Throat: Mucous membrane is moist. Thyroid examination: Difficult to appreciate due to body habitus.  Cardiovascular: RRR, S1, S2 normal.   Pulmonary/Chest: CTAB. No wheezing or rales.   Abdominal: +BS. Non tender to palpation.  Stretch marks: No  significant.  Neurological: Alert and oriented.  No tremor and reflexes are symmetrical bilaterally and within the normal limits. Muscle strength 5/5.   Extremities: No edema.  Psychological: appropriate mood and affect    In House Labs:   Hemoglobin A1C   Date Value Ref Range Status   09/12/2023 6.8 (H) 0.0 - 5.6 % Final     Comment:     Normal <5.7%   Prediabetes 5.7-6.4%    Diabetes 6.5% or higher     Note: Adopted from ADA consensus guidelines.   08/23/2023 6.5 (H) 0.0 - 5.6 % Final     Comment:     Normal <5.7%   Prediabetes 5.7-6.4%    Diabetes 6.5% or higher     Note: Adopted from ADA consensus guidelines.   05/09/2023 7.6 (H) 0.0 - 5.6 % Final     Comment:     Normal <5.7%   Prediabetes 5.7-6.4%    Diabetes 6.5% or higher     Note: Adopted from ADA consensus guidelines.   06/11/2021 10.1 (H) 0 - 5.6 % Final     Comment:     Normal <5.7% Prediabetes 5.7-6.4%  Diabetes 6.5% or higher - adopted from ADA   consensus guidelines.     01/14/2021 9.6 (H) 0 - 5.6 % Final     Comment:     Normal <5.7% Prediabetes 5.7-6.4%  Diabetes 6.5% or higher - adopted from ADA   consensus guidelines.     08/04/2020 11.0 (H) 0 - 5.6 % Final     Comment:     Normal <5.7% Prediabetes 5.7-6.4%  Diabetes 6.5% or higher - adopted from ADA   consensus guidelines.  Results confirmed by repeat test        TSH   Date Value Ref Range Status   02/15/2023 1.01 0.40 - 4.00 mU/L Final   12/30/2019 1.47 0.40 - 4.00 mU/L Final   05/13/2019 0.71 0.40 - 4.00 mU/L Final   05/10/2017 0.84 0.40 - 4.00 mU/L Final   02/17/2016 1.04 0.40 - 4.00 mU/L Final       Creatinine   Date Value Ref Range Status   08/23/2023 1.00 (H) 0.51 - 0.95 mg/dL Final   03/23/2021 1.18 (H) 0.52 - 1.04 mg/dL Final   ]    Recent Labs   Lab Test 09/29/21  0931 01/14/21  1622 05/17/19  1306   CHOL 128  --  147   HDL 42*  --  66   LDL 58 83 64   TRIG 140  --  83       This note has been dictated using voice recognition software.  As a result, there may be errors in the  documentation that have gone undetected.  Please consider this when interpreting information in this documentation.    40 minutes spent by me on the date of the encounter doing chart, DEXcom, counseling on management of type 2 diabetes and morbid obesity, history and exam, documentation and further activities per the note.  More than 50% directly spent face-to-face with the patient.

## 2024-01-17 ENCOUNTER — LAB (OUTPATIENT)
Dept: LAB | Facility: CLINIC | Age: 54
End: 2024-01-17
Payer: COMMERCIAL

## 2024-01-17 DIAGNOSIS — Z13.220 SCREENING FOR HYPERLIPIDEMIA: ICD-10-CM

## 2024-01-17 DIAGNOSIS — E27.9 ADRENAL NODULE (H): ICD-10-CM

## 2024-01-17 DIAGNOSIS — E11.9 TYPE 2 DIABETES MELLITUS (H): ICD-10-CM

## 2024-01-17 PROCEDURE — 80061 LIPID PANEL: CPT

## 2024-01-17 PROCEDURE — 36415 COLL VENOUS BLD VENIPUNCTURE: CPT

## 2024-01-17 PROCEDURE — 82533 TOTAL CORTISOL: CPT

## 2024-01-18 LAB
CHOLEST SERPL-MCNC: 119 MG/DL
CORTIS 8H P 1 MG DEX SERPL-MCNC: 1.7 UG/DL
FASTING STATUS PATIENT QL REPORTED: YES
HDLC SERPL-MCNC: 44 MG/DL
LDLC SERPL CALC-MCNC: 58 MG/DL
NONHDLC SERPL-MCNC: 75 MG/DL
TRIGL SERPL-MCNC: 85 MG/DL

## 2024-02-02 ENCOUNTER — DOCUMENTATION ONLY (OUTPATIENT)
Dept: ENDOCRINOLOGY | Facility: CLINIC | Age: 54
End: 2024-02-02
Payer: COMMERCIAL

## 2024-02-02 NOTE — PROGRESS NOTES
[facility]: Yasemin Hyatt  Assistant requesting last office/visit clinical notes for 1/16/2024, recent labs and list of current medications.     Medical case management nurse working with patient and their health plan. Functioning as a liaison between patient, medical providers, and claim office    Fax: [phone]  Confirmed via Right fax that transmission was successful.     See image above for Timestamp confirmation.     Placed in Misc file      Kelsi Cruz CMA  Adult Endocrinology   [facility]

## 2024-02-17 ENCOUNTER — MYC REFILL (OUTPATIENT)
Dept: CARDIOLOGY | Facility: CLINIC | Age: 54
End: 2024-02-17
Payer: COMMERCIAL

## 2024-02-17 DIAGNOSIS — I50.23 ACUTE ON CHRONIC SYSTOLIC HEART FAILURE (H): ICD-10-CM

## 2024-02-19 RX ORDER — TORSEMIDE 20 MG/1
TABLET ORAL
Qty: 180 TABLET | Refills: 5 | Status: SHIPPED | OUTPATIENT
Start: 2024-02-19 | End: 2024-06-14

## 2024-02-23 DIAGNOSIS — I50.23 ACUTE ON CHRONIC SYSTOLIC HEART FAILURE (H): Primary | ICD-10-CM

## 2024-02-24 ENCOUNTER — MYC REFILL (OUTPATIENT)
Dept: CARDIOLOGY | Facility: CLINIC | Age: 54
End: 2024-02-24
Payer: COMMERCIAL

## 2024-02-24 DIAGNOSIS — I50.23 ACUTE ON CHRONIC SYSTOLIC (CONGESTIVE) HEART FAILURE (H): ICD-10-CM

## 2024-02-26 RX ORDER — HYDRALAZINE HYDROCHLORIDE 10 MG/1
10 TABLET, FILM COATED ORAL 2 TIMES DAILY
Qty: 60 TABLET | Refills: 0 | Status: SHIPPED | OUTPATIENT
Start: 2024-02-26 | End: 2024-02-28

## 2024-02-28 ENCOUNTER — LAB (OUTPATIENT)
Dept: LAB | Facility: CLINIC | Age: 54
End: 2024-02-28
Payer: COMMERCIAL

## 2024-02-28 ENCOUNTER — OFFICE VISIT (OUTPATIENT)
Dept: CARDIOLOGY | Facility: CLINIC | Age: 54
End: 2024-02-28
Payer: COMMERCIAL

## 2024-02-28 VITALS
SYSTOLIC BLOOD PRESSURE: 113 MMHG | BODY MASS INDEX: 43.23 KG/M2 | DIASTOLIC BLOOD PRESSURE: 75 MMHG | WEIGHT: 269 LBS | OXYGEN SATURATION: 100 % | HEART RATE: 84 BPM | HEIGHT: 66 IN

## 2024-02-28 DIAGNOSIS — I50.22 CHRONIC SYSTOLIC CONGESTIVE HEART FAILURE (H): Primary | ICD-10-CM

## 2024-02-28 DIAGNOSIS — I50.23 ACUTE ON CHRONIC SYSTOLIC HEART FAILURE (H): ICD-10-CM

## 2024-02-28 DIAGNOSIS — I10 ESSENTIAL HYPERTENSION WITH GOAL BLOOD PRESSURE LESS THAN 140/90: ICD-10-CM

## 2024-02-28 DIAGNOSIS — Z79.4 TYPE 2 DIABETES MELLITUS WITH HYPEROSMOLARITY WITHOUT COMA, WITH LONG-TERM CURRENT USE OF INSULIN (H): ICD-10-CM

## 2024-02-28 DIAGNOSIS — I50.23 ACUTE ON CHRONIC SYSTOLIC (CONGESTIVE) HEART FAILURE (H): ICD-10-CM

## 2024-02-28 DIAGNOSIS — I42.8 NONISCHEMIC CARDIOMYOPATHY (H): ICD-10-CM

## 2024-02-28 DIAGNOSIS — R00.2 PALPITATIONS: ICD-10-CM

## 2024-02-28 DIAGNOSIS — G47.33 OSA (OBSTRUCTIVE SLEEP APNEA): ICD-10-CM

## 2024-02-28 DIAGNOSIS — J45.20 INTERMITTENT ASTHMA, UNCOMPLICATED: ICD-10-CM

## 2024-02-28 DIAGNOSIS — E11.00 TYPE 2 DIABETES MELLITUS WITH HYPEROSMOLARITY WITHOUT COMA, WITH LONG-TERM CURRENT USE OF INSULIN (H): ICD-10-CM

## 2024-02-28 LAB
ANION GAP SERPL CALCULATED.3IONS-SCNC: 11 MMOL/L (ref 7–15)
BUN SERPL-MCNC: 12.5 MG/DL (ref 6–20)
CALCIUM SERPL-MCNC: 9.6 MG/DL (ref 8.6–10)
CHLORIDE SERPL-SCNC: 102 MMOL/L (ref 98–107)
CREAT SERPL-MCNC: 1.07 MG/DL (ref 0.51–0.95)
DEPRECATED HCO3 PLAS-SCNC: 26 MMOL/L (ref 22–29)
EGFRCR SERPLBLD CKD-EPI 2021: 61 ML/MIN/1.73M2
GLUCOSE SERPL-MCNC: 162 MG/DL (ref 70–99)
POTASSIUM SERPL-SCNC: 4.5 MMOL/L (ref 3.4–5.3)
SODIUM SERPL-SCNC: 139 MMOL/L (ref 135–145)

## 2024-02-28 PROCEDURE — 80048 BASIC METABOLIC PNL TOTAL CA: CPT

## 2024-02-28 PROCEDURE — 99214 OFFICE O/P EST MOD 30 MIN: CPT | Performed by: NURSE PRACTITIONER

## 2024-02-28 PROCEDURE — 36415 COLL VENOUS BLD VENIPUNCTURE: CPT

## 2024-02-28 RX ORDER — HYDRALAZINE HYDROCHLORIDE 10 MG/1
10 TABLET, FILM COATED ORAL 2 TIMES DAILY
Qty: 180 TABLET | Refills: 3 | Status: SHIPPED | OUTPATIENT
Start: 2024-02-28 | End: 2024-06-14

## 2024-02-28 NOTE — PROGRESS NOTES
SANJAY  Bettina Montes is a 54 year old female with a past medical history of HFrEF (30-35%) secondary to NICM (felt to be idiopathic), HTN, obesity, DM II, cervical spine fusion, and hyperlipidemia who presents for routine follow-up. She was last seen by Dr. Chinchilla on 6/5/20.    Bettina had her last CORE visit in February.   She has been seeing the [facility]TM for DM management.  She had a decrease in her metformin a while back. She stopped Trulicity and Wegovy.  Now she is taking Mounjaro.    She says her BS have improved .  No SOB at rest and no CASILLAS-   She says there is now no swelling in her legs or abdomen. Home weight 262 lbs. Her appetite has been fine. She denies early satiety. Continues to self titrate her afternoon diuretic doses. She does take 40 mg daily and 60 mg only if she see's more swelling.     She denies any chest pain, lightheadedness, dizziness, or near syncopal episodes.     Pt is taking hydralazine 50mg BID, coreg 25 BID, entresto 97/103 BID, spironolactone 50 qday and torsemide 40mg qday.       PMH  Past Medical History:   Diagnosis Date    Abnormal Pap smear of cervix 09/10/2019    See problem list    Adrenal gland anomaly     CHF (congestive heart failure) (H)     Fatty liver     Gastroesophageal reflux disease     Hyperlipidemia     Hypertension     Mild persistent asthma     NICM (nonischemic cardiomyopathy) (H)     Obesity     WILLARD (obstructive sleep apnea) 1/9/2015    Type 2 diabetes mellitus (H)        Past Surgical History:   Procedure Laterality Date    COLONOSCOPY      COLONOSCOPY N/A 10/24/2022    Procedure: COLONOSCOPY, FLEXIBLE, WITH LESION REMOVAL USING SNARE;  Surgeon: Larissa Eubanks MD;  Location: [facility] OR    COLONOSCOPY WITH CO2 INSUFFLATION N/A 10/24/2022    Procedure: COLONOSCOPY, WITH CO2 INSUFFLATION;  Surgeon: Larissa Eubanks MD;  Location: [facility] OR    DISCECTOMY, FUSION CERVICAL ANTERIOR TWO LEVELS, COMBINED N/A 4/5/2019    Procedure: C4-6 anterior cervical discectomy and  fusion;  Surgeon: Hollis Hurtado MD;  Location: [facility] OR    TUBAL LIGATION         Family History   Problem Relation Age of Onset    Diabetes Mother     Hypertension Mother     Hyperlipidemia Mother     Heart Failure Mother     Diabetes Father     Cancer Paternal Grandmother         ?       Social History     Socioeconomic History    Marital status: Single     Spouse name: None    Number of children: 1    Years of education: None    Highest education level: None   Occupational History    Occupation: CNA     Employer: OTHER   Social Needs    Financial resource strain: None    Food insecurity:     Worry: None     Inability: None    Transportation needs:     Medical: None     Non-medical: None   Tobacco Use    Smoking status: Former Smoker     Packs/day: 1.00     Years: 30.00     Pack years: 30.00     Types: Cigarettes     Last attempt to quit: 2013     Years since quittin.8    Smokeless tobacco: Former User    Tobacco comment:     Substance and Sexual Activity    Alcohol use: Yes     Alcohol/week: 0.0 standard drinks     Comment: 1    Drug use: No    Sexual activity: Yes     Partners: Male     Birth control/protection: Surgical   Lifestyle    Physical activity:     Days per week: None     Minutes per session: None    Stress: None   Relationships    Social connections:     Talks on phone: None     Gets together: None     Attends Mandaeism service: None     Active member of club or organization: None     Attends meetings of clubs or organizations: None     Relationship status: None    Intimate partner violence:     Fear of current or ex partner: None     Emotionally abused: None     Physically abused: None     Forced sexual activity: None   Other Topics Concern    Parent/sibling w/ CABG, MI or angioplasty before 65F 55M? No   Social History Narrative    None       ALLERGIES  Allergies   Allergen Reactions    Amlodipine Swelling     Edema on 5mg throat    Lisinopril      Swelling in throat,  face  angioedema    Naprosyn [Naproxen]      Swelling, diff breathing       MEDICATIONS acetaminophen (TYLENOL) 500 MG tablet, Take 500-1,000 mg by mouth every 6 hours as needed for mild pain  aspirin 81 MG EC tablet, Take 1 tablet (81 mg) by mouth daily  atorvastatin (LIPITOR) 40 MG tablet, Take 1 tablet (40 mg) by mouth daily  blood glucose (NO BRAND SPECIFIED) test strip, Use to test blood sugar 3-4 times daily or as directed. (Patient not taking: Reported on 9/12/2023)  carvedilol (COREG) 25 MG tablet, Take 1 tablet (25 mg) by mouth 2 times daily (with meals)  Chlorpheniramine-DM (CORICIDIN HBP COUGH/COLD) 4-30 MG TABS, Take 1 tablet by mouth as needed (Patient not taking: Reported on 7/3/2023)  cholecalciferol 50 MCG (2000 UT) CAPS, Take 2,000 Units by mouth daily   Continuous Blood Gluc Sensor (DEXCOM G6 SENSOR) MISC, Change every 10 days.  Continuous Blood Gluc Sensor (FREESTYLE ANETA 14 DAY SENSOR) MISC, 1 Device every 14 days Change sensor as directed every 14 days (Patient not taking: Reported on 9/12/2023)  Continuous Blood Gluc Sensor (FREESTYLE ANETA 2 SENSOR) MISC, 1 each every 14 days (Patient not taking: Reported on 9/12/2023)  Continuous Blood Gluc Transmit (DEXCOM G6 TRANSMITTER) MISC, Change every 3 months.  dexAMETHasone (DECADRON) 1 MG tablet, Take 1 tablet (1 mg) by mouth once for 1 dose At 11:00 PM. The next morning after taking the medication go to he lab at 8:00 am.  fluticasone-salmeterol (WIXELA INHUB) 250-50 MCG/ACT inhaler, Inhale 1 puff into the lungs every 12 hours  hydrALAZINE (APRESOLINE) 10 MG tablet, Take 1 tablet (10 mg) by mouth 2 times daily  insulin glargine U-300 (TOUJEO MAX SOLOSTAR) 300 UNIT/ML (2 units dial) pen, Inject 85 Units Subcutaneous daily  insulin pen needle (ULTICARE MICRO) 32G X 4 MM miscellaneous, Use 4 pen needles daily or as directed.  ketoconazole (NIZORAL) 2 % external cream, Apply topically 2 times daily For 2 weeks. (Patient not taking: Reported on  2/15/2023)  levalbuterol (XOPENEX HFA) 45 MCG/ACT Inhaler, Inhale 2 puffs into the lungs every 4 hours as needed for shortness of breath / dyspnea or wheezing  magnesium oxide (MAG-OX) 400 (241.3 Mg) MG tablet, Take 1 tablet (400 mg) by mouth daily  metFORMIN (GLUCOPHAGE XR) 500 MG 24 hr tablet, Take 1 tablet (500 mg) by mouth 2 times daily (with meals)  montelukast (SINGULAIR) 10 MG tablet, TAKE 1 TABLET BY MOUTH EVERYDAY AT BEDTIME  ondansetron (ZOFRAN) 4 MG tablet, Take 1 tablet (4 mg) by mouth every 6 hours as needed for nausea (Patient not taking: Reported on 6/16/2023)  sacubitril-valsartan (ENTRESTO)  MG per tablet, Take 1 tablet by mouth 2 times daily  spironolactone (ALDACTONE) 50 MG tablet, Take 1 tablet (50 mg) by mouth daily  [START ON 3/12/2024] tirzepatide (MOUNJARO) 10 MG/0.5ML pen, Inject 10 mg Subcutaneous every 7 days  tirzepatide (MOUNJARO) 5 MG/0.5ML pen, Inject 5 mg Subcutaneous every 7 days  tirzepatide (MOUNJARO) 7.5 MG/0.5ML pen, Inject 7.5 mg Subcutaneous every 7 days  torsemide (DEMADEX) 20 MG tablet, Patient to take 40 mg daily and take an extra 20 mg if weight goes 285 lbs or higher.  triamcinolone (KENALOG) 0.1 % external cream, Apply topically 2 times daily For maximum 2 weeks. (Patient not taking: Reported on 2/15/2023)    sodium chloride (PF) 0.9% PF flush 10 mL            ROS:   Constitutional: No fever, chills, or sweats.  ENT: No visual disturbance, ear ache, epistaxis, sore throat.   Allergies/Immunologic: Negative  Respiratory: No cough, hemoptysis.   Cardiovascular: As per HPI.   GI: As per HPI.   : No urinary frequency, dysuria, or hematuria.   Integument: Negative.   Psychiatric: Negative.   Neuro: Negative.   Endocrinology: negative   Musculoskeletal: negative    EXAM:   General: appears comfortable, alert and articulate  Head: normocephalic, atraumatic  Eyes: anicteric sclera, EOMI  Neck: Supple, no adenopathy  Orophyarynx: moist mucosa, no cyanosis  Heart: regular,  S1/S2, no murmur, gallop, rub, estimated JVP not detected at 90 degrees  Lungs: Respirations even and unlabored, lungs clear, no rales or wheezing.  Lungs sounds decreased bilaterally in the bases.  Abdomen:  distended and firmer, non-tender, bowel sounds present, no hepatomegaly  Extremities: no clubbing, cyanosis. Has trace edema  Neurological: normal speech and affect, no gross motor deficits  Skin:  Warm and dry.      LABS  Last Comprehensive Metabolic Panel:  Sodium   Date Value Ref Range Status   08/23/2023 142 136 - 145 mmol/L Final   03/23/2021 133 133 - 144 mmol/L Final     Potassium   Date Value Ref Range Status   09/12/2023 3.7 3.4 - 5.3 mmol/L Final   02/15/2023 3.5 3.4 - 5.3 mmol/L Final   03/23/2021 3.8 3.4 - 5.3 mmol/L Final     Chloride   Date Value Ref Range Status   08/23/2023 105 98 - 107 mmol/L Final   02/15/2023 100 94 - 109 mmol/L Final   03/23/2021 95 94 - 109 mmol/L Final     Carbon Dioxide   Date Value Ref Range Status   03/23/2021 35 (H) 20 - 32 mmol/L Final     Carbon Dioxide (CO2)   Date Value Ref Range Status   08/23/2023 28 22 - 29 mmol/L Final   02/15/2023 29 20 - 32 mmol/L Final     Anion Gap   Date Value Ref Range Status   08/23/2023 9 7 - 15 mmol/L Final   02/15/2023 7 3 - 14 mmol/L Final   03/23/2021 3 3 - 14 mmol/L Final     Glucose   Date Value Ref Range Status   08/23/2023 142 (H) 70 - 99 mg/dL Final   02/15/2023 282 (H) 70 - 99 mg/dL Final   03/23/2021 260 (H) 70 - 99 mg/dL Final     Urea Nitrogen   Date Value Ref Range Status   08/23/2023 8.4 6.0 - 20.0 mg/dL Final   02/15/2023 12 7 - 30 mg/dL Final   03/23/2021 24 7 - 30 mg/dL Final     Creatinine   Date Value Ref Range Status   08/23/2023 1.00 (H) 0.51 - 0.95 mg/dL Final   03/23/2021 1.18 (H) 0.52 - 1.04 mg/dL Final     GFR Estimate   Date Value Ref Range Status   08/23/2023 67 >60 mL/min/1.73m2 Final   03/23/2021 53 (L) >60 mL/min/[1.73_m2] Final     Comment:     Non  GFR Calc  Starting 12/18/2018, serum  creatinine based estimated GFR (eGFR) will be   calculated using the Chronic Kidney Disease Epidemiology Collaboration   (CKD-EPI) equation.       Calcium   Date Value Ref Range Status   08/23/2023 9.4 8.6 - 10.0 mg/dL Final   03/23/2021 9.9 8.5 - 10.1 mg/dL Final       MOST RECENT ECHOCARDIOGRAM 10/25/19:  Interpretation Summary  Technically difficult study.  Left ventricular size is normal. Mildly (EF 40-45%, traced 45%) reduced left ventricular function is present.  Right ventricular function, chamber size, wall motion, and thickness are normal. This study was compared with the study from 3/21/18: The left ventricular function has improved    ASSESSMENT AND PLAN  Bettina Montes is a 53 year old female with NICM who appears euvolemic today. She has been at [facility] for her HF.  Unfortunately she couldn't tolerate the dapaglifozin. She has been feeling well and is successfully self -titrating her diuretic.  She should be set up with a HF MD ( Dr. Chinchilla patient ). Echo ordered can be scheduled before visit with MD.     1. Chronic systolic heart failure/HFrEF (EF 40-45%) secondary to nonischemic cardiomyopathy  NYHA Symptom Class IIIB  Stage C  Primary Cardiologist: Dr Chinchilla; Last seen 6/17/22  ACE-I/ARB/ARNi:  Entresto  mg- titration complete  BB yes, Carvedilol 25 mg twice daily- titration complete  Aldosterone antagonist yes, Spironolactone 50 mg daily.   SCD prophylaxis EF > 35%   Fluid status euvolemic  Cardiac Rehab: Completed  Sleep Apnea Evaluation: Documented sleep apnea.    Remote monitoring: Mychart active  Antiplatelet: none.   Anticoagulation: None.  SGLT II- unable to tolerate the dapaglifozin- frequent yeast infections.     # Obesity: BMI 47.29.  She is working on weight loss.  Continue to reassess at subsequent visits.     #  HTN:   continue Entresto   Continue to monitor BP readings at home.      #. Shoulder surgery. - October - 2021    4.  Follow-up:  Labs pending  CORE in 6 months              Heide GU NP-C

## 2024-02-28 NOTE — PROGRESS NOTES
[facility] Metrics    Blood Pressure   BP < 140/90 Yes    On Aspirin  Yes    On Statin  Yes    Tobacco use  No

## 2024-02-28 NOTE — PATIENT INSTRUCTIONS
Take your medicines every day, as directed    Changes made today:  None today      Monitor Your Weight and Symptoms    Contact us if you:    Gain 2 pounds in one day or 5 pounds in one week  Feel more short of breath  Notice more leg swelling  Feel lightheadeded   Change your lifestyle    Limit Salt or Sodium:  2000 mg  Limit Fluids:  2000 mL or approximately 64 ounces  Eat a Heart Healthy Diet  Low in saturated fats  Stay Active:  Aim to move at least 150 minutes every  week         To Contact us    During Business Hours:  [phone], option # 1      After hours, weekends or holidays:   [phone], Option #4  Ask to speak to the On-Call Cardiologist. Inform them you are a CORE/heart failure patient at the [facility].     Use [employer] allows you to communicate directly with your heart team through secure messaging.  [employer] can be accessed any time on your phone, computer, or tablet.  If you need assistance, we'd be happy to help!         Keep your Heart Appointments:    CORE in 6 months   Echo and Dr Bailey in June     Please consider attending our virtual support group which is held monthly. Please reach out to Jb at [phone] for more information if you are interested in attending.     2024 dates:    Monday, February 5th , 1-2pm     Monday, March 4th , 1-2pm     Monday, April 1st, 1-2pm     Monday, May 6th, 1-2pm     Monday, June 3rd, 1-2pm     Monday, July 1st, 1-2pm     Monday, August 5th, 1-2pm     Monday, September 9th, 1-2pm     Monday, October 7th, 1-2pm     Monday, November 4th, 1-2pm     Monday, December 2nd, 1-2pm

## 2024-02-28 NOTE — NURSING NOTE
"Chief Complaint   Patient presents with    Follow Up     CORE       Initial /75 (BP Location: Right arm, Patient Position: Chair, Cuff Size: Adult Large)   Pulse 84   Ht 1.676 m (5' 6\")   Wt 122 kg (269 lb)   LMP  (LMP Unknown)   SpO2 100%   BMI 43.42 kg/m   Estimated body mass index is 43.42 kg/m  as calculated from the following:    Height as of this encounter: 1.676 m (5' 6\").    Weight as of this encounter: 122 kg (269 lb)..  BP completed using cuff size: deisi MADDEN CNA  "

## 2024-03-29 NOTE — TELEPHONE ENCOUNTER
30 days sent.   Cardiovascular and Thoracic Surgery  [facility], [address]  Phone Encounter    Connor Ocasio     Urgent or Routine: Routine    Confirm correct patient?: Yes    Surgeon / CHILO Treating: Homer Tyler M.D.    Caller: Patient    Calling in regards to: Appointment    Caller Note: Pts. Calling to schedule an appt. To see Dr. Tyler.    Callback #: [phone]     Echo UnderwoodRINA

## 2024-03-30 ENCOUNTER — MYC REFILL (OUTPATIENT)
Dept: CARDIOLOGY | Facility: CLINIC | Age: 54
End: 2024-03-30
Payer: COMMERCIAL

## 2024-03-30 DIAGNOSIS — I10 ESSENTIAL HYPERTENSION WITH GOAL BLOOD PRESSURE LESS THAN 140/90: ICD-10-CM

## 2024-03-30 DIAGNOSIS — I50.22 CHRONIC SYSTOLIC CONGESTIVE HEART FAILURE (H): ICD-10-CM

## 2024-04-01 RX ORDER — SPIRONOLACTONE 50 MG/1
50 TABLET, FILM COATED ORAL DAILY
Qty: 90 TABLET | Refills: 3 | Status: SHIPPED | OUTPATIENT
Start: 2024-04-01

## 2024-04-01 NOTE — TELEPHONE ENCOUNTER
Last Clinic Visit: 2/28/2024 [facility]    spironolactone (ALDACTONE) 50 MG tablet: passed protocol  - refills sent

## 2024-04-20 ENCOUNTER — MYC REFILL (OUTPATIENT)
Dept: FAMILY MEDICINE | Facility: CLINIC | Age: 54
End: 2024-04-20
Payer: COMMERCIAL

## 2024-04-20 DIAGNOSIS — Z79.4 TYPE 2 DIABETES MELLITUS WITH HYPERGLYCEMIA, WITH LONG-TERM CURRENT USE OF INSULIN (H): ICD-10-CM

## 2024-04-20 DIAGNOSIS — E11.65 TYPE 2 DIABETES MELLITUS WITH HYPERGLYCEMIA, WITH LONG-TERM CURRENT USE OF INSULIN (H): ICD-10-CM

## 2024-04-22 RX ORDER — PROCHLORPERAZINE 25 MG/1
SUPPOSITORY RECTAL
Qty: 9 EACH | Refills: 3 | OUTPATIENT
Start: 2024-04-22

## 2024-04-22 RX ORDER — PROCHLORPERAZINE 25 MG/1
SUPPOSITORY RECTAL
Qty: 1 EACH | Refills: 3 | Status: SHIPPED | OUTPATIENT
Start: 2024-04-22

## 2024-04-22 RX ORDER — PROCHLORPERAZINE 25 MG/1
SUPPOSITORY RECTAL
Qty: 9 EACH | Refills: 3 | Status: SHIPPED | OUTPATIENT
Start: 2024-04-22

## 2024-05-18 ENCOUNTER — MYC REFILL (OUTPATIENT)
Dept: ENDOCRINOLOGY | Facility: CLINIC | Age: 54
End: 2024-05-18
Payer: COMMERCIAL

## 2024-05-18 DIAGNOSIS — E11.65 TYPE 2 DIABETES MELLITUS WITH HYPERGLYCEMIA, WITH LONG-TERM CURRENT USE OF INSULIN (H): ICD-10-CM

## 2024-05-18 DIAGNOSIS — Z79.4 TYPE 2 DIABETES MELLITUS WITH HYPERGLYCEMIA, WITH LONG-TERM CURRENT USE OF INSULIN (H): ICD-10-CM

## 2024-05-20 RX ORDER — METFORMIN HCL 500 MG
500 TABLET, EXTENDED RELEASE 24 HR ORAL 2 TIMES DAILY WITH MEALS
Qty: 180 TABLET | Refills: 1 | Status: SHIPPED | OUTPATIENT
Start: 2024-05-20

## 2024-05-21 RX ORDER — METFORMIN HCL 500 MG
TABLET, EXTENDED RELEASE 24 HR ORAL
Qty: 330 TABLET | Refills: 2 | OUTPATIENT
Start: 2024-05-21

## 2024-05-22 NOTE — TELEPHONE ENCOUNTER
Metformin refill addressed by Endocrinology team: DUPLICATE request    Future Office Visit:  07/02/2024 with Dr. Akbar    Requested Prescriptions   Pending Prescriptions Disp Refills    metFORMIN (GLUCOPHAGE XR) 500 MG 24 hr tablet [Pharmacy Med Name: METFORMIN HCL  MG TABLET] 330 tablet 2     Sig: TAKE 1 TABLET BY MOUTH DAILY WITH DINNER FOR 10 DAYS, THEN 2 TABS TWICE DAILY WITH MEALS       Biguanide Agents Passed - 5/18/2024 11:11 AM        Passed - Patient is age 10 or older        Passed - Patient has documented A1c within the specified period of time.     If HgbA1C is 8 or greater, it needs to be on file within the past 3 months.  If less than 8, must be on file within the past 6 months.     Recent Labs   Lab Test 01/16/24  1128 09/12/23  1636   A1C  --  6.8*   HEMOGLOBINA1 7.6*  --              Passed - Patient does NOT have a diagnosis of CHF.        Passed - Medication is active on med list        Passed - Medication indicated for associated diagnosis     Medication is associated with one or more of the following diagnoses:     Gestational diabetes mellitus     Hyperinsulinar obesity     Hypersecretion of ovarian androgens    Non-alcoholic fatty liver    Polycystic ovarian syndrome               Pre-diabetes (DM 2 prevention)    Type 2 diabetes mellitus     Weight gain, antipsychotic therapy-induced             Passed - Has GFR on file in past 12 months and most recent value is normal        Passed - Recent (6 mo) or future (90 days) visit within the authorizing provider's specialty     The patient must have completed an in-person or virtual visit within the past 6 months or has a future visit scheduled within the next 90 days with the authorizing provider s specialty.  Urgent care and e-visits do not quality as an office visit for this protocol.          Passed - Patient is not pregnant        Passed - Patient has not had a positive pregnancy test within the past 12 mos.              Duplicate  refill request.    Ritu Quintero RN on 5/21/2024 at 8:56 PM

## 2024-06-02 ENCOUNTER — HEALTH MAINTENANCE LETTER (OUTPATIENT)
Age: 54
End: 2024-06-02

## 2024-06-06 DIAGNOSIS — I50.22 CHRONIC SYSTOLIC CONGESTIVE HEART FAILURE (H): ICD-10-CM

## 2024-06-06 DIAGNOSIS — I10 ESSENTIAL HYPERTENSION WITH GOAL BLOOD PRESSURE LESS THAN 140/90: Primary | ICD-10-CM

## 2024-06-14 ENCOUNTER — OFFICE VISIT (OUTPATIENT)
Dept: CARDIOLOGY | Facility: CLINIC | Age: 54
End: 2024-06-14
Payer: COMMERCIAL

## 2024-06-14 ENCOUNTER — APPOINTMENT (OUTPATIENT)
Dept: LAB | Facility: CLINIC | Age: 54
End: 2024-06-14
Payer: COMMERCIAL

## 2024-06-14 ENCOUNTER — ANCILLARY PROCEDURE (OUTPATIENT)
Dept: CARDIOLOGY | Facility: CLINIC | Age: 54
End: 2024-06-14
Attending: INTERNAL MEDICINE
Payer: COMMERCIAL

## 2024-06-14 VITALS
OXYGEN SATURATION: 99 % | BODY MASS INDEX: 44.23 KG/M2 | DIASTOLIC BLOOD PRESSURE: 73 MMHG | SYSTOLIC BLOOD PRESSURE: 110 MMHG | WEIGHT: 275.2 LBS | HEART RATE: 87 BPM | HEIGHT: 66 IN

## 2024-06-14 DIAGNOSIS — I10 BENIGN ESSENTIAL HYPERTENSION: ICD-10-CM

## 2024-06-14 DIAGNOSIS — I50.42 CHRONIC COMBINED SYSTOLIC (CONGESTIVE) AND DIASTOLIC (CONGESTIVE) HEART FAILURE (H): Primary | ICD-10-CM

## 2024-06-14 DIAGNOSIS — I50.22 CHRONIC SYSTOLIC CONGESTIVE HEART FAILURE (H): ICD-10-CM

## 2024-06-14 DIAGNOSIS — I50.23 ACUTE ON CHRONIC SYSTOLIC (CONGESTIVE) HEART FAILURE (H): ICD-10-CM

## 2024-06-14 DIAGNOSIS — I42.8 NICM (NONISCHEMIC CARDIOMYOPATHY) (H): ICD-10-CM

## 2024-06-14 DIAGNOSIS — I50.23 ACUTE ON CHRONIC SYSTOLIC HEART FAILURE (H): ICD-10-CM

## 2024-06-14 LAB
ANION GAP SERPL CALCULATED.3IONS-SCNC: 10 MMOL/L (ref 7–15)
BUN SERPL-MCNC: 18.4 MG/DL (ref 6–20)
CALCIUM SERPL-MCNC: 10.1 MG/DL (ref 8.6–10)
CHLORIDE SERPL-SCNC: 100 MMOL/L (ref 98–107)
CREAT SERPL-MCNC: 1.18 MG/DL (ref 0.51–0.95)
DEPRECATED HCO3 PLAS-SCNC: 28 MMOL/L (ref 22–29)
EGFRCR SERPLBLD CKD-EPI 2021: 54 ML/MIN/1.73M2
GLUCOSE SERPL-MCNC: 99 MG/DL (ref 70–99)
LVEF ECHO: NORMAL
MAGNESIUM SERPL-MCNC: 1.9 MG/DL (ref 1.7–2.3)
NT-PROBNP SERPL-MCNC: 137 PG/ML (ref 0–900)
POTASSIUM SERPL-SCNC: 4.3 MMOL/L (ref 3.4–5.3)
SODIUM SERPL-SCNC: 138 MMOL/L (ref 135–145)

## 2024-06-14 PROCEDURE — 99215 OFFICE O/P EST HI 40 MIN: CPT | Performed by: INTERNAL MEDICINE

## 2024-06-14 PROCEDURE — 83880 ASSAY OF NATRIURETIC PEPTIDE: CPT

## 2024-06-14 PROCEDURE — 99417 PROLNG OP E/M EACH 15 MIN: CPT | Performed by: INTERNAL MEDICINE

## 2024-06-14 PROCEDURE — 83735 ASSAY OF MAGNESIUM: CPT

## 2024-06-14 PROCEDURE — 36415 COLL VENOUS BLD VENIPUNCTURE: CPT

## 2024-06-14 PROCEDURE — 93306 TTE W/DOPPLER COMPLETE: CPT | Performed by: INTERNAL MEDICINE

## 2024-06-14 PROCEDURE — 80048 BASIC METABOLIC PNL TOTAL CA: CPT

## 2024-06-14 RX ORDER — TORSEMIDE 20 MG/1
TABLET ORAL
Qty: 270 TABLET | Refills: 3 | Status: SHIPPED | OUTPATIENT
Start: 2024-06-14

## 2024-06-14 RX ORDER — HYDRALAZINE HYDROCHLORIDE 10 MG/1
10 TABLET, FILM COATED ORAL 3 TIMES DAILY
Qty: 270 TABLET | Refills: 3 | Status: SHIPPED | OUTPATIENT
Start: 2024-06-14

## 2024-06-14 RX ADMIN — Medication 5 ML: at 14:00

## 2024-06-14 ASSESSMENT — PAIN SCALES - GENERAL: PAINLEVEL: NO PAIN (0)

## 2024-06-14 NOTE — PATIENT INSTRUCTIONS
Cardiology Providers you saw during your visit:  Dr. Tonny Bailey     Medication changes:  1-increase your hydralazine to 10 mg three times daily   2- Try alternative sglt2i- which also may cause yeast infection. Will start with 5 mg jardiance- can increase at CORE in Aug if tolerate well        Follow up:  1- Genetics referral  2- CORE in August  3- Follow up with Dr Bailey in 4 months  4- Please take your blood pressure at home       Please call if you have:  1. Weight gain of more than 2 pounds in a day or 5 pounds in a week  2. Increased shortness of breath, swelling or bloating  3. Dizziness, lightheadedness   4. Any questions or concerns.      Heart Failure Support Group  Virtual meetings will continue in 2024 Please reach out if you would like to attend and we can get you the information you need to log in.     2024 dates:    Monday, May 6th, 1-2pm     Monday, June 3rd, 1-2pm     Monday, July 1st, 1-2pm     Monday, August 5th, 1-2pm     Monday, September 9th, 1-2pm     Monday, October 7th, 1-2pm     Monday, November 4th, 1-2pm     Monday, December 2nd, 1-2pm     Follow the American Heart Association Diet and Lifestyle recommendations:  Limit saturated fat, trans fat, sodium, red meat, sweets and sugar-sweetened beverages. If you choose to eat red meat, compare labels and select the leanest cuts available.  Aim for at least 150 minutes of moderate physical activity or 75 minutes of vigorous physical activity - or an equal combination of both - each week.     During business hours: [phone], press option # 1 to schedule an appointment or send a message to your care team     After hours, weekends or holidays: On Call Cardiologist- [phone]   option #4 and ask to speak to the on-call Cardiologist. Inform them you are a CORE/heart failure patient at the [facility].     CONCETTA Oneal RN Anna, RN

## 2024-06-14 NOTE — PROGRESS NOTES
[facility]  Advanced Heart Failure Clinic    Patient Name: Bettina Montes   : 1970   Date of Visit: 2024    Primary Care Physician: Prabhu Robbins MD, MD     Referring Physician: Dr. Robbins   Reason for Visit: Cardiomyopathy     Dear Dr. Robbins,     I had the pleasure of seeing Bettina Montes today in the [facility] Advanced Heart Failure Clinic. As you know Bettina Montes is a pleasant 54 year old year old female, who presents today for further evaluation of cardiomyopathy.      Bettina has a history of HFrEF, nonischemic cardiomyopathy, hypertension, obesity with BMI 44, diabetes type 2, hyperlipidemia, probable WILLARD (she reports two previous sleep studies but never heard back and was not prescribed CPAP).  She is a former patient of Dr. Chinchilla and is also followed in the [facility] and is here to establish heart failure care with me.    Today she reports feeling okay. She has more shortness of breath with activity and on lying down over the past 6 weeks and self increased torsemide to 60 mg once daily and on some afternoons has taken additional 20 mg if still short of breath, about once per week. This has helped her symptoms significantly. She continues to have mild leg swelling, weight gain. She has occasional palpitations (racing heart). She denies chest pain, lightheadedness, syncope.  Compliant with medications and notes no problems taking them.    She continues to work full time, in a business office, mostly sedentary. She is not exercising.     She has one daughter who is currently 35 years old and this pregnancy was complicated by gestational diabetes.     Home BPs - not checking  Home HRs - not checking  Home weights - gained 5 lbs in last few months    ROS:  A complete 10-point ROS was negative except as above.    PAST MEDICAL HISTORY:  Past Medical History:   Diagnosis Date    Abnormal Pap smear of cervix 09/10/2019    See problem list    Adrenal gland anomaly     CHF  (congestive heart failure) (H)     Fatty liver     Gastroesophageal reflux disease     Hyperlipidemia     Hypertension     Mild persistent asthma     NICM (nonischemic cardiomyopathy) (H)     Obesity     WILLARD (obstructive sleep apnea) 2015    Type 2 diabetes mellitus (H)        PAST SURGICAL HISTORY:  Past Surgical History:   Procedure Laterality Date    COLONOSCOPY      COLONOSCOPY N/A 10/24/2022    Procedure: COLONOSCOPY, FLEXIBLE, WITH LESION REMOVAL USING SNARE;  Surgeon: Larissa Eubanks MD;  Location: [facility] OR    COLONOSCOPY WITH CO2 INSUFFLATION N/A 10/24/2022    Procedure: COLONOSCOPY, WITH CO2 INSUFFLATION;  Surgeon: Larissa Eubanks MD;  Location: [facility] OR    DISCECTOMY, FUSION CERVICAL ANTERIOR TWO LEVELS, COMBINED N/A 2019    Procedure: C4-6 anterior cervical discectomy and fusion;  Surgeon: Hollis Hurtado MD;  Location: [facility] OR    TUBAL LIGATION         FAMILY HISTORY:  Family History   Problem Relation Age of Onset    Diabetes Mother     Hypertension Mother     Hyperlipidemia Mother     Heart Failure Mother     Colon Cancer Mother     Diabetes Father     Cancer Paternal Grandmother         ?   Father - hypertension    SOCIAL HISTORY:  Social History     Socioeconomic History    Marital status: Legally      Spouse name: None    Number of children: 1    Years of education: None    Highest education level: None   Occupational History    Occupation: CNA     Employer: OTHER   Tobacco Use    Smoking status: Former     Current packs/day: 0.00     Average packs/day: 1 pack/day for 30.0 years (30.0 ttl pk-yrs)     Types: Cigarettes     Start date: 1983     Quit date: 2013     Years since quittin.4    Smokeless tobacco: Former    Tobacco comments:         Vaping Use    Vaping status: Never Used   Substance and Sexual Activity    Alcohol use: Not Currently     Comment: four drinks a month    Drug use: No    Sexual activity: Yes     Partners: Male     Birth  control/protection: Surgical   Other Topics Concern    Parent/sibling w/ CABG, MI or angioplasty before 65F 55M? No     Social Determinants of Health      [facility]    Financial Resource Strain    [facility]    Social Connections       MEDICATIONS:  Current Outpatient Medications   Medication Sig Dispense Refill    acetaminophen (TYLENOL) 500 MG tablet Take 500-1,000 mg by mouth every 6 hours as needed for mild pain      aspirin 81 MG EC tablet Take 1 tablet (81 mg) by mouth daily      atorvastatin (LIPITOR) 40 MG tablet Take 1 tablet (40 mg) by mouth daily 90 tablet 3    carvedilol (COREG) 25 MG tablet Take 1 tablet (25 mg) by mouth 2 times daily (with meals) 180 tablet 3    cholecalciferol 50 MCG (2000 UT) CAPS Take 2,000 Units by mouth daily       Continuous Blood Gluc Sensor (DEXCOM G6 SENSOR) MISC Change every 10 days. 9 each 3    Continuous Blood Gluc Transmit (DEXCOM G6 TRANSMITTER) MISC Change every 3 months. 1 each 3    fluticasone-salmeterol (WIXELA INHUB) 250-50 MCG/ACT inhaler Inhale 1 puff into the lungs every 12 hours 1 each 11    hydrALAZINE (APRESOLINE) 10 MG tablet Take 1 tablet (10 mg) by mouth 2 times daily 180 tablet 3    insulin glargine U-300 (TOUJEO MAX SOLOSTAR) 300 UNIT/ML (2 units dial) pen Inject 85 Units Subcutaneous daily 30 mL 11    insulin pen needle (ULTICARE MICRO) 32G X 4 MM miscellaneous Use 4 pen needles daily or as directed. 400 each 11    levalbuterol (XOPENEX HFA) 45 MCG/ACT Inhaler Inhale 2 puffs into the lungs every 4 hours as needed for shortness of breath / dyspnea or wheezing 15 g 3    magnesium oxide (MAG-OX) 400 (241.3 Mg) MG tablet Take 1 tablet (400 mg) by mouth daily 100 tablet 3    metFORMIN (GLUCOPHAGE XR) 500 MG 24 hr tablet Take 1 tablet (500 mg) by mouth 2 times daily (with  meals) 180 tablet 1    montelukast (SINGULAIR) 10 MG tablet TAKE 1 TABLET BY MOUTH EVERYDAY AT BEDTIME 90 tablet 3    sacubitril-valsartan (ENTRESTO)  MG per tablet Take 1 tablet by mouth 2 times daily 180 tablet 3    spironolactone (ALDACTONE) 50 MG tablet Take 1 tablet (50 mg) by mouth daily 90 tablet 3    tirzepatide (MOUNJARO) 10 MG/0.5ML pen Inject 10 mg Subcutaneous every 7 days 2 mL 3    torsemide (DEMADEX) 20 MG tablet Patient to take 40 mg daily and take an extra 20 mg if weight goes 285 lbs or higher. 180 tablet 5    blood glucose (NO BRAND SPECIFIED) test strip Use to test blood sugar 3-4 times daily or as directed. (Patient not taking: Reported on 6/14/2024) 200 strip 1    Chlorpheniramine-DM (CORICIDIN HBP COUGH/COLD) 4-30 MG TABS Take 1 tablet by mouth as needed (Patient not taking: Reported on 7/3/2023)      Continuous Blood Gluc Sensor (FREESTYLE ANETA 14 DAY SENSOR) MISC 1 Device every 14 days Change sensor as directed every 14 days (Patient not taking: Reported on 9/12/2023) 2 each 6    Continuous Blood Gluc Sensor (FREESTYLE ANETA 2 SENSOR) MISC 1 each every 14 days (Patient not taking: Reported on 9/12/2023) 6 each 11    dexAMETHasone (DECADRON) 1 MG tablet Take 1 tablet (1 mg) by mouth once for 1 dose At 11:00 PM. The next morning after taking the medication go to he lab at 8:00 am. 1 tablet 0    ketoconazole (NIZORAL) 2 % external cream Apply topically 2 times daily For 2 weeks. (Patient not taking: Reported on 2/15/2023) 60 g 1    ondansetron (ZOFRAN) 4 MG tablet Take 1 tablet (4 mg) by mouth every 6 hours as needed for nausea (Patient not taking: Reported on 6/16/2023) 20 tablet 3    tirzepatide (MOUNJARO) 5 MG/0.5ML pen Inject 5 mg Subcutaneous every 7 days (Patient not taking: Reported on 6/14/2024) 2 mL 0    tirzepatide (MOUNJARO) 7.5 MG/0.5ML pen Inject 7.5 mg Subcutaneous every 7 days (Patient not taking: Reported on 6/14/2024) 2 mL 0    triamcinolone (KENALOG) 0.1 % external cream  "Apply topically 2 times daily For maximum 2 weeks. (Patient not taking: Reported on 2/15/2023) 60 g 1     No current facility-administered medications for this visit.     Facility-Administered Medications Ordered in Other Visits   Medication Dose Route Frequency Provider Last Rate Last Admin    sodium chloride (PF) 0.9% PF flush 10 mL  10 mL Intracatheter Once El Chinchilla MD            ALLERGIES:     Allergies   Allergen Reactions    Amlodipine Swelling     Edema on 5mg throat    Lisinopril      Swelling in throat, face  angioedema    Naprosyn [Naproxen]      Swelling, diff breathing       PHYSICAL EXAM:  /73 (BP Location: Right arm, Patient Position: Sitting, Cuff Size: Adult Large)   Pulse 87   Ht 1.676 m (5' 6\")   Wt 124.8 kg (275 lb 3.2 oz)   LMP  (LMP Unknown)   SpO2 99%   BMI 44.42 kg/m    Gen: alert, interactive, NAD  Neck: supple, unable to visualize JVD  CV: RRR, TR/MR murmur  Chest: CTAB, no wheezes or crackles  Ext: no LE edema    LABS:    CMP  Last Comprehensive Metabolic Panel:  Sodium   Date Value Ref Range Status   06/14/2024 138 135 - 145 mmol/L Final     Comment:     Reference intervals for this test were updated on 09/26/2023 to more accurately reflect our healthy population. There may be differences in the flagging of prior results with similar values performed with this method. Interpretation of those prior results can be made in the context of the updated reference intervals.    03/23/2021 133 133 - 144 mmol/L Final     Potassium   Date Value Ref Range Status   06/14/2024 4.3 3.4 - 5.3 mmol/L Final   02/15/2023 3.5 3.4 - 5.3 mmol/L Final   03/23/2021 3.8 3.4 - 5.3 mmol/L Final     Chloride   Date Value Ref Range Status   06/14/2024 100 98 - 107 mmol/L Final   02/15/2023 100 94 - 109 mmol/L Final   03/23/2021 95 94 - 109 mmol/L Final     Carbon Dioxide   Date Value Ref Range Status   03/23/2021 35 (H) 20 - 32 mmol/L Final     Carbon Dioxide (CO2)   Date Value Ref Range " Status   06/14/2024 28 22 - 29 mmol/L Final   02/15/2023 29 20 - 32 mmol/L Final     Anion Gap   Date Value Ref Range Status   06/14/2024 10 7 - 15 mmol/L Final   02/15/2023 7 3 - 14 mmol/L Final   03/23/2021 3 3 - 14 mmol/L Final     Glucose   Date Value Ref Range Status   06/14/2024 99 70 - 99 mg/dL Final   02/15/2023 282 (H) 70 - 99 mg/dL Final   03/23/2021 260 (H) 70 - 99 mg/dL Final     Urea Nitrogen   Date Value Ref Range Status   06/14/2024 18.4 6.0 - 20.0 mg/dL Final   02/15/2023 12 7 - 30 mg/dL Final   03/23/2021 24 7 - 30 mg/dL Final     Creatinine   Date Value Ref Range Status   06/14/2024 1.18 (H) 0.51 - 0.95 mg/dL Final   03/23/2021 1.18 (H) 0.52 - 1.04 mg/dL Final     GFR Estimate   Date Value Ref Range Status   06/14/2024 54 (L) >60 mL/min/1.73m2 Final     Comment:     eGFR calculated using 2021 CKD-EPI equation.   03/23/2021 53 (L) >60 mL/min/[1.73_m2] Final     Comment:     Non  GFR Calc  Starting 12/18/2018, serum creatinine based estimated GFR (eGFR) will be   calculated using the Chronic Kidney Disease Epidemiology Collaboration   (CKD-EPI) equation.       Calcium   Date Value Ref Range Status   06/14/2024 10.1 (H) 8.6 - 10.0 mg/dL Final   03/23/2021 9.9 8.5 - 10.1 mg/dL Final     Bilirubin Total   Date Value Ref Range Status   09/12/2023 0.5 <=1.2 mg/dL Final   05/13/2019 0.6 0.2 - 1.3 mg/dL Final     Alkaline Phosphatase   Date Value Ref Range Status   09/12/2023 114 (H) 35 - 104 U/L Final   05/13/2019 104 40 - 150 U/L Final     ALT   Date Value Ref Range Status   09/12/2023 14 0 - 50 U/L Final     Comment:     Reference intervals for this test were updated on 6/12/2023 to more accurately reflect our healthy population. There may be differences in the flagging of prior results with similar values performed with this method. Interpretation of those prior results can be made in the context of the updated reference intervals.     05/17/2019 22 0 - 50 U/L Final     AST   Date Value  "Ref Range Status   09/12/2023 23 0 - 45 U/L Final     Comment:     Reference intervals for this test were updated on 6/12/2023 to more accurately reflect our healthy population. There may be differences in the flagging of prior results with similar values performed with this method. Interpretation of those prior results can be made in the context of the updated reference intervals.   05/13/2019 13 0 - 45 U/L Final       NT-proBNP       TROP  No results found for: \"TROPI\", \"TROPONIN\", \"TROPR\", \"TROPN\"    CBC  CBC RESULTS:   Recent Labs   Lab Test 07/03/23  1551   WBC 12.6*   RBC 4.13   HGB 11.6*   HCT 35.7   MCV 86   MCH 28.1   MCHC 32.5   RDW 13.7          LIPIDS  Recent Labs   Lab Test 01/17/24  0806 07/03/23  1551   CHOL 119 161   HDL 44* 40*   LDL 58 83   TRIG 85 188*       TSH  TSH   Date Value Ref Range Status   02/15/2023 1.01 0.40 - 4.00 mU/L Final   12/30/2019 1.47 0.40 - 4.00 mU/L Final       HBA1C  Lab Results   Component Value Date    A1C 6.8 09/12/2023    A1C 6.5 08/23/2023    A1C 7.6 05/09/2023    A1C 8.1 12/07/2022    A1C 9.7 08/10/2022    A1C 10.1 06/11/2021    A1C 9.6 01/14/2021    A1C 11.0 08/04/2020    A1C 6.5 09/10/2019    A1C 7.7 05/17/2019         CARDIAC DATA:    EKG:  June 2023: Heart rate 96 bpm, normal sinus rhythm, nonspecific ST-T wave changes    Echo:  2017  Right ventricular function, chamber size, wall motion, and thickness are normal.  Mild left ventricular dilation is present. Mildly (EF 45-50%) reduced left ventricular function is present. Traced at 45%.    2017   Moderate to severe left ventricular systolic dysfunction.  LVEF 30% based on biplane 2D tracing. Global hypokinesis.  Right ventricular size and systolic function appears normal.  No valve dysfunction.  Unable to assess PA pressure but RA pressure is normal.    2019   Left ventricular size is normal. Mildly (EF 40-45%, traced 45%) reduced left  ventricular function is present.  Right ventricular function, chamber " size, wall motion, and thickness are  normal.  This study was compared with the study from 3/21/18: The left ventricular  function has improved    2020  Left ventricular wall thickness is normal. Left ventricular size is normal.  The Ejection Fraction is estimated at 40-45%. Mild diffuse hypokinesis is  present.  Right ventricular function, chamber size, wall motion, and thickness are  normal.  The inferior vena cava was normal in size with preserved respiratory  variability. No pericardial effusion is present.    2021  Mildly (EF 40-45%) reduced left ventricular function is present.  Global right ventricular function is normal.  No hemodynamically significant valve abnormalities.  The inferior vena cava is normal.  This study was compared with the study from 7/21/20. No significant change.    2022  Left ventricular function is decreased. The ejection fraction is 40-45% (mildly reduced).  Global right ventricular function is normal.  No significant valvular abnormalities present.  The inferior vena cava cannot be assessed.  No pericardial effusion is present.  No significant changes noted.    Today, 6/14/2024   Poor acoustic windows. IV contrast used.    Left ventricular function is decreased. The ejection fraction is 35-40% (moderately reduced).  Mild left ventricular dilation is present.  Grade I or early diastolic dysfunction.  Global right ventricular function is normal.  The right ventricle is normal size.  The inferior vena cava was normal in size with preserved respiratory variability.  No significant valvular abnormalities present.   This study was compared with the study from 8/2022 .  The left ventricular function has worsened.    Cardiac MRI:  2019  Clinical history: 49-year old female with DM, HTN, WILLARD, HFrEF with recent worsening of LV function now with LVEF of 30%. Stress CMR for further evaluation.  Comparison CMR: None.   1. The LV is mildly dilated in cavity size, with mild concentric LVH. The  global systolic function is moderately reduced. The LVEF is 32%. There is global hypokinesis.  2. The RV is normal in cavity size. The global systolic function is normal. The RVEF is 57%.   3. Both atria are normal in size.  4. There is no significant valvular disease.   5. Late gadolinium enhancement imaging shows no MI, or infiltrative disease. There is mid wall enhancement in the basal to mid septum, in a pattern that can be seen in idiopathic dilated CM.   6. Regadenoson stress perfusion imaging shows no ischemia.  7. There is no pericardial effusion or thickening.  8. There is no intracardiac thrombus.  CONCLUSIONS: No myocardial ischemia. Non-ischemic cardiomyopathy; LVEF 32%, RVEF 57%. Mid wall enhancement in the basal to mid septum, in a pattern that can be seen in idiopathic dilated CM. No evidence of cardiac sarcoid.        Cardiac Catheterization:  None available    Zio patch:  2023      ASSESSMENT/PLAN:  In summary, Bettina Montes is here today with the following -      1.  Nonischemic cardiomyopathy, chronic combined systolic and diastolic heart failure, ACC/AHA stage C, NYHA class II, EF 35 to 40%  2.  Hypertension  3.  BMI 44  4.  Diabetes mellitus, HbA1C 7.6%  5.  Mixed hyperlipidemia  6. Palpitations, sinus tachycardia, multiple episodes of brief SVT with average heart rate 120, PVC burden 5.5%     Plans-  The etiology of her HF is unknown, could be related to long standing previously uncontrolled hypertension, although this has improved more recently. Could also be familial, as mother has history of heart failure.    EF today measures slightly lower than before at 35-40% and she has also had an increased need for diuretic over the last 6 weeks.    Goal Directed Medical Therapies for Heart Failure:     These are indicated irrespective of the etiology of heart failure.  We discussed the primary medications, their side effects, the care plan including frequent follow-ups  for optimization of  therapies with monitoring of blood pressures, weights and lab results     Beta blocker: Continue carvedilol 25 mg twice a day  ACE/ARB/ARNI: Continue sacubitril/valsartan 97/103 mg, 1 tablet twice a day  MRA: Continue spironolactone 50 mg daily  SGLT2 inhibitor: She was previously unable to tolerate the dapaglifozin due to frequent and persistent yeast infections. We discussed benefits of SGLT2i for HF, DM and CKD - and she is willing to try it again and understands this alternative SGLT2 inhibitor can also cause yeast infections and she will stop it if she has recurrence of yeast infection. Will try empagliflozin 5 mg once daily if covered by insurance  Hydral/isordil: she is on hydral 10 mg BID, was previously on a higher dose which caused hypotension and dizziness. Suggesting correcting this to TID dosing based on its pharmacology      Diuretics: On torsemide - she increased to 60 mg once a day and takes additional 20 mg once day  Cardiac Rehab: Would not qualify based on EF  ICD/ CRT-D: Would not qualify based on EF  Labs: at follow up  Follow up [facility] - already scheduled 8/2024  Follow up with me in 4-5 months  CV Genetic testing: Discussed today, referral placed today  WILLARD: discussed repeating sleep study as the last sleep study was done years ago but she has symptoms, and untreated sleep apnea has significant implications for heart failure.  She would like to think about it and let us know if she is willing to get it scheduled    Advised on home BP and weight monitoring  Advised on observing caution with salt intake    I spent 65 minutes in care of the patient today including obtaining recent medical history, personally reviewing recent cardiac testing and/or lab results, today's examination, discussion of testing results and care recommendations with patient.       Thank you for the opportunity to participate in this patient's care. Please feel free to reach out with any questions or  concerns.      Tonny Bailey MD, [facility]  Associate Professor of Medicine  Advanced Heart Failure, LVAD & Cardiac Transplant  Director, Cardio-Obstetrics  Cardio-Oncology  [facility]

## 2024-06-14 NOTE — NURSING NOTE
"Chief Complaint   Patient presents with    Follow Up     Return Heart Failure       Initial /73 (BP Location: Right arm, Patient Position: Sitting, Cuff Size: Adult Large)   Pulse 87   Ht 1.676 m (5' 6\")   Wt 124.8 kg (275 lb 3.2 oz)   LMP  (LMP Unknown)   SpO2 99%   BMI 44.42 kg/m   Estimated body mass index is 44.42 kg/m  as calculated from the following:    Height as of this encounter: 1.676 m (5' 6\").    Weight as of this encounter: 124.8 kg (275 lb 3.2 oz)..  BP completed using cuff size: large    Kusum Raza, EMT  "

## 2024-06-18 NOTE — PROGRESS NOTES
Outcome for 06/18/24 4:26 PM: Data uploaded on Dexcom  Aleena Ordaz MA  Outcome for 06/28/24 9:59 AM: Data obtained via Dexcom website  Kimmy Mike LPN     Patient is showing 5/5 MNCM met.   Kimmy Mike LPN

## 2024-07-02 ENCOUNTER — VIRTUAL VISIT (OUTPATIENT)
Dept: ENDOCRINOLOGY | Facility: CLINIC | Age: 54
End: 2024-07-02
Payer: COMMERCIAL

## 2024-07-02 DIAGNOSIS — Z79.4 TYPE 2 DIABETES MELLITUS WITH HYPERGLYCEMIA, WITH LONG-TERM CURRENT USE OF INSULIN (H): Primary | ICD-10-CM

## 2024-07-02 DIAGNOSIS — E11.65 TYPE 2 DIABETES MELLITUS WITH HYPERGLYCEMIA, WITH LONG-TERM CURRENT USE OF INSULIN (H): Primary | ICD-10-CM

## 2024-07-02 DIAGNOSIS — E66.01 MORBID OBESITY (H): ICD-10-CM

## 2024-07-02 PROCEDURE — 99214 OFFICE O/P EST MOD 30 MIN: CPT | Mod: 95 | Performed by: INTERNAL MEDICINE

## 2024-07-02 PROCEDURE — G2211 COMPLEX E/M VISIT ADD ON: HCPCS | Mod: 95 | Performed by: INTERNAL MEDICINE

## 2024-07-02 ASSESSMENT — PAIN SCALES - GENERAL: PAINLEVEL: NO PAIN (0)

## 2024-07-02 NOTE — LETTER
7/2/2024      Bettina Montes  [address] [facility] [place] [address]      Dear Colleague,    Thank you for referring your patient, Bettina Montes, to the [facility]. Please see a copy of my visit note below.    Outcome for 06/18/24 4:26 PM: Data uploaded on Dexcom  Aleena Ordaz MA  Outcome for 06/28/24 9:59 AM: Data obtained via Dexcom website  Kimmy Mike LPN     Patient is showing 5/5 MNCM met.   Kimmy Mike LPN            Endocrinology Clinic Visit    Chief Complaint: RECHECK     Information obtained from:Patient      Assessment/Treatment Plan:    Type 2 diabetes /morbid obesity: Improvement of glycemic control but still not optimal.  We are adjusting Mounjaro as documented below.  Past medical history of heart failure, obstructive sleep apnea & morbid obesity Estimated body mass index is    BMI has improved from 46% to 42% -has lost 30 pounds on Wegovy previously.  Wegovy is not any longer covered.  Did not lose much weight on Mounjaro unfortunately.    Plan  Metformin 500 mg extended release with supper -did not tolerate higher dose.  Mounjaro 12 mg every 7 days for 4 doses and then increase the dose to Mounjaro 15 mg every 7 days   Continue insulin glargine ( U300)  85 units once a day; if morning blood sugars below 80 please reduce insulin dose by 5 units every third day.  Tolerating Jardiance 5 mg daily.  She will plan on increasing it to 10 mg daily; will further titrate up.  Previously     Adrenal nodule   I have personally reviewed adrenal nodule MRI from 7/6/2023.  Agree with interpretation of 2.2 x 1.9 cm adrenal nodule on the left side.  This nodule compared to previous study from 2017 is a stable in size.  MRI characteristics per report suggestive of fat-containing benign adrenal adenoma.  Hormonal assessment as detailed below for metanephrines, aldosterone hormones, DHEA-sulfate within the normal limits.  Cortisol after Dex suppression normal.      Hepatic  steatosis  Pancreatic cyst  Gallbladder polyp     Gastroenterology follow-up placed previously.         Cadence Akbar MD  Staff Endocrinologist    Division of Endocrinology and Diabetes      Subjective:         HPI: Bettina Montes is a 54 year old female with history of type 2 diabetes who is here for routine follow-up.   Type 2 diabetes diagnosed: 20 years ago that the intermittent   Insulin glargine 85  units   Mounjaro 10 mg every 7 days. Itching at the site 2-3 days but no GI symptoms.   Metformin 500 mg extended release daily.  Dexcom downloaded and reviewed in detail.   Past medical history of heart failure currently clinically stable.  Scheduled with bariatric surgery for consultation of weight loss procedures.  Not currently pursuing bariatric surgery.  Has tried multiple diets including weight watchers however sticking to the diet has been difficult.  Lost weight [about 30 pounds] on semaglutide until it was switched to Mounjaro due to coverage.                           Allergies   Allergen Reactions     Amlodipine Swelling     Edema on 5mg throat     Lisinopril      Swelling in throat, face  angioedema     Naprosyn [Naproxen]      Swelling, diff breathing       Current Outpatient Medications   Medication Sig Dispense Refill     acetaminophen (TYLENOL) 500 MG tablet Take 500-1,000 mg by mouth every 6 hours as needed for mild pain       aspirin 81 MG EC tablet Take 1 tablet (81 mg) by mouth daily       atorvastatin (LIPITOR) 40 MG tablet Take 1 tablet (40 mg) by mouth daily 90 tablet 3     carvedilol (COREG) 25 MG tablet Take 1 tablet (25 mg) by mouth 2 times daily (with meals) 180 tablet 3     cholecalciferol 50 MCG (2000 UT) CAPS Take 2,000 Units by mouth daily        Continuous Blood Gluc Sensor (DEXCOM G6 SENSOR) MISC Change every 10 days. 9 each 3     Continuous Blood Gluc Transmit (DEXCOM G6 TRANSMITTER) MISC Change every 3 months. 1 each 3     empagliflozin (JARDIANCE) 10 MG TABS tablet Take  1 tablet (10 mg) by mouth daily Take 1/2 tab daily - will consider increasing to 1 tab at next visit if tolerated 90 tablet 1     fluticasone-salmeterol (WIXELA INHUB) 250-50 MCG/ACT inhaler Inhale 1 puff into the lungs every 12 hours 1 each 11     hydrALAZINE (APRESOLINE) 10 MG tablet Take 1 tablet (10 mg) by mouth 3 times daily 270 tablet 3     insulin glargine U-300 (TOUJEO MAX SOLOSTAR) 300 UNIT/ML (2 units dial) pen Inject 85 Units Subcutaneous daily 30 mL 11     insulin pen needle (ULTICARE MICRO) 32G X 4 MM miscellaneous Use 4 pen needles daily or as directed. 400 each 11     levalbuterol (XOPENEX HFA) 45 MCG/ACT Inhaler Inhale 2 puffs into the lungs every 4 hours as needed for shortness of breath / dyspnea or wheezing 15 g 3     magnesium oxide (MAG-OX) 400 (241.3 Mg) MG tablet Take 1 tablet (400 mg) by mouth daily 100 tablet 3     metFORMIN (GLUCOPHAGE XR) 500 MG 24 hr tablet Take 1 tablet (500 mg) by mouth 2 times daily (with meals) 180 tablet 1     montelukast (SINGULAIR) 10 MG tablet TAKE 1 TABLET BY MOUTH EVERYDAY AT BEDTIME 90 tablet 3     sacubitril-valsartan (ENTRESTO)  MG per tablet Take 1 tablet by mouth 2 times daily 180 tablet 3     spironolactone (ALDACTONE) 50 MG tablet Take 1 tablet (50 mg) by mouth daily 90 tablet 3     tirzepatide (MOUNJARO) 10 MG/0.5ML pen Inject 10 mg Subcutaneous every 7 days 2 mL 3     torsemide (DEMADEX) 20 MG tablet Please take 60 mg daily (3 tabs) 270 tablet 3     blood glucose (NO BRAND SPECIFIED) test strip Use to test blood sugar 3-4 times daily or as directed. (Patient not taking: Reported on 6/14/2024) 200 strip 1     Chlorpheniramine-DM (CORICIDIN HBP COUGH/COLD) 4-30 MG TABS Take 1 tablet by mouth as needed (Patient not taking: Reported on 7/3/2023)       Continuous Blood Gluc Sensor (CurTranYLE ANETA 14 DAY SENSOR) MISC 1 Device every 14 days Change sensor as directed every 14 days (Patient not taking: Reported on 9/12/2023) 2 each 6     Continuous Blood  Gluc Sensor (FREESTYLE ANETA 2 SENSOR) MISC 1 each every 14 days (Patient not taking: Reported on 9/12/2023) 6 each 11     dexAMETHasone (DECADRON) 1 MG tablet Take 1 tablet (1 mg) by mouth once for 1 dose At 11:00 PM. The next morning after taking the medication go to he lab at 8:00 am. 1 tablet 0     ketoconazole (NIZORAL) 2 % external cream Apply topically 2 times daily For 2 weeks. (Patient not taking: Reported on 2/15/2023) 60 g 1     triamcinolone (KENALOG) 0.1 % external cream Apply topically 2 times daily For maximum 2 weeks. (Patient not taking: Reported on 2/15/2023) 60 g 1       Review of Systems     8 point review system (Constitutional, HENT, Eyes, Respiratory, Cardiovascular, Gastrointestinal, Genitourinary, Musculoskeletal,Neurological, Psychiatric/Behavioural, Endocrine) is negative or is as per HPI above  Past medical history, past surgical history, social and family history reviewed in epic.        Objective:   LMP  (LMP Unknown)   Constitutional: Pleasant no acute cardiopulmonary distress.   Psychological: appropriate mood and affect    In House Labs:   Hemoglobin A1C   Date Value Ref Range Status   09/12/2023 6.8 (H) 0.0 - 5.6 % Final     Comment:     Normal <5.7%   Prediabetes 5.7-6.4%    Diabetes 6.5% or higher     Note: Adopted from ADA consensus guidelines.   08/23/2023 6.5 (H) 0.0 - 5.6 % Final     Comment:     Normal <5.7%   Prediabetes 5.7-6.4%    Diabetes 6.5% or higher     Note: Adopted from ADA consensus guidelines.   05/09/2023 7.6 (H) 0.0 - 5.6 % Final     Comment:     Normal <5.7%   Prediabetes 5.7-6.4%    Diabetes 6.5% or higher     Note: Adopted from ADA consensus guidelines.   06/11/2021 10.1 (H) 0 - 5.6 % Final     Comment:     Normal <5.7% Prediabetes 5.7-6.4%  Diabetes 6.5% or higher - adopted from ADA   consensus guidelines.     01/14/2021 9.6 (H) 0 - 5.6 % Final     Comment:     Normal <5.7% Prediabetes 5.7-6.4%  Diabetes 6.5% or higher - adopted from ADA   consensus  guidelines.     08/04/2020 11.0 (H) 0 - 5.6 % Final     Comment:     Normal <5.7% Prediabetes 5.7-6.4%  Diabetes 6.5% or higher - adopted from ADA   consensus guidelines.  Results confirmed by repeat test       Hemoglobin A1C POCT   Date Value Ref Range Status   01/16/2024 7.6 (A) 4.3 - <5.7 % Final      TSH   Date Value Ref Range Status   02/15/2023 1.01 0.40 - 4.00 mU/L Final   12/30/2019 1.47 0.40 - 4.00 mU/L Final   05/13/2019 0.71 0.40 - 4.00 mU/L Final   05/10/2017 0.84 0.40 - 4.00 mU/L Final   02/17/2016 1.04 0.40 - 4.00 mU/L Final       Creatinine   Date Value Ref Range Status   06/14/2024 1.18 (H) 0.51 - 0.95 mg/dL Final   03/23/2021 1.18 (H) 0.52 - 1.04 mg/dL Final   ]    Recent Labs   Lab Test 09/29/21  0931 01/14/21  1622 05/17/19  1306   CHOL 128  --  147   HDL 42*  --  66   LDL 58 83 64   TRIG 140  --  83       Component      Latest Ref Rng 9/12/2023  4:36 PM   Metanephrine      0.00 - 0.49 nmol/L 0.22    Normetanephrine      0.00 - 0.89 nmol/L 0.41    Metanephrines Interpretation See Note    DHEA Sulfate      35 - 430 ug/dL 86    Adrenal Corticotropin      <47 pg/mL 35    Potassium      3.4 - 5.3 mmol/L 3.7    Aldosterone      0.0 - 31.0 ng/dL 16.3    Renin Activity      ng/mL/hr 19.6    Aldosterone Renin Ratio      0.0 - 25.0  0.8        This note has been dictated using voice recognition software.  As a result, there may be errors in the documentation that have gone undetected.  Please consider this when interpreting information in this documentation.      Video-Visit Details    Type of service:  Video Visit  Joined the call at 7/2/2024, 2:17:50 pm.  Left the call at 7/2/2024, 2:30:35 pm.  You were on the call for 12 minutes 45 seconds .    Distant Location (provider location):  Off-site.     Platform used for Video Visit: Nic  The longitudinal plan of care for the diagnosis(es)/condition(s) as documented were addressed during this visit. Due to the added complexity in care, I will continue to  support Bettina in the subsequent management and with ongoing continuity of care.        Again, thank you for allowing me to participate in the care of your patient.        Sincerely,        Cadence Akbar MD

## 2024-07-02 NOTE — PROGRESS NOTES
Endocrinology Clinic Visit    Chief Complaint: RECHECK     Information obtained from:Patient      Assessment/Treatment Plan:    Type 2 diabetes /morbid obesity: Improvement of glycemic control but still not optimal.  We are adjusting Mounjaro as documented below.  Past medical history of heart failure, obstructive sleep apnea & morbid obesity Estimated body mass index is    BMI has improved from 46% to 42% -has lost 30 pounds on Wegovy previously.  Wegovy is not any longer covered.  Did not lose much weight on Mounjaro unfortunately.    Plan  Metformin 500 mg extended release with supper -did not tolerate higher dose.  Mounjaro 12 mg every 7 days for 4 doses and then increase the dose to Mounjaro 15 mg every 7 days   Continue insulin glargine ( U300)  85 units once a day; if morning blood sugars below 80 please reduce insulin dose by 5 units every third day.  Tolerating Jardiance 5 mg daily.  She will plan on increasing it to 10 mg daily; will further titrate up.  Previously     Adrenal nodule   I have personally reviewed adrenal nodule MRI from 7/6/2023.  Agree with interpretation of 2.2 x 1.9 cm adrenal nodule on the left side.  This nodule compared to previous study from 2017 is a stable in size.  MRI characteristics per report suggestive of fat-containing benign adrenal adenoma.  Hormonal assessment as detailed below for metanephrines, aldosterone hormones, DHEA-sulfate within the normal limits.  Cortisol after Dex suppression normal.      Hepatic steatosis  Pancreatic cyst  Gallbladder polyp     Gastroenterology follow-up placed previously.         Cadence Akbar MD  Staff Endocrinologist    Division of Endocrinology and Diabetes      Subjective:         HPI: Bettina Montes is a 54 year old female with history of type 2 diabetes who is here for routine follow-up.   Type 2 diabetes diagnosed: 20 years ago that the intermittent   Insulin glargine 85  units   Mounjaro 10 mg every 7 days. Itching at the  site 2-3 days but no GI symptoms.   Metformin 500 mg extended release daily.  Dexcom downloaded and reviewed in detail.   Past medical history of heart failure currently clinically stable.  Scheduled with bariatric surgery for consultation of weight loss procedures.  Not currently pursuing bariatric surgery.  Has tried multiple diets including weight watchers however sticking to the diet has been difficult.  Lost weight [about 30 pounds] on semaglutide until it was switched to Mounjaro due to coverage.                           Allergies   Allergen Reactions    Amlodipine Swelling     Edema on 5mg throat    Lisinopril      Swelling in throat, face  angioedema    Naprosyn [Naproxen]      Swelling, diff breathing       Current Outpatient Medications   Medication Sig Dispense Refill    acetaminophen (TYLENOL) 500 MG tablet Take 500-1,000 mg by mouth every 6 hours as needed for mild pain      aspirin 81 MG EC tablet Take 1 tablet (81 mg) by mouth daily      atorvastatin (LIPITOR) 40 MG tablet Take 1 tablet (40 mg) by mouth daily 90 tablet 3    carvedilol (COREG) 25 MG tablet Take 1 tablet (25 mg) by mouth 2 times daily (with meals) 180 tablet 3    cholecalciferol 50 MCG (2000 UT) CAPS Take 2,000 Units by mouth daily       Continuous Blood Gluc Sensor (DEXCOM G6 SENSOR) MISC Change every 10 days. 9 each 3    Continuous Blood Gluc Transmit (DEXCOM G6 TRANSMITTER) MISC Change every 3 months. 1 each 3    empagliflozin (JARDIANCE) 10 MG TABS tablet Take 1 tablet (10 mg) by mouth daily Take 1/2 tab daily - will consider increasing to 1 tab at next visit if tolerated 90 tablet 1    fluticasone-salmeterol (WIXELA INHUB) 250-50 MCG/ACT inhaler Inhale 1 puff into the lungs every 12 hours 1 each 11    hydrALAZINE (APRESOLINE) 10 MG tablet Take 1 tablet (10 mg) by mouth 3 times daily 270 tablet 3    insulin glargine U-300 (TOUJEO MAX SOLOSTAR) 300 UNIT/ML (2 units dial) pen Inject 85 Units Subcutaneous daily 30 mL 11    insulin pen  needle (ULTICARE MICRO) 32G X 4 MM miscellaneous Use 4 pen needles daily or as directed. 400 each 11    levalbuterol (XOPENEX HFA) 45 MCG/ACT Inhaler Inhale 2 puffs into the lungs every 4 hours as needed for shortness of breath / dyspnea or wheezing 15 g 3    magnesium oxide (MAG-OX) 400 (241.3 Mg) MG tablet Take 1 tablet (400 mg) by mouth daily 100 tablet 3    metFORMIN (GLUCOPHAGE XR) 500 MG 24 hr tablet Take 1 tablet (500 mg) by mouth 2 times daily (with meals) 180 tablet 1    montelukast (SINGULAIR) 10 MG tablet TAKE 1 TABLET BY MOUTH EVERYDAY AT BEDTIME 90 tablet 3    sacubitril-valsartan (ENTRESTO)  MG per tablet Take 1 tablet by mouth 2 times daily 180 tablet 3    spironolactone (ALDACTONE) 50 MG tablet Take 1 tablet (50 mg) by mouth daily 90 tablet 3    tirzepatide (MOUNJARO) 10 MG/0.5ML pen Inject 10 mg Subcutaneous every 7 days 2 mL 3    torsemide (DEMADEX) 20 MG tablet Please take 60 mg daily (3 tabs) 270 tablet 3    blood glucose (NO BRAND SPECIFIED) test strip Use to test blood sugar 3-4 times daily or as directed. (Patient not taking: Reported on 6/14/2024) 200 strip 1    Chlorpheniramine-DM (CORICIDIN HBP COUGH/COLD) 4-30 MG TABS Take 1 tablet by mouth as needed (Patient not taking: Reported on 7/3/2023)      Continuous Blood Gluc Sensor (FREESTYLE ANETA 14 DAY SENSOR) MISC 1 Device every 14 days Change sensor as directed every 14 days (Patient not taking: Reported on 9/12/2023) 2 each 6    Continuous Blood Gluc Sensor (FREESTYLE ANETA 2 SENSOR) MISC 1 each every 14 days (Patient not taking: Reported on 9/12/2023) 6 each 11    dexAMETHasone (DECADRON) 1 MG tablet Take 1 tablet (1 mg) by mouth once for 1 dose At 11:00 PM. The next morning after taking the medication go to he lab at 8:00 am. 1 tablet 0    ketoconazole (NIZORAL) 2 % external cream Apply topically 2 times daily For 2 weeks. (Patient not taking: Reported on 2/15/2023) 60 g 1    triamcinolone (KENALOG) 0.1 % external cream Apply  topically 2 times daily For maximum 2 weeks. (Patient not taking: Reported on 2/15/2023) 60 g 1       Review of Systems     8 point review system (Constitutional, HENT, Eyes, Respiratory, Cardiovascular, Gastrointestinal, Genitourinary, Musculoskeletal,Neurological, Psychiatric/Behavioural, Endocrine) is negative or is as per HPI above  Past medical history, past surgical history, social and family history reviewed in epic.        Objective:   LMP  (LMP Unknown)   Constitutional: Pleasant no acute cardiopulmonary distress.   Psychological: appropriate mood and affect    In House Labs:   Hemoglobin A1C   Date Value Ref Range Status   09/12/2023 6.8 (H) 0.0 - 5.6 % Final     Comment:     Normal <5.7%   Prediabetes 5.7-6.4%    Diabetes 6.5% or higher     Note: Adopted from ADA consensus guidelines.   08/23/2023 6.5 (H) 0.0 - 5.6 % Final     Comment:     Normal <5.7%   Prediabetes 5.7-6.4%    Diabetes 6.5% or higher     Note: Adopted from ADA consensus guidelines.   05/09/2023 7.6 (H) 0.0 - 5.6 % Final     Comment:     Normal <5.7%   Prediabetes 5.7-6.4%    Diabetes 6.5% or higher     Note: Adopted from ADA consensus guidelines.   06/11/2021 10.1 (H) 0 - 5.6 % Final     Comment:     Normal <5.7% Prediabetes 5.7-6.4%  Diabetes 6.5% or higher - adopted from ADA   consensus guidelines.     01/14/2021 9.6 (H) 0 - 5.6 % Final     Comment:     Normal <5.7% Prediabetes 5.7-6.4%  Diabetes 6.5% or higher - adopted from ADA   consensus guidelines.     08/04/2020 11.0 (H) 0 - 5.6 % Final     Comment:     Normal <5.7% Prediabetes 5.7-6.4%  Diabetes 6.5% or higher - adopted from ADA   consensus guidelines.  Results confirmed by repeat test       Hemoglobin A1C POCT   Date Value Ref Range Status   01/16/2024 7.6 (A) 4.3 - <5.7 % Final      TSH   Date Value Ref Range Status   02/15/2023 1.01 0.40 - 4.00 mU/L Final   12/30/2019 1.47 0.40 - 4.00 mU/L Final   05/13/2019 0.71 0.40 - 4.00 mU/L Final   05/10/2017 0.84 0.40 - 4.00 mU/L Final    02/17/2016 1.04 0.40 - 4.00 mU/L Final       Creatinine   Date Value Ref Range Status   06/14/2024 1.18 (H) 0.51 - 0.95 mg/dL Final   03/23/2021 1.18 (H) 0.52 - 1.04 mg/dL Final   ]    Recent Labs   Lab Test 09/29/21  0931 01/14/21  1622 05/17/19  1306   CHOL 128  --  147   HDL 42*  --  66   LDL 58 83 64   TRIG 140  --  83       Component      Latest Ref Rng 9/12/2023  4:36 PM   Metanephrine      0.00 - 0.49 nmol/L 0.22    Normetanephrine      0.00 - 0.89 nmol/L 0.41    Metanephrines Interpretation See Note    DHEA Sulfate      35 - 430 ug/dL 86    Adrenal Corticotropin      <47 pg/mL 35    Potassium      3.4 - 5.3 mmol/L 3.7    Aldosterone      0.0 - 31.0 ng/dL 16.3    Renin Activity      ng/mL/hr 19.6    Aldosterone Renin Ratio      0.0 - 25.0  0.8        This note has been dictated using voice recognition software.  As a result, there may be errors in the documentation that have gone undetected.  Please consider this when interpreting information in this documentation.      Video-Visit Details    Type of service:  Video Visit  Joined the call at 7/2/2024, 2:17:50 pm.  Left the call at 7/2/2024, 2:30:35 pm.  You were on the call for 12 minutes 45 seconds .    Distant Location (provider location):  Off-site.     Platform used for Video Visit: Nic  The longitudinal plan of care for the diagnosis(es)/condition(s) as documented were addressed during this visit. Due to the added complexity in care, I will continue to support Bettina in the subsequent management and with ongoing continuity of care.

## 2024-07-02 NOTE — NURSING NOTE
Current patient location: Patient declined to provide     Is the patient currently in the state of [place]? YES    Visit mode:VIDEO    If the visit is dropped, the patient can be reconnected by: VIDEO VISIT: Text to cell phone:   Telephone Information:   Mobile [phone]       Will anyone else be joining the visit? NO  (If patient encounters technical issues they should call [phone]    How would you like to obtain your AVS? MyChart    Are changes needed to the allergy or medication list? No    Are refills needed on medications prescribed by this physician? NO    Reason for visit: RECHECK Shelby Kocher VVF

## 2024-07-02 NOTE — PATIENT INSTRUCTIONS
Bettina,    Mounjaro 12 mg every 7 days for 4 doses and then increase the dose to Mounjaro 15 mg every 7 days     Continue insulin glargine ( U300)  85 units once a day; if morning blood sugars below 80 please reduce insulin dose by 5 units every third day.

## 2024-08-11 ENCOUNTER — HEALTH MAINTENANCE LETTER (OUTPATIENT)
Age: 54
End: 2024-08-11

## 2024-08-20 DIAGNOSIS — I50.42 CHRONIC COMBINED SYSTOLIC (CONGESTIVE) AND DIASTOLIC (CONGESTIVE) HEART FAILURE (H): Primary | ICD-10-CM

## 2024-08-24 ENCOUNTER — MYC REFILL (OUTPATIENT)
Dept: CARDIOLOGY | Facility: CLINIC | Age: 54
End: 2024-08-24
Payer: COMMERCIAL

## 2024-08-24 DIAGNOSIS — I50.22 CHRONIC SYSTOLIC CONGESTIVE HEART FAILURE (H): ICD-10-CM

## 2024-08-24 DIAGNOSIS — I25.10 CAD (CORONARY ARTERY DISEASE): ICD-10-CM

## 2024-08-24 DIAGNOSIS — I50.22 CHRONIC SYSTOLIC HEART FAILURE (H): ICD-10-CM

## 2024-08-24 RX ORDER — SACUBITRIL AND VALSARTAN 97; 103 MG/1; MG/1
1 TABLET, FILM COATED ORAL 2 TIMES DAILY
Qty: 180 TABLET | Refills: 3 | Status: CANCELLED | OUTPATIENT
Start: 2024-08-24

## 2024-08-24 RX ORDER — CARVEDILOL 25 MG/1
25 TABLET ORAL 2 TIMES DAILY WITH MEALS
Qty: 180 TABLET | Refills: 3 | Status: CANCELLED | OUTPATIENT
Start: 2024-08-24

## 2024-08-28 ENCOUNTER — LAB (OUTPATIENT)
Dept: LAB | Facility: CLINIC | Age: 54
End: 2024-08-28
Payer: COMMERCIAL

## 2024-08-28 ENCOUNTER — OFFICE VISIT (OUTPATIENT)
Dept: CARDIOLOGY | Facility: CLINIC | Age: 54
End: 2024-08-28
Payer: COMMERCIAL

## 2024-08-28 VITALS
SYSTOLIC BLOOD PRESSURE: 118 MMHG | HEART RATE: 92 BPM | OXYGEN SATURATION: 99 % | WEIGHT: 270.8 LBS | DIASTOLIC BLOOD PRESSURE: 82 MMHG | BODY MASS INDEX: 43.71 KG/M2

## 2024-08-28 DIAGNOSIS — I50.22 CHRONIC SYSTOLIC CONGESTIVE HEART FAILURE (H): ICD-10-CM

## 2024-08-28 DIAGNOSIS — I50.23 ACUTE ON CHRONIC SYSTOLIC (CONGESTIVE) HEART FAILURE (H): ICD-10-CM

## 2024-08-28 DIAGNOSIS — I42.8 NICM (NONISCHEMIC CARDIOMYOPATHY) (H): ICD-10-CM

## 2024-08-28 DIAGNOSIS — I10 BENIGN ESSENTIAL HYPERTENSION: ICD-10-CM

## 2024-08-28 DIAGNOSIS — I50.22 CHRONIC SYSTOLIC HEART FAILURE (H): ICD-10-CM

## 2024-08-28 DIAGNOSIS — I50.42 CHRONIC COMBINED SYSTOLIC (CONGESTIVE) AND DIASTOLIC (CONGESTIVE) HEART FAILURE (H): ICD-10-CM

## 2024-08-28 LAB
ANION GAP SERPL CALCULATED.3IONS-SCNC: 10 MMOL/L (ref 7–15)
BUN SERPL-MCNC: 10.6 MG/DL (ref 6–20)
CALCIUM SERPL-MCNC: 9.4 MG/DL (ref 8.8–10.4)
CHLORIDE SERPL-SCNC: 102 MMOL/L (ref 98–107)
CREAT SERPL-MCNC: 1.02 MG/DL (ref 0.51–0.95)
EGFRCR SERPLBLD CKD-EPI 2021: 65 ML/MIN/1.73M2
GLUCOSE SERPL-MCNC: 160 MG/DL (ref 70–99)
HCO3 SERPL-SCNC: 25 MMOL/L (ref 22–29)
POTASSIUM SERPL-SCNC: 4.5 MMOL/L (ref 3.4–5.3)
SODIUM SERPL-SCNC: 137 MMOL/L (ref 135–145)

## 2024-08-28 PROCEDURE — 80048 BASIC METABOLIC PNL TOTAL CA: CPT

## 2024-08-28 PROCEDURE — 99214 OFFICE O/P EST MOD 30 MIN: CPT | Performed by: NURSE PRACTITIONER

## 2024-08-28 PROCEDURE — 36415 COLL VENOUS BLD VENIPUNCTURE: CPT

## 2024-08-28 RX ORDER — SACUBITRIL AND VALSARTAN 97; 103 MG/1; MG/1
1 TABLET, FILM COATED ORAL 2 TIMES DAILY
Qty: 180 TABLET | Refills: 3 | Status: SHIPPED | OUTPATIENT
Start: 2024-08-28

## 2024-08-28 RX ORDER — ATORVASTATIN CALCIUM 40 MG/1
40 TABLET, FILM COATED ORAL DAILY
Qty: 90 TABLET | Refills: 3 | Status: SHIPPED | OUTPATIENT
Start: 2024-08-28

## 2024-08-28 RX ORDER — CARVEDILOL 25 MG/1
25 TABLET ORAL 2 TIMES DAILY WITH MEALS
Qty: 180 TABLET | Refills: 3 | Status: SHIPPED | OUTPATIENT
Start: 2024-08-28

## 2024-08-28 ASSESSMENT — PAIN SCALES - GENERAL: PAINLEVEL: NO PAIN (0)

## 2024-08-28 NOTE — PROGRESS NOTES
SANJAY  Bettina Montes is a 54 year old female with a past medical history of HFrEF (30-35%) secondary to NICM (felt to be idiopathic), HTN, obesity, DM II, cervical spine fusion, and hyperlipidemia who presents for routine follow-up. She was last seen by Dr. Chinchilla on 6/5/20.    She saw Dr. Bailey in June with the following recommendations:  1-increase your hydralazine to 10 mg three times daily   2- Try alternative sglt2i- which also may cause yeast infection. Will start with 5 mg jardiance- can increase at CORE in Aug if tolerate well    Bettina had her last CORE visit in February.   She has been seeing the MTM for DM management.  She had a decrease in her metformin a while back. She stopped Trulicity and Wegovy.  Now she is taking Mounjaro.    She says her BS have improved .  No SOB at rest and no CASILLAS-   She says there is now no swelling in her legs or abdomen.  Home weight 272-275 lbs. Her appetite has been fine. She denies early satiety. Continues to self titrate her afternoon diuretic doses. She takes 60 mg daily .  She has been taking the Hydralazine 10 mg TID and taking Jardiance 10 mg since she did fine.  She just went up on it.     She denies any chest pain, lightheadedness, dizziness, or near syncopal episodes.     Pt is taking hydralazine 10 mg TID, coreg 25 BID, entresto 97/103 BID, spironolactone 50 qday and torsemide 60mg qday and Jardiance 10 mg daily .       PMH  Past Medical History:   Diagnosis Date    Abnormal Pap smear of cervix 09/10/2019    See problem list    Adrenal gland anomaly     CHF (congestive heart failure) (H)     Fatty liver     Gastroesophageal reflux disease     Hyperlipidemia     Hypertension     Mild persistent asthma     NICM (nonischemic cardiomyopathy) (H)     Obesity     WILLARD (obstructive sleep apnea) 1/9/2015    Type 2 diabetes mellitus (H)        Past Surgical History:   Procedure Laterality Date    COLONOSCOPY      COLONOSCOPY N/A 10/24/2022    Procedure: COLONOSCOPY,  FLEXIBLE, WITH LESION REMOVAL USING SNARE;  Surgeon: Larissa Eubanks MD;  Location: [facility] OR    COLONOSCOPY WITH CO2 INSUFFLATION N/A 10/24/2022    Procedure: COLONOSCOPY, WITH CO2 INSUFFLATION;  Surgeon: Larissa Eubanks MD;  Location: [facility] OR    DISCECTOMY, FUSION CERVICAL ANTERIOR TWO LEVELS, COMBINED N/A 2019    Procedure: C4-6 anterior cervical discectomy and fusion;  Surgeon: Hollis Hurtado MD;  Location: [facility] OR    TUBAL LIGATION         Family History   Problem Relation Age of Onset    Diabetes Mother     Hypertension Mother     Hyperlipidemia Mother     Heart Failure Mother     Colon Cancer Mother     Diabetes Father     Cancer Paternal Grandmother         ?       Social History     Socioeconomic History    Marital status: Single     Spouse name: None    Number of children: 1    Years of education: None    Highest education level: None   Occupational History    Occupation: CNA     Employer: OTHER   Social Needs    Financial resource strain: None    Food insecurity:     Worry: None     Inability: None    Transportation needs:     Medical: None     Non-medical: None   Tobacco Use    Smoking status: Former Smoker     Packs/day: 1.00     Years: 30.00     Pack years: 30.00     Types: Cigarettes     Last attempt to quit: 2013     Years since quittin.8    Smokeless tobacco: Former User    Tobacco comment:     Substance and Sexual Activity    Alcohol use: Yes     Alcohol/week: 0.0 standard drinks     Comment: 1    Drug use: No    Sexual activity: Yes     Partners: Male     Birth control/protection: Surgical   Lifestyle    Physical activity:     Days per week: None     Minutes per session: None    Stress: None   Relationships    Social connections:     Talks on phone: None     Gets together: None     Attends Worship service: None     Active member of club or organization: None     Attends meetings of clubs or organizations: None     Relationship status: None    Intimate partner violence:      Fear of current or ex partner: None     Emotionally abused: None     Physically abused: None     Forced sexual activity: None   Other Topics Concern    Parent/sibling w/ CABG, MI or angioplasty before 65F 55M? No   Social History Narrative    None       ALLERGIES  Allergies   Allergen Reactions    Amlodipine Swelling     Edema on 5mg throat    Lisinopril      Swelling in throat, face  angioedema    Naprosyn [Naproxen]      Swelling, diff breathing       MEDICATIONS   Current Outpatient Medications   Medication Sig Dispense Refill    acetaminophen (TYLENOL) 500 MG tablet Take 500-1,000 mg by mouth every 6 hours as needed for mild pain      aspirin 81 MG EC tablet Take 1 tablet (81 mg) by mouth daily      atorvastatin (LIPITOR) 40 MG tablet Take 1 tablet (40 mg) by mouth daily 90 tablet 3    carvedilol (COREG) 25 MG tablet Take 1 tablet (25 mg) by mouth 2 times daily (with meals) 180 tablet 3    cholecalciferol 50 MCG (2000 UT) CAPS Take 2,000 Units by mouth daily       Continuous Blood Gluc Sensor (DEXCOM G6 SENSOR) MISC Change every 10 days. 9 each 3    Continuous Blood Gluc Transmit (DEXCOM G6 TRANSMITTER) MISC Change every 3 months. 1 each 3    empagliflozin (JARDIANCE) 10 MG TABS tablet Take 1 tablet (10 mg) by mouth daily Take 1/2 tab daily - will consider increasing to 1 tab at next visit if tolerated 90 tablet 1    fluticasone-salmeterol (WIXELA INHUB) 250-50 MCG/ACT inhaler Inhale 1 puff into the lungs every 12 hours 1 each 11    hydrALAZINE (APRESOLINE) 10 MG tablet Take 1 tablet (10 mg) by mouth 3 times daily 270 tablet 3    insulin glargine U-300 (TOUJEO MAX SOLOSTAR) 300 UNIT/ML (2 units dial) pen Inject 85 Units Subcutaneous daily 30 mL 11    insulin pen needle (ULTICARE MICRO) 32G X 4 MM miscellaneous Use 4 pen needles daily or as directed. 400 each 11    levalbuterol (XOPENEX HFA) 45 MCG/ACT Inhaler Inhale 2 puffs into the lungs every 4 hours as needed for shortness of breath / dyspnea or wheezing 15  g 3    magnesium oxide (MAG-OX) 400 (241.3 Mg) MG tablet Take 1 tablet (400 mg) by mouth daily 100 tablet 3    metFORMIN (GLUCOPHAGE XR) 500 MG 24 hr tablet Take 1 tablet (500 mg) by mouth 2 times daily (with meals) 180 tablet 1    montelukast (SINGULAIR) 10 MG tablet TAKE 1 TABLET BY MOUTH EVERYDAY AT BEDTIME 90 tablet 3    sacubitril-valsartan (ENTRESTO)  MG per tablet Take 1 tablet by mouth 2 times daily 180 tablet 3    spironolactone (ALDACTONE) 50 MG tablet Take 1 tablet (50 mg) by mouth daily 90 tablet 3    tirzepatide (MOUNJARO) 15 MG/0.5ML pen Inject 15 mg Subcutaneous every 7 days 6 mL 1    torsemide (DEMADEX) 20 MG tablet Please take 60 mg daily (3 tabs) 270 tablet 3    blood glucose (NO BRAND SPECIFIED) test strip Use to test blood sugar 3-4 times daily or as directed. (Patient not taking: Reported on 6/14/2024) 200 strip 1    Chlorpheniramine-DM (CORICIDIN HBP COUGH/COLD) 4-30 MG TABS Take 1 tablet by mouth as needed (Patient not taking: Reported on 7/3/2023)      Continuous Blood Gluc Sensor (FREESTYLE ANETA 14 DAY SENSOR) MISC 1 Device every 14 days Change sensor as directed every 14 days (Patient not taking: Reported on 9/12/2023) 2 each 6    Continuous Blood Gluc Sensor (FREESTYLE ANETA 2 SENSOR) MISC 1 each every 14 days (Patient not taking: Reported on 9/12/2023) 6 each 11    dexAMETHasone (DECADRON) 1 MG tablet Take 1 tablet (1 mg) by mouth once for 1 dose At 11:00 PM. The next morning after taking the medication go to he lab at 8:00 am. 1 tablet 0    ketoconazole (NIZORAL) 2 % external cream Apply topically 2 times daily For 2 weeks. (Patient not taking: Reported on 2/15/2023) 60 g 1    tirzepatide (MOUNJARO) 10 MG/0.5ML pen Inject 10 mg Subcutaneous every 7 days (Patient not taking: Reported on 8/28/2024) 2 mL 3    tirzepatide (MOUNJARO) 12.5 MG/0.5ML pen Inject 12.5 mg Subcutaneous every 7 days (Patient not taking: Reported on 8/28/2024) 2 mL 0    triamcinolone (KENALOG) 0.1 % external  cream Apply topically 2 times daily For maximum 2 weeks. (Patient not taking: Reported on 2/15/2023) 60 g 1     No current facility-administered medications for this visit.     Facility-Administered Medications Ordered in Other Visits   Medication Dose Route Frequency Provider Last Rate Last Admin    sodium chloride (PF) 0.9% PF flush 10 mL  10 mL Intracatheter Once El Chinchilla MD               ROS:   Constitutional: No fever, chills, or sweats.  ENT: No visual disturbance, ear ache, epistaxis, sore throat.   Allergies/Immunologic: Negative  Respiratory: No cough, hemoptysis.   Cardiovascular: As per HPI.   GI: As per HPI.   : No urinary frequency, dysuria, or hematuria.   Integument: Negative.   Psychiatric: Negative.   Neuro: Negative.   Endocrinology: negative   Musculoskeletal: negative    EXAM:   General: appears comfortable, alert and articulate  Head: normocephalic, atraumatic  Eyes: anicteric sclera, EOMI  Neck: Supple, no adenopathy  Orophyarynx: moist mucosa, no cyanosis  Heart: regular, S1/S2, no murmur, gallop, rub, estimated JVP not detected at 90 degrees  Lungs: Respirations even and unlabored, lungs clear, no rales or wheezing.  Lungs sounds decreased bilaterally in the bases.  Abdomen:  distended and firmer, non-tender, bowel sounds present, no hepatomegaly  Extremities: no clubbing, cyanosis. Has trace edema  Neurological: normal speech and affect, no gross motor deficits  Skin:  Warm and dry.      LABS  Last Comprehensive Metabolic Panel:  Sodium   Date Value Ref Range Status   06/14/2024 138 135 - 145 mmol/L Final     Comment:     Reference intervals for this test were updated on 09/26/2023 to more accurately reflect our healthy population. There may be differences in the flagging of prior results with similar values performed with this method. Interpretation of those prior results can be made in the context of the updated reference intervals.    03/23/2021 133 133 - 144 mmol/L Final      Potassium   Date Value Ref Range Status   06/14/2024 4.3 3.4 - 5.3 mmol/L Final   02/15/2023 3.5 3.4 - 5.3 mmol/L Final   03/23/2021 3.8 3.4 - 5.3 mmol/L Final     Chloride   Date Value Ref Range Status   06/14/2024 100 98 - 107 mmol/L Final   02/15/2023 100 94 - 109 mmol/L Final   03/23/2021 95 94 - 109 mmol/L Final     Carbon Dioxide   Date Value Ref Range Status   03/23/2021 35 (H) 20 - 32 mmol/L Final     Carbon Dioxide (CO2)   Date Value Ref Range Status   06/14/2024 28 22 - 29 mmol/L Final   02/15/2023 29 20 - 32 mmol/L Final     Anion Gap   Date Value Ref Range Status   06/14/2024 10 7 - 15 mmol/L Final   02/15/2023 7 3 - 14 mmol/L Final   03/23/2021 3 3 - 14 mmol/L Final     Glucose   Date Value Ref Range Status   06/14/2024 99 70 - 99 mg/dL Final   02/15/2023 282 (H) 70 - 99 mg/dL Final   03/23/2021 260 (H) 70 - 99 mg/dL Final     Urea Nitrogen   Date Value Ref Range Status   06/14/2024 18.4 6.0 - 20.0 mg/dL Final   02/15/2023 12 7 - 30 mg/dL Final   03/23/2021 24 7 - 30 mg/dL Final     Creatinine   Date Value Ref Range Status   06/14/2024 1.18 (H) 0.51 - 0.95 mg/dL Final   03/23/2021 1.18 (H) 0.52 - 1.04 mg/dL Final     GFR Estimate   Date Value Ref Range Status   06/14/2024 54 (L) >60 mL/min/1.73m2 Final     Comment:     eGFR calculated using 2021 CKD-EPI equation.   03/23/2021 53 (L) >60 mL/min/[1.73_m2] Final     Comment:     Non  GFR Calc  Starting 12/18/2018, serum creatinine based estimated GFR (eGFR) will be   calculated using the Chronic Kidney Disease Epidemiology Collaboration   (CKD-EPI) equation.       Calcium   Date Value Ref Range Status   06/14/2024 10.1 (H) 8.6 - 10.0 mg/dL Final   03/23/2021 9.9 8.5 - 10.1 mg/dL Final       MOST RECENT ECHOCARDIOGRAM 10/25/19:  Interpretation Summary  Technically difficult study.  Left ventricular size is normal. Mildly (EF 40-45%, traced 45%) reduced left ventricular function is present.  Right ventricular function, chamber size, wall  motion, and thickness are normal. This study was compared with the study from 3/21/18: The left ventricular function has improved    ASSESSMENT AND PLAN  Bettina Montes is a 54 year old female with NICM who appears euvolemic today. She has been at [facility] for her HF.  Unfortunately she couldn't tolerate the dapaglifozin- Jardiance 10 mg daily. She has been feeling well and is successful with 60 mg daily torsemide.       1. Chronic systolic heart failure/HFrEF (EF 40-45%) secondary to nonischemic cardiomyopathy  NYHA Symptom Class IIIB  Stage C  Primary Cardiologist: Dr Chinchilla; Last seen 6/17/22  ACE-I/ARB/ARNi:  Entresto  mg- titration complete  BB yes, Carvedilol 25 mg twice daily- titration complete  Aldosterone antagonist yes, Spironolactone 50 mg daily.   SCD prophylaxis EF > 35%   Fluid status euvolemic  Cardiac Rehab: Completed  Sleep Apnea Evaluation: Documented sleep apnea.    Remote monitoring: Mychart active  Antiplatelet: none.   Anticoagulation: None.  SGLT II- unable to tolerate the dapaglifozin- frequent yeast infections. - is taking 10 mg Jardiance and tolerating  Hydral/isordil: she is on hydral 10 mg TID, was previously on a higher dose which caused hypotension and dizziness.        # Obesity: BMI 47.29.  She is working on weight loss.  Continue to reassess at subsequent visits.     #  HTN:   continue Entresto   Continue to monitor BP readings at home.      #. Shoulder surgery. - October - 2021    4.  Follow-up:  Labs pending  CORE in 6 months             Heide SAHA

## 2024-08-28 NOTE — PATIENT INSTRUCTIONS
Take your medicines every day, as directed     Changes made today:    -continue jardiance at 10 mg daily     Monitor Your Weight and Symptoms     Contact us if you:     Gain 2 pounds in one day or 5 pounds in one week  Feel more short of breath  Notice more leg swelling  Feel lightheadeded    Change your lifestyle     Limit Salt or Sodium:  2000 mg  Limit Fluids:  2000 mL or approximately 64 ounces  Eat a Heart Healthy Diet  Low in saturated fats  Stay Active:  Aim to move at least 150 minutes every  week            To Contact us     During Business Hours:  [phone], option # 1      After hours, weekends or holidays:   [phone], Option #4  Ask to speak to the On-Call Cardiologist. Inform them you are a CORE/heart failure patient at the [facility].       Use [facility] allows you to communicate directly with your heart team through secure messaging.  [email or web address] can be accessed any time on your phone, computer, or tablet.  If you need assistance, we'd be happy to help!             Keep your Heart Appointments:     6 month CORE      Please consider attending our virtual support group which is held monthly. Please reach out to Jb at [phone] for more information if you are interested in attending.      2024 dates:    Monday, September 9th, 1-2pm      Monday, October 7th, 1-2pm      Monday, November 4th, 1-2pm      Monday, December 2nd, 1-2pm

## 2024-08-28 NOTE — NURSING NOTE
"Chief Complaint   Patient presents with    Follow Up     CORE 6 month follow up       Initial /82 (BP Location: Right arm, Patient Position: Sitting, Cuff Size: Adult Large)   Pulse 92   Wt 122.8 kg (270 lb 12.8 oz)   LMP  (LMP Unknown)   SpO2 99%   BMI 43.71 kg/m   Estimated body mass index is 43.71 kg/m  as calculated from the following:    Height as of 6/14/24: 1.676 m (5' 6\").    Weight as of this encounter: 122.8 kg (270 lb 12.8 oz).  BP completed using cuff size: large    Medication refills needed?: Jardiance, carvedilol, entresto, atorvastatin      Kusum Raza, EMT  "

## 2024-08-28 NOTE — TELEPHONE ENCOUNTER
carvedilol (COREG) 25 MG tablet 180 tablet 3 8/28/2024     sacubitril-valsartan (ENTRESTO)  MG per tablet 180 tablet 3 8/28/2024   Above refilled in a different encounter today        atorvastatin (LIPITOR) 40 MG tablet 90 tablet 3 8/3/2023     Antihyperlipidemic agents Passed         Last Office Visit: 8/28/24  Future Office visit:   10/8/24  Katie Sinclair RN  P Central Nursing/Red Flag Triage & Med Refill Team

## 2024-09-20 ENCOUNTER — MYC MEDICAL ADVICE (OUTPATIENT)
Dept: ENDOCRINOLOGY | Facility: CLINIC | Age: 54
End: 2024-09-20
Payer: COMMERCIAL

## 2024-09-20 DIAGNOSIS — Z79.4 TYPE 2 DIABETES MELLITUS WITH HYPERGLYCEMIA, WITH LONG-TERM CURRENT USE OF INSULIN (H): ICD-10-CM

## 2024-09-20 DIAGNOSIS — E66.01 MORBID OBESITY (H): Primary | ICD-10-CM

## 2024-09-20 DIAGNOSIS — E11.65 TYPE 2 DIABETES MELLITUS WITH HYPERGLYCEMIA, WITH LONG-TERM CURRENT USE OF INSULIN (H): ICD-10-CM

## 2024-09-24 ENCOUNTER — TELEPHONE (OUTPATIENT)
Dept: ENDOCRINOLOGY | Facility: CLINIC | Age: 54
End: 2024-09-24
Payer: COMMERCIAL

## 2024-09-24 NOTE — TELEPHONE ENCOUNTER
Prior Authorization Approval    Medication: ZEPBOUND 5 MG/0.5ML SC SOAJ  Authorization Effective Date: 9/24/2024  Authorization Expiration Date: 5/22/2025  Approved Dose/Quantity: 2ml per 28 days  Reference #: Key: [id number]   Insurance Company: [employer]Specialty [employer]'s - Phone [phone] Fax [phone]  Expected CoPay: $ 50  CoPay Card Available: Yes    Financial Assistance Needed: yes  Which [facility] is filling the [id number] [place] [address]  Pharmacy Notified: yes  Patient Notified: yes

## 2024-09-24 NOTE — TELEPHONE ENCOUNTER
PA Initiation    Medication: ZEPBOUND 5 MG/0.5ML SC SOAJ  Insurance Company: [employer] - Phone [phone] Fax [phone]  Pharm[facility] Rx[id number] [place], [place] - [address]  Filling Pharmacy Phone:    Filling Pharmacy Fax:    Start Date: 9/24/2024    Key: [id number]

## 2024-09-25 ENCOUNTER — TELEPHONE (OUTPATIENT)
Dept: CARDIOLOGY | Facility: CLINIC | Age: 54
End: 2024-09-25
Payer: COMMERCIAL

## 2024-09-25 NOTE — TELEPHONE ENCOUNTER
Patient stated she tried to refill a prescription ordered by Heide Woodall, but was told that it is too soon to do so.    The patient also stated that she takes the medication three times daily, not twice daily as once was discussed.    Demadex 20 mg tablets    Please call the patient to discuss further. She will be out of the medication. Thank you.    Renay ROJAS/Complex Procedure    [facility]   Neurology, NeuroSurgery, NeuroPsychology, Pain Management and Cardiology Specialties  Medical/Surgical Adult Specialties

## 2024-09-26 NOTE — TELEPHONE ENCOUNTER
RN call to pharmacy to clarify rx- pharmacy states they had two prescriptions on file, will delete old rx and will fill 60 mg daily today    Pt notified    Kimmy Leonard RN

## 2024-09-26 NOTE — TELEPHONE ENCOUNTER
RN call to pt who confirms she is nearly out of her medication and that the directions she has on the last bottle say to take two tabs daily, and therefore she is out before eligible for a refill, since she takes 2-3 daily depending on symptoms.     RN will reach pharmacy after opening hours and return call to pt once resolved    Kimmy Leonard RN

## 2024-09-29 ENCOUNTER — MYC REFILL (OUTPATIENT)
Dept: FAMILY MEDICINE | Facility: CLINIC | Age: 54
End: 2024-09-29
Payer: COMMERCIAL

## 2024-09-29 DIAGNOSIS — J45.30 MILD PERSISTENT ASTHMA WITHOUT COMPLICATION: ICD-10-CM

## 2024-09-30 RX ORDER — MONTELUKAST SODIUM 10 MG/1
1 TABLET ORAL AT BEDTIME
Qty: 90 TABLET | Refills: 3 | Status: SHIPPED | OUTPATIENT
Start: 2024-09-30

## 2024-10-08 ENCOUNTER — VIRTUAL VISIT (OUTPATIENT)
Dept: PHARMACY | Facility: CLINIC | Age: 54
End: 2024-10-08
Attending: INTERNAL MEDICINE
Payer: COMMERCIAL

## 2024-10-08 DIAGNOSIS — E11.65 TYPE 2 DIABETES MELLITUS WITH HYPERGLYCEMIA, WITH LONG-TERM CURRENT USE OF INSULIN (H): ICD-10-CM

## 2024-10-08 DIAGNOSIS — Z79.4 TYPE 2 DIABETES MELLITUS WITH HYPERGLYCEMIA, WITH LONG-TERM CURRENT USE OF INSULIN (H): ICD-10-CM

## 2024-10-08 DIAGNOSIS — E66.01 MORBID OBESITY DUE TO EXCESS CALORIES (H): Primary | ICD-10-CM

## 2024-10-08 NOTE — PROGRESS NOTES
Medication Therapy Management (MTM) Encounter    ASSESSMENT:                            Medication Adherence/Access: See below for considerations    Type 2 Diabetes/BMI > 43:   A1c above goal of less than 7%, but now achieving time in range goal of greater than 70% since initiation of Zepbound 5 mg weekly.  Given tolerating well, would benefit from dose increase today.  In light of current control, will decrease long-acting insulin to mitigate risk of hypoglycemia with self taper plan.  Will continue to follow to titrate.    Due to time constraints, I was only able to assess the above with the patient today. Will follow-up on other disease states in future encounters    PLAN:                            After completed with Zepbound 5 mg supply, increase to Zepbound 7.5 mg weekly.    When you make this upon dose change, decrease Toujeo to 80 units daily and consider decreasing by 5 units every 3 days if FPG is consistently below 80.    Please let me know if you have any lows less than 70.    Endocrine Team & Next Follow-Up:  2024 with Dr. Akbar  2024 with Faraz    SUBJECTIVE/OBJECTIVE:                          Bettina Montes is a 54 year old female seen for an initial visit. She was referred to me from Dr. Akbar.      Reason for visit:     Allergies/ADRs: Reviewed in chart  Past Medical History: Reviewed in chart  Social History     Tobacco Use    Smoking status: Former     Current packs/day: 0.00     Average packs/day: 1 pack/day for 30.0 years (30.0 ttl pk-yrs)     Types: Cigarettes     Start date: 1983     Quit date: 2013     Years since quittin.7    Smokeless tobacco: Former    Tobacco comments:         Vaping Use    Vaping status: Never Used   Substance Use Topics    Alcohol use: Not Currently     Comment: four drinks a month    Drug use: No        Medication Adherence/Access: No issues identified    Diabetes   Patient diagnosed with type 2 diabetes*   Current  "Medications:  Metformin  mg daily (takes about every other day --feels that if she takes this daily she experiences diarrhea)  Insulin glargine U-300 85 units daily.   Zepbound 5 mg weekly -- still having cravings similar to Mounjaro. No itching like with Mounjaro   Jardiance 10 mg daily    Medication history per chart review:  Historically on Mounjaro 15 mg --did not see weight loss and had issues with itching, so switched to Zepbound last month and preferred to start at the 5 mg dose and restart titration schedule  Also historically on NovoLog, discontinued due to good control.    Blood sugar monitoring: Continuous Glucose Monitor see AGP below              Estimated body mass index is 43.71 kg/m  as calculated from the following:    Height as of 6/14/24: 5' 6\" (1.676 m).    Weight as of 8/28/24: 270 lb 12.8 oz (122.8 kg).    Wt Readings from Last 3 Encounters:   08/28/24 270 lb 12.8 oz (122.8 kg)   06/14/24 275 lb 3.2 oz (124.8 kg)   02/28/24 269 lb (122 kg)     Lab Results   Component Value Date    GFRESTIMATED 65 08/28/2024    GFRESTIMATED 54 (L) 06/14/2024    GFRESTIMATED 61 02/28/2024          Today's Vitals: LMP  (LMP Unknown)   ----------------      I spent 20 minutes with this patient today. Dr. Akbar was provided the recommendations above via routed note and is the authorizing prescriber for this visit through the pharmacist collaborative practice agreement. A copy of the visit note was provided to the patient's provider(s).    The patient was given to the patient a summary of these recommendations.     Faraz Renae, PharmD, [facility]  Endocrine & Diabetes [facility] Pharmacist  [address] [place] [id number]    Telemedicine Visit Details  The patient's medications can be safely assessed via a telemedicine encounter.  Type of service:  Telephone visit  Originating Location (pt. Location): Home  Distant Location (provider location):  Off-site  Start Time:  1PM  End Time:  1:20PM     Medication " Therapy Recommendations  Morbid obesity due to excess calories (H)    Current Medication: tirzepatide-Weight Management (ZEPBOUND) 5 MG/0.5ML prefilled pen (Discontinued)   Rationale: Dose too low - Dosage too low - Effectiveness   Recommendation: Increase Dose   Status: Accepted per CPA

## 2024-10-10 ENCOUNTER — VIRTUAL VISIT (OUTPATIENT)
Dept: CARDIOLOGY | Facility: CLINIC | Age: 54
End: 2024-10-10
Payer: COMMERCIAL

## 2024-10-10 VITALS — WEIGHT: 278 LBS | HEIGHT: 66 IN | BODY MASS INDEX: 44.68 KG/M2

## 2024-10-10 DIAGNOSIS — I50.22 CHRONIC SYSTOLIC HEART FAILURE (H): Primary | ICD-10-CM

## 2024-10-10 PROCEDURE — 99215 OFFICE O/P EST HI 40 MIN: CPT | Mod: 95 | Performed by: INTERNAL MEDICINE

## 2024-10-10 PROCEDURE — 99417 PROLNG OP E/M EACH 15 MIN: CPT | Mod: 95 | Performed by: INTERNAL MEDICINE

## 2024-10-10 ASSESSMENT — PAIN SCALES - GENERAL: PAINLEVEL: NO PAIN (0)

## 2024-10-10 NOTE — PROGRESS NOTES
Virtual Visit Details    Type of service:  Video Visit   Start time: 1: 05 p.m.  End time: 1: 28 pm     Patient location: Her workplace in [place]  My location [facility] remote clinic  Video visit software: Equine[employer]        [facility]  Advanced [facility] Failure [facility]    Patient Name: Bettina Montes   : 1970   Date of Visit: 10/10/2024    Primary Care Physician: Prabhu Robbins MD, MD     Referring Physician: Dr. Robbins   Reason for Visit: Cardiomyopathy     Dear Dr. Robbins,     I had the pleasure of seeing Bettina Montes today in the [facility]. As you know Bettina Montes is a pleasant 54 year old year old female, who presents today for further evaluation of cardiomyopathy.    Her cardiac history is as follows as adapted from my partner's note:      Bettina has a history of HFrEF, nonischemic cardiomyopathy, hypertension, obesity with BMI 44, diabetes type 2, hyperlipidemia, probable WILLARD (she reports two previous sleep studies but never heard back and was not prescribed CPAP).  She is a former patient of Dr. Chinchilla and is also followed in the [facility] and is now established with Dr. Bailey.     This visit:   Her hydralazine was changed to 3 times a day at the last visit and blood pressures are better controlled  She denies chest heaviness or pressure, PND orthopnea, or lower extremity edema  Her weight has been stable  She has not had a heart failure hospitalization  She can walk several blocks on a flat ground although inclines are more difficult and cause her to have shortness of breath  She also has knee pain that limits her    She was recently started on a new weight management medication and was on an injectable before and did not lose weight.   She is hopeful that the next agent will be more effective.     She continues to work full time, in a business office, mostly sedentary. She is not exercising.       ROS:  A complete 10-point ROS was negative except as  above.    PAST MEDICAL HISTORY:  Past Medical History:   Diagnosis Date    Abnormal Pap smear of cervix 09/10/2019    See problem list    Adrenal gland anomaly     CHF (congestive heart failure) (H)     Fatty liver     Gastroesophageal reflux disease     Hyperlipidemia     Hypertension     Mild persistent asthma     NICM (nonischemic cardiomyopathy) (H)     Obesity     WILLARD (obstructive sleep apnea) 1/9/2015    Type 2 diabetes mellitus (H)        PAST SURGICAL HISTORY:  Past Surgical History:   Procedure Laterality Date    COLONOSCOPY      COLONOSCOPY N/A 10/24/2022    Procedure: COLONOSCOPY, FLEXIBLE, WITH LESION REMOVAL USING SNARE;  Surgeon: Larissa Eubanks MD;  Location: [facility] OR    COLONOSCOPY WITH CO2 INSUFFLATION N/A 10/24/2022    Procedure: COLONOSCOPY, WITH CO2 INSUFFLATION;  Surgeon: Larissa Eubanks MD;  Location: [facility] OR    DISCECTOMY, FUSION CERVICAL ANTERIOR TWO LEVELS, COMBINED N/A 4/5/2019    Procedure: C4-6 anterior cervical discectomy and fusion;  Surgeon: Hollis Hurtado MD;  Location: [facility] OR    TUBAL LIGATION         FAMILY HISTORY:  Family History   Problem Relation Age of Onset    Diabetes Mother     Hypertension Mother     Hyperlipidemia Mother     Heart Failure Mother     Colon Cancer Mother     Diabetes Father     Cancer Paternal Grandmother         ?   Father - hypertension      She has one daughter who is currently 35 years old and this pregnancy was complicated by gestational diabetes.       SOCIAL HISTORY:  Social History     Socioeconomic History    Marital status: Legally      Spouse name: None    Number of children: 1    Years of education: None    Highest education level: None   Occupational History    Occupation: CNA     Employer: OTHER   Tobacco Use    Smoking status: Former     Current packs/day: 0.00     Average packs/day: 1 pack/day for 30.0 years (30.0 ttl pk-yrs)     Types: Cigarettes     Start date: 1/9/1983     Quit date: 1/9/2013     Years since  quittin.4    Smokeless tobacco: Former    Tobacco comments:         Vaping Use    Vaping status: Never Used   Substance and Sexual Activity    Alcohol use: Not Currently     Comment: four drinks a month    Drug use: No    Sexual activity: Yes     Partners: Male     Birth control/protection: Surgical   Other Topics Concern    Parent/sibling w/ CABG, MI or angioplasty before 65F 55M? No     Social Determinants of Health      Received from [facility], [facility]    Financial Resource Strain    [facility]    Social Connections       MEDICATIONS:  Current Outpatient Medications   Medication Sig Dispense Refill    acetaminophen (TYLENOL) 500 MG tablet Take 500-1,000 mg by mouth every 6 hours as needed for mild pain      aspirin 81 MG EC tablet Take 1 tablet (81 mg) by mouth daily      atorvastatin (LIPITOR) 40 MG tablet Take 1 tablet (40 mg) by mouth daily. 90 tablet 3    carvedilol (COREG) 25 MG tablet Take 1 tablet (25 mg) by mouth 2 times daily (with meals). 180 tablet 3    cholecalciferol 50 MCG (2000 UT) CAPS Take 2,000 Units by mouth daily       Continuous Blood Gluc Sensor (DEXCOM G6 SENSOR) MISC Change every 10 days. 9 each 3    Continuous Blood Gluc Transmit (DEXCOM G6 TRANSMITTER) MISC Change every 3 months. 1 each 3    dexAMETHasone (DECADRON) 1 MG tablet Take 1 tablet (1 mg) by mouth once for 1 dose At 11:00 PM. The next morning after taking the medication go to he lab at 8:00 am. 1 tablet 0    empagliflozin (JARDIANCE) 10 MG TABS tablet Take 1 tablet (10 mg) by mouth daily. 90 tablet 2    fluticasone-salmeterol (WIXELA INHUB) 250-50 MCG/ACT inhaler Inhale 1 puff into the lungs every 12 hours 1 each 11    hydrALAZINE (APRESOLINE) 10 MG tablet Take 1 tablet (10 mg) by mouth 3 times daily 270 tablet 3    insulin glargine U-300 (TOUJEO MAX SOLOSTAR)  300 UNIT/ML (2 units dial) pen Inject 85 Units Subcutaneous daily 30 mL 11    insulin pen needle (ULTICARE MICRO) 32G X 4 MM miscellaneous Use 4 pen needles daily or as directed. 400 each 11    ketoconazole (NIZORAL) 2 % external cream Apply topically 2 times daily For 2 weeks. (Patient not taking: Reported on 2/15/2023) 60 g 1    levalbuterol (XOPENEX HFA) 45 MCG/ACT Inhaler Inhale 2 puffs into the lungs every 4 hours as needed for shortness of breath / dyspnea or wheezing 15 g 3    magnesium oxide (MAG-OX) 400 (241.3 Mg) MG tablet Take 1 tablet (400 mg) by mouth daily 100 tablet 3    metFORMIN (GLUCOPHAGE XR) 500 MG 24 hr tablet Take 1 tablet (500 mg) by mouth 2 times daily (with meals) 180 tablet 1    montelukast (SINGULAIR) 10 MG tablet Take 1 tablet (10 mg) by mouth at bedtime. 90 tablet 3    sacubitril-valsartan (ENTRESTO)  MG per tablet Take 1 tablet by mouth 2 times daily. 180 tablet 3    spironolactone (ALDACTONE) 50 MG tablet Take 1 tablet (50 mg) by mouth daily 90 tablet 3    tirzepatide-Weight Management (ZEPBOUND) 7.5 MG/0.5ML prefilled pen Inject 0.5 mLs (7.5 mg) subcutaneously every 7 days. 2 mL 3    torsemide (DEMADEX) 20 MG tablet Please take 60 mg daily (3 tabs) 270 tablet 3    triamcinolone (KENALOG) 0.1 % external cream Apply topically 2 times daily For maximum 2 weeks. (Patient not taking: Reported on 2/15/2023) 60 g 1     No current facility-administered medications for this visit.     Facility-Administered Medications Ordered in Other Visits   Medication Dose Route Frequency Provider Last Rate Last Admin    sodium chloride (PF) 0.9% PF flush 10 mL  10 mL Intracatheter Once El Chinchilla MD            ALLERGIES:     Allergies   Allergen Reactions    Amlodipine Swelling     Edema on 5mg throat    Lisinopril      Swelling in throat, face  angioedema    Naprosyn [Naproxen]      Swelling, diff breathing         Physical Exam Elements:  Constitutional - well appearing   Eyes - no  redness, discharge  Respiratory - no cough, breathing is comfortable   Musculoskeletal - normal range of motion  Skin - no discoloration, lesions  Neurological - no tremors, headaches, normal gait   Psychiatric - no anxiety, patient is alert & oriented      LABS:    CMP  Last Comprehensive Metabolic Panel:  Sodium   Date Value Ref Range Status   08/28/2024 137 135 - 145 mmol/L Final   03/23/2021 133 133 - 144 mmol/L Final     Potassium   Date Value Ref Range Status   08/28/2024 4.5 3.4 - 5.3 mmol/L Final   02/15/2023 3.5 3.4 - 5.3 mmol/L Final   03/23/2021 3.8 3.4 - 5.3 mmol/L Final     Chloride   Date Value Ref Range Status   08/28/2024 102 98 - 107 mmol/L Final   02/15/2023 100 94 - 109 mmol/L Final   03/23/2021 95 94 - 109 mmol/L Final     Carbon Dioxide   Date Value Ref Range Status   03/23/2021 35 (H) 20 - 32 mmol/L Final     Carbon Dioxide (CO2)   Date Value Ref Range Status   08/28/2024 25 22 - 29 mmol/L Final   02/15/2023 29 20 - 32 mmol/L Final     Anion Gap   Date Value Ref Range Status   08/28/2024 10 7 - 15 mmol/L Final   02/15/2023 7 3 - 14 mmol/L Final   03/23/2021 3 3 - 14 mmol/L Final     Glucose   Date Value Ref Range Status   08/28/2024 160 (H) 70 - 99 mg/dL Final   02/15/2023 282 (H) 70 - 99 mg/dL Final   03/23/2021 260 (H) 70 - 99 mg/dL Final     Urea Nitrogen   Date Value Ref Range Status   08/28/2024 10.6 6.0 - 20.0 mg/dL Final   02/15/2023 12 7 - 30 mg/dL Final   03/23/2021 24 7 - 30 mg/dL Final     Creatinine   Date Value Ref Range Status   08/28/2024 1.02 (H) 0.51 - 0.95 mg/dL Final   03/23/2021 1.18 (H) 0.52 - 1.04 mg/dL Final     GFR Estimate   Date Value Ref Range Status   08/28/2024 65 >60 mL/min/1.73m2 Final     Comment:     eGFR calculated using 2021 CKD-EPI equation.   03/23/2021 53 (L) >60 mL/min/[1.73_m2] Final     Comment:     Non  GFR Calc  Starting 12/18/2018, serum creatinine based estimated GFR (eGFR) will be   calculated using the Chronic Kidney Disease  "Epidemiology Collaboration   (CKD-EPI) equation.       Calcium   Date Value Ref Range Status   08/28/2024 9.4 8.8 - 10.4 mg/dL Final     Comment:     Reference intervals for this test were updated on 7/16/2024 to reflect our healthy population more accurately. There may be differences in the flagging of prior results with similar values performed with this method. Those prior results can be interpreted in the context of the updated reference intervals.   03/23/2021 9.9 8.5 - 10.1 mg/dL Final     Bilirubin Total   Date Value Ref Range Status   09/12/2023 0.5 <=1.2 mg/dL Final   05/13/2019 0.6 0.2 - 1.3 mg/dL Final     Alkaline Phosphatase   Date Value Ref Range Status   09/12/2023 114 (H) 35 - 104 U/L Final   05/13/2019 104 40 - 150 U/L Final     ALT   Date Value Ref Range Status   09/12/2023 14 0 - 50 U/L Final     Comment:     Reference intervals for this test were updated on 6/12/2023 to more accurately reflect our healthy population. There may be differences in the flagging of prior results with similar values performed with this method. Interpretation of those prior results can be made in the context of the updated reference intervals.     05/17/2019 22 0 - 50 U/L Final     AST   Date Value Ref Range Status   09/12/2023 23 0 - 45 U/L Final     Comment:     Reference intervals for this test were updated on 6/12/2023 to more accurately reflect our healthy population. There may be differences in the flagging of prior results with similar values performed with this method. Interpretation of those prior results can be made in the context of the updated reference intervals.   05/13/2019 13 0 - 45 U/L Final       NT-proBNP       TROP  No results found for: \"TROPI\", \"TROPONIN\", \"TROPR\", \"TROPN\"      TSH  TSH   Date Value Ref Range Status   02/15/2023 1.01 0.40 - 4.00 mU/L Final   12/30/2019 1.47 0.40 - 4.00 mU/L Final       CARDIAC DATA:    EKG:  June 2023: Heart rate 96 bpm, normal sinus rhythm, nonspecific ST-T wave " changes    Echo:  2017  Right ventricular function, chamber size, wall motion, and thickness are normal.  Mild left ventricular dilation is present. Mildly (EF 45-50%) reduced left ventricular function is present. Traced at 45%.    2017   Moderate to severe left ventricular systolic dysfunction.  LVEF 30% based on biplane 2D tracing. Global hypokinesis.  Right ventricular size and systolic function appears normal.  No valve dysfunction.  Unable to assess PA pressure but RA pressure is normal.    2019   Left ventricular size is normal. Mildly (EF 40-45%, traced 45%) reduced left  ventricular function is present.  Right ventricular function, chamber size, wall motion, and thickness are  normal.  This study was compared with the study from 3/21/18: The left ventricular  function has improved    2020  Left ventricular wall thickness is normal. Left ventricular size is normal.  The Ejection Fraction is estimated at 40-45%. Mild diffuse hypokinesis is  present.  Right ventricular function, chamber size, wall motion, and thickness are  normal.  The inferior vena cava was normal in size with preserved respiratory  variability. No pericardial effusion is present.    2021  Mildly (EF 40-45%) reduced left ventricular function is present.  Global right ventricular function is normal.  No hemodynamically significant valve abnormalities.  The inferior vena cava is normal.  This study was compared with the study from 7/21/20. No significant change.    2022  Left ventricular function is decreased. The ejection fraction is 40-45% (mildly reduced).  Global right ventricular function is normal.  No significant valvular abnormalities present.  The inferior vena cava cannot be assessed.  No pericardial effusion is present.  No significant changes noted.    6/14/2024   Poor acoustic windows. IV contrast used.    Left ventricular function is decreased. The ejection fraction is 35-40% (moderately reduced).  Mild left ventricular dilation  is present.  Grade I or early diastolic dysfunction.  Global right ventricular function is normal.  The right ventricle is normal size.  The inferior vena cava was normal in size with preserved respiratory variability.  No significant valvular abnormalities present.   This study was compared with the study from 8/2022 .  The left ventricular function has worsened.    Cardiac MRI:  2019  Clinical history: 49-year old female with DM, HTN, WILLARD, HFrEF with recent worsening of LV function now with LVEF of 30%. Stress CMR for further evaluation.  Comparison CMR: None.   1. The LV is mildly dilated in cavity size, with mild concentric LVH. The global systolic function is moderately reduced. The LVEF is 32%. There is global hypokinesis.  2. The RV is normal in cavity size. The global systolic function is normal. The RVEF is 57%.   3. Both atria are normal in size.  4. There is no significant valvular disease.   5. Late gadolinium enhancement imaging shows no MI, or infiltrative disease. There is mid wall enhancement in the basal to mid septum, in a pattern that can be seen in idiopathic dilated CM.   6. Regadenoson stress perfusion imaging shows no ischemia.  7. There is no pericardial effusion or thickening.  8. There is no intracardiac thrombus.  CONCLUSIONS: No myocardial ischemia. Non-ischemic cardiomyopathy; LVEF 32%, RVEF 57%. Mid wall enhancement in the basal to mid septum, in a pattern that can be seen in idiopathic dilated CM. No evidence of cardiac sarcoid.        Cardiac Catheterization:  None available    Zio patch:  2023      ASSESSMENT/PLAN:  In summary, Bettina Montes is here today with the following -      1.  Nonischemic cardiomyopathy, chronic combined systolic and diastolic heart failure, ACC/AHA stage C, NYHA class II, EF 35 to 40%  2.  Hypertension  3.  BMI 44  4.  Diabetes mellitus, HbA1C 7.6%  5.  Mixed hyperlipidemia  6. Palpitations, sinus tachycardia, multiple episodes of brief SVT with average  heart rate 120, PVC burden 5.5%       Plans-  The etiology of her HF is unknown, could be related to long standing previously uncontrolled hypertension, although this has improved more recently. Could also be familial, as mother has history of heart failure.    Goal Directed Medical Therapies for Heart Failure:     These are indicated irrespective of the etiology of heart failure.  We discussed the primary medications, their side effects, the care plan including frequent follow-ups  for optimization of therapies with monitoring of blood pressures, weights and lab results     Beta blocker: Continue carvedilol 25 mg twice a day  ACE/ARB/ARNI: Continue sacubitril/valsartan 97/103 mg, 1 tablet twice a day  MRA: Continue spironolactone 50 mg daily  SGLT2 inhibitor:  empagliflozin 10   Hydral: yes       Euvolemic by history : diuretics: On torsemide - she increased to 60 mg once a day   Cardiac Rehab: Would not qualify based on EF  ICD/ CRT-D: Would not qualify based on EF  Labs: at follow up    WILLARD: discussed repeating sleep study as the last sleep study was done years ago but she has symptoms, and untreated sleep apnea has significant implications for heart failure.  She would like to think about it and let us know if she is willing to get it scheduled    Obesity: This is probably the biggest issue that Bettina is facing-I think her mobility which improved substantially her knee pain her heart failure and blood pressure diuretic needs would all improve significantly if she was able to lose weight.  This is presently being managed by her endocrinologist.  We briefly talked today about the fact that when the BMI is in her range sometimes gastric bypass can be life-changing.  She is going to think about it.  This can be readdressed after she has tried the next weight loss medication.     Follow up [facility] -3 months  Follow up with me in 6 months with repeat echo  CV Genetic testing: Referral placed but has not yet  happened she may need a reminder      I spent 65 minutes in care of the patient today including obtaining recent medical history, personally reviewing recent cardiac testing and/or lab results, today's examination, discussion of testing results and care recommendations with patient.       Thank you for the opportunity to participate in this patient's care. Please feel free to reach out with any questions or concerns.      Maddy Fournier MD

## 2024-10-10 NOTE — NURSING NOTE
Current patient location:  at work     Is the patient currently in the state of [place]? YES    Visit mode:VIDEO    If the visit is dropped, the patient can be reconnected by: VIDEO VISIT: Text to cell phone:   Telephone Information:   Mobile [phone]       Will anyone else be joining the visit? NO  (If patient encounters technical issues they should call [phone])    Are changes needed to the allergy or medication list? No    Patient denies any changes and states that all information remains accurate since last reviewed/verified.     Are refills needed on medications prescribed by this physician? NO    Rooming Documentation:  Questionnaire(s) completed    Reason for visit: TIFFANI Dixon MA VVF

## 2024-10-20 ENCOUNTER — HEALTH MAINTENANCE LETTER (OUTPATIENT)
Age: 54
End: 2024-10-20

## 2024-10-28 NOTE — PROGRESS NOTES
Outcome for 10/28/24 11:41 AM: Data uploaded on Dexcom  Aleena Ordaz MA   Outcome for 11/07/24 11:59 AM: Data obtained via Dexcom website  Aleena Ordaz MA    Patient is showing 5/5 [place] met.   Aleena Ordaz MA

## 2024-11-05 ENCOUNTER — VIRTUAL VISIT (OUTPATIENT)
Dept: PHARMACY | Facility: CLINIC | Age: 54
End: 2024-11-05
Attending: INTERNAL MEDICINE
Payer: COMMERCIAL

## 2024-11-05 DIAGNOSIS — E11.69 TYPE 2 DIABETES MELLITUS WITH OTHER SPECIFIED COMPLICATION, WITH LONG-TERM CURRENT USE OF INSULIN (H): Primary | ICD-10-CM

## 2024-11-05 DIAGNOSIS — Z79.4 TYPE 2 DIABETES MELLITUS WITH OTHER SPECIFIED COMPLICATION, WITH LONG-TERM CURRENT USE OF INSULIN (H): Primary | ICD-10-CM

## 2024-11-05 DIAGNOSIS — E66.01 MORBID OBESITY DUE TO EXCESS CALORIES (H): ICD-10-CM

## 2024-11-05 RX ORDER — TIRZEPATIDE 10 MG/.5ML
10 INJECTION, SOLUTION SUBCUTANEOUS
Qty: 2 ML | Refills: 5 | Status: SHIPPED | OUTPATIENT
Start: 2024-11-05 | End: 2024-11-11

## 2024-11-05 NOTE — PROGRESS NOTES
Medication Therapy Management (MTM) Encounter    ASSESSMENT:                            Medication Adherence/Access: See below for considerations    Type 2 Diabetes/BMI > 43:   A1c above goal of less than 7%, nearing time in range goal of greater than 70% since initiation of Zepbound.  Given tolerating well, would benefit from dose increase today.  In light of current control, will continue Toujeo self taper plan.  Will continue to follow to titrate.    PLAN:                            After completed with Zepbound 7.5 mg supply, increase to Zepbound 10 mg weekly.    Continue Toujeo 80 units daily and continue to decrease by 5 units every 3 days if morning fasting sugars are consistently below 80.    Please let me know if you have any lows less than 70.    Endocrine Team & Next Follow-Up:  2024 with Dr. Akbar  12/3/2024 with Faraz    SUBJECTIVE/OBJECTIVE:                          Bettina Montes is a 54 year old female seen for a follow-up visit. She was referred to me from Dr. Akbar.      Reason for visit:     Allergies/ADRs: Reviewed in chart  Past Medical History: Reviewed in chart  Social History     Tobacco Use    Smoking status: Former     Current packs/day: 0.00     Average packs/day: 1 pack/day for 30.0 years (30.0 ttl pk-yrs)     Types: Cigarettes     Start date: 1983     Quit date: 2013     Years since quittin.8    Smokeless tobacco: Former    Tobacco comments:         Vaping Use    Vaping status: Never Used   Substance Use Topics    Alcohol use: Not Currently     Comment: four drinks a month    Drug use: No        Medication Adherence/Access: No issues identified    Diabetes   Patient diagnosed with type 2 diabetes*   Current Medications:  Metformin  mg daily (takes about every other day --feels that if she takes this daily she experiences diarrhea)  Insulin glargine U-300 80 units daily.   Zepbound 7.5 mg weekly -- still having cravings similar to Mounjaro. No itching like  "with Mounjaro   Jardiance 10 mg daily    Medication history per chart review:  Historically on Mounjaro 15 mg --did not see weight loss and had issues with itching, so switched to Zepbound last month and preferred to start at the 5 mg dose and restart titration schedule  Also historically on NovoLog, discontinued due to good control.    Blood sugar monitoring: Continuous Glucose Monitor see AGP below        Lows over past few weeks likely sensor error per patient -- no symptoms              Estimated body mass index is 44.87 kg/m  as calculated from the following:    Height as of 10/10/24: 5' 6\" (1.676 m).    Weight as of 10/10/24: 278 lb (126.1 kg).    Wt Readings from Last 3 Encounters:   10/10/24 278 lb (126.1 kg)   08/28/24 270 lb 12.8 oz (122.8 kg)   06/14/24 275 lb 3.2 oz (124.8 kg)     Lab Results   Component Value Date    GFRESTIMATED 65 08/28/2024    GFRESTIMATED 54 (L) 06/14/2024    GFRESTIMATED 61 02/28/2024          Today's Vitals: LMP  (LMP Unknown)   ----------------      I spent 20 minutes with this patient today. Dr. Akbar was provided the recommendations above via routed note and is the authorizing prescriber for this visit through the pharmacist collaborative practice agreement. A copy of the visit note was provided to the patient's provider(s).    The patient was given to the patient a summary of these recommendations.     Faraz Renae, PharmD, [facility]  Endocrine & Diabetes [facility] Pharmacist  [address] [place] [id number]    Telemedicine Visit Details  The patient's medications can be safely assessed via a telemedicine encounter.  Type of service:  Telephone visit  Originating Location (pt. Location): Home  Distant Location (provider location):  Off-site  Start Time:  1PM  End Time:  1:20PM     Medication Therapy Recommendations  Morbid obesity due to excess calories (H)   1 Current Medication: ZEPBOUND 10 MG/0.5ML prefilled pen   Current Medication Sig: Inject 0.5 mLs (10 mg) " subcutaneously every 7 days.   Rationale: Dose too low - Dosage too low - Effectiveness   Recommendation: Increase Dose   Status: Accepted per CPA   Identified Date: 11/5/2024 Completed Date: 11/5/2024

## 2024-11-11 ENCOUNTER — VIRTUAL VISIT (OUTPATIENT)
Dept: ENDOCRINOLOGY | Facility: CLINIC | Age: 54
End: 2024-11-11
Payer: COMMERCIAL

## 2024-11-11 VITALS — HEIGHT: 66 IN | BODY MASS INDEX: 44.87 KG/M2

## 2024-11-11 DIAGNOSIS — E11.65 TYPE 2 DIABETES MELLITUS WITH HYPERGLYCEMIA, WITH LONG-TERM CURRENT USE OF INSULIN (H): ICD-10-CM

## 2024-11-11 DIAGNOSIS — Z79.4 TYPE 2 DIABETES MELLITUS WITH HYPERGLYCEMIA, WITH LONG-TERM CURRENT USE OF INSULIN (H): ICD-10-CM

## 2024-11-11 DIAGNOSIS — E66.01 MORBID OBESITY (H): Primary | ICD-10-CM

## 2024-11-11 RX ORDER — TIRZEPATIDE 12.5 MG/.5ML
12.5 INJECTION, SOLUTION SUBCUTANEOUS
Qty: 2 ML | Refills: 1 | OUTPATIENT
Start: 2024-11-11 | End: 2024-11-11

## 2024-11-11 RX ORDER — TIRZEPATIDE 10 MG/.5ML
10 INJECTION, SOLUTION SUBCUTANEOUS
Qty: 2 ML | Refills: 0 | Status: SHIPPED | OUTPATIENT
Start: 2024-11-11

## 2024-11-11 ASSESSMENT — PAIN SCALES - GENERAL: PAINLEVEL_OUTOF10: NO PAIN (0)

## 2024-11-11 NOTE — Clinical Note
Thank you for seeing her. I haven't changed anything today. She will see you in a few weeks. I will see her in 6-7 months. hCarlotte

## 2024-11-11 NOTE — PROGRESS NOTES
Endocrinology Clinic Visit    Chief Complaint: RECHECK     Information obtained from:Patient      Assessment/Treatment Plan:    Type 2 diabetes /morbid obesity: Improvement of glycemic control but still not optimal.  Past medical history of heart failure, obstructive sleep apnea & morbid obesity Estimated body mass index is       Plan  Metformin 500 mg extended release with supper -did not tolerate higher dose.  Increase Zepbound to 10 mg every 7 days after staying 4 weeks on the current dose.  Every 4 weeks if tolerated please increase the bone further to 12.5 mg every 7 days and then finally to 15 mg every 7 days.  Please follow-up closely with MTM team as you are currently doing.  Continue insulin glargine ( U300)  80 units once a day; if morning blood sugars below 80 please reduce insulin dose by 5 units every third day.  Continue Jardiance     Adrenal nodule   I have personally reviewed adrenal nodule MRI from 7/6/2023.  Agree with interpretation of 2.2 x 1.9 cm adrenal nodule on the left side.  This nodule compared to previous study from 2017 is a stable in size.  MRI characteristics per report suggestive of fat-containing benign adrenal adenoma.  Hormonal assessment as detailed below for metanephrines, aldosterone hormones, DHEA-sulfate within the normal limits.  Cortisol after Dex suppression normal.      Hepatic steatosis  Pancreatic cyst  Gallbladder polyp     Previously GI recommended MRI with MRCP however Bettina has not scheduled this yet.  I have reminded her to reach out to GI and schedule an appointment for this MRI.  Verbalized understanding.      Cadence Akbar MD  Staff Endocrinologist    Division of Endocrinology and Diabetes      Subjective:         HPI: Bettina Montes is a 54 year old female with history of type 2 diabetes who is here for routine follow-up.   Type 2 diabetes   Zepbound 7.5 mg weekly.  Metformin 500 mg extended release daily.   Toujeo 80 units daily.   Dexcom downloaded  and reviewed in detail.   Past medical history of heart failure currently clinically stable.  Scheduled with bariatric surgery for consultation of weight loss procedures.  Not currently pursuing bariatric surgery.  Has tried multiple diets including weight watchers however sticking to the diet has been difficult.  Lost weight [about 30+ pounds] on semaglutide until it was switched to Mounjaro due to coverage.                                    Allergies   Allergen Reactions    Amlodipine Swelling     Edema on 5mg throat    Lisinopril      Swelling in throat, face  angioedema    Naprosyn [Naproxen]      Swelling, diff breathing       Current Outpatient Medications   Medication Sig Dispense Refill    acetaminophen (TYLENOL) 500 MG tablet Take 500-1,000 mg by mouth every 6 hours as needed for mild pain      aspirin 81 MG EC tablet Take 1 tablet (81 mg) by mouth daily      atorvastatin (LIPITOR) 40 MG tablet Take 1 tablet (40 mg) by mouth daily. 90 tablet 3    carvedilol (COREG) 25 MG tablet Take 1 tablet (25 mg) by mouth 2 times daily (with meals). 180 tablet 3    cholecalciferol 50 MCG (2000 UT) CAPS Take 2,000 Units by mouth daily       Continuous Blood Gluc Sensor (DEXCOM G6 SENSOR) MISC Change every 10 days. 9 each 3    Continuous Blood Gluc Transmit (DEXCOM G6 TRANSMITTER) MISC Change every 3 months. 1 each 3    dexAMETHasone (DECADRON) 1 MG tablet Take 1 tablet (1 mg) by mouth once for 1 dose At 11:00 PM. The next morning after taking the medication go to he lab at 8:00 am. 1 tablet 0    empagliflozin (JARDIANCE) 10 MG TABS tablet Take 1 tablet (10 mg) by mouth daily. 90 tablet 2    fluticasone-salmeterol (WIXELA INHUB) 250-50 MCG/ACT inhaler Inhale 1 puff into the lungs every 12 hours 1 each 11    hydrALAZINE (APRESOLINE) 10 MG tablet Take 1 tablet (10 mg) by mouth 3 times daily 270 tablet 3    insulin glargine U-300 (TOUJEO MAX SOLOSTAR) 300 UNIT/ML (2 units dial) pen Inject 85 Units Subcutaneous daily 30 mL 11  "   insulin pen needle (ULTICARE MICRO) 32G X 4 MM miscellaneous Use 4 pen needles daily or as directed. 400 each 11    ketoconazole (NIZORAL) 2 % external cream Apply topically 2 times daily For 2 weeks. (Patient not taking: Reported on 2/15/2023) 60 g 1    levalbuterol (XOPENEX HFA) 45 MCG/ACT Inhaler Inhale 2 puffs into the lungs every 4 hours as needed for shortness of breath / dyspnea or wheezing 15 g 3    magnesium oxide (MAG-OX) 400 (241.3 Mg) MG tablet Take 1 tablet (400 mg) by mouth daily 100 tablet 3    metFORMIN (GLUCOPHAGE XR) 500 MG 24 hr tablet Take 1 tablet (500 mg) by mouth 2 times daily (with meals) 180 tablet 1    montelukast (SINGULAIR) 10 MG tablet Take 1 tablet (10 mg) by mouth at bedtime. 90 tablet 3    sacubitril-valsartan (ENTRESTO)  MG per tablet Take 1 tablet by mouth 2 times daily. 180 tablet 3    spironolactone (ALDACTONE) 50 MG tablet Take 1 tablet (50 mg) by mouth daily 90 tablet 3    torsemide (DEMADEX) 20 MG tablet Please take 60 mg daily (3 tabs) 270 tablet 3    triamcinolone (KENALOG) 0.1 % external cream Apply topically 2 times daily For maximum 2 weeks. (Patient not taking: Reported on 2/15/2023) 60 g 1    ZEPBOUND 10 MG/0.5ML prefilled pen Inject 0.5 mLs (10 mg) subcutaneously every 7 days. 2 mL 5       Review of Systems     8 point review system (Constitutional, HENT, Eyes, Respiratory, Cardiovascular, Gastrointestinal, Genitourinary, Musculoskeletal,Neurological, Psychiatric/Behavioural, Endocrine) is negative or is as per HPI above  Past medical history, past surgical history, social and family history reviewed in epic.        Objective:   Ht 1.676 m (5' 6\")   LMP  (LMP Unknown)   BMI 44.87 kg/m    Constitutional: Pleasant no acute cardiopulmonary distress.   Psychological: appropriate mood and affect    In House Labs:   Hemoglobin A1C   Date Value Ref Range Status   09/12/2023 6.8 (H) 0.0 - 5.6 % Final     Comment:     Normal <5.7%   Prediabetes 5.7-6.4%    Diabetes " 6.5% or higher     Note: Adopted from ADA consensus guidelines.   08/23/2023 6.5 (H) 0.0 - 5.6 % Final     Comment:     Normal <5.7%   Prediabetes 5.7-6.4%    Diabetes 6.5% or higher     Note: Adopted from ADA consensus guidelines.   05/09/2023 7.6 (H) 0.0 - 5.6 % Final     Comment:     Normal <5.7%   Prediabetes 5.7-6.4%    Diabetes 6.5% or higher     Note: Adopted from ADA consensus guidelines.   06/11/2021 10.1 (H) 0 - 5.6 % Final     Comment:     Normal <5.7% Prediabetes 5.7-6.4%  Diabetes 6.5% or higher - adopted from ADA   consensus guidelines.     01/14/2021 9.6 (H) 0 - 5.6 % Final     Comment:     Normal <5.7% Prediabetes 5.7-6.4%  Diabetes 6.5% or higher - adopted from ADA   consensus guidelines.     08/04/2020 11.0 (H) 0 - 5.6 % Final     Comment:     Normal <5.7% Prediabetes 5.7-6.4%  Diabetes 6.5% or higher - adopted from ADA   consensus guidelines.  Results confirmed by repeat test       Hemoglobin A1C POCT   Date Value Ref Range Status   01/16/2024 7.6 (A) 4.3 - <5.7 % Final      TSH   Date Value Ref Range Status   02/15/2023 1.01 0.40 - 4.00 mU/L Final   12/30/2019 1.47 0.40 - 4.00 mU/L Final   05/13/2019 0.71 0.40 - 4.00 mU/L Final   05/10/2017 0.84 0.40 - 4.00 mU/L Final   02/17/2016 1.04 0.40 - 4.00 mU/L Final       Creatinine   Date Value Ref Range Status   08/28/2024 1.02 (H) 0.51 - 0.95 mg/dL Final   03/23/2021 1.18 (H) 0.52 - 1.04 mg/dL Final   ]    Recent Labs   Lab Test 09/29/21  0931 01/14/21  1622 05/17/19  1306   CHOL 128  --  147   HDL 42*  --  66   LDL 58 83 64   TRIG 140  --  83       Component      Latest Ref Rn 9/12/2023  4:36 PM   Metanephrine      0.00 - 0.49 nmol/L 0.22    Normetanephrine      0.00 - 0.89 nmol/L 0.41    Metanephrines Interpretation See Note    DHEA Sulfate      35 - 430 ug/dL 86    Adrenal Corticotropin      <47 pg/mL 35    Potassium      3.4 - 5.3 mmol/L 3.7    Aldosterone      0.0 - 31.0 ng/dL 16.3    Renin Activity      ng/mL/hr 19.6    Aldosterone Renin  Ratio      0.0 - 25.0  0.8        This note has been dictated using voice recognition software.  As a result, there may be errors in the documentation that have gone undetected.  Please consider this when interpreting information in this documentation.      Video-Visit Details    Type of service:  Video Visit  Distant Location (provider location):  Off-site.   Joined the call at 11/11/2024, 11:29:55 am.  Left the call at 11/11/2024, 11:43:15 am.  You were on the call for 13 minutes 19 seconds .  Platform used for Video Visit: Nic  The longitudinal plan of care for the diagnosis(es)/condition(s) as documented were addressed during this visit. Due to the added complexity in care, I will continue to support Bettina in the subsequent management and with ongoing continuity of care.

## 2024-11-11 NOTE — LETTER
11/11/2024      Bettina Montes  [address] [place] [address]      Dear Colleague,    Thank you for referring your patient, Bettina Montes, to the [facility]. Please see a copy of my visit note below.    Outcome for 10/28/24 11:41 AM: Data uploaded on Dexcom  Aleena Ordaz MA   Outcome for 11/07/24 11:59 AM: Data obtained via Dexcom website  Aleena Ordaz MA    Patient is showing 5/5 [place] met.   Aleena Ordaz MA                Endocrinology Clinic Visit    Chief Complaint: RECHECK     Information obtained from:Patient      Assessment/Treatment Plan:    Type 2 diabetes /morbid obesity: Improvement of glycemic control but still not optimal.  Past medical history of heart failure, obstructive sleep apnea & morbid obesity Estimated body mass index is       Plan  Metformin 500 mg extended release with supper -did not tolerate higher dose.  Increase Zepbound to 10 mg every 7 days after staying 4 weeks on the current dose.  Every 4 weeks if tolerated please increase the bone further to 12.5 mg every 7 days and then finally to 15 mg every 7 days.  Please follow-up closely with MTM team as you are currently doing.  Continue insulin glargine ( U300)  80 units once a day; if morning blood sugars below 80 please reduce insulin dose by 5 units every third day.  Continue Jardiance     Adrenal nodule   I have personally reviewed adrenal nodule MRI from 7/6/2023.  Agree with interpretation of 2.2 x 1.9 cm adrenal nodule on the left side.  This nodule compared to previous study from 2017 is a stable in size.  MRI characteristics per report suggestive of fat-containing benign adrenal adenoma.  Hormonal assessment as detailed below for metanephrines, aldosterone hormones, DHEA-sulfate within the normal limits.  Cortisol after Dex suppression normal.      Hepatic steatosis  Pancreatic cyst  Gallbladder polyp     Previously GI recommended MRI with MRCP however Bettina has not  scheduled this yet.  I have reminded her to reach out to GI and schedule an appointment for this MRI.  Verbalized understanding.      Cadence Akbar MD  Staff Endocrinologist    Division of Endocrinology and Diabetes      Subjective:         HPI: Bettina Montes is a 54 year old female with history of type 2 diabetes who is here for routine follow-up.   Type 2 diabetes   Zepbound 7.5 mg weekly.  Metformin 500 mg extended release daily.   Toujeo 80 units daily.   Dexcom downloaded and reviewed in detail.   Past medical history of heart failure currently clinically stable.  Scheduled with bariatric surgery for consultation of weight loss procedures.  Not currently pursuing bariatric surgery.  Has tried multiple diets including weight watchers however sticking to the diet has been difficult.  Lost weight [about 30+ pounds] on semaglutide until it was switched to Mounjaro due to coverage.                                    Allergies   Allergen Reactions     Amlodipine Swelling     Edema on 5mg throat     Lisinopril      Swelling in throat, face  angioedema     Naprosyn [Naproxen]      Swelling, diff breathing       Current Outpatient Medications   Medication Sig Dispense Refill     acetaminophen (TYLENOL) 500 MG tablet Take 500-1,000 mg by mouth every 6 hours as needed for mild pain       aspirin 81 MG EC tablet Take 1 tablet (81 mg) by mouth daily       atorvastatin (LIPITOR) 40 MG tablet Take 1 tablet (40 mg) by mouth daily. 90 tablet 3     carvedilol (COREG) 25 MG tablet Take 1 tablet (25 mg) by mouth 2 times daily (with meals). 180 tablet 3     cholecalciferol 50 MCG (2000 UT) CAPS Take 2,000 Units by mouth daily        Continuous Blood Gluc Sensor (DEXCOM G6 SENSOR) MISC Change every 10 days. 9 each 3     Continuous Blood Gluc Transmit (DEXCOM G6 TRANSMITTER) MISC Change every 3 months. 1 each 3     dexAMETHasone (DECADRON) 1 MG tablet Take 1 tablet (1 mg) by mouth once for 1 dose At 11:00 PM. The next  morning after taking the medication go to he lab at 8:00 am. 1 tablet 0     empagliflozin (JARDIANCE) 10 MG TABS tablet Take 1 tablet (10 mg) by mouth daily. 90 tablet 2     fluticasone-salmeterol (WIXELA INHUB) 250-50 MCG/ACT inhaler Inhale 1 puff into the lungs every 12 hours 1 each 11     hydrALAZINE (APRESOLINE) 10 MG tablet Take 1 tablet (10 mg) by mouth 3 times daily 270 tablet 3     insulin glargine U-300 (TOUJEO MAX SOLOSTAR) 300 UNIT/ML (2 units dial) pen Inject 85 Units Subcutaneous daily 30 mL 11     insulin pen needle (ULTICARE MICRO) 32G X 4 MM miscellaneous Use 4 pen needles daily or as directed. 400 each 11     ketoconazole (NIZORAL) 2 % external cream Apply topically 2 times daily For 2 weeks. (Patient not taking: Reported on 2/15/2023) 60 g 1     levalbuterol (XOPENEX HFA) 45 MCG/ACT Inhaler Inhale 2 puffs into the lungs every 4 hours as needed for shortness of breath / dyspnea or wheezing 15 g 3     magnesium oxide (MAG-OX) 400 (241.3 Mg) MG tablet Take 1 tablet (400 mg) by mouth daily 100 tablet 3     metFORMIN (GLUCOPHAGE XR) 500 MG 24 hr tablet Take 1 tablet (500 mg) by mouth 2 times daily (with meals) 180 tablet 1     montelukast (SINGULAIR) 10 MG tablet Take 1 tablet (10 mg) by mouth at bedtime. 90 tablet 3     sacubitril-valsartan (ENTRESTO)  MG per tablet Take 1 tablet by mouth 2 times daily. 180 tablet 3     spironolactone (ALDACTONE) 50 MG tablet Take 1 tablet (50 mg) by mouth daily 90 tablet 3     torsemide (DEMADEX) 20 MG tablet Please take 60 mg daily (3 tabs) 270 tablet 3     triamcinolone (KENALOG) 0.1 % external cream Apply topically 2 times daily For maximum 2 weeks. (Patient not taking: Reported on 2/15/2023) 60 g 1     ZEPBOUND 10 MG/0.5ML prefilled pen Inject 0.5 mLs (10 mg) subcutaneously every 7 days. 2 mL 5       Review of Systems     8 point review system (Constitutional, HENT, Eyes, Respiratory, Cardiovascular, Gastrointestinal, Genitourinary,  "Musculoskeletal,Neurological, Psychiatric/Behavioural, Endocrine) is negative or is as per HPI above  Past medical history, past surgical history, social and family history reviewed in epic.        Objective:   Ht 1.676 m (5' 6\")   LMP  (LMP Unknown)   BMI 44.87 kg/m    Constitutional: Pleasant no acute cardiopulmonary distress.   Psychological: appropriate mood and affect    In House Labs:   Hemoglobin A1C   Date Value Ref Range Status   09/12/2023 6.8 (H) 0.0 - 5.6 % Final     Comment:     Normal <5.7%   Prediabetes 5.7-6.4%    Diabetes 6.5% or higher     Note: Adopted from ADA consensus guidelines.   08/23/2023 6.5 (H) 0.0 - 5.6 % Final     Comment:     Normal <5.7%   Prediabetes 5.7-6.4%    Diabetes 6.5% or higher     Note: Adopted from ADA consensus guidelines.   05/09/2023 7.6 (H) 0.0 - 5.6 % Final     Comment:     Normal <5.7%   Prediabetes 5.7-6.4%    Diabetes 6.5% or higher     Note: Adopted from ADA consensus guidelines.   06/11/2021 10.1 (H) 0 - 5.6 % Final     Comment:     Normal <5.7% Prediabetes 5.7-6.4%  Diabetes 6.5% or higher - adopted from ADA   consensus guidelines.     01/14/2021 9.6 (H) 0 - 5.6 % Final     Comment:     Normal <5.7% Prediabetes 5.7-6.4%  Diabetes 6.5% or higher - adopted from ADA   consensus guidelines.     08/04/2020 11.0 (H) 0 - 5.6 % Final     Comment:     Normal <5.7% Prediabetes 5.7-6.4%  Diabetes 6.5% or higher - adopted from ADA   consensus guidelines.  Results confirmed by repeat test       Hemoglobin A1C POCT   Date Value Ref Range Status   01/16/2024 7.6 (A) 4.3 - <5.7 % Final      TSH   Date Value Ref Range Status   02/15/2023 1.01 0.40 - 4.00 mU/L Final   12/30/2019 1.47 0.40 - 4.00 mU/L Final   05/13/2019 0.71 0.40 - 4.00 mU/L Final   05/10/2017 0.84 0.40 - 4.00 mU/L Final   02/17/2016 1.04 0.40 - 4.00 mU/L Final       Creatinine   Date Value Ref Range Status   08/28/2024 1.02 (H) 0.51 - 0.95 mg/dL Final   03/23/2021 1.18 (H) 0.52 - 1.04 mg/dL Final   ]    Recent " Labs   Lab Test 09/29/21  0931 01/14/21  1622 05/17/19  1306   CHOL 128  --  147   HDL 42*  --  66   LDL 58 83 64   TRIG 140  --  83       Component      Latest Ref Rng 9/12/2023  4:36 PM   Metanephrine      0.00 - 0.49 nmol/L 0.22    Normetanephrine      0.00 - 0.89 nmol/L 0.41    Metanephrines Interpretation See Note    DHEA Sulfate      35 - 430 ug/dL 86    Adrenal Corticotropin      <47 pg/mL 35    Potassium      3.4 - 5.3 mmol/L 3.7    Aldosterone      0.0 - 31.0 ng/dL 16.3    Renin Activity      ng/mL/hr 19.6    Aldosterone Renin Ratio      0.0 - 25.0  0.8        This note has been dictated using voice recognition software.  As a result, there may be errors in the documentation that have gone undetected.  Please consider this when interpreting information in this documentation.      Video-Visit Details    Type of service:  Video Visit  Distant Location (provider location):  Off-site.   Joined the call at 11/11/2024, 11:29:55 am.  Left the call at 11/11/2024, 11:43:15 am.  You were on the call for 13 minutes 19 seconds .  Platform used for Video Visit: Nic  The longitudinal plan of care for the diagnosis(es)/condition(s) as documented were addressed during this visit. Due to the added complexity in care, I will continue to support Bettina in the subsequent management and with ongoing continuity of care.        Again, thank you for allowing me to participate in the care of your patient.        Sincerely,        Cadence Akbar MD

## 2024-11-11 NOTE — NURSING NOTE
Current patient location: Patient declined to provide     Is the patient currently in the state of [place]? YES    Visit mode:VIDEO    If the visit is dropped, the patient can be reconnected by:VIDEO VISIT: Text to cell phone:   Telephone Information:   Mobile [phone]       Will anyone else be joining the visit? NO  (If patient encounters technical issues they should call [phone]    Are changes needed to the allergy or medication list? No    Are refills needed on medications prescribed by this physician? NO    Rooming Documentation:  Questionnaire(s) completed    Reason for visit: RECHECK    Cora GONZALEZ

## 2024-11-13 ENCOUNTER — NURSE TRIAGE (OUTPATIENT)
Dept: FAMILY MEDICINE | Facility: CLINIC | Age: 54
End: 2024-11-13
Payer: COMMERCIAL

## 2024-11-13 NOTE — TELEPHONE ENCOUNTER
"  Reason for Disposition   Patient wants to be seen    Answer Assessment - Initial Assessment Questions  1. ONSET: \"When did the pain start?\"      Been going on for a couple of weeks    2. LOCATION: \"Where is the pain located?\"      Left shoulder pain, neck pain, and low back pain    3. PAIN: \"How bad is the pain?\" (Scale 1-10; or mild, moderate, severe)    - MILD (1-3): doesn't interfere with normal activities    - MODERATE (4-7): interferes with normal activities (e.g., work or school) or awakens from sleep    - SEVERE (8-10): excruciating pain, unable to do any normal activities, unable to move arm at all due to pain      Rates at a 4 on 0-10 pain scale right now    4. WORK OR EXERCISE: \"Has there been any recent work or exercise that involved this part of the body?\"      Normally sleeps on left side. Had surgery on this part back in 2019    5. CAUSE: \"What do you think is causing the shoulder pain?\"      Unknown    6. OTHER SYMPTOMS: \"Do you have any other symptoms?\" (e.g., neck pain, swelling, rash, fever, numbness, weakness)      Neck pain, low back pain    7. PREGNANCY: \"Is there any chance you are pregnant?\" \"When was your last menstrual period?\"      Not applicable    Protocols used: Shoulder Pain-A-OH    Reviewed disposition. Appointment scheduled for tomorrow. Reviewed red flag symptoms and when to be seen more urgently. Reviewed care advise, over the counter tylenol and ibuprofen, ice and heat. Patient verbalized understanding and agrees with plan. Advised to call with questions or concerns. Neo Bsutos, RN, BSN    "

## 2024-11-14 ENCOUNTER — ANCILLARY PROCEDURE (OUTPATIENT)
Dept: GENERAL RADIOLOGY | Facility: CLINIC | Age: 54
End: 2024-11-14
Attending: FAMILY MEDICINE
Payer: COMMERCIAL

## 2024-11-14 ENCOUNTER — OFFICE VISIT (OUTPATIENT)
Dept: FAMILY MEDICINE | Facility: CLINIC | Age: 54
End: 2024-11-14
Payer: COMMERCIAL

## 2024-11-14 VITALS
OXYGEN SATURATION: 97 % | BODY MASS INDEX: 43.01 KG/M2 | HEIGHT: 66 IN | HEART RATE: 86 BPM | WEIGHT: 267.6 LBS | SYSTOLIC BLOOD PRESSURE: 119 MMHG | RESPIRATION RATE: 18 BRPM | DIASTOLIC BLOOD PRESSURE: 78 MMHG | TEMPERATURE: 96.9 F

## 2024-11-14 DIAGNOSIS — M25.512 ACUTE PAIN OF LEFT SHOULDER: Primary | ICD-10-CM

## 2024-11-14 DIAGNOSIS — M54.2 NECK PAIN ON LEFT SIDE: ICD-10-CM

## 2024-11-14 DIAGNOSIS — M25.512 ACUTE PAIN OF LEFT SHOULDER: ICD-10-CM

## 2024-11-14 DIAGNOSIS — Z23 ENCOUNTER FOR IMMUNIZATION: ICD-10-CM

## 2024-11-14 DIAGNOSIS — M54.9 UPPER BACK PAIN ON LEFT SIDE: ICD-10-CM

## 2024-11-14 DIAGNOSIS — Z12.31 VISIT FOR SCREENING MAMMOGRAM: ICD-10-CM

## 2024-11-14 PROCEDURE — 90471 IMMUNIZATION ADMIN: CPT | Performed by: FAMILY MEDICINE

## 2024-11-14 PROCEDURE — 90715 TDAP VACCINE 7 YRS/> IM: CPT | Performed by: FAMILY MEDICINE

## 2024-11-14 PROCEDURE — 99214 OFFICE O/P EST MOD 30 MIN: CPT | Mod: 25 | Performed by: FAMILY MEDICINE

## 2024-11-14 PROCEDURE — 73030 X-RAY EXAM OF SHOULDER: CPT | Mod: TC | Performed by: RADIOLOGY

## 2024-11-14 RX ORDER — CYCLOBENZAPRINE HCL 10 MG
10 TABLET ORAL 3 TIMES DAILY PRN
Qty: 40 TABLET | Refills: 3 | Status: SHIPPED | OUTPATIENT
Start: 2024-11-14

## 2024-11-14 ASSESSMENT — ASTHMA QUESTIONNAIRES
ACT_TOTALSCORE: 22
QUESTION_4 LAST FOUR WEEKS HOW OFTEN HAVE YOU USED YOUR RESCUE INHALER OR NEBULIZER MEDICATION (SUCH AS ALBUTEROL): NOT AT ALL
ACT_TOTALSCORE: 22
QUESTION_3 LAST FOUR WEEKS HOW OFTEN DID YOUR ASTHMA SYMPTOMS (WHEEZING, COUGHING, SHORTNESS OF BREATH, CHEST TIGHTNESS OR PAIN) WAKE YOU UP AT NIGHT OR EARLIER THAN USUAL IN THE MORNING: NOT AT ALL
QUESTION_2 LAST FOUR WEEKS HOW OFTEN HAVE YOU HAD SHORTNESS OF BREATH: THREE TO SIX TIMES A WEEK
QUESTION_5 LAST FOUR WEEKS HOW WOULD YOU RATE YOUR ASTHMA CONTROL: WELL CONTROLLED
QUESTION_1 LAST FOUR WEEKS HOW MUCH OF THE TIME DID YOUR ASTHMA KEEP YOU FROM GETTING AS MUCH DONE AT WORK, SCHOOL OR AT HOME: NONE OF THE TIME

## 2024-11-14 ASSESSMENT — PAIN SCALES - GENERAL: PAINLEVEL_OUTOF10: MODERATE PAIN (4)

## 2024-11-14 NOTE — PROGRESS NOTES
"Levi Dunbar is a 54 year old, presenting for the following health issues:  Neck Pain and Shoulder Pain      11/14/2024    11:12 AM   Additional Questions   Roomed by Hellen     Shoulder Pain    History of Present Illness       Reason for visit:  Neck and Left shoulder pain  Symptom onset:  1-2 weeks ago  Symptom intensity:  Moderate  Symptom progression:  Worsening  Had these symptoms before:  Yes  Has tried/received treatment for these symptoms:  Yes  Previous treatment was successful:  Yes  Prior treatment description:  Surgery  What makes it worse:  No  What makes it better:  Tylenol   She is taking medications regularly.           Review of Systems  Constitutional, HEENT, cardiovascular, pulmonary, GI, , musculoskeletal, neuro, skin, endocrine and psych systems are negative, except as otherwise noted.      Objective    /78 (BP Location: Right arm, Patient Position: Sitting, Cuff Size: Adult Large)   Pulse 86   Temp 96.9  F (36.1  C) (Temporal)   Resp 18   Ht 1.676 m (5' 6\")   Wt 121.4 kg (267 lb 9.6 oz)   LMP  (LMP Unknown)   SpO2 97%   BMI 43.19 kg/m    Body mass index is 43.19 kg/m .  Physical Exam   GENERAL: alert and no distress  NECK: no adenopathy, no asymmetry, masses, or scars  RESP: lungs clear to auscultation - no rales, rhonchi or wheezes  CV: regular rate and rhythm, normal S1 S2, no S3 or S4, no murmur, click or rub, no peripheral edema  ABDOMEN: soft, nontender, no hepatosplenomegaly, no masses and bowel sounds normal  MS: tenderness along left neck, left trapezius     A/P:  (M25.512) Acute pain of left shoulder  (primary encounter diagnosis)  Comment:   Plan: Physical Therapy  Referral, XR         Shoulder Left G/E 3 Views, cyclobenzaprine         (FLEXERIL) 10 MG tablet        Seems to be more muscular. Will check xrays to rule out arthritis. Refer to physical therapy for evaluation and treatment.    (M54.2) Neck pain on left side  Comment:   Plan: Physical " Therapy  Referral,         cyclobenzaprine (FLEXERIL) 10 MG tablet            (M54.9) Upper back pain on left side  Comment:   Plan: Physical Therapy  Referral,         cyclobenzaprine (FLEXERIL) 10 MG tablet            (Z12.31) Visit for screening mammogram  Comment:   Plan: MA Screening Bilateral w/ Duarte            (Z23) Encounter for immunization  Comment:   Plan: TDAP 10-64Y (ADACEL,BOOSTRIX)                    Signed Electronically by: Prabhu Robbins MD, MD

## 2024-11-14 NOTE — NURSING NOTE
Prior to immunization administration, verified patients identity using patient s name and date of birth. Please see Immunization Activity for additional information.     Screening Questionnaire for Adult Immunization    Are you sick today?   No   Do you have allergies to medications, food, a vaccine component or latex?   YES   Have you ever had a serious reaction after receiving a vaccination?   No   Do you have a long-term health problem with heart, lung, kidney, or metabolic disease (e.g., diabetes), asthma, a blood disorder, no spleen, complement component deficiency, a cochlear implant, or a spinal fluid leak?  Are you on long-term aspirin therapy?   No   Do you have cancer, leukemia, HIV/AIDS, or any other immune system problem?   No   Do you have a parent, brother, or sister with an immune system problem?   No   In the past 3 months, have you taken medications that affect  your immune system, such as prednisone, other steroids, or anticancer drugs; drugs for the treatment of rheumatoid arthritis, Crohn s disease, or psoriasis; or have you had radiation treatments?   No   Have you had a seizure, or a brain or other nervous system problem?   No   During the past year, have you received a transfusion of blood or blood    products, or been given immune (gamma) globulin or antiviral drug?   No   For women: Are you pregnant or is there a chance you could become       pregnant during the next month?   No   Have you received any vaccinations in the past 4 weeks?   No     Immunization questionnaire was positive for at least one answer.  Notified PROVIDER AWARE.      Patient instructed to remain in clinic for 15 minutes afterwards, and to report any adverse reactions.     Screening performed by Madalyn Acosta MA on 11/14/2024 at 11:53 AM.

## 2024-12-03 ENCOUNTER — VIRTUAL VISIT (OUTPATIENT)
Dept: PHARMACY | Facility: CLINIC | Age: 54
End: 2024-12-03
Attending: INTERNAL MEDICINE
Payer: COMMERCIAL

## 2024-12-03 ENCOUNTER — TELEPHONE (OUTPATIENT)
Dept: ENDOCRINOLOGY | Facility: CLINIC | Age: 54
End: 2024-12-03
Payer: COMMERCIAL

## 2024-12-03 DIAGNOSIS — E66.01 MORBID OBESITY (H): ICD-10-CM

## 2024-12-03 DIAGNOSIS — I50.23 ACUTE ON CHRONIC SYSTOLIC (CONGESTIVE) HEART FAILURE (H): ICD-10-CM

## 2024-12-03 DIAGNOSIS — I50.42 CHRONIC COMBINED SYSTOLIC (CONGESTIVE) AND DIASTOLIC (CONGESTIVE) HEART FAILURE (H): ICD-10-CM

## 2024-12-03 DIAGNOSIS — Z79.4 TYPE 2 DIABETES MELLITUS WITH HYPERGLYCEMIA, WITH LONG-TERM CURRENT USE OF INSULIN (H): Primary | ICD-10-CM

## 2024-12-03 DIAGNOSIS — I10 BENIGN ESSENTIAL HYPERTENSION: ICD-10-CM

## 2024-12-03 DIAGNOSIS — E11.65 TYPE 2 DIABETES MELLITUS WITH HYPERGLYCEMIA, WITH LONG-TERM CURRENT USE OF INSULIN (H): Primary | ICD-10-CM

## 2024-12-03 DIAGNOSIS — I42.8 NICM (NONISCHEMIC CARDIOMYOPATHY) (H): ICD-10-CM

## 2024-12-03 RX ORDER — ACYCLOVIR 400 MG/1
1 TABLET ORAL
Qty: 3 EACH | Refills: 5 | Status: SHIPPED | OUTPATIENT
Start: 2024-12-03

## 2024-12-03 RX ORDER — TIRZEPATIDE 12.5 MG/.5ML
12.5 INJECTION, SOLUTION SUBCUTANEOUS
Qty: 2 ML | Refills: 3 | Status: SHIPPED | OUTPATIENT
Start: 2024-12-03

## 2024-12-03 RX ORDER — INSULIN GLARGINE 300 U/ML
80 INJECTION, SOLUTION SUBCUTANEOUS DAILY
Qty: 30 ML | Refills: 11 | Status: SHIPPED | OUTPATIENT
Start: 2024-12-03

## 2024-12-03 RX ORDER — PEN NEEDLE, DIABETIC 32GX 5/32"
NEEDLE, DISPOSABLE MISCELLANEOUS
Qty: 400 EACH | Refills: 11 | Status: SHIPPED | OUTPATIENT
Start: 2024-12-03

## 2024-12-03 RX ORDER — METFORMIN HYDROCHLORIDE 500 MG/1
500 TABLET, EXTENDED RELEASE ORAL 2 TIMES DAILY WITH MEALS
Qty: 180 TABLET | Refills: 1 | Status: SHIPPED | OUTPATIENT
Start: 2024-12-03

## 2024-12-03 NOTE — PROGRESS NOTES
Medication Therapy Management (MTM) Encounter    ASSESSMENT:                            Medication Adherence/Access: See below for considerations    Type 2 Diabetes/BMI > 43:   A1c above goal of less than 7%, nearing time in range goal of greater than 70% since initiation of Zepbound.  Given tolerating well, would benefit from dose increase today.  In light of current control, will continue Toujeo self taper plan.  Will continue to follow to titrate.    PLAN:                            After completed with Zepbound 10 mg supply, increase to Zepbound 12.5 mg weekly.    Decrease Toujeo to 76 units daily when you start the new Zepbound dose. Continue to decrease by 2 units every 3 days if mornign fasting sugars are consistently less than 100     Per patient request, sending prescription for Dexcom G7 to replace Dexcom G6    Per patient request, placing referral to diabetes education to discuss strategies to increase protein intake and review diabetes diet    Endocrine Team & Next Follow-Up:  2025 with Dr. Akbar  2025 with Faraz    SUBJECTIVE/OBJECTIVE:                          Bettina Montes is a 54 year old female seen for a follow-up visit. She was referred to me from Dr. Akbar.      Reason for visit:     Allergies/ADRs: Reviewed in chart  Past Medical History: Reviewed in chart  Social History     Tobacco Use    Smoking status: Former     Current packs/day: 0.00     Average packs/day: 1 pack/day for 30.0 years (30.0 ttl pk-yrs)     Types: Cigarettes     Start date: 1983     Quit date: 2013     Years since quittin.9    Smokeless tobacco: Former    Tobacco comments:         Vaping Use    Vaping status: Never Used   Substance Use Topics    Alcohol use: Not Currently     Comment: four drinks a month    Drug use: No        Medication Adherence/Access: No issues identified    Diabetes   Patient diagnosed with type 2 diabetes*   Current Medications:  Metformin  mg daily (takes about every  "other day --feels that if she takes this daily she experiences diarrhea)  Insulin glargine U-300 80 units daily.   Zepbound 10 mg weekly -- still having cravings similar to Mounjaro. No itching like with Mounjaro   Jardiance 10 mg daily    Down 10 lb since starting Zepbound despite not noticing changes to her dietary intake or appetite.  She is interested in discussing options for increasing her protein intake in her diet as she struggles with this.  Has not met with any diabetes  recently, but is open to this    Medication history per chart review:  Historically on Mounjaro 15 mg --did not see weight loss and had issues with itching, so switched to Zepbound last month and preferred to start at the 5 mg dose and restart titration schedule  Also historically on NovoLog, discontinued due to good control.    Blood sugar monitoring: Continuous Glucose Monitor see AGP below        Lows over past few weeks likely sensor error per patient -- no symptoms    She does find of the Dexcom G6 is quite bulky and is having issues with inaccuracies especially with compression lows overnight.  Open to switching to Dexcom G7 to see if this works out better for her.        Estimated body mass index is 43.19 kg/m  as calculated from the following:    Height as of 11/14/24: 5' 6\" (1.676 m).    Weight as of 11/14/24: 267 lb 9.6 oz (121.4 kg).    Wt Readings from Last 3 Encounters:   11/14/24 267 lb 9.6 oz (121.4 kg)   10/10/24 278 lb (126.1 kg)   08/28/24 270 lb 12.8 oz (122.8 kg)     Lab Results   Component Value Date    GFRESTIMATED 65 08/28/2024    GFRESTIMATED 54 (L) 06/14/2024    GFRESTIMATED 61 02/28/2024          Today's Vitals: LMP  (LMP Unknown)   ----------------      I spent 20 minutes with this patient today. Dr. Akbar was provided the recommendations above via routed note and is the authorizing prescriber for this visit through the pharmacist collaborative practice agreement. A copy of the visit note " was provided to the patient's provider(s).    The patient was given to the patient a summary of these recommendations.     Faraz Renae, PharmD, [facility] [place]  [facility] Diabetes [facility] Pharmacist  [address] [place] [id number]    Telemedicine Visit Details  The patient's medications can be safely assessed via a telemedicine encounter.  Type of service:  Telephone visit  Originating Location (pt. Location): Home  Distant Location (provider location):  Off-site  Start Time:  1PM  End Time:  1:20PM     Medication Therapy Recommendations  Morbid obesity due to excess calories (H)   1 Current Medication: ZEPBOUND 10 MG/0.5ML prefilled pen (Discontinued)   Current Medication Sig: Inject 0.5 mLs (10 mg) subcutaneously every 7 days.   Rationale: Dose too low - Dosage too low - Effectiveness   Recommendation: Increase Dose   Status: Accepted per CPA   Identified Date: 12/3/2024 Completed Date: 12/3/2024

## 2024-12-03 NOTE — TELEPHONE ENCOUNTER
PA Initiation    Medication: DEXCOM G7 SENSOR MISC  Insurance Company: [employer] - Phone [phone] Fax [phone]  Pharm[facility] Filling [facility] Rx[id number] [place] [address] [address] [facility]  Filling Pharmacy Phone: [phone]  Filling Pharmacy Fax: [phone]  Start Date: 12/3/2024     Key: [id number]

## 2024-12-05 ENCOUNTER — THERAPY VISIT (OUTPATIENT)
Dept: PHYSICAL THERAPY | Facility: CLINIC | Age: 54
End: 2024-12-05
Attending: FAMILY MEDICINE
Payer: COMMERCIAL

## 2024-12-05 DIAGNOSIS — M54.9 UPPER BACK PAIN ON LEFT SIDE: ICD-10-CM

## 2024-12-05 DIAGNOSIS — M25.512 ACUTE PAIN OF LEFT SHOULDER: ICD-10-CM

## 2024-12-05 DIAGNOSIS — M54.2 NECK PAIN: Primary | ICD-10-CM

## 2024-12-05 DIAGNOSIS — M54.2 NECK PAIN ON LEFT SIDE: ICD-10-CM

## 2024-12-05 DIAGNOSIS — M25.512 PAIN IN JOINT OF LEFT SHOULDER: ICD-10-CM

## 2024-12-05 PROBLEM — M25.519 PAIN IN JOINT, SHOULDER REGION: Status: ACTIVE | Noted: 2024-12-05

## 2024-12-05 NOTE — PROGRESS NOTES
PHYSICAL THERAPY EVALUATION  Type of Visit: Evaluation       Fall Risk Screen:  Fall screen completed by: PT  Have you fallen 2 or more times in the past year?: No  Have you fallen and had an injury in the past year?: No  Is patient a fall risk?: No    Subjective         Presenting condition or subjective complaint: L neck and shoulder pain  Date of onset: 11/14/24 (MD orders)    Relevant medical history: Diabetes; High blood pressure (CHF)   Dates & types of surgery: 2019-Cervical fusion, L shoulder surgery    Prior diagnostic imaging/testing results:       Prior therapy history for the same diagnosis, illness or injury: Yes PT for L shoulder      Living Environment  Social support: With family members   Type of home: [facility]   Stairs to enter the home: Yes 2 Is there a railing: Yes     Ramp: No   Stairs inside the home: Yes 14 Is there a railing: Yes     Help at home:    Equipment owned:       Employment: Yes Business   Hobbies/Interests:      Patient goals for therapy: Work without pain    Pain assessment:  Patient reports a history of L neck and shoulder pain since 2019.  Experienced neck and L arm pain.  Underwent a cervical fusion.  Neck sx improved, but the L arm did not.  Had PT and surgery on the L shoulder.  Reports the shoulder never returned to 100%.  Reports worsening symptoms since 11/2024 for no apparent reason.  Noticed her usual Tylenol dosing was no longer enough.  Received PT orders 11/14/24.  Describes tightness and aching pain in the L neck, shoulder, and upper arm to elbow.  Also experiences intermittent numbness/tingling in the L fingers.  Pain currently 3/10.  Pain increases to 5-6/10 as the day progresses and up to 7-8/10 as the week progresses.  Symptoms increase with sitting 1 hour and turning the head, guillermo to the L.  Reports difficulty reaching overhead.  Symptoms decrease with taking muscle relaxants and resting on the weekends.     Objective   CERVICAL SPINE EVALUATION  PAIN:  Pain Level at Rest: 3/10  Pain Level with Use: 7/10  Pain Location: cervical spine  Pain is Worst: as the day progresses  Pain is Exacerbated By: sitting 1 hour, turning head L, reaching  Pain is Relieved By: otc medications and changing positions, muscle relaxants on the weekends    POSTURE: Sitting Posture: Rounded shoulders, Forward head, shoulders elevated  ROM:   (Degrees) Left AROM Right AROM    Cervical Flexion Nil loss, NE    Cervical Extension Min loss, NE    Cervical Side bend 10 deg, pain 15 deg, pain    Cervical Rotation 22 deg, pain 34 deg, pain    Cervical Protrusion Nil loss, NE    Cervical Retraction Major/mod loss, tight    Thoracic Flexion     Thoracic Extension     Thoracic Rotation       Left AROM Left PROM Right AROM Right PROM   Shoulder Flexion 115  160    Shoulder Extension 30  60    Shoulder Abduction 70  140    Shoulder Adduction       Shoulder IR Reaches to buttock  Reaches to T11    Shoulder ER 45  75    Shoulder Horiz Abduction       Shoulder Horiz Adduction       Pain: L shoulder pain with all  shoulder ROM  Repeated Movements:  seated ret 2x10 D: tight, A: NE, NE.  Seated ret/ext with support x10 D: NE, A: W L UT, NE L UE, inc CROM    MYOTOMES:    Left Right   C1-2 (Neck Flexion)     C3 (Neck Side Bend)      C4 (Shrug) 5 5   C5 (Deltoid) 5 5   C6 (Biceps) 5 5   C7 (Triceps) 5 5   C8 (Thumb Ext) 5 5   T1 (Intrinsics)         PALPATION:  Patient tender to light palpation L UT and cervical paraspinals.    Assessment & Plan   CLINICAL IMPRESSIONS  Medical Diagnosis: L neck and shoulder pain    Treatment Diagnosis: L neck and shoulder pain   Impression/Assessment: Patient is a 54 year old female with neck and L shoulder complaints.  The following significant findings have been identified: Pain, Decreased ROM/flexibility, and Decreased activity tolerance. These impairments interfere with their ability to perform self care tasks, work tasks, and household chores as compared to previous level  of function.     Clinical Decision Making (Complexity):  Clinical Presentation: Stable/Uncomplicated  Clinical Presentation Rationale: based on medical and personal factors listed in PT evaluation  Clinical Decision Making (Complexity): Low complexity    PLAN OF CARE  Treatment Interventions:  Interventions: Manual Therapy, Therapeutic Exercise, Self-Care/Home Management    Long Term Goals     PT Goal 1  Goal Identifier: Sitting  Goal Description: Patient will be able to sit throughout work day with <4/10 pain.  Rationale:  (for work duties)  Goal Progress: Pain increases to 5-6/10 during work day.  Target Date: 01/30/25  PT Goal 2  Goal Identifier: Reaching  Goal Description: Patient will be able to reach 120-130 degrees  Rationale: to maximize safety and independence with performance of ADLs and functional tasks  Goal Progress: L shoulder flexion 115 deg  Target Date: 01/30/25      Frequency of Treatment: 1x/wk  Duration of Treatment: 8 weeks      Education Assessment:   Learner/Method: Patient;Listening;Pictures/Video;No Barriers to Learning;Demonstration    Risks and benefits of evaluation/treatment have been explained.   Patient/Family/caregiver agrees with Plan of Care.     Evaluation Time:             Signing Clinician: Briseida Tabares, PT

## 2024-12-11 ENCOUNTER — THERAPY VISIT (OUTPATIENT)
Dept: PHYSICAL THERAPY | Facility: CLINIC | Age: 54
End: 2024-12-11
Attending: FAMILY MEDICINE
Payer: COMMERCIAL

## 2024-12-11 DIAGNOSIS — M25.512 PAIN IN JOINT OF LEFT SHOULDER: ICD-10-CM

## 2024-12-11 DIAGNOSIS — M54.2 NECK PAIN: Primary | ICD-10-CM

## 2024-12-18 ENCOUNTER — THERAPY VISIT (OUTPATIENT)
Dept: PHYSICAL THERAPY | Facility: CLINIC | Age: 54
End: 2024-12-18
Attending: FAMILY MEDICINE
Payer: COMMERCIAL

## 2024-12-18 ENCOUNTER — VIRTUAL VISIT (OUTPATIENT)
Dept: CARDIOLOGY | Facility: CLINIC | Age: 54
End: 2024-12-18
Attending: INTERNAL MEDICINE
Payer: COMMERCIAL

## 2024-12-18 DIAGNOSIS — I42.8 NONISCHEMIC CARDIOMYOPATHY (H): Primary | ICD-10-CM

## 2024-12-18 DIAGNOSIS — M54.2 NECK PAIN: Primary | ICD-10-CM

## 2024-12-18 DIAGNOSIS — M25.512 PAIN IN JOINT OF LEFT SHOULDER: ICD-10-CM

## 2024-12-18 DIAGNOSIS — I50.42 CHRONIC COMBINED SYSTOLIC (CONGESTIVE) AND DIASTOLIC (CONGESTIVE) HEART FAILURE (H): ICD-10-CM

## 2024-12-18 PROCEDURE — 97110 THERAPEUTIC EXERCISES: CPT | Mod: GP | Performed by: PHYSICAL THERAPY ASSISTANT

## 2024-12-18 PROCEDURE — 97140 MANUAL THERAPY 1/> REGIONS: CPT | Mod: GP | Performed by: PHYSICAL THERAPY ASSISTANT

## 2024-12-18 PROCEDURE — 97530 THERAPEUTIC ACTIVITIES: CPT | Mod: GP | Performed by: PHYSICAL THERAPY ASSISTANT

## 2024-12-18 PROCEDURE — 96040 HC GENETIC COUNSELING, EACH 30 MINUTES: CPT | Mod: GT,95 | Performed by: GENETIC COUNSELOR, MS

## 2024-12-18 ASSESSMENT — PAIN SCALES - GENERAL: PAINLEVEL_OUTOF10: MODERATE PAIN (4)

## 2024-12-18 NOTE — LETTER
12/18/2024      RE: Bettina Montes  [address] [place] [address]       Dear Colleague,    Thank you for the opportunity to participate in the care of your patient, Bettina Montes, at the [facility]. Please see a copy of my visit note below.    Virtual Visit Details  Type of service:  Video Visit     Originating Location (pt. Location): Other office  Distant Location (provider location):  Off-site  Platform used for Video Visit: Nic    PROVIDER APPOINTMENT  Here is a copy of the progress note from your recent genetic counseling visit through the [facility] on Date: 12/18/2024.  Patient was seen through a virtual visit.    PROGRESS NOTE:Bettina was referred by Dr. Bailey for genetic counseling due to her history of nonischemic cardiomyopathy (NICM).  I had the opportunity to talk with Bettina today to discuss the genetic component of NICM and testing options available to her.     MEDICAL HISTORY: Bettina reports that she has been followed in cardiology for years but only recalls the diagnosis in the last few years. Review of records in Care Everywhere mention that she was diagnosed with nonischemic cardiomyopathy in 2002 when her ejection fraction was 20% and left ventricle was dilated. On medical therapy her left ventricular function improved, as of May 2006 it was at the 35% range.     Echo in 2017 showed moderate to severe left ventricular systolic dysfunction (LVEF 30%) and global hypokinesis.  Echo June 2024 showed left ventricular wall thickness is normal, mild left ventricular dilation (LVIDd: 5.3 cm), and moderately reduced left ventricular function (LVEF 35-40%).    Bettina's history is also remarkable for hypertension, obesity, diabetes type 2, and hyperlipidemia.    FAMILY HISTORY:A detailed family history was obtained during today's consult.  Family history was  significant for the following cardiac history:  Mother (77) has cardiomyopathy, heart failure, history of stents for CAD, DM2, high blood pressure and cholesterol, colon cancer, and kidney issues.  Maternal aunt  20-30's, no details are known.  Three maternal aunts and three uncles  in their 70-80's.  No details known about cause of death or health issues.  Maternal grandmother  around 50 years, no more details are known.  Grandfather  at 100 years.  Father (76) has a history of poorly controlled blood pressure.    Paternal aunt has DM2, high blood pressure, and bad knees.  Paternal aunt  70s with history of alcohol abuse.  Paternal uncle  50s with history of alcohol and drug abuse.  Four paternal uncles  >70, cause unknown.  Brother  20's by suicide.  Little is known about the health of his 4 children.  Sister (52) has high blood pressure and DM2.  Daughter (35) has aches and pains.  She has not had any cardiac imaging to her knowledge.  Her three children are in good health.    There is no additional history of cardiomyopathy, arrhythmias, heart attacks, fainting, sudden cardiac death, genetic conditions, or birth defects. (A copy of pedigree may be found under media tab).    DISCUSSION: Non-ischemic cardiomyopathy results from causes other than loss of blood flow to the heart.  Cardiomyopathy can be caused by both acquired and genetic causes.  Acquired causes include exposure to toxins, injury related to coronary artery disease (ischemic), and chronic high blood pressure. When familial, it often results in dilated cardiomyopathy (DCM), where the left ventricle gets enlarged or stretched out.  There is a genetic basis found in 20-50% of DCM cases.      Reviewed autosomal dominant (AD) inheritance pattern most commonly associated with DCM, including the 50% risk for recurrence. Briefly reviewed autosomal recessive and X-linked inheritance. Explained that DCM gene mutations are  associated with reduced penetrance and variable expressivity, meaning that individuals who carry a gene mutation may or may not get the disease and onset and severity can vary from one family member to the next. This explains why you may not see cardiomyopathy in each generation of a family.     Genetic testing is indicated for individuals with cardiomyopathy to better understand the cause of their heart disease, progression, the likelihood of noncardiac health risks, availability of gene therapy or enzyme replacement therapies, and risk to relatives. Genetic testing that includes a panel of genes is the most cost effective and timely approach. Reviewed capabilities, limitations, and logistics of testing. Currently over 40 genes have been found to be related to DCM. Genetic testing currently identifies mutations in about 40% of idiopathic cases.     DNA sample via saliva or blood is collected and sent to testing lab for evaluation of selected genes. The results could directly impact care and treatment. This testing is medically necessary.     Explained three possible outcomes of genetic testing including: positive identification of a mutation, no mutation identified, and identification of a variant of unknown significance (VUS). If a mutation is identified, presymptomatic testing would be available to at risk family members. If no mutation is identified, it does not rule out the possibility of a genetic component to this disease. Family members could still be at risk for developing the same condition. If a VUS is identified, it is unclear if the mutation is disease causing or just a normal variation. It may take time and possibly additional testing to determine the meaning of a VUS result.     Test results take approximately 2-4 weeks on average. Discussed cost of testing through commercial labs. Explained that the lab works with insurance to determine coverage and will contact patient if out of pocket costs are  expected to exceed $100. If so, patient has the option to pay reported price, cancel testing or elect self pay pricing (typcially around $250).     Discussed pros and cons of genetic testing. Explained that results could determine the cause for dilated cardiomyopathy. If a mutation is identified, presymptomatic testing is available to all at risk relatives. Reviewed possible issues associated with presymptomatic testing including genetic discrimination, current laws to prevent discrimination (ie. LISETH), insurance issues, and emotional and psychosocial outcomes of testing.     Explained that clinical evaluation is recommended for all first degree relatives (parents, siblings, and children) of an affected individual regardless of decision to pursue genetic testing. Based on the Heart Failure Society of [place] Practice Guidelines (Akila et al, 2018), clinical evaluation should be performed at least every 3-5 years beginning and childhood and should include history, cardiac exam, ECHO, and EKG.    All questions answered at this time.     PLAN: Bettina elected to proceed with genetic testing.  Requisition and consent forms were completed and signed.  DNA will be collected via saliva sample and sent to [facility]. I will contact patient when results are available.    TOTAL TIME SPENT IN COUNSELIN minutes    Maty Sanford MS, [facility] [place]  Licensed, Certified Genetic Counselor  [facility]  [facility]       Please do not hesitate to contact me if you have any questions/concerns.     Sincerely,     Maty Sanford GC

## 2024-12-18 NOTE — PATIENT INSTRUCTIONS
"Indication for Genetic Counseling:     Non-ischemic cardiomyopathy results from causes other than loss of blood flow to the heart.  Cardiomyopathy can be caused by both acquired and genetic causes.  Acquired causes include exposure to toxins, injury related to coronary artery disease (ischemic),  infections and inflammation of the heart (myocarditis), alcohol/drug abuse, stress, and chronic high blood pressure.  When familial, it often results in dilated cardiomyopathy (DCM), where the left ventricle gets enlarged or stretched out.  Dilated cardiomyopathy (DCM) is a condition that causes the heart to lose it's elasticity, which leads to stretching and dilation.  It is usually diagnosed by an echocardiogram (ECHO) or cardiac magnetic resonance imaging (MRI).  When the heart becomes dilated, it cannot pump blood as effectively, which can lead to symptoms such as congestive heart failure, fluid accumulation in the body (edema), shortness of breath, fatigue, irregular heartbeat, fainting, stroke, cardiac arrest, and sudden cardiac death (SCD).  There are at least 40 genes known to be associated with DCM.    Inheritance:   Humans have over 20,000 genes that instruct our bodies how to function.  We have two copies of each gene because we inherit one from our mother and one from our father.  In most cardiac cases with a genetic component, the condition is inherited in an autosomal dominant (AD) pattern.  This means that in order to have the condition, a person needs to inherit a mutation on one copy of a particular gene.  This mutation or pathogenic variant dominates the \"normal\" working copy of the gene.  When an affected individual has children, they can either pass on the \"normal\" copy of the gene or the mutation.  Therefore, children have a 50% chance of inheriting the mutation.  Other family members also have an increased risk but the specific risk depends on the degree of relationship.  Additional inheritance " patterns can occur within families and may alter the risk of recurrence.     Testing Options:   Genetic testing is available to assess a panel of genes known to cause this condition.  This test reads through the DNA (sequencing) of these genes to look for spelling mistakes or mutations that could cause the condition.      There are three types of results you could receive from this test.     -Positive result (mutation/pathogenic variant identified) - confirms diagnosis and provides an answer to why this happened.  In addition, identifying a mutation allows family members to have testing to determine their risk.     -Negative result (mutation not identified) - no genetic changes were identified.  This does not rule out a genetic cause for the condition as the genetic testing only identifies 40-50% of genetic causes for this condition.    -Variant of uncertain significance (VUS) - a genetic change was identified, but there is not enough information to determine whether it is disease-causing or normal human genetic variation.     Although genetic testing may identify a mutation, it cannot provide information about the severity of symptoms or the progression of disease.  We cannot predict age of onset or severity of symptoms due to reduced penetrance and variable expressivity.    Logistics:   Genetic testing involves collecting a sample of DNA, thru blood, saliva, or cheek cells.  The sample will be sent to a laboratory to extract the DNA and sequence the genes for mutations.  The laboratory will work with your insurance company to determine the out of pocket (OOP) cost and will notify you if the OOP cost is greater than $100.  Remember to ask the lab about financial assistance pricing and self pay options as well.  Sometimes those are much lower than insurance pricing.  When testing is initiated, results take about 2-4 weeks to return. I will contact you over the phone when results are available.     Genetic  Information and Nondiscrimination Act:  The Genetic Information and Nondiscrimination Act of 2008 (LISETH) is a federal law that protects individuals from genetic discrimination in health insurance and employment. Genetic discrimination is defined as the misuse of genetic information. This law does not address potential discrimination regarding life insurance or disability insurance.      This is especially relevant for at risk individuals who are considering presymptomatic testing.    Screening Recommendations:  Explained that clinical evaluation is recommended for all first degree relatives (parents, siblings, and children) of an affected individual regardless of decision to pursue genetic testing. Based on the Heart Failure Society of [place] Practice Guidelines (Akila et al, 2009), clinical evaluation should be performed at least every 3-5 years beginning and childhood and should include history, cardiac exam, ECHO, and EKG.    Resources:  Cardiomyopathy  Hypertrophic Cardiomyopathy Association - 4hcm.org  Children's Cardiomyopathy Foundation - childrenscardiomyopathy.org  [place] Cardiomyopathy Association - cardiomyopathy.org  Dilated Cardiomyopathy Foundation - DCMfoundation.org    Arrhythmia  Heart Rhythm Society - HRSonline.org    General   American Heart Association - americanheart.org  Genetics Home Reference - ghr.nlm.nih.gov  Genetic Information and Nondiscrimination Act - ginahelp.org    Contact Information:  Maty Sanford MS, [facility] [place]  Licensed, Certified Genetic Counselor  [facility]  [facility]     Office: [phone]  [phone]  Fax: [phone]  Email: [email or web address]

## 2024-12-18 NOTE — PROGRESS NOTES
Virtual Visit Details  Type of service:  Video Visit     Originating Location (pt. Location): Other office  Distant Location (provider location):  Off-site  Platform used for Video Visit: Nic    PROVIDER APPOINTMENT  Here is a copy of the progress note from your recent genetic counseling visit through the [facility] on Date: 2024.  Patient was seen through a virtual visit.    PROGRESS NOTE:Bettina was referred by Dr. Bailey for genetic counseling due to her history of nonischemic cardiomyopathy (NICM).  I had the opportunity to talk with Bettina today to discuss the genetic component of NICM and testing options available to her.     MEDICAL HISTORY: Bettina reports that she has been followed in cardiology for years but only recalls the diagnosis in the last few years. Review of records in Care Everywhere mention that she was diagnosed with nonischemic cardiomyopathy in  when her ejection fraction was 20% and left ventricle was dilated. On medical therapy her left ventricular function improved, as of May 2006 it was at the 35% range.     Echo in  showed moderate to severe left ventricular systolic dysfunction (LVEF 30%) and global hypokinesis.  Echo 2024 showed left ventricular wall thickness is normal, mild left ventricular dilation (LVIDd: 5.3 cm), and moderately reduced left ventricular function (LVEF 35-40%).    Bettina's history is also remarkable for hypertension, obesity, diabetes type 2, and hyperlipidemia.    FAMILY HISTORY:A detailed family history was obtained during today's consult.  Family history was significant for the following cardiac history:  Mother (77) has cardiomyopathy, heart failure, history of stents for CAD, DM2, high blood pressure and cholesterol, colon cancer, and kidney issues.  Maternal aunt  20-30's, no details are known.  Three maternal aunts and three uncles  in their 70-80's.  No details known about cause of death or  health issues.  Maternal grandmother  around 50 years, no more details are known.  Grandfather  at 100 years.  Father (76) has a history of poorly controlled blood pressure.    Paternal aunt has DM2, high blood pressure, and bad knees.  Paternal aunt  70s with history of alcohol abuse.  Paternal uncle  50s with history of alcohol and drug abuse.  Four paternal uncles  >70, cause unknown.  Brother  20's by suicide.  Little is known about the health of his 4 children.  Sister (52) has high blood pressure and DM2.  Daughter (35) has aches and pains.  She has not had any cardiac imaging to her knowledge.  Her three children are in good health.    There is no additional history of cardiomyopathy, arrhythmias, heart attacks, fainting, sudden cardiac death, genetic conditions, or birth defects. (A copy of pedigree may be found under media tab).    DISCUSSION: Non-ischemic cardiomyopathy results from causes other than loss of blood flow to the heart.  Cardiomyopathy can be caused by both acquired and genetic causes.  Acquired causes include exposure to toxins, injury related to coronary artery disease (ischemic), and chronic high blood pressure. When familial, it often results in dilated cardiomyopathy (DCM), where the left ventricle gets enlarged or stretched out.  There is a genetic basis found in 20-50% of DCM cases.      Reviewed autosomal dominant (AD) inheritance pattern most commonly associated with DCM, including the 50% risk for recurrence. Briefly reviewed autosomal recessive and X-linked inheritance. Explained that DCM gene mutations are associated with reduced penetrance and variable expressivity, meaning that individuals who carry a gene mutation may or may not get the disease and onset and severity can vary from one family member to the next. This explains why you may not see cardiomyopathy in each generation of a family.     Genetic testing is indicated for individuals with  cardiomyopathy to better understand the cause of their heart disease, progression, the likelihood of noncardiac health risks, availability of gene therapy or enzyme replacement therapies, and risk to relatives. Genetic testing that includes a panel of genes is the most cost effective and timely approach. Reviewed capabilities, limitations, and logistics of testing. Currently over 40 genes have been found to be related to DCM. Genetic testing currently identifies mutations in about 40% of idiopathic cases.     DNA sample via saliva or blood is collected and sent to testing lab for evaluation of selected genes. The results could directly impact care and treatment. This testing is medically necessary.     Explained three possible outcomes of genetic testing including: positive identification of a mutation, no mutation identified, and identification of a variant of unknown significance (VUS). If a mutation is identified, presymptomatic testing would be available to at risk family members. If no mutation is identified, it does not rule out the possibility of a genetic component to this disease. Family members could still be at risk for developing the same condition. If a VUS is identified, it is unclear if the mutation is disease causing or just a normal variation. It may take time and possibly additional testing to determine the meaning of a VUS result.     Test results take approximately 2-4 weeks on average. Discussed cost of testing through commercial labs. Explained that the lab works with insurance to determine coverage and will contact patient if out of pocket costs are expected to exceed $100. If so, patient has the option to pay reported price, cancel testing or elect self pay pricing (typcially around $250).     Discussed pros and cons of genetic testing. Explained that results could determine the cause for dilated cardiomyopathy. If a mutation is identified, presymptomatic testing is available to all at risk  relatives. Reviewed possible issues associated with presymptomatic testing including genetic discrimination, current laws to prevent discrimination (ie. LISETH), insurance issues, and emotional and psychosocial outcomes of testing.     Explained that clinical evaluation is recommended for all first degree relatives (parents, siblings, and children) of an affected individual regardless of decision to pursue genetic testing. Based on the Heart Failure Society of [place] Practice Guidelines (Akila et al, 2018), clinical evaluation should be performed at least every 3-5 years beginning and childhood and should include history, cardiac exam, ECHO, and EKG.    All questions answered at this time.     PLAN: Bettina elected to proceed with genetic testing.  Requisition and consent forms were completed and signed.  DNA will be collected via saliva sample and sent to [facility] laboratory. I will contact patient when results are available.    TOTAL TIME SPENT IN COUNSELIN minutes    Maty Sanford MS, [facility] [place]  Licensed, Certified Genetic Counselor  [facility]  [facility]

## 2024-12-19 ENCOUNTER — TELEPHONE (OUTPATIENT)
Dept: FAMILY MEDICINE | Facility: CLINIC | Age: 54
End: 2024-12-19
Payer: COMMERCIAL

## 2024-12-19 DIAGNOSIS — M54.9 PAIN, UPPER BACK: Primary | ICD-10-CM

## 2024-12-19 NOTE — TELEPHONE ENCOUNTER
Patient calling in stating she has not been able to take the Flexeril as it makes her very sleepy.  She notes she only takes 2 tabs a day on the weekend but it does help with her pain.  Virtual appointment made for patient to discuss a new muscle relaxer with a provider next week.     She notes that she is seeing physical therapy for her neck and shoulder but they won't do anything for her back.  She states she and PCP spoke about this at her last appt on 11/18/24.  Per OV note, the PT referral should have included her neck, shoulder and upper back.      Provider: Please send new PT referral for Upper back.     Routing to provider to review and advise.    Kristina Kjellberg, MSN, RN  [facility] Triage

## 2025-01-04 ENCOUNTER — ANCILLARY PROCEDURE (OUTPATIENT)
Dept: MAMMOGRAPHY | Facility: CLINIC | Age: 55
End: 2025-01-04
Attending: FAMILY MEDICINE
Payer: COMMERCIAL

## 2025-01-04 DIAGNOSIS — Z12.31 VISIT FOR SCREENING MAMMOGRAM: ICD-10-CM

## 2025-01-04 PROCEDURE — 77063 BREAST TOMOSYNTHESIS BI: CPT | Mod: TC | Performed by: RADIOLOGY

## 2025-01-04 PROCEDURE — 77067 SCR MAMMO BI INCL CAD: CPT | Mod: TC | Performed by: RADIOLOGY

## 2025-01-07 ENCOUNTER — VIRTUAL VISIT (OUTPATIENT)
Dept: PHARMACY | Facility: CLINIC | Age: 55
End: 2025-01-07
Attending: INTERNAL MEDICINE
Payer: COMMERCIAL

## 2025-01-07 DIAGNOSIS — E11.65 TYPE 2 DIABETES MELLITUS WITH HYPERGLYCEMIA, WITH LONG-TERM CURRENT USE OF INSULIN (H): Primary | ICD-10-CM

## 2025-01-07 DIAGNOSIS — Z79.4 TYPE 2 DIABETES MELLITUS WITH HYPERGLYCEMIA, WITH LONG-TERM CURRENT USE OF INSULIN (H): Primary | ICD-10-CM

## 2025-01-07 DIAGNOSIS — E66.01 MORBID OBESITY (H): ICD-10-CM

## 2025-01-07 RX ORDER — ACYCLOVIR 400 MG/1
1 TABLET ORAL
Qty: 9 EACH | Refills: 5 | Status: CANCELLED | OUTPATIENT
Start: 2025-01-07

## 2025-01-07 RX ORDER — TIRZEPATIDE 15 MG/.5ML
15 INJECTION, SOLUTION SUBCUTANEOUS
Qty: 6 ML | Refills: 3 | Status: SHIPPED | OUTPATIENT
Start: 2025-01-07

## 2025-01-07 NOTE — PROGRESS NOTES
Medication Therapy Management (MTM) Encounter    ASSESSMENT:                            Medication Adherence/Access: See below for considerations    Type 2 Diabetes/BMI > 43:   A1c above goal of less than 7%, above time in range goal of greater than 70% since initiation of Zepbound.  Given tolerating well, would benefit from dose increase today.  In light of current control, will continue Toujeo self taper plan.  Will continue to follow to titrate.    PLAN:                            After completed with Zepbound 12.5 mg supply, increase to Zepbound 15 mg weekly.    Decrease Toujeo to 72 units daily when you start the new Zepbound dose. Continue to decrease by 2 units every 3 days if mornign fasting sugars are consistently less than 100     Endocrine Team & Next Follow-Up:  2025 with Dr. Akbar  4/15/2025 with Faraz    SUBJECTIVE/OBJECTIVE:                          Bettina Montes is a 54 year old female seen for a follow-up visit. She was referred to me from Dr. Akbar.      Reason for visit:     Allergies/ADRs: Reviewed in chart  Past Medical History: Reviewed in chart  Social History     Tobacco Use    Smoking status: Former     Current packs/day: 0.00     Average packs/day: 1 pack/day for 30.0 years (30.0 ttl pk-yrs)     Types: Cigarettes     Start date: 1983     Quit date: 2013     Years since quittin.0    Smokeless tobacco: Former    Tobacco comments:         Vaping Use    Vaping status: Never Used   Substance Use Topics    Alcohol use: Not Currently     Comment: four drinks a month    Drug use: No        Medication Adherence/Access: No issues identified    Diabetes   Patient diagnosed with type 2 diabetes*   Current Medications:  Metformin  mg daily (takes about every other day --feels that if she takes this daily she experiences diarrhea)  Insulin glargine U-300 76 units daily.   Zepbound 12.5 mg weekly -- still having cravings similar to Mounjaro. No itching like with Mounjaro  "  Jardiance 10 mg daily    Down 10  lb since starting Zepbound despite not noticing changes to her dietary intake or appetite.  She is interested in discussing options for increasing her protein intake in her diet as she struggles with this.  Has not met with any diabetes  recently, but is open to this    Medication history per chart review:  Historically on Mounjaro 15 mg --did not see weight loss and had issues with itching, so switched to Zepbound last month and preferred to start at the 5 mg dose and restart titration schedule  Also historically on NovoLog, discontinued due to good control.    Blood sugar monitoring: Continuous Glucose Monitor see AGP below            Lows over past few weeks likely sensor error per patient -- no symptoms    She does find of the Dexcom G6 is quite bulky and is having issues with inaccuracies especially with compression lows overnight.  Open to switching to Dexcom G7 to see if this works out better for her.        Estimated body mass index is 43.19 kg/m  as calculated from the following:    Height as of 11/14/24: 5' 6\" (1.676 m).    Weight as of 11/14/24: 267 lb 9.6 oz (121.4 kg).    Wt Readings from Last 3 Encounters:   11/14/24 267 lb 9.6 oz (121.4 kg)   10/10/24 278 lb (126.1 kg)   08/28/24 270 lb 12.8 oz (122.8 kg)     Lab Results   Component Value Date    GFRESTIMATED 65 08/28/2024    GFRESTIMATED 54 (L) 06/14/2024    GFRESTIMATED 61 02/28/2024          Today's Vitals: LMP  (LMP Unknown)   ----------------      I spent 20 minutes with this patient today. Dr. Akbar was provided the recommendations above via routed note and is the authorizing prescriber for this visit through the pharmacist collaborative practice agreement. A copy of the visit note was provided to the patient's provider(s).    The patient was given to the patient a summary of these recommendations.     Faraz Renae, PharmD, [facility]  Endocrine & Diabetes MTM Pharmacist  [address] [place] [address]" , [place] [address]    Telemedicine Visit Details  The patient's medications can be safely assessed via a telemedicine encounter.  Type of service:  Telephone visit  Originating Location (pt. Location): Home  Distant Location (provider location):  Off-site  Start Time:  1PM  End Time:  1:20PM     Medication Therapy Recommendations  No medication therapy recommendations to display

## 2025-01-26 ENCOUNTER — HEALTH MAINTENANCE LETTER (OUTPATIENT)
Age: 55
End: 2025-01-26

## 2025-01-29 DIAGNOSIS — Z79.4 TYPE 2 DIABETES MELLITUS WITH HYPERGLYCEMIA, WITH LONG-TERM CURRENT USE OF INSULIN (H): ICD-10-CM

## 2025-01-29 DIAGNOSIS — E11.65 TYPE 2 DIABETES MELLITUS WITH HYPERGLYCEMIA, WITH LONG-TERM CURRENT USE OF INSULIN (H): ICD-10-CM

## 2025-02-03 RX ORDER — ACYCLOVIR 400 MG/1
1 TABLET ORAL
Qty: 3 EACH | Refills: 5 | OUTPATIENT
Start: 2025-02-03

## 2025-02-03 NOTE — TELEPHONE ENCOUNTER
Continuous Glucose Sensor (DEXCOM G7 SENSOR) MISC 3 each 5 12/3/2024     Should have refills on file. Pharmacy sent message. Rx refill denied    Katie Sinclair RN  P Red Flag Triage/MRT

## 2025-02-04 ENCOUNTER — TELEPHONE (OUTPATIENT)
Dept: CARDIOLOGY | Facility: CLINIC | Age: 55
End: 2025-02-04
Payer: COMMERCIAL

## 2025-02-04 NOTE — TELEPHONE ENCOUNTER
Spoke with Bettina today to review results of genetic testing. She underwent genetic testing on January 13, 2025 due to her diagnosis of nonischemic cardiomyopathy (NICM).   Genetic testing for the Comprehensive Cardiomyopathy panel thru [facility] revealed that Bettina does NOT carry a mutation in the 56 genes analyzed. It is predicted that this panel will identify mutations in 40-50% of hereditary NICM cases.   Therefore, this negative result does not rule out a genetic basis for the NICM in Bettina but eliminates the option of presymptomatic genetic testing in at risk family members, at this time. However, since this condition could still be genetic, clinical screening is recommended in all first degree relatives (children, siblings, parents).  Clinical screening should include cardiac exam, ECHO, and EKG to assess their current status. Testing may also include heart monitor, stress testing, and MRI.   Explained that with continued research and genetic knowledge the detection rate for identifying mutations may increase over time. Therefore, it is worth touching base in the years to come to learn about new testing options.   A summary letter and copy of the results will be sent to patient. All questions answered at this time.   Maty Sanford MS, [facility] [place]  Licensed, Certified Genetic Counselor  [facility]  [facility]

## 2025-02-24 PROBLEM — M54.2 NECK PAIN: Status: RESOLVED | Noted: 2024-12-05 | Resolved: 2025-02-24

## 2025-02-24 PROBLEM — M25.519 PAIN IN JOINT, SHOULDER REGION: Status: RESOLVED | Noted: 2024-12-05 | Resolved: 2025-02-24

## 2025-03-16 ENCOUNTER — MYC REFILL (OUTPATIENT)
Dept: FAMILY MEDICINE | Facility: CLINIC | Age: 55
End: 2025-03-16
Payer: COMMERCIAL

## 2025-03-16 ENCOUNTER — MYC REFILL (OUTPATIENT)
Dept: CARDIOLOGY | Facility: CLINIC | Age: 55
End: 2025-03-16
Payer: COMMERCIAL

## 2025-03-16 ENCOUNTER — MYC REFILL (OUTPATIENT)
Dept: PHARMACY | Facility: CLINIC | Age: 55
End: 2025-03-16
Payer: COMMERCIAL

## 2025-03-16 DIAGNOSIS — E66.01 MORBID OBESITY (H): ICD-10-CM

## 2025-03-16 DIAGNOSIS — I50.22 CHRONIC SYSTOLIC CONGESTIVE HEART FAILURE (H): ICD-10-CM

## 2025-03-16 DIAGNOSIS — I25.10 CAD (CORONARY ARTERY DISEASE): ICD-10-CM

## 2025-03-16 DIAGNOSIS — I50.22 CHRONIC SYSTOLIC HEART FAILURE (H): ICD-10-CM

## 2025-03-16 DIAGNOSIS — Z79.4 TYPE 2 DIABETES MELLITUS WITH HYPERGLYCEMIA, WITH LONG-TERM CURRENT USE OF INSULIN (H): ICD-10-CM

## 2025-03-16 DIAGNOSIS — J45.30 MILD PERSISTENT ASTHMA WITHOUT COMPLICATION: ICD-10-CM

## 2025-03-16 DIAGNOSIS — I10 ESSENTIAL HYPERTENSION WITH GOAL BLOOD PRESSURE LESS THAN 140/90: ICD-10-CM

## 2025-03-16 DIAGNOSIS — E11.65 TYPE 2 DIABETES MELLITUS WITH HYPERGLYCEMIA, WITH LONG-TERM CURRENT USE OF INSULIN (H): ICD-10-CM

## 2025-03-16 DIAGNOSIS — I50.23 ACUTE ON CHRONIC SYSTOLIC HEART FAILURE (H): ICD-10-CM

## 2025-03-17 RX ORDER — TIRZEPATIDE 15 MG/.5ML
15 INJECTION, SOLUTION SUBCUTANEOUS
Qty: 6 ML | Refills: 3 | Status: SHIPPED | OUTPATIENT
Start: 2025-03-17

## 2025-03-17 RX ORDER — INSULIN GLARGINE 300 U/ML
72 INJECTION, SOLUTION SUBCUTANEOUS DAILY
Qty: 30 ML | Refills: 1 | Status: SHIPPED | OUTPATIENT
Start: 2025-03-17

## 2025-03-17 RX ORDER — MONTELUKAST SODIUM 10 MG/1
1 TABLET ORAL AT BEDTIME
Qty: 90 TABLET | Refills: 0 | Status: SHIPPED | OUTPATIENT
Start: 2025-03-17

## 2025-03-19 RX ORDER — TORSEMIDE 20 MG/1
TABLET ORAL
Qty: 270 TABLET | Refills: 0 | Status: SHIPPED | OUTPATIENT
Start: 2025-03-19

## 2025-03-19 RX ORDER — SPIRONOLACTONE 50 MG/1
50 TABLET, FILM COATED ORAL DAILY
Qty: 90 TABLET | Refills: 3 | Status: SHIPPED | OUTPATIENT
Start: 2025-03-19

## 2025-03-19 RX ORDER — CARVEDILOL 25 MG/1
25 TABLET ORAL 2 TIMES DAILY WITH MEALS
Qty: 180 TABLET | Refills: 1 | Status: SHIPPED | OUTPATIENT
Start: 2025-03-19

## 2025-03-19 RX ORDER — SACUBITRIL AND VALSARTAN 97; 103 MG/1; MG/1
1 TABLET, FILM COATED ORAL 2 TIMES DAILY
Qty: 180 TABLET | Refills: 1 | Status: SHIPPED | OUTPATIENT
Start: 2025-03-19

## 2025-03-19 RX ORDER — ATORVASTATIN CALCIUM 40 MG/1
40 TABLET, FILM COATED ORAL DAILY
Qty: 90 TABLET | Refills: 1 | Status: SHIPPED | OUTPATIENT
Start: 2025-03-19

## 2025-03-19 NOTE — TELEPHONE ENCOUNTER
atorvastatin (LIPITOR) 40 MG tablet: Sending signed order from 8/28/2024 Disp:90 with R:3 to new pharmacy as requested (refills adjusted considering date of order).        torsemide (DEMADEX) 20 MG tablet : Sending signed order from 6/14/2024 Disp:270 with R:3 as requested (refills adjusted considering date of order).

## 2025-03-19 NOTE — TELEPHONE ENCOUNTER
carvedilol (COREG) 25 MG tablet: Sending signed order from 8/28/2024 Disp:180 with R:3 to new pharmacy as requested (refills adjusted considering date of order).        sacubitril-valsartan (ENTRESTO)  MG per tablet: Sending signed order from 8/28/2024 Disp:180 with R:3 to new pharmacy as requested (refills adjusted considering date of order).       spironolactone (ALDACTONE) 50 MG tablet: passed medication protocol  - last Visit: 3/16/2025  - refills sent    - message sent to patient    Request from pharmacy:  Requested Prescriptions   Pending Prescriptions Disp Refills    spironolactone (ALDACTONE) 50 MG tablet 90 tablet 3     Sig: Take 1 tablet (50 mg) by mouth daily.       Diuretics (Including Combos) Protocol Passed - 3/19/2025  6:02 PM        Passed - Most recent blood pressure under 140/90 in past 12 months     BP Readings from Last 3 Encounters:   11/14/24 119/78   08/28/24 118/82   06/14/24 110/73       No data recorded            Passed - Potassium level on file in past 12 months        Passed - Medication is active on med list and the sig matches. RN to manually verify dose and sig if red X/fail.     If the protocol passes (green check), you do not need to verify med dose and sig.    A prescription matches if they are the same clinical intention.    For Example: once daily and every morning are the same.    The protocol can not identify upper and lower case letters as matching and will fail.     For Example: Take 1 tablet (50 mg) by mouth daily     TAKE 1 TABLET (50 MG) BY MOUTH DAILY    For all fails (red x), verify dose and sig.    If the refill does match what is on file, the RN can still proceed to approve the refill request.       If they do not match, route to the appropriate provider.             Passed - Has GFR on file in past 12 months and most recent value is normal        Passed - Medication indicated for associated diagnosis     Medication is associated with one or more of the following  diagnoses:     Hypertension   Heart Failure   Hyperaldosteronism   Acne   Hirsutism   Hypokalemia   Hepatic Cirrhosis   Nephrotic Syndrome   Myocardial Infarction   Transgender Female   Cardiomyopathy  Congenital Heart Disease  Diastolic Dysfunction          Passed - Recent (12 mo) or future (90 days) visit within the authorizing provider's specialty     The patient must have completed an in-person or virtual visit within the past 12 months or has a future visit scheduled within the next 90 days with the authorizing provider s specialty.  Urgent care and e-visits do not qualify as an office visit for this protocol.          Passed - Patient is age 18 or older        Passed - No active pregancy on record        Passed - No positive pregnancy test in past 12 months

## 2025-03-25 ENCOUNTER — TELEPHONE (OUTPATIENT)
Dept: FAMILY MEDICINE | Facility: CLINIC | Age: 55
End: 2025-03-25
Payer: COMMERCIAL

## 2025-03-25 NOTE — TELEPHONE ENCOUNTER
Patient Quality Outreach    Patient is due for the following:   Diabetes -  A1C, Diabetic Follow-Up Visit, and Foot Exam  Physical Preventive Adult Physical      Topic Date Due    Hepatitis B Vaccine (1 of 3 - 19+ 3-dose series) Never done    COVID-19 Vaccine (5 - 2024-25 season) 09/01/2024       Action(s) Taken:   Schedule a Adult Preventative    Type of outreach:    Sent [email or web address] message.    Questions for provider review:    None         Barbara Thomas MA

## 2025-03-26 ENCOUNTER — OFFICE VISIT (OUTPATIENT)
Dept: CARDIOLOGY | Facility: CLINIC | Age: 55
End: 2025-03-26
Payer: COMMERCIAL

## 2025-03-26 ENCOUNTER — LAB (OUTPATIENT)
Dept: LAB | Facility: CLINIC | Age: 55
End: 2025-03-26
Payer: COMMERCIAL

## 2025-03-26 VITALS
SYSTOLIC BLOOD PRESSURE: 115 MMHG | BODY MASS INDEX: 41.92 KG/M2 | WEIGHT: 259.7 LBS | OXYGEN SATURATION: 98 % | DIASTOLIC BLOOD PRESSURE: 77 MMHG | HEART RATE: 94 BPM

## 2025-03-26 DIAGNOSIS — I50.22 CHRONIC SYSTOLIC CONGESTIVE HEART FAILURE (H): Primary | ICD-10-CM

## 2025-03-26 DIAGNOSIS — I50.9 CHF (CONGESTIVE HEART FAILURE) (H): Primary | ICD-10-CM

## 2025-03-26 DIAGNOSIS — G47.33 OSA (OBSTRUCTIVE SLEEP APNEA): ICD-10-CM

## 2025-03-26 DIAGNOSIS — E78.5 HYPERLIPIDEMIA LDL GOAL <100: ICD-10-CM

## 2025-03-26 DIAGNOSIS — E11.00 TYPE 2 DIABETES MELLITUS WITH HYPEROSMOLARITY WITHOUT COMA, WITH LONG-TERM CURRENT USE OF INSULIN (H): ICD-10-CM

## 2025-03-26 DIAGNOSIS — E11.65 TYPE 2 DIABETES MELLITUS WITH HYPERGLYCEMIA, WITH LONG-TERM CURRENT USE OF INSULIN (H): ICD-10-CM

## 2025-03-26 DIAGNOSIS — I25.10 CORONARY ARTERY CALCIFICATION: ICD-10-CM

## 2025-03-26 DIAGNOSIS — Z79.4 TYPE 2 DIABETES MELLITUS WITH HYPERGLYCEMIA, WITH LONG-TERM CURRENT USE OF INSULIN (H): ICD-10-CM

## 2025-03-26 DIAGNOSIS — Z79.4 TYPE 2 DIABETES MELLITUS WITH HYPEROSMOLARITY WITHOUT COMA, WITH LONG-TERM CURRENT USE OF INSULIN (H): ICD-10-CM

## 2025-03-26 DIAGNOSIS — I10 ESSENTIAL HYPERTENSION WITH GOAL BLOOD PRESSURE LESS THAN 140/90: ICD-10-CM

## 2025-03-26 DIAGNOSIS — I42.8 NONISCHEMIC CARDIOMYOPATHY (H): ICD-10-CM

## 2025-03-26 DIAGNOSIS — I50.23 ACUTE ON CHRONIC SYSTOLIC HEART FAILURE (H): ICD-10-CM

## 2025-03-26 LAB
ALT SERPL W P-5'-P-CCNC: 20 U/L (ref 0–50)
ANION GAP SERPL CALCULATED.3IONS-SCNC: 10 MMOL/L (ref 7–15)
BUN SERPL-MCNC: 28.1 MG/DL (ref 6–20)
CALCIUM SERPL-MCNC: 9.6 MG/DL (ref 8.8–10.4)
CHLORIDE SERPL-SCNC: 102 MMOL/L (ref 98–107)
CHOLEST SERPL-MCNC: 111 MG/DL
CREAT SERPL-MCNC: 1.12 MG/DL (ref 0.51–0.95)
EGFRCR SERPLBLD CKD-EPI 2021: 57 ML/MIN/1.73M2
ERYTHROCYTE [DISTWIDTH] IN BLOOD BY AUTOMATED COUNT: 14.6 % (ref 10–15)
EST. AVERAGE GLUCOSE BLD GHB EST-MCNC: 177 MG/DL
FASTING STATUS PATIENT QL REPORTED: NO
FASTING STATUS PATIENT QL REPORTED: NO
GLUCOSE SERPL-MCNC: 110 MG/DL (ref 70–99)
HBA1C MFR BLD: 7.8 % (ref 0–5.6)
HCO3 SERPL-SCNC: 23 MMOL/L (ref 22–29)
HCT VFR BLD AUTO: 36.7 % (ref 35–47)
HDLC SERPL-MCNC: 38 MG/DL
HGB BLD-MCNC: 11.7 G/DL (ref 11.7–15.7)
LDLC SERPL CALC-MCNC: 51 MG/DL
MCH RBC QN AUTO: 27.1 PG (ref 26.5–33)
MCHC RBC AUTO-ENTMCNC: 31.9 G/DL (ref 31.5–36.5)
MCV RBC AUTO: 85 FL (ref 78–100)
NONHDLC SERPL-MCNC: 73 MG/DL
PLATELET # BLD AUTO: 366 10E3/UL (ref 150–450)
POTASSIUM SERPL-SCNC: 4.5 MMOL/L (ref 3.4–5.3)
RBC # BLD AUTO: 4.31 10E6/UL (ref 3.8–5.2)
SODIUM SERPL-SCNC: 135 MMOL/L (ref 135–145)
TRIGL SERPL-MCNC: 110 MG/DL
WBC # BLD AUTO: 10.5 10E3/UL (ref 4–11)

## 2025-03-26 PROCEDURE — 36415 COLL VENOUS BLD VENIPUNCTURE: CPT

## 2025-03-26 PROCEDURE — 85027 COMPLETE CBC AUTOMATED: CPT

## 2025-03-26 PROCEDURE — 80061 LIPID PANEL: CPT

## 2025-03-26 PROCEDURE — 82570 ASSAY OF URINE CREATININE: CPT

## 2025-03-26 PROCEDURE — 99214 OFFICE O/P EST MOD 30 MIN: CPT | Performed by: NURSE PRACTITIONER

## 2025-03-26 PROCEDURE — 80048 BASIC METABOLIC PNL TOTAL CA: CPT

## 2025-03-26 PROCEDURE — 84460 ALANINE AMINO (ALT) (SGPT): CPT

## 2025-03-26 PROCEDURE — 82043 UR ALBUMIN QUANTITATIVE: CPT

## 2025-03-26 PROCEDURE — 83036 HEMOGLOBIN GLYCOSYLATED A1C: CPT

## 2025-03-26 ASSESSMENT — PAIN SCALES - GENERAL: PAINLEVEL_OUTOF10: NO PAIN (0)

## 2025-03-26 NOTE — PATIENT INSTRUCTIONS
Take your medicines every day, as directed     Changes made today:    none    Monitor Your Weight and Symptoms     Contact us if you:     Gain 2 pounds in one day or 5 pounds in one week  Feel more short of breath  Notice more leg swelling  Feel lightheadeded    Change your lifestyle     Limit Salt or Sodium:  2000 mg  Limit Fluids:  2000 mL or approximately 64 ounces  Eat a Heart Healthy Diet  Low in saturated fats  Stay Active:  Aim to move at least 150 minutes every  week            To Contact us     During Business Hours:  [phone], option # 1      After hours, weekends or holidays:   [phone], Option #4  Ask to speak to the On-Call Cardiologist. Inform them you are a CORE/heart failure patient at the [facility].       Use [employer] allows you to communicate directly with your heart team through secure messaging.  [employer] can be accessed any time on your phone, computer, or tablet.  If you need assistance, we'd be happy to help!             Keep your Heart Appointments:     CORE in 9 months  Echo next avail  Dr Fournier August      Please consider attending our virtual support group which is held monthly. Please reach out to Jb at [phone] for more information if you are interested in attending.

## 2025-03-26 NOTE — PROGRESS NOTES
SANJAY  Bettina Montes is a 55 year old female with a past medical history of HFrEF (30-35%) secondary to NICM (felt to be idiopathic), HTN, obesity, DM II, cervical spine fusion, and hyperlipidemia who presents for routine follow-up. She was last seen by Dr. Chinchilla on 6/5/20.    She saw Dr. Bailey in June with the following recommendations:  1-increase your hydralazine to 10 mg three times daily   2- Try alternative sglt2i- which also may cause yeast infection. Will start with 5 mg jardiance- can increase at CORE in Aug if tolerate well    Bettina had her last CORE visit in February.   She has been seeing the MTM for DM management.  She had a decrease in her metformin a while back.  She says her BS have improved .  No SOB at rest and no CASILLAS-   She says there is now no swelling in her legs or abdomen.  Home weight 258 lbs. Her appetite has been fine. She denies early satiety. Continues to self titrate her afternoon diuretic doses. She takes 60 mg daily .  She has been taking the Hydralazine 10 mg TID and taking Jardiance 10 mg since she did fine.     She denies any chest pain, lightheadedness, dizziness, or near syncopal episodes.     Pt is taking hydralazine 10 mg TID, coreg 25 BID, entresto 97/103 BID, spironolactone 50 qday and torsemide 60mg qday and Jardiance 10 mg daily .       PMH  Past Medical History:   Diagnosis Date    Abnormal Pap smear of cervix 09/10/2019    See problem list    Adrenal gland anomaly     CHF (congestive heart failure) (H)     Fatty liver     Gastroesophageal reflux disease     Hyperlipidemia     Hypertension     Mild persistent asthma     NICM (nonischemic cardiomyopathy) (H)     Obesity     WILLARD (obstructive sleep apnea) 1/9/2015    Type 2 diabetes mellitus (H)        Past Surgical History:   Procedure Laterality Date    COLONOSCOPY      COLONOSCOPY N/A 10/24/2022    Procedure: COLONOSCOPY, FLEXIBLE, WITH LESION REMOVAL USING SNARE;  Surgeon: Larissa Eubanks MD;  Location:  OR     COLONOSCOPY WITH CO2 INSUFFLATION N/A 10/24/2022    Procedure: COLONOSCOPY, WITH CO2 INSUFFLATION;  Surgeon: Larissa Eubanks MD;  Location: [facility] OR    DISCECTOMY, FUSION CERVICAL ANTERIOR TWO LEVELS, COMBINED N/A 2019    Procedure: C4-6 anterior cervical discectomy and fusion;  Surgeon: Hollis Hurtado MD;  Location: [facility] OR    TUBAL LIGATION         Family History   Problem Relation Age of Onset    Diabetes Mother     Hypertension Mother     Hyperlipidemia Mother     Heart Failure Mother     Colon Cancer Mother     Diabetes Father     Cancer Paternal Grandmother         ?       Social History     Socioeconomic History    Marital status: Single     Spouse name: None    Number of children: 1    Years of education: None    Highest education level: None   Occupational History    Occupation: CNA     Employer: OTHER   Social Needs    Financial resource strain: None    Food insecurity:     Worry: None     Inability: None    Transportation needs:     Medical: None     Non-medical: None   Tobacco Use    Smoking status: Former Smoker     Packs/day: 1.00     Years: 30.00     Pack years: 30.00     Types: Cigarettes     Last attempt to quit: 2013     Years since quittin.8    Smokeless tobacco: Former User    Tobacco comment:     Substance and Sexual Activity    Alcohol use: Yes     Alcohol/week: 0.0 standard drinks     Comment: 1    Drug use: No    Sexual activity: Yes     Partners: Male     Birth control/protection: Surgical   Lifestyle    Physical activity:     Days per week: None     Minutes per session: None    Stress: None   Relationships    Social connections:     Talks on phone: None     Gets together: None     Attends Restorationist service: None     Active member of club or organization: None     Attends meetings of clubs or organizations: None     Relationship status: None    Intimate partner violence:     Fear of current or ex partner: None     Emotionally abused: None     Physically abused: None      Forced sexual activity: None   Other Topics Concern    Parent/sibling w/ CABG, MI or angioplasty before 65F 55M? No   Social History Narrative    None       ALLERGIES  Allergies   Allergen Reactions    Amlodipine Swelling     Edema on 5mg throat    Lisinopril      Swelling in throat, face  angioedema    Naprosyn [Naproxen]      Swelling, diff breathing       MEDICATIONS   Current Outpatient Medications   Medication Sig Dispense Refill    acetaminophen (TYLENOL) 500 MG tablet Take 500-1,000 mg by mouth every 6 hours as needed for mild pain      aspirin 81 MG EC tablet Take 1 tablet (81 mg) by mouth daily      atorvastatin (LIPITOR) 40 MG tablet Take 1 tablet (40 mg) by mouth daily. 90 tablet 1    carvedilol (COREG) 25 MG tablet Take 1 tablet (25 mg) by mouth 2 times daily (with meals). 180 tablet 1    cholecalciferol 50 MCG (2000 UT) CAPS Take 2,000 Units by mouth daily       Continuous Glucose Sensor (DEXCOM G7 SENSOR) MISC 1 Device every 10 days. 3 each 5    cyclobenzaprine (FLEXERIL) 5 MG tablet Take 1 tablet (5 mg) by mouth 3 times daily as needed for muscle spasms. 40 tablet 3    dexAMETHasone (DECADRON) 1 MG tablet Take 1 tablet (1 mg) by mouth once for 1 dose At 11:00 PM. The next morning after taking the medication go to he lab at 8:00 am. 1 tablet 0    empagliflozin (JARDIANCE) 10 MG TABS tablet Take 1 tablet (10 mg) by mouth daily. 90 tablet 2    fluticasone-salmeterol (WIXELA INHUB) 250-50 MCG/ACT inhaler Inhale 1 puff into the lungs every 12 hours 1 each 11    hydrALAZINE (APRESOLINE) 10 MG tablet Take 1 tablet (10 mg) by mouth 3 times daily 270 tablet 3    insulin glargine U-300 (TOUJEO MAX SOLOSTAR) 300 UNIT/ML (2 units dial) pen Inject 72 Units subcutaneously daily. 30 mL 1    insulin pen needle (ULTICARE MICRO) 32G X 4 MM miscellaneous Use 1 pen needles daily or as directed. 400 each 11    ketoconazole (NIZORAL) 2 % external cream Apply topically 2 times daily For 2 weeks. 60 g 1    levalbuterol  (XOPENEX HFA) 45 MCG/ACT Inhaler Inhale 2 puffs into the lungs every 4 hours as needed for shortness of breath / dyspnea or wheezing 15 g 3    magnesium oxide (MAG-OX) 400 (241.3 Mg) MG tablet Take 1 tablet (400 mg) by mouth daily 100 tablet 3    metFORMIN (GLUCOPHAGE XR) 500 MG 24 hr tablet Take 1 tablet (500 mg) by mouth 2 times daily (with meals). 180 tablet 1    montelukast (SINGULAIR) 10 MG tablet Take 1 tablet (10 mg) by mouth at bedtime. 90 tablet 0    sacubitril-valsartan (ENTRESTO)  MG per tablet Take 1 tablet by mouth 2 times daily. 180 tablet 1    spironolactone (ALDACTONE) 50 MG tablet Take 1 tablet (50 mg) by mouth daily. 90 tablet 3    torsemide (DEMADEX) 20 MG tablet Please take 60 mg daily (3 tabs) 270 tablet 0    triamcinolone (KENALOG) 0.1 % external cream Apply topically 2 times daily For maximum 2 weeks. 60 g 1    ZEPBOUND 15 MG/0.5ML prefilled pen Inject 0.5 mLs (15 mg) subcutaneously every 7 days. 6 mL 3     No current facility-administered medications for this visit.     Facility-Administered Medications Ordered in Other Visits   Medication Dose Route Frequency Provider Last Rate Last Admin    sodium chloride (PF) 0.9% PF flush 10 mL  10 mL Intracatheter Once El Chinchilla MD               ROS:   Constitutional: No fever, chills, or sweats.  ENT: No visual disturbance, ear ache, epistaxis, sore throat.   Allergies/Immunologic: Negative  Respiratory: No cough, hemoptysis.   Cardiovascular: As per HPI.   GI: As per HPI.   : No urinary frequency, dysuria, or hematuria.   Integument: Negative.   Psychiatric: Negative.   Neuro: Negative.   Endocrinology: negative   Musculoskeletal: negative    EXAM:   General: appears comfortable, alert and articulate  Head: normocephalic, atraumatic  Eyes: anicteric sclera, EOMI  Neck: Supple, no adenopathy  Orophyarynx: moist mucosa, no cyanosis  Heart: regular, S1/S2, no murmur, gallop, rub, estimated JVP not detected at 90 degrees  Lungs:  Respirations even and unlabored, lungs clear, no rales or wheezing.  Lungs sounds decreased bilaterally in the bases.  Abdomen:  distended and firmer, non-tender, bowel sounds present, no hepatomegaly  Extremities: no clubbing, cyanosis. Has trace edema  Neurological: normal speech and affect, no gross motor deficits  Skin:  Warm and dry.      LABS  Last Comprehensive Metabolic Panel:  Sodium   Date Value Ref Range Status   08/28/2024 137 135 - 145 mmol/L Final   03/23/2021 133 133 - 144 mmol/L Final     Potassium   Date Value Ref Range Status   08/28/2024 4.5 3.4 - 5.3 mmol/L Final   02/15/2023 3.5 3.4 - 5.3 mmol/L Final   03/23/2021 3.8 3.4 - 5.3 mmol/L Final     Chloride   Date Value Ref Range Status   08/28/2024 102 98 - 107 mmol/L Final   02/15/2023 100 94 - 109 mmol/L Final   03/23/2021 95 94 - 109 mmol/L Final     Carbon Dioxide   Date Value Ref Range Status   03/23/2021 35 (H) 20 - 32 mmol/L Final     Carbon Dioxide (CO2)   Date Value Ref Range Status   08/28/2024 25 22 - 29 mmol/L Final   02/15/2023 29 20 - 32 mmol/L Final     Anion Gap   Date Value Ref Range Status   08/28/2024 10 7 - 15 mmol/L Final   02/15/2023 7 3 - 14 mmol/L Final   03/23/2021 3 3 - 14 mmol/L Final     Glucose   Date Value Ref Range Status   08/28/2024 160 (H) 70 - 99 mg/dL Final   02/15/2023 282 (H) 70 - 99 mg/dL Final   03/23/2021 260 (H) 70 - 99 mg/dL Final     Urea Nitrogen   Date Value Ref Range Status   08/28/2024 10.6 6.0 - 20.0 mg/dL Final   02/15/2023 12 7 - 30 mg/dL Final   03/23/2021 24 7 - 30 mg/dL Final     Creatinine   Date Value Ref Range Status   08/28/2024 1.02 (H) 0.51 - 0.95 mg/dL Final   03/23/2021 1.18 (H) 0.52 - 1.04 mg/dL Final     GFR Estimate   Date Value Ref Range Status   08/28/2024 65 >60 mL/min/1.73m2 Final     Comment:     eGFR calculated using 2021 CKD-EPI equation.   03/23/2021 53 (L) >60 mL/min/[1.73_m2] Final     Comment:     Non  GFR Calc  Starting 12/18/2018, serum creatinine based  estimated GFR (eGFR) will be   calculated using the Chronic Kidney Disease Epidemiology Collaboration   (CKD-EPI) equation.       Calcium   Date Value Ref Range Status   08/28/2024 9.4 8.8 - 10.4 mg/dL Final     Comment:     Reference intervals for this test were updated on 7/16/2024 to reflect our healthy population more accurately. There may be differences in the flagging of prior results with similar values performed with this method. Those prior results can be interpreted in the context of the updated reference intervals.   03/23/2021 9.9 8.5 - 10.1 mg/dL Final       MOST RECENT ECHOCARDIOGRAM 10/25/19:  Interpretation Summary  Technically difficult study.  Left ventricular size is normal. Mildly (EF 40-45%, traced 45%) reduced left ventricular function is present.  Right ventricular function, chamber size, wall motion, and thickness are normal. This study was compared with the study from 3/21/18: The left ventricular function has improved    ASSESSMENT AND PLAN  Bettina Montes is a 55 year old female with NICM who appears euvolemic today. She has been at [facility] for her HF.         1. Chronic systolic heart failure/HFrEF (EF 40-45%) secondary to nonischemic cardiomyopathy  NYHA Symptom Class IIIB  Stage C  Primary Cardiologist: Dr Chinchilla; Last seen 6/17/22  ACE-I/ARB/ARNi:  Entresto  mg- titration complete  BB yes, Carvedilol 25 mg twice daily- titration complete  Aldosterone antagonist yes, Spironolactone 50 mg daily.   SCD prophylaxis EF > 35%   Fluid status euvolemic  Cardiac Rehab: Completed  Sleep Apnea Evaluation: Documented sleep apnea.    Remote monitoring: Mychart active  Antiplatelet: none.   Anticoagulation: None.  SGLT II- unable to tolerate the dapaglifozin- frequent yeast infections. - is taking 10 mg Jardiance and tolerating  Hydral/isordil: she is on hydral 10 mg TID, was previously on a higher dose which caused hypotension and dizziness.   GLP-1 - on Zepbound       # Obesity:  She is  working on weight loss.  Continue to reassess at subsequent visits. Has been losing weight    #  HTN:   continue Entresto   Continue to monitor BP readings at home.      #. Shoulder surgery. - October - 2021    4.  Follow-up:  Dr. Fournier 6 month from October with echo   CORE in 9 months             Heide JULIENC

## 2025-03-26 NOTE — NURSING NOTE
"Chief Complaint   Patient presents with    Follow Up     Return CORE for 6 mo follow up       Initial /77 (BP Location: Right arm, Patient Position: Sitting, Cuff Size: Adult Large)   Pulse 94   Wt 117.8 kg (259 lb 11.2 oz)   LMP  (LMP Unknown)   SpO2 98%   BMI 41.92 kg/m   Estimated body mass index is 41.92 kg/m  as calculated from the following:    Height as of 11/14/24: 1.676 m (5' 6\").    Weight as of this encounter: 117.8 kg (259 lb 11.2 oz).  BP completed using cuff size: large    Medication refills needed?: unsure      Kusum Raza, EMT  "

## 2025-03-27 LAB
CREAT UR-MCNC: 105 MG/DL
MICROALBUMIN UR-MCNC: <12 MG/L
MICROALBUMIN/CREAT UR: NORMAL MG/G{CREAT}

## 2025-04-14 DIAGNOSIS — E11.65 TYPE 2 DIABETES MELLITUS WITH HYPERGLYCEMIA, WITH LONG-TERM CURRENT USE OF INSULIN (H): ICD-10-CM

## 2025-04-14 DIAGNOSIS — Z79.4 TYPE 2 DIABETES MELLITUS WITH HYPERGLYCEMIA, WITH LONG-TERM CURRENT USE OF INSULIN (H): ICD-10-CM

## 2025-04-14 DIAGNOSIS — J45.30 MILD PERSISTENT ASTHMA WITHOUT COMPLICATION: ICD-10-CM

## 2025-04-15 ENCOUNTER — VIRTUAL VISIT (OUTPATIENT)
Dept: PHARMACY | Facility: CLINIC | Age: 55
End: 2025-04-15
Attending: INTERNAL MEDICINE
Payer: COMMERCIAL

## 2025-04-15 ENCOUNTER — TELEPHONE (OUTPATIENT)
Dept: ENDOCRINOLOGY | Facility: CLINIC | Age: 55
End: 2025-04-15
Payer: COMMERCIAL

## 2025-04-15 DIAGNOSIS — Z79.4 TYPE 2 DIABETES MELLITUS WITH HYPERGLYCEMIA, WITH LONG-TERM CURRENT USE OF INSULIN (H): Primary | ICD-10-CM

## 2025-04-15 DIAGNOSIS — E66.01 MORBID OBESITY (H): ICD-10-CM

## 2025-04-15 DIAGNOSIS — E11.65 TYPE 2 DIABETES MELLITUS WITH HYPERGLYCEMIA, WITH LONG-TERM CURRENT USE OF INSULIN (H): Primary | ICD-10-CM

## 2025-04-15 DIAGNOSIS — E66.01 MORBID OBESITY (H): Primary | ICD-10-CM

## 2025-04-15 RX ORDER — INSULIN GLARGINE 300 U/ML
72 INJECTION, SOLUTION SUBCUTANEOUS DAILY
Qty: 30 ML | Refills: 1 | Status: SHIPPED | OUTPATIENT
Start: 2025-04-15

## 2025-04-15 RX ORDER — METFORMIN HYDROCHLORIDE 500 MG/1
500 TABLET, EXTENDED RELEASE ORAL 2 TIMES DAILY WITH MEALS
Qty: 180 TABLET | Refills: 1 | Status: SHIPPED | OUTPATIENT
Start: 2025-04-15

## 2025-04-15 RX ORDER — MONTELUKAST SODIUM 10 MG/1
1 TABLET ORAL AT BEDTIME
Qty: 90 TABLET | Refills: 0 | Status: SHIPPED | OUTPATIENT
Start: 2025-04-15 | End: 2025-08-07

## 2025-04-15 RX ORDER — TIRZEPATIDE 15 MG/.5ML
15 INJECTION, SOLUTION SUBCUTANEOUS
Qty: 6 ML | Refills: 5 | Status: SHIPPED | OUTPATIENT
Start: 2025-04-15

## 2025-04-15 RX ORDER — ACYCLOVIR 400 MG/1
1 TABLET ORAL
Qty: 9 EACH | Refills: 5 | Status: SHIPPED | OUTPATIENT
Start: 2025-04-15

## 2025-04-15 RX ORDER — ACYCLOVIR 400 MG/1
1 TABLET ORAL
Qty: 3 EACH | Refills: 5 | Status: CANCELLED | OUTPATIENT
Start: 2025-04-15

## 2025-04-15 RX ORDER — PEN NEEDLE, DIABETIC 32GX 5/32"
NEEDLE, DISPOSABLE MISCELLANEOUS
Qty: 400 EACH | Refills: 11 | Status: SHIPPED | OUTPATIENT
Start: 2025-04-15

## 2025-04-15 NOTE — PROGRESS NOTES
Medication Therapy Management (MTM) Encounter    ASSESSMENT:                            Medication Adherence/Access: See below for considerations    Type 2 Diabetes/BMI > 43:   A1c above goal of less than 7%, but above time in range goal of greater than 70% since initiation of Zepbound.  Tolerating the 15 mg dose well, so will continue current therapy at this time along with Toujeo self-taper plan. Will complete documentation required today for Zepbound renewal as PA expires in May.     PLAN:                            Continue Zepbound 15 mg weekly, Jardiance 10 mg daily, metformin 500-1000 mg daily/as tolerated    Toujeo: Continue to decrease by 2 units every 3 days if mornign fasting sugars are consistently less than 100     Per patient request, resent endocrine prescriptions/supplies to [facility] pharmacy. Patient will contact me if access issues arise.    Please let me know if you have any lows < 70.    Started Zepbound prior authorization renewal (expires May 2025). See documentation below:    To Whom it May Concern:    I am writing to formally request a RENEWAL prior authorization of coverage for my patient,  Bettina Montes, for treatment using Zepbound 15 mg #6mL for 84 day supply (indefinite length of therapy)    The patient will be using for FDA approved indication (if patient has a BMI greater than 30 or > 27 with weight-related comorbidities).     Please see below for rationale for CONTINUED coverage for Zepbound:    Patient is currently taking Zepbound 15 mg weekly. She started Zepbound 9/2024. At that time, she weighed 278 lb (BMI 44.9). Current BMI is 40.7 (5ft 6in; 252 lb), which is a clinically significant 9.35% decrease in weight over a period of six months. Continuation of therapy is vital so she can continue to achieve her weight loss goals.     Zepbound would continue to be used for comprehensive weight management    Zepbound would continue to be used as an adjunct to a reduced-calorie diet and  increased physical activity    Patient has been and is currently participating in a weight loss program addressing diet and exercise for at least six months prior to initiation to therapy.     Patient follows the [facility] and has had more than two clinic appointments discussing weight loss and lifestyle changes with a provider, diabetes care and , or registered dietician in the last six months    Please approve this patient for Zepbound to help this patient continue to achieve their weight loss goals.    I would request this submission be evaluated on an individual basis by an endocrinologist or weight  who is current in the practice and standards of care. I firmly believe that this therapy is clinically appropriate and that Bettina Montes would benefit from improved clinical outcomes and quality of life if allowed the opportunity to receive this treatment.  I urge you to follow the aforementioned evidence presented to keep the best interest of our patient in mind.        Endocrine Team & Next Follow-Up:  2025 with Dr. Akbar  2025 with Faraz    SUBJECTIVE/OBJECTIVE:                          Bettina Montes is a 55 year old female seen for a follow-up visit. She was referred to me from Dr. Akbar.      Reason for visit:     Allergies/ADRs: Reviewed in chart  Past Medical History: Reviewed in chart  Social History     Tobacco Use    Smoking status: Former     Current packs/day: 0.00     Average packs/day: 1 pack/day for 30.0 years (30.0 ttl pk-yrs)     Types: Cigarettes     Start date: 1983     Quit date: 2013     Years since quittin.2    Smokeless tobacco: Former    Tobacco comments:         Vaping Use    Vaping status: Never Used   Substance Use Topics    Alcohol use: Not Currently     Comment: four drinks a month    Drug use: No        Medication Adherence/Access: No issues identified other  than being forced to switch back to [facility] per insurance. Many issues with [facility] (long wait times for medications, issues with prescriptions, poor staffing). She is working with her HR to give this feedback to see if they can prefer a different pharmacy on the plan going forward.     Diabetes   Patient diagnosed with type 2 diabetes*   Current Medications:  Metformin  mg daily (takes about every other day --feels that if she takes this daily she experiences diarrhea)  Insulin glargine U-300 70 units daily.   Zepbound 15 mg weekly -- still having cravings similar to Mounjaro. Is having some issues with itching/redness around injection site area, but not as severe as Mounjaro.  Jardiance 10 mg daily    No concerns other than mentioned above.    Medication history per chart review:  Historically on Mounjaro 15 mg --did not see weight loss and had issues with itching, so switched to Zepbound last month and preferred to start at the 5 mg dose and restart titration schedule  Also historically on NovoLog, discontinued due to good control.    Blood sugar monitoring: Continuous Glucose Monitor see AGP below          Lows over past few weeks likely sensor error/compression low per patient -- no symptoms    Lab Results   Component Value Date    A1C 7.8 (H) 03/26/2025    A1C 6.8 (H) 09/12/2023    A1C 6.5 (H) 08/23/2023     4/15/2025: 252 lb   Wt Readings from Last 3 Encounters:   03/26/25 259 lb 11.2 oz (117.8 kg)   11/14/24 267 lb 9.6 oz (121.4 kg)   10/10/24 278 lb (126.1 kg)     Lab Results   Component Value Date    GFRESTIMATED 57 (L) 03/26/2025    GFRESTIMATED 65 08/28/2024    GFRESTIMATED 54 (L) 06/14/2024          ----------------      I spent 20 minutes with this patient today. Dr. Akbar was provided the recommendations above via routed note and is the authorizing prescriber for this visit through the pharmacist collaborative practice agreement. A copy of the visit note was provided to the patient's  provider(s).    The patient was given to the patient a summary of these recommendations.     Faraz Renae, PharmD, [facility]  Endocrine & Diabetes [facility] Pharmacist  [address] [place] [id number]    Telemedicine Visit Details  The patient's medications can be safely assessed via a telemedicine encounter.  Type of service:  Telephone visit  Originating Location (pt. Location): Home  Distant Location (provider location):  Off-site  Start Time:  1PM  End Time:  1:20PM     Medication Therapy Recommendations  Morbid obesity (H)   1 Current Medication: ZEPBOUND 15 MG/0.5ML prefilled pen   Current Medication Sig: Inject 0.5 mLs (15 mg) subcutaneously every 7 days.   Rationale: Cannot afford medication product - Cost - Adherence   Recommendation: Referral to Service    Status: Resolved Med Access Issue   Identified Date: 4/15/2025 Completed Date: 4/15/2025

## 2025-04-15 NOTE — TELEPHONE ENCOUNTER
Zepbound prior authorization expires 5/2025. Please submit renewal when able and route to Faraz Renae if approved/denied. Can also use 4/15/25 [facility] visit note for documentation to warrant approval.     To Whom it May Concern:    I am writing to formally request a RENEWAL prior authorization of coverage for my patient,  Bettina Montes, for treatment using Zepbound 15 mg #6mL for 84 day supply (indefinite length of therapy)    The patient will be using for FDA approved indication (if patient has a BMI greater than 30 or > 27 with weight-related comorbidities).     Please see below for rationale for CONTINUED coverage for Zepbound:    Patient is currently taking Zepbound 15 mg weekly. She started Zepbound 9/2024. At that time, she weighed 278 lb (BMI 44.9). Current BMI is 40.7 (5ft 6in; 252 lb), which is a clinically significant 9.35% decrease in weight over a period of six months. Continuation of therapy is vital so she can continue to achieve her weight loss goals.     Zepbound would continue to be used for comprehensive weight management    Zepbound would continue to be used as an adjunct to a reduced-calorie diet and increased physical activity    Patient has been and is currently participating in a weight loss program addressing diet and exercise for at least six months prior to initiation to therapy.     Patient follows the [facility] and has had more than two clinic appointments discussing weight loss and lifestyle changes with a provider, diabetes care and , or registered dietician in the last six months    Please approve this patient for Zepbound to help this patient continue to achieve their weight loss goals.    I would request this submission be evaluated on an individual basis by an endocrinologist or weight  who is current in the practice and standards of care. I firmly believe that this therapy is clinically  appropriate and that Bettina Montes would benefit from improved clinical outcomes and quality of life if allowed the opportunity to receive this treatment.  I urge you to follow the aforementioned evidence presented to keep the best interest of our patient in mind.      Faraz Renae, PharmD, [facility]  Endocrine & Diabetes MTM Pharmacist  [facility]  Diabetes & Endocrinology Clinic  [address] [place] [id number]    Contact information:   To schedule a MTM appointment: [phone]  For questions or concerns, please send a [employer] message (preferred) or call the clinic at [phone].    For more urgent concerns that do not require 222, please call [phone] after hours/weekends and ask to speak with the Endocrinologist on call.

## 2025-04-16 ENCOUNTER — TELEPHONE (OUTPATIENT)
Dept: CARDIOLOGY | Facility: CLINIC | Age: 55
End: 2025-04-16
Payer: COMMERCIAL

## 2025-04-16 DIAGNOSIS — I50.23 ACUTE ON CHRONIC SYSTOLIC HEART FAILURE (H): ICD-10-CM

## 2025-04-16 RX ORDER — TORSEMIDE 20 MG/1
60 TABLET ORAL DAILY
Qty: 270 TABLET | Refills: 3 | Status: SHIPPED | OUTPATIENT
Start: 2025-04-16

## 2025-04-16 NOTE — TELEPHONE ENCOUNTER
[facility] Call Center    Phone Message    May a detailed message be left on voicemail: yes     Reason for Call: Medication Refill Request    Has the patient contacted the pharmacy for the refill? Yes   Name of medication being requested: torsemide (DEMADEX) 20 MG tablet    Provider who prescribed the medication:     Pharmacy:    [facility] [id number] [place] [facility] [address] [facility]    Date medication is needed: 4/16/2025     Deanna from [facility] [employer] called for refill. If requesting a 90 day supply could be order stated insurance will cover vs the 60 day supply.     Action Taken: Other: Cardio     Travel Screening: Not Applicable     Date of Service:             Thank you!  [facility]

## 2025-04-22 ENCOUNTER — ANCILLARY PROCEDURE (OUTPATIENT)
Dept: CARDIOLOGY | Facility: CLINIC | Age: 55
End: 2025-04-22
Attending: INTERNAL MEDICINE
Payer: COMMERCIAL

## 2025-04-22 DIAGNOSIS — I50.22 CHRONIC SYSTOLIC CONGESTIVE HEART FAILURE (H): ICD-10-CM

## 2025-04-22 LAB — LVEF ECHO: NORMAL

## 2025-04-22 PROCEDURE — 93306 TTE W/DOPPLER COMPLETE: CPT | Performed by: INTERNAL MEDICINE

## 2025-04-22 RX ADMIN — Medication 5 ML: at 09:53

## 2025-05-05 ENCOUNTER — E-VISIT (OUTPATIENT)
Dept: URGENT CARE | Facility: CLINIC | Age: 55
End: 2025-05-05
Payer: COMMERCIAL

## 2025-05-05 ENCOUNTER — OFFICE VISIT (OUTPATIENT)
Dept: URGENT CARE | Facility: URGENT CARE | Age: 55
End: 2025-05-05
Payer: COMMERCIAL

## 2025-05-05 VITALS
RESPIRATION RATE: 22 BRPM | TEMPERATURE: 100 F | HEART RATE: 121 BPM | BODY MASS INDEX: 40.48 KG/M2 | DIASTOLIC BLOOD PRESSURE: 64 MMHG | SYSTOLIC BLOOD PRESSURE: 89 MMHG | WEIGHT: 250.8 LBS | OXYGEN SATURATION: 96 %

## 2025-05-05 DIAGNOSIS — R30.0 BURNING WITH URINATION: ICD-10-CM

## 2025-05-05 DIAGNOSIS — N30.00 ACUTE CYSTITIS WITHOUT HEMATURIA: Primary | ICD-10-CM

## 2025-05-05 DIAGNOSIS — R30.0 DYSURIA: Primary | ICD-10-CM

## 2025-05-05 LAB
ALBUMIN UR-MCNC: 100 MG/DL
APPEARANCE UR: CLEAR
BACTERIA #/AREA URNS HPF: ABNORMAL /HPF
BILIRUB UR QL STRIP: NEGATIVE
CLUE CELLS: ABNORMAL
COLOR UR AUTO: YELLOW
GLUCOSE UR STRIP-MCNC: >=1000 MG/DL
HGB UR QL STRIP: ABNORMAL
KETONES UR STRIP-MCNC: NEGATIVE MG/DL
LEUKOCYTE ESTERASE UR QL STRIP: ABNORMAL
NITRATE UR QL: NEGATIVE
PH UR STRIP: 5.5 [PH] (ref 5–7)
RBC #/AREA URNS AUTO: ABNORMAL /HPF
SP GR UR STRIP: 1.01 (ref 1–1.03)
SQUAMOUS #/AREA URNS AUTO: ABNORMAL /LPF
TRICHOMONAS, WET PREP: ABNORMAL
UROBILINOGEN UR STRIP-ACNC: 0.2 E.U./DL
WBC #/AREA URNS AUTO: ABNORMAL /HPF
WBC CLUMPS #/AREA URNS HPF: PRESENT /HPF
WBC'S/HIGH POWER FIELD, WET PREP: ABNORMAL
YEAST, WET PREP: ABNORMAL

## 2025-05-05 PROCEDURE — 99214 OFFICE O/P EST MOD 30 MIN: CPT

## 2025-05-05 PROCEDURE — 87210 SMEAR WET MOUNT SALINE/INK: CPT

## 2025-05-05 PROCEDURE — 81001 URINALYSIS AUTO W/SCOPE: CPT | Performed by: EMERGENCY MEDICINE

## 2025-05-05 PROCEDURE — 99207 PR NON-BILLABLE SERV PER CHARTING: CPT | Performed by: EMERGENCY MEDICINE

## 2025-05-05 PROCEDURE — 87086 URINE CULTURE/COLONY COUNT: CPT | Performed by: EMERGENCY MEDICINE

## 2025-05-05 RX ORDER — CEPHALEXIN 500 MG/1
500 CAPSULE ORAL 3 TIMES DAILY
Qty: 21 CAPSULE | Refills: 0 | Status: SHIPPED | OUTPATIENT
Start: 2025-05-05 | End: 2025-05-12

## 2025-05-05 NOTE — PATIENT INSTRUCTIONS
Thank you for choosing us for your care. Given your symptoms, I would like you to do a lab-only visit to determine what is causing them.  I have placed the orders.  Please schedule an appointment with the lab right here in [employer], or call [phone].  I will let you know when the results are back and next steps to take.    Schedule a Lab Only appointment here.

## 2025-05-05 NOTE — PROGRESS NOTES
ASSESSMENT:   (N30.00) Acute cystitis without hematuria  (primary encounter diagnosis)  Plan: cephALEXin (KEFLEX) 500 MG capsule    (R30.0) Burning with urination  Plan: Wet prep - lab collect    PLAN:  Informed the patient that the wet prep test is negative and the urine test shows a urinary tract infection.  Urinary tract infection patient instructions discussed and provided.  Informed the patient to take the antibiotic as prescribed and finish the full course even if symptoms improve.  We discussed trying yogurt with active cultures or probiotic such as Culturelle daily to help and diarrhea while taking the antibiotic.  We also discussed trying over-the-counter Azo and/or cranberry supplements for the urinary symptoms.  Informed the patient to return to clinic with any new or worsening symptoms.  Patient acknowledged their understanding of the above plan.    The use of Dragon/PowerMic dictation services may have been used to construct the content in this note; any grammatical or spelling errors are non-intentional. Please contact the author of this note directly if you are in need of any clarification.      Geremias Silva, RICCARDO CNP     SUBJECTIVE:  Bettina Montes is a 55 year old female who  presents today for a possible UTI. Symptoms of dysuria, urgency, frequency, burning, back pain, fever, and chills have been going on for 1 week.  Fever and chills started today.  Hematuria no.  gradual onset and moderate.  There is no history of nausea or vomiting.  This patient does not have a history of urinary tract infections. Patient denies vaginal symptoms or STD concerns.    ROS:   Negative except noted above.    OBJECTIVE:  BP 89/64 (BP Location: Left arm, Patient Position: Sitting)   Pulse (!) 121   Temp 100  F (37.8  C) (Oral)   Resp 22   Wt 113.8 kg (250 lb 12.8 oz)   LMP  (LMP Unknown)   SpO2 96%   BMI 40.48 kg/m    GENERAL APPEARANCE: healthy, alert and no distress  BACK: Right sided CVA  tenderness  SKIN: no suspicious lesions or rashes    UA: positive for WBC's, positive for protein, positive for glucose, positive for leukocytes, and positive for bacturia

## 2025-05-05 NOTE — RESULT ENCOUNTER NOTE
Alexander Silva,     Sending you the patient's results.  Looks like she is seeing you right now for urinary concerns.    Thank you,  SY

## 2025-05-05 NOTE — PROGRESS NOTES
Urgent Care Clinic Visit    Chief Complaint   Patient presents with    UTI     Patient has been having symptoms for the last week of burning, smelling, and urgency with urine    Fever     Patient has had a fever since Saturday 5/5/2025     3:12 PM   Additional Questions   Roomed by Estrellita   Accompanied by Self     Pre-Provider Visit Orders - Vaginal Infection  Reason for visit:  Possible vaginal infections

## 2025-05-05 NOTE — PATIENT INSTRUCTIONS
Wet prep test is negative for infection.  Urinary test shows a urinary tract infection.  Take the antibiotic as prescribed and finish the full course even if symptoms improve.  Try probiotics such as Culturelle daily to help prevent diarrhea while using antibiotics.  You can also try over the counter Azo and/or cranberry supplements for your urinary symptoms.

## 2025-05-07 ENCOUNTER — TELEPHONE (OUTPATIENT)
Dept: ENDOCRINOLOGY | Facility: CLINIC | Age: 55
End: 2025-05-07

## 2025-05-07 ENCOUNTER — RESULTS FOLLOW-UP (OUTPATIENT)
Dept: URGENT CARE | Facility: CLINIC | Age: 55
End: 2025-05-07

## 2025-05-07 LAB — BACTERIA UR CULT: NORMAL

## 2025-05-07 NOTE — TELEPHONE ENCOUNTER
Prior Authorization Approval    Medication: ZEPBOUND 15 MG/0.5ML SC SOAJ  Authorization Effective Date: 5/7/2025  Authorization Expiration Date: 5/7/2026  Approved Dose/Quantity: uud  Reference #: Key: [id number]   Insurance Company: [employer] - Phone [phone] Fax [phone]  Expected CoPay: $    CoPay Card Available:      Financial Assistance Needed:    Which Pharmacy is filling the prescription:

## 2025-05-07 NOTE — TELEPHONE ENCOUNTER
PA Initiation    Medication: ZEPBOUND 15 MG/0.5ML SC SOAJ  Insurance Company: [employer] - Phone [phone] Fax [phone]  Pharmacy Filling the Rx:    Filling Pharmacy Phone:    Filling Pharmacy Fax:    Start Date: 5/7/2025    Key: [id number]  Waiting for clinical questionnaire to drop. Once received will complete and submit to patient's plan via CMM.

## 2025-05-07 NOTE — TELEPHONE ENCOUNTER
Question set received and prior authorization renewal has been submitted to the patient's plan.   Key: [id number]

## 2025-06-02 ENCOUNTER — VIRTUAL VISIT (OUTPATIENT)
Dept: PHARMACY | Facility: CLINIC | Age: 55
End: 2025-06-02
Attending: INTERNAL MEDICINE

## 2025-06-02 DIAGNOSIS — Z79.4 TYPE 2 DIABETES MELLITUS WITH HYPERGLYCEMIA, WITH LONG-TERM CURRENT USE OF INSULIN (H): ICD-10-CM

## 2025-06-02 DIAGNOSIS — E11.65 TYPE 2 DIABETES MELLITUS WITH HYPERGLYCEMIA, WITH LONG-TERM CURRENT USE OF INSULIN (H): ICD-10-CM

## 2025-06-02 DIAGNOSIS — E66.01 MORBID OBESITY (H): ICD-10-CM

## 2025-06-02 RX ORDER — ACYCLOVIR 400 MG/1
1 TABLET ORAL
Qty: 9 EACH | Refills: 5 | Status: SHIPPED | OUTPATIENT
Start: 2025-06-02

## 2025-06-02 RX ORDER — METFORMIN HYDROCHLORIDE 500 MG/1
500 TABLET, EXTENDED RELEASE ORAL 2 TIMES DAILY WITH MEALS
Qty: 180 TABLET | Refills: 5 | Status: SHIPPED | OUTPATIENT
Start: 2025-06-02

## 2025-06-02 RX ORDER — TIRZEPATIDE 15 MG/.5ML
15 INJECTION, SOLUTION SUBCUTANEOUS
Qty: 6 ML | Refills: 5 | Status: SHIPPED | OUTPATIENT
Start: 2025-06-02

## 2025-06-02 RX ORDER — INSULIN GLARGINE 300 U/ML
70 INJECTION, SOLUTION SUBCUTANEOUS DAILY
Qty: 30 ML | Refills: 11 | Status: SHIPPED | OUTPATIENT
Start: 2025-06-02

## 2025-06-02 NOTE — PATIENT INSTRUCTIONS
Continue Zepbound 15 mg weekly, Jardiance 10 mg daily, metformin 500-1000 mg daily/as tolerated    Toujeo: Continue to decrease by 2 units every 3 days if mornign fasting sugars are consistently less than 100     Please let me know if you have any lows < 70.    Endocrine Team & Next Follow-Up:  7/1/2025 with Dr. Akbar  12/15/2025 with Faraz

## 2025-06-02 NOTE — PROGRESS NOTES
Medication Therapy Management (MTM) Encounter    ASSESSMENT:                            Medication Adherence/Access: See below for considerations    Type 2 Diabetes/BMI > 43:   A1c above goal of less than 7%, but above time in range goal of greater than 70% since initiation of Zepbound.  Tolerating the 15 mg dose well with steady weight loss, so will continue current therapy at this time along with Toujeo self-taper plan.     PLAN:                            Continue Zepbound 15 mg weekly, Jardiance 10 mg daily, metformin 500-1000 mg daily/as tolerated    Toujeo: Continue to decrease by 2 units every 3 days if mornign fasting sugars are consistently less than 100     Please let me know if you have any lows < 70.    Endocrine Team & Next Follow-Up:  2025 with Dr. Akbar  12/15/2025 with Faraz    SUBJECTIVE/OBJECTIVE:                          Bettina Montes is a 55 year old female seen for a follow-up visit. She was referred to me from Dr. Akbar.      Reason for visit:     Allergies/ADRs: Reviewed in chart  Past Medical History: Reviewed in chart  Social History     Tobacco Use    Smoking status: Former     Current packs/day: 0.00     Average packs/day: 1 pack/day for 30.0 years (30.0 ttl pk-yrs)     Types: Cigarettes     Start date: 1983     Quit date: 2013     Years since quittin.4    Smokeless tobacco: Former    Tobacco comments:         Vaping Use    Vaping status: Never Used   Substance Use Topics    Alcohol use: Not Currently     Comment: four drinks a month    Drug use: No        Medication Adherence/Access: No issues identified other than [facility]. Many issues with [facility] (long wait times for medications, issues with prescriptions, poor staffing). She is working with her HR to give this feedback to see if they can prefer a different pharmacy on the plan going forward.     Diabetes   Patient diagnosed with type 2 diabetes*   Current  Medications:  Metformin XR 1000 mg daily (takes about every other day --feels that if she takes this daily she experiences diarrhea)  Insulin glargine U-300 70 units daily.   Zepbound 15 mg weekly -- still having cravings similar to Mounjaro. Is having some issues with itching/redness around injection site area, but not as severe as Mounjaro.  Jardiance 10 mg daily -- concerned about increasing further due to risk of yeast infection. Started on 5 mg because of this and occasionally will take 5 mg instead of 10 mg    No concerns other than mentioned above.    Medication history per chart review:  Historically on Mounjaro 15 mg --did not see weight loss and had issues with itching, so switched to Zepbound last month and preferred to start at the 5 mg dose and restart titration schedule  Also historically on NovoLog, discontinued due to good control.    Blood sugar monitoring: Continuous Glucose Monitor see AGP below        Lows over past few weeks likely sensor error/compression low per patient -- no symptoms    Lab Results   Component Value Date    A1C 7.8 (H) 03/26/2025    A1C 6.8 (H) 09/12/2023    A1C 6.5 (H) 08/23/2023 6/2/2025: 246 lb   Wt Readings from Last 3 Encounters:   05/05/25 250 lb 12.8 oz (113.8 kg)   03/26/25 259 lb 11.2 oz (117.8 kg)   11/14/24 267 lb 9.6 oz (121.4 kg)     Lab Results   Component Value Date    GFRESTIMATED 57 (L) 03/26/2025    GFRESTIMATED 65 08/28/2024    GFRESTIMATED 54 (L) 06/14/2024          ----------------      I spent 20 minutes with this patient today. Dr. Akbar was provided the recommendations above via routed note and is the authorizing prescriber for this visit through the pharmacist collaborative practice agreement. A copy of the visit note was provided to the patient's provider(s).    The patient was given to the patient a summary of these recommendations.     Faraz Renae, PharmD, [facility]  Endocrine & Diabetes [facility] Pharmacist  [address] [place] [address]  [phone]    Telemedicine Visit Details  The patient's medications can be safely assessed via a telemedicine encounter.  Type of service:  Telephone visit  Originating Location (pt. Location): Home  Distant Location (provider location):  Off-site  Start Time: 1PM  End Time: 1:20PM     Medication Therapy Recommendations  No medication therapy recommendations to display

## 2025-06-03 ENCOUNTER — MYC REFILL (OUTPATIENT)
Dept: CARDIOLOGY | Facility: CLINIC | Age: 55
End: 2025-06-03

## 2025-06-03 DIAGNOSIS — I50.23 ACUTE ON CHRONIC SYSTOLIC (CONGESTIVE) HEART FAILURE (H): ICD-10-CM

## 2025-06-04 RX ORDER — HYDRALAZINE HYDROCHLORIDE 10 MG/1
10 TABLET, FILM COATED ORAL 3 TIMES DAILY
Qty: 270 TABLET | Refills: 2 | Status: SHIPPED | OUTPATIENT
Start: 2025-06-04

## 2025-06-04 NOTE — TELEPHONE ENCOUNTER
Lab/BMP results from March 2025 already addressed during last CORE visit on 3/26/2025. Repeat lab scheduled in August.

## 2025-06-04 NOTE — TELEPHONE ENCOUNTER
Last Written Prescription:  hydrALAZINE (APRESOLINE) 10 MG tablet   270 tablet 3 6/14/2024     ----------------------  Last Visit Date: 3-  Future Visit Date: 8-  ----------------------      Refill decision: Medication refilled per  Medication Refill in Ambulatory Care  policy.    Basic metabolic panel  (Ca, Cl, CO2, Creat, Gluc, K, Na, BUN)   completed on  3-, labs do not have to be normal for passed protocol.  Creatinine  0.51 - 0.95 mg/dL 1.12 High      GFR Estimate  >60 mL/min/1.73m2 57 Low      Urea Nitrogen  6.0 - 20.0 mg/dL 28.1 High      Request from pharmacy:  Requested Prescriptions   Pending Prescriptions Disp Refills    hydrALAZINE (APRESOLINE) 10 MG tablet 270 tablet 3     Sig: Take 1 tablet (10 mg) by mouth 3 times daily.       Vasodilators Passed - 6/4/2025  1:01 PM        Passed - Most recent BP less than 140/90 on record     BP Readings from Last 3 Encounters:   05/05/25 89/64   03/26/25 115/77   11/14/24 119/78           Passed - Medication is active on med list and the sig matches. RN to manually verify dose and sig if red X/fail.             Passed - Recent (12 month) or future (90 days) visit with authorizing provider's specialty (provided they have been seen in the past 15 months)     The patient must have completed an in-person or virtual visit within the past 12 months or has a future visit scheduled within the next 90 days with the authorizing provider s specialty.  Urgent care and e-visits do not qualify as an office visit for this protocol.          Passed - Patient is of age 18 years or older        Passed - Patient is not pregnant        Passed - Patient has not had a positive pregnancy test within the past 12 months

## 2025-06-17 NOTE — PROGRESS NOTES
Outcome for 06/17/25 11:18 AM: Data uploaded on Dexcom  Robbi Henry MA  Outcome for 06/30/25 10:12 AM: Data obtained via Dexcom website  Robbi Henry MA      Patient is showing 5/5 [place] met.   Robbi Henry MA

## 2025-07-01 ENCOUNTER — VIRTUAL VISIT (OUTPATIENT)
Dept: ENDOCRINOLOGY | Facility: CLINIC | Age: 55
End: 2025-07-01

## 2025-07-01 DIAGNOSIS — Z79.4 TYPE 2 DIABETES MELLITUS WITH HYPERGLYCEMIA, WITH LONG-TERM CURRENT USE OF INSULIN (H): Primary | ICD-10-CM

## 2025-07-01 DIAGNOSIS — E66.01 MORBID OBESITY (H): ICD-10-CM

## 2025-07-01 DIAGNOSIS — E11.65 TYPE 2 DIABETES MELLITUS WITH HYPERGLYCEMIA, WITH LONG-TERM CURRENT USE OF INSULIN (H): Primary | ICD-10-CM

## 2025-07-01 DIAGNOSIS — E27.9 ADRENAL NODULE: ICD-10-CM

## 2025-07-01 PROCEDURE — 98006 SYNCH AUDIO-VIDEO EST MOD 30: CPT | Mod: 25 | Performed by: INTERNAL MEDICINE

## 2025-07-01 PROCEDURE — 95251 CONT GLUC MNTR ANALYSIS I&R: CPT | Mod: 95 | Performed by: INTERNAL MEDICINE

## 2025-07-01 RX ORDER — METFORMIN HYDROCHLORIDE 500 MG/1
1000 TABLET, EXTENDED RELEASE ORAL 2 TIMES DAILY WITH MEALS
Qty: 360 TABLET | Refills: 3 | Status: SHIPPED | OUTPATIENT
Start: 2025-07-01

## 2025-07-01 ASSESSMENT — PAIN SCALES - GENERAL: PAINLEVEL_OUTOF10: NO PAIN (0)

## 2025-07-01 NOTE — PATIENT INSTRUCTIONS
Metformin 1000 mg twice daily   Zepbound to 15 mg every 7 days   Insulin glargine ( U300)  70 units once a day; if morning blood sugars below 80 please reduce insulin dose by 5 units every third day.  Continue Jardiance

## 2025-07-01 NOTE — NURSING NOTE
Current patient location: Patient declined to provide     Is the patient currently in the state of [place]? YES    Visit mode: VIDEO    If the visit is dropped, the patient can be reconnected by:VIDEO VISIT: Text to cell phone:   Telephone Information:   Mobile [phone]       Will anyone else be joining the visit? NO  (If patient encounters technical issues they should call [phone]    Are changes needed to the allergy or medication list? No    Are refills needed on medications prescribed by this physician? NO    Rooming Documentation:  Questionnaire(s) not done per department protocol    Reason for visit: RECHECK Shelby Kocher VVF

## 2025-07-01 NOTE — PROGRESS NOTES
Endocrinology Clinic Visit    Chief Complaint: RECHECK     Information obtained from:Patient      Assessment/Treatment Plan:    Type 2 diabetes /morbid obesity: Continuous glucose monitor downloaded and reviewed.  Overall blood sugar readings have been within the target range 74% of the time with no significant low numbers.  To address occasional postprandial hyperglycemia we have adjusted medications as below.  Currently on Zepbound 15 mg weekly and current weight is 244.      Plan  Metformin 1000 mg twice daily   Zepbound to 15 mg every 7 days   Insulin glargine ( U300)  70 units once a day; if morning blood sugars below 80 please reduce insulin dose by 5 units every third day.  Continue Jardiance     Adrenal nodule   2.2 x 1.9 cm adrenal nodule on the left side.  This nodule compared to previous study from 2017 is a stable in size.  MRI characteristics per report suggestive of fat-containing benign adrenal adenoma.  Hormonal assessment as detailed below for metanephrines, aldosterone hormones, DHEA-sulfate within the normal limits.  Cortisol after Dex suppression normal.      Hepatic steatosis  Pancreatic cyst  Gallbladder polyp     Previously GI recommended MRI with MRCP however Bettina has not scheduled this yet.  I have reminded her to reach out to GI and schedule an appointment for this MRI.  Verbalized understanding.  Notified ordering GI team as well.      Cadence Akbar MD  Staff Endocrinologist    Division of Endocrinology and Diabetes      Subjective:         HPI: Bettina CAMPBELL Simon is a 55 year old female with history of type 2 diabetes who is here for routine follow-up.   Type 2 diabetes   Zepbound 15 mg weekly.  Metformin 1000 mg extended release daily.   Toujeo 70 units daily.   Dexcom downloaded and reviewed in detail.   Past medical history of heart failure currently clinically stable.  Scheduled with bariatric surgery for consultation of weight loss procedures.  Not currently pursuing bariatric  surgery.  Has tried multiple diets including weight watchers however sticking to the diet has been difficult.  Lost weight [about 30+ pounds] on semaglutide until it was switched to Mounjaro due to coverage.                                       Allergies   Allergen Reactions    Amlodipine Swelling     Edema on 5mg throat    Lisinopril      Swelling in throat, face  angioedema    Naprosyn [Naproxen]      Swelling, diff breathing       Current Outpatient Medications   Medication Sig Dispense Refill    acetaminophen (TYLENOL) 500 MG tablet Take 500-1,000 mg by mouth every 6 hours as needed for mild pain      aspirin 81 MG EC tablet Take 1 tablet (81 mg) by mouth daily      atorvastatin (LIPITOR) 40 MG tablet Take 1 tablet (40 mg) by mouth daily. 90 tablet 1    carvedilol (COREG) 25 MG tablet Take 1 tablet (25 mg) by mouth 2 times daily (with meals). 180 tablet 1    cholecalciferol 50 MCG (2000 UT) CAPS Take 2,000 Units by mouth daily       Continuous Glucose Sensor (DEXCOM G7 SENSOR) MISC 1 Device every 10 days. 9 each 5    cyclobenzaprine (FLEXERIL) 5 MG tablet Take 1 tablet (5 mg) by mouth 3 times daily as needed for muscle spasms. 40 tablet 3    empagliflozin (JARDIANCE) 10 MG TABS tablet Take 1 tablet (10 mg) by mouth daily. 90 tablet 3    fluticasone-salmeterol (WIXELA INHUB) 250-50 MCG/ACT inhaler Inhale 1 puff into the lungs every 12 hours 1 each 11    hydrALAZINE (APRESOLINE) 10 MG tablet Take 1 tablet (10 mg) by mouth 3 times daily. 270 tablet 2    insulin glargine U-300 (TOUJEO MAX SOLOSTAR) 300 UNIT/ML (2 units dial) pen Inject 70 Units subcutaneously daily. 30 mL 11    insulin pen needle (ULTICARE MICRO) 32G X 4 MM miscellaneous Use 1 pen needles daily or as directed. 400 each 11    ketoconazole (NIZORAL) 2 % external cream Apply topically 2 times daily For 2 weeks. 60 g 1    levalbuterol (XOPENEX HFA) 45 MCG/ACT Inhaler Inhale 2 puffs into the lungs every 4 hours as needed for shortness of breath /  dyspnea or wheezing 15 g 3    magnesium oxide (MAG-OX) 400 (241.3 Mg) MG tablet Take 1 tablet (400 mg) by mouth daily 100 tablet 3    metFORMIN (GLUCOPHAGE XR) 500 MG 24 hr tablet Take 1 tablet (500 mg) by mouth 2 times daily (with meals). 180 tablet 5    montelukast (SINGULAIR) 10 MG tablet Take 1 tablet (10 mg) by mouth at bedtime. 90 tablet 0    sacubitril-valsartan (ENTRESTO)  MG per tablet Take 1 tablet by mouth 2 times daily. 180 tablet 1    spironolactone (ALDACTONE) 50 MG tablet Take 1 tablet (50 mg) by mouth daily. 90 tablet 3    torsemide (DEMADEX) 20 MG tablet Take 3 tablets (60 mg) by mouth daily. 270 tablet 3    triamcinolone (KENALOG) 0.1 % external cream Apply topically 2 times daily For maximum 2 weeks. 60 g 1    ZEPBOUND 15 MG/0.5ML prefilled pen Inject 0.5 mLs (15 mg) subcutaneously every 7 days. 6 mL 5    dexAMETHasone (DECADRON) 1 MG tablet Take 1 tablet (1 mg) by mouth once for 1 dose At 11:00 PM. The next morning after taking the medication go to he lab at 8:00 am. 1 tablet 0       Review of Systems     8 point review system (Constitutional, HENT, Eyes, Respiratory, Cardiovascular, Gastrointestinal, Genitourinary, Musculoskeletal,Neurological, Psychiatric/Behavioural, Endocrine) is negative or is as per HPI above  Past medical history, past surgical history, social and family history reviewed in epic.        Objective:   LMP  (LMP Unknown)   Constitutional: Pleasant no acute cardiopulmonary distress.   Psychological: appropriate mood and affect    In House Labs:   Hemoglobin A1C   Date Value Ref Range Status   03/26/2025 7.8 (H) 0.0 - 5.6 % Final     Comment:     Normal <5.7%   Prediabetes 5.7-6.4%    Diabetes 6.5% or higher     Note: Adopted from ADA consensus guidelines.   09/12/2023 6.8 (H) 0.0 - 5.6 % Final     Comment:     Normal <5.7%   Prediabetes 5.7-6.4%    Diabetes 6.5% or higher     Note: Adopted from ADA consensus guidelines.   08/23/2023 6.5 (H) 0.0 - 5.6 % Final     Comment:      Normal <5.7%   Prediabetes 5.7-6.4%    Diabetes 6.5% or higher     Note: Adopted from ADA consensus guidelines.   06/11/2021 10.1 (H) 0 - 5.6 % Final     Comment:     Normal <5.7% Prediabetes 5.7-6.4%  Diabetes 6.5% or higher - adopted from ADA   consensus guidelines.     01/14/2021 9.6 (H) 0 - 5.6 % Final     Comment:     Normal <5.7% Prediabetes 5.7-6.4%  Diabetes 6.5% or higher - adopted from ADA   consensus guidelines.     08/04/2020 11.0 (H) 0 - 5.6 % Final     Comment:     Normal <5.7% Prediabetes 5.7-6.4%  Diabetes 6.5% or higher - adopted from ADA   consensus guidelines.  Results confirmed by repeat test       Hemoglobin A1C POCT   Date Value Ref Range Status   01/16/2024 7.6 (A) 4.3 - <5.7 % Final      TSH   Date Value Ref Range Status   02/15/2023 1.01 0.40 - 4.00 mU/L Final   12/30/2019 1.47 0.40 - 4.00 mU/L Final   05/13/2019 0.71 0.40 - 4.00 mU/L Final   05/10/2017 0.84 0.40 - 4.00 mU/L Final   02/17/2016 1.04 0.40 - 4.00 mU/L Final       Creatinine   Date Value Ref Range Status   03/26/2025 1.12 (H) 0.51 - 0.95 mg/dL Final   03/23/2021 1.18 (H) 0.52 - 1.04 mg/dL Final   ]    Recent Labs   Lab Test 09/29/21  0931 01/14/21  1622 05/17/19  1306   CHOL 128  --  147   HDL 42*  --  66   LDL 58 83 64   TRIG 140  --  83       Component      Latest Ref Rng 9/12/2023  4:36 PM   Metanephrine      0.00 - 0.49 nmol/L 0.22    Normetanephrine      0.00 - 0.89 nmol/L 0.41    Metanephrines Interpretation See Note    DHEA Sulfate      35 - 430 ug/dL 86    Adrenal Corticotropin      <47 pg/mL 35    Potassium      3.4 - 5.3 mmol/L 3.7    Aldosterone      0.0 - 31.0 ng/dL 16.3    Renin Activity      ng/mL/hr 19.6    Aldosterone Renin Ratio      0.0 - 25.0  0.8         Latest Ref Rn 9/12/2023  4:36 PM   END[facility]     Aldosterone 0.0 - 31.0 ng/dL 16.3    Creatinine 0.51 - 0.95 mg/dL    Creatinine Urine mg/dL    DHEA Sulfate 35 - 430 ug/dL 86    Metanephrine 0.00 - 0.49 nmol/L 0.22    Normetanephrine 0.00 - 0.89  nmol/L 0.41    Potassium 3.4 - 5.3 mmol/L 3.7    Renin Activity ng/mL/hr 19.6        This note has been dictated using voice recognition software.  As a result, there may be errors in the documentation that have gone undetected.  Please consider this when interpreting information in this documentation.      Video-Visit Details    Type of service:  Video Visit  Distant Location (provider location):  Off-site.   Joined the call at 7/1/2025, 1:01:54 pm.  Left the call at 7/1/2025, 1:11:05 pm.  You were on the call for 9 minutes 11 seconds.  Platform used for Video Visit: Nic  The longitudinal plan of care for the diagnosis(es)/condition(s) as documented were addressed during this visit. Due to the added complexity in care, I will continue to support Bettina in the subsequent management and with ongoing continuity of care.

## 2025-07-01 NOTE — Clinical Note
Hello, this is a mutual patient.  I noted that previously of ordered MRCP for a follow-up of pancreatic cysts.  However, Bettina Montes hasn't completed the imaging study.  I was wondering if your team can reach out to patient to coordinate this testing. Thank you, Charlotte

## 2025-07-01 NOTE — LETTER
7/1/2025      Bettina Montes  [address] [place] [address]      Dear Colleague,    Thank you for referring your patient, Bettina Montes, to the [facility]. Please see a copy of my visit note below.    Outcome for 06/17/25 11:18 AM: Data uploaded on Dexcom  Robbi Henry MA  Outcome for 06/30/25 10:12 AM: Data obtained via Dexcom website  Robbi Henry MA      Patient is showing 5/5 MNCM met.   Robbi Henry MA              Endocrinology Clinic Visit    Chief Complaint: RECHECK     Information obtained from:Patient      Assessment/Treatment Plan:    Type 2 diabetes /morbid obesity: Continuous glucose monitor downloaded and reviewed.  Overall blood sugar readings have been within the target range 74% of the time with no significant low numbers.  To address occasional postprandial hyperglycemia we have adjusted medications as below.  Currently on Zepbound 15 mg weekly and current weight is 244.      Plan  Metformin 1000 mg twice daily   Zepbound to 15 mg every 7 days   Insulin glargine ( U300)  70 units once a day; if morning blood sugars below 80 please reduce insulin dose by 5 units every third day.  Continue Jardiance     Adrenal nodule   2.2 x 1.9 cm adrenal nodule on the left side.  This nodule compared to previous study from 2017 is a stable in size.  MRI characteristics per report suggestive of fat-containing benign adrenal adenoma.  Hormonal assessment as detailed below for metanephrines, aldosterone hormones, DHEA-sulfate within the normal limits.  Cortisol after Dex suppression normal.      Hepatic steatosis  Pancreatic cyst  Gallbladder polyp     Previously GI recommended MRI with MRCP however Bettina has not scheduled this yet.  I have reminded her to reach out to GI and schedule an appointment for this MRI.  Verbalized understanding.  Notified ordering GI team as well.      Cadence Akbar MD  Staff Endocrinologist    Division of Endocrinology and  Diabetes      Subjective:         HPI: Bettina Montes is a 55 year old female with history of type 2 diabetes who is here for routine follow-up.   Type 2 diabetes   Zepbound 15 mg weekly.  Metformin 1000 mg extended release daily.   Toujeo 70 units daily.   Dexcom downloaded and reviewed in detail.   Past medical history of heart failure currently clinically stable.  Scheduled with bariatric surgery for consultation of weight loss procedures.  Not currently pursuing bariatric surgery.  Has tried multiple diets including weight watchers however sticking to the diet has been difficult.  Lost weight [about 30+ pounds] on semaglutide until it was switched to Mounjaro due to coverage.                                       Allergies   Allergen Reactions     Amlodipine Swelling     Edema on 5mg throat     Lisinopril      Swelling in throat, face  angioedema     Naprosyn [Naproxen]      Swelling, diff breathing       Current Outpatient Medications   Medication Sig Dispense Refill     acetaminophen (TYLENOL) 500 MG tablet Take 500-1,000 mg by mouth every 6 hours as needed for mild pain       aspirin 81 MG EC tablet Take 1 tablet (81 mg) by mouth daily       atorvastatin (LIPITOR) 40 MG tablet Take 1 tablet (40 mg) by mouth daily. 90 tablet 1     carvedilol (COREG) 25 MG tablet Take 1 tablet (25 mg) by mouth 2 times daily (with meals). 180 tablet 1     cholecalciferol 50 MCG (2000 UT) CAPS Take 2,000 Units by mouth daily        Continuous Glucose Sensor (DEXCOM G7 SENSOR) MISC 1 Device every 10 days. 9 each 5     cyclobenzaprine (FLEXERIL) 5 MG tablet Take 1 tablet (5 mg) by mouth 3 times daily as needed for muscle spasms. 40 tablet 3     empagliflozin (JARDIANCE) 10 MG TABS tablet Take 1 tablet (10 mg) by mouth daily. 90 tablet 3     fluticasone-salmeterol (WIXELA INHUB) 250-50 MCG/ACT inhaler Inhale 1 puff into the lungs every 12 hours 1 each 11     hydrALAZINE (APRESOLINE) 10 MG tablet Take 1 tablet (10 mg) by mouth 3  times daily. 270 tablet 2     insulin glargine U-300 (TOUJEO MAX SOLOSTAR) 300 UNIT/ML (2 units dial) pen Inject 70 Units subcutaneously daily. 30 mL 11     insulin pen needle (ULTICARE MICRO) 32G X 4 MM miscellaneous Use 1 pen needles daily or as directed. 400 each 11     ketoconazole (NIZORAL) 2 % external cream Apply topically 2 times daily For 2 weeks. 60 g 1     levalbuterol (XOPENEX HFA) 45 MCG/ACT Inhaler Inhale 2 puffs into the lungs every 4 hours as needed for shortness of breath / dyspnea or wheezing 15 g 3     magnesium oxide (MAG-OX) 400 (241.3 Mg) MG tablet Take 1 tablet (400 mg) by mouth daily 100 tablet 3     metFORMIN (GLUCOPHAGE XR) 500 MG 24 hr tablet Take 1 tablet (500 mg) by mouth 2 times daily (with meals). 180 tablet 5     montelukast (SINGULAIR) 10 MG tablet Take 1 tablet (10 mg) by mouth at bedtime. 90 tablet 0     sacubitril-valsartan (ENTRESTO)  MG per tablet Take 1 tablet by mouth 2 times daily. 180 tablet 1     spironolactone (ALDACTONE) 50 MG tablet Take 1 tablet (50 mg) by mouth daily. 90 tablet 3     torsemide (DEMADEX) 20 MG tablet Take 3 tablets (60 mg) by mouth daily. 270 tablet 3     triamcinolone (KENALOG) 0.1 % external cream Apply topically 2 times daily For maximum 2 weeks. 60 g 1     ZEPBOUND 15 MG/0.5ML prefilled pen Inject 0.5 mLs (15 mg) subcutaneously every 7 days. 6 mL 5     dexAMETHasone (DECADRON) 1 MG tablet Take 1 tablet (1 mg) by mouth once for 1 dose At 11:00 PM. The next morning after taking the medication go to he lab at 8:00 am. 1 tablet 0       Review of Systems     8 point review system (Constitutional, HENT, Eyes, Respiratory, Cardiovascular, Gastrointestinal, Genitourinary, Musculoskeletal,Neurological, Psychiatric/Behavioural, Endocrine) is negative or is as per HPI above  Past medical history, past surgical history, social and family history reviewed in epic.        Objective:   LMP  (LMP Unknown)   Constitutional: Pleasant no acute cardiopulmonary  distress.   Psychological: appropriate mood and affect    In House Labs:   Hemoglobin A1C   Date Value Ref Range Status   03/26/2025 7.8 (H) 0.0 - 5.6 % Final     Comment:     Normal <5.7%   Prediabetes 5.7-6.4%    Diabetes 6.5% or higher     Note: Adopted from ADA consensus guidelines.   09/12/2023 6.8 (H) 0.0 - 5.6 % Final     Comment:     Normal <5.7%   Prediabetes 5.7-6.4%    Diabetes 6.5% or higher     Note: Adopted from ADA consensus guidelines.   08/23/2023 6.5 (H) 0.0 - 5.6 % Final     Comment:     Normal <5.7%   Prediabetes 5.7-6.4%    Diabetes 6.5% or higher     Note: Adopted from ADA consensus guidelines.   06/11/2021 10.1 (H) 0 - 5.6 % Final     Comment:     Normal <5.7% Prediabetes 5.7-6.4%  Diabetes 6.5% or higher - adopted from ADA   consensus guidelines.     01/14/2021 9.6 (H) 0 - 5.6 % Final     Comment:     Normal <5.7% Prediabetes 5.7-6.4%  Diabetes 6.5% or higher - adopted from ADA   consensus guidelines.     08/04/2020 11.0 (H) 0 - 5.6 % Final     Comment:     Normal <5.7% Prediabetes 5.7-6.4%  Diabetes 6.5% or higher - adopted from ADA   consensus guidelines.  Results confirmed by repeat test       Hemoglobin A1C POCT   Date Value Ref Range Status   01/16/2024 7.6 (A) 4.3 - <5.7 % Final      TSH   Date Value Ref Range Status   02/15/2023 1.01 0.40 - 4.00 mU/L Final   12/30/2019 1.47 0.40 - 4.00 mU/L Final   05/13/2019 0.71 0.40 - 4.00 mU/L Final   05/10/2017 0.84 0.40 - 4.00 mU/L Final   02/17/2016 1.04 0.40 - 4.00 mU/L Final       Creatinine   Date Value Ref Range Status   03/26/2025 1.12 (H) 0.51 - 0.95 mg/dL Final   03/23/2021 1.18 (H) 0.52 - 1.04 mg/dL Final   ]    Recent Labs   Lab Test 09/29/21  0931 01/14/21  1622 05/17/19  1306   CHOL 128  --  147   HDL 42*  --  66   LDL 58 83 64   TRIG 140  --  83       Component      Latest Ref Rng 9/12/2023  4:36 PM   Metanephrine      0.00 - 0.49 nmol/L 0.22    Normetanephrine      0.00 - 0.89 nmol/L 0.41    Metanephrines Interpretation See Note     DHEA Sulfate      35 - 430 ug/dL 86    Adrenal Corticotropin      <47 pg/mL 35    Potassium      3.4 - 5.3 mmol/L 3.7    Aldosterone      0.0 - 31.0 ng/dL 16.3    Renin Activity      ng/mL/hr 19.6    Aldosterone Renin Ratio      0.0 - 25.0  0.8         Latest Ref Rng 9/12/2023  4:36 PM   ENDO ADRENAL LABS     Aldosterone 0.0 - 31.0 ng/dL 16.3    Creatinine 0.51 - 0.95 mg/dL    Creatinine Urine mg/dL    DHEA Sulfate 35 - 430 ug/dL 86    Metanephrine 0.00 - 0.49 nmol/L 0.22    Normetanephrine 0.00 - 0.89 nmol/L 0.41    Potassium 3.4 - 5.3 mmol/L 3.7    Renin Activity ng/mL/hr 19.6        This note has been dictated using voice recognition software.  As a result, there may be errors in the documentation that have gone undetected.  Please consider this when interpreting information in this documentation.      Video-Visit Details    Type of service:  Video Visit  Distant Location (provider location):  Off-site.   Joined the call at 7/1/2025, 1:01:54 pm.  Left the call at 7/1/2025, 1:11:05 pm.  You were on the call for 9 minutes 11 seconds.  Platform used for Video Visit: [employer]  The longitudinal plan of care for the diagnosis(es)/condition(s) as documented were addressed during this visit. Due to the added complexity in care, I will continue to support Bettina in the subsequent management and with ongoing continuity of care.        Again, thank you for allowing me to participate in the care of your patient.        Sincerely,        Cadence Akbar MD    Electronically signed

## 2025-07-06 ENCOUNTER — HEALTH MAINTENANCE LETTER (OUTPATIENT)
Age: 55
End: 2025-07-06

## 2025-07-09 ENCOUNTER — TELEPHONE (OUTPATIENT)
Dept: FAMILY MEDICINE | Facility: CLINIC | Age: 55
End: 2025-07-09

## 2025-07-09 DIAGNOSIS — I50.22 CHRONIC SYSTOLIC CONGESTIVE HEART FAILURE (H): ICD-10-CM

## 2025-07-09 RX ORDER — CARVEDILOL 25 MG/1
25 TABLET ORAL 2 TIMES DAILY WITH MEALS
Qty: 180 TABLET | Refills: 1 | Status: SHIPPED | OUTPATIENT
Start: 2025-07-09

## 2025-07-09 NOTE — TELEPHONE ENCOUNTER
Patient Quality Outreach    Patient is due for the following:   Diabetes -  Diabetic Follow-Up Visit  Asthma  -  ACT needed  Physical Preventive Adult Physical      Topic Date Due    Hepatitis B Vaccine (1 of 3 - 19+ 3-dose series) Never done    COVID-19 Vaccine (5 - 2024-25 season) 09/01/2024       Action(s) Taken:   Schedule a Adult Preventative    Type of outreach:    Sent [employer] message., Sent letter., and Copy of ACT mailed to patient.    Questions for provider review:    None         Madalyn Acosta MA  Chart routed to None.

## 2025-07-09 NOTE — LETTER
July 9, 2025    Bettina Montes  [address] [place] [address]    Dear Bettina    [facility] we care about your health and are committed to providing quality patient care.     Here is a list of Health Maintenance topics that are due now or due soon:  Health Maintenance Due   Topic Date Due    URINE DRUG SCREEN  Never done    HEPATITIS B VACCINE (1 of 3 - 19+ 3-dose series) Never done    HF ACTION PLAN  02/17/2019    ASTHMA ACTION PLAN  05/02/2019    EYE EXAM  04/29/2022    DIABETIC FOOT EXAM  09/01/2023    YEARLY PREVENTIVE VISIT  07/03/2024    LUNG CANCER SCREENING  07/05/2024    COVID-19 VACCINE (5 - 2024-25 season) 09/01/2024    ASTHMA CONTROL TEST  05/14/2025    A1C  06/26/2025    ANNUAL REVIEW OF HM ORDERS  11/14/2025        We are recommending that you:  Schedule a WELLNESS (Preventative/Physical) APPOINTMENT with your primary care provider. If you go elsewhere for your wellness appointments then please disregard this reminder    ,   Complete the attached ASTHMA CONTROL TEST.  If your total score is 19 or less or you have been to the ER or urgent care for your asthma, then please schedule an asthma followup appointment with your primary care provider.    ,    Schedule a Diabetes Follow-Up Office Appointment: You are due to be seen with your primary care provider for a diabetes follow up office appointment. Please schedule this as soon as you are able.       Schedule a Nurse-Only appointment to update your immunizations: Your records indicate that you are not up to date with your immunizations, please schedule a nurse-only appointment to get these updated or update them at your next office visit. If this is incorrect, please disregard.    To schedule an appointment or discuss this further, you may contact us by phone at the [facility] at [phone] or online through the patient [email or web address]    Thank you for  trusting [facility] and we appreciate the opportunity to serve you.  We look forward to supporting your healthcare needs in the future.    Your partners in health,      Quality Committee at [facility]          Electronically signed

## 2025-07-09 NOTE — TELEPHONE ENCOUNTER
Last Written Prescription:  carvedilol (COREG) 25 MG tablet 180 tablet 1 3/19/2025 -- No   Sig - Route: Take 1 tablet (25 mg) by mouth 2 times daily (with meals). - Oral   Sent to pharmacy as: Carvedilol 25 MG Oral Tablet (COREG)   Class: E-Prescribe   Notes to Pharmacy: New pharmacy   Order: [id number]     ----------------------  Last Visit Date: 3/26/25  Future Visit Date: 8/19/25  ----------------------      Refill decision:   [x] Medication refilled per  Medication Refill in Ambulatory Care  policy.      Request from pharmacy:  Requested Prescriptions   Pending Prescriptions Disp Refills    carvedilol (COREG) 25 MG tablet 180 tablet 1     Sig: Take 1 tablet (25 mg) by mouth 2 times daily (with meals).       Beta-Blockers Protocol Passed - 7/9/2025 12:35 PM        Passed - Patient is age 6 or older        Passed - Medication is active on med list and the sig matches. RN to manually verify dose and sig if red X/fail.     If the protocol passes (green check), you do not need to verify med dose and sig.    A prescription matches if they are the same clinical intention.    For Example: once daily and every morning are the same.    The protocol can not identify upper and lower case letters as matching and will fail.     For Example: Take 1 tablet (50 mg) by mouth daily     TAKE 1 TABLET (50 MG) BY MOUTH DAILY    For all fails (red x), verify dose and sig.    If the refill does match what is on file, the RN can still proceed to approve the refill request.       If they do not match, route to the appropriate provider.             Passed - Medication indicated for associated diagnosis     Medication is associated with one or more of the following diagnoses:     Hypertension (HTN)   Atrial fibrillation/flutter   Angina   ASCVD   Migraine   Heart Failure   Tremor   Anxiety   Ocular hypertension   Glaucoma   PTSD   Obstructive hypertrophic cardiomyopathy   History of myocarditis   Palpitations   POTS (postural orthostatic  tachycardia syndrome)   SVT (supraventricular tachycardia)   Hyperthyroidism   Tachycardia   Acute non-st segment elevation myocardial infarction   Subsequent non-st segment elevation myocardial infarction   Ischemic myocardial dysfunction          Passed - Most recent blood pressure on file in last 12 months     BP Readings from Last 3 Encounters:   05/05/25 89/64   03/26/25 115/77   11/14/24 119/78       No data recorded            Passed - Recent (12 month) or future (90 days) visit with authorizing provider's specialty (provided they have been seen in the past 15 months)     The patient must have completed an in-person or virtual visit within the past 12 months or has a future visit scheduled within the next 90 days with the authorizing provider s specialty.  Urgent care and e-visits do not qualify as an office visit for this protocol.

## 2025-07-17 ENCOUNTER — MYC MEDICAL ADVICE (OUTPATIENT)
Dept: ENDOCRINOLOGY | Facility: CLINIC | Age: 55
End: 2025-07-17

## 2025-07-17 DIAGNOSIS — E11.65 TYPE 2 DIABETES MELLITUS WITH HYPERGLYCEMIA, WITH LONG-TERM CURRENT USE OF INSULIN (H): Primary | ICD-10-CM

## 2025-07-17 DIAGNOSIS — Z79.4 TYPE 2 DIABETES MELLITUS WITH HYPERGLYCEMIA, WITH LONG-TERM CURRENT USE OF INSULIN (H): Primary | ICD-10-CM

## 2025-07-28 ENCOUNTER — MYC REFILL (OUTPATIENT)
Dept: CARDIOLOGY | Facility: CLINIC | Age: 55
End: 2025-07-28

## 2025-07-28 DIAGNOSIS — I25.10 CAD (CORONARY ARTERY DISEASE): ICD-10-CM

## 2025-07-30 RX ORDER — ATORVASTATIN CALCIUM 40 MG/1
40 TABLET, FILM COATED ORAL DAILY
Qty: 90 TABLET | Refills: 2 | Status: SHIPPED | OUTPATIENT
Start: 2025-07-30

## 2025-07-30 NOTE — TELEPHONE ENCOUNTER
Last Visit: 3/26/2025 [facility]       Request from pharmacy:  Requested Prescriptions   Pending Prescriptions Disp Refills    atorvastatin (LIPITOR) 40 MG tablet 90 tablet 1     Sig: Take 1 tablet (40 mg) by mouth daily.       Antihyperlipidemic agents Passed - 7/30/2025 10:31 AM        Passed - LDL on file in the past 12 months     Recent Labs   Lab Test 03/26/25  1513   LDL 51             Passed - Medication is active on med list and the sig matches. RN to manually verify dose and sig if red X/fail.     If the protocol passes (green check), you do not need to verify med dose and sig.    A prescription matches if they are the same clinical intention.    For Example: once daily and every morning are the same.    The protocol can not identify upper and lower case letters as matching and will fail.     For Example: Take 1 tablet (50 mg) by mouth daily     TAKE 1 TABLET (50 MG) BY MOUTH DAILY    For all fails (red x), verify dose and sig.    If the refill does match what is on file, the RN can still proceed to approve the refill request.       If they do not match, route to the appropriate provider.             Passed - Recent (12 month) or future (90 days) visit with authorizing provider's specialty (provided they have been seen in the past 15 months)     The patient must have completed an in-person or virtual visit within the past 12 months or has a future visit scheduled within the next 90 days with the authorizing provider s specialty.  Urgent care and e-visits do not qualify as an office visit for this protocol.          Passed - Patient is age 18 years or older        Passed - No active pregnancy on record        Passed - No positive pregnancy test in past 12 mos

## 2025-08-17 ENCOUNTER — HEALTH MAINTENANCE LETTER (OUTPATIENT)
Age: 55
End: 2025-08-17

## 2025-08-19 ENCOUNTER — OFFICE VISIT (OUTPATIENT)
Dept: CARDIOLOGY | Facility: CLINIC | Age: 55
End: 2025-08-19
Payer: COMMERCIAL

## 2025-08-19 ENCOUNTER — LAB (OUTPATIENT)
Dept: LAB | Facility: CLINIC | Age: 55
End: 2025-08-19
Payer: COMMERCIAL

## 2025-08-19 VITALS
WEIGHT: 252.25 LBS | HEART RATE: 90 BPM | DIASTOLIC BLOOD PRESSURE: 69 MMHG | OXYGEN SATURATION: 98 % | BODY MASS INDEX: 40.71 KG/M2 | SYSTOLIC BLOOD PRESSURE: 102 MMHG

## 2025-08-19 DIAGNOSIS — I50.22 CHRONIC SYSTOLIC CONGESTIVE HEART FAILURE (H): ICD-10-CM

## 2025-08-19 DIAGNOSIS — I50.22 CHRONIC SYSTOLIC CONGESTIVE HEART FAILURE (H): Primary | ICD-10-CM

## 2025-08-19 LAB
ANION GAP SERPL CALCULATED.3IONS-SCNC: 13 MMOL/L (ref 7–15)
BUN SERPL-MCNC: 14.8 MG/DL (ref 6–20)
CALCIUM SERPL-MCNC: 9.8 MG/DL (ref 8.8–10.4)
CHLORIDE SERPL-SCNC: 100 MMOL/L (ref 98–107)
CREAT SERPL-MCNC: 1.16 MG/DL (ref 0.51–0.95)
EGFRCR SERPLBLD CKD-EPI 2021: 55 ML/MIN/1.73M2
GLUCOSE SERPL-MCNC: 120 MG/DL (ref 70–99)
HCO3 SERPL-SCNC: 26 MMOL/L (ref 22–29)
NT-PROBNP SERPL-MCNC: 136 PG/ML (ref 0–226)
POTASSIUM SERPL-SCNC: 4.2 MMOL/L (ref 3.4–5.3)
SODIUM SERPL-SCNC: 139 MMOL/L (ref 135–145)

## 2025-08-19 PROCEDURE — 83880 ASSAY OF NATRIURETIC PEPTIDE: CPT

## 2025-08-19 PROCEDURE — 99215 OFFICE O/P EST HI 40 MIN: CPT | Performed by: INTERNAL MEDICINE

## 2025-08-19 PROCEDURE — 36415 COLL VENOUS BLD VENIPUNCTURE: CPT

## 2025-08-19 PROCEDURE — 99417 PROLNG OP E/M EACH 15 MIN: CPT | Performed by: INTERNAL MEDICINE

## 2025-08-19 PROCEDURE — 80048 BASIC METABOLIC PNL TOTAL CA: CPT

## 2025-08-20 ENCOUNTER — TELEPHONE (OUTPATIENT)
Dept: CARDIOLOGY | Facility: CLINIC | Age: 55
End: 2025-08-20
Payer: COMMERCIAL

## (undated) DEVICE — PREP CHLORAPREP 26ML TINTED ORANGE  260815

## (undated) DEVICE — ESU ELEC BLADE 2.75" COATED/INSULATED E1455

## (undated) DEVICE — SPONGE SURGIFOAM 100 1974

## (undated) DEVICE — SU VICRYL 2-0 CT-2 CR 8X18" J726D

## (undated) DEVICE — DRSG KERLIX FLUFFS X5

## (undated) DEVICE — ESU CLEANER TIP 31142717

## (undated) DEVICE — TUBING SUCTION MEDI-VAC SOFT 3/16"X20' N520A

## (undated) DEVICE — SUCTION MANIFOLD NEPTUNE 2 SYS 4 PORT 0702-020-000

## (undated) DEVICE — GLOVE PROTEXIS W/NEU-THERA 8.5  2D73TE85

## (undated) DEVICE — RX SURGIFLO HEMOSTATIC MATRIX 8ML 2991

## (undated) DEVICE — Device

## (undated) DEVICE — DRAPE X-RAY TUBE 00-901169-01-OEC

## (undated) DEVICE — ESU GROUND PAD ADULT W/CORD E7507

## (undated) DEVICE — DRAPE MICROSCOPE OPMI ZEISS 48X118" 306071-0000-000

## (undated) DEVICE — DECANTER VIAL 2006S

## (undated) DEVICE — SPONGE COTTONOID 1/2X1/2" 80-1400

## (undated) DEVICE — LINEN POUCH DBL 5427

## (undated) DEVICE — PEN MARKING SKIN W/LABELS 31145884

## (undated) DEVICE — DRAIN JACKSON PRATT RESERVOIR 100ML SU130-1305

## (undated) DEVICE — DRSG TELFA ISLAND 4X10"

## (undated) DEVICE — DRAPE MAYO STAND 23X54 8337

## (undated) DEVICE — IMM COLLAR CERVICAL MED UNIVERSAL 3X24" 79-83500

## (undated) DEVICE — NDL SPINAL 22GA 3.5" QUINCKE 405181

## (undated) DEVICE — PACK SMALL SPINE RIDGES

## (undated) DEVICE — TAPE DURAPORE 3" SILK 1538-3

## (undated) DEVICE — GOWN REINFORCED XXLG 9071

## (undated) DEVICE — DRAIN JACKSON PRATT CHANNEL 10FR RND SIL W/TROCAR JP-2227

## (undated) DEVICE — GLOVE PROTEXIS MICRO 7.5  2D73PM75

## (undated) DEVICE — MIDAS REX DISSECTING TOOL  14MH30

## (undated) DEVICE — DECANTER BAG 2002S

## (undated) DEVICE — GLOVE PROTEXIS BLUE W/NEU-THERA 8.5  2D73EB85

## (undated) DEVICE — LINEN ORTHO ACL PACK 5447

## (undated) DEVICE — SU VICRYL 3-0 SH CR 8X18" J774

## (undated) DEVICE — BLADE KNIFE SURG 11 371111

## (undated) DEVICE — SOL WATER IRRIG 1000ML BOTTLE 07139-09

## (undated) RX ORDER — PHENYLEPHRINE HCL IN 0.9% NACL 1 MG/10 ML
SYRINGE (ML) INTRAVENOUS
Status: DISPENSED
Start: 2019-04-05

## (undated) RX ORDER — PROPOFOL 10 MG/ML
INJECTION, EMULSION INTRAVENOUS
Status: DISPENSED
Start: 2019-04-05

## (undated) RX ORDER — FENTANYL CITRATE 50 UG/ML
INJECTION, SOLUTION INTRAMUSCULAR; INTRAVENOUS
Status: DISPENSED
Start: 2019-04-05

## (undated) RX ORDER — HYDROMORPHONE HYDROCHLORIDE 1 MG/ML
INJECTION, SOLUTION INTRAMUSCULAR; INTRAVENOUS; SUBCUTANEOUS
Status: DISPENSED
Start: 2019-04-05

## (undated) RX ORDER — BUPIVACAINE HYDROCHLORIDE AND EPINEPHRINE 5; 5 MG/ML; UG/ML
INJECTION, SOLUTION EPIDURAL; INTRACAUDAL; PERINEURAL
Status: DISPENSED
Start: 2019-04-05

## (undated) RX ORDER — CEFAZOLIN SODIUM IN 0.9 % NACL 3 G/100 ML
INTRAVENOUS SOLUTION, PIGGYBACK (ML) INTRAVENOUS
Status: DISPENSED
Start: 2019-04-05

## (undated) RX ORDER — LIDOCAINE HYDROCHLORIDE 10 MG/ML
INJECTION, SOLUTION EPIDURAL; INFILTRATION; INTRACAUDAL; PERINEURAL
Status: DISPENSED
Start: 2019-04-05

## (undated) RX ORDER — EPHEDRINE SULFATE 50 MG/ML
INJECTION, SOLUTION INTRAMUSCULAR; INTRAVENOUS; SUBCUTANEOUS
Status: DISPENSED
Start: 2019-04-05

## (undated) RX ORDER — KETAMINE HCL IN 0.9 % NACL 50 MG/5 ML
SYRINGE (ML) INTRAVENOUS
Status: DISPENSED
Start: 2019-04-05

## (undated) RX ORDER — ATROPINE SULFATE 0.4 MG/ML
AMPUL (ML) INJECTION
Status: DISPENSED
Start: 2019-03-21

## (undated) RX ORDER — CEFAZOLIN SODIUM 1 G/3ML
INJECTION, POWDER, FOR SOLUTION INTRAMUSCULAR; INTRAVENOUS
Status: DISPENSED
Start: 2019-04-05

## (undated) RX ORDER — FENTANYL CITRATE 50 UG/ML
INJECTION, SOLUTION INTRAMUSCULAR; INTRAVENOUS
Status: DISPENSED
Start: 2022-10-24

## (undated) RX ORDER — DEXAMETHASONE SODIUM PHOSPHATE 4 MG/ML
INJECTION, SOLUTION INTRA-ARTICULAR; INTRALESIONAL; INTRAMUSCULAR; INTRAVENOUS; SOFT TISSUE
Status: DISPENSED
Start: 2019-04-05

## (undated) RX ORDER — ONDANSETRON 2 MG/ML
INJECTION INTRAMUSCULAR; INTRAVENOUS
Status: DISPENSED
Start: 2019-04-05

## (undated) RX ORDER — GLYCOPYRROLATE 0.2 MG/ML
INJECTION INTRAMUSCULAR; INTRAVENOUS
Status: DISPENSED
Start: 2019-04-05

## (undated) RX ORDER — DOBUTAMINE HYDROCHLORIDE 200 MG/100ML
INJECTION INTRAVENOUS
Status: DISPENSED
Start: 2019-03-21

## (undated) RX ORDER — METOPROLOL TARTRATE 1 MG/ML
INJECTION, SOLUTION INTRAVENOUS
Status: DISPENSED
Start: 2019-03-21